# Patient Record
Sex: MALE | Race: WHITE | NOT HISPANIC OR LATINO | URBAN - METROPOLITAN AREA
[De-identification: names, ages, dates, MRNs, and addresses within clinical notes are randomized per-mention and may not be internally consistent; named-entity substitution may affect disease eponyms.]

---

## 2018-11-18 ENCOUNTER — INPATIENT (INPATIENT)
Facility: HOSPITAL | Age: 73
LOS: 2 days | Discharge: OTHER INSTITUTION | DRG: 481 | End: 2018-11-21
Attending: HOSPITALIST | Admitting: FAMILY MEDICINE
Payer: MEDICARE

## 2018-11-18 VITALS
HEIGHT: 67 IN | TEMPERATURE: 98 F | DIASTOLIC BLOOD PRESSURE: 80 MMHG | RESPIRATION RATE: 15 BRPM | HEART RATE: 80 BPM | OXYGEN SATURATION: 93 % | SYSTOLIC BLOOD PRESSURE: 138 MMHG | WEIGHT: 179.9 LBS

## 2018-11-18 DIAGNOSIS — Z90.49 ACQUIRED ABSENCE OF OTHER SPECIFIED PARTS OF DIGESTIVE TRACT: Chronic | ICD-10-CM

## 2018-11-18 DIAGNOSIS — J43.9 EMPHYSEMA, UNSPECIFIED: ICD-10-CM

## 2018-11-18 DIAGNOSIS — I10 ESSENTIAL (PRIMARY) HYPERTENSION: ICD-10-CM

## 2018-11-18 DIAGNOSIS — I25.10 ATHEROSCLEROTIC HEART DISEASE OF NATIVE CORONARY ARTERY WITHOUT ANGINA PECTORIS: ICD-10-CM

## 2018-11-18 DIAGNOSIS — M97.02XA PERIPROSTHETIC FRACTURE AROUND INTERNAL PROSTHETIC LEFT HIP JOINT, INITIAL ENCOUNTER: ICD-10-CM

## 2018-11-18 DIAGNOSIS — M06.9 RHEUMATOID ARTHRITIS, UNSPECIFIED: ICD-10-CM

## 2018-11-18 DIAGNOSIS — M81.0 AGE-RELATED OSTEOPOROSIS WITHOUT CURRENT PATHOLOGICAL FRACTURE: ICD-10-CM

## 2018-11-18 DIAGNOSIS — Z29.9 ENCOUNTER FOR PROPHYLACTIC MEASURES, UNSPECIFIED: ICD-10-CM

## 2018-11-18 DIAGNOSIS — N40.0 BENIGN PROSTATIC HYPERPLASIA WITHOUT LOWER URINARY TRACT SYMPTOMS: ICD-10-CM

## 2018-11-18 DIAGNOSIS — M19.90 UNSPECIFIED OSTEOARTHRITIS, UNSPECIFIED SITE: ICD-10-CM

## 2018-11-18 DIAGNOSIS — F41.9 ANXIETY DISORDER, UNSPECIFIED: ICD-10-CM

## 2018-11-18 DIAGNOSIS — Z96.642 PRESENCE OF LEFT ARTIFICIAL HIP JOINT: Chronic | ICD-10-CM

## 2018-11-18 DIAGNOSIS — E78.5 HYPERLIPIDEMIA, UNSPECIFIED: ICD-10-CM

## 2018-11-18 DIAGNOSIS — E66.9 OBESITY, UNSPECIFIED: ICD-10-CM

## 2018-11-18 DIAGNOSIS — Z96.611 PRESENCE OF RIGHT ARTIFICIAL SHOULDER JOINT: Chronic | ICD-10-CM

## 2018-11-18 LAB
ANION GAP SERPL CALC-SCNC: 8 MMOL/L — SIGNIFICANT CHANGE UP (ref 5–17)
APPEARANCE UR: CLEAR — SIGNIFICANT CHANGE UP
APTT BLD: 29.4 SEC — SIGNIFICANT CHANGE UP (ref 28.5–37)
BILIRUB UR-MCNC: ABNORMAL
BUN SERPL-MCNC: 24 MG/DL — HIGH (ref 7–23)
CALCIUM SERPL-MCNC: 8.7 MG/DL — SIGNIFICANT CHANGE UP (ref 8.5–10.1)
CHLORIDE SERPL-SCNC: 100 MMOL/L — SIGNIFICANT CHANGE UP (ref 96–108)
CO2 SERPL-SCNC: 27 MMOL/L — SIGNIFICANT CHANGE UP (ref 22–31)
COLOR SPEC: YELLOW — SIGNIFICANT CHANGE UP
CREAT SERPL-MCNC: 0.95 MG/DL — SIGNIFICANT CHANGE UP (ref 0.5–1.3)
DIFF PNL FLD: NEGATIVE — SIGNIFICANT CHANGE UP
GLUCOSE SERPL-MCNC: 92 MG/DL — SIGNIFICANT CHANGE UP (ref 70–99)
GLUCOSE UR QL: NEGATIVE — SIGNIFICANT CHANGE UP
HCT VFR BLD CALC: 32.6 % — LOW (ref 39–50)
HGB BLD-MCNC: 10.8 G/DL — LOW (ref 13–17)
INR BLD: 1.16 RATIO — SIGNIFICANT CHANGE UP (ref 0.88–1.16)
KETONES UR-MCNC: ABNORMAL
LEUKOCYTE ESTERASE UR-ACNC: ABNORMAL
MCHC RBC-ENTMCNC: 31.8 PG — SIGNIFICANT CHANGE UP (ref 27–34)
MCHC RBC-ENTMCNC: 33.1 GM/DL — SIGNIFICANT CHANGE UP (ref 32–36)
MCV RBC AUTO: 95.9 FL — SIGNIFICANT CHANGE UP (ref 80–100)
NITRITE UR-MCNC: NEGATIVE — SIGNIFICANT CHANGE UP
NRBC # BLD: 0 /100 WBCS — SIGNIFICANT CHANGE UP (ref 0–0)
PH UR: 5 — SIGNIFICANT CHANGE UP (ref 5–8)
PLATELET # BLD AUTO: 202 K/UL — SIGNIFICANT CHANGE UP (ref 150–400)
POTASSIUM SERPL-MCNC: 4.7 MMOL/L — SIGNIFICANT CHANGE UP (ref 3.5–5.3)
POTASSIUM SERPL-SCNC: 4.7 MMOL/L — SIGNIFICANT CHANGE UP (ref 3.5–5.3)
PROT UR-MCNC: NEGATIVE — SIGNIFICANT CHANGE UP
PROTHROM AB SERPL-ACNC: 13.3 SEC — HIGH (ref 10–12.9)
RBC # BLD: 3.4 M/UL — LOW (ref 4.2–5.8)
RBC # FLD: 15.5 % — HIGH (ref 10.3–14.5)
SODIUM SERPL-SCNC: 135 MMOL/L — SIGNIFICANT CHANGE UP (ref 135–145)
SP GR SPEC: 1.02 — SIGNIFICANT CHANGE UP (ref 1.01–1.02)
TSH SERPL-MCNC: 1.04 UIU/ML — SIGNIFICANT CHANGE UP (ref 0.36–3.74)
UROBILINOGEN FLD QL: 1
WBC # BLD: 11.05 K/UL — HIGH (ref 3.8–10.5)
WBC # FLD AUTO: 11.05 K/UL — HIGH (ref 3.8–10.5)

## 2018-11-18 PROCEDURE — 99285 EMERGENCY DEPT VISIT HI MDM: CPT

## 2018-11-18 PROCEDURE — 73552 X-RAY EXAM OF FEMUR 2/>: CPT | Mod: 26,LT

## 2018-11-18 PROCEDURE — 74018 RADEX ABDOMEN 1 VIEW: CPT | Mod: 26

## 2018-11-18 PROCEDURE — 73700 CT LOWER EXTREMITY W/O DYE: CPT | Mod: 26,LT

## 2018-11-18 PROCEDURE — 99223 1ST HOSP IP/OBS HIGH 75: CPT | Mod: GC,AI

## 2018-11-18 PROCEDURE — 71045 X-RAY EXAM CHEST 1 VIEW: CPT | Mod: 26

## 2018-11-18 PROCEDURE — 73502 X-RAY EXAM HIP UNI 2-3 VIEWS: CPT | Mod: 26,LT

## 2018-11-18 PROCEDURE — 93010 ELECTROCARDIOGRAM REPORT: CPT

## 2018-11-18 RX ORDER — SODIUM CHLORIDE 9 MG/ML
1000 INJECTION, SOLUTION INTRAVENOUS
Qty: 0 | Refills: 0 | Status: DISCONTINUED | OUTPATIENT
Start: 2018-11-18 | End: 2018-11-19

## 2018-11-18 RX ORDER — HYDROCORTISONE 20 MG
25 TABLET ORAL EVERY 8 HOURS
Qty: 0 | Refills: 0 | Status: DISCONTINUED | OUTPATIENT
Start: 2018-11-19 | End: 2018-11-19

## 2018-11-18 RX ORDER — HYDROCORTISONE 20 MG
50 TABLET ORAL ONCE
Qty: 0 | Refills: 0 | Status: COMPLETED | OUTPATIENT
Start: 2018-11-19 | End: 2018-11-19

## 2018-11-18 RX ORDER — HYDROMORPHONE HYDROCHLORIDE 2 MG/ML
1 INJECTION INTRAMUSCULAR; INTRAVENOUS; SUBCUTANEOUS EVERY 4 HOURS
Qty: 0 | Refills: 0 | Status: DISCONTINUED | OUTPATIENT
Start: 2018-11-18 | End: 2018-11-19

## 2018-11-18 RX ORDER — ASPIRIN/CALCIUM CARB/MAGNESIUM 324 MG
81 TABLET ORAL DAILY
Qty: 0 | Refills: 0 | Status: DISCONTINUED | OUTPATIENT
Start: 2018-11-18 | End: 2018-11-18

## 2018-11-18 RX ORDER — FLUOXETINE HCL 10 MG
60 CAPSULE ORAL DAILY
Qty: 0 | Refills: 0 | Status: DISCONTINUED | OUTPATIENT
Start: 2018-11-18 | End: 2018-11-19

## 2018-11-18 RX ORDER — TAMSULOSIN HYDROCHLORIDE 0.4 MG/1
0.4 CAPSULE ORAL AT BEDTIME
Qty: 0 | Refills: 0 | Status: DISCONTINUED | OUTPATIENT
Start: 2018-11-18 | End: 2018-11-19

## 2018-11-18 RX ORDER — ALPRAZOLAM 0.25 MG
1 TABLET ORAL
Qty: 0 | Refills: 0 | COMMUNITY

## 2018-11-18 RX ORDER — LISINOPRIL 2.5 MG/1
1 TABLET ORAL
Qty: 0 | Refills: 0 | COMMUNITY

## 2018-11-18 RX ORDER — FUROSEMIDE 40 MG
1 TABLET ORAL
Qty: 0 | Refills: 0 | COMMUNITY

## 2018-11-18 RX ORDER — FLUOXETINE HCL 10 MG
60 CAPSULE ORAL ONCE
Qty: 0 | Refills: 0 | Status: DISCONTINUED | OUTPATIENT
Start: 2018-11-18 | End: 2018-11-18

## 2018-11-18 RX ORDER — FLUOXETINE HCL 10 MG
1 CAPSULE ORAL
Qty: 0 | Refills: 0 | COMMUNITY

## 2018-11-18 RX ORDER — SODIUM CHLORIDE 9 MG/ML
500 INJECTION, SOLUTION INTRAVENOUS
Qty: 0 | Refills: 0 | Status: DISCONTINUED | OUTPATIENT
Start: 2018-11-18 | End: 2018-11-19

## 2018-11-18 RX ORDER — METHADONE HYDROCHLORIDE 40 MG/1
5 TABLET ORAL EVERY 8 HOURS
Qty: 0 | Refills: 0 | Status: DISCONTINUED | OUTPATIENT
Start: 2018-11-18 | End: 2018-11-19

## 2018-11-18 RX ORDER — CYCLOBENZAPRINE HYDROCHLORIDE 10 MG/1
10 TABLET, FILM COATED ORAL THREE TIMES A DAY
Qty: 0 | Refills: 0 | Status: DISCONTINUED | OUTPATIENT
Start: 2018-11-18 | End: 2018-11-18

## 2018-11-18 RX ORDER — HEPARIN SODIUM 5000 [USP'U]/ML
5000 INJECTION INTRAVENOUS; SUBCUTANEOUS EVERY 8 HOURS
Qty: 0 | Refills: 0 | Status: COMPLETED | OUTPATIENT
Start: 2018-11-18 | End: 2018-11-18

## 2018-11-18 RX ORDER — SIMVASTATIN 20 MG/1
5 TABLET, FILM COATED ORAL AT BEDTIME
Qty: 0 | Refills: 0 | Status: DISCONTINUED | OUTPATIENT
Start: 2018-11-18 | End: 2018-11-19

## 2018-11-18 RX ORDER — FINASTERIDE 5 MG/1
5 TABLET, FILM COATED ORAL DAILY
Qty: 0 | Refills: 0 | Status: DISCONTINUED | OUTPATIENT
Start: 2018-11-18 | End: 2018-11-19

## 2018-11-18 RX ORDER — FUROSEMIDE 40 MG
20 TABLET ORAL DAILY
Qty: 0 | Refills: 0 | Status: DISCONTINUED | OUTPATIENT
Start: 2018-11-18 | End: 2018-11-18

## 2018-11-18 RX ORDER — ALPRAZOLAM 0.25 MG
0.5 TABLET ORAL AT BEDTIME
Qty: 0 | Refills: 0 | Status: DISCONTINUED | OUTPATIENT
Start: 2018-11-18 | End: 2018-11-19

## 2018-11-18 RX ORDER — LISINOPRIL 2.5 MG/1
20 TABLET ORAL DAILY
Qty: 0 | Refills: 0 | Status: DISCONTINUED | OUTPATIENT
Start: 2018-11-18 | End: 2018-11-19

## 2018-11-18 RX ORDER — ALBUTEROL 90 UG/1
2.5 AEROSOL, METERED ORAL EVERY 8 HOURS
Qty: 0 | Refills: 0 | Status: DISCONTINUED | OUTPATIENT
Start: 2018-11-18 | End: 2018-11-19

## 2018-11-18 RX ORDER — TIOTROPIUM BROMIDE 18 UG/1
1 CAPSULE ORAL; RESPIRATORY (INHALATION) DAILY
Qty: 0 | Refills: 0 | Status: DISCONTINUED | OUTPATIENT
Start: 2018-11-18 | End: 2018-11-19

## 2018-11-18 RX ORDER — HYDROMORPHONE HYDROCHLORIDE 2 MG/ML
1 INJECTION INTRAMUSCULAR; INTRAVENOUS; SUBCUTANEOUS ONCE
Qty: 0 | Refills: 0 | Status: DISCONTINUED | OUTPATIENT
Start: 2018-11-18 | End: 2018-11-18

## 2018-11-18 RX ADMIN — ALBUTEROL 2.5 MILLIGRAM(S): 90 AEROSOL, METERED ORAL at 23:24

## 2018-11-18 RX ADMIN — HYDROMORPHONE HYDROCHLORIDE 1 MILLIGRAM(S): 2 INJECTION INTRAMUSCULAR; INTRAVENOUS; SUBCUTANEOUS at 16:12

## 2018-11-18 RX ADMIN — HYDROMORPHONE HYDROCHLORIDE 1 MILLIGRAM(S): 2 INJECTION INTRAMUSCULAR; INTRAVENOUS; SUBCUTANEOUS at 20:09

## 2018-11-18 RX ADMIN — HYDROMORPHONE HYDROCHLORIDE 1 MILLIGRAM(S): 2 INJECTION INTRAMUSCULAR; INTRAVENOUS; SUBCUTANEOUS at 21:00

## 2018-11-18 RX ADMIN — HEPARIN SODIUM 5000 UNIT(S): 5000 INJECTION INTRAVENOUS; SUBCUTANEOUS at 16:33

## 2018-11-18 RX ADMIN — FINASTERIDE 5 MILLIGRAM(S): 5 TABLET, FILM COATED ORAL at 18:25

## 2018-11-18 RX ADMIN — SODIUM CHLORIDE 75 MILLILITER(S): 9 INJECTION, SOLUTION INTRAVENOUS at 23:30

## 2018-11-18 RX ADMIN — METHADONE HYDROCHLORIDE 5 MILLIGRAM(S): 40 TABLET ORAL at 21:43

## 2018-11-18 RX ADMIN — HYDROMORPHONE HYDROCHLORIDE 1 MILLIGRAM(S): 2 INJECTION INTRAMUSCULAR; INTRAVENOUS; SUBCUTANEOUS at 15:56

## 2018-11-18 RX ADMIN — TAMSULOSIN HYDROCHLORIDE 0.4 MILLIGRAM(S): 0.4 CAPSULE ORAL at 21:43

## 2018-11-18 RX ADMIN — SIMVASTATIN 5 MILLIGRAM(S): 20 TABLET, FILM COATED ORAL at 21:43

## 2018-11-18 RX ADMIN — HYDROMORPHONE HYDROCHLORIDE 1 MILLIGRAM(S): 2 INJECTION INTRAMUSCULAR; INTRAVENOUS; SUBCUTANEOUS at 12:36

## 2018-11-18 RX ADMIN — Medication 60 MILLIGRAM(S): at 18:26

## 2018-11-18 RX ADMIN — HEPARIN SODIUM 5000 UNIT(S): 5000 INJECTION INTRAVENOUS; SUBCUTANEOUS at 21:43

## 2018-11-18 NOTE — H&P ADULT - NSHPLABSRESULTS_GEN_ALL_CORE
LABS:                        10.8   11.05 )-----------( 202      ( 18 Nov 2018 11:14 )             32.6     11-18    135  |  100  |  24<H>  ----------------------------<  92  4.7   |  27  |  0.95    Ca    8.7      18 Nov 2018 11:11        PT/INR - ( 18 Nov 2018 12:18 )   PT: 13.3 sec;   INR: 1.16 ratio         PTT - ( 18 Nov 2018 12:18 )  PTT:29.4 sec    IMPRESSION:  Left hip arthroplasty. Comminuted slightly displaced left periprosthetic   fracture of the left proximal femur, as discussed. Fracture of the left   superior pubic ramus.    The distal half of the femur cannot be assessed for the presence or   absence of fractures due to extensive motion artifact. However, the   radiographs reveal no evidence of left distal femur fracture.    < end of copied text >    < from: Xray Hip w/ Pelvis 2 or 3 Views, Left (11.18.18 @ 11:48) >    AP pelvis, 2 views left hip.    There is a left THR. There is acute slightly displaced periprosthetic   fracture involving lesser trochanter and proximal shaft. No dislocation.   Pelvic ring sacroiliac joints pubic symphysis intact. Marked global   narrowing right hip joint space. Extensive arterial calcification.    Impression: Acute periprosthetic fracture left proximal femur.    < end of copied text >    < from: Xray Chest 1 View AP/PA (11.18.18 @ 11:46) >    Status post median sternotomy. Heart magnified by AP film shallow   inspiration. Calcified aortic arch. Grossly clear lungs. No consolidation   or effusion. Partially visualized hardware lower cervical spine. Right   shoulder arthroplasty. Trace right upper quadrant. Metallic fusion lumbar   spine    Impression: Grossly clear lungs.    < end of copied text >    < from: Xray Femur 2 Views, Left (11.18.18 @ 11:46) >    Impression: Periprosthetic fracture left proximal femur involving the   lesser trochanter and proximal shaft described in a separate report.   Remainder of the left femur is intact. No dislocation. Left THR noted.   Extensive arterial calcification.    < end of copied text >

## 2018-11-18 NOTE — ED ADULT NURSE NOTE - PMH
HTN (hypertension) COPD (chronic obstructive pulmonary disease)    HTN (hypertension)    Osteoarthritis    Rheumatoid arthritis    S/P CABG x 3    S/P hip replacement, left

## 2018-11-18 NOTE — CONSULT NOTE ADULT - ASSESSMENT
A/P: 72y Male with L PP hip fx    Pain control  NWB L LE, bedrest  imaging reviewed  Admit to medical team and mgmt appreciated  Medical clearance/optimization for OR for tomorrow 11/19/18  npo except meds after midnight   iv fluids while npo  hold any chemical AC after midnight  discussed with attending and will advise if plan changes

## 2018-11-18 NOTE — H&P ADULT - PROBLEM SELECTOR PLAN 8
DVT proph- patient takes alendronate weekly.  patient has medication with him, plan to give to pharmacy patient takes alendronate weekly (on Wednesdays).   patient's wife states that she will bring in the medication, plan to give to pharmacy patient takes alendronate weekly (on Wednesdays).   patient's wife states that she will bring in the medication

## 2018-11-18 NOTE — CONSULT NOTE ADULT - PROBLEM SELECTOR RECOMMENDATION 4
planned for OR  will optimize for OR from pulm point  copd  ex smoker  cad  prognosis guarded  I aundrea  will follow  ortho eval noted  discussed with family

## 2018-11-18 NOTE — H&P ADULT - NSHPSOCIALHISTORY_GEN_ALL_CORE
patient lives in [place] and is here visiting family for the holidays.  patient uses a walker and a cane.  quite smoking 6 years ago, drinks 1 drink per week, denies illicit drug use.

## 2018-11-18 NOTE — ED PROVIDER NOTE - ATTESTATION, MLM
[facility]    [address] [place] [address]    Phone:  [phone]    Fax:  [phone]       Thank You for choosing us for your health care visit. We are glad to serve you and happy to provide you with this summary of your visit. Please help us to ensure we have accurate records. If you find anything that needs to be changed, please let our staff know as soon as possible.          Your Demographic Information     Patient Name Sex Duong Carpio Male 1995       Ethnic Group Patient Race    Not of  or  Origin White      Your Visit Details     Date & Time Provider Department    2017 8:00 AM Duong Pittman MD [facility]      We Ordered or Performed the Following     XR HIP 2 VW RIGHT     XR PELVIS 1 OR 2 VW       Conditions Discussed Today or Order-Related Diagnoses        Comments    Right hip pain    -  Primary       Your Vitals Were     Temp Height Weight BMI Smoking Status       97.6 °F (36.4 °C) (Temporal Artery) 5' 10\" (1.778 m) (56 %, Z= 0.16)* 155 lb (70.3 kg) (52 %, Z= 0.06)* 22.24 kg/m2 (45 %, Z= -0.14)* Current Every Day Smoker     *Growth percentiles are based on CDC 2-20 Years data.      Medications Prescribed or Re-Ordered Today     None      Your Current Medications Are        Disp Refills Start End    cetirizine (ZYRTEC) 10 MG tablet        Sig - Route: Take 10 mg by mouth daily. - Oral    Class: Historical Med    loratadine (CLARITIN) 10 MG tablet 30 tablet 1 2016    Sig - Route: Take 1 tablet by mouth daily. Indications: Hayfever - Oral    fluticasone (FLONASE) 50 MCG/ACT nasal spray 16 g 12 3/17/2016     Sig - Route: Spray 1 spray in each nostril daily as needed (allergic rhinitis). Indications: Hayfever - Nasal    Class: Eprescribe    EPINEPHrine (EPIPEN) 0.3 MG/0.3ML injection        Sig - Route: Inject 0.3 mg into the muscle once. - Intramuscular    Class: Historical Med      Discontinued Medications        Reason for Discontinue    buPROPion (WELLBUTRIN XL) 150 MG 24 hr tablet Therapy Completed    VIVITROL 380 MG injection Therapy Completed      Allergies     Cat Dander Dermatitis    Nut - Multiple     Peanut RASH      Immunizations History as of 2/2/2017     Name Date    DTaP 10/30/2002, 2/4/1999, 5/13/1998, 2/11/1998, 1997    HEPATITIS A CHILD 7/17/2013    HIB 2/4/1999, 2/11/1998, 1997    HPV QUAD 8/24/2011, 3/28/2011, 2/28/2011    Hepatitis B Child 2/11/1999, 5/13/1998, 1997    Influenza 11/20/2012, 11/8/2011, 12/3/2010, 11/13/2006, 10/6/2005, 11/28/2003    Influenza Quadrivalent Live 12/10/2013    Influenza Quadrivalent Preservative Free 10/21/2015  4:40 PM    MMR 10/30/2002, 11/11/1998    Meningococcal Conjugate MCV4P (Menactra) 12/10/2013, 7/28/2009    Polio, INACTIVATED 10/30/2002    Polio, ORAL 2/4/1999, 2/11/1998, 1997    Tdap 7/28/2009    Varicella 11/21/2007, 11/11/1998      Problem List as of 2/2/2017     Myopia OU    Fitting and adjustment of spectacles and contact lenses    Hydrocele    Cannabis abuse    Depressive disorder, not elsewhere classified    Concussion      Results of Testing Done Today             Patient Instructions     None       I have reviewed and confirmed nurses' notes for patient's medications, allergies, medical history, and surgical history.

## 2018-11-18 NOTE — ED PROVIDER NOTE - OBJECTIVE STATEMENT
71 yo white male from [place] with PMHx of COPD, HTN, HLD, MI, CABG, PTCA with STENTS, RA and OA was well until stumbled while putting on pants and landed on left hip. Since that time, 2-hours ago, patient has been unable to walk or bear any weight on left lower extremity as a result of left hip pain. Does not have any headache/head trauma, neck/chest or back pain and denies any LOC.

## 2018-11-18 NOTE — ED PROVIDER NOTE - CARE PLAN
Principal Discharge DX:	Periprosthetic fracture around internal prosthetic left hip joint, initial encounter

## 2018-11-18 NOTE — H&P ADULT - HISTORY OF PRESENT ILLNESS
71yo male PMH emphysema, HTN, HLD, MI, CABG s/p 3 stents (2008), Rheumatoid arthritis, and osteoarthritis presenting with left sided hip pain due to mechanical fall this morning. Patient stated that he was pulling up his pants and lost balance and fallen. He was laying on the ground for 30 minutes, as wife was not able to pick him up. Patient is in severe 10/10 pain. Patient denies LOC, headaches, fevers or chills but does admit to numbness and tingling alongside left lower extremity.    In the ED: vitals stable. CBC, BMP, UA, xray left hip, xray left femur, CT left femur, CT left hip ordered and significant for: EBC 11.05, Hgb 10.8, elevated BUN 24  EKG:  xray left hip: Acute periprosthetic fracture left proximal femur.  xray left femur:  Periprosthetic fracture left proximal femur involving the   lesser trochanter and proximal shaft.  CT femur: Left hip arthroplasty. Comminuted slightly displaced left periprosthetic   fracture of the left proximal femur, as discussed. Fracture of the left   superior pubic ramus.  chest xray: Grossly clear lungs. 71yo male PMH emphysema, HTN, HLD, MI, CABG s/p 3 stents (2008), Rheumatoid arthritis, and osteoarthritis presenting with left sided hip pain due to mechanical fall this morning. Patient stated that he was pulling up his pants and lost balance and fallen. He was laying on the ground for 30 minutes, as wife was not able to pick him up. Patient is in severe 10/10 pain. Patient denies LOC, headaches, fevers or chills but does admit to numbness and tingling alongside left lower extremity.    In the ED: vitals stable. CBC, BMP, UA, xray left hip, xray left femur, CT left femur, CT left hip ordered and significant for: EBC 11.05, Hgb 10.8, elevated BUN 24  EKG: sinus rhythm, 78 with 1st degree AV block  xray left hip: Acute periprosthetic fracture left proximal femur.  xray left femur:  Periprosthetic fracture left proximal femur involving the   lesser trochanter and proximal shaft.  CT femur: Left hip arthroplasty. Comminuted slightly displaced left periprosthetic   fracture of the left proximal femur, as discussed. Fracture of the left   superior pubic ramus.  chest xray: Grossly clear lungs. 73yo male PMH emphysema, HTN, HLD, MI, CABG s/p 3 stents (2008), Rheumatoid arthritis, and osteoarthritis presenting with left sided hip pain due to mechanical fall this morning. Patient stated that he was pulling up his pants, lost his balance, and fell. He was laying on the ground for 30 minutes, as wife was not able to pick him up. Patient is in severe 10/10 pain. Patient denies LOC, headaches, fevers or chills, but does admit to numbness and tingling alongside left lower extremity and reports this is chronic since a prior back surgery.    In the ED: vitals stable. CBC, BMP, UA, xray left hip, xray left femur, CT left femur, CT left hip ordered and significant for: WBC 11.05, Hgb 10.8, elevated BUN 24  EKG: sinus rhythm, 78 with 1st degree AV block  xray left hip: Acute periprosthetic fracture left proximal femur.  xray left femur:  Periprosthetic fracture left proximal femur involving the   lesser trochanter and proximal shaft.  CT femur: Left hip arthroplasty. Comminuted slightly displaced left periprosthetic   fracture of the left proximal femur, as discussed. Fracture of the left   superior pubic ramus.  chest xray: Grossly clear lungs.

## 2018-11-18 NOTE — ED ADULT NURSE NOTE - OBJECTIVE STATEMENT
Pt presents to ED via EMS with family, states he was standing & putting on his pants, lost his balance. Pt states "that's happening a lot lately." Pt is C/O left hip pain, unable to stand after fall. Pt reports taking Methadone prior to coming to ED. Pt states pain is 2/10 at rest, increased with movement

## 2018-11-18 NOTE — H&P ADULT - ASSESSMENT
73 yo male PMH emphysema, HTN, HLD, MI, CABG s/p 3 stents (2008), Rheumatoid arthritis, and osteoarthritis presenting with left sided hip pain due to mechanical fall admitted for Acute periprosthetic fracture left proximal femur and fracture of left superior pubic ramus.

## 2018-11-18 NOTE — CONSULT NOTE ADULT - SUBJECTIVE AND OBJECTIVE BOX
72y Male community ambulator w/ walker and cane for long distances presents c/o L hip pain and inability to ambulate sp mechanical fall today. Denies HS/LOC. Denies numbness/tingling. Denies fever/chills. Denies pain/injury elsewhere. he lives in [place] and is visiting for the holidays. he had a L GENO done in [place] over 25 years ago 2/2 OA. He denies any chemical AC.    ros: denies cp/sob, all other ros neg other than noted above    PAST MEDICAL & SURGICAL HISTORY:  S/P CABG x 3  S/P hip replacement, left  Rheumatoid arthritis  Osteoarthritis  COPD (chronic obstructive pulmonary disease)  HTN (hypertension)  R reverse TSA    MEDICATIONS  (STANDING):  see ehr  Allergies    No Known Allergies    Intolerances                        10.8   11.05 )-----------( 202      ( 18 Nov 2018 11:14 )             32.6     18 Nov 2018 11:11    135    |  100    |  24     ----------------------------<  92     4.7     |  27     |  0.95     Ca    8.7        18 Nov 2018 11:11      PT/INR - ( 18 Nov 2018 12:18 )   PT: 13.3 sec;   INR: 1.16 ratio         PTT - ( 18 Nov 2018 12:18 )  PTT:29.4 sec  Vital Signs Last 24 Hrs  T(C): 36.6 (11-18-18 @ 10:30), Max: 36.6 (11-18-18 @ 10:30)  T(F): 97.8 (11-18-18 @ 10:30), Max: 97.8 (11-18-18 @ 10:30)  HR: 80 (11-18-18 @ 10:30) (80 - 80)  BP: 138/80 (11-18-18 @ 10:30) (138/80 - 138/80)  BP(mean): --  RR: 15 (11-18-18 @ 10:30) (15 - 15)  SpO2: 93% (11-18-18 @ 10:30) (93% - 93%)  Imaging: XR demonstrates L pp hip fx    CT LLE:  IMPRESSION:   Left hip arthroplasty. Comminuted slightly displaced left periprosthetic   fracture of the left proximal femur, as discussed. Fracture of the left   superior pubic ramus.     The distal half of the femur cannot be assessed for the presence or absence   of fractures due to extensive motion artifact. However, the radiographs   reveal no evidence of left distal femur fracture.     Extensive arterial calcifications as noted above.         Physical Exam  Gen: NAD  L LE: skin intact, well healed scar over hip, posterior approach from aforementioned previous GENO surgery, unable to SLR L LE, + log roll L LE, +ttp hip/groin, no ttp elsewhere, +ehl/fhl/ta/gs function, no calf ttp, dp/pt pulse intact, compartments soft, no ttp over distal femur and knee    Secondary survey: benign, nv intact, able to SLR contralateral leg, negative log roll contralateral leg, no bony ttp elsewhere, well healed scar anterior right shoulder

## 2018-11-18 NOTE — H&P ADULT - PSH
History of appendectomy    History of right shoulder replacement    History of total left hip arthroplasty

## 2018-11-18 NOTE — CHART NOTE - NSCHARTNOTEFT_GEN_A_CORE
Orthopedic Pre Op Note    PreOp Dx: left pp hip fx  Procedure: orif vs revision GENO  Surgeon:                         10.8   11.05 )-----------( 202      ( 18 Nov 2018 11:14 )             32.6     18 Nov 2018 11:11    135    |  100    |  24     ----------------------------<  92     4.7     |  27     |  0.95     Ca    8.7        18 Nov 2018 11:11      PT/INR - ( 18 Nov 2018 12:18 )   PT: 13.3 sec;   INR: 1.16 ratio         PTT - ( 18 Nov 2018 12:18 )  PTT:29.4 sec  Labs: AM  CXR: Done  EKG: Done  UA: Pending  T&S: Done  Clearance: med clear'd pending cardiac clearance  Consent: done    Plan: 72y Male with left PP hip fx  -NPO after midnight except meds  -IVF while NPO  -hold all anticoagulation after midnight  -Plan for OR tomorrow 11/19/18

## 2018-11-18 NOTE — H&P ADULT - PROBLEM SELECTOR PLAN 7
chronic   stable  continue finastaride daily   and flomax daily chronic   stable  continue Finasteride daily   and Flomax daily chronic   patient's is distended, r/u urinary cause as patient did not take medication this morning.  f/u bladder scan  continue Finasteride daily   and Flomax daily chronic   Due to his distended abdomen and discomfort, he had bladder scan which showed 185 cc urine in bladder. Continue to monitor for urine output.  continue Finasteride daily and Flomax daily

## 2018-11-18 NOTE — CONSULT NOTE ADULT - SUBJECTIVE AND OBJECTIVE BOX
Date/Time Patient Seen:  		  Referring MD:   Data Reviewed	       Patient is a 72y old  Male who presents with a chief complaint of fall, hip pain (18 Nov 2018 14:09)      Subjective/HPI  in bed  seen and examined  vs and meds reviewed    H and P reviewed  ER provider note reviewed  family at the bedside    labs and imaging reviewed    H&P Adult [Charted Location: [facility] [address]] [Authored: 18-Nov-2018 14:09]- for Visit: [id number], Complete, Revised, Signed in Full, General    History and Physical:   Source of Information	Patient, Spouse/Significant Other  Outpatient Providers	PMD: [facility] [place]  Cardiologist: Dr. Green ([place])  Rheumatologist- Dr. Cruz ([place])     Language:  · Patient/Family of Limited English Proficiency	No       History of Present Illness:  Reason for Admission: fall, hip pain    History of Present Illness:   71yo male PMH emphysema, HTN, HLD, MI, CABG s/p 3 stents (2008), Rheumatoid arthritis, and osteoarthritis presenting with left sided hip pain due to mechanical fall this morning. Patient stated that he was pulling up his pants, lost his balance, and fell. He was laying on the ground for 30 minutes, as wife was not able to pick him up. Patient is in severe 10/10 pain. Patient denies LOC, headaches, fevers or chills, but does admit to numbness and tingling alongside left lower extremity and reports this is chronic since a prior back surgery.    In the ED: vitals stable. CBC, BMP, UA, xray left hip, xray left femur, CT left femur, CT left hip ordered and significant for: WBC 11.05, Hgb 10.8, elevated BUN 24  EKG: sinus rhythm, 78 with 1st degree AV block  xray left hip: Acute periprosthetic fracture left proximal femur.  xray left femur:  Periprosthetic fracture left proximal femur involving the   lesser trochanter and proximal shaft.  CT femur: Left hip arthroplasty. Comminuted slightly displaced left periprosthetic   fracture of the left proximal femur, as discussed. Fracture of the left   superior pubic ramus.  chest xray: Grossly clear lungs.     PAST MEDICAL & SURGICAL HISTORY:  S/P CABG x 3  S/P hip replacement, left  Rheumatoid arthritis  Osteoarthritis  COPD (chronic obstructive pulmonary disease)  HTN (hypertension)  History of appendectomy  History of right shoulder replacement  History of total left hip arthroplasty        Medication list         MEDICATIONS  (STANDING):  ALBUTerol    0.083% 2.5 milliGRAM(s) Nebulizer every 8 hours  finasteride 5 milliGRAM(s) Oral daily  FLUoxetine 60 milliGRAM(s) Oral daily  heparin  Injectable 5000 Unit(s) SubCutaneous every 8 hours  lactated ringers. 500 milliLiter(s) (50 mL/Hr) IV Continuous <Continuous>  lactated ringers. 1000 milliLiter(s) (75 mL/Hr) IV Continuous <Continuous>  lisinopril 20 milliGRAM(s) Oral daily  methadone    Tablet 5 milliGRAM(s) Oral every 8 hours  methylPREDNISolone 4 milliGRAM(s) Oral daily  simvastatin 5 milliGRAM(s) Oral at bedtime  tamsulosin 0.4 milliGRAM(s) Oral at bedtime  tiotropium 18 MICROgram(s) Capsule 1 Capsule(s) Inhalation daily    MEDICATIONS  (PRN):  ALPRAZolam 0.5 milliGRAM(s) Oral at bedtime PRN anxiety  HYDROmorphone  Injectable 1 milliGRAM(s) IV Push every 4 hours PRN breakthrough pain         Vitals log        ICU Vital Signs Last 24 Hrs  T(C): 36.6 (18 Nov 2018 15:42), Max: 36.6 (18 Nov 2018 10:30)  T(F): 97.8 (18 Nov 2018 15:42), Max: 97.8 (18 Nov 2018 10:30)  HR: 88 (18 Nov 2018 15:42) (77 - 88)  BP: 108/75 (18 Nov 2018 15:42) (108/75 - 138/80)  BP(mean): --  ABP: --  ABP(mean): --  RR: 16 (18 Nov 2018 15:42) (15 - 16)  SpO2: 95% (18 Nov 2018 15:42) (93% - 95%)           Input and Output:  I&O's Detail      Lab Data                        10.8   11.05 )-----------( 202      ( 18 Nov 2018 11:14 )             32.6     11-18    135  |  100  |  24<H>  ----------------------------<  92  4.7   |  27  |  0.95    Ca    8.7      18 Nov 2018 11:11      quit tob 3 months ago          Review of Systems	  left hip pain    Objective     Physical Examination    heart s1s2  lung dec BS  abd soft  cn grossly int      Pertinent Lab findings & Imaging      Weinstein:  NO   Adequate UO     I&O's Detail           Discussed with:     Cultures:	        Radiology

## 2018-11-18 NOTE — ED PROVIDER NOTE - CONSTITUTIONAL, MLM
normal... Cushingoid appearing white male, well nourished, awake, alert, oriented to person, place, time/situation and in mild apparent distress.

## 2018-11-18 NOTE — H&P ADULT - ATTENDING COMMENTS
Patient seen and examined. Case discussed in detail with PGY1 Dr. Sheriff and PGY2 Dr Ramos. Agree with assessment and plan above. It has been edited where necessary.    Will add the following:   -Abdominal distension: urinary retention ruled out. Will check AXR now to r/o fecal retention. He does report having a bowel movement yesterday, but his is at risk of constipation given his chronic treatment with opioids. Will start bowel regimen if needed.  -The patient reports having chronic left LE decreased sensation and reports there is currently no change from his baseline in terms of sensation or strength.   -I have also discussed plan at length with patient and his family at bedside. Will consult Pulm as patient has recent diagnosis of emphysema and presently short of breath with wheezing.

## 2018-11-18 NOTE — H&P ADULT - NSHPOUTPATIENTPROVIDERS_GEN_ALL_CORE
PMD: [facility] [place]  Cardiologist: Dr. Green ([place])  Rheumatologist- Dr. Cruz ([place])

## 2018-11-18 NOTE — H&P ADULT - NSHPREVIEWOFSYSTEMS_GEN_ALL_CORE
REVIEW OF SYSTEMS:    CONSTITUTIONAL: + LE weakness,- fevers or chills  EYES/ENT: No visual changes, headaches, blurry vision, LOC;  No vertigo or throat pain   NECK: No pain or stiffness  RESPIRATORY: No cough, + wheezing, - hemoptysis; No shortness of breath  CARDIOVASCULAR: No chest pain or palpitations  GASTROINTESTINAL: No abdominal or epigastric pain. No nausea, vomiting, or hematemesis; No diarrhea or constipation. No melena or hematochezia.  GENITOURINARY: No dysuria, frequency or hematuria  NEUROLOGICAL: + numbness and weakness in left lower extremity  SKIN: No itching REVIEW OF SYSTEMS:    CONSTITUTIONAL: + LE weakness,- fevers or chills  EYES/ENT: No visual changes, headaches, blurry vision, LOC;  No vertigo or throat pain   NECK: No pain or stiffness  RESPIRATORY: No cough, + wheezing, - hemoptysis; No shortness of breath  CARDIOVASCULAR: No chest pain or palpitations  GASTROINTESTINAL: +abdominal distension and discomfort, No epigastric pain. No nausea, vomiting, or hematemesis; No diarrhea or constipation. No melena or hematochezia. No loss of continence.  GENITOURINARY: No dysuria, frequency or hematuria, no loss of continence  NEUROLOGICAL: + numbness and weakness in left lower extremity  SKIN: No itching

## 2018-11-18 NOTE — H&P ADULT - PROBLEM SELECTOR PLAN 3
chronic   stable  continue lisinopril and lasix with hold parameters chronic   stable  continue lisinopril with hold parameters  start LR 50 ml/hr after midnight per ortho  holding Lasix for now due to LR while NPO chronic   continue lisinopril with hold parameters  start LR 50 ml/hr after midnight per ortho  holding Lasix for now due to LR while NPO  Restart post-op.

## 2018-11-18 NOTE — H&P ADULT - NSHPPHYSICALEXAM_GEN_ALL_CORE
PHYSICAL EXAM:  GENERAL: + acute distress due to pain in left hip  HEAD:  Atraumatic, Normocephalic  EYES: EOMI, conjunctiva and sclera clear  NECK: Supple, no JVD  CHEST/LUNG: course breath sounds b/l; + diffuse wheezes, rales, or rhonchi  HEART: Regular rate and rhythm; No murmurs, rubs, or gallops  ABDOMEN: , non-tender, distended in all 4 quadrants; normal bowel sounds.   EXTREMITIES:  2+ peripheral pulses b/l, No clubbing or edema  NEUROLOGY: A&O x 3, decrease sensation on L4 distribution of LLE  SKIN: upper extremities /bl have extensive bruising. PHYSICAL EXAM:  GENERAL: + acute distress due to pain in left hip  HEAD:  Atraumatic, Normocephalic  EYES: EOMI, conjunctiva and sclera clear  NECK: Supple, no JVD  CHEST/LUNG: course breath sounds b/l; + diffuse faint wheezes, no rales or rhonchi  HEART: Regular rate and rhythm; No murmurs, rubs, or gallops  ABDOMEN: , non-tender, distended in all 4 quadrants; normal bowel sounds.   EXTREMITIES:  2+ peripheral pulses b/l, No clubbing or edema  NEUROLOGY: A&O x 3, decreased sensation over L3-L4 dermatome of LLE (reportedly chronic per patient--denies any change in severity)  SKIN: bilateral upper extremities with extensive ecchymoses, striae on bilateral lower abdomen

## 2018-11-18 NOTE — H&P ADULT - PROBLEM SELECTOR PLAN 1
admit to medicine  CT and xray show fractures of periprosthetic left proximal femur and superior pubic ramus on L.   pain control  IV fluids  NPO after midnight with IV fluids  No anticoagulation after midnight  ortho following  patient medically optimized and low risk for intermediate risk procedure. RICI 0.9%.  Pending cardiac clearance- Dr. Tavares consulted. admit to medicine  CT and xray show fractures of periprosthetic left proximal femur and superior pubic ramus on L.   suspect pathologic fracture 2/2 to osteoporosis from chronic steroid use.   pain control  IV fluids  NPO after midnight with IV fluids  No anticoagulation after midnight  ortho following  patient medically optimized and low risk for intermediate risk procedure. RICI 0.9%.  Pending cardiac clearance- Dr. Tavares consulted.

## 2018-11-18 NOTE — H&P ADULT - PMH
COPD (chronic obstructive pulmonary disease)    HTN (hypertension)    Osteoarthritis    Rheumatoid arthritis    S/P CABG x 3    S/P hip replacement, left

## 2018-11-18 NOTE — H&P ADULT - PROBLEM SELECTOR PLAN 6
chronic   stable  continue alprazolam 5mg PRN (wife states that patient takes it every night)  prozac 60mg daily

## 2018-11-18 NOTE — H&P ADULT - PROBLEM SELECTOR PLAN 9
DVT proph- heparin On no home meds.   Wheezing, SOB today.   Will give Albuterol nebs q8h for now.  May need to be discharged with Albuterol MDI.  Pulm Dr. Valente to see in AM.

## 2018-11-18 NOTE — H&P ADULT - PROBLEM SELECTOR PLAN 5
chronic   stable  continue medrol chronic   stable  continue medrol  patient low risk for HPA axis suppression  will not stress dose at the moment, will monitor closely for signs of adrenal insufficiency chronic   stable  continue methylprednisolone  Based on his current dose of methylprednisolone, he would not require periop stress dose steroids, but given his Cushingoid appearance he is likely HPA axis suppressed. Will give his regular dose of Methylprednisolone tomorrow morning, Hydrocortisone 50 mg IV immediately pre-op, and then Hydrocortisone 25 mg IV q8 hours for first 24 hours post-op, and then resume his home dose.

## 2018-11-19 LAB
ABO RH CONFIRMATION: SIGNIFICANT CHANGE UP
ANION GAP SERPL CALC-SCNC: 6 MMOL/L — SIGNIFICANT CHANGE UP (ref 5–17)
ANION GAP SERPL CALC-SCNC: 8 MMOL/L — SIGNIFICANT CHANGE UP (ref 5–17)
APTT BLD: 31.2 SEC — SIGNIFICANT CHANGE UP (ref 28.5–37)
BUN SERPL-MCNC: 33 MG/DL — HIGH (ref 7–23)
BUN SERPL-MCNC: 34 MG/DL — HIGH (ref 7–23)
CALCIUM SERPL-MCNC: 8.1 MG/DL — LOW (ref 8.5–10.1)
CALCIUM SERPL-MCNC: 8.8 MG/DL — SIGNIFICANT CHANGE UP (ref 8.5–10.1)
CHLORIDE SERPL-SCNC: 101 MMOL/L — SIGNIFICANT CHANGE UP (ref 96–108)
CHLORIDE SERPL-SCNC: 96 MMOL/L — SIGNIFICANT CHANGE UP (ref 96–108)
CO2 SERPL-SCNC: 26 MMOL/L — SIGNIFICANT CHANGE UP (ref 22–31)
CO2 SERPL-SCNC: 30 MMOL/L — SIGNIFICANT CHANGE UP (ref 22–31)
CREAT SERPL-MCNC: 1.2 MG/DL — SIGNIFICANT CHANGE UP (ref 0.5–1.3)
CREAT SERPL-MCNC: 1.4 MG/DL — HIGH (ref 0.5–1.3)
GLUCOSE SERPL-MCNC: 104 MG/DL — HIGH (ref 70–99)
GLUCOSE SERPL-MCNC: 121 MG/DL — HIGH (ref 70–99)
HCT VFR BLD CALC: 31.1 % — LOW (ref 39–50)
HCT VFR BLD CALC: 31.9 % — LOW (ref 39–50)
HGB BLD-MCNC: 10.3 G/DL — LOW (ref 13–17)
HGB BLD-MCNC: 10.3 G/DL — LOW (ref 13–17)
INR BLD: 1.25 RATIO — HIGH (ref 0.88–1.16)
MCHC RBC-ENTMCNC: 31 PG — SIGNIFICANT CHANGE UP (ref 27–34)
MCHC RBC-ENTMCNC: 31.6 PG — SIGNIFICANT CHANGE UP (ref 27–34)
MCHC RBC-ENTMCNC: 32.3 GM/DL — SIGNIFICANT CHANGE UP (ref 32–36)
MCHC RBC-ENTMCNC: 33.1 GM/DL — SIGNIFICANT CHANGE UP (ref 32–36)
MCV RBC AUTO: 95.4 FL — SIGNIFICANT CHANGE UP (ref 80–100)
MCV RBC AUTO: 96.1 FL — SIGNIFICANT CHANGE UP (ref 80–100)
NRBC # BLD: 0 /100 WBCS — SIGNIFICANT CHANGE UP (ref 0–0)
NRBC # BLD: 0 /100 WBCS — SIGNIFICANT CHANGE UP (ref 0–0)
NT-PROBNP SERPL-SCNC: 584 PG/ML — HIGH (ref 0–125)
PLATELET # BLD AUTO: 200 K/UL — SIGNIFICANT CHANGE UP (ref 150–400)
PLATELET # BLD AUTO: 224 K/UL — SIGNIFICANT CHANGE UP (ref 150–400)
POTASSIUM SERPL-MCNC: 5.2 MMOL/L — SIGNIFICANT CHANGE UP (ref 3.5–5.3)
POTASSIUM SERPL-MCNC: 5.6 MMOL/L — HIGH (ref 3.5–5.3)
POTASSIUM SERPL-SCNC: 5.2 MMOL/L — SIGNIFICANT CHANGE UP (ref 3.5–5.3)
POTASSIUM SERPL-SCNC: 5.6 MMOL/L — HIGH (ref 3.5–5.3)
PROTHROM AB SERPL-ACNC: 14.4 SEC — HIGH (ref 10–12.9)
RBC # BLD: 3.26 M/UL — LOW (ref 4.2–5.8)
RBC # BLD: 3.32 M/UL — LOW (ref 4.2–5.8)
RBC # FLD: 15.6 % — HIGH (ref 10.3–14.5)
RBC # FLD: 15.6 % — HIGH (ref 10.3–14.5)
SODIUM SERPL-SCNC: 132 MMOL/L — LOW (ref 135–145)
SODIUM SERPL-SCNC: 135 MMOL/L — SIGNIFICANT CHANGE UP (ref 135–145)
T3 SERPL-MCNC: 100 NG/DL — SIGNIFICANT CHANGE UP (ref 80–200)
T4 AB SER-ACNC: 4.6 UG/DL — SIGNIFICANT CHANGE UP (ref 4.6–12)
WBC # BLD: 15.03 K/UL — HIGH (ref 3.8–10.5)
WBC # BLD: 9.62 K/UL — SIGNIFICANT CHANGE UP (ref 3.8–10.5)
WBC # FLD AUTO: 15.03 K/UL — HIGH (ref 3.8–10.5)
WBC # FLD AUTO: 9.62 K/UL — SIGNIFICANT CHANGE UP (ref 3.8–10.5)

## 2018-11-19 PROCEDURE — 99223 1ST HOSP IP/OBS HIGH 75: CPT

## 2018-11-19 PROCEDURE — 93306 TTE W/DOPPLER COMPLETE: CPT | Mod: 26

## 2018-11-19 PROCEDURE — 73501 X-RAY EXAM HIP UNI 1 VIEW: CPT | Mod: 26,LT

## 2018-11-19 PROCEDURE — 99233 SBSQ HOSP IP/OBS HIGH 50: CPT

## 2018-11-19 RX ORDER — HYDROMORPHONE HYDROCHLORIDE 2 MG/ML
0.5 INJECTION INTRAMUSCULAR; INTRAVENOUS; SUBCUTANEOUS ONCE
Qty: 0 | Refills: 0 | Status: DISCONTINUED | OUTPATIENT
Start: 2018-11-19 | End: 2018-11-19

## 2018-11-19 RX ORDER — CYCLOBENZAPRINE HYDROCHLORIDE 10 MG/1
10 TABLET, FILM COATED ORAL THREE TIMES A DAY
Qty: 0 | Refills: 0 | Status: DISCONTINUED | OUTPATIENT
Start: 2018-11-20 | End: 2018-11-21

## 2018-11-19 RX ORDER — CEFAZOLIN SODIUM 1 G
2000 VIAL (EA) INJECTION ONCE
Qty: 0 | Refills: 0 | Status: COMPLETED | OUTPATIENT
Start: 2018-11-19 | End: 2018-11-19

## 2018-11-19 RX ORDER — ONDANSETRON 8 MG/1
4 TABLET, FILM COATED ORAL EVERY 6 HOURS
Qty: 0 | Refills: 0 | Status: DISCONTINUED | OUTPATIENT
Start: 2018-11-19 | End: 2018-11-19

## 2018-11-19 RX ORDER — FINASTERIDE 5 MG/1
5 TABLET, FILM COATED ORAL DAILY
Qty: 0 | Refills: 0 | Status: DISCONTINUED | OUTPATIENT
Start: 2018-11-20 | End: 2018-11-21

## 2018-11-19 RX ORDER — FERROUS SULFATE 325(65) MG
325 TABLET ORAL
Qty: 0 | Refills: 0 | Status: DISCONTINUED | OUTPATIENT
Start: 2018-11-20 | End: 2018-11-21

## 2018-11-19 RX ORDER — DIPHENHYDRAMINE HCL 50 MG
25 CAPSULE ORAL EVERY 8 HOURS
Qty: 0 | Refills: 0 | Status: DISCONTINUED | OUTPATIENT
Start: 2018-11-20 | End: 2018-11-21

## 2018-11-19 RX ORDER — HYDROMORPHONE HYDROCHLORIDE 2 MG/ML
0.5 INJECTION INTRAMUSCULAR; INTRAVENOUS; SUBCUTANEOUS
Qty: 0 | Refills: 0 | Status: DISCONTINUED | OUTPATIENT
Start: 2018-11-19 | End: 2018-11-20

## 2018-11-19 RX ORDER — OXYCODONE HYDROCHLORIDE 5 MG/1
5 TABLET ORAL EVERY 4 HOURS
Qty: 0 | Refills: 0 | Status: DISCONTINUED | OUTPATIENT
Start: 2018-11-20 | End: 2018-11-21

## 2018-11-19 RX ORDER — DOCUSATE SODIUM 100 MG
100 CAPSULE ORAL THREE TIMES A DAY
Qty: 0 | Refills: 0 | Status: DISCONTINUED | OUTPATIENT
Start: 2018-11-20 | End: 2018-11-21

## 2018-11-19 RX ORDER — TAMSULOSIN HYDROCHLORIDE 0.4 MG/1
0.4 CAPSULE ORAL AT BEDTIME
Qty: 0 | Refills: 0 | Status: DISCONTINUED | OUTPATIENT
Start: 2018-11-20 | End: 2018-11-21

## 2018-11-19 RX ORDER — ACETAMINOPHEN 500 MG
650 TABLET ORAL EVERY 6 HOURS
Qty: 0 | Refills: 0 | Status: DISCONTINUED | OUTPATIENT
Start: 2018-11-20 | End: 2018-11-21

## 2018-11-19 RX ORDER — ALPRAZOLAM 0.25 MG
0.5 TABLET ORAL AT BEDTIME
Qty: 0 | Refills: 0 | Status: DISCONTINUED | OUTPATIENT
Start: 2018-11-20 | End: 2018-11-20

## 2018-11-19 RX ORDER — ONDANSETRON 8 MG/1
4 TABLET, FILM COATED ORAL EVERY 6 HOURS
Qty: 0 | Refills: 0 | Status: DISCONTINUED | OUTPATIENT
Start: 2018-11-20 | End: 2018-11-21

## 2018-11-19 RX ORDER — SENNA PLUS 8.6 MG/1
2 TABLET ORAL AT BEDTIME
Qty: 0 | Refills: 0 | Status: DISCONTINUED | OUTPATIENT
Start: 2018-11-20 | End: 2018-11-21

## 2018-11-19 RX ORDER — ACETAMINOPHEN 500 MG
650 TABLET ORAL EVERY 4 HOURS
Qty: 0 | Refills: 0 | Status: DISCONTINUED | OUTPATIENT
Start: 2018-11-20 | End: 2018-11-21

## 2018-11-19 RX ORDER — POLYETHYLENE GLYCOL 3350 17 G/17G
17 POWDER, FOR SOLUTION ORAL DAILY
Qty: 0 | Refills: 0 | Status: DISCONTINUED | OUTPATIENT
Start: 2018-11-20 | End: 2018-11-21

## 2018-11-19 RX ORDER — OXYCODONE HYDROCHLORIDE 5 MG/1
10 TABLET ORAL EVERY 4 HOURS
Qty: 0 | Refills: 0 | Status: DISCONTINUED | OUTPATIENT
Start: 2018-11-20 | End: 2018-11-21

## 2018-11-19 RX ORDER — LANOLIN ALCOHOL/MO/W.PET/CERES
3 CREAM (GRAM) TOPICAL AT BEDTIME
Qty: 0 | Refills: 0 | Status: DISCONTINUED | OUTPATIENT
Start: 2018-11-20 | End: 2018-11-21

## 2018-11-19 RX ORDER — SODIUM CHLORIDE 9 MG/ML
1000 INJECTION, SOLUTION INTRAVENOUS
Qty: 0 | Refills: 0 | Status: DISCONTINUED | OUTPATIENT
Start: 2018-11-19 | End: 2018-11-20

## 2018-11-19 RX ORDER — CEFAZOLIN SODIUM 1 G
2000 VIAL (EA) INJECTION ONCE
Qty: 0 | Refills: 0 | Status: DISCONTINUED | OUTPATIENT
Start: 2018-11-19 | End: 2018-11-19

## 2018-11-19 RX ORDER — TRAMADOL HYDROCHLORIDE 50 MG/1
100 TABLET ORAL EVERY 6 HOURS
Qty: 0 | Refills: 0 | Status: DISCONTINUED | OUTPATIENT
Start: 2018-11-20 | End: 2018-11-21

## 2018-11-19 RX ORDER — ASCORBIC ACID 60 MG
500 TABLET,CHEWABLE ORAL
Qty: 0 | Refills: 0 | Status: DISCONTINUED | OUTPATIENT
Start: 2018-11-20 | End: 2018-11-21

## 2018-11-19 RX ORDER — CEFAZOLIN SODIUM 1 G
2000 VIAL (EA) INJECTION EVERY 8 HOURS
Qty: 0 | Refills: 0 | Status: COMPLETED | OUTPATIENT
Start: 2018-11-20 | End: 2018-11-20

## 2018-11-19 RX ORDER — BUPIVACAINE 13.3 MG/ML
20 INJECTION, SUSPENSION, LIPOSOMAL INFILTRATION ONCE
Qty: 0 | Refills: 0 | Status: DISCONTINUED | OUTPATIENT
Start: 2018-11-19 | End: 2018-11-19

## 2018-11-19 RX ORDER — HYDROMORPHONE HYDROCHLORIDE 2 MG/ML
0.5 INJECTION INTRAMUSCULAR; INTRAVENOUS; SUBCUTANEOUS EVERY 4 HOURS
Qty: 0 | Refills: 0 | Status: DISCONTINUED | OUTPATIENT
Start: 2018-11-19 | End: 2018-11-19

## 2018-11-19 RX ORDER — ACETAMINOPHEN 500 MG
1000 TABLET ORAL ONCE
Qty: 0 | Refills: 0 | Status: COMPLETED | OUTPATIENT
Start: 2018-11-19 | End: 2018-11-19

## 2018-11-19 RX ORDER — BUPIVACAINE 13.3 MG/ML
100 INJECTION, SUSPENSION, LIPOSOMAL INFILTRATION ONCE
Qty: 0 | Refills: 0 | Status: DISCONTINUED | OUTPATIENT
Start: 2018-11-19 | End: 2018-11-19

## 2018-11-19 RX ORDER — IPRATROPIUM/ALBUTEROL SULFATE 18-103MCG
3 AEROSOL WITH ADAPTER (GRAM) INHALATION ONCE
Qty: 0 | Refills: 0 | Status: COMPLETED | OUTPATIENT
Start: 2018-11-19 | End: 2018-11-20

## 2018-11-19 RX ORDER — BENZOCAINE AND MENTHOL 5; 1 G/100ML; G/100ML
1 LIQUID ORAL
Qty: 0 | Refills: 0 | Status: DISCONTINUED | OUTPATIENT
Start: 2018-11-20 | End: 2018-11-21

## 2018-11-19 RX ORDER — FOLIC ACID 0.8 MG
1 TABLET ORAL DAILY
Qty: 0 | Refills: 0 | Status: DISCONTINUED | OUTPATIENT
Start: 2018-11-20 | End: 2018-11-21

## 2018-11-19 RX ORDER — ENOXAPARIN SODIUM 100 MG/ML
40 INJECTION SUBCUTANEOUS DAILY
Qty: 0 | Refills: 0 | Status: DISCONTINUED | OUTPATIENT
Start: 2018-11-20 | End: 2018-11-21

## 2018-11-19 RX ORDER — ASPIRIN/CALCIUM CARB/MAGNESIUM 324 MG
81 TABLET ORAL DAILY
Qty: 0 | Refills: 0 | Status: CANCELLED | OUTPATIENT
Start: 2018-11-20 | End: 2018-11-19

## 2018-11-19 RX ORDER — SIMVASTATIN 20 MG/1
5 TABLET, FILM COATED ORAL AT BEDTIME
Qty: 0 | Refills: 0 | Status: DISCONTINUED | OUTPATIENT
Start: 2018-11-20 | End: 2018-11-21

## 2018-11-19 RX ORDER — SODIUM CHLORIDE 9 MG/ML
3 INJECTION INTRAMUSCULAR; INTRAVENOUS; SUBCUTANEOUS EVERY 8 HOURS
Qty: 0 | Refills: 0 | Status: DISCONTINUED | OUTPATIENT
Start: 2018-11-20 | End: 2018-11-21

## 2018-11-19 RX ORDER — FAMOTIDINE 10 MG/ML
20 INJECTION INTRAVENOUS EVERY 12 HOURS
Qty: 0 | Refills: 0 | Status: DISCONTINUED | OUTPATIENT
Start: 2018-11-20 | End: 2018-11-21

## 2018-11-19 RX ORDER — LISINOPRIL 2.5 MG/1
20 TABLET ORAL DAILY
Qty: 0 | Refills: 0 | Status: DISCONTINUED | OUTPATIENT
Start: 2018-11-20 | End: 2018-11-21

## 2018-11-19 RX ORDER — ONDANSETRON 8 MG/1
4 TABLET, FILM COATED ORAL ONCE
Qty: 0 | Refills: 0 | Status: DISCONTINUED | OUTPATIENT
Start: 2018-11-19 | End: 2018-11-20

## 2018-11-19 RX ADMIN — HYDROMORPHONE HYDROCHLORIDE 1 MILLIGRAM(S): 2 INJECTION INTRAMUSCULAR; INTRAVENOUS; SUBCUTANEOUS at 05:26

## 2018-11-19 RX ADMIN — HYDROMORPHONE HYDROCHLORIDE 1 MILLIGRAM(S): 2 INJECTION INTRAMUSCULAR; INTRAVENOUS; SUBCUTANEOUS at 10:06

## 2018-11-19 RX ADMIN — METHADONE HYDROCHLORIDE 5 MILLIGRAM(S): 40 TABLET ORAL at 05:26

## 2018-11-19 RX ADMIN — Medication 400 MILLIGRAM(S): at 22:15

## 2018-11-19 RX ADMIN — ALBUTEROL 2.5 MILLIGRAM(S): 90 AEROSOL, METERED ORAL at 08:19

## 2018-11-19 RX ADMIN — HYDROMORPHONE HYDROCHLORIDE 1 MILLIGRAM(S): 2 INJECTION INTRAMUSCULAR; INTRAVENOUS; SUBCUTANEOUS at 01:15

## 2018-11-19 RX ADMIN — TIOTROPIUM BROMIDE 1 CAPSULE(S): 18 CAPSULE ORAL; RESPIRATORY (INHALATION) at 09:38

## 2018-11-19 RX ADMIN — Medication 4 MILLIGRAM(S): at 05:26

## 2018-11-19 RX ADMIN — HYDROMORPHONE HYDROCHLORIDE 0.5 MILLIGRAM(S): 2 INJECTION INTRAMUSCULAR; INTRAVENOUS; SUBCUTANEOUS at 07:39

## 2018-11-19 RX ADMIN — METHADONE HYDROCHLORIDE 5 MILLIGRAM(S): 40 TABLET ORAL at 13:09

## 2018-11-19 RX ADMIN — HYDROMORPHONE HYDROCHLORIDE 0.5 MILLIGRAM(S): 2 INJECTION INTRAMUSCULAR; INTRAVENOUS; SUBCUTANEOUS at 07:51

## 2018-11-19 RX ADMIN — LISINOPRIL 20 MILLIGRAM(S): 2.5 TABLET ORAL at 05:26

## 2018-11-19 RX ADMIN — Medication 1000 MILLIGRAM(S): at 23:45

## 2018-11-19 RX ADMIN — SODIUM CHLORIDE 75 MILLILITER(S): 9 INJECTION, SOLUTION INTRAVENOUS at 22:15

## 2018-11-19 RX ADMIN — HYDROMORPHONE HYDROCHLORIDE 1 MILLIGRAM(S): 2 INJECTION INTRAMUSCULAR; INTRAVENOUS; SUBCUTANEOUS at 06:19

## 2018-11-19 RX ADMIN — HYDROMORPHONE HYDROCHLORIDE 1 MILLIGRAM(S): 2 INJECTION INTRAMUSCULAR; INTRAVENOUS; SUBCUTANEOUS at 09:43

## 2018-11-19 RX ADMIN — SODIUM CHLORIDE 75 MILLILITER(S): 9 INJECTION, SOLUTION INTRAVENOUS at 13:05

## 2018-11-19 RX ADMIN — HYDROMORPHONE HYDROCHLORIDE 1 MILLIGRAM(S): 2 INJECTION INTRAMUSCULAR; INTRAVENOUS; SUBCUTANEOUS at 13:47

## 2018-11-19 RX ADMIN — HYDROMORPHONE HYDROCHLORIDE 1 MILLIGRAM(S): 2 INJECTION INTRAMUSCULAR; INTRAVENOUS; SUBCUTANEOUS at 15:01

## 2018-11-19 RX ADMIN — HYDROMORPHONE HYDROCHLORIDE 1 MILLIGRAM(S): 2 INJECTION INTRAMUSCULAR; INTRAVENOUS; SUBCUTANEOUS at 00:40

## 2018-11-19 NOTE — PROGRESS NOTE ADULT - SUBJECTIVE AND OBJECTIVE BOX
Date/Time Patient Seen:  		  Referring MD:   Data Reviewed	       Patient is a 72y old  Male who presents with a chief complaint of fall, hip pain (19 Nov 2018 06:32)  in bed  seen and examined  vs and meds reviewed      Subjective/HPI     PAST MEDICAL & SURGICAL HISTORY:  S/P CABG x 3  S/P hip replacement, left  Rheumatoid arthritis  Osteoarthritis  COPD (chronic obstructive pulmonary disease)  HTN (hypertension)  History of appendectomy  History of right shoulder replacement  History of total left hip arthroplasty        Medication list         MEDICATIONS  (STANDING):  ALBUTerol    0.083% 2.5 milliGRAM(s) Nebulizer every 8 hours  finasteride 5 milliGRAM(s) Oral daily  FLUoxetine 60 milliGRAM(s) Oral daily  hydrocortisone sodium succinate Injectable 50 milliGRAM(s) IV Push once  hydrocortisone sodium succinate Injectable 25 milliGRAM(s) IV Push every 8 hours  HYDROmorphone  Injectable 0.5 milliGRAM(s) IV Push once  lactated ringers. 500 milliLiter(s) (50 mL/Hr) IV Continuous <Continuous>  lactated ringers. 1000 milliLiter(s) (75 mL/Hr) IV Continuous <Continuous>  lisinopril 20 milliGRAM(s) Oral daily  methadone    Tablet 5 milliGRAM(s) Oral every 8 hours  methylPREDNISolone 4 milliGRAM(s) Oral daily  simvastatin 5 milliGRAM(s) Oral at bedtime  tamsulosin 0.4 milliGRAM(s) Oral at bedtime  tiotropium 18 MICROgram(s) Capsule 1 Capsule(s) Inhalation daily    MEDICATIONS  (PRN):  ALPRAZolam 0.5 milliGRAM(s) Oral at bedtime PRN anxiety  HYDROmorphone  Injectable 1 milliGRAM(s) IV Push every 4 hours PRN breakthrough pain         Vitals log        ICU Vital Signs Last 24 Hrs  T(C): 36.8 (19 Nov 2018 05:24), Max: 37.7 (18 Nov 2018 23:40)  T(F): 98.2 (19 Nov 2018 05:24), Max: 99.9 (18 Nov 2018 23:40)  HR: 87 (19 Nov 2018 05:24) (77 - 88)  BP: 124/79 (19 Nov 2018 05:24) (108/75 - 138/80)  BP(mean): --  ABP: --  ABP(mean): --  RR: 18 (19 Nov 2018 05:24) (15 - 18)  SpO2: 95% (19 Nov 2018 05:24) (93% - 100%)           Input and Output:  I&O's Detail    18 Nov 2018 07:01  -  19 Nov 2018 07:00  --------------------------------------------------------  IN:  Total IN: 0 mL    OUT:    Voided: 300 mL  Total OUT: 300 mL    Total NET: -300 mL          Lab Data                        10.3   9.62  )-----------( 200      ( 19 Nov 2018 04:24 )             31.9     11-19    132<L>  |  96  |  34<H>  ----------------------------<  104<H>  5.2   |  30  |  1.40<H>    Ca    8.8      19 Nov 2018 04:24              Review of Systems	      Objective     Physical Examination    heart s1s2  lung dec BS  abd soft      Pertinent Lab findings & Imaging      Corin[place]:  NO   Adequate UO     I&O's Detail    18 Nov 2018 07:01  -  19 Nov 2018 07:00  --------------------------------------------------------  IN:  Total IN: 0 mL    OUT:    Voided: 300 mL  Total OUT: 300 mL    Total NET: -300 mL               Discussed with:     Cultures:	        Radiology

## 2018-11-19 NOTE — PROGRESS NOTE ADULT - PROBLEM SELECTOR PLAN 3
chronic   continue lisinopril with hold parameters  start LR 50 ml/hr after midnight per ortho  holding Lasix for now due to LR while NPO  Restart post-op.

## 2018-11-19 NOTE — PROGRESS NOTE ADULT - SUBJECTIVE AND OBJECTIVE BOX
Patient seen and examined. Pain controlled. Disoriented but able to follow commands, tolerated procedure well, denies any CP/SOB.      MEDICATIONS  (STANDING):  ALBUTerol/ipratropium for Nebulization. 3 milliLiter(s) Nebulizer once  lactated ringers. 1000 milliLiter(s) IV Continuous <Continuous>    Allergies    No Known Allergies    Intolerances                            10.3   15.03 )-----------( 224      ( 19 Nov 2018 22:45 )             31.1     19 Nov 2018 22:45    135    |  101    |  33     ----------------------------<  121    5.6     |  26     |  1.20     Ca    8.1        19 Nov 2018 22:45      PT/INR - ( 19 Nov 2018 04:24 )   PT: 14.4 sec;   INR: 1.25 ratio         PTT - ( 19 Nov 2018 04:24 )  PTT:31.2 sec  Vital Signs Last 24 Hrs  T(C): 37.2 (11-19-18 @ 23:15), Max: 37.7 (11-18-18 @ 23:40)  T(F): 99 (11-19-18 @ 23:15), Max: 99.9 (11-18-18 @ 23:40)  HR: 79 (11-19-18 @ 23:15) (79 - 99)  BP: 96/49 (11-19-18 @ 23:15) (81/60 - 128/69)  RR: 15 (11-19-18 @ 23:15) (14 - 19)  SpO2: 94% (11-19-18 @ 23:15) (92% - 100%)    Physical Exam  Gen: NAD  LLE:   Dressing c/d/i  +ehl/fhl/ta/gs function  L2-S1 silt  Dp/pt pulse intact  No calf ttp  Compartments soft    A/P: 72y Male sp L Femur ORIF POD 0  Continue Post op abx  FU AM labs  FU Transfer to floor   Pt on methadone at baseline, recommend FU with PCP vs Pain management specialist for recs on dosing  1U intraop, 1U in Pacu, discussed with anesthesia he feels due to patient cardiac and COPD history prbc warranted  Pain control  DVT ppx-lovenox in AM  PT/OOB/50% PWB  Ice/elevate  Medical management appreciated  Incentive spirometry  Dispo planning

## 2018-11-19 NOTE — PROGRESS NOTE ADULT - SUBJECTIVE AND OBJECTIVE BOX
Patient is a 72y old  Male who presents with a chief complaint of fall, hip pain (2018 09:57)      INTERVAL HPI/OVERNIGHT EVENTS: 73 yo male PMH emphysema, HTN, HLD, MI, CABG s/p 3 stents (), Rheumatoid arthritis, and osteoarthritis presenting with left sided hip pain due to mechanical fall admitted for Acute periprosthetic fracture left proximal femur and fracture of left superior pubic ramus. pt is in pain, c/o jaw pain which resolved.     Pain Location & Control: not controlled     MEDICATIONS  (STANDING):  ALBUTerol    0.083% 2.5 milliGRAM(s) Nebulizer every 8 hours  finasteride 5 milliGRAM(s) Oral daily  FLUoxetine 60 milliGRAM(s) Oral daily  hydrocortisone sodium succinate Injectable 50 milliGRAM(s) IV Push once  hydrocortisone sodium succinate Injectable 25 milliGRAM(s) IV Push every 8 hours  lactated ringers. 1000 milliLiter(s) (75 mL/Hr) IV Continuous <Continuous>  lisinopril 20 milliGRAM(s) Oral daily  methadone    Tablet 5 milliGRAM(s) Oral every 8 hours  methylPREDNISolone 4 milliGRAM(s) Oral daily  simvastatin 5 milliGRAM(s) Oral at bedtime  tamsulosin 0.4 milliGRAM(s) Oral at bedtime  tiotropium 18 MICROgram(s) Capsule 1 Capsule(s) Inhalation daily    MEDICATIONS  (PRN):  ALPRAZolam 0.5 milliGRAM(s) Oral at bedtime PRN anxiety  HYDROmorphone  Injectable 0.5 milliGRAM(s) IV Push every 4 hours PRN breakthrough pain  HYDROmorphone  Injectable 1 milliGRAM(s) IV Push every 4 hours PRN breakthrough pain      Allergies    No Known Allergies    Intolerances        REVIEW OF SYSTEMS:  CONSTITUTIONAL: No fever, weight loss, or fatigue  EYES: No eye pain, visual disturbances, or discharge  ENMT:  No difficulty hearing, tinnitus, vertigo; No sinus or throat pain  NECK: No pain or stiffness  BREASTS: No pain, masses, or nipple discharge  RESPIRATORY: No cough, wheezing, chills or hemoptysis; No shortness of breath  CARDIOVASCULAR: No chest pain, palpitations, dizziness, or leg swelling  GASTROINTESTINAL: No abdominal or epigastric pain. No nausea, vomiting, or hematemesis; No diarrhea or constipation. No melena or hematochezia.  GENITOURINARY: No dysuria, frequency, hematuria, or incontinence  NEUROLOGICAL: No headaches, memory loss, loss of strength, numbness, or tremors  SKIN: No itching, burning, rashes, or lesions   LYMPH NODES: No enlarged glands  ENDOCRINE: No heat or cold intolerance; No hair loss; No polydipsia or polyuria  MUSCULOSKELETAL: No back pain  PSYCHIATRIC: No depression, anxiety, mood swings, or difficulty sleeping  HEME/LYMPH: No easy bruising, or bleeding gums  ALLERGY AND IMMUNOLOGIC: No hives or eczema    Vital Signs Last 24 Hrs  T(C): 36.9 (2018 08:01), Max: 37.7 (2018 23:40)  T(F): 98.5 (2018 08:01), Max: 99.9 (2018 23:40)  HR: 82 (2018 08:21) (77 - 88)  BP: 114/70 (2018 08:01) (108/75 - 128/78)  BP(mean): --  RR: 18 (2018 08:01) (16 - 18)  SpO2: 94% (2018 08:21) (92% - 100%)    PHYSICAL EXAM:  GENERAL: NAD, well-groomed, well-developed  HEAD:  Atraumatic, Normocephalic  EYES: EOMI, PERRLA, conjunctiva and sclera clear  ENMT: No tonsillar erythema, exudates, or enlargement; Moist mucous membranes, Good dentition, No lesions  NECK: Supple, No JVD, Normal thyroid  NERVOUS SYSTEM:  Alert & Oriented X3, Good concentration; Motor Strength 5/5 B/L upper and lower extremities; DTRs 2+ intact and symmetric  CHEST/LUNG: Clear to auscultation bilaterally; No rales, rhonchi, wheezing, or rubs  HEART: Regular rate and rhythm; No murmurs, rubs, or gallops  ABDOMEN: Soft, Nontender, Nondistended; Bowel sounds present  EXTREMITIES:  2+ Peripheral Pulses, No clubbing or cyanosis, left hip pain and tenderness.  LYMPH: No lymphadenopathy noted  SKIN: No rashes or lesions      LABS:                        10.3   9.62  )-----------( 200      ( 2018 04:24 )             31.9     2018 04:24    132    |  96     |  34     ----------------------------<  104    5.2     |  30     |  1.40     Ca    8.8        2018 04:24      PT/INR - ( 2018 04:24 )   PT: 14.4 sec;   INR: 1.25 ratio         PTT - ( 2018 04:24 )  PTT:31.2 sec  Urinalysis Basic - ( 2018 22:08 )    Color: Yellow / Appearance: Clear / S.020 / pH: x  Gluc: x / Ketone: Trace  / Bili: Small / Urobili: 1   Blood: x / Protein: Negative / Nitrite: Negative   Leuk Esterase: Small / RBC: Negative /HPF / WBC 3-5   Sq Epi: x / Non Sq Epi: Occasional / Bacteria: Occasional      CAPILLARY BLOOD GLUCOSE          RADIOLOGY & ADDITIONAL TESTS:    Imaging Personally Reviewed: < from: CT Femur No Cont, Left (18 @ 13:00) >  Left hip arthroplasty. Comminuted slightly displaced left periprosthetic   fracture of the left proximal femur, as discussed. Fracture of the left   superior pubic ramus.    The distal half of the femur cannot be assessed for the presence or   absence of fractures due to extensive motion artifact. However, the   radiographs reveal no evidence of left distal femur fracture.    < end of copied text >   [ x] YES  [ ] NO    Consultant(s) Notes Reviewed:  [x ] YES  [ ] NO    Care Discussed with Consultants/Other Providers [x ] YES  [ ] NO

## 2018-11-19 NOTE — BRIEF OPERATIVE NOTE - PROCEDURE
<<-----Click on this checkbox to enter Procedure Closed reduction of periprosthetic fracture of femur  11/19/2018    Active  [id number]

## 2018-11-19 NOTE — PRE-OP CHECKLIST - INTERNAL PROSTHESES
yes(specify)/left THR/right shoulder surgery with implants/5 back surgeries with rods/neck surgery with rods/cardiac stent x 1

## 2018-11-19 NOTE — PROGRESS NOTE ADULT - SUBJECTIVE AND OBJECTIVE BOX
Pt seen and examined. Pain controlled. No acute events overnight.  Denies any Fever/Cp/SOB/Chills.      Vital Signs Last 24 Hrs  T(C): 36.8 (19 Nov 2018 05:24), Max: 37.7 (18 Nov 2018 23:40)  T(F): 98.2 (19 Nov 2018 05:24), Max: 99.9 (18 Nov 2018 23:40)  HR: 87 (19 Nov 2018 05:24) (77 - 88)  BP: 124/79 (19 Nov 2018 05:24) (108/75 - 138/80)  BP(mean): --  RR: 18 (19 Nov 2018 05:24) (15 - 18)  SpO2: 95% (19 Nov 2018 05:24) (93% - 100%)                          10.3   9.62  )-----------( 200      ( 19 Nov 2018 04:24 )             31.9     11-19    132<L>  |  96  |  34<H>  ----------------------------<  104<H>  5.2   |  30  |  1.40<H>    Ca    8.8      19 Nov 2018 04:24      PT/INR - ( 19 Nov 2018 04:24 )   PT: 14.4 sec;   INR: 1.25 ratio         PTT - ( 19 Nov 2018 04:24 )  PTT:31.2 sec    Gen: NAD  LLE:  skin intact, well healed scar over L hip, +  muscle spasm 2/2 pain, no deformity noted  +sensation L2-S1  +dorsiflexion/plantarflexion of ankle/hallux  +dorsalis pedis pulse  Soft compartments, - calf tenderness

## 2018-11-19 NOTE — CONSULT NOTE ADULT - SUBJECTIVE AND OBJECTIVE BOX
[facility]         Shannon Valdez, [place], Anusha, Johanny, Sidney, Abdias      [phone] (office)    Reason for Consult: preoperative cardiac assessment    Interval HPI: Patient seen and examined at bedside. No acute events overnight. Consulted for preoperative assessment for L periprosthetic fracture. Denies chest pain, palpitations or sob.     HPI: 73yo male PMH emphysema, HTN, HLD, MI, CABG s/p 3 stents (2008), Rheumatoid arthritis, and osteoarthritis presenting with left sided hip pain due to mechanical fall this morning. Patient stated that he was pulling up his pants, lost his balance, and fell. He was laying on the ground for 30 minutes, as wife was not able to pick him up. Patient is in severe 10/10 pain. Patient denies LOC, headaches, fevers or chills, but does admit to numbness and tingling alongside left lower extremity and reports this is chronic since a prior back surgery.    In the ED: vitals stable. CBC, BMP, UA, xray left hip, xray left femur, CT left femur, CT left hip ordered and significant for: WBC 11.05, Hgb 10.8, elevated BUN 24  EKG: sinus rhythm, 78 with 1st degree AV block  xray left hip: Acute periprosthetic fracture left proximal femur.  xray left femur:  Periprosthetic fracture left proximal femur involving the   lesser trochanter and proximal shaft.  CT femur: Left hip arthroplasty. Comminuted slightly displaced left periprosthetic   fracture of the left proximal femur, as discussed. Fracture of the left   superior pubic ramus.  chest xray: Grossly clear lungs. (18 Nov 2018 14:09)      PAST MEDICAL & SURGICAL HISTORY:  S/P CABG x 3  S/P hip replacement, left  Rheumatoid arthritis  Osteoarthritis  COPD (chronic obstructive pulmonary disease)  HTN (hypertension)  History of appendectomy  History of right shoulder replacement  History of total left hip arthroplasty    SOCIAL HISTORY: No active tobacco, alcohol or illicit drug use    FAMILY HISTORY:  No pertinent family history in first degree relatives      Home Medications:  alendronate 70 mg oral tablet: 1 tab(s) orally once a week (19 Nov 2018 07:30)  ALPRAZolam 0.5 mg oral tablet: 1 tab(s) orally once a day (at bedtime), As Needed (19 Nov 2018 07:30)  aspirin 81 mg oral tablet: 1 tab(s) orally once a day (19 Nov 2018 07:30)  cyclobenzaprine 10 mg oral tablet: 1 tab(s) orally 3 times a day, As Needed (19 Nov 2018 07:30)  finasteride 5 mg oral tablet: 1 tab(s) orally once a day (19 Nov 2018 07:30)  FLUoxetine 20 mg oral capsule: 1 cap(s) orally 3 times a day (19 Nov 2018 07:30)  furosemide 20 mg oral tablet: 1 tab(s) orally once a day (19 Nov 2018 07:30)  lisinopril 20 mg oral tablet: 1 tab(s) orally once a day (19 Nov 2018 07:30)  lovastatin 20 mg oral tablet: 1 tab(s) orally once a day (19 Nov 2018 07:30)  methadone 5 mg oral tablet: 1 tab(s) orally every 8 hours (19 Nov 2018 07:30)  methylPREDNISolone 4 mg oral tablet: orally once a day (19 Nov 2018 07:30)  potassium chloride 10 mEq oral capsule, extended release: 1 cap(s) orally 2 times a day (19 Nov 2018 07:30)  tamsulosin 0.4 mg oral capsule: 1 cap(s) orally once a day (19 Nov 2018 07:30)      MEDICATIONS  (STANDING):  ALBUTerol    0.083% 2.5 milliGRAM(s) Nebulizer every 8 hours  finasteride 5 milliGRAM(s) Oral daily  FLUoxetine 60 milliGRAM(s) Oral daily  hydrocortisone sodium succinate Injectable 50 milliGRAM(s) IV Push once  hydrocortisone sodium succinate Injectable 25 milliGRAM(s) IV Push every 8 hours  lactated ringers. 500 milliLiter(s) (50 mL/Hr) IV Continuous <Continuous>  lactated ringers. 1000 milliLiter(s) (75 mL/Hr) IV Continuous <Continuous>  lisinopril 20 milliGRAM(s) Oral daily  methadone    Tablet 5 milliGRAM(s) Oral every 8 hours  methylPREDNISolone 4 milliGRAM(s) Oral daily  simvastatin 5 milliGRAM(s) Oral at bedtime  tamsulosin 0.4 milliGRAM(s) Oral at bedtime  tiotropium 18 MICROgram(s) Capsule 1 Capsule(s) Inhalation daily    MEDICATIONS  (PRN):  ALPRAZolam 0.5 milliGRAM(s) Oral at bedtime PRN anxiety  HYDROmorphone  Injectable 0.5 milliGRAM(s) IV Push every 4 hours PRN breakthrough pain  HYDROmorphone  Injectable 1 milliGRAM(s) IV Push every 4 hours PRN breakthrough pain      Allergies: No Known Allergies    REVIEW OF SYSTEMS: Negative except as per HPI.    VITAL SIGNS:   Vital Signs Last 24 Hrs  T(C): 36.9 (19 Nov 2018 08:01), Max: 37.7 (18 Nov 2018 23:40)  T(F): 98.5 (19 Nov 2018 08:01), Max: 99.9 (18 Nov 2018 23:40)  HR: 82 (19 Nov 2018 08:21) (77 - 88)  BP: 114/70 (19 Nov 2018 08:01) (108/75 - 138/80)  RR: 18 (19 Nov 2018 08:01) (15 - 18)  SpO2: 94% (19 Nov 2018 08:21) (92% - 100%)    I&O's Summary    18 Nov 2018 07:01  -  19 Nov 2018 07:00  --------------------------------------------------------  IN: 0 mL / OUT: 300 mL / NET: -300 mL    PHYSICAL EXAM:  Constitutional: NAD, well-developed  HEENT NC/AT, moist mucous membranes  Pulmonary: +expiratory wheezing x4  Cardiovascular: +S1, S2, RRR, no murmur  Extremities: No peripheral edema   Neurological: Alert, strength and sensitivity are grossly intact      LABS: All Labs Reviewed:                        10.3   9.62  )-----------( 200      ( 19 Nov 2018 04:24 )             31.9              19 Nov 2018 04:24    132    |  96     |  34     ----------------------------<  104    5.2     |  30     |  1.40   18 Nov 2018 11:11    135    |  100    |  24     ----------------------------<  92     4.7     |  27     |  0.95     Ca    8.8        19 Nov 2018 04:24  Ca    8.7        18 Nov 2018 11:11      PT/INR - ( 19 Nov 2018 04:24 )   PT: 14.4 sec;   INR: 1.25 ratio         PTT - ( 19 Nov 2018 04:24 )  PTT:31.2 sec      Blood Culture:   11-19 @ 04:24  Pro Bnp 584    11-18 @ 20:39  TSH: 1.04      EKG:    RADIOLOGY:  < from: Xray Chest 1 View AP/PA (11.18.18 @ 11:46) >  Grossly clear lungs.    < from: Xray Femur 2 Views, Left (11.18.18 @ 11:46) >   Periprosthetic fracture left proximal femur involving the   lesser trochanter and proximal shaft described in a separate report.   Remainder of the left femur is intact. No dislocation. Left THR noted.   Extensive arterial calcification. [facility]         Shannon Valdez, [place], Anusha, Johanny, Sidney, Abdias      [phone] (office)    Reason for Consult: preoperative cardiac assessment    Interval HPI: Patient seen and examined at bedside. No acute events overnight. Consulted for preoperative assessment for L periprosthetic fracture. Denies chest pain, palpitations or sob.     HPI: 71yo male PMH emphysema, HTN, HLD, MI, CABG s/p 3 stents (2008), Rheumatoid arthritis, and osteoarthritis presenting with left sided hip pain due to mechanical fall this morning. Patient stated that he was pulling up his pants, lost his balance, and fell. He was laying on the ground for 30 minutes, as wife was not able to pick him up. Patient is in severe 10/10 pain. Patient denies LOC, headaches, fevers or chills, but does admit to numbness and tingling alongside left lower extremity and reports this is chronic since a prior back surgery.    In the ED: vitals stable. CBC, BMP, UA, xray left hip, xray left femur, CT left femur, CT left hip ordered and significant for: WBC 11.05, Hgb 10.8, elevated BUN 24  EKG: sinus rhythm, 78 with 1st degree AV block  xray left hip: Acute periprosthetic fracture left proximal femur.  xray left femur:  Periprosthetic fracture left proximal femur involving the   lesser trochanter and proximal shaft.  CT femur: Left hip arthroplasty. Comminuted slightly displaced left periprosthetic   fracture of the left proximal femur, as discussed. Fracture of the left   superior pubic ramus.  chest xray: Grossly clear lungs. (18 Nov 2018 14:09)      PAST MEDICAL & SURGICAL HISTORY:  S/P CABG x 3  S/P hip replacement, left  Rheumatoid arthritis  Osteoarthritis  COPD (chronic obstructive pulmonary disease)  HTN (hypertension)  History of appendectomy  History of right shoulder replacement  History of total left hip arthroplasty    SOCIAL HISTORY: No active tobacco, alcohol or illicit drug use    FAMILY HISTORY:  No pertinent family history in first degree relatives      Home Medications:  alendronate 70 mg oral tablet: 1 tab(s) orally once a week (19 Nov 2018 07:30)  ALPRAZolam 0.5 mg oral tablet: 1 tab(s) orally once a day (at bedtime), As Needed (19 Nov 2018 07:30)  aspirin 81 mg oral tablet: 1 tab(s) orally once a day (19 Nov 2018 07:30)  cyclobenzaprine 10 mg oral tablet: 1 tab(s) orally 3 times a day, As Needed (19 Nov 2018 07:30)  finasteride 5 mg oral tablet: 1 tab(s) orally once a day (19 Nov 2018 07:30)  FLUoxetine 20 mg oral capsule: 1 cap(s) orally 3 times a day (19 Nov 2018 07:30)  furosemide 20 mg oral tablet: 1 tab(s) orally once a day (19 Nov 2018 07:30)  lisinopril 20 mg oral tablet: 1 tab(s) orally once a day (19 Nov 2018 07:30)  lovastatin 20 mg oral tablet: 1 tab(s) orally once a day (19 Nov 2018 07:30)  methadone 5 mg oral tablet: 1 tab(s) orally every 8 hours (19 Nov 2018 07:30)  methylPREDNISolone 4 mg oral tablet: orally once a day (19 Nov 2018 07:30)  potassium chloride 10 mEq oral capsule, extended release: 1 cap(s) orally 2 times a day (19 Nov 2018 07:30)  tamsulosin 0.4 mg oral capsule: 1 cap(s) orally once a day (19 Nov 2018 07:30)      MEDICATIONS  (STANDING):  ALBUTerol    0.083% 2.5 milliGRAM(s) Nebulizer every 8 hours  finasteride 5 milliGRAM(s) Oral daily  FLUoxetine 60 milliGRAM(s) Oral daily  hydrocortisone sodium succinate Injectable 50 milliGRAM(s) IV Push once  hydrocortisone sodium succinate Injectable 25 milliGRAM(s) IV Push every 8 hours  lactated ringers. 500 milliLiter(s) (50 mL/Hr) IV Continuous <Continuous>  lactated ringers. 1000 milliLiter(s) (75 mL/Hr) IV Continuous <Continuous>  lisinopril 20 milliGRAM(s) Oral daily  methadone    Tablet 5 milliGRAM(s) Oral every 8 hours  methylPREDNISolone 4 milliGRAM(s) Oral daily  simvastatin 5 milliGRAM(s) Oral at bedtime  tamsulosin 0.4 milliGRAM(s) Oral at bedtime  tiotropium 18 MICROgram(s) Capsule 1 Capsule(s) Inhalation daily    MEDICATIONS  (PRN):  ALPRAZolam 0.5 milliGRAM(s) Oral at bedtime PRN anxiety  HYDROmorphone  Injectable 0.5 milliGRAM(s) IV Push every 4 hours PRN breakthrough pain  HYDROmorphone  Injectable 1 milliGRAM(s) IV Push every 4 hours PRN breakthrough pain      Allergies: No Known Allergies    REVIEW OF SYSTEMS: Negative except as per HPI.    VITAL SIGNS:   Vital Signs Last 24 Hrs  T(C): 36.9 (19 Nov 2018 08:01), Max: 37.7 (18 Nov 2018 23:40)  T(F): 98.5 (19 Nov 2018 08:01), Max: 99.9 (18 Nov 2018 23:40)  HR: 82 (19 Nov 2018 08:21) (77 - 88)  BP: 114/70 (19 Nov 2018 08:01) (108/75 - 138/80)  RR: 18 (19 Nov 2018 08:01) (15 - 18)  SpO2: 94% (19 Nov 2018 08:21) (92% - 100%)    I&O's Summary    18 Nov 2018 07:01  -  19 Nov 2018 07:00  --------------------------------------------------------  IN: 0 mL / OUT: 300 mL / NET: -300 mL    PHYSICAL EXAM:  Constitutional: NAD, well-developed  HEENT NC/AT, moist mucous membranes  Pulmonary: +expiratory wheezing x4  Cardiovascular: Nl PMI, RRR nl S1 and Nl S2, no murmers, gallops  Extremities: No peripheral edema   Neurological: Alert, strength and sensitivity are grossly intact      LABS: All Labs Reviewed:                        10.3   9.62  )-----------( 200      ( 19 Nov 2018 04:24 )             31.9              19 Nov 2018 04:24    132    |  96     |  34     ----------------------------<  104    5.2     |  30     |  1.40   18 Nov 2018 11:11    135    |  100    |  24     ----------------------------<  92     4.7     |  27     |  0.95     Ca    8.8        19 Nov 2018 04:24  Ca    8.7        18 Nov 2018 11:11      PT/INR - ( 19 Nov 2018 04:24 )   PT: 14.4 sec;   INR: 1.25 ratio         PTT - ( 19 Nov 2018 04:24 )  PTT:31.2 sec      Blood Culture:   11-19 @ 04:24  Pro Bnp 584    11-18 @ 20:39  TSH: 1.04      EKG:    RADIOLOGY:  < from: Xray Chest 1 View AP/PA (11.18.18 @ 11:46) >  Grossly clear lungs.    < from: Xray Femur 2 Views, Left (11.18.18 @ 11:46) >   Periprosthetic fracture left proximal femur involving the   lesser trochanter and proximal shaft described in a separate report.   Remainder of the left femur is intact. No dislocation. Left THR noted.   Extensive arterial calcification.

## 2018-11-19 NOTE — PROGRESS NOTE ADULT - ASSESSMENT
73 y/o Male with L periprosthetic Hip Fracture  NPO except meds  IV Fluids while NPO  Hold all chemical AC, use SCD's   Bedrest  Possible OR Today with Dr Maguire for ORIF vs Revision ORIF L HIp  Continue medical optimization for OR  FU Cards Consult/clearance for OR  FU Pulm clearance for OR  Pain Control  Will advise if plan changes

## 2018-11-19 NOTE — CONSULT NOTE ADULT - ASSESSMENT
72M with PMH of HTN, HLD, CABG x3 (2008), MI (2010), RA and OA presenting with L hip pain. Found to have L periprosthetic proximal femoral fx. Cardio consulted for preoperative assessment.     - No clear evidence of acute ischemia  - No evidence of volume overload   - EKG showed no acute ST elevation or depression.   - Previous ECHO in 10/2018 with Cardiologist in [place] (Dr. John Mar). Awaiting fax of records. Will get perioperative echo in the meantime.   - BP well controlled, continue home BP meds, monitor routine hemodynamics  - Monitor and replete lytes, keep K>4, Mg>2  - Continue Lisinopril 20mg po qd  - Restart Lasix post procedure pending improvement of renal function. Monitor Cr as patient has LIOR.   - Continue statin  - Restart asa 81mg due to hx of CAD  - Pt has no active ischemia, decompensated HF, unstable arrythmia, or severe stenotic valvular disease, and in the setting of low risk orthopedic procedure, patient is optimized from cardiovascular standpoint to proceed with planned procedure with routine hemodynamic monitoring.   - Other cardiovascular workup will depend on clinical course.  - All other workup per primary team  - Will follow 72M with PMH of HTN, HLD, CABG x3 (2008), MI (2010), RA and OA presenting with L hip pain. Found to have L periprosthetic proximal femoral fx. Cardio consulted for preoperative assessment.     - No clear evidence of acute ischemia  - No evidence of volume overload   - EKG showed no acute ST elevation or depression.   - Previous ECHO in 10/2018 with Cardiologist in [place] (Dr. John Mar). Records in chart. ECHO showed LV normal in size, EF 59%, mild AS, mild TR and mild LVH. Perioperative echo done unchanged from prior. Await official read.   - BP well controlled, continue home BP meds, monitor routine hemodynamics  - Monitor and replete lytes, keep K>4, Mg>2  - Continue Lisinopril 20mg po qd  - Restart Lasix post procedure pending improvement of renal function. Monitor Cr as patient has LIOR.   - Continue statin  - Restart asa 81mg due to hx of CAD  - Pt has no active ischemia, decompensated HF, unstable arrythmia, or severe stenotic valvular disease, and in the setting of low risk orthopedic procedure, patient is optimized from cardiovascular standpoint to proceed with planned procedure with routine hemodynamic monitoring.   - Other cardiovascular workup will depend on clinical course.  - All other workup per primary team  - Will follow 72M with PMH of HTN, HLD, CABG x3 (2008), MI (2010), RA and OA presenting with L hip pain. Found to have L periprosthetic proximal femoral fx. Cardio consulted for preoperative assessment.     - No clear evidence of acute ischemia  - No evidence of volume overload   - EKG showed no acute ST elevation or depression.   - Previous ECHO in 10/2018 with Cardiologist in [place] (Dr. John Mar). Records in chart. ECHO showed LV normal in size, EF 59%, mild AS, mild TR and mild LVH.   -Perioperative echo (11/19/18) done unchanged from prior. Await official read.   - BP well controlled, continue home BP meds, monitor routine hemodynamics  - Monitor and replete lytes, keep K>4, Mg>2  - Continue Lisinopril 20mg po qd  - Restart Lasix post procedure pending improvement of renal function. Monitor Cr as patient has LIOR.   - Continue statin  - Restart asa 81mg due to hx of CAD  - Pt has no active ischemia, decompensated HF, unstable arrythmia, or severe stenotic valvular disease, and in the setting of low risk orthopedic procedure, patient is optimized from cardiovascular standpoint to proceed with planned procedure with routine hemodynamic monitoring.   - Other cardiovascular workup will depend on clinical course.  - All other workup per primary team  - Will follow 72M with PMH of HTN, HLD, CABG x3 (2008), MI (2010), RA and OA presenting with L hip pain. Found to have L periprosthetic proximal femoral fx. Cardio consulted for preoperative assessment.     - No clear evidence of acute ischemia  - No evidence of volume overload   - EKG showed no acute ST elevation or depression.   - Previous ECHO in 10/2018 with Cardiologist in [place] (Dr. John Mar). Records in chart. ECHO showed LV normal in size, EF 59%, mild AS, mild TR and mild LVH.   -Perioperative echo (11/19/18) done- prelim read unchanged from prior. Await official read.   - BP well controlled, continue home BP meds, monitor routine hemodynamics  - Monitor and replete lytes, keep K>4, Mg>2  - Continue Lisinopril 20mg po qd  - Restart Lasix post procedure pending improvement of renal function. Monitor Cr as patient has LIOR.   - Continue statin  - Restart asa 81mg due to hx of CAD  - Pt has no active ischemia, decompensated HF, unstable arrythmia, or severe stenotic valvular disease, and in the setting of low risk orthopedic procedure, patient is optimized from cardiovascular standpoint to proceed with planned procedure with routine hemodynamic monitoring.   - Other cardiovascular workup will depend on clinical course.  - All other workup per primary team  - Will follow 72M with PMH of HTN, HLD, CABG x3 (2008), MI (2010), RA and OA presenting with L hip pain. Found to have L periprosthetic proximal femoral fx. Cardio consulted for preoperative assessment.   No clear evidence of acute ischemia, volume overload   EKG showed no acute ST elevation or depression.   Previous ECHO in 10/2018 with Cardiologist in [place] (Dr. John Mar). Records in chart. ECHO showed LV normal in size, EF 59%, mild AS, mild TR and mild LVH.   Perioperative echo (11/19/18) done- prelim read unchanged from prior.  Patient is in optimal cardiovascular condition for his planned surgery      Recoomend  - Proceed with planned surgery using routine hemodynamic monitoring  - BP well controlled, continue home BP meds, monitor routine hemodynamics  - Monitor and replete lytes, keep K>4, Mg>2  - Continue Lisinopril 20mg po qd  - Restart Lasix post procedure pending improvement of renal function. Monitor Cr as patient has LIOR.   - Continue statin  - Restart asa 81mg due to hx of CAD  - Pt has no active ischemia, decompensated HF, unstable arrythmia, or severe stenotic valvular disease, and in the setting of low risk orthopedic procedure, patient is optimized from cardiovascular standpoint to proceed with planned procedure with routine hemodynamic monitoring.   - Other cardiovascular workup will depend on clinical course.  - All other workup per primary team  - Will follow

## 2018-11-19 NOTE — PROGRESS NOTE ADULT - ASSESSMENT
71 yo male PMH emphysema, HTN, HLD, MI, CABG s/p 3 stents (2008), Rheumatoid arthritis, and osteoarthritis presenting with left sided hip pain due to mechanical fall admitted for Acute periprosthetic fracture left proximal femur and fracture of left superior pubic ramus.

## 2018-11-19 NOTE — PROGRESS NOTE ADULT - PROBLEM SELECTOR PLAN 1
planned for left hip surgery  oral and skin care  cardio eval  planned for stress dose of steroids  cont NEBS and Spiriva  I aundrea  may have undiagnosed PRO  optimize cvs regimen  at risk for olvin op and post op complication, quit tobacco 3 months ago  will follow and monitor  no objection to proceed with surgery from pulm point

## 2018-11-20 LAB
ANION GAP SERPL CALC-SCNC: 11 MMOL/L — SIGNIFICANT CHANGE UP (ref 5–17)
BUN SERPL-MCNC: 35 MG/DL — HIGH (ref 7–23)
CALCIUM SERPL-MCNC: 8.1 MG/DL — LOW (ref 8.5–10.1)
CHLORIDE SERPL-SCNC: 98 MMOL/L — SIGNIFICANT CHANGE UP (ref 96–108)
CO2 SERPL-SCNC: 26 MMOL/L — SIGNIFICANT CHANGE UP (ref 22–31)
CREAT SERPL-MCNC: 1.3 MG/DL — SIGNIFICANT CHANGE UP (ref 0.5–1.3)
GLUCOSE SERPL-MCNC: 140 MG/DL — HIGH (ref 70–99)
HCT VFR BLD CALC: 31.3 % — LOW (ref 39–50)
HGB BLD-MCNC: 10.4 G/DL — LOW (ref 13–17)
MCHC RBC-ENTMCNC: 31.7 PG — SIGNIFICANT CHANGE UP (ref 27–34)
MCHC RBC-ENTMCNC: 33.2 GM/DL — SIGNIFICANT CHANGE UP (ref 32–36)
MCV RBC AUTO: 95.4 FL — SIGNIFICANT CHANGE UP (ref 80–100)
NRBC # BLD: 0 /100 WBCS — SIGNIFICANT CHANGE UP (ref 0–0)
PLATELET # BLD AUTO: 184 K/UL — SIGNIFICANT CHANGE UP (ref 150–400)
POTASSIUM SERPL-MCNC: 5.3 MMOL/L — SIGNIFICANT CHANGE UP (ref 3.5–5.3)
POTASSIUM SERPL-SCNC: 5.3 MMOL/L — SIGNIFICANT CHANGE UP (ref 3.5–5.3)
RBC # BLD: 3.28 M/UL — LOW (ref 4.2–5.8)
RBC # FLD: 15.6 % — HIGH (ref 10.3–14.5)
SODIUM SERPL-SCNC: 135 MMOL/L — SIGNIFICANT CHANGE UP (ref 135–145)
WBC # BLD: 12.88 K/UL — HIGH (ref 3.8–10.5)
WBC # FLD AUTO: 12.88 K/UL — HIGH (ref 3.8–10.5)

## 2018-11-20 PROCEDURE — 73502 X-RAY EXAM HIP UNI 2-3 VIEWS: CPT | Mod: 26,LT

## 2018-11-20 PROCEDURE — 73552 X-RAY EXAM OF FEMUR 2/>: CPT | Mod: 26,LT

## 2018-11-20 PROCEDURE — 99233 SBSQ HOSP IP/OBS HIGH 50: CPT

## 2018-11-20 PROCEDURE — 99232 SBSQ HOSP IP/OBS MODERATE 35: CPT

## 2018-11-20 RX ORDER — DIAZEPAM 5 MG
1 TABLET ORAL EVERY 8 HOURS
Qty: 0 | Refills: 0 | Status: DISCONTINUED | OUTPATIENT
Start: 2018-11-20 | End: 2018-11-21

## 2018-11-20 RX ORDER — METHADONE HYDROCHLORIDE 40 MG/1
5 TABLET ORAL EVERY 8 HOURS
Qty: 0 | Refills: 0 | Status: DISCONTINUED | OUTPATIENT
Start: 2018-11-20 | End: 2018-11-21

## 2018-11-20 RX ORDER — HYDROCORTISONE 20 MG
25 TABLET ORAL EVERY 8 HOURS
Qty: 0 | Refills: 0 | Status: DISCONTINUED | OUTPATIENT
Start: 2018-11-20 | End: 2018-11-20

## 2018-11-20 RX ORDER — SODIUM CHLORIDE 9 MG/ML
1000 INJECTION, SOLUTION INTRAVENOUS
Qty: 0 | Refills: 0 | Status: DISCONTINUED | OUTPATIENT
Start: 2018-11-20 | End: 2018-11-20

## 2018-11-20 RX ORDER — ALBUTEROL 90 UG/1
2.5 AEROSOL, METERED ORAL EVERY 8 HOURS
Qty: 0 | Refills: 0 | Status: DISCONTINUED | OUTPATIENT
Start: 2018-11-20 | End: 2018-11-21

## 2018-11-20 RX ORDER — TIOTROPIUM BROMIDE 18 UG/1
1 CAPSULE ORAL; RESPIRATORY (INHALATION) DAILY
Qty: 0 | Refills: 0 | Status: DISCONTINUED | OUTPATIENT
Start: 2018-11-20 | End: 2018-11-21

## 2018-11-20 RX ADMIN — Medication 500 MILLIGRAM(S): at 05:18

## 2018-11-20 RX ADMIN — Medication 3 MILLIGRAM(S): at 21:58

## 2018-11-20 RX ADMIN — SODIUM CHLORIDE 3 MILLILITER(S): 9 INJECTION INTRAMUSCULAR; INTRAVENOUS; SUBCUTANEOUS at 21:59

## 2018-11-20 RX ADMIN — SODIUM CHLORIDE 3 MILLILITER(S): 9 INJECTION INTRAMUSCULAR; INTRAVENOUS; SUBCUTANEOUS at 13:15

## 2018-11-20 RX ADMIN — CYCLOBENZAPRINE HYDROCHLORIDE 10 MILLIGRAM(S): 10 TABLET, FILM COATED ORAL at 02:30

## 2018-11-20 RX ADMIN — ALBUTEROL 2.5 MILLIGRAM(S): 90 AEROSOL, METERED ORAL at 14:01

## 2018-11-20 RX ADMIN — FAMOTIDINE 20 MILLIGRAM(S): 10 INJECTION INTRAVENOUS at 17:19

## 2018-11-20 RX ADMIN — Medication 325 MILLIGRAM(S): at 17:19

## 2018-11-20 RX ADMIN — TAMSULOSIN HYDROCHLORIDE 0.4 MILLIGRAM(S): 0.4 CAPSULE ORAL at 21:58

## 2018-11-20 RX ADMIN — FAMOTIDINE 20 MILLIGRAM(S): 10 INJECTION INTRAVENOUS at 05:19

## 2018-11-20 RX ADMIN — Medication 1 MILLIGRAM(S): at 13:16

## 2018-11-20 RX ADMIN — OXYCODONE HYDROCHLORIDE 10 MILLIGRAM(S): 5 TABLET ORAL at 08:15

## 2018-11-20 RX ADMIN — POLYETHYLENE GLYCOL 3350 17 GRAM(S): 17 POWDER, FOR SOLUTION ORAL at 11:37

## 2018-11-20 RX ADMIN — Medication 100 MILLIGRAM(S): at 01:58

## 2018-11-20 RX ADMIN — OXYCODONE HYDROCHLORIDE 10 MILLIGRAM(S): 5 TABLET ORAL at 07:41

## 2018-11-20 RX ADMIN — Medication 1 TABLET(S): at 13:16

## 2018-11-20 RX ADMIN — Medication 100 MILLIGRAM(S): at 21:58

## 2018-11-20 RX ADMIN — SIMVASTATIN 5 MILLIGRAM(S): 20 TABLET, FILM COATED ORAL at 21:58

## 2018-11-20 RX ADMIN — Medication 100 MILLIGRAM(S): at 13:16

## 2018-11-20 RX ADMIN — Medication 4 MILLIGRAM(S): at 05:19

## 2018-11-20 RX ADMIN — FINASTERIDE 5 MILLIGRAM(S): 5 TABLET, FILM COATED ORAL at 11:37

## 2018-11-20 RX ADMIN — OXYCODONE HYDROCHLORIDE 10 MILLIGRAM(S): 5 TABLET ORAL at 17:19

## 2018-11-20 RX ADMIN — METHADONE HYDROCHLORIDE 5 MILLIGRAM(S): 40 TABLET ORAL at 21:58

## 2018-11-20 RX ADMIN — Medication 100 MILLIGRAM(S): at 09:50

## 2018-11-20 RX ADMIN — Medication 500 MILLIGRAM(S): at 17:19

## 2018-11-20 RX ADMIN — Medication 3 MILLILITER(S): at 04:00

## 2018-11-20 RX ADMIN — Medication 1 TABLET(S): at 21:58

## 2018-11-20 RX ADMIN — TAMSULOSIN HYDROCHLORIDE 0.4 MILLIGRAM(S): 0.4 CAPSULE ORAL at 02:06

## 2018-11-20 RX ADMIN — Medication 100 MILLIGRAM(S): at 05:18

## 2018-11-20 RX ADMIN — METHADONE HYDROCHLORIDE 5 MILLIGRAM(S): 40 TABLET ORAL at 13:16

## 2018-11-20 RX ADMIN — OXYCODONE HYDROCHLORIDE 10 MILLIGRAM(S): 5 TABLET ORAL at 01:59

## 2018-11-20 RX ADMIN — OXYCODONE HYDROCHLORIDE 10 MILLIGRAM(S): 5 TABLET ORAL at 18:06

## 2018-11-20 RX ADMIN — CYCLOBENZAPRINE HYDROCHLORIDE 10 MILLIGRAM(S): 10 TABLET, FILM COATED ORAL at 09:30

## 2018-11-20 RX ADMIN — OXYCODONE HYDROCHLORIDE 10 MILLIGRAM(S): 5 TABLET ORAL at 13:09

## 2018-11-20 RX ADMIN — Medication 325 MILLIGRAM(S): at 11:37

## 2018-11-20 RX ADMIN — ALBUTEROL 2.5 MILLIGRAM(S): 90 AEROSOL, METERED ORAL at 07:48

## 2018-11-20 RX ADMIN — OXYCODONE HYDROCHLORIDE 10 MILLIGRAM(S): 5 TABLET ORAL at 03:00

## 2018-11-20 RX ADMIN — TIOTROPIUM BROMIDE 1 CAPSULE(S): 18 CAPSULE ORAL; RESPIRATORY (INHALATION) at 06:30

## 2018-11-20 RX ADMIN — Medication 1 TABLET(S): at 05:19

## 2018-11-20 RX ADMIN — SODIUM CHLORIDE 50 MILLILITER(S): 9 INJECTION, SOLUTION INTRAVENOUS at 07:43

## 2018-11-20 RX ADMIN — ENOXAPARIN SODIUM 40 MILLIGRAM(S): 100 INJECTION SUBCUTANEOUS at 21:58

## 2018-11-20 RX ADMIN — OXYCODONE HYDROCHLORIDE 10 MILLIGRAM(S): 5 TABLET ORAL at 11:37

## 2018-11-20 RX ADMIN — Medication 1 MILLIGRAM(S): at 15:26

## 2018-11-20 RX ADMIN — Medication 25 MILLIGRAM(S): at 03:12

## 2018-11-20 RX ADMIN — METHADONE HYDROCHLORIDE 5 MILLIGRAM(S): 40 TABLET ORAL at 05:19

## 2018-11-20 RX ADMIN — Medication 20 MILLIGRAM(S): at 07:41

## 2018-11-20 RX ADMIN — Medication 325 MILLIGRAM(S): at 07:41

## 2018-11-20 NOTE — PHYSICAL THERAPY INITIAL EVALUATION ADULT - MD ORDER
PT order for PT eval, OOB to chair, PWB to LLE and FWB to RLE PT order for PT eval, OOB to chair, PWB to LLE and FWB to RLE. 1 unit of PRBC given yesterday. CT left femur: (+) left hip arthroplasty. Comminuted slightly displaced left periprosthetic fx of the left proximal femur, fx of the left superior pubic ramus. No radiographic evidence of left distal fx?; X-ray abd: Probably 1 cm left renal stone. mildly increased fecal content t/o the colon but no sign of obstruction.

## 2018-11-20 NOTE — PROGRESS NOTE ADULT - SUBJECTIVE AND OBJECTIVE BOX
The patient was interviewed and evaluated  72y Male    T(C): 37 (11-20-18 @ 07:52), Max: 37.7 (11-19-18 @ 22:05)  HR: 85 (11-20-18 @ 07:52) (79 - 99)  BP: 124/63 (11-20-18 @ 07:52) (81/60 - 124/63)  RR: 16 (11-20-18 @ 07:52) (14 - 156)  SpO2: 96% (11-20-18 @ 07:52) (93% - 97%)  Wt(kg): --    Pt seen, doing well, no anesthesia complications or complaints noted or reported.   No Nausea    All questions answered    No additional recommendations.     Pain adequately controlled

## 2018-11-20 NOTE — PROGRESS NOTE ADULT - PROBLEM SELECTOR PLAN 10
DVT proph- heparin 5000 u q8h. Stop after midnight.
DVT proph- heparin 5000 u q8h. Stop after midnight.

## 2018-11-20 NOTE — PROGRESS NOTE ADULT - SUBJECTIVE AND OBJECTIVE BOX
Patient is a 72y old  Male who presents with a chief complaint of fall, hip pain (2018 11:40)      INTERVAL HPI/OVERNIGHT EVENTS:     MEDICATIONS  (STANDING):  ALBUTerol    0.083% 2.5 milliGRAM(s) Nebulizer every 8 hours  ascorbic acid 500 milliGRAM(s) Oral two times a day  calcium carbonate 1250 mG  + Vitamin D (OsCal 500 + D) 1 Tablet(s) Oral three times a day  docusate sodium 100 milliGRAM(s) Oral three times a day  enoxaparin Injectable 40 milliGRAM(s) SubCutaneous daily  famotidine    Tablet 20 milliGRAM(s) Oral every 12 hours  ferrous    sulfate 325 milliGRAM(s) Oral three times a day with meals  finasteride 5 milliGRAM(s) Oral daily  folic acid 1 milliGRAM(s) Oral daily  lisinopril 20 milliGRAM(s) Oral daily  methadone    Tablet 5 milliGRAM(s) Oral every 8 hours  methylPREDNISolone 4 milliGRAM(s) Oral daily  multivitamin 1 Tablet(s) Oral daily  polyethylene glycol 3350 17 Gram(s) Oral daily  predniSONE   Tablet 20 milliGRAM(s) Oral daily  simvastatin 5 milliGRAM(s) Oral at bedtime  sodium chloride 0.9% lock flush 3 milliLiter(s) IV Push every 8 hours  tamsulosin 0.4 milliGRAM(s) Oral at bedtime  tiotropium 18 MICROgram(s) Capsule 1 Capsule(s) Inhalation daily    MEDICATIONS  (PRN):  acetaminophen   Tablet .. 650 milliGRAM(s) Oral every 4 hours PRN Mild Pain (1 - 3)  acetaminophen   Tablet .. 650 milliGRAM(s) Oral every 6 hours PRN Temp greater or equal to 38C (100.4F)  ALPRAZolam 0.5 milliGRAM(s) Oral at bedtime PRN anxiety  aluminum hydroxide/magnesium hydroxide/simethicone Suspension 30 milliLiter(s) Oral four times a day PRN Indigestion  benzocaine 15 mG/menthol 3.6 mG Lozenge 1 Lozenge Oral every 3 hours PRN Sore Throat  cyclobenzaprine 10 milliGRAM(s) Oral three times a day PRN Muscle Spasm  diphenhydrAMINE 25 milliGRAM(s) Oral every 8 hours PRN Itching  melatonin 3 milliGRAM(s) Oral at bedtime PRN Insomnia  ondansetron Injectable 4 milliGRAM(s) IV Push every 6 hours PRN Nausea and/or Vomiting  oxyCODONE    IR 5 milliGRAM(s) Oral every 4 hours PRN Moderate Pain (4 - 6)  oxyCODONE    IR 10 milliGRAM(s) Oral every 4 hours PRN Severe Pain (7 - 10)  senna 2 Tablet(s) Oral at bedtime PRN Constipation  traMADol 100 milliGRAM(s) Oral every 6 hours PRN Mild Pain (1 - 3)      Allergies    No Known Allergies    Intolerances        REVIEW OF SYSTEMS:  CONSTITUTIONAL: No fever, weight loss, or fatigue  EYES: No eye pain, visual disturbances, or discharge  ENMT:  No difficulty hearing, tinnitus, vertigo; No sinus or throat pain  NECK: No pain or stiffness  BREASTS: No pain, masses, or nipple discharge  RESPIRATORY: No cough, wheezing, chills or hemoptysis; No shortness of breath  CARDIOVASCULAR: No chest pain, palpitations, dizziness, or leg swelling  GASTROINTESTINAL: No abdominal or epigastric pain. No nausea, vomiting, or hematemesis; No diarrhea or constipation. No melena or hematochezia.  GENITOURINARY: No dysuria, frequency, hematuria, or incontinence  NEUROLOGICAL: No headaches, memory loss, loss of strength, numbness, or tremors  SKIN: No itching, burning, rashes, or lesions   LYMPH NODES: No enlarged glands  ENDOCRINE: No heat or cold intolerance; No hair loss; No polydipsia or polyuria  MUSCULOSKELETAL: No joint pain or swelling; No muscle, back, or extremity pain  PSYCHIATRIC: No depression, anxiety, mood swings, or difficulty sleeping  HEME/LYMPH: No easy bruising, or bleeding gums  ALLERGY AND IMMUNOLOGIC: No hives or eczema    Vital Signs Last 24 Hrs  T(C): 36.9 (2018 12:01), Max: 37.7 (2018 22:05)  T(F): 98.5 (2018 12:01), Max: 99.9 (2018 22:05)  HR: 96 (2018 12:01) (79 - 109)  BP: 117/76 (2018 12:01) (81/60 - 124/63)  BP(mean): --  RR: 18 (2018 12:01) (14 - 156)  SpO2: 95% (2018 12:01) (93% - 97%)    PHYSICAL EXAM:  GENERAL: NAD, well-groomed, well-developed  HEAD:  Atraumatic, Normocephalic  EYES: EOMI, PERRLA, conjunctiva and sclera clear  ENMT: No tonsillar erythema, exudates, or enlargement; Moist mucous membranes, Good dentition, No lesions  NECK: Supple, No JVD, Normal thyroid  NERVOUS SYSTEM:  Alert & Oriented X3, Good concentration; Motor Strength 5/5 B/L upper and lower extremities; DTRs 2+ intact and symmetric  CHEST/LUNG: Clear to auscultation bilaterally; No rales, rhonchi, wheezing, or rubs  HEART: Regular rate and rhythm; No murmurs, rubs, or gallops  ABDOMEN: Soft, Nontender, Nondistended; Bowel sounds present  EXTREMITIES:  2+ Peripheral Pulses, No clubbing, cyanosis, or edema  LYMPH: No lymphadenopathy noted  SKIN: No rashes or lesions    LABS:                        10.4   12.88 )-----------( 184      ( 2018 06:00 )             31.3     2018 06:00    135    |  98     |  35     ----------------------------<  140    5.3     |  26     |  1.30     Ca    8.1        2018 06:00      PT/INR - ( 2018 04:24 )   PT: 14.4 sec;   INR: 1.25 ratio         PTT - ( 2018 04:24 )  PTT:31.2 sec  Urinalysis Basic - ( 2018 22:08 )    Color: Yellow / Appearance: Clear / S.020 / pH: x  Gluc: x / Ketone: Trace  / Bili: Small / Urobili: 1   Blood: x / Protein: Negative / Nitrite: Negative   Leuk Esterase: Small / RBC: Negative /HPF / WBC 3-5   Sq Epi: x / Non Sq Epi: Occasional / Bacteria: Occasional      CAPILLARY BLOOD GLUCOSE          RADIOLOGY & ADDITIONAL TESTS:    Imaging Personally Reviewed:  [ ] YES  [ ] NO    Consultant(s) Notes Reviewed:  [ ] YES  [ ] NO    Care Discussed with Consultants/Other Providers [ ] YES  [ ] NO Patient is a 72y old  Male who presents with a chief complaint of fall, hip pain (2018 11:40)      INTERVAL HPI/OVERNIGHT EVENTS: 73 yo male PMH emphysema, HTN, HLD, MI, CABG s/p 3 stents (), Rheumatoid arthritis, and osteoarthritis presenting with left sided hip pain due to mechanical fall admitted for Acute periprosthetic fracture left proximal femur and fracture of left superior pubic ramus. pt still feel pain and spasm.    MEDICATIONS  (STANDING):  ALBUTerol    0.083% 2.5 milliGRAM(s) Nebulizer every 8 hours  ascorbic acid 500 milliGRAM(s) Oral two times a day  calcium carbonate 1250 mG  + Vitamin D (OsCal 500 + D) 1 Tablet(s) Oral three times a day  docusate sodium 100 milliGRAM(s) Oral three times a day  enoxaparin Injectable 40 milliGRAM(s) SubCutaneous daily  famotidine    Tablet 20 milliGRAM(s) Oral every 12 hours  ferrous    sulfate 325 milliGRAM(s) Oral three times a day with meals  finasteride 5 milliGRAM(s) Oral daily  folic acid 1 milliGRAM(s) Oral daily  lisinopril 20 milliGRAM(s) Oral daily  methadone    Tablet 5 milliGRAM(s) Oral every 8 hours  methylPREDNISolone 4 milliGRAM(s) Oral daily  multivitamin 1 Tablet(s) Oral daily  polyethylene glycol 3350 17 Gram(s) Oral daily  predniSONE   Tablet 20 milliGRAM(s) Oral daily  simvastatin 5 milliGRAM(s) Oral at bedtime  sodium chloride 0.9% lock flush 3 milliLiter(s) IV Push every 8 hours  tamsulosin 0.4 milliGRAM(s) Oral at bedtime  tiotropium 18 MICROgram(s) Capsule 1 Capsule(s) Inhalation daily    MEDICATIONS  (PRN):  acetaminophen   Tablet .. 650 milliGRAM(s) Oral every 4 hours PRN Mild Pain (1 - 3)  acetaminophen   Tablet .. 650 milliGRAM(s) Oral every 6 hours PRN Temp greater or equal to 38C (100.4F)  ALPRAZolam 0.5 milliGRAM(s) Oral at bedtime PRN anxiety  aluminum hydroxide/magnesium hydroxide/simethicone Suspension 30 milliLiter(s) Oral four times a day PRN Indigestion  benzocaine 15 mG/menthol 3.6 mG Lozenge 1 Lozenge Oral every 3 hours PRN Sore Throat  cyclobenzaprine 10 milliGRAM(s) Oral three times a day PRN Muscle Spasm  diphenhydrAMINE 25 milliGRAM(s) Oral every 8 hours PRN Itching  melatonin 3 milliGRAM(s) Oral at bedtime PRN Insomnia  ondansetron Injectable 4 milliGRAM(s) IV Push every 6 hours PRN Nausea and/or Vomiting  oxyCODONE    IR 5 milliGRAM(s) Oral every 4 hours PRN Moderate Pain (4 - 6)  oxyCODONE    IR 10 milliGRAM(s) Oral every 4 hours PRN Severe Pain (7 - 10)  senna 2 Tablet(s) Oral at bedtime PRN Constipation  traMADol 100 milliGRAM(s) Oral every 6 hours PRN Mild Pain (1 - 3)      Allergies    No Known Allergies    Intolerances        REVIEW OF SYSTEMS:  CONSTITUTIONAL: No fever, weight loss, or fatigue  EYES: No eye pain, visual disturbances, or discharge  ENMT:  No difficulty hearing, tinnitus, vertigo; No sinus or throat pain  NECK: No pain or stiffness  BREASTS: No pain, masses, or nipple discharge  RESPIRATORY: No cough, wheezing, chills or hemoptysis; No shortness of breath  CARDIOVASCULAR: No chest pain, palpitations, dizziness, or leg swelling  GASTROINTESTINAL: No abdominal or epigastric pain. No nausea, vomiting, or hematemesis; No diarrhea or constipation. No melena or hematochezia.  GENITOURINARY: No dysuria, frequency, hematuria, or incontinence  NEUROLOGICAL: No headaches, memory loss, loss of strength, numbness, or tremors  SKIN: No itching, burning, rashes, or lesions   LYMPH NODES: No enlarged glands  ENDOCRINE: No heat or cold intolerance; No hair loss; No polydipsia or polyuria  MUSCULOSKELETAL: No joint pain or swelling; No muscle, back, or extremity pain  PSYCHIATRIC: No depression, anxiety, mood swings, or difficulty sleeping  HEME/LYMPH: No easy bruising, or bleeding gums  ALLERGY AND IMMUNOLOGIC: No hives or eczema    Vital Signs Last 24 Hrs  T(C): 36.9 (2018 12:01), Max: 37.7 (2018 22:05)  T(F): 98.5 (2018 12:01), Max: 99.9 (2018 22:05)  HR: 96 (2018 12:01) (79 - 109)  BP: 117/76 (2018 12:01) (81/60 - 124/63)  BP(mean): --  RR: 18 (2018 12:01) (14 - 156)  SpO2: 95% (2018 12:01) (93% - 97%)    PHYSICAL EXAM:  GENERAL: NAD, well-groomed, well-developed  HEAD:  Atraumatic, Normocephalic  EYES: EOMI, PERRLA, conjunctiva and sclera clear  ENMT: No tonsillar erythema, exudates, or enlargement; Moist mucous membranes, Good dentition, No lesions  NECK: Supple, No JVD, Normal thyroid  NERVOUS SYSTEM:  Alert & Oriented X3, Good concentration; Motor Strength 5/5 B/L upper and lower extremities; DTRs 2+ intact and symmetric  CHEST/LUNG: Clear to auscultation bilaterally; No rales, rhonchi, wheezing, or rubs  HEART: Regular rate and rhythm; No murmurs, rubs, or gallops  ABDOMEN: Soft, Nontender, Nondistended; Bowel sounds present  EXTREMITIES:  2+ Peripheral Pulses, No clubbing, cyanosis, or edema  LYMPH: No lymphadenopathy noted  SKIN: No rashes or lesions    LABS:                        10.4   12.88 )-----------( 184      ( 2018 06:00 )             31.3     2018 06:00    135    |  98     |  35     ----------------------------<  140    5.3     |  26     |  1.30     Ca    8.1        2018 06:00      PT/INR - ( 2018 04:24 )   PT: 14.4 sec;   INR: 1.25 ratio         PTT - ( 2018 04:24 )  PTT:31.2 sec  Urinalysis Basic - ( 2018 22:08 )    Color: Yellow / Appearance: Clear / S.020 / pH: x  Gluc: x / Ketone: Trace  / Bili: Small / Urobili: 1   Blood: x / Protein: Negative / Nitrite: Negative   Leuk Esterase: Small / RBC: Negative /HPF / WBC 3-5   Sq Epi: x / Non Sq Epi: Occasional / Bacteria: Occasional      CAPILLARY BLOOD GLUCOSE          RADIOLOGY & ADDITIONAL TESTS:    Imaging Personally Reviewed:  [x ] YES  [ ] NO    Consultant(s) Notes Reviewed:  [x ] YES  [ ] NO    Care Discussed with Consultants/Other Providers [x ] YES  [ ] NO

## 2018-11-20 NOTE — PROGRESS NOTE ADULT - PROBLEM SELECTOR PLAN 8
patient takes alendronate weekly (on Wednesdays).   patient's wife states that she will bring in the medication
patient takes alendronate weekly (on Wednesdays).   patient's wife states that she will bring in the medication

## 2018-11-20 NOTE — PHYSICAL THERAPY INITIAL EVALUATION ADULT - IMPAIRED TRANSFERS: SIT/STAND, REHAB EVAL
impaired balance/decreased strength/decreased flexibility/impaired coordination/abnormal muscle tone/impaired motor control/decreased ROM/decreased sensation/impaired postural control pain/impaired postural control/decreased ROM/impaired motor control/decreased sensation/decreased strength/impaired balance/impaired coordination/decreased flexibility/abnormal muscle tone

## 2018-11-20 NOTE — PROGRESS NOTE ADULT - SUBJECTIVE AND OBJECTIVE BOX
[facility] -- Shannon Valdez, [place], [place], Sidney Orlando Savella  Office # [phone]      Follow Up:  Post op evaluation    Subjective/Observations: Seen and examined. Patient resting comfortably in bed, denies chest pain, SOB/ orthopnea, but c/o left hip pain; on PRN narcotics      REVIEW OF SYSTEMS: All other review of systems is negative unless indicated above    PAST MEDICAL & SURGICAL HISTORY:  S/P CABG x 3  S/P hip replacement, left  Rheumatoid arthritis  Osteoarthritis  COPD (chronic obstructive pulmonary disease)  HTN (hypertension)  History of appendectomy  History of right shoulder replacement  History of total left hip arthroplasty      MEDICATIONS  (STANDING):  ALBUTerol    0.083% 2.5 milliGRAM(s) Nebulizer every 8 hours  ascorbic acid 500 milliGRAM(s) Oral two times a day  calcium carbonate 1250 mG  + Vitamin D (OsCal 500 + D) 1 Tablet(s) Oral three times a day  docusate sodium 100 milliGRAM(s) Oral three times a day  enoxaparin Injectable 40 milliGRAM(s) SubCutaneous daily  famotidine    Tablet 20 milliGRAM(s) Oral every 12 hours  ferrous    sulfate 325 milliGRAM(s) Oral three times a day with meals  finasteride 5 milliGRAM(s) Oral daily  folic acid 1 milliGRAM(s) Oral daily  lactated ringers. 1000 milliLiter(s) (50 mL/Hr) IV Continuous <Continuous>  lisinopril 20 milliGRAM(s) Oral daily  methadone    Tablet 5 milliGRAM(s) Oral every 8 hours  methylPREDNISolone 4 milliGRAM(s) Oral daily  multivitamin 1 Tablet(s) Oral daily  polyethylene glycol 3350 17 Gram(s) Oral daily  predniSONE   Tablet 20 milliGRAM(s) Oral daily  simvastatin 5 milliGRAM(s) Oral at bedtime  sodium chloride 0.9% lock flush 3 milliLiter(s) IV Push every 8 hours  tamsulosin 0.4 milliGRAM(s) Oral at bedtime  tiotropium 18 MICROgram(s) Capsule 1 Capsule(s) Inhalation daily    MEDICATIONS  (PRN):  acetaminophen   Tablet .. 650 milliGRAM(s) Oral every 4 hours PRN Mild Pain (1 - 3)  acetaminophen   Tablet .. 650 milliGRAM(s) Oral every 6 hours PRN Temp greater or equal to 38C (100.4F)  ALPRAZolam 0.5 milliGRAM(s) Oral at bedtime PRN anxiety  aluminum hydroxide/magnesium hydroxide/simethicone Suspension 30 milliLiter(s) Oral four times a day PRN Indigestion  benzocaine 15 mG/menthol 3.6 mG Lozenge 1 Lozenge Oral every 3 hours PRN Sore Throat  cyclobenzaprine 10 milliGRAM(s) Oral three times a day PRN Muscle Spasm  diphenhydrAMINE 25 milliGRAM(s) Oral every 8 hours PRN Itching  melatonin 3 milliGRAM(s) Oral at bedtime PRN Insomnia  ondansetron Injectable 4 milliGRAM(s) IV Push every 6 hours PRN Nausea and/or Vomiting  oxyCODONE    IR 5 milliGRAM(s) Oral every 4 hours PRN Moderate Pain (4 - 6)  oxyCODONE    IR 10 milliGRAM(s) Oral every 4 hours PRN Severe Pain (7 - 10)  senna 2 Tablet(s) Oral at bedtime PRN Constipation  traMADol 100 milliGRAM(s) Oral every 6 hours PRN Mild Pain (1 - 3)      Allergies    No Known Allergies    Intolerances            Vital Signs Last 24 Hrs  T(C): 37 (20 Nov 2018 07:52), Max: 37.7 (19 Nov 2018 22:05)  T(F): 98.6 (20 Nov 2018 07:52), Max: 99.9 (19 Nov 2018 22:05)  HR: 109 (20 Nov 2018 11:13) (79 - 109)  BP: 121/76 (20 Nov 2018 11:13) (81/60 - 124/63)  BP(mean): --  RR: 16 (20 Nov 2018 07:52) (14 - 156)  SpO2: 95% (20 Nov 2018 11:13) (93% - 97%)    I&O's Summary    19 Nov 2018 07:01  -  20 Nov 2018 07:00  --------------------------------------------------------  IN: 1632 mL / OUT: 950 mL / NET: 682 mL    20 Nov 2018 07:01  -  20 Nov 2018 11:41  --------------------------------------------------------  IN: 0 mL / OUT: 150 mL / NET: -150 mL      Weight (kg): 83.9 (11-19 @ 15:27)    PHYSICAL EXAM:  TELE: Not on tele  Constitutional: NAD, awake and alert, well-developed  HEENT: Moist Mucous Membranes, Anicteric  Pulmonary: Non-labored, breath sounds are clear bilaterally, No wheezing, rales or rhonchi  Cardiovascular: Regular, S1 and S2, No murmurs, rubs, gallops or clicks  Gastrointestinal: Bowel Sounds present, soft, nontender.   Lymph: No peripheral edema. No lymphadenopathy.  Skin: No visible rashes or ulcers.  Psych:  Mood & affect appropriate    LABS: All Labs Reviewed:                        10.4   12.88 )-----------( 184      ( 20 Nov 2018 06:00 )             31.3                         10.3   15.03 )-----------( 224      ( 19 Nov 2018 22:45 )             31.1                         10.3   9.62  )-----------( 200      ( 19 Nov 2018 04:24 )             31.9     20 Nov 2018 06:00    135    |  98     |  35     ----------------------------<  140    5.3     |  26     |  1.30   19 Nov 2018 22:45    135    |  101    |  33     ----------------------------<  121    5.6     |  26     |  1.20   19 Nov 2018 04:24    132    |  96     |  34     ----------------------------<  104    5.2     |  30     |  1.40     Ca    8.1        20 Nov 2018 06:00  Ca    8.1        19 Nov 2018 22:45  Ca    8.8        19 Nov 2018 04:24      PT/INR - ( 19 Nov 2018 04:24 )   PT: 14.4 sec;   INR: 1.25 ratio         PTT - ( 19 Nov 2018 04:24 )  PTT:31.2 sec       < from: 12 Lead ECG (11.18.18 @ 10:58) >    Diagnosis Line Sinus rhythm with 1st degree AV block  Left axis deviation  Inferior infarct , age undetermined  Cannot rule out Anterior infarct , age undetermined  Abnormal ECG  No previous ECGs available  Confirmed by Walter Villasenor MD ([id number]) on 11/19/2018 1:15:34 PM    < end of copied text >  < from: Xray Chest 1 View AP/PA (11.18.18 @ 11:46) >  AP chest. No priors.    Status post median sternotomy. Heart magnified by AP film shallow   inspiration. Calcified aortic arch. Grossly clear lungs. No consolidation   or effusion. Partially visualized hardware lower cervical spine. Right   shoulder arthroplasty. Trace right upper quadrant. Metallic fusion lumbar   spine    Impression: Grossly clear lungs.    < end of copied text >

## 2018-11-20 NOTE — PHYSICAL THERAPY INITIAL EVALUATION ADULT - RANGE OF MOTION EXAMINATION, REHAB EVAL
deficits as listed below/Left hip limited due to pain and muscle spasms/bilateral lower extremity ROM was WFL (within functional limits)/bilateral upper extremity ROM was WFL (within functional limits)

## 2018-11-20 NOTE — PHYSICAL THERAPY INITIAL EVALUATION ADULT - PATIENT PROFILE REVIEW, REHAB EVAL
Pt admitted for a mecahnical fall and sustained a left periprosthetic fracture/yes yes/Pt admitted for a mechanical fall and sustained a left periprosthetic fracture

## 2018-11-20 NOTE — PROGRESS NOTE ADULT - ASSESSMENT
73 yo male PMH emphysema, HTN, HLD, MI, CABG s/p 3 stents (2008), Rheumatoid arthritis, and osteoarthritis presenting with left sided hip pain due to mechanical fall admitted for Acute periprosthetic fracture left proximal femur and fracture of left superior pubic ramus. 73 yo male PMH emphysema, HTN, HLD, MI, CABG s/p 3 stents (2008), Rheumatoid arthritis, and osteoarthritis presenting with left sided hip pain due to mechanical fall admitted for Acute periprosthetic fracture left proximal femur and fracture of left superior pubic ramus.     Hyperkalemia resolved  Anemia , acute , 2/2 acute blood loss, monitor cbc, asymptomatic.   Leukocytosis 2/2 post op reactive and steroid induced.

## 2018-11-20 NOTE — PROGRESS NOTE ADULT - PROBLEM SELECTOR PLAN 9
On no home meds.   Wheezing, SOB today.   Will give Albuterol nebs q8h for now.  May need to be discharged with Albuterol MDI.  Pulm Dr. Valente to see in AM.
On no home meds.   Wheezing, SOB today.   Will give Albuterol nebs q8h for now.  May need to be discharged with Albuterol MDI.  Pulm Dr. Valente to see in AM.

## 2018-11-20 NOTE — OCCUPATIONAL THERAPY INITIAL EVALUATION ADULT - ADDITIONAL COMMENTS
Pt lives in [place] with his spouse and is visiting his daughter. Pt appears to be an unreliable historian and was inconsistent in his answers regarding his previous level of function. Pt reports his daughter's house has 1 step to enter back entrance of house. In [place] patient reports 1 step to enter 1 level house. +stall shower with built-in seat. Pt owns a rolling walker, cane, raised toilet seat, reacher and motorized scooter. Pt reports he was ambulating using a rolling walker in [place] and used a motorized scooter in [place]. Pt reports he needed assistance w/transfers ~50% of the time. Spouse assisted patient with bathing, transfers and LE dressing.

## 2018-11-20 NOTE — OCCUPATIONAL THERAPY INITIAL EVALUATION ADULT - BED MOBILITY TRAINING, PT EVAL
Patient will perform bed mobility skills (supine to sit and sit to supine) with moderate assistance in 2-4 sessions.

## 2018-11-20 NOTE — PHYSICAL THERAPY INITIAL EVALUATION ADULT - TRANSFER SAFETY CONCERNS NOTED: SIT/STAND, REHAB EVAL
decreased sequencing ability/decreased proprioception/decreased step length/losing balance/decreased weight-shifting ability decreased weight-shifting ability/losing balance/decreased sequencing ability/decreased step length/decreased proprioception/Pt able to PWB to LLE /c Toe-touch WB

## 2018-11-20 NOTE — PHYSICAL THERAPY INITIAL EVALUATION ADULT - BALANCE DISTURBANCE, IDENTIFIED IMPAIRMENT CONTRIBUTE, REHAB EVAL
abnormal muscle tone/impaired postural control/impaired coordination/impaired motor control/decreased ROM/pain/decreased sensation/decreased strength

## 2018-11-20 NOTE — PROGRESS NOTE ADULT - PROBLEM SELECTOR PLAN 6
chronic   stable  continue alprazolam 5mg PRN (wife states that patient takes it every night)  prozac 60mg daily chronic   stable  d/c alprazolam 5mg and start Valium prn   prozac 60mg daily

## 2018-11-20 NOTE — PHYSICAL THERAPY INITIAL EVALUATION ADULT - PHYSICAL ASSIST/NONPHYSICAL ASSIST: SUPINE/SIT, REHAB EVAL
2 person assist/set-up required/nonverbal cues (demo/gestures)/verbal cues verbal cues/nonverbal cues (demo/gestures)/2 person assist/**pain and muscle spams at rest and /c movement, pt agreeable /c PT and OT consult/set-up required

## 2018-11-20 NOTE — OCCUPATIONAL THERAPY INITIAL EVALUATION ADULT - GENERAL OBSERVATIONS, REHAB EVAL
Patient supine in bed with +IV lock Left UE and +bandages (2) Left LE. Pt exhibits SOB/dyspnea upon exertion. Tremors noted UE's during evaluation. Pt reports that he has muscle spasms, spasms observed b/l LE's.

## 2018-11-20 NOTE — PROGRESS NOTE ADULT - SUBJECTIVE AND OBJECTIVE BOX
Date/Time Patient Seen:  		  Referring MD:   Data Reviewed	       Patient is a 72y old  Male who presents with a chief complaint of fall, hip pain (20 Nov 2018 06:52)  in bed  seen and examined  vs and meds reviewed      Subjective/HPI     PAST MEDICAL & SURGICAL HISTORY:  S/P CABG x 3  S/P hip replacement, left  Rheumatoid arthritis  Osteoarthritis  COPD (chronic obstructive pulmonary disease)  HTN (hypertension)  History of appendectomy  History of right shoulder replacement  History of total left hip arthroplasty        Medication list         MEDICATIONS  (STANDING):  ALBUTerol    0.083% 2.5 milliGRAM(s) Nebulizer every 8 hours  ascorbic acid 500 milliGRAM(s) Oral two times a day  calcium carbonate 1250 mG  + Vitamin D (OsCal 500 + D) 1 Tablet(s) Oral three times a day  ceFAZolin   IVPB 2000 milliGRAM(s) IV Intermittent every 8 hours  docusate sodium 100 milliGRAM(s) Oral three times a day  enoxaparin Injectable 40 milliGRAM(s) SubCutaneous daily  famotidine    Tablet 20 milliGRAM(s) Oral every 12 hours  ferrous    sulfate 325 milliGRAM(s) Oral three times a day with meals  finasteride 5 milliGRAM(s) Oral daily  folic acid 1 milliGRAM(s) Oral daily  lactated ringers. 1000 milliLiter(s) (50 mL/Hr) IV Continuous <Continuous>  lisinopril 20 milliGRAM(s) Oral daily  methadone    Tablet 5 milliGRAM(s) Oral every 8 hours  methylPREDNISolone 4 milliGRAM(s) Oral daily  multivitamin 1 Tablet(s) Oral daily  polyethylene glycol 3350 17 Gram(s) Oral daily  predniSONE   Tablet 20 milliGRAM(s) Oral daily  simvastatin 5 milliGRAM(s) Oral at bedtime  sodium chloride 0.9% lock flush 3 milliLiter(s) IV Push every 8 hours  tamsulosin 0.4 milliGRAM(s) Oral at bedtime  tiotropium 18 MICROgram(s) Capsule 1 Capsule(s) Inhalation daily    MEDICATIONS  (PRN):  acetaminophen   Tablet .. 650 milliGRAM(s) Oral every 4 hours PRN Mild Pain (1 - 3)  acetaminophen   Tablet .. 650 milliGRAM(s) Oral every 6 hours PRN Temp greater or equal to 38C (100.4F)  ALPRAZolam 0.5 milliGRAM(s) Oral at bedtime PRN anxiety  aluminum hydroxide/magnesium hydroxide/simethicone Suspension 30 milliLiter(s) Oral four times a day PRN Indigestion  benzocaine 15 mG/menthol 3.6 mG Lozenge 1 Lozenge Oral every 3 hours PRN Sore Throat  cyclobenzaprine 10 milliGRAM(s) Oral three times a day PRN Muscle Spasm  diphenhydrAMINE 25 milliGRAM(s) Oral every 8 hours PRN Itching  melatonin 3 milliGRAM(s) Oral at bedtime PRN Insomnia  ondansetron Injectable 4 milliGRAM(s) IV Push every 6 hours PRN Nausea and/or Vomiting  oxyCODONE    IR 5 milliGRAM(s) Oral every 4 hours PRN Moderate Pain (4 - 6)  oxyCODONE    IR 10 milliGRAM(s) Oral every 4 hours PRN Severe Pain (7 - 10)  senna 2 Tablet(s) Oral at bedtime PRN Constipation  traMADol 100 milliGRAM(s) Oral every 6 hours PRN Mild Pain (1 - 3)         Vitals log        ICU Vital Signs Last 24 Hrs  T(C): 36.7 (20 Nov 2018 05:10), Max: 37.7 (19 Nov 2018 22:05)  T(F): 98 (20 Nov 2018 05:10), Max: 99.9 (19 Nov 2018 22:05)  HR: 82 (20 Nov 2018 05:10) (79 - 99)  BP: 95/59 (20 Nov 2018 05:10) (81/60 - 128/69)  BP(mean): --  ABP: --  ABP(mean): --  RR: 18 (20 Nov 2018 05:10) (14 - 156)  SpO2: 93% (20 Nov 2018 05:10) (92% - 96%)           Input and Output:  I&O's Detail    19 Nov 2018 07:01  -  20 Nov 2018 07:00  --------------------------------------------------------  IN:    lactated ringers.: 75 mL    lactated ringers.: 300 mL    lactated ringers.: 750 mL    Oral Fluid: 50 mL    Packed Red Blood Cells: 307 mL    Solution: 50 mL    Solution: 100 mL  Total IN: 1632 mL    OUT:    Voided: 950 mL  Total OUT: 950 mL    Total NET: 682 mL          Lab Data                        10.4   12.88 )-----------( 184      ( 20 Nov 2018 06:00 )             31.3     11-20    135  |  98  |  35<H>  ----------------------------<  140<H>  5.3   |  26  |  1.30    Ca    8.1<L>      20 Nov 2018 06:00              Review of Systems	      Objective     Physical Examination    heart s1s2  lung dec BS  abd soft      Pertinent Lab findings & Imaging      [place]in[place]:  NO   Adequate UO     I&O's Detail    19 Nov 2018 07:01  -  20 Nov 2018 07:00  --------------------------------------------------------  IN:    lactated ringers.: 75 mL    lactated ringers.: 300 mL    lactated ringers.: 750 mL    Oral Fluid: 50 mL    Packed Red Blood Cells: 307 mL    Solution: 50 mL    Solution: 100 mL  Total IN: 1632 mL    OUT:    Voided: 950 mL  Total OUT: 950 mL    Total NET: 682 mL               Discussed with:     Cultures:	        Radiology

## 2018-11-20 NOTE — PHYSICAL THERAPY INITIAL EVALUATION ADULT - LEVEL OF INDEPENDENCE: SIT/SUPINE, REHAB EVAL
moderate assist (50% patients effort)/maximum assist (25% patients effort) moderate assist (50% patients effort)/Tactile, manual and verbal cues needed/maximum assist (25% patients effort)

## 2018-11-20 NOTE — OCCUPATIONAL THERAPY INITIAL EVALUATION ADULT - NS ASR FOLLOW COMMAND OT EVAL
Patient educated regarding fall prevention and home/hospital safety. Pt educated on use of AE/DME recommended for safety & the need to call for assistance. Role of OT and patient goals discussed./100% of the time

## 2018-11-20 NOTE — PHYSICAL THERAPY INITIAL EVALUATION ADULT - AMBULATION SKILLS, REHAB EVAL
can ambulate at times /c RW and or uses motorized scooter. Needs assistance at times/needs device and assist

## 2018-11-20 NOTE — PROGRESS NOTE ADULT - PROBLEM SELECTOR PLAN 1
CT and xray show fractures of periprosthetic left proximal femur and superior pubic ramus on L.   suspect pathologic fracture 2/2 to osteoporosis from chronic steroid use.   pain control with IV dilaudid prn and for breakthrough   IV fluids  NPO after midnight with IV fluids  No anticoagulation after midnight  ortho following  patient medically optimized and low risk for intermediate risk procedure. RICI 0.9%.  cleared by cardiology team as well.  - Spoke to Dr Villasenor. Echo done, CT and xray show fractures of periprosthetic left proximal femur and superior pubic ramus on L.   suspect pathologic fracture 2/2 to osteoporosis from chronic steroid use.   pain control with po Oxycodone prn, tramadol,   ortho following  on Flaxeryl for muscle spasm, will add valium prn , since spasm not controlled yet.

## 2018-11-20 NOTE — PROGRESS NOTE ADULT - PROBLEM SELECTOR PLAN 3
chronic   continue lisinopril with hold parameters  start LR 50 ml/hr after midnight per ortho  holding Lasix for now due to LR while NPO  Restart post-op. chronic   continue lisinopril with hold parameters  holding Lasix for now  Restart post-op.

## 2018-11-20 NOTE — PROGRESS NOTE ADULT - NSHPATTENDINGPLANDISCUSS_GEN_ALL_CORE
pt and family and Dr Villasenor about his cardiac history.
pt and ortho team about his spasm control.

## 2018-11-20 NOTE — PHYSICAL THERAPY INITIAL EVALUATION ADULT - MANUAL MUSCLE TESTING RESULTS, REHAB EVAL
BUE at least 4/5, RLE 3+/5, LLE/hip </ 2-/5 due to pain, left ankle DF at least 3+/5 in bed/grossly assessed due to

## 2018-11-20 NOTE — PHYSICAL THERAPY INITIAL EVALUATION ADULT - LEVEL OF INDEPENDENCE: SUPINE/SIT, REHAB EVAL
moderate assist (50% patients effort)/tactile, manual and verbal cues needed/maximum assist (25% patients effort)

## 2018-11-20 NOTE — PHYSICAL THERAPY INITIAL EVALUATION ADULT - PERTINENT HX OF CURRENT PROBLEM, REHAB EVAL
As per H&P:"73yo male PMH emphysema, HTN, HLD, MI, CABG s/p 3 stents (2008), Rheumatoid arthritis, and osteoarthritis presenting with left sided hip pain due to mechanical fall this morning. Patient stated that he was pulling up his pants, lost his balance, and fell. He was laying on the ground for 30 minutes, as wife was not able to pick him up. Patient is in severe 10/10 pain."

## 2018-11-20 NOTE — OCCUPATIONAL THERAPY INITIAL EVALUATION ADULT - PERTINENT HX OF CURRENT PROBLEM, REHAB EVAL
73 yo male PMH emphysema, HTN, HLD, MI, CABG s/p 3 stents (2008), Rheumatoid arthritis, and osteoarthritis presenting with left sided hip pain due to mechanical fall admitted for Acute periprosthetic fracture left proximal femur and fracture of left superior pubic ramus. 73 yo male s/p closed reduction of Left periprosthetic fx of femur on 11/19/18. PMH emphysema, HTN, HLD, MI, CABG s/p 3 stents (2008), RA, and OA presenting with left sided hip pain due to mechanical fall admitted for Acute periprosthetic fx left proximal femur & fx of left superior pubic ramus. Pt reports that he has fibromyalgia.

## 2018-11-20 NOTE — PROGRESS NOTE ADULT - PROBLEM SELECTOR PLAN 1
post op day 1  cut down IVF  monitor for volume overload  I aundrea  Albuterol NEBS round the clock  Spiriva for COPD  cvs regimen optimization  pt likely has PRO, not using CPAP, caution with Opioids  o2 support as needed, keep sat > 88 pct  I aundrea  PT  pain control  DVT p  mobilize  wound care  skin care  will follow

## 2018-11-20 NOTE — PROGRESS NOTE ADULT - ASSESSMENT
72M with PMH of HTN, HLD, CABG x3 (2008), MI (2010), RA and OA presenting with L hip pain. Found to have L periprosthetic proximal femoral fx. Cardio consulted for preoperative assessment.   No clear evidence of acute ischemia, volume overload   EKG showed no acute ST elevation or depression.   Previous ECHO in 10/2018 with Cardiologist in [place] (Dr. John Mar). Records in chart. ECHO showed LV normal in size, EF 59%, mild AS, mild TR and mild LVH.   Perioperative echo (11/19/18) done- prelim read unchanged from prior.    - Patient is POD #1 after hip surgery.  - BP well controlled, continue home BP meds, monitor routine hemodynamics  - Monitor and replete lytes, keep K>4, Mg>2  - Continue Lisinopril 20mg po qd  - Restart Lasix  ( home med)  - Continue statin  - Restart asa 81mg due to hx of CAD  - All other workup per primary team  - Will follow   Bri Ashford, PORTILLO, Cardiology Np 72M with PMH of HTN, HLD, CABG x3 (2008), MI (2010), RA and OA presenting with L hip pain. Found to have L periprosthetic proximal femoral fx. Cardio consulted for preoperative assessment.   No clear evidence of acute ischemia, volume overload   EKG showed no acute ST elevation or depression.   Previous ECHO in 10/2018 with Cardiologist in [place] (Dr. John Mar). Records in chart. ECHO showed LV normal in size, EF 59%, mild AS, mild TR and mild LVH.   Perioperative echo (11/19/18) done- prelim read unchanged from prior.    - Patient is POD #1 after hip surgery.  - No signs of significant ischemia or volume overload.   - BP well controlled, continue home BP meds, monitor routine hemodynamics  - Monitor and replete lytes, keep K>4, Mg>2  - Continue Lisinopril 20mg po qd  - Restart Lasix  ( home med). this would likely help with elevated K.   - Continue statin  - Restart asa 81mg due to hx of CAD  - All other workup per primary team  - Will follow   Bri Ashford, MSN, Cardiology Np

## 2018-11-20 NOTE — OCCUPATIONAL THERAPY INITIAL EVALUATION ADULT - RANGE OF MOTION EXAMINATION, UPPER EXTREMITY
bilateral UE Active ROM was WFL  (within functional limits)/Fine motor skills: WFL's with occasional tremors noted.

## 2018-11-20 NOTE — PHYSICAL THERAPY INITIAL EVALUATION ADULT - ADDITIONAL COMMENTS
Pt lives at home in a private house /c his spouse in [place] /c 1 KAYCEE and no rails. No stairs to negotiate inside. At pt daughter house he has multiple steps to enter but states through the backwards there is only ~ 3 KAYCEE. Pt initially reports being independent /c all functional mobility and ADLs prior to admission however during interviewing pt admits that he needs assistance for transfers and ambulation ~25% and needs assistance for donning and doffing shoes and socks. Supervision needed during showering. Pt does have a rolling walker, single axis cane, motorized scooter, built in shower seat and raised toilet seat. Pt states his wife assists him as needed. Pt /c history of chronic pain due to Fibromyalgia, LLE neuropathy, back and shoulder pain. Pt has been on Methadone for 30 years. Also on muscle relaxers prior to admission. Pt lives at home in a private house /c his spouse in [place] /c 1 KAYCEE and no rails. No stairs to negotiate inside. At pt daughter house he has multiple steps to enter but states through the backwards there is only ~ 3 KAYCEE. Pt initially reports being independent /c all functional mobility and ADLs prior to admission however during interviewing pt admits that he needs assistance for transfers and ambulation ~25% and needs assistance for donning and doffing shoes and socks. Supervision needed during showering. Pt does have a rolling walker, single axis cane, motorized scooter, built in shower seat and raised toilet seat. Pt states his wife assists him as needed. Pt /c history of chronic pain due to Fibromyalgia, LLE neuropathy, back and shoulder pain. Pt has been on Methadone for 30 years. Also on muscle relaxers prior to admission. Pt noted to be a questionable historian.

## 2018-11-20 NOTE — PROGRESS NOTE ADULT - SUBJECTIVE AND OBJECTIVE BOX
Patient seen and examined. Pain controlled. No acute events overnight.  Denies any Fever/Chills/CP/SOB.       MEDICATIONS  (STANDING):  ascorbic acid 500 milliGRAM(s) Oral two times a day  calcium carbonate 1250 mG  + Vitamin D (OsCal 500 + D) 1 Tablet(s) Oral three times a day  ceFAZolin   IVPB 2000 milliGRAM(s) IV Intermittent every 8 hours  docusate sodium 100 milliGRAM(s) Oral three times a day  enoxaparin Injectable 40 milliGRAM(s) SubCutaneous daily  famotidine    Tablet 20 milliGRAM(s) Oral every 12 hours  ferrous    sulfate 325 milliGRAM(s) Oral three times a day with meals  finasteride 5 milliGRAM(s) Oral daily  folic acid 1 milliGRAM(s) Oral daily  hydrocortisone sodium succinate Injectable 25 milliGRAM(s) IV Push every 8 hours  lactated ringers. 1000 milliLiter(s) IV Continuous <Continuous>  lisinopril 20 milliGRAM(s) Oral daily  methadone    Tablet 5 milliGRAM(s) Oral every 8 hours  methylPREDNISolone 4 milliGRAM(s) Oral daily  multivitamin 1 Tablet(s) Oral daily  polyethylene glycol 3350 17 Gram(s) Oral daily  simvastatin 5 milliGRAM(s) Oral at bedtime  sodium chloride 0.9% lock flush 3 milliLiter(s) IV Push every 8 hours  tamsulosin 0.4 milliGRAM(s) Oral at bedtime  tiotropium 18 MICROgram(s) Capsule 1 Capsule(s) Inhalation daily    Allergies    No Known Allergies    Intolerances                            10.4   12.88 )-----------( 184      ( 20 Nov 2018 06:00 )             31.3     20 Nov 2018 06:00    135    |  98     |  35     ----------------------------<  140    5.3     |  26     |  1.30     Ca    8.1        20 Nov 2018 06:00      PT/INR - ( 19 Nov 2018 04:24 )   PT: 14.4 sec;   INR: 1.25 ratio         PTT - ( 19 Nov 2018 04:24 )  PTT:31.2 sec  Vital Signs Last 24 Hrs  T(C): 36.7 (11-20-18 @ 05:10), Max: 37.7 (11-19-18 @ 22:05)  T(F): 98 (11-20-18 @ 05:10), Max: 99.9 (11-19-18 @ 22:05)  HR: 82 (11-20-18 @ 05:10) (79 - 99)  BP: 95/59 (11-20-18 @ 05:10) (81/60 - 128/69)  BP(mean): --  RR: 18 (11-20-18 @ 05:10) (14 - 156)  SpO2: 93% (11-20-18 @ 05:10) (92% - 96%)    Physical Exam  Gen: NAD  LLE:   Dressing c/d/i  +ehl/fhl/ta/gs function  L2-S1 silt  Dp/pt pulse intact  No calf ttp  Compartments soft    A/P: 72y Male sp L Femur ORIF POD 0  Continue Post op abx  FU AM labs  FU Transfer to floor   Pt on methadone at baseline, recommend FU with PCP vs Pain management specialist for recs on dosing  Tolerated 2U PRBC well, follow H&H   Pain control  DVT ppx-lovenox in AM  PT/OOB/50% PWB  Ice/elevate  Medical management appreciated  Incentive spirometry  Dispo planning

## 2018-11-20 NOTE — PHYSICAL THERAPY INITIAL EVALUATION ADULT - IMPAIRMENTS FOUND, PT EVAL
gross motor/posture/tone/muscle strength/gait, locomotion, and balance/sensory integrity/fine motor/joint integrity and mobility/aerobic capacity/endurance joint integrity and mobility/posture/tone/sensory integrity/gross motor/gait, locomotion, and balance/integumentary integrity/muscle strength/arousal, attention, and cognition/aerobic capacity/endurance/fine motor

## 2018-11-21 VITALS
SYSTOLIC BLOOD PRESSURE: 143 MMHG | DIASTOLIC BLOOD PRESSURE: 86 MMHG | HEART RATE: 97 BPM | OXYGEN SATURATION: 100 % | TEMPERATURE: 98 F | RESPIRATION RATE: 18 BRPM

## 2018-11-21 DIAGNOSIS — J44.9 CHRONIC OBSTRUCTIVE PULMONARY DISEASE, UNSPECIFIED: ICD-10-CM

## 2018-11-21 LAB
ANION GAP SERPL CALC-SCNC: 7 MMOL/L — SIGNIFICANT CHANGE UP (ref 5–17)
BUN SERPL-MCNC: 29 MG/DL — HIGH (ref 7–23)
CALCIUM SERPL-MCNC: 8.7 MG/DL — SIGNIFICANT CHANGE UP (ref 8.5–10.1)
CHLORIDE SERPL-SCNC: 100 MMOL/L — SIGNIFICANT CHANGE UP (ref 96–108)
CO2 SERPL-SCNC: 30 MMOL/L — SIGNIFICANT CHANGE UP (ref 22–31)
CREAT SERPL-MCNC: 0.94 MG/DL — SIGNIFICANT CHANGE UP (ref 0.5–1.3)
GLUCOSE SERPL-MCNC: 83 MG/DL — SIGNIFICANT CHANGE UP (ref 70–99)
HCT VFR BLD CALC: 29.5 % — LOW (ref 39–50)
HGB BLD-MCNC: 9.8 G/DL — LOW (ref 13–17)
MCHC RBC-ENTMCNC: 31.6 PG — SIGNIFICANT CHANGE UP (ref 27–34)
MCHC RBC-ENTMCNC: 33.2 GM/DL — SIGNIFICANT CHANGE UP (ref 32–36)
MCV RBC AUTO: 95.2 FL — SIGNIFICANT CHANGE UP (ref 80–100)
NRBC # BLD: 0 /100 WBCS — SIGNIFICANT CHANGE UP (ref 0–0)
PLATELET # BLD AUTO: 200 K/UL — SIGNIFICANT CHANGE UP (ref 150–400)
POTASSIUM SERPL-MCNC: 5.1 MMOL/L — SIGNIFICANT CHANGE UP (ref 3.5–5.3)
POTASSIUM SERPL-SCNC: 5.1 MMOL/L — SIGNIFICANT CHANGE UP (ref 3.5–5.3)
RBC # BLD: 3.1 M/UL — LOW (ref 4.2–5.8)
RBC # FLD: 15.4 % — HIGH (ref 10.3–14.5)
SODIUM SERPL-SCNC: 137 MMOL/L — SIGNIFICANT CHANGE UP (ref 135–145)
WBC # BLD: 10.72 K/UL — HIGH (ref 3.8–10.5)
WBC # FLD AUTO: 10.72 K/UL — HIGH (ref 3.8–10.5)

## 2018-11-21 PROCEDURE — 73501 X-RAY EXAM HIP UNI 1 VIEW: CPT

## 2018-11-21 PROCEDURE — 85730 THROMBOPLASTIN TIME PARTIAL: CPT

## 2018-11-21 PROCEDURE — C1889: CPT

## 2018-11-21 PROCEDURE — 99239 HOSP IP/OBS DSCHRG MGMT >30: CPT

## 2018-11-21 PROCEDURE — 99285 EMERGENCY DEPT VISIT HI MDM: CPT | Mod: 25

## 2018-11-21 PROCEDURE — 71045 X-RAY EXAM CHEST 1 VIEW: CPT

## 2018-11-21 PROCEDURE — 84443 ASSAY THYROID STIM HORMONE: CPT

## 2018-11-21 PROCEDURE — 73700 CT LOWER EXTREMITY W/O DYE: CPT

## 2018-11-21 PROCEDURE — 74018 RADEX ABDOMEN 1 VIEW: CPT

## 2018-11-21 PROCEDURE — 94760 N-INVAS EAR/PLS OXIMETRY 1: CPT

## 2018-11-21 PROCEDURE — 94640 AIRWAY INHALATION TREATMENT: CPT

## 2018-11-21 PROCEDURE — 86901 BLOOD TYPING SEROLOGIC RH(D): CPT

## 2018-11-21 PROCEDURE — 73502 X-RAY EXAM HIP UNI 2-3 VIEWS: CPT

## 2018-11-21 PROCEDURE — 83880 ASSAY OF NATRIURETIC PEPTIDE: CPT

## 2018-11-21 PROCEDURE — 86900 BLOOD TYPING SEROLOGIC ABO: CPT

## 2018-11-21 PROCEDURE — 93005 ELECTROCARDIOGRAM TRACING: CPT

## 2018-11-21 PROCEDURE — 86850 RBC ANTIBODY SCREEN: CPT

## 2018-11-21 PROCEDURE — C1713: CPT

## 2018-11-21 PROCEDURE — 84436 ASSAY OF TOTAL THYROXINE: CPT

## 2018-11-21 PROCEDURE — 99232 SBSQ HOSP IP/OBS MODERATE 35: CPT

## 2018-11-21 PROCEDURE — 80048 BASIC METABOLIC PNL TOTAL CA: CPT

## 2018-11-21 PROCEDURE — C1776: CPT

## 2018-11-21 PROCEDURE — 97530 THERAPEUTIC ACTIVITIES: CPT

## 2018-11-21 PROCEDURE — 99221 1ST HOSP IP/OBS SF/LOW 40: CPT

## 2018-11-21 PROCEDURE — 94664 DEMO&/EVAL PT USE INHALER: CPT

## 2018-11-21 PROCEDURE — 85610 PROTHROMBIN TIME: CPT

## 2018-11-21 PROCEDURE — 97110 THERAPEUTIC EXERCISES: CPT

## 2018-11-21 PROCEDURE — 85027 COMPLETE CBC AUTOMATED: CPT

## 2018-11-21 PROCEDURE — 86923 COMPATIBILITY TEST ELECTRIC: CPT

## 2018-11-21 PROCEDURE — 84480 ASSAY TRIIODOTHYRONINE (T3): CPT

## 2018-11-21 PROCEDURE — 97166 OT EVAL MOD COMPLEX 45 MIN: CPT

## 2018-11-21 PROCEDURE — P9016: CPT

## 2018-11-21 PROCEDURE — 36415 COLL VENOUS BLD VENIPUNCTURE: CPT

## 2018-11-21 PROCEDURE — 97162 PT EVAL MOD COMPLEX 30 MIN: CPT

## 2018-11-21 PROCEDURE — 97535 SELF CARE MNGMENT TRAINING: CPT

## 2018-11-21 PROCEDURE — 73552 X-RAY EXAM OF FEMUR 2/>: CPT

## 2018-11-21 PROCEDURE — C1769: CPT

## 2018-11-21 PROCEDURE — 93306 TTE W/DOPPLER COMPLETE: CPT

## 2018-11-21 PROCEDURE — 96374 THER/PROPH/DIAG INJ IV PUSH: CPT

## 2018-11-21 PROCEDURE — 81001 URINALYSIS AUTO W/SCOPE: CPT

## 2018-11-21 RX ORDER — FOLIC ACID 0.8 MG
1 TABLET ORAL
Qty: 0 | Refills: 0 | COMMUNITY
Start: 2018-11-21

## 2018-11-21 RX ORDER — ASCORBIC ACID 60 MG
1 TABLET,CHEWABLE ORAL
Qty: 0 | Refills: 0 | COMMUNITY
Start: 2018-11-21

## 2018-11-21 RX ORDER — TRAMADOL HYDROCHLORIDE 50 MG/1
2 TABLET ORAL
Qty: 0 | Refills: 0 | COMMUNITY
Start: 2018-11-21

## 2018-11-21 RX ORDER — DOCUSATE SODIUM 100 MG
1 CAPSULE ORAL
Qty: 0 | Refills: 0 | COMMUNITY
Start: 2018-11-21

## 2018-11-21 RX ORDER — ENOXAPARIN SODIUM 100 MG/ML
40 INJECTION SUBCUTANEOUS
Qty: 0 | Refills: 0 | COMMUNITY
Start: 2018-11-21

## 2018-11-21 RX ORDER — POLYETHYLENE GLYCOL 3350 17 G/17G
17 POWDER, FOR SOLUTION ORAL
Qty: 0 | Refills: 0 | COMMUNITY
Start: 2018-11-21

## 2018-11-21 RX ORDER — SENNA PLUS 8.6 MG/1
2 TABLET ORAL
Qty: 0 | Refills: 0 | COMMUNITY
Start: 2018-11-21

## 2018-11-21 RX ADMIN — METHADONE HYDROCHLORIDE 5 MILLIGRAM(S): 40 TABLET ORAL at 06:26

## 2018-11-21 RX ADMIN — Medication 1 MILLIGRAM(S): at 09:07

## 2018-11-21 RX ADMIN — Medication 1 MILLIGRAM(S): at 00:47

## 2018-11-21 RX ADMIN — Medication 325 MILLIGRAM(S): at 08:13

## 2018-11-21 RX ADMIN — OXYCODONE HYDROCHLORIDE 5 MILLIGRAM(S): 5 TABLET ORAL at 09:20

## 2018-11-21 RX ADMIN — LISINOPRIL 20 MILLIGRAM(S): 2.5 TABLET ORAL at 06:26

## 2018-11-21 RX ADMIN — Medication 1 TABLET(S): at 12:15

## 2018-11-21 RX ADMIN — Medication 100 MILLIGRAM(S): at 14:16

## 2018-11-21 RX ADMIN — Medication 500 MILLIGRAM(S): at 06:26

## 2018-11-21 RX ADMIN — FINASTERIDE 5 MILLIGRAM(S): 5 TABLET, FILM COATED ORAL at 12:15

## 2018-11-21 RX ADMIN — Medication 4 MILLIGRAM(S): at 06:26

## 2018-11-21 RX ADMIN — OXYCODONE HYDROCHLORIDE 5 MILLIGRAM(S): 5 TABLET ORAL at 08:49

## 2018-11-21 RX ADMIN — TIOTROPIUM BROMIDE 1 CAPSULE(S): 18 CAPSULE ORAL; RESPIRATORY (INHALATION) at 07:18

## 2018-11-21 RX ADMIN — POLYETHYLENE GLYCOL 3350 17 GRAM(S): 17 POWDER, FOR SOLUTION ORAL at 12:15

## 2018-11-21 RX ADMIN — SODIUM CHLORIDE 3 MILLILITER(S): 9 INJECTION INTRAMUSCULAR; INTRAVENOUS; SUBCUTANEOUS at 06:27

## 2018-11-21 RX ADMIN — Medication 1 MILLIGRAM(S): at 12:15

## 2018-11-21 RX ADMIN — ALBUTEROL 2.5 MILLIGRAM(S): 90 AEROSOL, METERED ORAL at 07:39

## 2018-11-21 RX ADMIN — SODIUM CHLORIDE 3 MILLILITER(S): 9 INJECTION INTRAMUSCULAR; INTRAVENOUS; SUBCUTANEOUS at 14:16

## 2018-11-21 RX ADMIN — OXYCODONE HYDROCHLORIDE 10 MILLIGRAM(S): 5 TABLET ORAL at 03:10

## 2018-11-21 RX ADMIN — Medication 100 MILLIGRAM(S): at 06:26

## 2018-11-21 RX ADMIN — Medication 1 TABLET(S): at 14:16

## 2018-11-21 RX ADMIN — Medication 20 MILLIGRAM(S): at 06:26

## 2018-11-21 RX ADMIN — OXYCODONE HYDROCHLORIDE 10 MILLIGRAM(S): 5 TABLET ORAL at 02:10

## 2018-11-21 RX ADMIN — METHADONE HYDROCHLORIDE 5 MILLIGRAM(S): 40 TABLET ORAL at 14:16

## 2018-11-21 RX ADMIN — Medication 1 TABLET(S): at 06:26

## 2018-11-21 RX ADMIN — Medication 325 MILLIGRAM(S): at 12:15

## 2018-11-21 RX ADMIN — FAMOTIDINE 20 MILLIGRAM(S): 10 INJECTION INTRAVENOUS at 06:26

## 2018-11-21 NOTE — DISCHARGE NOTE ADULT - PLAN OF CARE
1.	Resume previous diet, regular or diabetic as appropriate  2.	50% Partial Weight Bearing Left Lower Extremity with assistive devices  3.	Continue DVT/PE Prophylaxis. Lovenox SC 40mg once a day for 30days. See Med Rec.  4.	PT daily  5.	Follow up with Orthopedic Surgeon Dr. Ramirez in 14 Days. Call Office For Appointment.  6.	Staples to be removed by RN at rehab Post-Op Day 14, provided wound is healed, no open areas drainage.  7.	Ice the hip as much as possible  8.	Keep Aquacel bandage on Hip. Change only if wet or soiled using Tegaderm/Paper tape and dry gauze. No bandage needed after staple removal.  9.     OK to shower with Aquacel beige bandage, otherwise sponge bathe only. Will need assistance to shower Return to baseline ADLs cont steroid booster and maitenance dose as instructed, f/u with your rheumatologist in 2-3 weeks 1.	Resume previous diet, regular or diabetic as appropriate  2.	50% Partial Weight Bearing Left Lower Extremity with assistive devices  3.	Continue DVT/PE Prophylaxis. Lovenox SC 40mg once a day for 30days. See Med Rec.  4.	PT daily  5.	Follow up with Orthopedic Surgeon Dr. Ramirez in 5-7 Days for wound check. Call Office For Appointment.  6.	Staples to be removed by RN at rehab Post-Op Day 14 (12/17, unless otherwise specified by Dr. Ramirez). Please confirm with Dr. Ramirez prior to staple removal.   7.	Ice the hip as much as possible  8.	Keep NAVA dressing on until 12/12, to be removed and placement of dry sterile dressing at that time. Change only if wet or soiled using Paper tape and dry gauze.  9.     Sponge bathe only; Do not get dressing wet or submerge in water. Will need assistance.  10.   Return to ER or office follow up should the wound become more red, painful or significantly more swollen.  11.   Make follow up appt with Infectious Disease (Dr. Childs) in 5-7 days. Plan for IV abx (ancef) via PICC until 1/15/2019. cont steroid booster and maintenance dose as instructed, f/u with your rheumatologist in 2-3 weeks 1.	Resume previous diet, regular or diabetic as appropriate  2.	50% Partial Weight Bearing Left Lower Extremity with assistive devices  3.	Continue DVT/PE Prophylaxis. Lovenox SC 40mg once a day for 30days. See Med Rec.  4.	PT daily  5.	Follow up with Orthopedic Surgeon Dr. Ramirez in 5-7 Days (around 12/17) for wound check and likely staple removal. Call Office For Appointment.  6.	Ice the hip as much as possible  7.	Keep NAVA dressing on until 12/12, to be removed and placement of dry sterile dressing at that time. Change only if wet or soiled using Paper tape and dry gauze.  8.     Sponge bathe only; Do not get dressing wet or submerge in water. Will need assistance.  9.   Return to ER or office follow up should the wound become more red, painful or significantly more swollen.  10.   Make follow up appt with Infectious Disease (Dr. Childs) in 5-7 days. Plan for IV abx (ancef) via PICC until 1/15/2019.

## 2018-11-21 NOTE — DISCHARGE NOTE ADULT - CARE PLAN
Principal Discharge DX:	Periprosthetic fracture around internal prosthetic left hip joint, initial encounter  Goal:	Return to baseline ADLs  Assessment and plan of treatment:	1.	Resume previous diet, regular or diabetic as appropriate  2.	50% Partial Weight Bearing Left Lower Extremity with assistive devices  3.	Continue DVT/PE Prophylaxis. Lovenox SC 40mg once a day for 30days. See Med Rec.  4.	PT daily  5.	Follow up with Orthopedic Surgeon Dr. Ramirez in 14 Days. Call Office For Appointment.  6.	Staples to be removed by RN at rehab Post-Op Day 14, provided wound is healed, no open areas drainage.  7.	Ice the hip as much as possible  8.	Keep Aquacel bandage on Hip. Change only if wet or soiled using Tegaderm/Paper tape and dry gauze. No bandage needed after staple removal.  9.     OK to shower with Aquacel beige bandage, otherwise sponge bathe only. Will need assistance to shower Principal Discharge DX:	Periprosthetic fracture around internal prosthetic left hip joint, initial encounter  Goal:	Return to baseline ADLs  Assessment and plan of treatment:	1.	Resume previous diet, regular or diabetic as appropriate  2.	50% Partial Weight Bearing Left Lower Extremity with assistive devices  3.	Continue DVT/PE Prophylaxis. Lovenox SC 40mg once a day for 30days. See Med Rec.  4.	PT daily  5.	Follow up with Orthopedic Surgeon Dr. Ramirez in 14 Days. Call Office For Appointment.  6.	Staples to be removed by RN at rehab Post-Op Day 14, provided wound is healed, no open areas drainage.  7.	Ice the hip as much as possible  8.	Keep Aquacel bandage on Hip. Change only if wet or soiled using Tegaderm/Paper tape and dry gauze. No bandage needed after staple removal.  9.     OK to shower with Aquacel beige bandage, otherwise sponge bathe only. Will need assistance to shower  Secondary Diagnosis:	Rheumatoid arthritis  Goal:	cont steroid booster and maitenance dose as instructed, f/u with your rheumatologist in 2-3 weeks Principal Discharge DX:	Periprosthetic fracture around internal prosthetic left hip joint, initial encounter  Goal:	Return to baseline ADLs  Assessment and plan of treatment:	1.	Resume previous diet, regular or diabetic as appropriate  2.	50% Partial Weight Bearing Left Lower Extremity with assistive devices  3.	Continue DVT/PE Prophylaxis. Lovenox SC 40mg once a day for 30days. See Med Rec.  4.	PT daily  5.	Follow up with Orthopedic Surgeon Dr. Ramirez in 5-7 Days for wound check. Call Office For Appointment.  6.	Staples to be removed by RN at rehab Post-Op Day 14 (12/17, unless otherwise specified by Dr. Ramirez). Please confirm with Dr. Ramriez prior to staple removal.   7.	Ice the hip as much as possible  8.	Keep NAVA dressing on until 12/12, to be removed and placement of dry sterile dressing at that time. Change only if wet or soiled using Paper tape and dry gauze.  9.     Sponge bathe only; Do not get dressing wet or submerge in water. Will need assistance.  10.   Return to ER or office follow up should the wound become more red, painful or significantly more swollen.  11.   Make follow up appt with Infectious Disease (Dr. Childs) in 5-7 days. Plan for IV abx (ancef) via PICC until 1/15/2019.  Secondary Diagnosis:	Rheumatoid arthritis  Goal:	cont steroid booster and maintenance dose as instructed, f/u with your rheumatologist in 2-3 weeks Principal Discharge DX:	Periprosthetic fracture around internal prosthetic left hip joint, initial encounter  Goal:	Return to baseline ADLs  Assessment and plan of treatment:	1.	Resume previous diet, regular or diabetic as appropriate  2.	50% Partial Weight Bearing Left Lower Extremity with assistive devices  3.	Continue DVT/PE Prophylaxis. Lovenox SC 40mg once a day for 30days. See Med Rec.  4.	PT daily  5.	Follow up with Orthopedic Surgeon Dr. Ramirez in 5-7 Days (around 12/17) for wound check and likely staple removal. Call Office For Appointment.  6.	Ice the hip as much as possible  7.	Keep NAVA dressing on until 12/12, to be removed and placement of dry sterile dressing at that time. Change only if wet or soiled using Paper tape and dry gauze.  8.     Sponge bathe only; Do not get dressing wet or submerge in water. Will need assistance.  9.   Return to ER or office follow up should the wound become more red, painful or significantly more swollen.  10.   Make follow up appt with Infectious Disease (Dr. Childs) in 5-7 days. Plan for IV abx (ancef) via PICC until 1/15/2019.  Secondary Diagnosis:	Rheumatoid arthritis  Goal:	cont steroid booster and maintenance dose as instructed, f/u with your rheumatologist in 2-3 weeks

## 2018-11-21 NOTE — PROGRESS NOTE ADULT - SUBJECTIVE AND OBJECTIVE BOX
[facility] -- Shannon Valdez, [place], [place], Sidney Orlando Savella  Office # [phone]    Follow Up:  Preop eval/CAD s/p CABG and PCI    Subjective/Observations: Denies any cardiac symptoms.  c/o incisional pain during activity.  Also c/o leg spasms.    REVIEW OF SYSTEMS: All other review of systems is negative unless indicated above    PAST MEDICAL & SURGICAL HISTORY:  S/P CABG x 3  S/P hip replacement, left  Rheumatoid arthritis  Osteoarthritis  COPD (chronic obstructive pulmonary disease)  HTN (hypertension)  History of appendectomy  History of right shoulder replacement  History of total left hip arthroplasty    MEDICATIONS  (STANDING):  ALBUTerol    0.083% 2.5 milliGRAM(s) Nebulizer every 8 hours  ascorbic acid 500 milliGRAM(s) Oral two times a day  calcium carbonate 1250 mG  + Vitamin D (OsCal 500 + D) 1 Tablet(s) Oral three times a day  docusate sodium 100 milliGRAM(s) Oral three times a day  enoxaparin Injectable 40 milliGRAM(s) SubCutaneous daily  famotidine    Tablet 20 milliGRAM(s) Oral every 12 hours  ferrous    sulfate 325 milliGRAM(s) Oral three times a day with meals  finasteride 5 milliGRAM(s) Oral daily  folic acid 1 milliGRAM(s) Oral daily  lisinopril 20 milliGRAM(s) Oral daily  methadone    Tablet 5 milliGRAM(s) Oral every 8 hours  methylPREDNISolone 4 milliGRAM(s) Oral daily  multivitamin 1 Tablet(s) Oral daily  polyethylene glycol 3350 17 Gram(s) Oral daily  predniSONE   Tablet 20 milliGRAM(s) Oral daily  simvastatin 5 milliGRAM(s) Oral at bedtime  sodium chloride 0.9% lock flush 3 milliLiter(s) IV Push every 8 hours  tamsulosin 0.4 milliGRAM(s) Oral at bedtime  tiotropium 18 MICROgram(s) Capsule 1 Capsule(s) Inhalation daily    MEDICATIONS  (PRN):  acetaminophen   Tablet .. 650 milliGRAM(s) Oral every 4 hours PRN Mild Pain (1 - 3)  acetaminophen   Tablet .. 650 milliGRAM(s) Oral every 6 hours PRN Temp greater or equal to 38C (100.4F)  aluminum hydroxide/magnesium hydroxide/simethicone Suspension 30 milliLiter(s) Oral four times a day PRN Indigestion  benzocaine 15 mG/menthol 3.6 mG Lozenge 1 Lozenge Oral every 3 hours PRN Sore Throat  cyclobenzaprine 10 milliGRAM(s) Oral three times a day PRN Muscle Spasm  diazepam    Tablet 1 milliGRAM(s) Oral every 8 hours PRN for spasm and anxiety  diphenhydrAMINE 25 milliGRAM(s) Oral every 8 hours PRN Itching  melatonin 3 milliGRAM(s) Oral at bedtime PRN Insomnia  ondansetron Injectable 4 milliGRAM(s) IV Push every 6 hours PRN Nausea and/or Vomiting  oxyCODONE    IR 5 milliGRAM(s) Oral every 4 hours PRN Moderate Pain (4 - 6)  oxyCODONE    IR 10 milliGRAM(s) Oral every 4 hours PRN Severe Pain (7 - 10)  senna 2 Tablet(s) Oral at bedtime PRN Constipation  traMADol 100 milliGRAM(s) Oral every 6 hours PRN Mild Pain (1 - 3)    Allergies    No Known Allergies    Intolerances    Vital Signs Last 24 Hrs  T(C): 36.9 (21 Nov 2018 07:52), Max: 36.9 (21 Nov 2018 00:23)  T(F): 98.4 (21 Nov 2018 07:52), Max: 98.4 (21 Nov 2018 00:23)  HR: 97 (21 Nov 2018 07:52) (74 - 99)  BP: 143/86 (21 Nov 2018 07:52) (117/78 - 143/86)  BP(mean): --  RR: 18 (21 Nov 2018 07:52) (17 - 18)  SpO2: 100% (21 Nov 2018 07:52) (96% - 100%)    I&O's Summary    20 Nov 2018 07:01  -  21 Nov 2018 07:00  --------------------------------------------------------  IN: 1350 mL / OUT: 1900 mL / NET: -550 mL    PHYSICAL EXAM:  TELE: Not on tele  Constitutional: NAD, awake and alert, obese  HEENT: Moist Mucous Membranes, Anicteric  Pulmonary: Non-labored, breath sounds are clear bilaterally, No wheezing, rales or rhonchi  Cardiovascular: Regular, S1 and S2, No murmurs, rubs, gallops or clicks  Gastrointestinal: Bowel Sounds present, soft, nontender.   Lymph: No peripheral edema. No lymphadenopathy.  Skin: No visible rashes or ulcers. + ecchymoses on BUE.  Dressing on left hip dry and intact  Psych:  Mood & affect appropriate    LABS: All Labs Reviewed:                        9.8    10.72 )-----------( 200      ( 21 Nov 2018 06:11 )             29.5                         10.4   12.88 )-----------( 184      ( 20 Nov 2018 06:00 )             31.3                         10.3   15.03 )-----------( 224      ( 19 Nov 2018 22:45 )             31.1     21 Nov 2018 06:11    137    |  100    |  29     ----------------------------<  83     5.1     |  30     |  0.94   20 Nov 2018 06:00    135    |  98     |  35     ----------------------------<  140    5.3     |  26     |  1.30   19 Nov 2018 22:45    135    |  101    |  33     ----------------------------<  121    5.6     |  26     |  1.20     Ca    8.7        21 Nov 2018 06:11  Ca    8.1        20 Nov 2018 06:00  Ca    8.1        19 Nov 2018 22:45    < from: TTE Echo Doppler w/o Cont (11.19.18 @ 11:09) >     EXAM:  ECHO TTE WO CON COMP W DOPPLR         PROCEDURE DATE:  11/19/2018        INTERPRETATION:  INDICATION: CAD  Referring M.D.:Paolo  Blood Pressure 114/70        Weight (kg) :81     Height (cm):170       BSA (sq m): 1.98  Technician: AS    Dimensions:    LA 3.5       Normal Values: 2.0 - 4.0 cm    Ao 3.94        Normal Values: 2.0 - 3.8 cm  SEPTUM 1.1       Normal Values: 0.6 - 1.2 cm  PWT 1.1       Normal Values: 0.6 - 1.1 cm  LVIDd 5.1         Normal Values: 3.0 - 5.6 cm  LVIDs 4.05 Normal Values: 1.8 - 4.0 cm      OBSERVATIONS:  Technically difficult study  Mitral Valve: MAC with calcified MV leaflets. Mild mitral regurgitation.  Aortic Valve/Aorta: Mildly calcified trileaflet AV with normal opening.   Mildly dilated aortic root at 3.9  cm  Tricuspid Valve: Normal tricuspid valve. Trace tricuspid regurgitation.  Pulmonic Valve: The pulmonic valve is not well visualized. Probably   normal.  Left Atrium: Mildly enlarged   Right Atrium: Mildly enlarged  Left Ventricle: Endocardium is not well-visualized. Overall there appears   to be normal left ventricular systolic function. The EF is approximately   65%.  Right Ventricle: The right ventricle is not well-visualized. It appears   to be mildly enlarged in some views with normal systolic function.    Pericardium/Pleura: Trace pericardial effusion noted.  Pulmonary/RV Pressure: Insufficient tricuspid regurgitation Doppler in   order to estimate the right ventricular systolic pressure  LV Diastolic Function: Stage I diastolic dysfunction     Conclusion: Overall preserved left ventricular systolic function. EF 65.   Insufficient tricuspid regurgitation Doppler in order to estimate the   right ventricular systolic pressure        CARMEN BAE M.D., ATTENDING CARDIOLOGIST  This document has been electronically signed. Nov 20 2018  3:56PM      < end of copied text >    < from: Xray Chest 1 View AP/PA (11.18.18 @ 11:46) >    EXAM:  XR CHEST AP OR PA 1V                          PROCEDURE DATE:  11/18/2018      INTERPRETATION:  Preop.    AP chest. No priors.    Status post median sternotomy. Heart magnified by AP film shallow   inspiration. Calcified aortic arch. Grossly clear lungs. No consolidation   or effusion. Partially visualized hardware lower cervical spine. Right   shoulder arthroplasty. Trace right upper quadrant. Metallic fusion lumbar   spine    Impression: Grossly clear lungs.    JENS KELLER M.D., ATTENDING RADIOLOGIST  This document has been electronically signed. Nov 18 2018 11:54AM      < end of copied text >

## 2018-11-21 NOTE — DISCHARGE NOTE ADULT - HOSPITAL COURSE
71yo male PMH emphysema, HTN, HLD, MI, CABG s/p 3 stents (2008), Rheumatoid arthritis, and osteoarthritis presenting with left sided hip pain due to mechanical fall this morning. Patient stated that he was pulling up his pants, lost his balance, and fell. He was laying on the ground for 30 minutes, as wife was not able to pick him up. Patient is in severe 10/10 pain. Patient denies LOC, headaches, fevers or chills, but does admit to numbness and tingling alongside left lower extremity and reports this is chronic since a prior back surgery.    Hyperkalemia resolved    Anemia , acute , 2/2 acute blood loss, started on po Iron.     Leukocytosis 2/2 post op reactive and steroid induced.       Periprosthetic fracture around internal prosthetic left hip joint,  CT and xray show fractures of periprosthetic left proximal femur and superior pubic ramus on L.   suspect pathologic fracture 2/2 to osteoporosis from chronic steroid use.   pain control with po tramadol and Methadone.   con Flaxeryl for muscle spasm,      HTN ,continue lisinopril with hold parameters, Resume  Lasix       Rheumatoid arthritis.  chronic ,stable, continue methylprednisolone      Problem: Anxiety. chronic ,stable , cont alprazolam   prozac 60mg daily.      BPH ,chronic   Due to his distended abdomen and discomfort, he had bladder scan which showed 185 cc urine in bladder. Continue to monitor for urine output.  continue Finasteride daily and Flomax daily.       Osteoporosis.  patient takes alendronate weekly (on Wednesdays).         Emphysema of lung. On no home meds.   Seen by Pulm Dr. Valente     I spent 50 min for discharge.   PHYSICAL EXAM    Constitutional: NAD, well-groomed, well-developed  HEENT: PERRLA, EOMI, Normal Hearing, MMM  Neck: No LAD, No JVD  Back: Normal spine flexure, No CVA tenderness  Respiratory: CTAB/L   Cardiovascular: S1 and S2, RRR, no M/G/R  Gastrointestinal: BS+, soft, NT/ND  Extremities: No peripheral edema  Vascular: 2+ peripheral pulses  Neurological: A/O x 3, no focal deficits  Skin: No rashes

## 2018-11-21 NOTE — DISCHARGE NOTE ADULT - CARE PROVIDER_API CALL
Irvin Ramirez), Orthopaedic Surgery  [address] [place] [address]  Phone: [phone]  Fax: [phone] Irvin Ramirez), Orthopaedic Surgery  [address] [place] [address]  Phone: [phone]  Fax: [phone]    New PMD,   Phone: (   )    -  Fax: (   )    - Irvin Ramirez), Orthopaedic Surgery  [address] [place] [address]  Phone: [phone]  Fax: [phone]    New PMD,   Phone: (   )    -  Fax: (   )    -    Fabian Childs), Infectious Disease; Internal Medicine  [address] [facility]  [address]  [place] [address]  Phone: [phone]  Fax: [phone]

## 2018-11-21 NOTE — PROGRESS NOTE ADULT - SUBJECTIVE AND OBJECTIVE BOX
Patient seen and examined. Pain controlled. No acute events overnight. Patient is reporting of some arthritic flares in his hands and wrists. Denies any Fever/Chills/CP/SOB.       MEDICATIONS  (STANDING):  ascorbic acid 500 milliGRAM(s) Oral two times a day  calcium carbonate 1250 mG  + Vitamin D (OsCal 500 + D) 1 Tablet(s) Oral three times a day  ceFAZolin   IVPB 2000 milliGRAM(s) IV Intermittent every 8 hours  docusate sodium 100 milliGRAM(s) Oral three times a day  enoxaparin Injectable 40 milliGRAM(s) SubCutaneous daily  famotidine    Tablet 20 milliGRAM(s) Oral every 12 hours  ferrous    sulfate 325 milliGRAM(s) Oral three times a day with meals  finasteride 5 milliGRAM(s) Oral daily  folic acid 1 milliGRAM(s) Oral daily  hydrocortisone sodium succinate Injectable 25 milliGRAM(s) IV Push every 8 hours  lactated ringers. 1000 milliLiter(s) IV Continuous <Continuous>  lisinopril 20 milliGRAM(s) Oral daily  methadone    Tablet 5 milliGRAM(s) Oral every 8 hours  methylPREDNISolone 4 milliGRAM(s) Oral daily  multivitamin 1 Tablet(s) Oral daily  polyethylene glycol 3350 17 Gram(s) Oral daily  simvastatin 5 milliGRAM(s) Oral at bedtime  sodium chloride 0.9% lock flush 3 milliLiter(s) IV Push every 8 hours  tamsulosin 0.4 milliGRAM(s) Oral at bedtime  tiotropium 18 MICROgram(s) Capsule 1 Capsule(s) Inhalation daily    Allergies    No Known Allergies    Intolerances                            10.4   12.88 )-----------( 184      ( 20 Nov 2018 06:00 )             31.3     20 Nov 2018 06:00    135    |  98     |  35     ----------------------------<  140    5.3     |  26     |  1.30     Ca    8.1        20 Nov 2018 06:00      PT/INR - ( 19 Nov 2018 04:24 )   PT: 14.4 sec;   INR: 1.25 ratio         PTT - ( 19 Nov 2018 04:24 )  PTT:31.2 sec    Vital Signs Last 24 Hrs  T(C): 36.7 (21 Nov 2018 05:23), Max: 37 (20 Nov 2018 07:52)  T(F): 98.1 (21 Nov 2018 05:23), Max: 98.6 (20 Nov 2018 07:52)  HR: 80 (21 Nov 2018 06:20) (74 - 109)  BP: 128/70 (21 Nov 2018 06:20) (101/59 - 131/74)  BP(mean): --  RR: 17 (21 Nov 2018 06:20) (16 - 19)  SpO2: 97% (21 Nov 2018 06:20) (95% - 98%)    Physical Exam  Gen: NAD, resting comfortably   LLE:   Dressing c/d/i  +ehl/fhl/ta/gs function  L2-S1 silt  Dp/pt pulse intact  No calf ttp  Compartments soft    A/P: 72y Male sp L Femur ORIF POD 1  Continue Post op abx  FU AM labs  Pt on methadone at baseline, recommend FU with PCP vs Pain management specialist for recs on dosing  Tolerated 2U PRBC well, follow H&H   Pain control  DVT ppx-lovenox  PT/OOB/50% PWB  Ice/elevate  Medical management appreciated  c/w methylprednisolone   Incentive spirometry  Dispo planning    will discuss with attending and advise if plan changes Patient seen and examined. Pain controlled. No acute events overnight. Patient is reporting of some arthritic flares in his hands and wrists. Denies any Fever/Chills/CP/SOB.       MEDICATIONS  (STANDING):  ascorbic acid 500 milliGRAM(s) Oral two times a day  calcium carbonate 1250 mG  + Vitamin D (OsCal 500 + D) 1 Tablet(s) Oral three times a day  ceFAZolin   IVPB 2000 milliGRAM(s) IV Intermittent every 8 hours  docusate sodium 100 milliGRAM(s) Oral three times a day  enoxaparin Injectable 40 milliGRAM(s) SubCutaneous daily  famotidine    Tablet 20 milliGRAM(s) Oral every 12 hours  ferrous    sulfate 325 milliGRAM(s) Oral three times a day with meals  finasteride 5 milliGRAM(s) Oral daily  folic acid 1 milliGRAM(s) Oral daily  hydrocortisone sodium succinate Injectable 25 milliGRAM(s) IV Push every 8 hours  lactated ringers. 1000 milliLiter(s) IV Continuous <Continuous>  lisinopril 20 milliGRAM(s) Oral daily  methadone    Tablet 5 milliGRAM(s) Oral every 8 hours  methylPREDNISolone 4 milliGRAM(s) Oral daily  multivitamin 1 Tablet(s) Oral daily  polyethylene glycol 3350 17 Gram(s) Oral daily  simvastatin 5 milliGRAM(s) Oral at bedtime  sodium chloride 0.9% lock flush 3 milliLiter(s) IV Push every 8 hours  tamsulosin 0.4 milliGRAM(s) Oral at bedtime  tiotropium 18 MICROgram(s) Capsule 1 Capsule(s) Inhalation daily    Allergies    No Known Allergies    Intolerances                            10.4   12.88 )-----------( 184      ( 20 Nov 2018 06:00 )             31.3     20 Nov 2018 06:00    135    |  98     |  35     ----------------------------<  140    5.3     |  26     |  1.30     Ca    8.1        20 Nov 2018 06:00      PT/INR - ( 19 Nov 2018 04:24 )   PT: 14.4 sec;   INR: 1.25 ratio         PTT - ( 19 Nov 2018 04:24 )  PTT:31.2 sec    Vital Signs Last 24 Hrs  T(C): 36.7 (21 Nov 2018 05:23), Max: 37 (20 Nov 2018 07:52)  T(F): 98.1 (21 Nov 2018 05:23), Max: 98.6 (20 Nov 2018 07:52)  HR: 80 (21 Nov 2018 06:20) (74 - 109)  BP: 128/70 (21 Nov 2018 06:20) (101/59 - 131/74)  BP(mean): --  RR: 17 (21 Nov 2018 06:20) (16 - 19)  SpO2: 97% (21 Nov 2018 06:20) (95% - 98%)    Physical Exam  Gen: NAD, resting comfortably   LLE:   Dressing c/d/i  +ehl/fhl/ta/gs function  L2-S1 silt  Dp/pt pulse intact  No calf ttp  Compartments soft    A/P: 72y Male sp L Femur ORIF POD 1  Continue Post op abx  FU AM labs  Pt on methadone at baseline, recommend FU with PCP vs Pain management specialist for recs on dosing  Tolerated 2U PRBC well, follow H&H   Pain control  DVT ppx-lovenox  PT/OOB/50% PWB  Ice/elevate  Medical management appreciated  c/w methylprednisolone   Incentive spirometry  No further acute orthopedic surgical interventions at this time  Ortho stable for DC    will discuss with attending and advise if plan changes

## 2018-11-21 NOTE — PROGRESS NOTE ADULT - ATTENDING COMMENTS
I personally saw and examined the patient in detail.  I have spoken to the above provider regarding the assessment and plan of care.  I reviewed the above assessment and plan of care, and agree.  I have made changes in the body of the note where appropriate.
I saw and examined the patient personally. Spoke with above provider regarding this case. I reviewed the above findings completely.  I agree with the above history, physical, and plan which I have edited where appropriate.
pt complains of mild jaw pain, no trauma, no tenderness or clicking in his jaw. no need further work up.

## 2018-11-21 NOTE — PROGRESS NOTE ADULT - REASON FOR ADMISSION
fall, hip pain

## 2018-11-21 NOTE — PROGRESS NOTE ADULT - ASSESSMENT
72M with PMH of HTN, HLD, CABG x3 (2008), MI (2010), RA and OA presenting with L hip pain. Found to have L periprosthetic proximal femoral fx. Cardio consulted for preoperative assessment.   No clear evidence of acute ischemia, volume overload.  EKG showed no acute ST elevation or depression.  Previous ECHO in 10/2018 with Cardiologist in [place] (Dr. John Mar). Records in chart. ECHO showed LV normal in size, EF 59%, mild AS, mild TR and mild LVH.  Perioperative echo (11/19/18) done- prelim read unchanged from prior.    - Patient is post closed reduction of periprosthetic fracture of femur POD #2.  No obvious cardiac complications postop  - No signs of significant ischemia or volume overload.   - BP well controlled, continue home BP meds, monitor routine hemodynamics  - Monitor and replete lytes, keep K>4, Mg>2  - Pain control  - Continue Lisinopril 20mg po qd  - Restart Lasix ( home med).   - Restart asa 81mg due to hx of CAD  - Continue statin  - Unsure why patient is not on BB as he has Hx of CABG and MI.  Patient is under care of a private cardio, Dr. John Mar.  Would defer it to him  - DVT ppx  - All other workup per primary team  - Will follow     Mikayla Gold NP  Cardiology

## 2018-11-21 NOTE — DISCHARGE NOTE ADULT - PATIENT PORTAL LINK FT
You can access the [facility] Patient Portal, offered by [facility], by registering with the following website: [email or web address]

## 2018-11-21 NOTE — PROGRESS NOTE ADULT - PROVIDER SPECIALTY LIST ADULT
Anesthesia
Cardiology
Cardiology
Hospitalist
Orthopedics
Pulmonology
Hospitalist

## 2018-11-21 NOTE — PROGRESS NOTE ADULT - PROBLEM SELECTOR PROBLEM 1
COPD (chronic obstructive pulmonary disease)
Periprosthetic fracture around internal prosthetic left hip joint, initial encounter

## 2018-11-21 NOTE — DISCHARGE NOTE ADULT - MEDICATION SUMMARY - MEDICATIONS TO TAKE
I will START or STAY ON the medications listed below when I get home from the hospital:    finasteride 5 mg oral tablet  -- 1 tab(s) by mouth once a day  -- Indication: For BPH (benign prostatic hyperplasia)    methylPREDNISolone 4 mg oral tablet  -- orally once a day  -- Indication: For Rheumatoid arthritis    predniSONE 20 mg oral tablet  -- 1 tab(s) by mouth once a day, for 3 more days then stop   -- Indication: For Rheumatoid arthritis    methadone 5 mg oral tablet  -- 1 tab(s) by mouth every 8 hours  -- Indication: For Pain     aspirin 81 mg oral tablet  -- 1 tab(s) by mouth once a day  -- Indication: For CAD (coronary artery disease)    traMADol 50 mg oral tablet  -- 2 tab(s) by mouth every 6 hours, As needed, Mild Pain (1 - 3)  -- Indication: For Pain     lisinopril 20 mg oral tablet  -- 1 tab(s) by mouth once a day  -- Indication: For HTN (hypertension)    aluminum hydroxide-magnesium hydroxide 200 mg-200 mg/5 mL oral suspension  -- 30 milliliter(s) by mouth 4 times a day, As needed, Indigestion  -- Indication: For COnstipation     tamsulosin 0.4 mg oral capsule  -- 1 cap(s) by mouth once a day  -- Indication: For BPH (benign prostatic hyperplasia)    enoxaparin  -- 40 milligram(s) subcutaneous once a day  until he ambulates at least 70 feet a day.   -- Indication: For DVT ppx     FLUoxetine 20 mg oral capsule  -- 1 cap(s) by mouth 3 times a day  -- Indication: For Anxiety    lovastatin 20 mg oral tablet  -- 1 tab(s) by mouth once a day  -- Indication: For HLD (hyperlipidemia)    ALPRAZolam 0.5 mg oral tablet  -- 1 tab(s) by mouth once a day (at bedtime), As Needed  -- Indication: For Anxiety    alendronate 70 mg oral tablet  -- 1 tab(s) by mouth once a week  -- Indication: For Osteoporosis    furosemide 20 mg oral tablet  -- 1 tab(s) by mouth once a day  -- Indication: For HTN (hypertension)    ferrous sulfate 324 mg (65 mg elemental iron) oral tablet  -- 1 tab(s) by mouth once a day  -- Indication: For Anemia    polyethylene glycol 3350 oral powder for reconstitution  -- 17 gram(s) by mouth once a day, As Needed for constipation   -- Indication: For COnstiption     docusate sodium 100 mg oral capsule  -- 1 cap(s) by mouth 3 times a day  -- Indication: For COnstipation     bisacodyl 10 mg rectal suppository  -- 1 suppository(ies) rectally once a day, As needed, If no bowel movement by POD#2  -- Indication: For COnstipation     senna oral tablet  -- 2 tab(s) by mouth once a day (at bedtime), As needed, Constipation  -- Indication: For COnstipation     potassium chloride 10 mEq oral capsule, extended release  -- 1 cap(s) by mouth 2 times a day  -- Indication: For Supplementary     cyclobenzaprine 10 mg oral tablet  -- 1 tab(s) by mouth 3 times a day, As Needed  -- Indication: For Spasm     Multiple Vitamins oral tablet  -- 1 tab(s) by mouth once a day  -- Indication: For Home meds     ascorbic acid 500 mg oral tablet  -- 1 tab(s) by mouth 2 times a day  -- Indication: For vitamin     folic acid 1 mg oral tablet  -- 1 tab(s) by mouth once a day  -- Indication: For vitamin

## 2018-11-21 NOTE — PROGRESS NOTE ADULT - SUBJECTIVE AND OBJECTIVE BOX
Date/Time Patient Seen:  		  Referring MD:   Data Reviewed	       Patient is a 72y old  Male who presents with a chief complaint of fall, hip pain (21 Nov 2018 06:38)    in bed  seen and examined  vs and meds reviewed      Subjective/HPI     PAST MEDICAL & SURGICAL HISTORY:  S/P CABG x 3  S/P hip replacement, left  Rheumatoid arthritis  Osteoarthritis  COPD (chronic obstructive pulmonary disease)  HTN (hypertension)  History of appendectomy  History of right shoulder replacement  History of total left hip arthroplasty        Medication list         MEDICATIONS  (STANDING):  ALBUTerol    0.083% 2.5 milliGRAM(s) Nebulizer every 8 hours  ascorbic acid 500 milliGRAM(s) Oral two times a day  calcium carbonate 1250 mG  + Vitamin D (OsCal 500 + D) 1 Tablet(s) Oral three times a day  docusate sodium 100 milliGRAM(s) Oral three times a day  enoxaparin Injectable 40 milliGRAM(s) SubCutaneous daily  famotidine    Tablet 20 milliGRAM(s) Oral every 12 hours  ferrous    sulfate 325 milliGRAM(s) Oral three times a day with meals  finasteride 5 milliGRAM(s) Oral daily  folic acid 1 milliGRAM(s) Oral daily  lisinopril 20 milliGRAM(s) Oral daily  methadone    Tablet 5 milliGRAM(s) Oral every 8 hours  methylPREDNISolone 4 milliGRAM(s) Oral daily  multivitamin 1 Tablet(s) Oral daily  polyethylene glycol 3350 17 Gram(s) Oral daily  predniSONE   Tablet 20 milliGRAM(s) Oral daily  simvastatin 5 milliGRAM(s) Oral at bedtime  sodium chloride 0.9% lock flush 3 milliLiter(s) IV Push every 8 hours  tamsulosin 0.4 milliGRAM(s) Oral at bedtime  tiotropium 18 MICROgram(s) Capsule 1 Capsule(s) Inhalation daily    MEDICATIONS  (PRN):  acetaminophen   Tablet .. 650 milliGRAM(s) Oral every 4 hours PRN Mild Pain (1 - 3)  acetaminophen   Tablet .. 650 milliGRAM(s) Oral every 6 hours PRN Temp greater or equal to 38C (100.4F)  aluminum hydroxide/magnesium hydroxide/simethicone Suspension 30 milliLiter(s) Oral four times a day PRN Indigestion  benzocaine 15 mG/menthol 3.6 mG Lozenge 1 Lozenge Oral every 3 hours PRN Sore Throat  cyclobenzaprine 10 milliGRAM(s) Oral three times a day PRN Muscle Spasm  diazepam    Tablet 1 milliGRAM(s) Oral every 8 hours PRN for spasm and anxiety  diphenhydrAMINE 25 milliGRAM(s) Oral every 8 hours PRN Itching  melatonin 3 milliGRAM(s) Oral at bedtime PRN Insomnia  ondansetron Injectable 4 milliGRAM(s) IV Push every 6 hours PRN Nausea and/or Vomiting  oxyCODONE    IR 5 milliGRAM(s) Oral every 4 hours PRN Moderate Pain (4 - 6)  oxyCODONE    IR 10 milliGRAM(s) Oral every 4 hours PRN Severe Pain (7 - 10)  senna 2 Tablet(s) Oral at bedtime PRN Constipation  traMADol 100 milliGRAM(s) Oral every 6 hours PRN Mild Pain (1 - 3)         Vitals log        ICU Vital Signs Last 24 Hrs  T(C): 36.9 (21 Nov 2018 07:52), Max: 36.9 (20 Nov 2018 12:01)  T(F): 98.4 (21 Nov 2018 07:52), Max: 98.5 (20 Nov 2018 12:01)  HR: 97 (21 Nov 2018 07:52) (74 - 109)  BP: 143/86 (21 Nov 2018 07:52) (117/76 - 143/86)  BP(mean): --  ABP: --  ABP(mean): --  RR: 18 (21 Nov 2018 07:52) (17 - 18)  SpO2: 100% (21 Nov 2018 07:52) (95% - 100%)           Input and Output:  I&O's Detail    20 Nov 2018 07:01  -  21 Nov 2018 07:00  --------------------------------------------------------  IN:    lactated ringers.: 450 mL    Oral Fluid: 850 mL    Solution: 50 mL  Total IN: 1350 mL    OUT:    Voided: 1900 mL  Total OUT: 1900 mL    Total NET: -550 mL          Lab Data                        9.8    10.72 )-----------( 200      ( 21 Nov 2018 06:11 )             29.5     11-21    137  |  100  |  29<H>  ----------------------------<  83  5.1   |  30  |  0.94    Ca    8.7      21 Nov 2018 06:11              Review of Systems	      Objective     Physical Examination    heart s1s2  lung dec BS  abd soft   on room air      Pertinent Lab findings & Imaging      Corinna:  NO   Adequate UO     I&O's Detail    20 Nov 2018 07:01  -  21 Nov 2018 07:00  --------------------------------------------------------  IN:    lactated ringers.: 450 mL    Oral Fluid: 850 mL    Solution: 50 mL  Total IN: 1350 mL    OUT:    Voided: 1900 mL  Total OUT: 1900 mL    Total NET: -550 mL               Discussed with:     Cultures:	        Radiology

## 2018-11-21 NOTE — DISCHARGE NOTE ADULT - PROVIDER TOKENS
TOKLUIS F:'[id number]' TOKEN:'[id number]',FREE:[LAST:[New PMD],PHONE:[(   )    -],FAX:[(   )    -]] TOKEN:'[id number]',FREE:[LAST:[New PMD],PHONE:[(   )    -],FAX:[(   )    -]],TOKEN:'[id number]'

## 2018-11-21 NOTE — PROGRESS NOTE ADULT - PROBLEM SELECTOR PLAN 1
copd  HFpEF  Obesity  POR  post op - hip   I aundrea  oral hygiene  skin care  wound care  cvs regimen  NEBS and Spiriva  PT as tolerated  pain regimen, caution with Opioids  planned for home DC - will need NEBS machine and albuterol Solution

## 2018-12-03 ENCOUNTER — INPATIENT (INPATIENT)
Facility: HOSPITAL | Age: 73
LOS: 6 days | Discharge: HOME | DRG: 857 | End: 2018-12-10
Attending: STUDENT IN AN ORGANIZED HEALTH CARE EDUCATION/TRAINING PROGRAM | Admitting: STUDENT IN AN ORGANIZED HEALTH CARE EDUCATION/TRAINING PROGRAM
Payer: MEDICARE

## 2018-12-03 VITALS
SYSTOLIC BLOOD PRESSURE: 100 MMHG | RESPIRATION RATE: 18 BRPM | HEART RATE: 82 BPM | WEIGHT: 179.9 LBS | DIASTOLIC BLOOD PRESSURE: 66 MMHG | OXYGEN SATURATION: 93 % | TEMPERATURE: 99 F

## 2018-12-03 DIAGNOSIS — E78.5 HYPERLIPIDEMIA, UNSPECIFIED: ICD-10-CM

## 2018-12-03 DIAGNOSIS — G47.33 OBSTRUCTIVE SLEEP APNEA (ADULT) (PEDIATRIC): ICD-10-CM

## 2018-12-03 DIAGNOSIS — T81.49XA INFECTION FOLLOWING A PROCEDURE, OTHER SURGICAL SITE, INITIAL ENCOUNTER: ICD-10-CM

## 2018-12-03 DIAGNOSIS — Z29.9 ENCOUNTER FOR PROPHYLACTIC MEASURES, UNSPECIFIED: ICD-10-CM

## 2018-12-03 DIAGNOSIS — Z96.611 PRESENCE OF RIGHT ARTIFICIAL SHOULDER JOINT: Chronic | ICD-10-CM

## 2018-12-03 DIAGNOSIS — Z96.642 PRESENCE OF LEFT ARTIFICIAL HIP JOINT: ICD-10-CM

## 2018-12-03 DIAGNOSIS — Z96.642 PRESENCE OF LEFT ARTIFICIAL HIP JOINT: Chronic | ICD-10-CM

## 2018-12-03 DIAGNOSIS — R79.1 ABNORMAL COAGULATION PROFILE: ICD-10-CM

## 2018-12-03 DIAGNOSIS — R93.89 ABNORMAL FINDINGS ON DIAGNOSTIC IMAGING OF OTHER SPECIFIED BODY STRUCTURES: ICD-10-CM

## 2018-12-03 DIAGNOSIS — J44.9 CHRONIC OBSTRUCTIVE PULMONARY DISEASE, UNSPECIFIED: ICD-10-CM

## 2018-12-03 DIAGNOSIS — Z90.49 ACQUIRED ABSENCE OF OTHER SPECIFIED PARTS OF DIGESTIVE TRACT: Chronic | ICD-10-CM

## 2018-12-03 DIAGNOSIS — Z98.1 ARTHRODESIS STATUS: Chronic | ICD-10-CM

## 2018-12-03 DIAGNOSIS — Z87.81 PERSONAL HISTORY OF (HEALED) TRAUMATIC FRACTURE: Chronic | ICD-10-CM

## 2018-12-03 DIAGNOSIS — I10 ESSENTIAL (PRIMARY) HYPERTENSION: ICD-10-CM

## 2018-12-03 DIAGNOSIS — M06.9 RHEUMATOID ARTHRITIS, UNSPECIFIED: ICD-10-CM

## 2018-12-03 DIAGNOSIS — D64.9 ANEMIA, UNSPECIFIED: ICD-10-CM

## 2018-12-03 DIAGNOSIS — Z95.1 PRESENCE OF AORTOCORONARY BYPASS GRAFT: ICD-10-CM

## 2018-12-03 PROBLEM — M19.90 UNSPECIFIED OSTEOARTHRITIS, UNSPECIFIED SITE: Chronic | Status: ACTIVE | Noted: 2018-11-18

## 2018-12-03 LAB
ALBUMIN SERPL ELPH-MCNC: 2.6 G/DL — LOW (ref 3.3–5)
ALP SERPL-CCNC: 143 U/L — HIGH (ref 40–120)
ALT FLD-CCNC: 30 U/L — SIGNIFICANT CHANGE UP (ref 12–78)
ANION GAP SERPL CALC-SCNC: 8 MMOL/L — SIGNIFICANT CHANGE UP (ref 5–17)
APTT BLD: 32.4 SEC — SIGNIFICANT CHANGE UP (ref 27.5–36.3)
AST SERPL-CCNC: 21 U/L — SIGNIFICANT CHANGE UP (ref 15–37)
BASOPHILS # BLD AUTO: 0.03 K/UL — SIGNIFICANT CHANGE UP (ref 0–0.2)
BASOPHILS NFR BLD AUTO: 0.3 % — SIGNIFICANT CHANGE UP (ref 0–2)
BILIRUB SERPL-MCNC: 1.1 MG/DL — SIGNIFICANT CHANGE UP (ref 0.2–1.2)
BUN SERPL-MCNC: 12 MG/DL — SIGNIFICANT CHANGE UP (ref 7–23)
CALCIUM SERPL-MCNC: 8.5 MG/DL — SIGNIFICANT CHANGE UP (ref 8.5–10.1)
CHLORIDE SERPL-SCNC: 96 MMOL/L — SIGNIFICANT CHANGE UP (ref 96–108)
CO2 SERPL-SCNC: 30 MMOL/L — SIGNIFICANT CHANGE UP (ref 22–31)
CREAT SERPL-MCNC: 0.73 MG/DL — SIGNIFICANT CHANGE UP (ref 0.5–1.3)
EOSINOPHIL # BLD AUTO: 0.38 K/UL — SIGNIFICANT CHANGE UP (ref 0–0.5)
EOSINOPHIL NFR BLD AUTO: 3.8 % — SIGNIFICANT CHANGE UP (ref 0–6)
GLUCOSE SERPL-MCNC: 96 MG/DL — SIGNIFICANT CHANGE UP (ref 70–99)
HCT VFR BLD CALC: 31 % — LOW (ref 39–50)
HGB BLD-MCNC: 10 G/DL — LOW (ref 13–17)
IMM GRANULOCYTES NFR BLD AUTO: 0.9 % — SIGNIFICANT CHANGE UP (ref 0–1.5)
INR BLD: 1.38 RATIO — HIGH (ref 0.88–1.16)
LACTATE SERPL-SCNC: 1.7 MMOL/L — SIGNIFICANT CHANGE UP (ref 0.7–2)
LYMPHOCYTES # BLD AUTO: 0.87 K/UL — LOW (ref 1–3.3)
LYMPHOCYTES # BLD AUTO: 8.8 % — LOW (ref 13–44)
MCHC RBC-ENTMCNC: 31.2 PG — SIGNIFICANT CHANGE UP (ref 27–34)
MCHC RBC-ENTMCNC: 32.3 GM/DL — SIGNIFICANT CHANGE UP (ref 32–36)
MCV RBC AUTO: 96.6 FL — SIGNIFICANT CHANGE UP (ref 80–100)
MONOCYTES # BLD AUTO: 0.48 K/UL — SIGNIFICANT CHANGE UP (ref 0–0.9)
MONOCYTES NFR BLD AUTO: 4.8 % — SIGNIFICANT CHANGE UP (ref 2–14)
NEUTROPHILS # BLD AUTO: 8.08 K/UL — HIGH (ref 1.8–7.4)
NEUTROPHILS NFR BLD AUTO: 81.4 % — HIGH (ref 43–77)
PLATELET # BLD AUTO: 286 K/UL — SIGNIFICANT CHANGE UP (ref 150–400)
POTASSIUM SERPL-MCNC: 4.1 MMOL/L — SIGNIFICANT CHANGE UP (ref 3.5–5.3)
POTASSIUM SERPL-SCNC: 4.1 MMOL/L — SIGNIFICANT CHANGE UP (ref 3.5–5.3)
PROT SERPL-MCNC: 6.3 G/DL — SIGNIFICANT CHANGE UP (ref 6–8.3)
PROTHROM AB SERPL-ACNC: 15.7 SEC — HIGH (ref 10–12.9)
RBC # BLD: 3.21 M/UL — LOW (ref 4.2–5.8)
RBC # FLD: 15.5 % — HIGH (ref 10.3–14.5)
SODIUM SERPL-SCNC: 134 MMOL/L — LOW (ref 135–145)
WBC # BLD: 9.93 K/UL — SIGNIFICANT CHANGE UP (ref 3.8–10.5)
WBC # FLD AUTO: 9.93 K/UL — SIGNIFICANT CHANGE UP (ref 3.8–10.5)

## 2018-12-03 PROCEDURE — 71250 CT THORAX DX C-: CPT | Mod: 26

## 2018-12-03 PROCEDURE — 99358 PROLONG SERVICE W/O CONTACT: CPT

## 2018-12-03 PROCEDURE — 73551 X-RAY EXAM OF FEMUR 1: CPT | Mod: 26,LT

## 2018-12-03 PROCEDURE — 73701 CT LOWER EXTREMITY W/DYE: CPT | Mod: 26,LT,76

## 2018-12-03 PROCEDURE — 99223 1ST HOSP IP/OBS HIGH 75: CPT | Mod: AI,GC

## 2018-12-03 PROCEDURE — 73552 X-RAY EXAM OF FEMUR 2/>: CPT | Mod: 26,LT

## 2018-12-03 PROCEDURE — 99285 EMERGENCY DEPT VISIT HI MDM: CPT

## 2018-12-03 PROCEDURE — 71045 X-RAY EXAM CHEST 1 VIEW: CPT | Mod: 26

## 2018-12-03 PROCEDURE — 73502 X-RAY EXAM HIP UNI 2-3 VIEWS: CPT | Mod: 26,LT

## 2018-12-03 RX ORDER — ASPIRIN/CALCIUM CARB/MAGNESIUM 324 MG
81 TABLET ORAL DAILY
Qty: 0 | Refills: 0 | Status: DISCONTINUED | OUTPATIENT
Start: 2018-12-03 | End: 2018-12-03

## 2018-12-03 RX ORDER — ACETAMINOPHEN 500 MG
650 TABLET ORAL EVERY 6 HOURS
Qty: 0 | Refills: 0 | Status: DISCONTINUED | OUTPATIENT
Start: 2018-12-03 | End: 2018-12-05

## 2018-12-03 RX ORDER — FINASTERIDE 5 MG/1
5 TABLET, FILM COATED ORAL DAILY
Qty: 0 | Refills: 0 | Status: DISCONTINUED | OUTPATIENT
Start: 2018-12-03 | End: 2018-12-05

## 2018-12-03 RX ORDER — AZTREONAM 2 G
1000 VIAL (EA) INJECTION ONCE
Qty: 0 | Refills: 0 | Status: COMPLETED | OUTPATIENT
Start: 2018-12-03 | End: 2018-12-03

## 2018-12-03 RX ORDER — FAMOTIDINE 10 MG/ML
20 INJECTION INTRAVENOUS
Qty: 0 | Refills: 0 | Status: DISCONTINUED | OUTPATIENT
Start: 2018-12-03 | End: 2018-12-05

## 2018-12-03 RX ORDER — FUROSEMIDE 40 MG
20 TABLET ORAL ONCE
Qty: 0 | Refills: 0 | Status: DISCONTINUED | OUTPATIENT
Start: 2018-12-03 | End: 2018-12-03

## 2018-12-03 RX ORDER — LISINOPRIL 2.5 MG/1
20 TABLET ORAL DAILY
Qty: 0 | Refills: 0 | Status: DISCONTINUED | OUTPATIENT
Start: 2018-12-03 | End: 2018-12-05

## 2018-12-03 RX ORDER — CYCLOBENZAPRINE HYDROCHLORIDE 10 MG/1
1 TABLET, FILM COATED ORAL
Qty: 0 | Refills: 0 | COMMUNITY

## 2018-12-03 RX ORDER — ALPRAZOLAM 0.25 MG
0.5 TABLET ORAL AT BEDTIME
Qty: 0 | Refills: 0 | Status: DISCONTINUED | OUTPATIENT
Start: 2018-12-03 | End: 2018-12-05

## 2018-12-03 RX ORDER — CYCLOBENZAPRINE HYDROCHLORIDE 10 MG/1
10 TABLET, FILM COATED ORAL
Qty: 0 | Refills: 0 | Status: DISCONTINUED | OUTPATIENT
Start: 2018-12-03 | End: 2018-12-05

## 2018-12-03 RX ORDER — FLUOXETINE HCL 10 MG
20 CAPSULE ORAL
Qty: 0 | Refills: 0 | Status: DISCONTINUED | OUTPATIENT
Start: 2018-12-03 | End: 2018-12-05

## 2018-12-03 RX ORDER — CHOLECALCIFEROL (VITAMIN D3) 125 MCG
1000 CAPSULE ORAL DAILY
Qty: 0 | Refills: 0 | Status: DISCONTINUED | OUTPATIENT
Start: 2018-12-03 | End: 2018-12-05

## 2018-12-03 RX ORDER — FERROUS SULFATE 325(65) MG
325 TABLET ORAL DAILY
Qty: 0 | Refills: 0 | Status: DISCONTINUED | OUTPATIENT
Start: 2018-12-03 | End: 2018-12-05

## 2018-12-03 RX ORDER — FUROSEMIDE 40 MG
20 TABLET ORAL DAILY
Qty: 0 | Refills: 0 | Status: DISCONTINUED | OUTPATIENT
Start: 2018-12-03 | End: 2018-12-05

## 2018-12-03 RX ORDER — ALBUTEROL 90 UG/1
2.5 AEROSOL, METERED ORAL EVERY 6 HOURS
Qty: 0 | Refills: 0 | Status: DISCONTINUED | OUTPATIENT
Start: 2018-12-03 | End: 2018-12-06

## 2018-12-03 RX ORDER — ATORVASTATIN CALCIUM 80 MG/1
10 TABLET, FILM COATED ORAL AT BEDTIME
Qty: 0 | Refills: 0 | Status: DISCONTINUED | OUTPATIENT
Start: 2018-12-03 | End: 2018-12-05

## 2018-12-03 RX ORDER — DIPHENHYDRAMINE HCL 50 MG
25 CAPSULE ORAL EVERY 8 HOURS
Qty: 0 | Refills: 0 | Status: DISCONTINUED | OUTPATIENT
Start: 2018-12-03 | End: 2018-12-03

## 2018-12-03 RX ORDER — FOLIC ACID 0.8 MG
1 TABLET ORAL DAILY
Qty: 0 | Refills: 0 | Status: DISCONTINUED | OUTPATIENT
Start: 2018-12-03 | End: 2018-12-03

## 2018-12-03 RX ORDER — SODIUM CHLORIDE 9 MG/ML
1000 INJECTION, SOLUTION INTRAVENOUS
Qty: 0 | Refills: 0 | Status: DISCONTINUED | OUTPATIENT
Start: 2018-12-03 | End: 2018-12-06

## 2018-12-03 RX ORDER — POLYETHYLENE GLYCOL 3350 17 G/17G
17 POWDER, FOR SOLUTION ORAL DAILY
Qty: 0 | Refills: 0 | Status: DISCONTINUED | OUTPATIENT
Start: 2018-12-03 | End: 2018-12-10

## 2018-12-03 RX ORDER — SODIUM CHLORIDE 9 MG/ML
1000 INJECTION INTRAMUSCULAR; INTRAVENOUS; SUBCUTANEOUS ONCE
Qty: 0 | Refills: 0 | Status: COMPLETED | OUTPATIENT
Start: 2018-12-03 | End: 2018-12-03

## 2018-12-03 RX ORDER — ACETAMINOPHEN 500 MG
650 TABLET ORAL ONCE
Qty: 0 | Refills: 0 | Status: COMPLETED | OUTPATIENT
Start: 2018-12-03 | End: 2018-12-03

## 2018-12-03 RX ORDER — TIOTROPIUM BROMIDE 18 UG/1
1 CAPSULE ORAL; RESPIRATORY (INHALATION) DAILY
Qty: 0 | Refills: 0 | Status: DISCONTINUED | OUTPATIENT
Start: 2018-12-03 | End: 2018-12-05

## 2018-12-03 RX ORDER — CALCIUM CARBONATE 500(1250)
1 TABLET ORAL DAILY
Qty: 0 | Refills: 0 | Status: DISCONTINUED | OUTPATIENT
Start: 2018-12-03 | End: 2018-12-03

## 2018-12-03 RX ORDER — VANCOMYCIN HCL 1 G
1000 VIAL (EA) INTRAVENOUS ONCE
Qty: 0 | Refills: 0 | Status: COMPLETED | OUTPATIENT
Start: 2018-12-03 | End: 2018-12-03

## 2018-12-03 RX ORDER — METHADONE HYDROCHLORIDE 40 MG/1
5 TABLET ORAL EVERY 8 HOURS
Qty: 0 | Refills: 0 | Status: DISCONTINUED | OUTPATIENT
Start: 2018-12-03 | End: 2018-12-05

## 2018-12-03 RX ORDER — MORPHINE SULFATE 50 MG/1
4 CAPSULE, EXTENDED RELEASE ORAL ONCE
Qty: 0 | Refills: 0 | Status: DISCONTINUED | OUTPATIENT
Start: 2018-12-03 | End: 2018-12-03

## 2018-12-03 RX ADMIN — SODIUM CHLORIDE 1000 MILLILITER(S): 9 INJECTION INTRAMUSCULAR; INTRAVENOUS; SUBCUTANEOUS at 11:50

## 2018-12-03 RX ADMIN — ATORVASTATIN CALCIUM 10 MILLIGRAM(S): 80 TABLET, FILM COATED ORAL at 23:05

## 2018-12-03 RX ADMIN — Medication 1000 MILLIGRAM(S): at 12:55

## 2018-12-03 RX ADMIN — FAMOTIDINE 20 MILLIGRAM(S): 10 INJECTION INTRAVENOUS at 18:50

## 2018-12-03 RX ADMIN — MORPHINE SULFATE 4 MILLIGRAM(S): 50 CAPSULE, EXTENDED RELEASE ORAL at 12:10

## 2018-12-03 RX ADMIN — Medication 650 MILLIGRAM(S): at 12:55

## 2018-12-03 RX ADMIN — MORPHINE SULFATE 4 MILLIGRAM(S): 50 CAPSULE, EXTENDED RELEASE ORAL at 12:25

## 2018-12-03 RX ADMIN — Medication 650 MILLIGRAM(S): at 11:50

## 2018-12-03 RX ADMIN — Medication 50 MILLIGRAM(S): at 11:50

## 2018-12-03 RX ADMIN — METHADONE HYDROCHLORIDE 5 MILLIGRAM(S): 40 TABLET ORAL at 23:05

## 2018-12-03 RX ADMIN — SODIUM CHLORIDE 1000 MILLILITER(S): 9 INJECTION INTRAMUSCULAR; INTRAVENOUS; SUBCUTANEOUS at 12:50

## 2018-12-03 RX ADMIN — Medication 20 MILLIGRAM(S): at 18:50

## 2018-12-03 RX ADMIN — Medication 1000 MILLIGRAM(S): at 14:10

## 2018-12-03 RX ADMIN — Medication 250 MILLIGRAM(S): at 13:06

## 2018-12-03 NOTE — H&P ADULT - PROBLEM SELECTOR PLAN 1
-Admit to [facility]  -Patient w/ purelent drainage, swelling and erythema surronding surgical site concerning for surgical site infection w/ xrays revealing soft tissue swelling. CT pending  - Deeper joint infection on differential, though less likely. Patient has had previos surgical site infection in back after lumbar fusion which progressed to sepsis, tx'ed with IV abx in [place]. possible patient has some propensity for SSIs, perhaps due to immunocompromised state on chronic steroids (Medrol daily) for RA  - s/p vanc and aztreonam in ED  - hold further abx currently, per ortho and patient covered for gram positives including MRSA for today  - resume abx after decision made on joint tap, with coverage for MRSA (recent hospitalization and rehab)   - trend fever curve  - -Admit to [facility]  -Patient w/ purelent drainage, swelling and erythema surronding surgical site concerning for surgical site infection w/ xrays revealing soft tissue swelling. CT pending  - Deeper joint infection on differential, though less likely. Patient has had previos surgical site infection in back after lumbar fusion which progressed to sepsis, tx'ed with IV abx in [place]. possible patient has some propensity for SSIs, perhaps due to immunocompromised state on chronic steroids (Medrol daily) for RA  - s/p vanc and aztreonam in ED  - hold further abx currently, per ortho and patient covered for gram positives including MRSA for today  - ID consulted, f/u abx recs   - resume abx after decision made on joint tap, with coverage for MRSA (recent hospitalization and rehab)   - trend fever curve  - f/ bcx and wound culture (purulent drainage culture) -Admit to [facility]  -Patient w/ purelent drainage, swelling and erythema surronding surgical site concerning for surgical site infection w/ xrays revealing soft tissue swelling. CT pending  - Deeper joint infection on differential, though less likely. Patient has had previous surgical site infection in back after lumbar fusion which progressed to sepsis, tx'ed with IV abx in [place]. possible patient has some propensity for SSIs, perhaps due to immunocompromised state on chronic steroids (Medrol daily) for RA  - s/p vanc and aztreonam in ED  - hold further abx currently, per ortho and patient covered for gram positives including MRSA for today  - ID consulted, f/u abx recs   - resume abx after decision made on joint tap, with coverage for MRSA (recent hospitalization and rehab)   - trend fever curve  - f/ bcx and wound culture (purulent drainage culture) -Admit to [facility]  -Patient w/ purelent drainage, swelling and erythema surronding surgical site concerning for surgical site infection w/ xrays revealing soft tissue swelling. CT pending  - Deeper joint infection on differential, though less likely. Patient has had previous surgical site infection in back after lumbar fusion which progressed to sepsis, tx[place]ed with IV abx in [place]. possible patient has some propensity for SSIs, perhaps due to immunocompromised state on chronic steroids (Medrol daily) for RA  - s/p vanc and aztreonam in ED  - hold further abx currently, per ortho and patient covered for gram positives including MRSA for today  - ID consulted, f/u abx recs   - resume abx after decision made on joint tap, with coverage for MRSA (recent hospitalization and rehab)   - trend fever curve  - f/ bcx and wound culture (purulent drainage culture)  - f/u IR drainage results tomorrow -Admit to [facility]  -Patient w/ purelent drainage, swelling and erythema surronding surgical site concerning for surgical site infection w/ xrays revealing soft tissue swelling. CT pending  - Deeper joint infection on differential, though less likely. Patient has had previous surgical site infection in back after lumbar fusion which progressed to sepsis, tx[place]ed [place] abx in [place]. possible patient has some propensity for SSIs, perhaps due to immunocompromised state on chronic steroids (Medrol daily) for RA  - s/p vanc and aztreonam in ED  - hold further abx currently, per ortho and patient covered for gram positives including MRSA for today  - ID consulted, f/u abx recs   - resume abx after decision made on joint tap, with coverage for MRSA (recent hospitalization and rehab)   - trend fever curve  - f/ bcx and wound culture (purulent drainage culture)  - EKG ordered STAT, still pending, signed out to nursing PM team who will contact EKG tech, f/u results   - f/u IR drainage results tomorrow

## 2018-12-03 NOTE — H&P ADULT - PROBLEM SELECTOR PLAN 2
- chronic, no hardware displacement viewed on X ray pelvis/hip/femur   - ortho following - Hgb stable at 10 and 9.8 on admission 11/21   - f/u daily CBC, no need for intervention at this time

## 2018-12-03 NOTE — H&P ADULT - NSHPOUTPATIENTPROVIDERS_GEN_ALL_CORE
[facility] (multiple physicians listed)- [phone]; patient's cardiologist acts as his PMD   Wife- Lyndsey Amin [phone]

## 2018-12-03 NOTE — ED PROVIDER NOTE - SKIN COLOR
L hip incision sites with staples; upper incision w/ purulent drainage of distal aspect of wound, overlying fluctuant erythema and firmness; area warm to touch

## 2018-12-03 NOTE — CONSULT NOTE ADULT - SUBJECTIVE AND OBJECTIVE BOX
Date/Time Patient Seen:  		  Referring MD:   Data Reviewed	       Patient is a 72y old  Male who presents with a chief complaint of     Subjective/HPI    in bed  seen and examined  vs and meds reviewed    H and P reviewed  ER provider note reviewed    LABS and Imaging reviewed    returns from [facility] with poss Post Op Site / wound infection    pt is originally from [place]    wife and family at the bedside    Consult Note Adult-Orthopedics Resident [Charted Location: [facility] ED] [Authored: 03-Dec-2018 12:04]- for Visit: [id number], In Progress, Revised, Signed w/additional Signatures Pending, General    Consult Note:   · Provider Specialty	Orthopedics    Referral/Consultation:    Initial Consult:  · Requested by Name:	ER  · Date/Time:	03-Dec-2018 12:04  · Reason for Referral/Consultation:	L hip infection      · Subjective and Objective:   72y Male presents to [place] ED s/p L hip ORIF for Periprosthetic Fx around a GENO on 11/19 with Dr. Ramirez c/o worsening pain and redness around the surgical site. States he has been at [facility] for the past 9 days and overall has been doing well. Has participated in PT without major difficulty. Has been having L hip/thigh pain since the surgery but this has not worsened at all. Approximately 5-6 days ago there was noted to be redness around the hip as well as some clear drainage. He was started on PO ceftin 3 days ago while at rehab. Despite this the redness has continued. Notes pain and sensitivity about the surgical sites. No falls or injuries. No other complaints at this time. Patient denies radiation of pain. Patient denies numbness/tingling/burning in the LLE. No other bone/joint complaints. Patient has maintain 50% PWB with a walker while OOB with PT at rehab.    ED Provider Note [Charted Location: [facility] ED] [Authored: 03-Dec-2018 11:32]- for Visit: [id number], Complete, Revised, Signed in Full, General    HISTORY OF PRESENT ILLNESS:    High Risk Travel:  International Travel? No(1)    · Chief Complaint: The patient is a 72y Male complaining of see chief complaint quote.  · HPI Objective Statement: 71 YO M PMHx emphysema, HTN, HLD, MI, CABG s/p 3 stents (2008), rheumatoid arthritis, OA who presents with redness, drainage of L hip at site of recent surgery. Of note, patient has hx of L hip replacement 30 years ago; visiting from [place] week before thanksgiving and had fall w/ comminuted slightly displaced left periprosthetic fracture of the left proximal femur and underwent closed reduction of periprosthetic fracture of femur  11/19/2018 with Dr. Ramirez. Patient was transferred to [facility]. Last Tuesday, daughter reports patient began having increased redness and pain at surgical site which worsened throughout the week with purulent drainage. Temperatures reported at  F. Denies chills, N/V, CP. Patient has COPD but does report worsening cough and SOB over this time. Daughter reports increased coldness in L foot but patient denies any numbness or weakness in L leg/foot. Patient has been on prednisone daily for 30 years for arthritis. PMD- [facility] [place].           PAST MEDICAL & SURGICAL HISTORY:  S/P CABG x 3  S/P hip replacement, left  Rheumatoid arthritis  Osteoarthritis  COPD (chronic obstructive pulmonary disease)  HTN (hypertension)  History of femur fracture: Repaired 11/19- L hip  History of appendectomy  History of right shoulder replacement  History of total left hip arthroplasty        Medication list         MEDICATIONS  (STANDING):    MEDICATIONS  (PRN):         Vitals log        ICU Vital Signs Last 24 Hrs  T(C): 37.4 (03 Dec 2018 10:37), Max: 37.4 (03 Dec 2018 10:37)  T(F): 99.4 (03 Dec 2018 10:37), Max: 99.4 (03 Dec 2018 10:37)  HR: 82 (03 Dec 2018 10:37) (82 - 82)  BP: 100/66 (03 Dec 2018 10:37) (100/66 - 100/66)  BP(mean): --  ABP: --  ABP(mean): --  RR: 18 (03 Dec 2018 10:37) (18 - 18)  SpO2: 93% (03 Dec 2018 10:37) (93% - 93%)           Input and Output:  I&O's Detail      Lab Data                        10.0   9.93  )-----------( 286      ( 03 Dec 2018 12:17 )             31.0     12-03    134<L>  |  96  |  12  ----------------------------<  96  4.1   |  30  |  0.73    Ca    8.5      03 Dec 2018 12:11    TPro  6.3  /  Alb  2.6<L>  /  TBili  1.1  /  DBili  x   /  AST  21  /  ALT  30  /  AlkPhos  143<H>  12-03      ex smoker  lives in [place]    retired  from [employer]          Review of Systems	      Objective     Physical Examination  obese  neck obese  head at  heart s1s2  lung dec BS        Pertinent Lab findings & Imaging      Weinstein:  NO   Adequate UO     I&O's Detail           Discussed with:     Cultures:	        Radiology      cxr left infiltrate

## 2018-12-03 NOTE — CONSULT NOTE ADULT - SUBJECTIVE AND OBJECTIVE BOX
72y Male presents to [place] ED s/p L hip ORIF for Periprosthetic Fx around a GENO on 11/19 with Dr. Ramirez c/o worsening pain and redness around the surgical site. States he has been at [facility] for the past 9 days and overall has been doing well. Has participated in PT without major difficulty. Has been having L hip/thigh pain since the surgery but this has not worsened at all. Approximately 3 days ago there was noted to be redness around the hip as well as some clear drainage. He was started on PO ceftin 3 days ago while at rehab. Despite this the redness has continued. Notes pain and sensitivity about the surgical sites. No falls or injuries. No other complaints at this time. Patient denies radiation of pain. Patient denies numbness/tingling/burning in the LLE. No other bone/joint complaints. Patient has maintain 50% PWB with a walker while OOB with PT at rehab.    PAST MEDICAL & SURGICAL HISTORY:  S/P CABG x 3  S/P hip replacement, left  Rheumatoid arthritis  Osteoarthritis  COPD (chronic obstructive pulmonary disease)  HTN (hypertension)  History of femur fracture: Repaired 11/19- L hip  History of appendectomy  History of right shoulder replacement  History of total left hip arthroplasty    MEDICATIONS  (STANDING):  acetaminophen   Tablet .. 650 milliGRAM(s) Oral once  aztreonam  IVPB 1000 milliGRAM(s) IV Intermittent once  morphine  - Injectable 4 milliGRAM(s) IV Push Once  sodium chloride 0.9% Bolus 1000 milliLiter(s) IV Bolus once  vancomycin  IVPB 1000 milliGRAM(s) IV Intermittent once    MEDICATIONS  (PRN):    Allergies    Ceftin (Pruritus)    Intolerances        T(C): 37.4 (12-03-18 @ 10:37), Max: 37.4 (12-03-18 @ 10:37)  HR: 82 (12-03-18 @ 10:37) (82 - 82)  BP: 100/66 (12-03-18 @ 10:37) (100/66 - 100/66)  RR: 18 (12-03-18 @ 10:37) (18 - 18)  SpO2: 93% (12-03-18 @ 10:37) (93% - 93%)  Wt(kg): --    PE   LLE:  Surgical staples intact about the lateral incisions.   Diffuse erythema and warmth noted about the lateral hip/thigh extending to the lateral aspect of the buttocks.   Clear serosanguinous drainage noted from the distal aspect of the largest proximal incision. There is firm subcutaneous swelling noted. No overt or localized fluctuance.   Diffusely TTP about the redness.   Difficult to assess ROM due to hip pain.  Compartments soft; No TTP to knee/leg/ankle/foot   Unable to SLR; - Log Roll/Heel Strike  Motor intact GS/TA/FHL/EHL  SILT L2-S1  DP/PT pulses 2+    RLE/BUE:   No bony TTP; Good ROM w/o pain; Exam Unremarkable    Imaging:  XR pending. 72y Male presents to [place] ED s/p L hip ORIF for Periprosthetic Fx around a GENO on 11/19 with Dr. Ramirez c/o worsening pain and redness around the surgical site. States he has been at [facility] for the past 9 days and overall has been doing well. Has participated in PT without major difficulty. Has been having L hip/thigh pain since the surgery but this has not worsened at all. Approximately 5-6 days ago there was noted to be redness around the hip as well as some clear drainage. He was started on PO ceftin 3 days ago while at rehab. Despite this the redness has continued. Notes pain and sensitivity about the surgical sites. No falls or injuries. No other complaints at this time. Patient denies radiation of pain. Patient denies numbness/tingling/burning in the LLE. No other bone/joint complaints. Patient has maintain 50% PWB with a walker while OOB with PT at rehab.    PAST MEDICAL & SURGICAL HISTORY:  S/P CABG x 3  S/P hip replacement, left  Rheumatoid arthritis  Osteoarthritis  COPD (chronic obstructive pulmonary disease)  HTN (hypertension)  History of femur fracture: Repaired 11/19- L hip  History of appendectomy  History of right shoulder replacement  History of total left hip arthroplasty    MEDICATIONS  (STANDING):  acetaminophen   Tablet .. 650 milliGRAM(s) Oral once  aztreonam  IVPB 1000 milliGRAM(s) IV Intermittent once  morphine  - Injectable 4 milliGRAM(s) IV Push Once  sodium chloride 0.9% Bolus 1000 milliLiter(s) IV Bolus once  vancomycin  IVPB 1000 milliGRAM(s) IV Intermittent once    MEDICATIONS  (PRN):    Allergies    Ceftin (Pruritus)    Intolerances        T(C): 37.4 (12-03-18 @ 10:37), Max: 37.4 (12-03-18 @ 10:37)  HR: 82 (12-03-18 @ 10:37) (82 - 82)  BP: 100/66 (12-03-18 @ 10:37) (100/66 - 100/66)  RR: 18 (12-03-18 @ 10:37) (18 - 18)  SpO2: 93% (12-03-18 @ 10:37) (93% - 93%)  Wt(kg): --    PE   LLE:  Surgical staples intact about the lateral incisions.   Diffuse erythema and warmth noted about the lateral hip/thigh extending to the lateral aspect of the buttocks.   Clear serosanguinous drainage noted from the distal aspect of the largest proximal incision. There is firm subcutaneous swelling noted. No overt or localized fluctuance.   Diffusely TTP about the redness.   Difficult to assess ROM due to hip pain.  Compartments soft; No TTP to knee/leg/ankle/foot   Unable to SLR; - Log Roll/Heel Strike  Motor intact GS/TA/FHL/EHL  SILT L2-S1  DP/PT pulses 2+    RLE/BUE:   No bony TTP; Good ROM w/o pain; Exam Unremarkable    Imaging:  XR pending. 72y Male presents to [place] ED s/p L hip ORIF for Periprosthetic Fx around a GENO on 11/19 with Dr. Ramirez c/o worsening pain and redness around the surgical site. States he has been at [facility] for the past 9 days and overall has been doing well. Has participated in PT without major difficulty. Has been having L hip/thigh pain since the surgery but this has not worsened at all. Approximately 5-6 days ago there was noted to be redness around the hip as well as some clear drainage. He was started on PO ceftin 3 days ago while at rehab. Despite this the redness has continued. Notes pain and sensitivity about the surgical sites. No falls or injuries. No other complaints at this time. Patient denies radiation of pain. Patient denies numbness/tingling/burning in the LLE. No other bone/joint complaints. Patient has maintain 50% PWB with a walker while OOB with PT at rehab.    PAST MEDICAL & SURGICAL HISTORY:  S/P CABG x 3  S/P hip replacement, left  Rheumatoid arthritis  Osteoarthritis  COPD (chronic obstructive pulmonary disease)  HTN (hypertension)  History of femur fracture: Repaired 11/19- L hip  History of appendectomy  History of right shoulder replacement  History of total left hip arthroplasty    MEDICATIONS  (STANDING):  acetaminophen   Tablet .. 650 milliGRAM(s) Oral once  aztreonam  IVPB 1000 milliGRAM(s) IV Intermittent once  morphine  - Injectable 4 milliGRAM(s) IV Push Once  sodium chloride 0.9% Bolus 1000 milliLiter(s) IV Bolus once  vancomycin  IVPB 1000 milliGRAM(s) IV Intermittent once    MEDICATIONS  (PRN):    Allergies    Ceftin (Pruritus)    Intolerances        T(C): 37.4 (12-03-18 @ 10:37), Max: 37.4 (12-03-18 @ 10:37)  HR: 82 (12-03-18 @ 10:37) (82 - 82)  BP: 100/66 (12-03-18 @ 10:37) (100/66 - 100/66)  RR: 18 (12-03-18 @ 10:37) (18 - 18)  SpO2: 93% (12-03-18 @ 10:37) (93% - 93%)  Wt(kg): --    PE   LLE:  Surgical staples intact about the lateral incisions.   Diffuse erythema and warmth noted about the lateral hip/thigh extending to the lateral aspect of the buttocks.   Clear serosanguinous drainage noted from the distal aspect of the largest proximal incision. There is firm subcutaneous swelling noted. No overt or localized fluctuance.   Diffusely TTP about the redness.   Difficult to assess ROM due to hip pain.  Compartments soft; No TTP to knee/leg/ankle/foot   Unable to SLR; - Log Roll/Heel Strike  Motor intact GS/TA/FHL/EHL  SILT L2-S1  DP/PT pulses 2+    RLE/BUE:   No bony TTP; Good ROM w/o pain; Exam Unremarkable    Imaging:  Xrays show no change in hardware alignment.     CT: 8cm subcutaneous fluid collection without any intramuscular fascial involvement.

## 2018-12-03 NOTE — CONSULT NOTE ADULT - ASSESSMENT
72y M s/p L hip ORIF 11/21 for [place] B1 periprosthetic fracture about a cemented GENO (25+ yrs ago in [place]) with post op wound infection.     - Pain control  - NPO for the time being  - Ideally, hold abx until orthopedic plan in place.   - CBC/BMP/Coags/UA/T+S x2  - EKG/CXR  - FU XR hip/femur/pelvis  - FU BCx ordered  - Recommend ruling out any other possible causes of septic picture/fever with extensive medical work up.   - Will DW attending and update plan above as necessary. 72y M s/p L hip ORIF 11/21 for [place] B1 periprosthetic fracture about a cemented GENO (25+ yrs ago in [place]) with likely aseptic versus septic hematoma.     - Pain control as needed  - Hip spica dressing applied.   - Distal wound staples removed; Proximal staples left in place.   - Will arrange for IR to perform CT guided (or possible US) aspiration of fluid collection to obtain a sample for culture and sensitivity.   - NPO after midnight tonight for procedure in am.   - ID consult recommended.   - CBC/BMP/Coags/UA/T+S x2  - EKG/CXR  - FU BCx  - DW attending who agrees with above stated plan. 72y M s/p L hip ORIF 11/21 for [place] B1 periprosthetic fracture about a cemented GENO (25+ yrs ago in [place]) with likely aseptic versus septic hematoma.     - Pain control as needed  - Hip spica dressing applied.   - Distal wound staples removed; Proximal staples left in place.   - Will arrange for IR to perform CT guided (or possible US) aspiration of fluid collection to obtain a sample for culture and sensitivity.   - NPO after midnight tonight for procedure in am.   - Hold AC; (PT received Lovenox this am)  - ID consult recommended.   - CBC/BMP/Coags/UA/T+S x2  - EKG/CXR  - FU BCx  - DW attending who agrees with above stated plan.

## 2018-12-03 NOTE — ED ADULT NURSE NOTE - OBJECTIVE STATEMENT
71 y/o M patient presents to ED from home c/o redness and swelling to surgery site on left hip. Patient reports he had left hip surgery two weeks ago and began to have pain, redness and swelling at the site. Patient states he had infection post hip surgery 30 years ago and believes he is having a similar episode. Patient A&Ox3. lungs CTA. redness, swelling, and warmth noted to left hip and left thigh. sutures noted along left hip. yellow thick drainage noted at suture site. staples noted along left thigh. Patient denies HA, dizziness, SOB, chest pain, abdominal pain, N/V/D, bowel/bladder changes, cough. Safety and comfort measures provided and maintained. Family bedside. MD bedside.

## 2018-12-03 NOTE — H&P ADULT - NSHPSOCIALHISTORY_GEN_ALL_CORE
Mostly independent for ADLs and IADLs though does get some help from wife to get dressed beginning before 11/19 surgery     Lives in [place] w/ wife     45 pack year history, quit "a few years ago"  Denies alcohol use or illicit drug use     Full code

## 2018-12-03 NOTE — ED PROVIDER NOTE - PSH
History of appendectomy    History of femur fracture  Repaired 11/19- L hip  History of right shoulder replacement    History of total left hip arthroplasty

## 2018-12-03 NOTE — ED PROVIDER NOTE - MEDICAL DECISION MAKING DETAILS
Recent L hip surgery w/ increased redness/drainage; hypotensive on admission r/o sepsis. BCs x 2, lactate, wound culture. CBC/CMP, coags. XR L hip, femur, pelvis, CXR. Ortho following. IV vanc/azactam x 1. 1L NS bolus.

## 2018-12-03 NOTE — H&P ADULT - ATTENDING COMMENTS
This is a 73yo M with PMHx COPD/emphysema, HTN, HLD, CAD s/p MI, CABG and PCO, Rheumatoid arthritis, HFpEF, obesity, PRO who had a recent hospitalization in Nov 2018 for an acute periprosthetic fracture of the left proximal femur secondary to a fall. He underwent left hip ORIF at the time. He was sent to [facility] and now is presenting with several days of serosang and bloody drainage from the site of sx with associated fever at [facility]. Per pt and daughter who is at bedside, family had noted drainage and mild erythema around site 6 days ago- several days later, on Fri- pt was started on Ceftin. Given the increase in drainage and erythema, pt was sent back to hospital today. He denies left hip pain on palpation. At rehab, he was able to ambulate approximately 10 feet with rolling walker. He denies CP, SOB, abd pain, nausea or vomiting at this time.   L hip surgical site cellulitis with active bloody drainage- CT imaging of femur revealing deep collection that may be abscess vs hematoma. ID eval placed- s/p Aztreonam and Vanco in ED- will await further antibiotics until more definitive plan by ortho- may need washout.  f/u blood and wound cx  EVAN opacity not present on CXR 2 weeks ago- per family- pt has h/o pulm nodule in the area- Ct chest to evaluate for PNA- pt would like to defer test today  RA- would c/w Medrol 4mg for now This is a 71yo M with PMHx COPD/emphysema, HTN, HLD, CAD s/p MI, CABG and PCI, lumbar spinal stenosis s/p fusion, Rheumatoid arthritis, HFpEF, obesity, PRO who had a recent hospitalization in Nov 2018 for an acute periprosthetic fracture of the left proximal femur secondary to a fall. He underwent left hip ORIF at the time. He was sent to [facility] and now is presenting with several days of serosang and bloody drainage from the site of sx with associated reported fever at [facility]. Per pt and daughter who is at bedside, family had noted mild drainage, swelling and mild erythema around site 6 days ago- pt was started on Ceftin 3 days ago. Given the increase in drainage and erythema, pt was sent back to hospital today. He denies left hip pain on palpation. At rehab, he was able to ambulate approximately 10 feet with rolling walker. He denies CP, SOB, abd pain, nausea or vomiting at this time.   L hip surgical site cellulitis with active bloody drainage- CT imaging of femur revealing localized collection involving SQ tissue that may be abscess vs hematoma. ID eval placed- s/p Aztreonam and Vanco in ED- will await further antibiotics until more definitive plan by ortho- may need washout.  f/u blood and wound cx.   EVAN opacity not present on CXR 2 weeks ago- per family- pt has h/o pulm nodule in the area- Ct chest to evaluate for PNA- pt would like to defer test today  RA- would c/w Medrol 4mg for now  CAD with HFpEF- c/w lasix and lisinopril with hold parameters- c/w ASA  DVT PPX- would like to resume lovenox- awaiting ok by ortho after films are reviewed  Spinal stenosis with chronic intermittent LE parestheias- on flexeril and methadone- can continue- currently, pt has 5/5 DF/PF b/l; 5/5 KE/KF b/l; and 4/5 HF/HE b/l; he denies urinary or fecal incontinence- he has no saddle anesthesia. Gross sensation intact of b/l hip/knee/shin and feet This is a 71yo M with PMHx COPD/emphysema, HTN, HLD, CAD s/p MI, CABG and PCI, lumbar spinal stenosis s/p fusion, Rheumatoid arthritis, HFpEF, obesity, PRO who had a recent hospitalization in Nov 2018 for an acute periprosthetic fracture of the left proximal femur secondary to a fall. He underwent left hip ORIF at the time. He was sent to [facility] and now is presenting with several days of serosang and bloody drainage from the site of sx with associated reported fever at [facility]. Per pt and daughter who is at bedside, family had noted mild drainage, swelling and mild erythema around site 6 days ago- pt was started on Ceftin 3 days ago. Given the increase in drainage and erythema, pt was sent back to hospital today. He denies left hip pain on palpation. At rehab, he was able to ambulate approximately 10 feet with rolling walker. He denies CP, SOB, abd pain, nausea or vomiting at this time.   L hip surgical site cellulitis with active bloody drainage- CT imaging of femur revealing localized collection involving SQ tissue that may be abscess vs hematoma. ID eval placed- s/p Aztreonam and Vanco in ED- will await further antibiotics until more definitive plan by ortho- may need washout.  f/u blood and wound cx.   EVAN opacity not present on CXR 2 weeks ago- per family- pt has h/o pulm nodule in the area- Ct chest to evaluate for PNA- pt would like to defer test today  RA- would c/w Medrol 4mg for now  CAD with HFpEF- c/w lasix and lisinopril with hold parameters- c/w ASA  DVT PPX- would like to resume lovenox- awaiting ok by ortho after films are reviewed  Spinal stenosis with chronic intermittent LE parestheias- on flexeril and methadone- can continue- currently, pt has 5/5 DF/PF b/l; 5/5 KE/KF b/l; and 4/5 HF/HE b/l; he denies urinary or fecal incontinence- he has no saddle anesthesia. Gross sensation intact of b/l hip/knee/shin and feet    I spent a total of 33 minutes of non-face to face time, from 3:00-3:33pm reviewing patient's recent hospitalization, including H/P, progress notes and discharge documents, and also reviewing medications from [facility]. In addition, 5 minutes, from 4:10to 4:15pm, was spent discussing with ortho team findings of CT femur results and plan of care. This is a 71yo M with PMHx COPD/emphysema, HTN, HLD, CAD s/p MI, CABG and PCI, lumbar spinal stenosis s/p fusion, Rheumatoid arthritis, HFpEF, obesity, PRO who had a recent hospitalization in Nov 2018 for an acute periprosthetic fracture of the left proximal femur secondary to a fall. He underwent left hip ORIF at the time. He was sent to [facility] and now is presenting with several days of serosang and bloody drainage from the site of sx with associated reported fever at [facility]. Per pt and daughter who is at bedside, family had noted mild drainage, swelling and mild erythema around site 6 days ago- pt was started on Ceftin 3 days ago. Given the increase in drainage and erythema, pt was sent back to hospital today. He denies left hip pain on palpation. At rehab, he was able to ambulate approximately 10 feet with rolling walker. He denies CP, SOB, abd pain, nausea or vomiting at this time.   L hip surgical site cellulitis with active bloody drainage- CT imaging of femur revealing localized collection involving SQ tissue that may be abscess vs hematoma. ID eval placed- s/p Aztreonam and Vanco in ED- will await further antibiotics until more definitive plan by ortho- may need washout.  f/u blood and wound cx.   EVAN opacity not present on CXR 2 weeks ago- per family- pt has h/o pulm nodule in the area- Ct chest to evaluate for PNA- pt would like to defer test today  RA- would c/w Medrol 4mg for now  CAD with HFpEF- c/w lasix and lisinopril with hold parameters- c/w ASA  DVT PPX- would like to resume lovenox- awaiting ok by ortho after films are reviewed  Spinal stenosis with chronic intermittent LE parestheias- on flexeril and methadone- can continue- currently, pt has 5/5 DF/PF b/l; 5/5 KE/KF b/l; and 4/5 HF/HE b/l; he denies urinary or fecal incontinence- he has no saddle anesthesia. Gross sensation intact of b/l hip/knee/shin and feet  No clear allergy to ceftin- pt noted itchiness in back, but likely due to soaked bedsheet from hip drainage- pt has no UE, LE, face, torso pruritus.    I spent a total of 33 minutes of non-face to face time, from 3:00-3:33pm reviewing patient's recent hospitalization, including H/P, progress notes and discharge documents, and also reviewing medications from [facility]. In addition, 5 minutes, from 4:10to 4:15pm, was spent discussing with ortho team findings of CT femur results and plan of care. This is a 73yo M with PMHx COPD/emphysema, HTN, HLD, CAD s/p MI, CABG and PCI, lumbar spinal stenosis s/p fusion, Rheumatoid arthritis, HFpEF, obesity, PRO who had a recent hospitalization in Nov 2018 for an acute periprosthetic fracture of the left proximal femur secondary to a fall. He underwent left hip ORIF at the time. He was sent to [facility] and now is presenting with several days of serosang and bloody drainage from the site of sx with ?reported fever at [facility]. Per pt and daughter who is at bedside, family had noted mild drainage, swelling and mild erythema around site 6 days ago- pt was started on Ceftin 3 days ago. Given the increase in drainage and erythema, pt was sent back to hospital today. He has some left hip pain on palpation but states this has not worsened since surgery. At rehab, he was able to ambulate approximately 10 feet with rolling walker. He denies CP, SOB, abd pain, nausea or vomiting at this time.   L hip surgical site erythema, edema with drainage- CT imaging of femur revealing localized collection involving SQ tissue that may be abscess vs hematoma. ID eval placed- s/p Aztreonam and Vanco in ED- we are deferring further abx for now as plan is to have IR perform aspiration tomorrow to determine if there is infection and if so, to maximize diagnostic yield at this juncture.  f/u blood and wound cx.   EVAN opacity not present on CXR 2 weeks ago- per family- pt has h/o pulm nodule in the area- Ct chest to evaluate for PNA- pt would like to defer test today;  RA- would c/w Medrol 4mg for now  CAD with HFpEF- c/w lasix and lisinopril with hold parameters- c/w ASA  DVT PPX- pt did get dose of Lovenox today- would not start Lovenoz until after aspiration procedure- can use SCD's meanwhile  Spinal stenosis- on flexeril and methadone chronically - can continue- currently, pt has 5/5 DF/PF b/l; 5/5 KE/KF b/l; and 4/5 HF/HE on right- left is limited d/t pain and pt effort; he denies urinary or fecal incontinence- he has no saddle anesthesia. Gross sensation intact of b/l hip/knee/shin and feet; no clonus appreciated  No clear allergy to ceftin- pt noted itchiness in back, but likely due to soaked bedsheet from hip drainage- pt has no UE, LE, face, torso pruritus.    I spent a total of 33 minutes of non-face to face time, from 3:00-3:33pm reviewing patient's recent hospitalization, including H/P, progress notes and discharge documents, and also reviewing medications from [facility]. Pt has an extensive list of medications. In addition, 5 minutes, from 4:10to 4:15pm, was spent discussing with ortho team findings of CT femur results and plan of care. Patient will be continued to be followed by the hospitalist team.  Plan of care discussed with pt, wife, daughter and son at the bedside

## 2018-12-03 NOTE — H&P ADULT - HISTORY OF PRESENT ILLNESS
73 yo M PMHx 45 pack year smoking hx and COPD (diagnosed "a few years ago, quit smoking then), HTN, HLD, hx of MI and CABG (2008) (stents also placed per chart review), RA (on Medrol daily), hx of multiple L hip surgeries (hip replacement 30 years ago in [place], ORIF recently 11/2018 w/ Dr. Ramirez) p/w L pain, drainage, redness and swelling surrounding upper L hip surgical site beginning 6 days ago.     Patient first noted some pain and redness 6 days ago while in [facility] ([place] Patient was prescribed Ceftin w/ symptom progression from pain and erythema to swelling, erythema and purulent drainage. Patient also noted some bilateral tingling (chronic, attributes to spinal stenosis), and left lower extremity coldness compared to right (new starting last week).     Patient denied fever, chills, or other signs of infection including nausea, vomiting, productive cough, change in urination or bowel habits. Patient also denied sick contacts, recent falls.     Of note patient endorsed previous surgical site infection on his back after lumbar fusion for spinal stenosis treated with IV antibiotics, which progressed to sepsis with "Strep" isolated 6 years ago, per patient's wife. Also of note, patient had temperatures ranging from T  at [facility] per patient's daughter.     Also of note, patient now endorses diffuse itching and mild diaphoresis starting after antibiotics in ED, denied difficulty swallowing or breathing, lip swelling or dizziness.       In the ED, vitals T 99.4, HR 82, /66, RR 18, Sp02 93% ORA. Patient received vancomycin and aztreonam, and 1 L NS, morphine 4 mg and acetaminophen 650 mg. Ortho consulted, and L pelvis/hip/femur xrays revealed intact L hip hardware, lumbar fusion hardware and diffuse soft tissue swelling. CT results pending. 71 yo M PMHx 45 pack year smoking hx and COPD (diagnosed "a few years ago, quit smoking then), HTN, HLD, hx of MI and CABG (2008) (stents also placed per chart review), RA (on Medrol daily), hx of multiple L hip surgeries (hip replacement 30 years ago in [place], ORIF recently 11/2018 w/ Dr. Ramirez) p/w L pain, drainage, redness and swelling surrounding upper L hip surgical site beginning 6 days ago.     Patient first noted some pain and redness 6 days ago while in [facility] Patient was prescribed Ceftin w/ symptom progression from pain and erythema to swelling, erythema and purulent drainage.     Patient denied fever, chills, or other signs of infection including nausea, vomiting, productive cough, change in urination or bowel habits. Patient also denied sick contacts, recent falls.     Of note patient endorsed previous surgical site infection on his back after lumbar fusion for spinal stenosis treated with IV antibiotics, which progressed to sepsis with "Strep" isolated 6 years ago, per patient's wife. Also of note, patient had temperatures ranging from T  at [facility] per patient's daughter.     Also of note, patient now endorses diffuse itching and mild diaphoresis starting after antibiotics in ED, denied difficulty swallowing or breathing, lip swelling or dizziness.       In the ED, vitals T 99.4, HR 82, /66, RR 18, Sp02 93% ORA. Patient received vancomycin and aztreonam, and 1 L NS, morphine 4 mg and acetaminophen 650 mg. Ortho consulted, and L pelvis/hip/femur xrays revealed intact L hip hardware, lumbar fusion hardware and diffuse soft tissue swelling. CT results pending.   No Fhx of RA, hip infection

## 2018-12-03 NOTE — H&P ADULT - PROBLEM SELECTOR PLAN 4
- diagnosed "a few years ago", patient had 45 pack year hx now quit after diagnosis   - no oxygen at home, inhalers only prn, rarely uses and patient does not remember which inhaler - diagnosed "a few years ago", patient had 45 pack year hx now quit after diagnosis   - no oxygen at home, inhalers only prn, rarely uses and patient does not remember which inhaler  - CXR revealed lung opacity new from previous admission this month   - non-urgent CT chest for further evaluation  - Dr. Valente consulted, appreciate recs

## 2018-12-03 NOTE — CONSULT NOTE ADULT - PROBLEM SELECTOR RECOMMENDATION 5
sherie  non compliant with CPAP  sleep hygiene discussed  weight management  keep sat > 88 pct  pt refuses CPAP trial   discussed benefits

## 2018-12-03 NOTE — ED PROVIDER NOTE - ABDOMINAL EXAM
nontender.../DISTENDED/no rebound, no guarding, umbilical hernia easily reducible w/o overlying skin changes

## 2018-12-03 NOTE — H&P ADULT - ASSESSMENT
73 yo M PMHx 45 pack year smoking hx and COPD (diagnosed "a few years ago, quit smoking then), HTN, HLD, hx of MI and CABG (2008) (stents also placed per chart review), RA (on Medrol daily), hx of multiple L hip surgeries (hip replacement 30 years ago in [place], ORIF recently 11/2018 w/ Dr. Ramirez) p/w L pain, drainage, redness and swelling surrounding upper L hip surgical site beginning 6 days ago in [facility], s/p 3 days ceftin at [facility] with symptom progression (more erythema, swelling on L hip), admitted for surgical site infection r/o septic joint. 73 yo M PMHx 45 pack year smoking hx and COPD (diagnosed "a few years ago, quit smoking then), HTN, HLD, hx of MI and CABG (2008) (stents also placed per chart review), RA (on Medrol daily), hx of multiple L hip surgeries (hip replacement 30 years ago in [place], ORIF recently 11/2018 w/ Dr. Ramirez) p/w L pain, drainage, redness and swelling surrounding upper L hip surgical site beginning 6 days ago in [facility], s/p 3 days ceftin at [facility] with symptom progression (more erythema, swelling on L hip), admitted for surgical site infection r/o septic joint with CT revealing L hip fluid collection (infection vs hematoma), now for possible CT drainage by IR tomorrow.

## 2018-12-03 NOTE — H&P ADULT - PSH
History of appendectomy    History of femur fracture  Repaired 11/19- L hip  History of right shoulder replacement    History of total left hip arthroplasty    S/P lumbar fusion  Approx 2012

## 2018-12-03 NOTE — H&P ADULT - PROBLEM SELECTOR PLAN 10
- DVT ppx SCDs pending ortho procedures. Can start heparin ppx if none planned  - GI ppx not indicated at this time - DVT ppx SCDs pending ortho procedures. Can start heparin ppx if none planned  - GI ppx pepcid 20 mg (pt on steroids)  - Allergies-- patient's daughter notes no pruritus after ceftin however hx somewhat unclear. Patient does pruritus here in ED after vanc though likely due to infusion rate - DVT ppx SCDs pending  IR procedure tomorrow and possible ortho procedure. Re-start a/c as soon as tomorrow's procedures completed  - GI ppx pepcid 20 mg (pt on steroids)  - Allergies-- patient's daughter notes no pruritus after ceftin however hx somewhat unclear. Patient does pruritus here in ED after vanc though likely due to infusion rate

## 2018-12-03 NOTE — H&P ADULT - NSHPPHYSICALEXAM_GEN_ALL_CORE
T(C): 37.4 (12-03-18 @ 10:37), Max: 37.4 (12-03-18 @ 10:37)  HR: 82 (12-03-18 @ 10:37) (82 - 82)  BP: 100/66 (12-03-18 @ 10:37) (100/66 - 100/66)  RR: 18 (12-03-18 @ 10:37) (18 - 18)  SpO2: 93% (12-03-18 @ 10:37) (93% - 93%)    GENERAL: lying down comfortably with bed flat, not in acute distress  EYES: sclera clear, no exudates  ENMT: oropharynx clear without erythema, no exudates, moist mucous membranes, no lip or tongue swelling, no pharyngeal swelling   NECK: supple, soft  LUNGS: diffuse bilateral rhonchi in all lung bases, some upper respiratory sounds, no crackles, mild end expiratory wheezing   HEART:  difficult exam due to loud lung sounds, soft S1/S2, regular rate and rhythm, no murmurs noted, no lower extremity edema  GASTROINTESTINAL: abdomen is soft, mildly distended, normoactive bowel sounds  INTEGUMENT: diffuse bruising on upper extremities, some areas of hyper and hypopigmentation on upper extremities   MUSCULOSKELETAL: L hip with staples on surgical incision, purulent drainage with evidence of some blood on sheet, erythematous diffuse L hip, impressive swelling near upper L hip surgical site, staples in place   NEUROLOGIC: awake, alert, oriented x3, no obvious sensory deficits  PSYCHIATRIC: mood is good, affect is congruent, linear and logical thought process  HEME/LYMPH: no palpable supraclavicular nodules T(C): 37.4 (12-03-18 @ 10:37), Max: 37.4 (12-03-18 @ 10:37)  HR: 82 (12-03-18 @ 10:37) (82 - 82)  BP: 100/66 (12-03-18 @ 10:37) (100/66 - 100/66)  RR: 18 (12-03-18 @ 10:37) (18 - 18)  SpO2: 93% (12-03-18 @ 10:37) (93% - 93%)    GENERAL: lying down comfortably with bed flat, not in acute distress  EYES: sclera clear, no exudates  ENMT: oropharynx clear without erythema, no exudates, moist mucous membranes, no lip or tongue swelling, no pharyngeal swelling   NECK: supple, soft  LUNGS: diffuse bilateral rhonchi in all lung bases, some upper respiratory sounds, no crackles, mild end expiratory wheezing   HEART:  difficult exam due to loud lung sounds, soft S1/S2, regular rate and rhythm, no murmurs noted, no lower extremity edema  GASTROINTESTINAL: abdomen is soft, mildly distended, normoactive bowel sounds; non-tender  INTEGUMENT: diffuse bruising on upper extremities, some areas of hyper and hypopigmentation on upper extremities   MUSCULOSKELETAL: L hip with staples on surgical incision, purulent drainage with evidence of some blood on sheet, erythematous diffuse L hip, impressive swelling near upper L hip surgical site, staples in place   NEUROLOGIC: awake, alert, oriented x3, no obvious sensory deficits  PSYCHIATRIC: mood is good, affect is congruent, linear and logical thought process  HEME/LYMPH: no palpable supraclavicular nodules

## 2018-12-03 NOTE — ED ADULT NURSE NOTE - NSIMPLEMENTINTERV_GEN_ALL_ED
Implemented All Fall with Harm Risk Interventions:  [place] to call system. Call bell, personal items and telephone within reach. Instruct patient to call for assistance. Room bathroom lighting operational. Non-slip footwear when patient is off stretcher. Physically safe environment: no spills, clutter or unnecessary equipment. Stretcher in lowest position, wheels locked, appropriate side rails in place. Provide visual cue, wrist band, yellow gown, etc. Monitor gait and stability. Monitor for mental status changes and reorient to person, place, and time. Review medications for side effects contributing to fall risk. Reinforce activity limits and safety measures with patient and family. Provide visual clues: red socks.

## 2018-12-03 NOTE — H&P ADULT - PROBLEM SELECTOR PLAN 5
- chronic, on medrol 4 mg daily at home  - patient's daughter noted medrol stopped for a few days ago 11/19 [facility] w/ Dr. Ramirez then decreased to 2 mg daily at [facility] - chronic, on medrol 4 mg daily at home  - patient's daughter noted medrol stopped for a few days ago 11/19 [facility] w/ Dr. Ramirez then decreased to 2 mg daily at [facility] c/w medrol 4 mg as patient noted flares on any decrease of steroids. benefits of decreasing RA flares currently outweigh risk of immunosuppression though low threshold for re-evaluation in light of any new fevers, worsening infection

## 2018-12-03 NOTE — ED PROVIDER NOTE - ATTENDING CONTRIBUTION TO CARE
71 yo male hx of emphysema, htn, hld, MI, cabg, recent L hip replacement by Dr. Ramirez, [facility]o left hip redness/swelling/pain and drainage.  Fever 100.4 here.  Has been on 4 days of PO ceftin.      Gen: Alert, NAD  Head/eyes: NC/AT, PERRL, EOMI, normal lids/conjunctiva, no scleral icterus  ENT: Bilateral TM WNL, normal hearing, patent oropharynx without erythema/exudate, uvula midline, no peritonsillar abscess, no tongue/uvula swelling  Neck: supple, no tenderness/meningismus/JVD, Trachea midline  Pulm: Bilateral clear BS, normal resp effort, no wheeze/stridor/retractions  CV: RRR, no M/R/G, +2 dist pulses (radial, pedal DP/PT, popliteal)  Abd: soft, NT/ND, +BS, no guarding/rebound tenderness  Musculoskeletal: no edema/erythema/cyanosis, FROM in all extremities except left hip secondary to pain, no C/T/L spine ttp  Skin: +left hip erythema, warmth, firm to touch, +purulent drainage surrounding incisional site, staples in place.  Neuro: AAOx3, CN 2-12 intact, normal sensation    labs, XR, IV abx, IV fluids, ortho consult

## 2018-12-03 NOTE — ED PROVIDER NOTE - OBJECTIVE STATEMENT
71 YO M PMHx emphysema, HTN, HLD, MI, CABG s/p 3 stents (2008), rheumatoid arthritis, OA who presents with redness, drainage of L hip at site of recent surgery. Of note, patient has hx of L hip replacement 30 years ago; visiting from [place] week before thanksgiving and had fall w/ comminuted slightly displaced left periprosthetic fracture of the left proximal femur and underwent closed reduction of periprosthetic fracture of femur  11/19/2018 with Dr. Ramirez. Patient was transferred to [facility]. Last Tuesday, daughter reports patient began having increased redness and pain at surgical site which worsened throughout the week with purulent drainage. Temperatures reported at  F. Denies chills, N/V, CP. Patient has COPD but does report worsening cough and SOB over this time. Daughter reports increased coldness in L foot but patient denies any numbness or weakness in L leg/foot. Patient has been on prednisone daily for 30 years for arthritis. PMD- [facility] [place].

## 2018-12-03 NOTE — H&P ADULT - PROBLEM SELECTOR PLAN 3
- Hgb stable at 10 and 9.8 on admission 11/21   - slow bleed vs iron deficiency anemia vs anemia of chronic disease - Hgb stable at 10 and 9.8 on admission 11/21   - slow bleed vs iron deficiency anemia vs anemia of chronic disease  - f/u daily CBC, no need for intervention at this time - Hgb stable at 10 and 9.8 on admission 11/21   - f/u daily CBC, no need for intervention at this time - patient with INR 1.38 on admission though no documented afib, not on chronic a/c per med rec   - possible increased INR due to malnutrition (pt with low albumin)  - patient not currently on a/c; has IR procedure tomorrow for CT guided drainage of L hip  - w/ INR 1.38 and patient receiving lovenox on morning of admission, can continue to hold for IR procedure and for possible ortho procedure after IR drainage; resume a/c as soon as IR and possible ortho procedures are completed

## 2018-12-03 NOTE — CONSULT NOTE ADULT - SUBJECTIVE AND OBJECTIVE BOX
Full note to follow  Redness/drainage from L hip/thigh surgical site  S/P recent Rx of periprosthetic fracture  Extensive hematoma in/of itself can cause current changes  Could be superinfected though air bubbles may be remainder from surgery  If hardware infected would be exceedingly problematic  No fevers  WBC normal  Doubt any pneumonia here, CXR to my eye similar to prior  Defer Abx  IR aspiration of hematoma for culture, though did get single doses of antibiotics  D/W family  D/W ortho resident  Thank you for the courtesy of this referral.  Fabian Childs MD  [phone]  -----------------------------------  [facility], Division of Infectious Diseases  GAETANO Briones A. Lee  [phone]    Name: ERIKA TORREZ  Age: 72y  Gender: Male  MRN: [id number]    Interval History--  Notes reviewed    Past Medical History--  S/P CABG x 3  S/P hip replacement, left  Rheumatoid arthritis  Osteoarthritis  COPD (chronic obstructive pulmonary disease)  HTN (hypertension)  S/P lumbar fusion  History of femur fracture  History of appendectomy  History of right shoulder replacement  History of total left hip arthroplasty      For details regarding the patient's social history, family history, and other miscellaneous elements, please refer the initial infectious diseases consultation and/or the admitting history and physical examination for this admission.    Allergies    Ceftin (Pruritus)    Intolerances        Medications--  Antibiotics:    Immunologic:    Other:  acetaminophen   Tablet .. PRN  ALBUTerol    0.083% PRN  ALPRAZolam PRN  aspirin enteric coated  atorvastatin  calcium carbonate 1250 mG  + Vitamin D (OsCal 500 + D)  cholecalciferol  cyclobenzaprine PRN  famotidine    Tablet  ferrous    sulfate  finasteride  FLUoxetine  furosemide    Tablet  lisinopril  methadone    Tablet  methylPREDNISolone  polyethylene glycol 3350  tiotropium 18 MICROgram(s) Capsule      Review of Systems--  A 10-point review of systems was obtained.     Pertinent positives and negatives--  Constitutional: No fevers. No Chills. No Rigors.   Cardiovascular: No chest pain. No palpitations.  Respiratory: No shortness of breath. No cough.  Gastrointestinal: No nausea or vomiting. No diarrhea or constipation.   Psychiatric: Pleasant. Appropriate affect.    Review of systems otherwise negative except as previously noted.    Physical Examination--  Vital Signs: T(F): 97.7 (12-03-18 @ 16:27), Max: 99.4 (12-03-18 @ 10:37)  HR: 78 (12-03-18 @ 16:27)  BP: 111/77 (12-03-18 @ 16:27)  RR: 18 (12-03-18 @ 16:27)  SpO2: 97% (12-03-18 @ 16:27)  Wt(kg): --  General: Nontoxic-appearing Male in no acute distress.  HEENT: AT/NC. PERRL. EOMI. Anicteric. Conjunctiva pink and moist. Oropharynx clear. Dentition fair.  Neck: Not rigid. No sense of mass.  Nodes: None palpable.  Lungs: Clear bilaterally without rales, wheezing or rhonchi  Heart: Regular rate and rhythm. No Murmur. No rub. No gallop. No palpable thrill.  Abdomen: Bowel sounds present and normoactive. Soft. Nondistended. Nontender. No sense of mass. No organomegaly.  Back: No spinal tenderness. No costovertebral angle tenderness.   Extremities: No cyanosis or clubbing. No edema.   Skin: Warm. Dry. Good turgor. No rash. No vasculitic stigmata.  Psychiatric: Appropriate affect and mood for situation.         Laboratory Studies--  CBC                        10.0   9.93  )-----------( 286      ( 03 Dec 2018 12:17 )             31.0       Chemistries  12-03    134<L>  |  96  |  12  ----------------------------<  96  4.1   |  30  |  0.73    Ca    8.5      03 Dec 2018 12:11    TPro  6.3  /  Alb  2.6<L>  /  TBili  1.1  /  DBili  x   /  AST  21  /  ALT  30  /  AlkPhos  143<H>  12-03      Culture Data

## 2018-12-03 NOTE — H&P ADULT - PROBLEM SELECTOR PLAN 8
- CABG 2008, per chart review also stents placed  - c/w ASA 81 - CABG 2008, per chart review also stents placed  - c/w ASA 81  - c/w statin

## 2018-12-03 NOTE — H&P ADULT - PROBLEM SELECTOR PLAN 6
- per patient, well controlled  - stopped BP meds at [facility] for hypotension per patient's daughter  - at home on lisinopril 20 mg daily

## 2018-12-03 NOTE — H&P ADULT - NSHPREVIEWOFSYSTEMS_GEN_ALL_CORE
Constitutional: +diaphoresis, denies fever, chills   HEENT: denies change in vision or hearing   Respiratory: +chronic nonproductive cough, denies SOB, KENNEY,  sputum production, wheezing, hemoptysis  Cardiovascular: denies chest pain, palpitations, edema  Gastrointestinal: denies nausea, vomiting, diarrhea, constipation, abdominal pain, melena, hematochezia   Genitourinary: denies dysuria, frequency, urgency, hematuria   Skin/Breast: denies rash, itching  Musculoskeletal: + L hip redness, swelling, pain denies muscle weakness  Neurologic: + bilateral paresthesias, chronic, denies headache, weakness, dizziness, paresthesias, numbness/tingling  Hematology/Oncology: +diffuse bruising, chronic and attributes to steroid use, denies lymph node swelling   ROS negative except as noted above Constitutional: +diaphoresis, denies fever, chills   HEENT: denies change in vision or hearing   Respiratory: +chronic nonproductive cough, denies SOB, KENNEY,  sputum production, wheezing, hemoptysis  Cardiovascular: denies chest pain, palpitations, edema  Gastrointestinal: denies nausea, vomiting, diarrhea, constipation, abdominal pain, melena, hematochezia   Genitourinary: denies dysuria, frequency, urgency, hematuria   Skin/Breast: denies rash, itching  Musculoskeletal: + L hip redness, swelling, pain denies muscle weakness  Neurologic: + bilateral paresthesias, chronic, denies headache, weakness, dizziness, paresthesias, numbness/tingling  Hematology/Oncology: +diffuse bruising, chronic and attributes to steroid use, denies lymph node swelling   Psych: denies depression or anxiety

## 2018-12-04 LAB
ALBUMIN SERPL ELPH-MCNC: 2 G/DL — LOW (ref 3.3–5)
ALP SERPL-CCNC: 119 U/L — SIGNIFICANT CHANGE UP (ref 40–120)
ALT FLD-CCNC: 23 U/L — SIGNIFICANT CHANGE UP (ref 12–78)
ANION GAP SERPL CALC-SCNC: 10 MMOL/L — SIGNIFICANT CHANGE UP (ref 5–17)
ANION GAP SERPL CALC-SCNC: 9 MMOL/L — SIGNIFICANT CHANGE UP (ref 5–17)
APTT BLD: 29.9 SEC — SIGNIFICANT CHANGE UP (ref 27.5–36.3)
AST SERPL-CCNC: 17 U/L — SIGNIFICANT CHANGE UP (ref 15–37)
BILIRUB SERPL-MCNC: 0.7 MG/DL — SIGNIFICANT CHANGE UP (ref 0.2–1.2)
BUN SERPL-MCNC: 11 MG/DL — SIGNIFICANT CHANGE UP (ref 7–23)
BUN SERPL-MCNC: 11 MG/DL — SIGNIFICANT CHANGE UP (ref 7–23)
CALCIUM SERPL-MCNC: 8.3 MG/DL — LOW (ref 8.5–10.1)
CALCIUM SERPL-MCNC: 8.4 MG/DL — LOW (ref 8.5–10.1)
CHLORIDE SERPL-SCNC: 100 MMOL/L — SIGNIFICANT CHANGE UP (ref 96–108)
CHLORIDE SERPL-SCNC: 100 MMOL/L — SIGNIFICANT CHANGE UP (ref 96–108)
CO2 SERPL-SCNC: 27 MMOL/L — SIGNIFICANT CHANGE UP (ref 22–31)
CO2 SERPL-SCNC: 28 MMOL/L — SIGNIFICANT CHANGE UP (ref 22–31)
CREAT SERPL-MCNC: 0.54 MG/DL — SIGNIFICANT CHANGE UP (ref 0.5–1.3)
CREAT SERPL-MCNC: 0.58 MG/DL — SIGNIFICANT CHANGE UP (ref 0.5–1.3)
GLUCOSE SERPL-MCNC: 70 MG/DL — SIGNIFICANT CHANGE UP (ref 70–99)
GLUCOSE SERPL-MCNC: 71 MG/DL — SIGNIFICANT CHANGE UP (ref 70–99)
GRAM STN FLD: SIGNIFICANT CHANGE UP
HCT VFR BLD CALC: 28 % — LOW (ref 39–50)
HGB BLD-MCNC: 9.1 G/DL — LOW (ref 13–17)
INR BLD: 1.35 RATIO — HIGH (ref 0.88–1.16)
MCHC RBC-ENTMCNC: 31.1 PG — SIGNIFICANT CHANGE UP (ref 27–34)
MCHC RBC-ENTMCNC: 32.5 GM/DL — SIGNIFICANT CHANGE UP (ref 32–36)
MCV RBC AUTO: 95.6 FL — SIGNIFICANT CHANGE UP (ref 80–100)
NRBC # BLD: 0 /100 WBCS — SIGNIFICANT CHANGE UP (ref 0–0)
PLATELET # BLD AUTO: 279 K/UL — SIGNIFICANT CHANGE UP (ref 150–400)
POTASSIUM SERPL-MCNC: 3.7 MMOL/L — SIGNIFICANT CHANGE UP (ref 3.5–5.3)
POTASSIUM SERPL-MCNC: 3.7 MMOL/L — SIGNIFICANT CHANGE UP (ref 3.5–5.3)
POTASSIUM SERPL-SCNC: 3.7 MMOL/L — SIGNIFICANT CHANGE UP (ref 3.5–5.3)
POTASSIUM SERPL-SCNC: 3.7 MMOL/L — SIGNIFICANT CHANGE UP (ref 3.5–5.3)
PROT SERPL-MCNC: 5.6 G/DL — LOW (ref 6–8.3)
PROTHROM AB SERPL-ACNC: 15.4 SEC — HIGH (ref 10–12.9)
RBC # BLD: 2.93 M/UL — LOW (ref 4.2–5.8)
RBC # FLD: 15.4 % — HIGH (ref 10.3–14.5)
SODIUM SERPL-SCNC: 137 MMOL/L — SIGNIFICANT CHANGE UP (ref 135–145)
SODIUM SERPL-SCNC: 137 MMOL/L — SIGNIFICANT CHANGE UP (ref 135–145)
SPECIMEN SOURCE: SIGNIFICANT CHANGE UP
WBC # BLD: 6.92 K/UL — SIGNIFICANT CHANGE UP (ref 3.8–10.5)
WBC # FLD AUTO: 6.92 K/UL — SIGNIFICANT CHANGE UP (ref 3.8–10.5)

## 2018-12-04 PROCEDURE — 93010 ELECTROCARDIOGRAM REPORT: CPT

## 2018-12-04 PROCEDURE — 99233 SBSQ HOSP IP/OBS HIGH 50: CPT | Mod: GC

## 2018-12-04 PROCEDURE — 76942 ECHO GUIDE FOR BIOPSY: CPT | Mod: 26

## 2018-12-04 PROCEDURE — 10160 PNXR ASPIR ABSC HMTMA BULLA: CPT

## 2018-12-04 RX ORDER — HYDROCORTISONE 1 %
1 OINTMENT (GRAM) TOPICAL
Qty: 0 | Refills: 0 | Status: DISCONTINUED | OUTPATIENT
Start: 2018-12-04 | End: 2018-12-05

## 2018-12-04 RX ORDER — ENOXAPARIN SODIUM 100 MG/ML
40 INJECTION SUBCUTANEOUS DAILY
Qty: 0 | Refills: 0 | Status: DISCONTINUED | OUTPATIENT
Start: 2018-12-04 | End: 2018-12-04

## 2018-12-04 RX ORDER — VANCOMYCIN HCL 1 G
1250 VIAL (EA) INTRAVENOUS EVERY 12 HOURS
Qty: 0 | Refills: 0 | Status: DISCONTINUED | OUTPATIENT
Start: 2018-12-05 | End: 2018-12-05

## 2018-12-04 RX ORDER — VANCOMYCIN HCL 1 G
1250 VIAL (EA) INTRAVENOUS ONCE
Qty: 0 | Refills: 0 | Status: COMPLETED | OUTPATIENT
Start: 2018-12-04 | End: 2018-12-04

## 2018-12-04 RX ORDER — SODIUM CHLORIDE 9 MG/ML
1000 INJECTION, SOLUTION INTRAVENOUS
Qty: 0 | Refills: 0 | Status: DISCONTINUED | OUTPATIENT
Start: 2018-12-04 | End: 2018-12-06

## 2018-12-04 RX ORDER — MEROPENEM 1 G/30ML
2000 INJECTION INTRAVENOUS EVERY 8 HOURS
Qty: 0 | Refills: 0 | Status: DISCONTINUED | OUTPATIENT
Start: 2018-12-04 | End: 2018-12-05

## 2018-12-04 RX ORDER — VANCOMYCIN HCL 1 G
VIAL (EA) INTRAVENOUS
Qty: 0 | Refills: 0 | Status: DISCONTINUED | OUTPATIENT
Start: 2018-12-04 | End: 2018-12-04

## 2018-12-04 RX ORDER — MEROPENEM 1 G/30ML
INJECTION INTRAVENOUS
Qty: 0 | Refills: 0 | Status: DISCONTINUED | OUTPATIENT
Start: 2018-12-04 | End: 2018-12-05

## 2018-12-04 RX ORDER — MEROPENEM 1 G/30ML
2000 INJECTION INTRAVENOUS ONCE
Qty: 0 | Refills: 0 | Status: COMPLETED | OUTPATIENT
Start: 2018-12-04 | End: 2018-12-04

## 2018-12-04 RX ADMIN — Medication 4 MILLIGRAM(S): at 06:44

## 2018-12-04 RX ADMIN — Medication 1 TABLET(S): at 13:16

## 2018-12-04 RX ADMIN — TIOTROPIUM BROMIDE 1 CAPSULE(S): 18 CAPSULE ORAL; RESPIRATORY (INHALATION) at 08:51

## 2018-12-04 RX ADMIN — Medication 20 MILLIGRAM(S): at 05:08

## 2018-12-04 RX ADMIN — METHADONE HYDROCHLORIDE 5 MILLIGRAM(S): 40 TABLET ORAL at 13:16

## 2018-12-04 RX ADMIN — METHADONE HYDROCHLORIDE 5 MILLIGRAM(S): 40 TABLET ORAL at 06:44

## 2018-12-04 RX ADMIN — FAMOTIDINE 20 MILLIGRAM(S): 10 INJECTION INTRAVENOUS at 05:08

## 2018-12-04 RX ADMIN — Medication 20 MILLIGRAM(S): at 06:44

## 2018-12-04 RX ADMIN — METHADONE HYDROCHLORIDE 5 MILLIGRAM(S): 40 TABLET ORAL at 22:34

## 2018-12-04 RX ADMIN — Medication 20 MILLIGRAM(S): at 16:46

## 2018-12-04 RX ADMIN — Medication 325 MILLIGRAM(S): at 13:16

## 2018-12-04 RX ADMIN — FINASTERIDE 5 MILLIGRAM(S): 5 TABLET, FILM COATED ORAL at 13:16

## 2018-12-04 RX ADMIN — POLYETHYLENE GLYCOL 3350 17 GRAM(S): 17 POWDER, FOR SOLUTION ORAL at 13:16

## 2018-12-04 RX ADMIN — Medication 1 APPLICATION(S): at 17:40

## 2018-12-04 RX ADMIN — ATORVASTATIN CALCIUM 10 MILLIGRAM(S): 80 TABLET, FILM COATED ORAL at 22:34

## 2018-12-04 RX ADMIN — FAMOTIDINE 20 MILLIGRAM(S): 10 INJECTION INTRAVENOUS at 17:41

## 2018-12-04 RX ADMIN — Medication 1000 UNIT(S): at 13:17

## 2018-12-04 RX ADMIN — Medication 166.67 MILLIGRAM(S): at 17:40

## 2018-12-04 RX ADMIN — MEROPENEM 200 MILLIGRAM(S): 1 INJECTION INTRAVENOUS at 19:56

## 2018-12-04 NOTE — PROGRESS NOTE ADULT - PROBLEM SELECTOR PLAN 4
- diagnosed "a few years ago", patient had 45 pack year hx now quit after diagnosis   - no oxygen at home, inhalers only prn, rarely uses and patient does not remember which inhaler  - CXR revealed lung opacity new from previous admission this month   - CT chest: . Area of subsegmental linear atelectasis within the left upper lobe,   corresponding to the previously noted x-ray abnormality.    2. Mild scattered peripheral reticular opacities within upper lobe   predominance which may reflect mild fibrosis.  - Dr. Valente consulted, appreciate recs cont use of I aundrea cont NEBS prn for copd. will follow

## 2018-12-04 NOTE — PROGRESS NOTE ADULT - PROBLEM SELECTOR PLAN 4
INR 1.35  continue to monitor INR INR 1.35  continue to monitor INR  will discuss need for DVT prophylaxis with ortho - no DVT prophylaxis at the moment due to possible plans to bring the patient to the OR

## 2018-12-04 NOTE — PROGRESS NOTE ADULT - SUBJECTIVE AND OBJECTIVE BOX
Patient is a 73y old  Male who presents with a chief complaint of Redness and swelling around previous L hip surgical site (04 Dec 2018 10:39)      FROM ADMISSION H+P:   HPI:  71 yo M PMHx 45 pack year smoking hx and COPD (diagnosed "a few years ago, quit smoking then), HTN, HLD, hx of MI and CABG (2008) (stents also placed per chart review), RA (on Medrol daily), hx of multiple L hip surgeries (hip replacement 30 years ago in [place], ORIF recently 11/2018 w/ Dr. Ramirez) p/w L pain, drainage, redness and swelling surrounding upper L hip surgical site beginning 6 days ago.     Patient first noted some pain and redness 6 days ago while in [facility] ([facility] Patient was prescribed Ceftin w/ symptom progression from pain and erythema to swelling, erythema and purulent drainage.     Patient denied fever, chills, or other signs of infection including nausea, vomiting, productive cough, change in urination or bowel habits. Patient also denied sick contacts, recent falls.     Of note patient endorsed previous surgical site infection on his back after lumbar fusion for spinal stenosis treated with IV antibiotics, which progressed to sepsis with "Strep" isolated 6 years ago, per patient's wife. Also of note, patient had temperatures ranging from T  at [facility] per patient's daughter.     Also of note, patient now endorses diffuse itching and mild diaphoresis starting after antibiotics in ED, denied difficulty swallowing or breathing, lip swelling or dizziness.       In the ED, vitals T 99.4, HR 82, /66, RR 18, Sp02 93% ORA. Patient received vancomycin and aztreonam, and 1 L NS, morphine 4 mg and acetaminophen 650 mg. Ortho consulted, and L pelvis/hip/femur xrays revealed intact L hip hardware, lumbar fusion hardware and diffuse soft tissue swelling. CT results pending.   No Fhx of RA, hip infection (03 Dec 2018 14:32)      ----  INTERVAL HPI/OVERNIGHT EVENTS: Pt seen and evaluated at the bedside. No acute overnight events noted. He reports he is in severe pain of L hip (at times 10/10), also reporting pain of his b/l MCPs, PCPs reporting "I have history of RA." Feels warm overall. Denies any other acute focal complaints. No CP, SOB, n/v, chills.     ----  PAST MEDICAL & SURGICAL HISTORY:  S/P CABG x 3  S/P hip replacement, left  Rheumatoid arthritis  Osteoarthritis  COPD (chronic obstructive pulmonary disease)  HTN (hypertension)  S/P lumbar fusion: Approx 2012  History of femur fracture: Repaired 11/19- L hip  History of appendectomy  History of right shoulder replacement  History of total left hip arthroplasty      FAMILY HISTORY:  No pertinent family history in first degree relatives      ----  MEDICATIONS  (STANDING):  atorvastatin 10 milliGRAM(s) Oral at bedtime  calcium carbonate 1250 mG  + Vitamin D (OsCal 500 + D) 1 Tablet(s) Oral daily  cholecalciferol 1000 Unit(s) Oral daily  famotidine    Tablet 20 milliGRAM(s) Oral two times a day  ferrous    sulfate 325 milliGRAM(s) Oral daily  finasteride 5 milliGRAM(s) Oral daily  FLUoxetine 20 milliGRAM(s) Oral <User Schedule>  furosemide    Tablet 20 milliGRAM(s) Oral daily  lactated ringers. 1000 milliLiter(s) (50 mL/Hr) IV Continuous <Continuous>  lisinopril 20 milliGRAM(s) Oral daily  methadone    Tablet 5 milliGRAM(s) Oral every 8 hours  methylPREDNISolone 4 milliGRAM(s) Oral daily  polyethylene glycol 3350 17 Gram(s) Oral daily  tiotropium 18 MICROgram(s) Capsule 1 Capsule(s) Inhalation daily    MEDICATIONS  (PRN):  acetaminophen   Tablet .. 650 milliGRAM(s) Oral every 6 hours PRN Temp greater or equal to 38C (100.4F), Mild Pain (1 - 3)  ALBUTerol    0.083% 2.5 milliGRAM(s) Nebulizer every 6 hours PRN Shortness of Breath and/or Wheezing  ALPRAZolam 0.5 milliGRAM(s) Oral at bedtime PRN anxiety  cyclobenzaprine 10 milliGRAM(s) Oral two times a day PRN Muscle Spasm      ----  REVIEW OF SYSTEMS:  CONSTITUTIONAL: + subjective fever, +sweatiness. NO chills, fatigue, weakness  HEENT: denies blurred vision, sore throat  SKIN: + L hip rash extending to ankle, unchanged from yesterday. NO new lesions, rash  CARDIOVASCULAR: denies chest pain, chest pressure, palpitations  RESPIRATORY: denies shortness of breath, sputum production  GASTROINTESTINAL: denies nausea, vomiting, diarrhea, abdominal pain  GENITOURINARY: denies dysuria, discharge  NEUROLOGICAL: denies numbness, headache, focal weakness  MUSCULOSKELETAL: denies new joint pain, muscle aches  HEMATOLOGIC: + L hip hematoma tracing to ankle. No gross bleeding  LYMPHATICS: denies enlarged lymph nodes, extremity swelling  PSYCHIATRIC: denies recent changes in anxiety, depression  ENDOCRINOLOGIC: denies sweating, cold or heat intolerance    ----  PHYSICAL EXAM:  GENERAL: patient appears well, no acute distress, appropriate, pleasant  EYES: sclera clear, no exudates  ENMT: oropharynx clear without erythema, no exudates, moist mucous membranes  NECK: supple, soft, no thyromegaly noted  LUNGS: good air entry bilaterally, clear to auscultation, symmetric breath sounds, no wheezing or rhonchi appreciated  HEART: soft S1/S2, regular rate and rhythm, no murmurs noted, +1 pitting edema b/l   GASTROINTESTINAL: abdomen is soft, nontender, nondistended, normoactive bowel sounds, no palpable masses  INTEGUMENT: good skin turgor, L hip erythema extending to thigh, dressing intact, staples intact. no evidence of drainage. +evidence of erythema/ecchymosis tracing to L ankle  MUSCULOSKELETAL: no clubbing or cyanosis, no obvious deformity  NEUROLOGIC: awake, alert, oriented x3, good muscle tone in 4 extremities, no obvious sensory deficits  PSYCHIATRIC: mood is good, affect is congruent, linear and logical thought process  HEME/LYMPH: no palpable supraclavicular nodules, no obvious ecchymosis or petechiae     T(C): 36.7 (12-04-18 @ 10:33), Max: 36.7 (12-03-18 @ 12:00)  HR: 76 (12-04-18 @ 10:33) (62 - 86)  BP: 118/69 (12-04-18 @ 10:33) (105/62 - 146/83)  RR: 18 (12-04-18 @ 10:33) (17 - 19)  SpO2: 95% (12-04-18 @ 10:33) (95% - 97%)  Wt(kg): --    ----  I&O's Summary    03 Dec 2018 07:01  -  04 Dec 2018 07:00  --------------------------------------------------------  IN: 350 mL / OUT: 200 mL / NET: 150 mL        LABS:                        9.1    6.92  )-----------( 279      ( 04 Dec 2018 04:55 )             28.0     12-04    137  |  100  |  11  ----------------------------<  70  3.7   |  27  |  0.58    Ca    8.3<L>      04 Dec 2018 04:56    TPro  5.6<L>  /  Alb  2.0<L>  /  TBili  0.7  /  DBili  x   /  AST  17  /  ALT  23  /  AlkPhos  119  12-04    PT/INR - ( 04 Dec 2018 04:55 )   PT: 15.4 sec;   INR: 1.35 ratio         PTT - ( 04 Dec 2018 04:55 )  PTT:29.9 sec    CAPILLARY BLOOD GLUCOSE                    ----  Personally reviewed:  Vital sign trends: [x  ] yes    [  ] no     [  ] n/a  Laboratory results: [x  ] yes    [  ] no     [  ] n/a  Radiology results: [x  ] yes    [  ] no     [  ] n/a  Culture results: [x  ] yes    [  ] no     [  ] n/a  Consultant recommendations: [ x ] yes    [  ] no     [  ] n/a

## 2018-12-04 NOTE — PROGRESS NOTE ADULT - ASSESSMENT
72 yo male with possible Post Sx hematoma vs Surgical Site infection  Drainage from wound overnight, no signs of drainage on new dressing  Plan for IR aspiration today of the Sub Q fluid for analysis  NPO  IV Fluids while NPO  HOld DVT ppx  will apply new dressing after IR  Agree with ID plan to hold abx for now as pt could have hematoma vs seroma post surgically  Pain control  50% PWB, will D/w Dr Ramirez when to advance  Continue Medical treatment  Will D/w attending and advise if plan changes

## 2018-12-04 NOTE — PROGRESS NOTE ADULT - SUBJECTIVE AND OBJECTIVE BOX
[facility], Division of Infectious Diseases  GAETANO Briones A. Lee  [phone]    Name: ERIKA TORREZ  Age: 73y  Gender: Male  MRN: [id number]    Interval History--  Notes reviewed. Feels ok. Still no respiratory symptoms.  Patient s/p IR aspiration. D/W IR RN, 4ml of serosanguineous fluid aspirated. No malodor. No pus.  CT chest reviewed, no acute changes.    Past Medical History--  S/P CABG x 3  S/P hip replacement, left  Rheumatoid arthritis  Osteoarthritis  COPD (chronic obstructive pulmonary disease)  HTN (hypertension)  S/P lumbar fusion  History of femur fracture  History of appendectomy  History of right shoulder replacement  History of total left hip arthroplasty      For details regarding the patient's social history, family history, and other miscellaneous elements, please refer the initial infectious diseases consultation and/or the admitting history and physical examination for this admission.    Allergies    Ceftin (Pruritus)    Intolerances        Medications--  Antibiotics:    Immunologic:    Other:  acetaminophen   Tablet .. PRN  ALBUTerol    0.083% PRN  ALPRAZolam PRN  atorvastatin  calcium carbonate 1250 mG  + Vitamin D (OsCal 500 + D)  cholecalciferol  cyclobenzaprine PRN  famotidine    Tablet  ferrous    sulfate  finasteride  FLUoxetine  furosemide    Tablet  lactated ringers.  lisinopril  methadone    Tablet  methylPREDNISolone  polyethylene glycol 3350  tiotropium 18 MICROgram(s) Capsule      Review of Systems--  A 10-point review of systems was obtained.   Review of systems otherwise unchanged compared to prior visit except as previously noted.    Physical Examination--  Vital Signs: T(F): 98 (12-04-18 @ 10:33), Max: 98.1 (12-04-18 @ 04:57)  HR: 76 (12-04-18 @ 10:33)  BP: 118/69 (12-04-18 @ 10:33)  RR: 18 (12-04-18 @ 10:33)  SpO2: 95% (12-04-18 @ 10:33)  Wt(kg): --  General: Nontoxic-appearing Male in no acute distress.  HEENT: AT/NC. Anicteric. Conjunctiva pink and moist. Oropharynx clear.   Neck: Not rigid. No sense of mass.  Nodes: None palpable.  Lungs: Diminished breath sounds bilaterally without rales, wheezing or rhonchi  Heart: Regular rate and rhythm. No Murmur. No rub. No gallop. No palpable thrill.  Abdomen: Bowel sounds present and normoactive. Soft. Nondistended. Nontender. No sense of mass. No organomegaly. Distended. Obese  Extremities: No cyanosis or clubbing. L thigh erythema/calor/edema certainly no worse, perhaps a little bit better. Still with evidence of deep blood tracking to ankle.   Skin: Warm. Dry. Good turgor. No rash. No vasculitic stigmata.  Psychiatric: Appropriate affect and mood for situation.       Laboratory Studies--  CBC                        9.1    6.92  )-----------( 279      ( 04 Dec 2018 04:55 )             28.0       Chemistries  12-04    137  |  100  |  11  ----------------------------<  70  3.7   |  27  |  0.58    Ca    8.3<L>      04 Dec 2018 04:56    TPro  5.6<L>  /  Alb  2.0<L>  /  TBili  0.7  /  DBili  x   /  AST  17  /  ALT  23  /  AlkPhos  119  12-04    CT Chest (personally reviewed) < from: CT Chest No Cont (12.03.18 @ 19:30) >    1. Area of subsegmental linear atelectasis within the left upper lobe,   corresponding to the previously noted x-ray abnormality.    2. Mild scattered peripheral reticular opacities within upper lobe   predominance which may reflect mild fibrosis.    3. Probable left lateral lower chest wall bruising. Correlate with direct   physical examination.    4. Nonobstructing bilateral intrarenal stones.    < end of copied text >      Culture Data  None as of yet

## 2018-12-04 NOTE — PROGRESS NOTE ADULT - PROBLEM SELECTOR PLAN 1
Surgical site infection vs infected hematoma  ID and ortho following - will discuss with ortho plans for OR  pt received vancomycin and azetreoam in the ED  f/u results of IR procedure  f/u culture results Surgical site infection vs infected hematoma  ID and ortho following - will discuss with ortho to see if they plan to take the patient to the OR  pt received vancomycin and aztreonam in the ED  f/u results of IR procedure  f/u culture results  patient currently NPO - possible plans for OR, continue IVF while patient NPO

## 2018-12-04 NOTE — PROGRESS NOTE ADULT - PROBLEM SELECTOR PLAN 1
-Patient w/ purulent drainage, swelling and erythema surrounding surgical site concerning for surgical site infection w/ xrays revealing soft tissue swelling  -CT reviewed: Interval internal fixation left femur.  Localized deep subcutaneous fluid collection containing air at the level   of the proximal femoral component; recommend further clinical correlation   for infection/abscess or infected hematoma.  - Deeper joint infection on differential, though less likely. Patient has had previous surgical site infection in back after lumbar fusion which progressed to sepsis, tx'ed with IV abx in [place]. possible patient has some propensity for SSIs, perhaps due to immunocompromised state on chronic steroids (Medrol daily) for RA  -CT femur: Interval internal fixation left femur.  Localized deep subcutaneous fluid collection containing air at the level   of the proximal femoral component; recommend further clinical correlation   for infection/abscess or infected hematoma.  - s/p vanc and aztreonam in ED  -ID Dr. Childs c/s appreciated, hold abx at this time. If hardware is infected, he will not be cured with antibiotics. Would need definitive drainage of hematoma and removal of hardware to cure him.  - if abx are indicated, coverage for MRSA (recent hospitalization and rehab)   - trend fever curve  - f/ bcx and wound culture from IR drainage  -ortho reccs noted:IV Fluids while NPO  HOld DVT ppx. will apply new dressing after IR. pt could have hematoma vs seroma post surgically Pain control. 50% PWB,

## 2018-12-04 NOTE — PROGRESS NOTE ADULT - ASSESSMENT
Mr. Stacy is a 73 y.o. male with a past medical history of COPD (45 pack year history), HTN, HLD, hx of MI s/p CABG in 2008 and cardiac stents, RA (on daily medrol), and hx of multiple left hip surgeries (replacement 30 years ago, and most recently ORIF in 11/2018). He presented to the hospital with a chief complaint of redness and swelling around the previous left hip surgical site that started 6 days ago. As an outpatient the patient was started on Ceftin, but experienced progression of his symptoms.

## 2018-12-04 NOTE — PROGRESS NOTE ADULT - PROBLEM SELECTOR PLAN 3
- patient with INR 1.38 on admission, today 1.35 though no documented afib, not on chronic a/c per med rec   - possible increased INR due to malnutrition (pt with low albumin 2.6)  - S/p CT guided drainage of L hip today, 4 ml turbid serosanguinous fluid removed  - resume a/c as soon as ?ortho procedure complete. see above re ortho reccs

## 2018-12-04 NOTE — PROGRESS NOTE ADULT - SUBJECTIVE AND OBJECTIVE BOX
Date/Time Patient Seen:  		  Referring MD:   Data Reviewed	       Patient is a 73y old  Male who presents with a chief complaint of Redness and swelling around previous L hip surgical site (04 Dec 2018 06:31)    in bed  seen and examined  vs and meds reviewed  overnight events noted  on IVF  on LASIX      Subjective/HPI     PAST MEDICAL & SURGICAL HISTORY:  S/P CABG x 3  S/P hip replacement, left  Rheumatoid arthritis  Osteoarthritis  COPD (chronic obstructive pulmonary disease)  HTN (hypertension)  S/P lumbar fusion: Approx 2012  History of femur fracture: Repaired 11/19- L hip  History of appendectomy  History of right shoulder replacement  History of total left hip arthroplasty        Medication list         MEDICATIONS  (STANDING):  atorvastatin 10 milliGRAM(s) Oral at bedtime  calcium carbonate 1250 mG  + Vitamin D (OsCal 500 + D) 1 Tablet(s) Oral daily  cholecalciferol 1000 Unit(s) Oral daily  famotidine    Tablet 20 milliGRAM(s) Oral two times a day  ferrous    sulfate 325 milliGRAM(s) Oral daily  finasteride 5 milliGRAM(s) Oral daily  FLUoxetine 20 milliGRAM(s) Oral <User Schedule>  furosemide    Tablet 20 milliGRAM(s) Oral daily  lactated ringers. 1000 milliLiter(s) (50 mL/Hr) IV Continuous <Continuous>  lisinopril 20 milliGRAM(s) Oral daily  methadone    Tablet 5 milliGRAM(s) Oral every 8 hours  methylPREDNISolone 4 milliGRAM(s) Oral daily  polyethylene glycol 3350 17 Gram(s) Oral daily  tiotropium 18 MICROgram(s) Capsule 1 Capsule(s) Inhalation daily    MEDICATIONS  (PRN):  acetaminophen   Tablet .. 650 milliGRAM(s) Oral every 6 hours PRN Temp greater or equal to 38C (100.4F), Mild Pain (1 - 3)  ALBUTerol    0.083% 2.5 milliGRAM(s) Nebulizer every 6 hours PRN Shortness of Breath and/or Wheezing  ALPRAZolam 0.5 milliGRAM(s) Oral at bedtime PRN anxiety  cyclobenzaprine 10 milliGRAM(s) Oral two times a day PRN Muscle Spasm         Vitals log        ICU Vital Signs Last 24 Hrs  T(C): 36.7 (04 Dec 2018 04:57), Max: 37.4 (03 Dec 2018 10:37)  T(F): 98.1 (04 Dec 2018 04:57), Max: 99.4 (03 Dec 2018 10:37)  HR: 77 (04 Dec 2018 04:57) (62 - 83)  BP: 105/62 (04 Dec 2018 04:57) (100/66 - 119/67)  BP(mean): --  ABP: --  ABP(mean): --  RR: 17 (04 Dec 2018 04:57) (17 - 18)  SpO2: 95% (04 Dec 2018 04:57) (93% - 97%)           Input and Output:  I&O's Detail    03 Dec 2018 07:01  -  04 Dec 2018 07:00  --------------------------------------------------------  IN:    lactated ringers.: 350 mL  Total IN: 350 mL    OUT:    Voided: 200 mL  Total OUT: 200 mL    Total NET: 150 mL          Lab Data                        9.1    6.92  )-----------( 279      ( 04 Dec 2018 04:55 )             28.0     12-04    137  |  100  |  11  ----------------------------<  70  3.7   |  27  |  0.58    Ca    8.3<L>      04 Dec 2018 04:56    TPro  5.6<L>  /  Alb  2.0<L>  /  TBili  0.7  /  DBili  x   /  AST  17  /  ALT  23  /  AlkPhos  119  12-04            Review of Systems	      Objective     Physical Examination    heart s1s2  lung dec BS  abd soft  obese  cn grossly int  on IVF      Pertinent Lab findings & Imaging      Weinstein:  NO   Adequate UO     I&O's Detail    03 Dec 2018 07:01  -  04 Dec 2018 07:00  --------------------------------------------------------  IN:    lactated ringers.: 350 mL  Total IN: 350 mL    OUT:    Voided: 200 mL  Total OUT: 200 mL    Total NET: 150 mL               Discussed with:     Cultures:	        Radiology

## 2018-12-04 NOTE — PROGRESS NOTE ADULT - PROBLEM SELECTOR PLAN 1
eval surgical site infection vs seroma vs hematoma, planned for OR this am  copd  cad  cabg hx  atelectasis  sherie - refuses CPAP  cont use of I aundrea  cont NEBS prn for copd  cvs regimen optimization  pt is on IVF at present and on LASIX, may be hold lasix while on IVF and NPO  will follow and monitor  pt is monitored off ABX at present  serial labs  serial PE  ct chest reviewed  will follow  discussed with pt at bedside

## 2018-12-04 NOTE — BRIEF OPERATIVE NOTE - PROCEDURE
<<-----Click on this checkbox to enter Procedure Aspiration of abscess  12/04/2018  left thigh  Active  WFORMAN

## 2018-12-04 NOTE — PROGRESS NOTE ADULT - SUBJECTIVE AND OBJECTIVE BOX
Patient is a 73y old  Male who presents with a chief complaint of Redness and swelling around previous L hip surgical site (04 Dec 2018 09:08)      FROM ADMISSION H+P:   HPI:  73 yo M PMHx 45 pack year smoking hx and COPD (diagnosed "a few years ago, quit smoking then), HTN, HLD, hx of MI and CABG (2008) (stents also placed per chart review), RA (on Medrol daily), hx of multiple L hip surgeries (hip replacement 30 years ago in [place], ORIF recently 11/2018 w/ Dr. Ramirez) p/w L pain, drainage, redness and swelling surrounding upper L hip surgical site beginning 6 days ago.     Patient first noted some pain and redness 6 days ago while in [facility] ([facility] Patient was prescribed Ceftin w/ symptom progression from pain and erythema to swelling, erythema and purulent drainage.     Patient denied fever, chills, or other signs of infection including nausea, vomiting, productive cough, change in urination or bowel habits. Patient also denied sick contacts, recent falls.     Of note patient endorsed previous surgical site infection on his back after lumbar fusion for spinal stenosis treated with IV antibiotics, which progressed to sepsis with "Strep" isolated 6 years ago, per patient's wife. Also of note, patient had temperatures ranging from T  at [facility] per patient's daughter.     Also of note, patient now endorses diffuse itching and mild diaphoresis starting after antibiotics in ED, denied difficulty swallowing or breathing, lip swelling or dizziness.       In the ED, vitals T 99.4, HR 82, /66, RR 18, Sp02 93% ORA. Patient received vancomycin and aztreonam, and 1 L NS, morphine 4 mg and acetaminophen 650 mg. Ortho consulted, and L pelvis/hip/femur xrays revealed intact L hip hardware, lumbar fusion hardware and diffuse soft tissue swelling. CT results pending.   No Fhx of RA, hip infection (03 Dec 2018 14:32)      ----  INTERVAL HPI/OVERNIGHT EVENTS: Pt seen and evaluated at the bedside. He reports no acute events overnight. He reports significant amount of nonradiating sharp stabbing pain 12/10 in severity in his left hip/leg. He denies fevers/chills/nausea/vomiting. He also complains of itching that started after receiving antibiotics yesterday in the ED.     ----  PAST MEDICAL & SURGICAL HISTORY:  S/P CABG x 3  S/P hip replacement, left  Rheumatoid arthritis  Osteoarthritis  COPD (chronic obstructive pulmonary disease)  HTN (hypertension)  S/P lumbar fusion: Approx 2012  History of femur fracture: Repaired 11/19- L hip  History of appendectomy  History of right shoulder replacement  History of total left hip arthroplasty      FAMILY HISTORY:  No pertinent family history in first degree relatives      ----  MEDICATIONS  (STANDING):  atorvastatin 10 milliGRAM(s) Oral at bedtime  calcium carbonate 1250 mG  + Vitamin D (OsCal 500 + D) 1 Tablet(s) Oral daily  cholecalciferol 1000 Unit(s) Oral daily  famotidine    Tablet 20 milliGRAM(s) Oral two times a day  ferrous    sulfate 325 milliGRAM(s) Oral daily  finasteride 5 milliGRAM(s) Oral daily  FLUoxetine 20 milliGRAM(s) Oral <User Schedule>  furosemide    Tablet 20 milliGRAM(s) Oral daily  lactated ringers. 1000 milliLiter(s) (50 mL/Hr) IV Continuous <Continuous>  lisinopril 20 milliGRAM(s) Oral daily  methadone    Tablet 5 milliGRAM(s) Oral every 8 hours  methylPREDNISolone 4 milliGRAM(s) Oral daily  polyethylene glycol 3350 17 Gram(s) Oral daily  tiotropium 18 MICROgram(s) Capsule 1 Capsule(s) Inhalation daily    MEDICATIONS  (PRN):  acetaminophen   Tablet .. 650 milliGRAM(s) Oral every 6 hours PRN Temp greater or equal to 38C (100.4F), Mild Pain (1 - 3)  ALBUTerol    0.083% 2.5 milliGRAM(s) Nebulizer every 6 hours PRN Shortness of Breath and/or Wheezing  ALPRAZolam 0.5 milliGRAM(s) Oral at bedtime PRN anxiety  cyclobenzaprine 10 milliGRAM(s) Oral two times a day PRN Muscle Spasm      ----  REVIEW OF SYSTEMS:  CONSTITUTIONAL: denies fever, chills, fatigue, weakness, reports feeling hot and sweaty  HEENT: denies blurred vision, sore throat  SKIN: denies new lesions, reports feeling itchy  CARDIOVASCULAR: denies chest pain, chest pressure, palpitations  RESPIRATORY: denies shortness of breath, reports hoarseness and cough that is minimally productive  GASTROINTESTINAL: denies nausea, vomiting, diarrhea, abdominal pain  GENITOURINARY: denies dysuria, discharge  NEUROLOGICAL: denies numbness, headache, focal weakness  MUSCULOSKELETAL: reports significant left hip/leg pain with associated swelling in the left ankle  HEMATOLOGIC: denies gross bleeding, bruising  LYMPHATICS: denies enlarged lymph nodes, extremity swelling  PSYCHIATRIC: denies recent changes in anxiety, depression  ENDOCRINOLOGIC: denies cold or heat intolerance reports sweating    ----  PHYSICAL EXAM:  GENERAL: patient appears well, no acute distress but uncomfortable at times, appropriate, pleasant  EYES: sclera clear, no exudates  NECK: supple, soft, no thyromegaly noted  LUNGS: good air entry bilaterally, clear to auscultation, symmetric breath sounds, diffuse expiratory wheezes appreciated  HEART: soft S1/S2, regular rate and rhythm, no murmurs noted, 2+ pitting edema in the left lower extremity, 2+ dorsalis pedis pulses bilaterally  GASTROINTESTINAL: abdomen is soft, nontender, nondistended, normoactive bowel sounds, no palpable masses  INTEGUMENT: good skin turgor, left hip incision intact held together by surgical staples - area around the incision is warm, erythematous, and edematous. Left leg is edematous to the ankle with 2+ pitting edema. scrotum also edematous  MUSCULOSKELETAL: no clubbing or cyanosis, no obvious deformity  NEUROLOGIC: awake, alert, oriented x3, good muscle tone in 4 extremities, no obvious sensory deficits  PSYCHIATRIC: mood is good, affect is congruent, linear and logical thought process  HEME/LYMPH: no palpable supraclavicular nodules, no obvious ecchymosis or petechiae     T(C): 36.7 (12-04-18 @ 10:33), Max: 36.7 (12-03-18 @ 12:00)  HR: 76 (12-04-18 @ 10:33) (62 - 86)  BP: 118/69 (12-04-18 @ 10:33) (105/62 - 146/83)  RR: 18 (12-04-18 @ 10:33) (17 - 19)  SpO2: 95% (12-04-18 @ 10:33) (95% - 97%)  Wt(kg): --    ----  I&O's Summary    03 Dec 2018 07:01  -  04 Dec 2018 07:00  --------------------------------------------------------  IN: 350 mL / OUT: 200 mL / NET: 150 mL        LABS:                        9.1    6.92  )-----------( 279      ( 04 Dec 2018 04:55 )             28.0     12-04    137  |  100  |  11  ----------------------------<  70  3.7   |  27  |  0.58    Ca    8.3<L>      04 Dec 2018 04:56    TPro  5.6<L>  /  Alb  2.0<L>  /  TBili  0.7  /  DBili  x   /  AST  17  /  ALT  23  /  AlkPhos  119  12-04    PT/INR - ( 04 Dec 2018 04:55 )   PT: 15.4 sec;   INR: 1.35 ratio         PTT - ( 04 Dec 2018 04:55 )  PTT:29.9 sec    CAPILLARY BLOOD GLUCOSE                    ----  Personally reviewed:  Vital sign trends: [ x ] yes    [  ] no     [  ] n/a  Laboratory results: [x  ] yes    [  ] no     [  ] n/a  Radiology results: [x  ] yes    [  ] no     [  ] n/a  Culture results: [x  ] yes    [  ] no     [  ] n/a  Consultant recommendations: [  x	] yes    [  ] no     [  ] n/a

## 2018-12-04 NOTE — CHART NOTE - NSCHARTNOTEFT_GEN_A_CORE
Update on plan:    Aspiration done by IR today.   Will FU on Cx results.   Will make NPO after midnight should surgical washout be necessary upon cx results.   Patient may eat today.   Will give Lovenox 40mg x 1 now then DC.   Attending Aware.

## 2018-12-04 NOTE — PROGRESS NOTE ADULT - PROBLEM SELECTOR PLAN 3
Hgb 9.1 today  Baseline __________ Hgb 9.1 today  Baseline 10.8  monitor H/H q24 hr  continue ferrous sulfate

## 2018-12-04 NOTE — PROGRESS NOTE ADULT - SUBJECTIVE AND OBJECTIVE BOX
Pt seen and examined. Pain controlled. No acute events overnight, mild confusion this AM but AAO x3 after talking with patient. Patient compressive dressing was saturated overnight and removed.     Vital Signs Last 24 Hrs  T(C): 36.7 (04 Dec 2018 04:57), Max: 37.4 (03 Dec 2018 10:37)  T(F): 98.1 (04 Dec 2018 04:57), Max: 99.4 (03 Dec 2018 10:37)  HR: 77 (04 Dec 2018 04:57) (62 - 83)  BP: 105/62 (04 Dec 2018 04:57) (100/66 - 119/67)  BP(mean): --  RR: 17 (04 Dec 2018 04:57) (17 - 18)  SpO2: 95% (04 Dec 2018 04:57) (93% - 97%)    Gen: NAD  LLE:  Dressing clean D&I, no drainage expressable from wound, erythema improved from demarcation, TTP around site  Distal staples removed, no erythema or drainage   +sensation L2-S1  +dorsiflexion/plantarflexion of ankle/hallux  +dorsalis pedis pulse  Soft compartments, - calf tenderness

## 2018-12-04 NOTE — PROGRESS NOTE ADULT - ASSESSMENT
Hematoma/seroma  R/O superinfection but all changes at present could be noninfectious in nature  S/P repair of periprosthetic femur fracture  If hardware is infected, he will not be cured with antibiotics. Would need definitive drainage of hematoma and removal of hardware to cure him. This would be very problematic in the setting of acute fracture.  No concern of pneumonia clinically  CT chest changes look chronic  I would continue to defer empiric antibiotics    Suggestions--  Await gram stain and culture of fluid  Serial exams of the area  Need for any additional studies TBD  D/W patient    Fabian Childs MD  [phone]

## 2018-12-04 NOTE — PROGRESS NOTE ADULT - ASSESSMENT
71 yo M PMHx 45 pack year smoking hx and COPD (diagnosed "a few years ago, quit smoking then), HTN, HLD, hx of MI and CABG (2008) (stents also placed per chart review), RA (on Medrol daily), hx of multiple L hip surgeries (hip replacement 30 years ago in [place], ORIF recently 11/2018 w/ Dr. Ramirez) p/w L pain, drainage, redness and swelling surrounding upper L hip surgical site beginning 6 days ago in [facility], s/p 3 days ceftin at [facility] with symptom progression (more erythema, swelling on L hip), admitted for surgical site infection r/o septic joint with CT revealing L hip fluid collection (infection vs hematoma).

## 2018-12-04 NOTE — PROGRESS NOTE ADULT - PROBLEM SELECTOR PLAN 2
pulmonology consulted  continue spiriva and NEBS PRN pulmonology consulted  continue spiriva and NEBS PRN  monitor off abx at this time  cardiovascular optimization - continue Lasix when patient off IVF and no longer NPO, continue home lisinopril, and ASA

## 2018-12-04 NOTE — PROGRESS NOTE ADULT - PROBLEM SELECTOR PLAN 6
- stable; controlled  - stopped BP meds at [facility] for hypotension per patient's daughter  - cont home lisinopril 20 mg daily with hold parameters

## 2018-12-05 LAB
-  AMPICILLIN/SULBACTAM: SIGNIFICANT CHANGE UP
-  CEFAZOLIN: SIGNIFICANT CHANGE UP
-  CLINDAMYCIN: SIGNIFICANT CHANGE UP
-  ERYTHROMYCIN: SIGNIFICANT CHANGE UP
-  GENTAMICIN: SIGNIFICANT CHANGE UP
-  OXACILLIN: SIGNIFICANT CHANGE UP
-  PENICILLIN: SIGNIFICANT CHANGE UP
-  RIFAMPIN: SIGNIFICANT CHANGE UP
-  TETRACYCLINE: SIGNIFICANT CHANGE UP
-  TRIMETHOPRIM/SULFAMETHOXAZOLE: SIGNIFICANT CHANGE UP
-  VANCOMYCIN: SIGNIFICANT CHANGE UP
ANION GAP SERPL CALC-SCNC: 7 MMOL/L — SIGNIFICANT CHANGE UP (ref 5–17)
APTT BLD: 29.9 SEC — SIGNIFICANT CHANGE UP (ref 28.5–37)
BUN SERPL-MCNC: 8 MG/DL — SIGNIFICANT CHANGE UP (ref 7–23)
CALCIUM SERPL-MCNC: 8.2 MG/DL — LOW (ref 8.5–10.1)
CHLORIDE SERPL-SCNC: 100 MMOL/L — SIGNIFICANT CHANGE UP (ref 96–108)
CO2 SERPL-SCNC: 31 MMOL/L — SIGNIFICANT CHANGE UP (ref 22–31)
CREAT SERPL-MCNC: 0.58 MG/DL — SIGNIFICANT CHANGE UP (ref 0.5–1.3)
GLUCOSE SERPL-MCNC: 92 MG/DL — SIGNIFICANT CHANGE UP (ref 70–99)
HCT VFR BLD CALC: 25.7 % — LOW (ref 39–50)
HGB BLD-MCNC: 8.3 G/DL — LOW (ref 13–17)
INR BLD: 1.27 RATIO — HIGH (ref 0.88–1.16)
MCHC RBC-ENTMCNC: 30.5 PG — SIGNIFICANT CHANGE UP (ref 27–34)
MCHC RBC-ENTMCNC: 32.3 GM/DL — SIGNIFICANT CHANGE UP (ref 32–36)
MCV RBC AUTO: 94.5 FL — SIGNIFICANT CHANGE UP (ref 80–100)
METHOD TYPE: SIGNIFICANT CHANGE UP
NRBC # BLD: 0 /100 WBCS — SIGNIFICANT CHANGE UP (ref 0–0)
PLATELET # BLD AUTO: 276 K/UL — SIGNIFICANT CHANGE UP (ref 150–400)
POTASSIUM SERPL-MCNC: 3.6 MMOL/L — SIGNIFICANT CHANGE UP (ref 3.5–5.3)
POTASSIUM SERPL-SCNC: 3.6 MMOL/L — SIGNIFICANT CHANGE UP (ref 3.5–5.3)
PROTHROM AB SERPL-ACNC: 14.6 SEC — HIGH (ref 10–12.9)
RBC # BLD: 2.72 M/UL — LOW (ref 4.2–5.8)
RBC # FLD: 15.3 % — HIGH (ref 10.3–14.5)
SODIUM SERPL-SCNC: 138 MMOL/L — SIGNIFICANT CHANGE UP (ref 135–145)
WBC # BLD: 7.23 K/UL — SIGNIFICANT CHANGE UP (ref 3.8–10.5)
WBC # FLD AUTO: 7.23 K/UL — SIGNIFICANT CHANGE UP (ref 3.8–10.5)

## 2018-12-05 PROCEDURE — 99222 1ST HOSP IP/OBS MODERATE 55: CPT

## 2018-12-05 PROCEDURE — 99233 SBSQ HOSP IP/OBS HIGH 50: CPT | Mod: GC

## 2018-12-05 RX ORDER — OXYCODONE HYDROCHLORIDE 5 MG/1
5 TABLET ORAL EVERY 4 HOURS
Qty: 0 | Refills: 0 | Status: DISCONTINUED | OUTPATIENT
Start: 2018-12-05 | End: 2018-12-05

## 2018-12-05 RX ORDER — HYDROCORTISONE 20 MG
25 TABLET ORAL EVERY 8 HOURS
Qty: 0 | Refills: 0 | Status: COMPLETED | OUTPATIENT
Start: 2018-12-05 | End: 2018-12-06

## 2018-12-05 RX ORDER — LISINOPRIL 2.5 MG/1
20 TABLET ORAL DAILY
Qty: 0 | Refills: 0 | Status: DISCONTINUED | OUTPATIENT
Start: 2018-12-05 | End: 2018-12-06

## 2018-12-05 RX ORDER — ASCORBIC ACID 60 MG
500 TABLET,CHEWABLE ORAL
Qty: 0 | Refills: 0 | Status: DISCONTINUED | OUTPATIENT
Start: 2018-12-05 | End: 2018-12-05

## 2018-12-05 RX ORDER — SODIUM CHLORIDE 9 MG/ML
1000 INJECTION, SOLUTION INTRAVENOUS
Qty: 0 | Refills: 0 | Status: DISCONTINUED | OUTPATIENT
Start: 2018-12-05 | End: 2018-12-05

## 2018-12-05 RX ORDER — HYDROMORPHONE HYDROCHLORIDE 2 MG/ML
1 INJECTION INTRAMUSCULAR; INTRAVENOUS; SUBCUTANEOUS EVERY 4 HOURS
Qty: 0 | Refills: 0 | Status: DISCONTINUED | OUTPATIENT
Start: 2018-12-05 | End: 2018-12-10

## 2018-12-05 RX ORDER — ONDANSETRON 8 MG/1
4 TABLET, FILM COATED ORAL ONCE
Qty: 0 | Refills: 0 | Status: DISCONTINUED | OUTPATIENT
Start: 2018-12-05 | End: 2018-12-05

## 2018-12-05 RX ORDER — DIPHENHYDRAMINE HCL 50 MG
25 CAPSULE ORAL AT BEDTIME
Qty: 0 | Refills: 0 | Status: DISCONTINUED | OUTPATIENT
Start: 2018-12-05 | End: 2018-12-05

## 2018-12-05 RX ORDER — FAMOTIDINE 10 MG/ML
20 INJECTION INTRAVENOUS
Qty: 0 | Refills: 0 | Status: DISCONTINUED | OUTPATIENT
Start: 2018-12-05 | End: 2018-12-10

## 2018-12-05 RX ORDER — OXYCODONE HYDROCHLORIDE 5 MG/1
10 TABLET ORAL EVERY 4 HOURS
Qty: 0 | Refills: 0 | Status: DISCONTINUED | OUTPATIENT
Start: 2018-12-05 | End: 2018-12-05

## 2018-12-05 RX ORDER — METHADONE HYDROCHLORIDE 40 MG/1
5 TABLET ORAL EVERY 8 HOURS
Qty: 0 | Refills: 0 | Status: DISCONTINUED | OUTPATIENT
Start: 2018-12-05 | End: 2018-12-10

## 2018-12-05 RX ORDER — DIPHENHYDRAMINE HCL 50 MG
25 CAPSULE ORAL AT BEDTIME
Qty: 0 | Refills: 0 | Status: DISCONTINUED | OUTPATIENT
Start: 2018-12-05 | End: 2018-12-07

## 2018-12-05 RX ORDER — MAGNESIUM HYDROXIDE 400 MG/1
30 TABLET, CHEWABLE ORAL DAILY
Qty: 0 | Refills: 0 | Status: DISCONTINUED | OUTPATIENT
Start: 2018-12-05 | End: 2018-12-10

## 2018-12-05 RX ORDER — ASCORBIC ACID 60 MG
500 TABLET,CHEWABLE ORAL
Qty: 0 | Refills: 0 | Status: DISCONTINUED | OUTPATIENT
Start: 2018-12-05 | End: 2018-12-10

## 2018-12-05 RX ORDER — HYDROCORTISONE 20 MG
25 TABLET ORAL EVERY 8 HOURS
Qty: 0 | Refills: 0 | Status: DISCONTINUED | OUTPATIENT
Start: 2018-12-05 | End: 2018-12-05

## 2018-12-05 RX ORDER — ACETAMINOPHEN 500 MG
650 TABLET ORAL EVERY 6 HOURS
Qty: 0 | Refills: 0 | Status: DISCONTINUED | OUTPATIENT
Start: 2018-12-05 | End: 2018-12-05

## 2018-12-05 RX ORDER — MEROPENEM 1 G/30ML
2000 INJECTION INTRAVENOUS EVERY 8 HOURS
Qty: 0 | Refills: 0 | Status: DISCONTINUED | OUTPATIENT
Start: 2018-12-05 | End: 2018-12-06

## 2018-12-05 RX ORDER — FUROSEMIDE 40 MG
20 TABLET ORAL DAILY
Qty: 0 | Refills: 0 | Status: DISCONTINUED | OUTPATIENT
Start: 2018-12-05 | End: 2018-12-06

## 2018-12-05 RX ORDER — HYDROCORTISONE 1 %
1 OINTMENT (GRAM) TOPICAL
Qty: 0 | Refills: 0 | Status: DISCONTINUED | OUTPATIENT
Start: 2018-12-05 | End: 2018-12-10

## 2018-12-05 RX ORDER — FOLIC ACID 0.8 MG
1 TABLET ORAL DAILY
Qty: 0 | Refills: 0 | Status: DISCONTINUED | OUTPATIENT
Start: 2018-12-05 | End: 2018-12-05

## 2018-12-05 RX ORDER — ONDANSETRON 8 MG/1
4 TABLET, FILM COATED ORAL EVERY 6 HOURS
Qty: 0 | Refills: 0 | Status: DISCONTINUED | OUTPATIENT
Start: 2018-12-05 | End: 2018-12-10

## 2018-12-05 RX ORDER — DIPHENHYDRAMINE HCL 50 MG
25 CAPSULE ORAL EVERY 4 HOURS
Qty: 0 | Refills: 0 | Status: DISCONTINUED | OUTPATIENT
Start: 2018-12-05 | End: 2018-12-07

## 2018-12-05 RX ORDER — TIOTROPIUM BROMIDE 18 UG/1
1 CAPSULE ORAL; RESPIRATORY (INHALATION) DAILY
Qty: 0 | Refills: 0 | Status: DISCONTINUED | OUTPATIENT
Start: 2018-12-05 | End: 2018-12-06

## 2018-12-05 RX ORDER — ACETAMINOPHEN 500 MG
1000 TABLET ORAL ONCE
Qty: 0 | Refills: 0 | Status: COMPLETED | OUTPATIENT
Start: 2018-12-05 | End: 2018-12-05

## 2018-12-05 RX ORDER — OXYCODONE HYDROCHLORIDE 5 MG/1
10 TABLET ORAL EVERY 4 HOURS
Qty: 0 | Refills: 0 | Status: DISCONTINUED | OUTPATIENT
Start: 2018-12-05 | End: 2018-12-10

## 2018-12-05 RX ORDER — ALPRAZOLAM 0.25 MG
0.5 TABLET ORAL AT BEDTIME
Qty: 0 | Refills: 0 | Status: DISCONTINUED | OUTPATIENT
Start: 2018-12-05 | End: 2018-12-10

## 2018-12-05 RX ORDER — ACETAMINOPHEN 500 MG
1000 TABLET ORAL ONCE
Qty: 0 | Refills: 0 | Status: DISCONTINUED | OUTPATIENT
Start: 2018-12-05 | End: 2018-12-05

## 2018-12-05 RX ORDER — BENZOCAINE AND MENTHOL 5; 1 G/100ML; G/100ML
1 LIQUID ORAL
Qty: 0 | Refills: 0 | Status: DISCONTINUED | OUTPATIENT
Start: 2018-12-05 | End: 2018-12-10

## 2018-12-05 RX ORDER — CHOLECALCIFEROL (VITAMIN D3) 125 MCG
1000 CAPSULE ORAL DAILY
Qty: 0 | Refills: 0 | Status: DISCONTINUED | OUTPATIENT
Start: 2018-12-05 | End: 2018-12-10

## 2018-12-05 RX ORDER — OXYCODONE HYDROCHLORIDE 5 MG/1
5 TABLET ORAL EVERY 4 HOURS
Qty: 0 | Refills: 0 | Status: DISCONTINUED | OUTPATIENT
Start: 2018-12-05 | End: 2018-12-10

## 2018-12-05 RX ORDER — IPRATROPIUM/ALBUTEROL SULFATE 18-103MCG
3 AEROSOL WITH ADAPTER (GRAM) INHALATION ONCE
Qty: 0 | Refills: 0 | Status: DISCONTINUED | OUTPATIENT
Start: 2018-12-05 | End: 2018-12-06

## 2018-12-05 RX ORDER — FERROUS SULFATE 325(65) MG
325 TABLET ORAL DAILY
Qty: 0 | Refills: 0 | Status: DISCONTINUED | OUTPATIENT
Start: 2018-12-05 | End: 2018-12-10

## 2018-12-05 RX ORDER — CYCLOBENZAPRINE HYDROCHLORIDE 10 MG/1
10 TABLET, FILM COATED ORAL ONCE
Qty: 0 | Refills: 0 | Status: COMPLETED | OUTPATIENT
Start: 2018-12-05 | End: 2018-12-05

## 2018-12-05 RX ORDER — SODIUM CHLORIDE 9 MG/ML
1000 INJECTION, SOLUTION INTRAVENOUS
Qty: 0 | Refills: 0 | Status: DISCONTINUED | OUTPATIENT
Start: 2018-12-05 | End: 2018-12-06

## 2018-12-05 RX ORDER — IPRATROPIUM/ALBUTEROL SULFATE 18-103MCG
3 AEROSOL WITH ADAPTER (GRAM) INHALATION ONCE
Qty: 0 | Refills: 0 | Status: COMPLETED | OUTPATIENT
Start: 2018-12-05 | End: 2018-12-05

## 2018-12-05 RX ORDER — HYDROMORPHONE HYDROCHLORIDE 2 MG/ML
0.5 INJECTION INTRAMUSCULAR; INTRAVENOUS; SUBCUTANEOUS
Qty: 0 | Refills: 0 | Status: DISCONTINUED | OUTPATIENT
Start: 2018-12-05 | End: 2018-12-05

## 2018-12-05 RX ORDER — ATORVASTATIN CALCIUM 80 MG/1
10 TABLET, FILM COATED ORAL AT BEDTIME
Qty: 0 | Refills: 0 | Status: DISCONTINUED | OUTPATIENT
Start: 2018-12-05 | End: 2018-12-10

## 2018-12-05 RX ORDER — ALBUTEROL 90 UG/1
1 AEROSOL, METERED ORAL EVERY 4 HOURS
Qty: 0 | Refills: 0 | Status: DISCONTINUED | OUTPATIENT
Start: 2018-12-05 | End: 2018-12-06

## 2018-12-05 RX ORDER — ENOXAPARIN SODIUM 100 MG/ML
40 INJECTION SUBCUTANEOUS EVERY 24 HOURS
Qty: 0 | Refills: 0 | Status: DISCONTINUED | OUTPATIENT
Start: 2018-12-05 | End: 2018-12-05

## 2018-12-05 RX ORDER — ONDANSETRON 8 MG/1
4 TABLET, FILM COATED ORAL ONCE
Qty: 0 | Refills: 0 | Status: DISCONTINUED | OUTPATIENT
Start: 2018-12-05 | End: 2018-12-10

## 2018-12-05 RX ORDER — ENOXAPARIN SODIUM 100 MG/ML
40 INJECTION SUBCUTANEOUS EVERY 24 HOURS
Qty: 0 | Refills: 0 | Status: DISCONTINUED | OUTPATIENT
Start: 2018-12-05 | End: 2018-12-06

## 2018-12-05 RX ORDER — FINASTERIDE 5 MG/1
5 TABLET, FILM COATED ORAL DAILY
Qty: 0 | Refills: 0 | Status: DISCONTINUED | OUTPATIENT
Start: 2018-12-05 | End: 2018-12-10

## 2018-12-05 RX ORDER — DOCUSATE SODIUM 100 MG
100 CAPSULE ORAL THREE TIMES A DAY
Qty: 0 | Refills: 0 | Status: DISCONTINUED | OUTPATIENT
Start: 2018-12-05 | End: 2018-12-10

## 2018-12-05 RX ORDER — DOCUSATE SODIUM 100 MG
100 CAPSULE ORAL THREE TIMES A DAY
Qty: 0 | Refills: 0 | Status: DISCONTINUED | OUTPATIENT
Start: 2018-12-05 | End: 2018-12-05

## 2018-12-05 RX ORDER — HYDROCORTISONE 20 MG
50 TABLET ORAL ONCE
Qty: 0 | Refills: 0 | Status: COMPLETED | OUTPATIENT
Start: 2018-12-05 | End: 2018-12-05

## 2018-12-05 RX ORDER — FLUOXETINE HCL 10 MG
20 CAPSULE ORAL
Qty: 0 | Refills: 0 | Status: DISCONTINUED | OUTPATIENT
Start: 2018-12-05 | End: 2018-12-10

## 2018-12-05 RX ORDER — MAGNESIUM HYDROXIDE 400 MG/1
30 TABLET, CHEWABLE ORAL DAILY
Qty: 0 | Refills: 0 | Status: DISCONTINUED | OUTPATIENT
Start: 2018-12-05 | End: 2018-12-05

## 2018-12-05 RX ORDER — CYCLOBENZAPRINE HYDROCHLORIDE 10 MG/1
10 TABLET, FILM COATED ORAL
Qty: 0 | Refills: 0 | Status: DISCONTINUED | OUTPATIENT
Start: 2018-12-05 | End: 2018-12-10

## 2018-12-05 RX ORDER — HYDROMORPHONE HYDROCHLORIDE 2 MG/ML
1 INJECTION INTRAMUSCULAR; INTRAVENOUS; SUBCUTANEOUS EVERY 4 HOURS
Qty: 0 | Refills: 0 | Status: DISCONTINUED | OUTPATIENT
Start: 2018-12-05 | End: 2018-12-05

## 2018-12-05 RX ORDER — ACETAMINOPHEN 500 MG
650 TABLET ORAL EVERY 6 HOURS
Qty: 0 | Refills: 0 | Status: DISCONTINUED | OUTPATIENT
Start: 2018-12-05 | End: 2018-12-10

## 2018-12-05 RX ORDER — IPRATROPIUM/ALBUTEROL SULFATE 18-103MCG
3 AEROSOL WITH ADAPTER (GRAM) INHALATION EVERY 6 HOURS
Qty: 0 | Refills: 0 | Status: DISCONTINUED | OUTPATIENT
Start: 2018-12-05 | End: 2018-12-06

## 2018-12-05 RX ORDER — FOLIC ACID 0.8 MG
1 TABLET ORAL DAILY
Qty: 0 | Refills: 0 | Status: DISCONTINUED | OUTPATIENT
Start: 2018-12-05 | End: 2018-12-10

## 2018-12-05 RX ORDER — VANCOMYCIN HCL 1 G
1250 VIAL (EA) INTRAVENOUS EVERY 12 HOURS
Qty: 0 | Refills: 0 | Status: DISCONTINUED | OUTPATIENT
Start: 2018-12-05 | End: 2018-12-06

## 2018-12-05 RX ADMIN — Medication 100 MILLIGRAM(S): at 22:54

## 2018-12-05 RX ADMIN — CYCLOBENZAPRINE HYDROCHLORIDE 10 MILLIGRAM(S): 10 TABLET, FILM COATED ORAL at 23:27

## 2018-12-05 RX ADMIN — MEROPENEM 200 MILLIGRAM(S): 1 INJECTION INTRAVENOUS at 05:07

## 2018-12-05 RX ADMIN — Medication 25 MILLIGRAM(S): at 22:54

## 2018-12-05 RX ADMIN — HYDROMORPHONE HYDROCHLORIDE 0.5 MILLIGRAM(S): 2 INJECTION INTRAMUSCULAR; INTRAVENOUS; SUBCUTANEOUS at 20:51

## 2018-12-05 RX ADMIN — FAMOTIDINE 20 MILLIGRAM(S): 10 INJECTION INTRAVENOUS at 05:07

## 2018-12-05 RX ADMIN — METHADONE HYDROCHLORIDE 5 MILLIGRAM(S): 40 TABLET ORAL at 14:44

## 2018-12-05 RX ADMIN — Medication 25 MILLIGRAM(S): at 23:59

## 2018-12-05 RX ADMIN — METHADONE HYDROCHLORIDE 5 MILLIGRAM(S): 40 TABLET ORAL at 05:14

## 2018-12-05 RX ADMIN — MEROPENEM 200 MILLIGRAM(S): 1 INJECTION INTRAVENOUS at 15:52

## 2018-12-05 RX ADMIN — ATORVASTATIN CALCIUM 10 MILLIGRAM(S): 80 TABLET, FILM COATED ORAL at 22:54

## 2018-12-05 RX ADMIN — SODIUM CHLORIDE 70 MILLILITER(S): 9 INJECTION, SOLUTION INTRAVENOUS at 00:09

## 2018-12-05 RX ADMIN — TIOTROPIUM BROMIDE 1 CAPSULE(S): 18 CAPSULE ORAL; RESPIRATORY (INHALATION) at 06:51

## 2018-12-05 RX ADMIN — Medication 325 MILLIGRAM(S): at 12:54

## 2018-12-05 RX ADMIN — Medication 1 TABLET(S): at 12:54

## 2018-12-05 RX ADMIN — Medication 4 MILLIGRAM(S): at 06:03

## 2018-12-05 RX ADMIN — SODIUM CHLORIDE 100 MILLILITER(S): 9 INJECTION, SOLUTION INTRAVENOUS at 20:55

## 2018-12-05 RX ADMIN — HYDROMORPHONE HYDROCHLORIDE 0.5 MILLIGRAM(S): 2 INJECTION INTRAMUSCULAR; INTRAVENOUS; SUBCUTANEOUS at 21:43

## 2018-12-05 RX ADMIN — METHADONE HYDROCHLORIDE 5 MILLIGRAM(S): 40 TABLET ORAL at 22:54

## 2018-12-05 RX ADMIN — Medication 0.5 MILLIGRAM(S): at 00:03

## 2018-12-05 RX ADMIN — Medication 1000 UNIT(S): at 12:54

## 2018-12-05 RX ADMIN — CYCLOBENZAPRINE HYDROCHLORIDE 10 MILLIGRAM(S): 10 TABLET, FILM COATED ORAL at 02:06

## 2018-12-05 RX ADMIN — MEROPENEM 200 MILLIGRAM(S): 1 INJECTION INTRAVENOUS at 23:28

## 2018-12-05 RX ADMIN — LISINOPRIL 20 MILLIGRAM(S): 2.5 TABLET ORAL at 05:11

## 2018-12-05 RX ADMIN — Medication 20 MILLIGRAM(S): at 15:50

## 2018-12-05 RX ADMIN — Medication 20 MILLIGRAM(S): at 05:09

## 2018-12-05 RX ADMIN — HYDROMORPHONE HYDROCHLORIDE 0.5 MILLIGRAM(S): 2 INJECTION INTRAMUSCULAR; INTRAVENOUS; SUBCUTANEOUS at 21:32

## 2018-12-05 RX ADMIN — Medication 166.67 MILLIGRAM(S): at 06:03

## 2018-12-05 RX ADMIN — FINASTERIDE 5 MILLIGRAM(S): 5 TABLET, FILM COATED ORAL at 12:54

## 2018-12-05 RX ADMIN — Medication 20 MILLIGRAM(S): at 05:11

## 2018-12-05 RX ADMIN — Medication 400 MILLIGRAM(S): at 23:27

## 2018-12-05 RX ADMIN — ALBUTEROL 2.5 MILLIGRAM(S): 90 AEROSOL, METERED ORAL at 20:43

## 2018-12-05 RX ADMIN — CYCLOBENZAPRINE HYDROCHLORIDE 10 MILLIGRAM(S): 10 TABLET, FILM COATED ORAL at 15:50

## 2018-12-05 NOTE — PROGRESS NOTE ADULT - SUBJECTIVE AND OBJECTIVE BOX
Pt seen and examined. Pain controlled. No acute events overnight, mild agitation this AM but AAO x3 after talking with patient. Patient compression dressing was off, no active discharge from wound noticed.     Vital Signs Last 24 Hrs  T(C): 36.8 (05 Dec 2018 04:49), Max: 36.9 (04 Dec 2018 12:00)  T(F): 98.3 (05 Dec 2018 04:49), Max: 98.4 (04 Dec 2018 12:00)  HR: 67 (05 Dec 2018 04:49) (67 - 91)  BP: 123/66 (05 Dec 2018 04:49) (100/62 - 146/83)  BP(mean): --  RR: 17 (05 Dec 2018 04:49) (17 - 19)  SpO2: 98% (05 Dec 2018 04:49) (95% - 98%)    Gen: NAD  LLE:  Dressing clean D&I, no drainage expressible from wound, erythema signigicantly improved from demarcation, mild TTP around site  Distal staples removed, no erythema or drainage   +sensation L2-S1  +dorsiflexion/plantarflexion of ankle/hallux  +dorsalis pedis pulse  Soft compartments, - calf tenderness

## 2018-12-05 NOTE — PROGRESS NOTE ADULT - ASSESSMENT
74 yo male with possible Post Sx hematoma vs Surgical Site infection    no signs of drainage this a.m, New compression dressing was applied  Incisional site erythema improved this a.m  FU IR aspiration cultures  NPO  IV Fluids while NPO  Hold DVT ppx  Agree with ID plan to hold abx for now as pt could have hematoma vs seroma post surgically.  Pain control  50% PWB, will D/w Dr Ramirez when to advance  Continue Medical treatment  Will D/w attending and advise if plan changes 74 yo male with possible Post Sx hematoma vs Surgical Site infection, Plan for Wash Out Today    Plan For OR Today With Dr. Ramirez For Wash out   no signs of drainage this a.m, New compression dressing was applied  Incisional site erythema improved this a.m  FU IR aspiration cultures  NPO  IV Fluids while NPO  Hold DVT ppx  Agree with ID plan to hold abx for now as pt could have hematoma vs seroma post surgically.  Pain control  50% PWB, will D/w Dr Ramirez when to advance  Continue Medical treatment  Attending aware who agrees with plan 74 yo male with possible Post Sx hematoma vs Surgical Site infection, Plan for Wash Out Today    Plan For OR Today With Dr. Ramirez For Wash out   no signs of drainage this a.m, New compression dressing was applied  FU IR aspiration cultures  NPO  IV Fluids while NPO  Hold DVT ppx  Continue Medical treatment, Please document Clearance in Note   Agree with ID plan to hold abx for now as pt could have hematoma vs seroma post surgically.  Pain control  50% PWB, will D/w Dr Ramirez when to advance    Attending aware who agrees with plan

## 2018-12-05 NOTE — PROGRESS NOTE ADULT - PROBLEM SELECTOR PLAN 2
- Hgb stable at 9.1 and 28   - f/u daily CBC, no need for intervention at this time - Will give one units of PRBCs prior to surgery.  - f/u daily CBC, no need for intervention at this time

## 2018-12-05 NOTE — PROGRESS NOTE ADULT - PROBLEM SELECTOR PLAN 3
- patient with INR 1.38 on admission, today 1.27 though no documented afib, not on chronic a/c per med rec   - possible increased INR due to malnutrition (pt with low albumin 2.6)  - S/p CT guided drainage of L hip 12/4, 4 ml turbid serosanguinous fluid removed  - resume a/c as soon as ortho procedure complete. see above re ortho reccs

## 2018-12-05 NOTE — CONSULT NOTE ADULT - ASSESSMENT
72 yo M with PMH of HTN, HLD, MI and CABG with stents x3 (2008), COPD, RA and s/p recent L hip surgery now admitted with surgical site infection. 74 yo M with PMH of HTN, HLD, MI and CABG with stents x3, COPD, RA and s/p recent L hip surgery now admitted with surgical site infection.    - No clear evidence of acute ischemia or arrhythmia on EKG.  - No evidence of volume overload.  - Previous ECHO in 11/2018 shows preserved LV systolic function with EF: 65%, mild MR, mild TR, mildly enlarged LA/RA, stage I diastolic dysfunction.  - BP well controlled, continue home BP meds, monitor routine hemodynamics.  - Monitor and replete lytes, keep K>4, Mg>2.    - Patient has no active ischemia, decompensated HF, unstable arrythmia, or severe stenotic valvular disease, and in the setting of intermediate risk orthopedic procedure, patient is optimized from cardiovascular standpoint to proceed with planned procedure with routine hemodynamic monitoring.   - Other cardiovascular workup will depend on clinical course. All other workup per primary team.  - Will follow. 74 yo M with PMH of HTN, HLD, MI and CABG with stents x3, COPD, RA and s/p recent L hip surgery now admitted with surgical site infection, in need of urgent washout. He has had an overall reasonable exercise capacity and reports riding an exercise bicycle for ~10 miles at a time without difficulty.  He had recent surgery without cv complication.    - No clear evidence of acute ischemia, arrhythmia or volume overload.  - Previous ECHO in 11/2018 shows preserved LV systolic function with EF: 65%, mild MR, mild TR, mildly enlarged LA/RA, stage I diastolic dysfunction.  - BP well controlled, continue home BP meds, monitor routine hemodynamics.  - Monitor and replete lytes, keep K>4, Mg>2.  - Patient has no active ischemia, decompensated HF, unstable arrythmia, or severe stenotic valvular disease, and in the setting of intermediate risk orthopedic procedure, patient is optimized from cardiovascular standpoint to proceed with this urgent procedure with routine hemodynamic monitoring.   - Other cardiovascular workup will depend on clinical course. All other workup per primary team.  - Will follow.

## 2018-12-05 NOTE — PROGRESS NOTE ADULT - ASSESSMENT
Hematoma/seroma  R/O superinfection but all changes at present could be noninfectious in nature  S/P repair of periprosthetic femur fracture  If hardware is infected, he will not be cured with antibiotics. Would need definitive drainage of hematoma and removal of hardware to cure him. This would be very problematic in the setting of acute fracture.  No concern of pneumonia clinically  CT chest changes look chronic  For OR today    Suggestions--  Await OR findings  Continue antibiotics  Depending on OR cx/findings will narrow antibiotics as able.  D/W patient    Fabian Childs MD  [phone]

## 2018-12-05 NOTE — BRIEF OPERATIVE NOTE - PROCEDURE
<<-----Click on this checkbox to enter Procedure Irrigation and debridement of femur or hip  12/05/2018  left hip wound I&D  Active  NOLSON

## 2018-12-05 NOTE — PROGRESS NOTE ADULT - ASSESSMENT
71 yo M PMHx 45 pack year smoking hx and COPD (diagnosed "a few years ago, quit smoking then), HTN, HLD, hx of MI and CABG (2008) (stents also placed per chart review), RA (on Medrol daily), hx of multiple L hip surgeries (hip replacement 30 years ago in [place], ORIF recently 11/2018 w/ Dr. Ramirez) p/w L pain, drainage, redness and swelling surrounding upper L hip surgical site beginning 6 days ago in [facility], s/p 3 days ceftin at [facility] with symptom progression (more erythema, swelling on L hip), admitted for surgical site infection r/o septic joint with CT revealing L hip fluid collection (infection vs hematoma).

## 2018-12-05 NOTE — PROGRESS NOTE ADULT - SUBJECTIVE AND OBJECTIVE BOX
Patient seen and examined. Pain controlled.    Vital Signs Last 24 Hrs  T(C): 36.9 (05 Dec 2018 21:00), Max: 36.9 (05 Dec 2018 21:00)  T(F): 98.4 (05 Dec 2018 21:00), Max: 98.4 (05 Dec 2018 21:00)  HR: 96 (05 Dec 2018 21:45) (67 - 98)  BP: 110/67 (05 Dec 2018 21:45) (106/70 - 137/74)  BP(mean): --  RR: 14 (05 Dec 2018 21:45) (14 - 17)  SpO2: 100% (05 Dec 2018 21:45) (96% - 100%)    Physical exam  Gen: NAD  LLE: NAVA Dressing clean, dry, and intact left hip. +EHL/FHL/TA/GSC.  SILT L3-S1. +Dorsalis pedis (faint), dopplerable, capillary refill brisk. Compartments soft and nontender.    72M s/p L hip wound I&D POD# 0    Partial weight bearing 50% LLE  Pain control  DVT prophylaxis  PT  FU OR Cx  Abx per ID  Discharge planning

## 2018-12-05 NOTE — PROGRESS NOTE ADULT - SUBJECTIVE AND OBJECTIVE BOX
Patient is a 73y old  Male who presents with a chief complaint of Redness and swelling around previous L hip surgical site (05 Dec 2018 08:20)      FROM ADMISSION H+P:   HPI:  73 yo M PMHx 45 pack year smoking hx and COPD (diagnosed "a few years ago, quit smoking then), HTN, HLD, hx of MI and CABG (2008) (stents also placed per chart review), RA (on Medrol daily), hx of multiple L hip surgeries (hip replacement 30 years ago in [place], ORIF recently 11/2018 w/ Dr. Ramirez) p/w L pain, drainage, redness and swelling surrounding upper L hip surgical site beginning 6 days ago.     Patient first noted some pain and redness 6 days ago while in [facility] Patient was prescribed Ceftin w/ symptom progression from pain and erythema to swelling, erythema and purulent drainage.     Patient denied fever, chills, or other signs of infection including nausea, vomiting, productive cough, change in urination or bowel habits. Patient also denied sick contacts, recent falls.     Of note patient endorsed previous surgical site infection on his back after lumbar fusion for spinal stenosis treated with IV antibiotics, which progressed to sepsis with "Strep" isolated 6 years ago, per patient's wife. Also of note, patient had temperatures ranging from T  at [facility] per patient's daughter.     Also of note, patient now endorses diffuse itching and mild diaphoresis starting after antibiotics in ED, denied difficulty swallowing or breathing, lip swelling or dizziness.       In the ED, vitals T 99.4, HR 82, /66, RR 18, Sp02 93% ORA. Patient received vancomycin and aztreonam, and 1 L NS, morphine 4 mg and acetaminophen 650 mg. Ortho consulted, and L pelvis/hip/femur xrays revealed intact L hip hardware, lumbar fusion hardware and diffuse soft tissue swelling. CT results pending.   No Fhx of RA, hip infection (03 Dec 2018 14:32)      ----  INTERVAL HPI/OVERNIGHT EVENTS: Spoke to ID DR. Childs yesterday regarding growth in aspirate cultures, recc'd starting IV meropenam and vanco. Pt seen and evaluated at the bedside. No acute overnight events occurred. Patient reports he is able to walk to the restroom now without difficulty, however RN reporting patient is unsteady on his feet. Pt feels pain has significantly improved, today 0/10. Denies CP, SOB, palpitations, abd pain, diarrhea, n/v, fevers, chills, or any other acute complaints. Comfortable    ----  PAST MEDICAL & SURGICAL HISTORY:  S/P CABG x 3  S/P hip replacement, left  Rheumatoid arthritis  Osteoarthritis  COPD (chronic obstructive pulmonary disease)  HTN (hypertension)  S/P lumbar fusion: Approx 2012  History of femur fracture: Repaired 11/19- L hip  History of appendectomy  History of right shoulder replacement  History of total left hip arthroplasty      FAMILY HISTORY:  No pertinent family history in first degree relatives      ----  MEDICATIONS  (STANDING):  atorvastatin 10 milliGRAM(s) Oral at bedtime  calcium carbonate 1250 mG  + Vitamin D (OsCal 500 + D) 1 Tablet(s) Oral daily  cholecalciferol 1000 Unit(s) Oral daily  famotidine    Tablet 20 milliGRAM(s) Oral two times a day  ferrous    sulfate 325 milliGRAM(s) Oral daily  finasteride 5 milliGRAM(s) Oral daily  FLUoxetine 20 milliGRAM(s) Oral <User Schedule>  furosemide    Tablet 20 milliGRAM(s) Oral daily  hydrocortisone sodium succinate Injectable 50 milliGRAM(s) IV Push once  hydrocortisone sodium succinate Injectable 25 milliGRAM(s) IV Push every 8 hours  lactated ringers. 1000 milliLiter(s) (50 mL/Hr) IV Continuous <Continuous>  lactated ringers. 1000 milliLiter(s) (70 mL/Hr) IV Continuous <Continuous>  lisinopril 20 milliGRAM(s) Oral daily  meropenem  IVPB 2000 milliGRAM(s) IV Intermittent every 8 hours  meropenem  IVPB      methadone    Tablet 5 milliGRAM(s) Oral every 8 hours  methylPREDNISolone 4 milliGRAM(s) Oral daily  polyethylene glycol 3350 17 Gram(s) Oral daily  tiotropium 18 MICROgram(s) Capsule 1 Capsule(s) Inhalation daily  vancomycin  IVPB 1250 milliGRAM(s) IV Intermittent every 12 hours    MEDICATIONS  (PRN):  acetaminophen   Tablet .. 650 milliGRAM(s) Oral every 6 hours PRN Temp greater or equal to 38C (100.4F), Mild Pain (1 - 3)  ALBUTerol    0.083% 2.5 milliGRAM(s) Nebulizer every 6 hours PRN Shortness of Breath and/or Wheezing  ALPRAZolam 0.5 milliGRAM(s) Oral at bedtime PRN anxiety  cyclobenzaprine 10 milliGRAM(s) Oral two times a day PRN Muscle Spasm  hydrocortisone 0.5% Cream 1 Application(s) Topical two times a day PRN Rash and/or Itching      ----  REVIEW OF SYSTEMS:  CONSTITUTIONAL: NO fever, chills, sweats, fatigue, weakness  HEENT: denies blurred vision, sore throat  SKIN: + L hip/thigh rash. NO new lesions, rash  CARDIOVASCULAR: denies chest pain, chest pressure, palpitations  RESPIRATORY: denies shortness of breath, sputum production  GASTROINTESTINAL: denies nausea, vomiting, diarrhea, abdominal pain  GENITOURINARY: denies dysuria, discharge  NEUROLOGICAL: denies numbness, headache, focal weakness  MUSCULOSKELETAL: denies new joint pain, muscle aches  HEMATOLOGIC: + L hip rash.  No gross bleeding  LYMPHATICS: denies enlarged lymph nodes, extremity swelling  PSYCHIATRIC: denies recent changes in anxiety, depression  ENDOCRINOLOGIC: denies sweating, cold or heat intolerance  ----  PHYSICAL EXAM:  GENERAL: patient appears well, no acute distress, appropriate, pleasant. raspy voice  EYES: sclera clear, no exudates  ENMT: oropharynx clear without erythema, no exudates, moist mucous membranes  NECK: supple, soft, no thyromegaly noted  LUNGS: good air entry bilaterally, clear to auscultation, symmetric breath sounds, no wheezing or rhonchi appreciated  HEART: soft S1/S2, regular rate and rhythm, no murmurs noted, +1 pitting edema b/l   GASTROINTESTINAL: abdomen is soft, nontender, nondistended, normoactive bowel sounds, no palpable masses  INTEGUMENT: good skin turgor, L hip dressing intact, no evidence of drainage. +evidence of trace ecchymosis L ankle. No TTP L hip  MUSCULOSKELETAL: no clubbing or cyanosis, no obvious deformity  NEUROLOGIC: awake, alert, oriented x3, good muscle tone in 4 extremities, no obvious sensory deficits  PSYCHIATRIC: mood is good, affect is congruent, linear and logical thought process  HEME/LYMPH: no palpable supraclavicular nodules, no obvious ecchymosis or petechiae     T(C): 36.8 (12-05-18 @ 04:49), Max: 36.9 (12-04-18 @ 12:00)  HR: 67 (12-05-18 @ 04:49) (67 - 91)  BP: 123/66 (12-05-18 @ 04:49) (100/62 - 146/83)  RR: 17 (12-05-18 @ 04:49) (17 - 19)  SpO2: 98% (12-05-18 @ 04:49) (95% - 98%)  Wt(kg): --    ----  I&O's Summary    04 Dec 2018 07:01  -  05 Dec 2018 07:00  --------------------------------------------------------  IN: 0 mL / OUT: 200 mL / NET: -200 mL        LABS:                        8.3    7.23  )-----------( 276      ( 05 Dec 2018 04:50 )             25.7     12-05    138  |  100  |  8   ----------------------------<  92  3.6   |  31  |  0.58    Ca    8.2<L>      05 Dec 2018 04:50    TPro  5.6<L>  /  Alb  2.0<L>  /  TBili  0.7  /  DBili  x   /  AST  17  /  ALT  23  /  AlkPhos  119  12-04    PT/INR - ( 05 Dec 2018 04:50 )   PT: 14.6 sec;   INR: 1.27 ratio         PTT - ( 05 Dec 2018 04:50 )  PTT:29.9 sec    CAPILLARY BLOOD GLUCOSE          12-04 @ 14:54   Numerous Staphylococcus aureus  --  --  12-03 @ 16:52   Few Staphylococcus aureus  --  --  12-03 @ 16:38   No growth to date.  --  --          Culture - Body Fluid with Gram Stain (collected 12-04-18 @ 14:54)  Source: .Body Fluid Interstitial Fluid  Gram Stain (12-04-18 @ 16:03):    Numerous polymorphonuclear leukocytes seen per low power field    Few Gram positive cocci in pairs seen per oil power field  Preliminary Report (12-05-18 @ 08:37):    Numerous Staphylococcus aureus        ----  Personally reviewed:  Vital sign trends: [x  ] yes    [  ] no     [  ] n/a  Laboratory results: [ x ] yes    [  ] no     [  ] n/a  Radiology results: [ x ] yes    [  ] no     [  ] n/a  Culture results: [x  ] yes    [  ] no     [  ] n/a  Consultant recommendations: [ x ] yes    [  ] no     [  ] n/a

## 2018-12-05 NOTE — PROGRESS NOTE ADULT - PROBLEM SELECTOR PLAN 6
- stable; controlled  - stopped BP meds at [facility] for hypotension per patient's daughter  - cont home lisinopril 20 mg daily with hold parameters

## 2018-12-05 NOTE — PROGRESS NOTE ADULT - PROBLEM SELECTOR PLAN 1
-Patient w/ purulent drainage, swelling and erythema surrounding surgical site concerning for surgical site infection w/ xrays revealing soft tissue swelling  -CT reviewed: Interval internal fixation left femur.  Localized deep subcutaneous fluid collection containing air at the level   of the proximal femoral component; recommend further clinical correlation   for infection/abscess or infected hematoma.  - Deeper joint infection on differential, though less likely. Patient has had previous surgical site infection in back after lumbar fusion which progressed to sepsis, tx'ed with IV abx in [place]. possible patient has some propensity for SSIs, perhaps due to immunocompromised state on chronic steroids (Medrol daily) for RA  -CT femur: Interval internal fixation left femur.  Localized deep subcutaneous fluid collection containing air at the level   of the proximal femoral component; recommend further clinical correlation   for infection/abscess or infected hematoma.  - s/p vanc and aztreonam in ED  -ID Dr. Childs c/s appreciated, started patient on IV vanco and meropenam after discussion of gram stain of aspirated fluid.    - trend fever curve  - BC NGTD. Aspirated fluid gram stain and cx- S. Aureus, numerous PMNs, few + cocci in pairs  -ortho reccs noted, for surgical washout today.  -Patient is medically optimized moderate-to high risk for low-to intermediate risk procedure with the following addendum. likely HPA axis suppressed- to get regular dose of Methylprednisolone tomorrow morning, Hydrocortisone 50 mg IV immediately pre-op, and then Hydrocortisone 25 mg IV q8 hours for first 24 hours post-op, and then resume his home dose. -Patient w/ purulent drainage, swelling and erythema surrounding surgical site concerning for surgical site infection w/ xrays revealing soft tissue swelling  -CT reviewed: Interval internal fixation left femur.  Localized deep subcutaneous fluid collection containing air at the level   of the proximal femoral component; recommend further clinical correlation   for infection/abscess or infected hematoma.  - Deeper joint infection on differential, though less likely. Patient has had previous surgical site infection in back after lumbar fusion which progressed to sepsis, tx'ed with IV abx in [place]. possible patient has some propensity for SSIs, perhaps due to immunocompromised state on chronic steroids (Medrol daily) for RA  -CT femur: Interval internal fixation left femur.  Localized deep subcutaneous fluid collection containing air at the level   of the proximal femoral component; recommend further clinical correlation   for infection/abscess or infected hematoma.  - s/p vanc and aztreonam in ED  -ID Dr. Childs c/s appreciated, started patient on IV vanco and meropenam after discussion of gram stain of aspirated fluid.    - trend fever curve  - BC NGTD. Aspirated fluid gram stain and cx- S. Aureus, numerous PMNs, few + cocci in pairs  -ortho reccs noted, for surgical washout today.  -Patient is medically optimized moderate-to high risk for low-to intermediate risk procedure with the following addendum. likely HPA axis suppressed- to get regular dose of Methylprednisolone this AM, Hydrocortisone 50 mg IV immediately pre-op, and then Hydrocortisone 25 mg IV q8 hours for first 24 hours post-op, and then resume his home dose. -Patient w/ purulent drainage, swelling and erythema surrounding surgical site concerning for surgical site infection w/ xrays revealing soft tissue swelling  -CT reviewed: Interval internal fixation left femur.  Localized deep subcutaneous fluid collection containing air at the level   of the proximal femoral component; recommend further clinical correlation   for infection/abscess or infected hematoma.  - Deeper joint infection on differential, though less likely. Patient has had previous surgical site infection in back after lumbar fusion which progressed to sepsis, tx'ed with IV abx in [place]. possible patient has some propensity for SSIs, perhaps due to immunocompromised state on chronic steroids (Medrol daily) for RA  -CT femur: Interval internal fixation left femur.  Localized deep subcutaneous fluid collection containing air at the level   of the proximal femoral component; recommend further clinical correlation   for infection/abscess or infected hematoma.  - s/p vanc and aztreonam in ED  -ID Dr. Childs c/s appreciated, started patient on IV vanco and meropenam after discussion of gram stain of aspirated fluid.    - trend fever curve  - BC NGTD. Aspirated fluid gram stain and cx- S. Aureus, numerous PMNs, few + cocci in pairs  -ortho reccs noted, for surgical washout today.  -Patient is medically optimized moderate-to high risk for low-to intermediate risk procedure with the following addendum. likely HPA axis suppressed- to get regular dose of Methylprednisolone this AM, Hydrocortisone 50 mg IV immediately pre-op, and then Hydrocortisone 25 mg IV q8 hours for first 24 hours post-op, and then resume his home dose.  -PT consulted, will f/u -Patient w/ purulent drainage, swelling and erythema surrounding surgical site concerning for surgical site infection w/x-rays revealing soft tissue swelling  -CT reviewed: Interval internal fixation left femur.  Localized deep subcutaneous fluid collection containing air at the level   of the proximal femoral component; recommend further clinical correlation   for infection/abscess or infected hematoma.  - Deeper joint infection on differential, though less likely. Patient has had previous surgical site infection in back after lumbar fusion which progressed to sepsis, tx'ed with IV abx in [place]. possible patient has some propensity for SSIs, perhaps due to immunocompromised state on chronic steroids (Medrol daily) for RA  -CT femur: Interval internal fixation left femur.  Localized deep subcutaneous fluid collection containing air at the level   of the proximal femoral component; recommend further clinical correlation   for infection/abscess or infected hematoma.  - s/p vanc and aztreonam in ED  -ID Dr. Childs c/s appreciated, started patient on IV vanco and meropenam after discussion of gram stain of aspirated fluid.    - trend fever curve  - BC NGTD. Aspirated fluid gram stain and cx- S. Aureus, numerous PMNs, few + cocci in pairs  -ortho reccs noted, for surgical washout today.  -Patient is medically optimized moderate-to high risk for low-to intermediate risk procedure with the following addendum. likely HPA axis suppressed- to get regular dose of Methylprednisolone this AM, Hydrocortisone 50 mg IV immediately pre-op, and then Hydrocortisone 25 mg IV q8 hours for first 24 hours post-op, and then resume his home dose.  -PT consulted, will f/u

## 2018-12-05 NOTE — CONSULT NOTE ADULT - SUBJECTIVE AND OBJECTIVE BOX
[facility]         Shannon Valdez, [place], Anusha, Johanny, Sidney, Abdias        [phone] (office)    CHIEF COMPLAINT: Patient is a 73y old  Male who presents with a chief complaint of Redness and swelling around previous L hip surgical site (05 Dec 2018 09:14)    HPI: 72 yo M PMHx 45 pack year smoking hx and COPD (diagnosed "a few years ago, quit smoking then), HTN, HLD, hx of MI and CABG (2008) (stents also placed per chart review), RA (on Medrol daily), hx of multiple L hip surgeries (hip replacement 30 years ago in [place], ORIF recently 11/2018 w/ Dr. Ramirez) p/w L pain, drainage, redness and swelling surrounding upper L hip surgical site beginning 6 days ago.   Patient first noted some pain and redness 6 days ago while in [facility] ([facility]). Patient was prescribed Ceftin w/ symptom progression from pain and erythema to swelling, erythema and purulent drainage.   Patient denied fever, chills, or other signs of infection including nausea, vomiting, productive cough, change in urination or bowel habits. Patient also denied sick contacts, recent falls.   Of note patient endorsed previous surgical site infection on his back after lumbar fusion for spinal stenosis treated with IV antibiotics, which progressed to sepsis with "Strep" isolated 6 years ago, per patient's wife. Also of note, patient had temperatures ranging from T  at [facility] per patient's daughter.   Also of note, patient now endorses diffuse itching and mild diaphoresis starting after antibiotics in ED, denied difficulty swallowing or breathing, lip swelling or dizziness.   In the ED, vitals T 99.4, HR 82, /66, RR 18, Sp02 93% ORA. Patient received vancomycin and aztreonam, and 1 L NS, morphine 4 mg and acetaminophen 650 mg. Ortho consulted, and L pelvis/hip/femur xrays revealed intact L hip hardware, lumbar fusion hardware and diffuse soft tissue swelling. CT results pending.   No Fhx of RA, hip infection (03 Dec 2018 14:32)      Patient seen and examined at bedside for cardiac preop assessment. Patient reports that he sees a cardiologist in [place], Dr. Montana, and was visiting family in [place]. He had L hip ORIF in November, has been in rehab where he noted progressively worsening pain, swelling, redness and drainage from the surgical site, starting ~1 week ago. Denied chest pain, palpitations, SOB, dyspnea, PND, orthopnea, near syncope, syncope, or lower extremity edema.      PAST MEDICAL & SURGICAL HISTORY:  S/P CABG x 3  S/P hip replacement, left  Rheumatoid arthritis  Osteoarthritis  COPD (chronic obstructive pulmonary disease)  HTN (hypertension)  S/P lumbar fusion: Approx 2012  History of femur fracture: Repaired 11/19- L hip  History of appendectomy  History of right shoulder replacement  History of total left hip arthroplasty      SOCIAL HISTORY: No active tobacco (former smoker of ~1PPD for ~50 years), alcohol or illicit drug use.    FAMILY HISTORY: History of heart disease in father.    Home Medications:  alendronate 70 mg oral tablet: 1 tab(s) orally once a week (19 Nov 2018 07:30)  ALPRAZolam 0.5 mg oral tablet: 1 tab(s) orally once a day (at bedtime), As Needed (19 Nov 2018 07:30)  ascorbic acid 500 mg oral tablet: 1 tab(s) orally 2 times a day (21 Nov 2018 11:59)  aspirin 81 mg oral tablet: 1 tab(s) orally once a day (19 Nov 2018 07:30)  cyclobenzaprine 10 mg oral tablet: 1 tab(s) orally 2 times a day, As Needed (03 Dec 2018 15:05)  ferrous sulfate 324 mg (65 mg elemental iron) oral tablet: 1 tab(s) orally once a day (21 Nov 2018 11:59)  finasteride 5 mg oral tablet: 1 tab(s) orally once a day (19 Nov 2018 07:30)  FLUoxetine 20 mg oral capsule: 1 cap(s) orally 3 times a day (19 Nov 2018 07:30)  folic acid 1 mg oral tablet: 1 tab(s) orally once a day (21 Nov 2018 11:59)  furosemide 20 mg oral tablet: 1 tab(s) orally once a day (19 Nov 2018 07:30)  lisinopril 20 mg oral tablet: 1 tab(s) orally once a day (19 Nov 2018 07:30)  lovastatin 20 mg oral tablet: 1 tab(s) orally once a day (19 Nov 2018 07:30)  methadone 5 mg oral tablet: 1 tab(s) orally every 8 hours (19 Nov 2018 07:30)  methylPREDNISolone 2 mg oral tablet: 1 tab(s) orally once a day (03 Dec 2018 15:07)  Multiple Vitamins oral tablet: 1 tab(s) orally once a day (21 Nov 2018 11:59)  potassium chloride 10 mEq oral capsule, extended release: 1 cap(s) orally 2 times a day (19 Nov 2018 07:30)  tamsulosin 0.4 mg oral capsule: 1 cap(s) orally once a day (19 Nov 2018 07:30)    MEDICATIONS  (STANDING):  atorvastatin 10 milliGRAM(s) Oral at bedtime  calcium carbonate 1250 mG  + Vitamin D (OsCal 500 + D) 1 Tablet(s) Oral daily  cholecalciferol 1000 Unit(s) Oral daily  famotidine    Tablet 20 milliGRAM(s) Oral two times a day  ferrous    sulfate 325 milliGRAM(s) Oral daily  finasteride 5 milliGRAM(s) Oral daily  FLUoxetine 20 milliGRAM(s) Oral <User Schedule>  furosemide    Tablet 20 milliGRAM(s) Oral daily  hydrocortisone sodium succinate Injectable 50 milliGRAM(s) IV Push once  hydrocortisone sodium succinate Injectable 25 milliGRAM(s) IV Push every 8 hours  lactated ringers. 1000 milliLiter(s) (50 mL/Hr) IV Continuous <Continuous>  lactated ringers. 1000 milliLiter(s) (70 mL/Hr) IV Continuous <Continuous>  lisinopril 20 milliGRAM(s) Oral daily  meropenem  IVPB 2000 milliGRAM(s) IV Intermittent every 8 hours  meropenem  IVPB      methadone    Tablet 5 milliGRAM(s) Oral every 8 hours  methylPREDNISolone 4 milliGRAM(s) Oral daily  polyethylene glycol 3350 17 Gram(s) Oral daily  tiotropium 18 MICROgram(s) Capsule 1 Capsule(s) Inhalation daily  vancomycin  IVPB 1250 milliGRAM(s) IV Intermittent every 12 hours    MEDICATIONS  (PRN):  acetaminophen   Tablet .. 650 milliGRAM(s) Oral every 6 hours PRN Temp greater or equal to 38C (100.4F), Mild Pain (1 - 3)  ALBUTerol    0.083% 2.5 milliGRAM(s) Nebulizer every 6 hours PRN Shortness of Breath and/or Wheezing  ALPRAZolam 0.5 milliGRAM(s) Oral at bedtime PRN anxiety  cyclobenzaprine 10 milliGRAM(s) Oral two times a day PRN Muscle Spasm  hydrocortisone 0.5% Cream 1 Application(s) Topical two times a day PRN Rash and/or Itching      Allergies    Ceftin (Pruritus)    Intolerances        REVIEW OF SYSTEMS: Is negative for eye, ENT, GI, , allergic, dermatologic, musculoskeletal and neurologic, except as described above.    VITAL SIGNS:   Vital Signs Last 24 Hrs  T(C): 36.6 (05 Dec 2018 12:45), Max: 36.9 (04 Dec 2018 20:05)  T(F): 97.8 (05 Dec 2018 12:45), Max: 98.4 (04 Dec 2018 20:05)  HR: 75 (05 Dec 2018 12:45) (67 - 78)  BP: 118/68 (05 Dec 2018 12:45) (103/69 - 123/66)  BP(mean): --  RR: 17 (05 Dec 2018 12:45) (17 - 18)  SpO2: 98% (05 Dec 2018 12:45) (97% - 98%)    I&O's Summary    04 Dec 2018 07:01  -  05 Dec 2018 07:00  --------------------------------------------------------  IN: 0 mL / OUT: 200 mL / NET: -200 mL      PHYSICAL EXAM:  Constitutional: Resting in bed, in mild distress due to pain in L LE  Eyes: EOMI, no oral cyanosis, conjunctivae clear, anicteric  Pulmonary: Non-labored, decreased breath sounds bilaterally, no wheezing, rales or rhonchi  Cardiovascular: Regular S1 and S2, normal rate. No murmur, rubs, gallops or clicks  Gastrointestinal: Bowel Sounds present, soft, nontender  Lymph: No peripheral edema   Neurological: Alert, strength and sensitivity are grossly intact  Skin: L LE dressing intact, ecchymoses across bilateral UE  Psych: Mood & affect appropriate    LABS: All Labs Reviewed:                        8.3    7.23  )-----------( 276      ( 05 Dec 2018 04:50 )             25.7                         9.1    6.92  )-----------( 279      ( 04 Dec 2018 04:55 )             28.0                         10.0   9.93  )-----------( 286      ( 03 Dec 2018 12:17 )             31.0     05 Dec 2018 04:50    138    |  100    |  8      ----------------------------<  92     3.6     |  31     |  0.58   04 Dec 2018 04:56    137    |  100    |  11     ----------------------------<  70     3.7     |  27     |  0.58   04 Dec 2018 04:55    137    |  100    |  11     ----------------------------<  71     3.7     |  28     |  0.54     Ca    8.2        05 Dec 2018 04:50  Ca    8.3        04 Dec 2018 04:56  Ca    8.4        04 Dec 2018 04:55    TPro  5.6    /  Alb  2.0    /  TBili  0.7    /  DBili  x      /  AST  17     /  ALT  23     /  AlkPhos  119    04 Dec 2018 04:56  TPro  6.3    /  Alb  2.6    /  TBili  1.1    /  DBili  x      /  AST  21     /  ALT  30     /  AlkPhos  143    03 Dec 2018 12:11    PT/INR - ( 05 Dec 2018 04:50 )   PT: 14.6 sec;   INR: 1.27 ratio    PTT - ( 05 Dec 2018 04:50 )  PTT:29.9 sec    Blood Culture: Organism --  Gram Stain Blood -- Gram Stain   Numerous polymorphonuclear leukocytes seen per low power field  Few Gram positive cocci in pairs seen per oil power field  Specimen Source .Body Fluid Interstitial Fluid  Culture-Blood --    Organism --  Gram Stain Blood -- Gram Stain --  Specimen Source .Abscess Hip - Left  Culture-Blood --    Organism --  Gram Stain Blood -- Gram Stain --  Specimen Source .Blood Blood-Peripheral  Culture-Blood --      RADIOLOGY:  < from: CT Chest No Cont (12.03.18 @ 19:30) >  IMPRESSION:  1. Area of subsegmental linear atelectasis within the left upper lobe,   corresponding to the previously noted x-ray abnormality.  2. Mild scattered peripheral reticular opacities within upper lobe   predominance which may reflect mild fibrosis.  3. Probable left lateral lower chest wall bruising. Correlate with direct   physical examination.  4. Nonobstructing bilateral intrarenal stones.  < end of copied text >      EKG: Sinus rhythm at 75 bpm [facility]         Shannon Valdez, [place], Anusha, Johanny, Sidney, Abdias        [phone] (office)    CHIEF COMPLAINT: Patient is a 73y old  Male who presents with a chief complaint of Redness and swelling around previous L hip surgical site (05 Dec 2018 09:14)    HPI: 72 yo M PMHx 45 pack year smoking hx and COPD (diagnosed "a few years ago, quit smoking then), HTN, HLD, hx of MI and CABG (2008) (stents also placed per chart review), RA (on Medrol daily), hx of multiple L hip surgeries (hip replacement 30 years ago in [place], ORIF recently 11/2018 w/ Dr. Ramirez) p/w L pain, drainage, redness and swelling surrounding upper L hip surgical site beginning 6 days ago.   Patient first noted some pain and redness 6 days ago while in [facility] ([facility]). Patient was prescribed Ceftin w/ symptom progression from pain and erythema to swelling, erythema and purulent drainage.   Patient denied fever, chills, or other signs of infection including nausea, vomiting, productive cough, change in urination or bowel habits. Patient also denied sick contacts, recent falls.   Of note patient endorsed previous surgical site infection on his back after lumbar fusion for spinal stenosis treated with IV antibiotics, which progressed to sepsis with "Strep" isolated 6 years ago, per patient's wife. Also of note, patient had temperatures ranging from T  at [facility] per patient's daughter.   Also of note, patient now endorses diffuse itching and mild diaphoresis starting after antibiotics in ED, denied difficulty swallowing or breathing, lip swelling or dizziness.   In the ED, vitals T 99.4, HR 82, /66, RR 18, Sp02 93% ORA. Patient received vancomycin and aztreonam, and 1 L NS, morphine 4 mg and acetaminophen 650 mg. Ortho consulted, and L pelvis/hip/femur xrays revealed intact L hip hardware, lumbar fusion hardware and diffuse soft tissue swelling. CT results pending.   No Fhx of RA, hip infection (03 Dec 2018 14:32)      Patient seen and examined at bedside for cardiac preop assessment. Patient reports that he sees a cardiologist in [place], Dr. Mar, and was visiting family in [place]. He had L hip ORIF in November, which he reports he tolerated well without postop complications. He has been in rehab where he noted progressively worsening pain, swelling, redness and drainage from the surgical site, starting ~1 week ago. Denied chest pain, palpitations, SOB, dyspnea, PND, orthopnea, near syncope, syncope, or lower extremity edema. Was ambulating with a walker/cane and reports previously exercising regularly on his bike without Stark or angina.      PAST MEDICAL & SURGICAL HISTORY:  S/P CABG x 3  S/P hip replacement, left  Rheumatoid arthritis  Osteoarthritis  COPD (chronic obstructive pulmonary disease)  HTN (hypertension)  S/P lumbar fusion: Approx 2012  History of femur fracture: Repaired 11/19- L hip  History of appendectomy  History of right shoulder replacement  History of total left hip arthroplasty      SOCIAL HISTORY: No active tobacco (former smoker of ~1PPD for ~50 years), alcohol or illicit drug use.    FAMILY HISTORY: History of heart disease in father.    Home Medications:  alendronate 70 mg oral tablet: 1 tab(s) orally once a week (19 Nov 2018 07:30)  ALPRAZolam 0.5 mg oral tablet: 1 tab(s) orally once a day (at bedtime), As Needed (19 Nov 2018 07:30)  ascorbic acid 500 mg oral tablet: 1 tab(s) orally 2 times a day (21 Nov 2018 11:59)  aspirin 81 mg oral tablet: 1 tab(s) orally once a day (19 Nov 2018 07:30)  cyclobenzaprine 10 mg oral tablet: 1 tab(s) orally 2 times a day, As Needed (03 Dec 2018 15:05)  ferrous sulfate 324 mg (65 mg elemental iron) oral tablet: 1 tab(s) orally once a day (21 Nov 2018 11:59)  finasteride 5 mg oral tablet: 1 tab(s) orally once a day (19 Nov 2018 07:30)  FLUoxetine 20 mg oral capsule: 1 cap(s) orally 3 times a day (19 Nov 2018 07:30)  folic acid 1 mg oral tablet: 1 tab(s) orally once a day (21 Nov 2018 11:59)  furosemide 20 mg oral tablet: 1 tab(s) orally once a day (19 Nov 2018 07:30)  lisinopril 20 mg oral tablet: 1 tab(s) orally once a day (19 Nov 2018 07:30)  lovastatin 20 mg oral tablet: 1 tab(s) orally once a day (19 Nov 2018 07:30)  methadone 5 mg oral tablet: 1 tab(s) orally every 8 hours (19 Nov 2018 07:30)  methylPREDNISolone 2 mg oral tablet: 1 tab(s) orally once a day (03 Dec 2018 15:07)  Multiple Vitamins oral tablet: 1 tab(s) orally once a day (21 Nov 2018 11:59)  potassium chloride 10 mEq oral capsule, extended release: 1 cap(s) orally 2 times a day (19 Nov 2018 07:30)  tamsulosin 0.4 mg oral capsule: 1 cap(s) orally once a day (19 Nov 2018 07:30)    MEDICATIONS  (STANDING):  atorvastatin 10 milliGRAM(s) Oral at bedtime  calcium carbonate 1250 mG  + Vitamin D (OsCal 500 + D) 1 Tablet(s) Oral daily  cholecalciferol 1000 Unit(s) Oral daily  famotidine    Tablet 20 milliGRAM(s) Oral two times a day  ferrous    sulfate 325 milliGRAM(s) Oral daily  finasteride 5 milliGRAM(s) Oral daily  FLUoxetine 20 milliGRAM(s) Oral <User Schedule>  furosemide    Tablet 20 milliGRAM(s) Oral daily  hydrocortisone sodium succinate Injectable 50 milliGRAM(s) IV Push once  hydrocortisone sodium succinate Injectable 25 milliGRAM(s) IV Push every 8 hours  lactated ringers. 1000 milliLiter(s) (50 mL/Hr) IV Continuous <Continuous>  lactated ringers. 1000 milliLiter(s) (70 mL/Hr) IV Continuous <Continuous>  lisinopril 20 milliGRAM(s) Oral daily  meropenem  IVPB 2000 milliGRAM(s) IV Intermittent every 8 hours  meropenem  IVPB      methadone    Tablet 5 milliGRAM(s) Oral every 8 hours  methylPREDNISolone 4 milliGRAM(s) Oral daily  polyethylene glycol 3350 17 Gram(s) Oral daily  tiotropium 18 MICROgram(s) Capsule 1 Capsule(s) Inhalation daily  vancomycin  IVPB 1250 milliGRAM(s) IV Intermittent every 12 hours    MEDICATIONS  (PRN):  acetaminophen   Tablet .. 650 milliGRAM(s) Oral every 6 hours PRN Temp greater or equal to 38C (100.4F), Mild Pain (1 - 3)  ALBUTerol    0.083% 2.5 milliGRAM(s) Nebulizer every 6 hours PRN Shortness of Breath and/or Wheezing  ALPRAZolam 0.5 milliGRAM(s) Oral at bedtime PRN anxiety  cyclobenzaprine 10 milliGRAM(s) Oral two times a day PRN Muscle Spasm  hydrocortisone 0.5% Cream 1 Application(s) Topical two times a day PRN Rash and/or Itching      Allergies    Ceftin (Pruritus)    Intolerances        REVIEW OF SYSTEMS: Is negative for eye, ENT, GI, , allergic, dermatologic, musculoskeletal and neurologic, except as described above.    VITAL SIGNS:   Vital Signs Last 24 Hrs  T(C): 36.6 (05 Dec 2018 12:45), Max: 36.9 (04 Dec 2018 20:05)  T(F): 97.8 (05 Dec 2018 12:45), Max: 98.4 (04 Dec 2018 20:05)  HR: 75 (05 Dec 2018 12:45) (67 - 78)  BP: 118/68 (05 Dec 2018 12:45) (103/69 - 123/66)  BP(mean): --  RR: 17 (05 Dec 2018 12:45) (17 - 18)  SpO2: 98% (05 Dec 2018 12:45) (97% - 98%)    I&O's Summary    04 Dec 2018 07:01  -  05 Dec 2018 07:00  --------------------------------------------------------  IN: 0 mL / OUT: 200 mL / NET: -200 mL      PHYSICAL EXAM:  Constitutional: Resting in bed, in mild distress due to pain in L LE  Eyes: EOMI, no oral cyanosis, conjunctivae clear, anicteric  Pulmonary: Non-labored, decreased breath sounds bilaterally, no wheezing, rales or rhonchi  Cardiovascular: Regular S1 and S2, normal rate. No murmur, rubs, gallops or clicks  Gastrointestinal: Bowel Sounds present, soft, nontender  Lymph: No peripheral edema   Neurological: Alert, strength and sensitivity are grossly intact  Skin: L LE dressing intact, ecchymoses across bilateral UE  Psych: Mood & affect appropriate    LABS: All Labs Reviewed:                        8.3    7.23  )-----------( 276      ( 05 Dec 2018 04:50 )             25.7                         9.1    6.92  )-----------( 279      ( 04 Dec 2018 04:55 )             28.0                         10.0   9.93  )-----------( 286      ( 03 Dec 2018 12:17 )             31.0     05 Dec 2018 04:50    138    |  100    |  8      ----------------------------<  92     3.6     |  31     |  0.58   04 Dec 2018 04:56    137    |  100    |  11     ----------------------------<  70     3.7     |  27     |  0.58   04 Dec 2018 04:55    137    |  100    |  11     ----------------------------<  71     3.7     |  28     |  0.54     Ca    8.2        05 Dec 2018 04:50  Ca    8.3        04 Dec 2018 04:56  Ca    8.4        04 Dec 2018 04:55    TPro  5.6    /  Alb  2.0    /  TBili  0.7    /  DBili  x      /  AST  17     /  ALT  23     /  AlkPhos  119    04 Dec 2018 04:56  TPro  6.3    /  Alb  2.6    /  TBili  1.1    /  DBili  x      /  AST  21     /  ALT  30     /  AlkPhos  143    03 Dec 2018 12:11    PT/INR - ( 05 Dec 2018 04:50 )   PT: 14.6 sec;   INR: 1.27 ratio    PTT - ( 05 Dec 2018 04:50 )  PTT:29.9 sec    Blood Culture: Organism --  Gram Stain Blood -- Gram Stain   Numerous polymorphonuclear leukocytes seen per low power field  Few Gram positive cocci in pairs seen per oil power field  Specimen Source .Body Fluid Interstitial Fluid  Culture-Blood --    Organism --  Gram Stain Blood -- Gram Stain --  Specimen Source .Abscess Hip - Left  Culture-Blood --    Organism --  Gram Stain Blood -- Gram Stain --  Specimen Source .Blood Blood-Peripheral  Culture-Blood --      RADIOLOGY:  < from: CT Chest No Cont (12.03.18 @ 19:30) >  IMPRESSION:  1. Area of subsegmental linear atelectasis within the left upper lobe,   corresponding to the previously noted x-ray abnormality.  2. Mild scattered peripheral reticular opacities within upper lobe   predominance which may reflect mild fibrosis.  3. Probable left lateral lower chest wall bruising. Correlate with direct   physical examination.  4. Nonobstructing bilateral intrarenal stones.  < end of copied text >    < from: TTE Echo Doppler w/o Cont (11.19.18 @ 11:09) >  INTERPRETATION:  INDICATION: CAD  Referring M.D.:Paolo  Blood Pressure 114/70        Weight (kg) :81     Height (cm):170       BSA (sq m): 1.98  Technician: AS    Dimensions:    LA 3.5       Normal Values: 2.0 - 4.0 cm    Ao 3.94        Normal Values: 2.0 - 3.8 cm  SEPTUM 1.1       Normal Values: 0.6 - 1.2 cm  PWT 1.1       Normal Values: 0.6 - 1.1 cm  LVIDd 5.1         Normal Values: 3.0 - 5.6 cm  LVIDs 4.05 Normal Values: 1.8 - 4.0 cm    OBSERVATIONS:  Technically difficult study  Mitral Valve: MAC with calcified MV leaflets. Mild mitral regurgitation.  Aortic Valve/Aorta: Mildly calcified trileaflet AV with normal opening.   Mildly dilated aortic root at 3.9  cm  Tricuspid Valve: Normal tricuspid valve. Trace tricuspid regurgitation.  Pulmonic Valve: The pulmonic valve is not well visualized. Probably   normal.  Left Atrium: Mildly enlarged   Right Atrium: Mildly enlarged  Left Ventricle: Endocardium is not well-visualized. Overall there appears   to be normal left ventricular systolic function. The EF is approximately   65%.  Right Ventricle: The right ventricle is not well-visualized. It appears   to be mildly enlarged in some views with normal systolic function.    Pericardium/Pleura: Trace pericardial effusion noted.  Pulmonary/RV Pressure: Insufficient tricuspid regurgitation Doppler in   order to estimate the right ventricular systolic pressure  LV Diastolic Function: Stage I diastolic dysfunction     Conclusion: Overall preserved left ventricular systolic function. EF 65.   Insufficient tricuspid regurgitation Doppler in order to estimate the   right ventricular systolic pressure  < end of copied text >    EKG: Sinus rhythm at 75 bpm, UT interval at ULN

## 2018-12-05 NOTE — PROGRESS NOTE ADULT - SUBJECTIVE AND OBJECTIVE BOX
Date/Time Patient Seen:  		  Referring MD:   Data Reviewed	       Patient is a 73y old  Male who presents with a chief complaint of Redness and swelling around previous L hip surgical site (05 Dec 2018 08:42)    in bed  seen and examined  vs and meds reviewed    Subjective/HPI     PAST MEDICAL & SURGICAL HISTORY:  S/P CABG x 3  S/P hip replacement, left  Rheumatoid arthritis  Osteoarthritis  COPD (chronic obstructive pulmonary disease)  HTN (hypertension)  S/P lumbar fusion: Approx 2012  History of femur fracture: Repaired 11/19- L hip  History of appendectomy  History of right shoulder replacement  History of total left hip arthroplasty        Medication list         MEDICATIONS  (STANDING):  atorvastatin 10 milliGRAM(s) Oral at bedtime  calcium carbonate 1250 mG  + Vitamin D (OsCal 500 + D) 1 Tablet(s) Oral daily  cholecalciferol 1000 Unit(s) Oral daily  famotidine    Tablet 20 milliGRAM(s) Oral two times a day  ferrous    sulfate 325 milliGRAM(s) Oral daily  finasteride 5 milliGRAM(s) Oral daily  FLUoxetine 20 milliGRAM(s) Oral <User Schedule>  furosemide    Tablet 20 milliGRAM(s) Oral daily  hydrocortisone sodium succinate Injectable 50 milliGRAM(s) IV Push once  hydrocortisone sodium succinate Injectable 25 milliGRAM(s) IV Push every 8 hours  lactated ringers. 1000 milliLiter(s) (50 mL/Hr) IV Continuous <Continuous>  lactated ringers. 1000 milliLiter(s) (70 mL/Hr) IV Continuous <Continuous>  lisinopril 20 milliGRAM(s) Oral daily  meropenem  IVPB 2000 milliGRAM(s) IV Intermittent every 8 hours  meropenem  IVPB      methadone    Tablet 5 milliGRAM(s) Oral every 8 hours  methylPREDNISolone 4 milliGRAM(s) Oral daily  polyethylene glycol 3350 17 Gram(s) Oral daily  tiotropium 18 MICROgram(s) Capsule 1 Capsule(s) Inhalation daily  vancomycin  IVPB 1250 milliGRAM(s) IV Intermittent every 12 hours    MEDICATIONS  (PRN):  acetaminophen   Tablet .. 650 milliGRAM(s) Oral every 6 hours PRN Temp greater or equal to 38C (100.4F), Mild Pain (1 - 3)  ALBUTerol    0.083% 2.5 milliGRAM(s) Nebulizer every 6 hours PRN Shortness of Breath and/or Wheezing  ALPRAZolam 0.5 milliGRAM(s) Oral at bedtime PRN anxiety  cyclobenzaprine 10 milliGRAM(s) Oral two times a day PRN Muscle Spasm  hydrocortisone 0.5% Cream 1 Application(s) Topical two times a day PRN Rash and/or Itching         Vitals log        ICU Vital Signs Last 24 Hrs  T(C): 36.8 (05 Dec 2018 04:49), Max: 36.9 (04 Dec 2018 12:00)  T(F): 98.3 (05 Dec 2018 04:49), Max: 98.4 (04 Dec 2018 12:00)  HR: 67 (05 Dec 2018 04:49) (67 - 91)  BP: 123/66 (05 Dec 2018 04:49) (100/62 - 146/83)  BP(mean): --  ABP: --  ABP(mean): --  RR: 17 (05 Dec 2018 04:49) (17 - 19)  SpO2: 98% (05 Dec 2018 04:49) (95% - 98%)           Input and Output:  I&O's Detail    04 Dec 2018 07:01  -  05 Dec 2018 07:00  --------------------------------------------------------  IN:  Total IN: 0 mL    OUT:    Voided: 200 mL  Total OUT: 200 mL    Total NET: -200 mL          Lab Data                        8.3    7.23  )-----------( 276      ( 05 Dec 2018 04:50 )             25.7     12-05    138  |  100  |  8   ----------------------------<  92  3.6   |  31  |  0.58    Ca    8.2<L>      05 Dec 2018 04:50    TPro  5.6<L>  /  Alb  2.0<L>  /  TBili  0.7  /  DBili  x   /  AST  17  /  ALT  23  /  AlkPhos  119  12-04            Review of Systems	      Objective     Physical Examination    heart s1s2  lung dec BS      Pertinent Lab findings & Imaging      Corinna:  NO   Adequate UO     I&O's Detail    04 Dec 2018 07:01  -  05 Dec 2018 07:00  --------------------------------------------------------  IN:  Total IN: 0 mL    OUT:    Voided: 200 mL  Total OUT: 200 mL    Total NET: -200 mL               Discussed with:     Cultures:	  Culture Results:   Numerous Staphylococcus aureus (12-04 @ 14:54)        Radiology

## 2018-12-05 NOTE — PROGRESS NOTE ADULT - SUBJECTIVE AND OBJECTIVE BOX
[facility], Division of Infectious Diseases  GAETANO Briones A. Lee  [phone]    Name: ERIKA TORREZ  Age: 73y  Gender: Male  MRN: [id number]    Interval History--  Notes reviewed. Discussed with Dr. Verduzco yesterday. Patient's gram stain from the aspirate with GPC, stared on broad spectrum coverage yesterday. D/W ortho residents, for washout today, with hardware likely to be retained.     Past Medical History--  S/P CABG x 3  S/P hip replacement, left  Rheumatoid arthritis  Osteoarthritis  COPD (chronic obstructive pulmonary disease)  HTN (hypertension)  S/P lumbar fusion  History of femur fracture  History of appendectomy  History of right shoulder replacement  History of total left hip arthroplasty      For details regarding the patient's social history, family history, and other miscellaneous elements, please refer the initial infectious diseases consultation and/or the admitting history and physical examination for this admission.    Allergies  Ceftin (Pruritus)    Intolerances        Medications--  Antibiotics:  meropenem  IVPB 2000 milliGRAM(s) IV Intermittent every 8 hours  meropenem  IVPB      vancomycin  IVPB 1250 milliGRAM(s) IV Intermittent every 12 hours    Immunologic:    Other:  acetaminophen   Tablet .. PRN  ALBUTerol    0.083% PRN  ALPRAZolam PRN  atorvastatin  calcium carbonate 1250 mG  + Vitamin D (OsCal 500 + D)  cholecalciferol  cyclobenzaprine PRN  famotidine    Tablet  ferrous    sulfate  finasteride  FLUoxetine  furosemide    Tablet  hydrocortisone 0.5% Cream PRN  hydrocortisone sodium succinate Injectable  hydrocortisone sodium succinate Injectable  lactated ringers.  lactated ringers.  lisinopril  methadone    Tablet  methylPREDNISolone  polyethylene glycol 3350  tiotropium 18 MICROgram(s) Capsule      Review of Systems--  A 10-point review of systems was obtained.   Review of systems otherwise unchanged compared to prior visit except as previously noted.    Physical Examination--  Vital Signs: T(F): 98.3 (12-05-18 @ 04:49), Max: 98.4 (12-04-18 @ 20:05)  HR: 67 (12-05-18 @ 04:49)  BP: 123/66 (12-05-18 @ 04:49)  RR: 17 (12-05-18 @ 04:49)  SpO2: 98% (12-05-18 @ 04:49)  Wt(kg): --  General: Nontoxic-appearing Male in no acute distress.  HEENT: AT/NC. Anicteric. Conjunctiva pink and moist. Oropharynx clear.   Neck: Not rigid. No sense of mass.  Nodes: None palpable.  Lungs: Diminished breath sounds bilaterally without rales, wheezing or rhonchi  Heart: Regular rate and rhythm. No Murmur. No rub. No gallop. No palpable thrill.  Abdomen: Bowel sounds present and normoactive. Soft. Nondistended. Nontender. No sense of mass. No organomegaly. Distended. Obese  Extremities: No cyanosis or clubbing. L thigh erythema/calor/edema about the same.   Skin: Warm. Dry. Good turgor. No rash. No vasculitic stigmata.  Psychiatric: Appropriate affect and mood for situation.         Laboratory Studies--  CBC                        8.3    7.23  )-----------( 276      ( 05 Dec 2018 04:50 )             25.7       Chemistries  12-05    138  |  100  |  8   ----------------------------<  92  3.6   |  31  |  0.58    Ca    8.2<L>      05 Dec 2018 04:50    TPro  5.6<L>  /  Alb  2.0<L>  /  TBili  0.7  /  DBili  x   /  AST  17  /  ALT  23  /  AlkPhos  119  12-04      Culture Data    Culture - Body Fluid with Gram Stain (collected 04 Dec 2018 14:54)  Source: .Body Fluid Interstitial Fluid  Gram Stain (04 Dec 2018 16:03):    Numerous polymorphonuclear leukocytes seen per low power field    Few Gram positive cocci in pairs seen per oil power field  Preliminary Report (05 Dec 2018 08:37):    Numerous Staphylococcus aureus    Culture - Abscess with Gram Stain (collected 03 Dec 2018 16:52)  Source: .Abscess Hip - Left  Preliminary Report (04 Dec 2018 18:10):    Few Staphylococcus aureus    Culture - Blood (collected 03 Dec 2018 16:38)  Source: .Blood Blood-Peripheral  Preliminary Report (04 Dec 2018 17:10):    No growth to date.    Culture - Blood (collected 03 Dec 2018 16:38)  Source: .Blood Blood-Peripheral  Preliminary Report (04 Dec 2018 17:10):    No growth to date.

## 2018-12-05 NOTE — PROGRESS NOTE ADULT - PROBLEM SELECTOR PLAN 1
eval in progress  IR procedure noted  ortho follow up  emp ABX  sherie - refuses CPAP  ct chest reviewed  I aundrea  mobilize if ok with ortho  pain regimen  dvt p  albuterol PRN and Spiriva for COPD  on steroids chronic / stress dose- RA   supportive measures  medical management of comorbidities

## 2018-12-06 DIAGNOSIS — N17.9 ACUTE KIDNEY FAILURE, UNSPECIFIED: ICD-10-CM

## 2018-12-06 LAB
-  AMPICILLIN/SULBACTAM: SIGNIFICANT CHANGE UP
-  CEFAZOLIN: SIGNIFICANT CHANGE UP
-  CLINDAMYCIN: SIGNIFICANT CHANGE UP
-  ERYTHROMYCIN: SIGNIFICANT CHANGE UP
-  GENTAMICIN: SIGNIFICANT CHANGE UP
-  OXACILLIN: SIGNIFICANT CHANGE UP
-  PENICILLIN: SIGNIFICANT CHANGE UP
-  RIFAMPIN: SIGNIFICANT CHANGE UP
-  TETRACYCLINE: SIGNIFICANT CHANGE UP
-  TRIMETHOPRIM/SULFAMETHOXAZOLE: SIGNIFICANT CHANGE UP
-  VANCOMYCIN: SIGNIFICANT CHANGE UP
ANION GAP SERPL CALC-SCNC: 9 MMOL/L — SIGNIFICANT CHANGE UP (ref 5–17)
BASOPHILS # BLD AUTO: 0.02 K/UL — SIGNIFICANT CHANGE UP (ref 0–0.2)
BASOPHILS NFR BLD AUTO: 0.1 % — SIGNIFICANT CHANGE UP (ref 0–2)
BUN SERPL-MCNC: 11 MG/DL — SIGNIFICANT CHANGE UP (ref 7–23)
CALCIUM SERPL-MCNC: 8.4 MG/DL — LOW (ref 8.5–10.1)
CHLORIDE SERPL-SCNC: 100 MMOL/L — SIGNIFICANT CHANGE UP (ref 96–108)
CO2 SERPL-SCNC: 29 MMOL/L — SIGNIFICANT CHANGE UP (ref 22–31)
CREAT SERPL-MCNC: 1.4 MG/DL — HIGH (ref 0.5–1.3)
EOSINOPHIL # BLD AUTO: 0.01 K/UL — SIGNIFICANT CHANGE UP (ref 0–0.5)
EOSINOPHIL NFR BLD AUTO: 0.1 % — SIGNIFICANT CHANGE UP (ref 0–6)
GLUCOSE SERPL-MCNC: 139 MG/DL — HIGH (ref 70–99)
HCT VFR BLD CALC: 26.9 % — LOW (ref 39–50)
HGB BLD-MCNC: 8.7 G/DL — LOW (ref 13–17)
IMM GRANULOCYTES NFR BLD AUTO: 1.1 % — SIGNIFICANT CHANGE UP (ref 0–1.5)
LYMPHOCYTES # BLD AUTO: 0.62 K/UL — LOW (ref 1–3.3)
LYMPHOCYTES # BLD AUTO: 4.4 % — LOW (ref 13–44)
MCHC RBC-ENTMCNC: 31.1 PG — SIGNIFICANT CHANGE UP (ref 27–34)
MCHC RBC-ENTMCNC: 32.3 GM/DL — SIGNIFICANT CHANGE UP (ref 32–36)
MCV RBC AUTO: 96.1 FL — SIGNIFICANT CHANGE UP (ref 80–100)
METHOD TYPE: SIGNIFICANT CHANGE UP
MONOCYTES # BLD AUTO: 0.62 K/UL — SIGNIFICANT CHANGE UP (ref 0–0.9)
MONOCYTES NFR BLD AUTO: 4.4 % — SIGNIFICANT CHANGE UP (ref 2–14)
NEUTROPHILS # BLD AUTO: 12.76 K/UL — HIGH (ref 1.8–7.4)
NEUTROPHILS NFR BLD AUTO: 89.9 % — HIGH (ref 43–77)
PLATELET # BLD AUTO: 309 K/UL — SIGNIFICANT CHANGE UP (ref 150–400)
POTASSIUM SERPL-MCNC: 4.3 MMOL/L — SIGNIFICANT CHANGE UP (ref 3.5–5.3)
POTASSIUM SERPL-SCNC: 4.3 MMOL/L — SIGNIFICANT CHANGE UP (ref 3.5–5.3)
RBC # BLD: 2.8 M/UL — LOW (ref 4.2–5.8)
RBC # FLD: 15.9 % — HIGH (ref 10.3–14.5)
SODIUM SERPL-SCNC: 138 MMOL/L — SIGNIFICANT CHANGE UP (ref 135–145)
VANCOMYCIN TROUGH SERPL-MCNC: 17.2 UG/ML — SIGNIFICANT CHANGE UP (ref 10–20)
WBC # BLD: 14.18 K/UL — HIGH (ref 3.8–10.5)
WBC # FLD AUTO: 14.18 K/UL — HIGH (ref 3.8–10.5)

## 2018-12-06 PROCEDURE — 77001 FLUOROGUIDE FOR VEIN DEVICE: CPT | Mod: 26

## 2018-12-06 PROCEDURE — 36569 INSJ PICC 5 YR+ W/O IMAGING: CPT

## 2018-12-06 PROCEDURE — 76937 US GUIDE VASCULAR ACCESS: CPT | Mod: 26

## 2018-12-06 PROCEDURE — 99232 SBSQ HOSP IP/OBS MODERATE 35: CPT

## 2018-12-06 PROCEDURE — 99233 SBSQ HOSP IP/OBS HIGH 50: CPT | Mod: GC

## 2018-12-06 RX ORDER — CEFAZOLIN SODIUM 1 G
2000 VIAL (EA) INJECTION EVERY 24 HOURS
Qty: 0 | Refills: 0 | Status: DISCONTINUED | OUTPATIENT
Start: 2018-12-07 | End: 2018-12-07

## 2018-12-06 RX ORDER — ALBUTEROL 90 UG/1
2.5 AEROSOL, METERED ORAL
Qty: 0 | Refills: 0 | Status: DISCONTINUED | OUTPATIENT
Start: 2018-12-06 | End: 2018-12-08

## 2018-12-06 RX ORDER — SODIUM CHLORIDE 9 MG/ML
1000 INJECTION INTRAMUSCULAR; INTRAVENOUS; SUBCUTANEOUS
Qty: 0 | Refills: 0 | Status: DISCONTINUED | OUTPATIENT
Start: 2018-12-06 | End: 2018-12-07

## 2018-12-06 RX ORDER — HEPARIN SODIUM 5000 [USP'U]/ML
5000 INJECTION INTRAVENOUS; SUBCUTANEOUS EVERY 12 HOURS
Qty: 0 | Refills: 0 | Status: DISCONTINUED | OUTPATIENT
Start: 2018-12-07 | End: 2018-12-10

## 2018-12-06 RX ORDER — CEFAZOLIN SODIUM 1 G
2000 VIAL (EA) INJECTION ONCE
Qty: 0 | Refills: 0 | Status: COMPLETED | OUTPATIENT
Start: 2018-12-06 | End: 2018-12-06

## 2018-12-06 RX ORDER — CEFAZOLIN SODIUM 1 G
VIAL (EA) INJECTION
Qty: 0 | Refills: 0 | Status: DISCONTINUED | OUTPATIENT
Start: 2018-12-06 | End: 2018-12-07

## 2018-12-06 RX ORDER — VANCOMYCIN HCL 1 G
1250 VIAL (EA) INTRAVENOUS EVERY 12 HOURS
Qty: 0 | Refills: 0 | Status: DISCONTINUED | OUTPATIENT
Start: 2018-12-06 | End: 2018-12-06

## 2018-12-06 RX ORDER — SODIUM CHLORIDE 9 MG/ML
500 INJECTION INTRAMUSCULAR; INTRAVENOUS; SUBCUTANEOUS ONCE
Qty: 0 | Refills: 0 | Status: COMPLETED | OUTPATIENT
Start: 2018-12-06 | End: 2018-12-06

## 2018-12-06 RX ORDER — MEROPENEM 1 G/30ML
2000 INJECTION INTRAVENOUS EVERY 8 HOURS
Qty: 0 | Refills: 0 | Status: DISCONTINUED | OUTPATIENT
Start: 2018-12-06 | End: 2018-12-06

## 2018-12-06 RX ORDER — ALBUTEROL 90 UG/1
2.5 AEROSOL, METERED ORAL EVERY 8 HOURS
Qty: 0 | Refills: 0 | Status: DISCONTINUED | OUTPATIENT
Start: 2018-12-06 | End: 2018-12-08

## 2018-12-06 RX ADMIN — OXYCODONE HYDROCHLORIDE 10 MILLIGRAM(S): 5 TABLET ORAL at 10:05

## 2018-12-06 RX ADMIN — Medication 3 MILLILITER(S): at 00:38

## 2018-12-06 RX ADMIN — SODIUM CHLORIDE 1000 MILLILITER(S): 9 INJECTION INTRAMUSCULAR; INTRAVENOUS; SUBCUTANEOUS at 12:00

## 2018-12-06 RX ADMIN — TIOTROPIUM BROMIDE 1 CAPSULE(S): 18 CAPSULE ORAL; RESPIRATORY (INHALATION) at 06:42

## 2018-12-06 RX ADMIN — Medication 325 MILLIGRAM(S): at 12:13

## 2018-12-06 RX ADMIN — Medication 1000 UNIT(S): at 12:14

## 2018-12-06 RX ADMIN — ALBUTEROL 2.5 MILLIGRAM(S): 90 AEROSOL, METERED ORAL at 23:08

## 2018-12-06 RX ADMIN — Medication 500 MILLIGRAM(S): at 05:24

## 2018-12-06 RX ADMIN — Medication 166.67 MILLIGRAM(S): at 05:24

## 2018-12-06 RX ADMIN — METHADONE HYDROCHLORIDE 5 MILLIGRAM(S): 40 TABLET ORAL at 16:01

## 2018-12-06 RX ADMIN — Medication 100 MILLIGRAM(S): at 20:55

## 2018-12-06 RX ADMIN — Medication 100 MILLIGRAM(S): at 12:54

## 2018-12-06 RX ADMIN — CYCLOBENZAPRINE HYDROCHLORIDE 10 MILLIGRAM(S): 10 TABLET, FILM COATED ORAL at 20:54

## 2018-12-06 RX ADMIN — Medication 3 MILLILITER(S): at 07:32

## 2018-12-06 RX ADMIN — ATORVASTATIN CALCIUM 10 MILLIGRAM(S): 80 TABLET, FILM COATED ORAL at 20:54

## 2018-12-06 RX ADMIN — ALBUTEROL 2.5 MILLIGRAM(S): 90 AEROSOL, METERED ORAL at 13:41

## 2018-12-06 RX ADMIN — METHADONE HYDROCHLORIDE 5 MILLIGRAM(S): 40 TABLET ORAL at 06:41

## 2018-12-06 RX ADMIN — Medication 100 MILLIGRAM(S): at 15:28

## 2018-12-06 RX ADMIN — Medication 25 MILLIGRAM(S): at 06:42

## 2018-12-06 RX ADMIN — Medication 20 MILLIGRAM(S): at 15:28

## 2018-12-06 RX ADMIN — SODIUM CHLORIDE 50 MILLILITER(S): 9 INJECTION INTRAMUSCULAR; INTRAVENOUS; SUBCUTANEOUS at 12:24

## 2018-12-06 RX ADMIN — Medication 20 MILLIGRAM(S): at 07:01

## 2018-12-06 RX ADMIN — Medication 500 MILLIGRAM(S): at 17:27

## 2018-12-06 RX ADMIN — FAMOTIDINE 20 MILLIGRAM(S): 10 INJECTION INTRAVENOUS at 17:27

## 2018-12-06 RX ADMIN — Medication 1 MILLIGRAM(S): at 12:14

## 2018-12-06 RX ADMIN — Medication 1 TABLET(S): at 12:14

## 2018-12-06 RX ADMIN — Medication 100 MILLIGRAM(S): at 06:41

## 2018-12-06 RX ADMIN — FAMOTIDINE 20 MILLIGRAM(S): 10 INJECTION INTRAVENOUS at 05:24

## 2018-12-06 RX ADMIN — ENOXAPARIN SODIUM 40 MILLIGRAM(S): 100 INJECTION SUBCUTANEOUS at 05:24

## 2018-12-06 RX ADMIN — POLYETHYLENE GLYCOL 3350 17 GRAM(S): 17 POWDER, FOR SOLUTION ORAL at 12:14

## 2018-12-06 RX ADMIN — METHADONE HYDROCHLORIDE 5 MILLIGRAM(S): 40 TABLET ORAL at 20:57

## 2018-12-06 RX ADMIN — FINASTERIDE 5 MILLIGRAM(S): 5 TABLET, FILM COATED ORAL at 12:13

## 2018-12-06 RX ADMIN — Medication 1000 MILLIGRAM(S): at 00:00

## 2018-12-06 RX ADMIN — ALBUTEROL 2.5 MILLIGRAM(S): 90 AEROSOL, METERED ORAL at 13:40

## 2018-12-06 RX ADMIN — MEROPENEM 200 MILLIGRAM(S): 1 INJECTION INTRAVENOUS at 06:42

## 2018-12-06 RX ADMIN — Medication 4 MILLIGRAM(S): at 05:24

## 2018-12-06 RX ADMIN — Medication 25 MILLIGRAM(S): at 15:28

## 2018-12-06 RX ADMIN — SODIUM CHLORIDE 50 MILLILITER(S): 9 INJECTION INTRAMUSCULAR; INTRAVENOUS; SUBCUTANEOUS at 10:41

## 2018-12-06 NOTE — PHYSICAL THERAPY INITIAL EVALUATION ADULT - ADDITIONAL COMMENTS
Pt lives at home in a private house /c his spouse in [place] /c 1 KAYCEE and no rails. No stairs to negotiate inside. At pt daughter house he has multiple steps to enter but states through the backwards there is only ~ 3 KAYCEE. Pt initially reports being independent /c all functional mobility and ADLs prior to admission however during interviewing pt admits that he needs assistance for transfers and ambulation ~25% and needs assistance for donning and doffing shoes and socks. Supervision needed during showering. Pt does have a rolling walker, single axis cane, motorized scooter, built in shower seat and raised toilet seat. Pt states his wife assists him as needed. Pt /c history of chronic pain due to Fibromyalgia, LLE neuropathy, back and shoulder pain. Pt has been on Methadone for 30 years. Also on muscle relaxers prior to admission. Pt noted to be a questionable historian? Pt has been in [facility] for 2 weeks in which he was assisted for mobility.

## 2018-12-06 NOTE — PHYSICAL THERAPY INITIAL EVALUATION ADULT - GENERAL OBSERVATIONS, REHAB EVAL
Pt rec'd in chair in room on 3 west /c IV to LUE and portable small wound vac to left hip. Pt is know to this PT from last admission. Pt denies any pain & agreeable /c PT eval.

## 2018-12-06 NOTE — PROGRESS NOTE ADULT - SUBJECTIVE AND OBJECTIVE BOX
[facility], Division of Infectious Diseases  GAETANO Briones A. Lee  [phone]    Name: ERIKA TORREZ  Age: 73y  Gender: Male  MRN: [id number]    Interval History--  Notes reviewed. Discussed with ortho resident, gross pus encountered but no clear connection to the underlying hardware.  Patient feeling ok. No fevers, chills, or rigors. No complaints presently.    Noted to have LIOR this AM, had been hypotensive. Getting IVF.     Past Medical History--  S/P CABG x 3  S/P hip replacement, left  Rheumatoid arthritis  Osteoarthritis  COPD (chronic obstructive pulmonary disease)  HTN (hypertension)  S/P lumbar fusion  History of femur fracture  History of appendectomy  History of right shoulder replacement  History of total left hip arthroplasty      For details regarding the patient's social history, family history, and other miscellaneous elements, please refer the initial infectious diseases consultation and/or the admitting history and physical examination for this admission.    Allergies    Ceftin (Pruritus)    Intolerances        Medications--  Antibiotics:    Immunologic:    Other:  acetaminophen   Tablet .. PRN  ALBUTerol    0.083%  ALBUTerol    0.083% PRN  ALPRAZolam PRN  ascorbic acid  atorvastatin  benzocaine 15 mG/menthol 3.6 mG Lozenge PRN  calcium carbonate 1250 mG  + Vitamin D (OsCal 500 + D)  cholecalciferol  cyclobenzaprine PRN  diphenhydrAMINE PRN  diphenhydrAMINE PRN  docusate sodium  famotidine    Tablet  ferrous    sulfate  finasteride  FLUoxetine  folic acid  furosemide    Tablet  heparin  Injectable  hydrocortisone 0.5% Cream PRN  hydrocortisone sodium succinate Injectable  HYDROmorphone  Injectable PRN  lisinopril  magnesium hydroxide Suspension PRN  methadone    Tablet  methylPREDNISolone  multivitamin  ondansetron Injectable PRN  ondansetron Injectable PRN  oxyCODONE    IR PRN  oxyCODONE    IR PRN  polyethylene glycol 3350  sodium chloride 0.9% Bolus  sodium chloride 0.9%.      Review of Systems--  A 10-point review of systems was obtained.   Review of systems otherwise unchanged compared to prior visit except as previously noted.    Physical Examination--  Vital Signs: T(F): 97.6 (12-06-18 @ 10:15), Max: 98.6 (12-05-18 @ 22:12)  HR: 96 (12-06-18 @ 10:15)  BP: 88/55 (12-06-18 @ 10:15)  RR: 12 (12-06-18 @ 10:15)  SpO2: 94% (12-06-18 @ 07:30)  Wt(kg): --  General: Nontoxic-appearing Male in no acute distress.  HEENT: AT/NC. Anicteric. Conjunctiva pink and moist. Oropharynx clear.   Neck: Not rigid. No sense of mass.  Nodes: None palpable.  Lungs: Diminished breath sounds bilaterally without rales, wheezing or rhonchi  Heart: Regular rate and rhythm. No Murmur. No rub. No gallop. No palpable thrill.  Abdomen: Bowel sounds present and normoactive. Soft. Nondistended. Nontender. No sense of mass. No organomegaly. Obese.   Extremities: No cyanosis or clubbing. L thigh dressed.  Skin: Warm. Dry. Good turgor. No rash. No vasculitic stigmata.  Psychiatric: Appropriate affect and mood for situation.         Laboratory Studies--  CBC                        8.7    14.18 )-----------( 309      ( 06 Dec 2018 05:28 )             26.9       Chemistries  12-06    138  |  100  |  11  ----------------------------<  139<H>  4.3   |  29  |  1.40<H>    Ca    8.4<L>      06 Dec 2018 05:28        Culture Data    Culture - Body Fluid with Gram Stain (collected 04 Dec 2018 14:54)  Source: .Body Fluid Interstitial Fluid  Gram Stain (04 Dec 2018 16:03):    Numerous polymorphonuclear leukocytes seen per low power field    Few Gram positive cocci in pairs seen per oil power field  Preliminary Report (05 Dec 2018 08:37):    Numerous Staphylococcus aureus  Organism: Staphylococcus aureus (06 Dec 2018 09:42)  Organism: Staphylococcus aureus (06 Dec 2018 09:42)    Culture - Abscess with Gram Stain (collected 03 Dec 2018 16:52)  Source: .Abscess Hip - Left  Preliminary Report (04 Dec 2018 18:10):    Few Staphylococcus aureus  Organism: Staphylococcus aureus (05 Dec 2018 19:42)  Organism: Staphylococcus aureus (05 Dec 2018 19:42)    Culture - Blood (collected 03 Dec 2018 16:38)  Source: .Blood Blood-Peripheral  Preliminary Report (04 Dec 2018 17:10):    No growth to date.    Culture - Blood (collected 03 Dec 2018 16:38)  Source: .Blood Blood-Peripheral  Preliminary Report (04 Dec 2018 17:10):    No growth to date.

## 2018-12-06 NOTE — PROGRESS NOTE ADULT - SUBJECTIVE AND OBJECTIVE BOX
[facility] -- Shannon Valdez, Anusha Villasenor, Sidney Restrepo Savella  Office # [phone]      Follow Up:    Preop cardiac assessment  Subjective/Observations:   No events overnight resting comfortably in bed without complaints     REVIEW OF SYSTEMS: All other review of systems is negative unless indicated above    PAST MEDICAL & SURGICAL HISTORY:  S/P CABG x 3  S/P hip replacement, left  Rheumatoid arthritis  Osteoarthritis  COPD (chronic obstructive pulmonary disease)  HTN (hypertension)  S/P lumbar fusion: Approx 2012  History of femur fracture: Repaired 11/19- L hip  History of appendectomy  History of right shoulder replacement  History of total left hip arthroplasty      MEDICATIONS  (STANDING):  ALBUTerol    0.083% 2.5 milliGRAM(s) Nebulizer every 8 hours  ascorbic acid 500 milliGRAM(s) Oral two times a day  atorvastatin 10 milliGRAM(s) Oral at bedtime  calcium carbonate 1250 mG  + Vitamin D (OsCal 500 + D) 1 Tablet(s) Oral daily  ceFAZolin   IVPB      cholecalciferol 1000 Unit(s) Oral daily  docusate sodium 100 milliGRAM(s) Oral three times a day  famotidine    Tablet 20 milliGRAM(s) Oral two times a day  ferrous    sulfate 325 milliGRAM(s) Oral daily  finasteride 5 milliGRAM(s) Oral daily  FLUoxetine 20 milliGRAM(s) Oral <User Schedule>  folic acid 1 milliGRAM(s) Oral daily  furosemide    Tablet 20 milliGRAM(s) Oral daily  heparin  Injectable 5000 Unit(s) SubCutaneous every 12 hours  hydrocortisone sodium succinate Injectable 25 milliGRAM(s) IV Push every 8 hours  lisinopril 20 milliGRAM(s) Oral daily  methadone    Tablet 5 milliGRAM(s) Oral every 8 hours  methylPREDNISolone 4 milliGRAM(s) Oral daily  multivitamin 1 Tablet(s) Oral daily  polyethylene glycol 3350 17 Gram(s) Oral daily  sodium chloride 0.9%. 1000 milliLiter(s) (50 mL/Hr) IV Continuous <Continuous>    MEDICATIONS  (PRN):  acetaminophen   Tablet .. 650 milliGRAM(s) Oral every 6 hours PRN Temp greater or equal to 38C (100.4F)  ALBUTerol    0.083% 2.5 milliGRAM(s) Nebulizer every 3 hours PRN Shortness of Breath and/or Wheezing  ALPRAZolam 0.5 milliGRAM(s) Oral at bedtime PRN anxiety  benzocaine 15 mG/menthol 3.6 mG Lozenge 1 Lozenge Oral every 3 hours PRN Sore Throat  cyclobenzaprine 10 milliGRAM(s) Oral two times a day PRN Muscle Spasm  diphenhydrAMINE 25 milliGRAM(s) Oral at bedtime PRN Insomnia  diphenhydrAMINE 25 milliGRAM(s) Oral every 4 hours PRN Rash and/or Itching  hydrocortisone 0.5% Cream 1 Application(s) Topical two times a day PRN Rash and/or Itching  HYDROmorphone  Injectable 1 milliGRAM(s) IV Push every 4 hours PRN Severe Pain (7 - 10)  magnesium hydroxide Suspension 30 milliLiter(s) Oral daily PRN Constipation  ondansetron Injectable 4 milliGRAM(s) IV Push once PRN Nausea and/or Vomiting  ondansetron Injectable 4 milliGRAM(s) IV Push every 6 hours PRN Nausea and/or Vomiting  oxyCODONE    IR 10 milliGRAM(s) Oral every 4 hours PRN Moderate Pain (4 - 6)  oxyCODONE    IR 5 milliGRAM(s) Oral every 4 hours PRN Mild Pain (1 - 3)      Allergies    Ceftin (Pruritus)    Intolerances        Vital Signs Last 24 Hrs  T(C): 36.4 (06 Dec 2018 10:15), Max: 37 (05 Dec 2018 22:12)  T(F): 97.6 (06 Dec 2018 10:15), Max: 98.6 (05 Dec 2018 22:12)  HR: 96 (06 Dec 2018 10:15) (70 - 98)  BP: 88/55 (06 Dec 2018 10:15) (88/55 - 137/74)  BP(mean): --  RR: 12 (06 Dec 2018 10:15) (12 - 17)  SpO2: 94% (06 Dec 2018 07:30) (92% - 100%)    I&O's Summary    05 Dec 2018 07:01  -  06 Dec 2018 07:00  --------------------------------------------------------  IN: 2485 mL / OUT: 450 mL / NET: 2035 mL    06 Dec 2018 07:01  -  06 Dec 2018 13:15  --------------------------------------------------------  IN: 500 mL / OUT: 0 mL / NET: 500 mL          PHYSICAL EXAM:  TELE: Off tele   Constitutional: NAD, awake and alert, well-developed  HEENT: Moist Mucous Membranes, Anicteric  Pulmonary: Non-labored, breath sounds are clear bilaterally, No wheezing, crackles or rhonchi  Cardiovascular: Regular, S1 and S2 nl, No murmurs, rubs, gallops or clicks  Gastrointestinal: Bowel Sounds present, soft, nontender.   Lymph: No lymphadenopathy. No peripheral edema.  Skin: No visible rashes or ulcers.  Psych:  Mood & affect appropriate    LABS: All Labs Reviewed:                        8.7    14.18 )-----------( 309      ( 06 Dec 2018 05:28 )             26.9                         8.3    7.23  )-----------( 276      ( 05 Dec 2018 04:50 )             25.7                         9.1    6.92  )-----------( 279      ( 04 Dec 2018 04:55 )             28.0     06 Dec 2018 05:28    138    |  100    |  11     ----------------------------<  139    4.3     |  29     |  1.40   05 Dec 2018 04:50    138    |  100    |  8      ----------------------------<  92     3.6     |  31     |  0.58   04 Dec 2018 04:56    137    |  100    |  11     ----------------------------<  70     3.7     |  27     |  0.58     Ca    8.4        06 Dec 2018 05:28  Ca    8.2        05 Dec 2018 04:50  Ca    8.3        04 Dec 2018 04:56    TPro  5.6    /  Alb  2.0    /  TBili  0.7    /  DBili  x      /  AST  17     /  ALT  23     /  AlkPhos  119    04 Dec 2018 04:56    PT/INR - ( 05 Dec 2018 04:50 )   PT: 14.6 sec;   INR: 1.27 ratio         PTT - ( 05 Dec 2018 04:50 )  PTT:29.9 sec         ECG:  < from: 12 Lead ECG (12.04.18 @ 01:38) >  Ventricular Rate 75 BPM    Atrial Rate 75 BPM    P-R Interval 214 ms    QRS Duration 108 ms    Q-T Interval 404 ms    QTC Calculation(Bezet) 451 ms    P Axis 37 degrees    R Axis -26 degrees    T Axis 50 degrees    Diagnosis Line Sinus rhythm with sinus arrhythmia with 1st degree AV block  Confirmed by EVITA RESTREPO ([id number]) on 12/4/2018 10:14:55 AM    < end of copied text >    Echo:  < from: TTE Echo Doppler w/o Cont (11.19.18 @ 11:09) >  EXAM:  ECHO TTE WO CON COMP W DOPPLR         PROCEDURE DATE:  11/19/2018        INTERPRETATION:  INDICATION: CAD  Referring M.D.:Paolo  Blood Pressure 114/70        Weight (kg) :81     Height (cm):170       BSA (sq m): 1.98  Technician: AS    Dimensions:    LA 3.5       Normal Values: 2.0 - 4.0 cm    Ao 3.94        Normal Values: 2.0 - 3.8 cm  SEPTUM 1.1       Normal Values: 0.6 - 1.2 cm  PWT 1.1       Normal Values: 0.6 - 1.1 cm  LVIDd 5.1         Normal Values: 3.0 - 5.6 cm  LVIDs 4.05 Normal Values: 1.8 - 4.0 cm      OBSERVATIONS:  Technically difficult study  Mitral Valve: MAC with calcified MV leaflets. Mild mitral regurgitation.  Aortic Valve/Aorta: Mildly calcified trileaflet AV with normal opening.   Mildly dilated aortic root at 3.9  cm  Tricuspid Valve: Normal tricuspid valve. Trace tricuspid regurgitation.  Pulmonic Valve: The pulmonic valve is not well visualized. Probably   normal.  Left Atrium: Mildly enlarged   Right Atrium: Mildly enlarged  Left Ventricle: Endocardium is not well-visualized. Overall there appears   to be normal left ventricular systolic function. The EF is approximately   65%.  Right Ventricle: The right ventricle is not well-visualized. It appears   to be mildly enlarged in some views with normal systolic function.    Pericardium/Pleura: Trace pericardial effusion noted.  Pulmonary/RV Pressure: Insufficient tricuspid regurgitation Doppler in   order to estimate the right ventricular systolic pressure  LV Diastolic Function: Stage I diastolic dysfunction     Conclusion: Overall preserved left ventricular systolic function. EF 65.   Insufficient tricuspid regurgitation Doppler in order to estimate the   right ventricular systolic pressure                  CARMEN BAE M.D., ATTENDING CARDIOLOGIST  This document has been electronically signed. Nov 20 2018  3:56PM        < end of copied text >    Radiology:  < from: US Guided Needle Placement SI (12.04.18 @ 09:49) >  EXAM:  IR PROCEDURE NON PICC                          EXAM:  SP FLUOROSCOPY TO 1 HOUR                          EXAM:  US GUIDANCE NDL PLC SI                            PROCEDURE DATE:  12/04/2018          INTERPRETATION:  Ultrasound guided fluid aspiration    CLINICAL HISTORY:  Left thigh hematoma after recent hip surgery with   clinical suspicion of underlying infection    PROCEDURE: The patient was brought to the interventional suite and placed   in a supine position. Physiological monitoringand oxygen saturation were   assessed throughout the procedure.  A time-out was performed before   starting the procedure to verify that the correct patient was present for   the appropriate procedure.      Sonographic evaluation of the patient's left lateral thigh was performed.   A suitable level was selected and the skin at that level was prepped and   draped in the usual sterile fashion. After injection of local anesthesia   (Lidocaine 1%), a 20 gauge needle was advanced and its position was   verified with ultrasound imaging. Fluid was aspirated. The needle was   removed.     The specimen obtained was placed in appropriate laboratory solution or   container. A sterile dressing was then applied.     The patient tolerated the procedure well and was discharged from the   interventional suite in stable condition.     INTERPRETATION: Approximately 4 mL of nonfoul smelling turbid   serosanguineous fluid was removed.     Thank you for this referral.                 KETURAH VALENTINE M.D., ATTENDING RADIOLOGIST  This document has been electronically signed. Dec  4 2018  1:55PM    < end of copied text >           [facility]   Cardiology

## 2018-12-06 NOTE — PHYSICAL THERAPY INITIAL EVALUATION ADULT - SKIN COLOR/CHARACTERISTICS
left hip dressing, erythremia and swelling to left hip ,callous, dry and flakiness to feet, left > right, discoloration to BLE hands and shins/redness blanchable

## 2018-12-06 NOTE — PHYSICAL THERAPY INITIAL EVALUATION ADULT - IMPAIRMENTS FOUND, PT EVAL
aerobic capacity/endurance/arousal, attention, and cognition/fine motor/tone/integumentary integrity/joint integrity and mobility/muscle strength/posture/sensory integrity/gross motor/gait, locomotion, and balance

## 2018-12-06 NOTE — PHYSICAL THERAPY INITIAL EVALUATION ADULT - BALANCE DISTURBANCE, IDENTIFIED IMPAIRMENT CONTRIBUTE, REHAB EVAL
decreased ROM/decreased sensation/decreased strength/impaired coordination/impaired postural control/impaired motor control/abnormal muscle tone

## 2018-12-06 NOTE — DISCHARGE NOTE ADULT - HOME CARE AGENCY
[facility]   [phone]    [facility] infusion services  [phone]

## 2018-12-06 NOTE — DISCHARGE NOTE ADULT - MEDICATION SUMMARY - MEDICATIONS TO TAKE
I will START or STAY ON the medications listed below when I get home from the hospital:    3-in-1 commode  -- disp: 1  ICD-10: R26.89 - gait instability  -- Indication: For Ambulatory Assistance    ancillary supplies  -- ancillary supplies, normal saline flush for pre and post intravenous infusion  -- Indication: For IV infusion supplies    finasteride 5 mg oral tablet  -- 1 tab(s) by mouth once a day  -- Indication: For BPH    methylPREDNISolone 2 mg oral tablet  -- 1 tab(s) by mouth once a day  -- Indication: For Rheumatoid arthritis    methadone 5 mg oral tablet  -- 1 tab(s) by mouth every 8 hours  -- Indication: For Chronic opiate use    acetaminophen 325 mg oral tablet  -- 2 tab(s) by mouth every 6 hours, As needed, Temp greater or equal to 38C (100.4F)  -- Indication: For For pain    aspirin 81 mg oral tablet  -- 1 tab(s) by mouth once a day   -- Take with food or milk.    -- Indication: For CAD    lisinopril 10 mg oral tablet  -- 1 tab(s) by mouth once a day  -- Indication: For HTN (hypertension)    tamsulosin 0.4 mg oral capsule  -- 1 cap(s) by mouth once a day  -- Indication: For BPH    FLUoxetine 20 mg oral capsule  -- 1 cap(s) by mouth 3 times a day  -- Indication: For Depression    lovastatin 20 mg oral tablet  -- 1 tab(s) by mouth once a day  -- Indication: For Dyslipidemia    ALPRAZolam 0.5 mg oral tablet  -- 1 tab(s) by mouth once a day (at bedtime), As Needed  -- Indication: For Anxiety    alendronate 70 mg oral tablet  -- 1 tab(s) by mouth once a week  -- Indication: For OSteoporosis (due to chronic steroid use)    furosemide 20 mg oral tablet  -- 1 tab(s) by mouth once a day  -- Indication: For HTN (hypertension)    famotidine 20 mg oral tablet  -- 1 tab(s) by mouth 2 times a day  -- Indication: For Reflux    ferrous sulfate 325 mg (65 mg elemental iron) oral delayed release tablet  -- 1 tab(s) by mouth once a day   -- May discolor urine or feces.  Swallow whole.  Do not crush.    -- Indication: For Iron supplement    potassium chloride 10 mEq oral capsule, extended release  -- 1 cap(s) by mouth 2 times a day  -- Indication: For Potassium supplement    cyclobenzaprine 10 mg oral tablet  -- 1 tab(s) by mouth 2 times a day, As Needed  -- Indication: For Muscle relaxant    Multiple Vitamins oral tablet  -- 1 tab(s) by mouth once a day  -- Indication: For Supplement    ascorbic acid 500 mg oral tablet  -- 1 tab(s) by mouth 2 times a day  -- Indication: For Vitamin C (take w/ iron)    folic acid 1 mg oral tablet  -- 1 tab(s) by mouth once a day  -- Indication: For Supplement

## 2018-12-06 NOTE — DISCHARGE NOTE ADULT - HOSPITAL COURSE
FROM ADMISSION H+P:   HPI:  71 yo M PMHx 45 pack year smoking hx and COPD (diagnosed "a few years ago, quit smoking then), HTN, HLD, hx of MI and CABG (2008) (stents also placed per chart review), RA (on Medrol daily), hx of multiple L hip surgeries (hip replacement 30 years ago in [place], ORIF recently 11/2018 w/ Dr. Ramirez) p/w L pain, drainage, redness and swelling surrounding upper L hip surgical site beginning 6 days ago.     Patient first noted some pain and redness 6 days ago while in [facility] ([facility]). Patient was prescribed Ceftin w/ symptom progression from pain and erythema to swelling, erythema and purulent drainage.     Patient denied fever, chills, or other signs of infection including nausea, vomiting, productive cough, change in urination or bowel habits. Patient also denied sick contacts, recent falls.     Of note patient endorsed previous surgical site infection on his back after lumbar fusion for spinal stenosis treated with IV antibiotics, which progressed to sepsis with "Strep" isolated 6 years ago, per patient's wife. Also of note, patient had temperatures ranging from T  at [facility] per patient's daughter.     Also of note, patient now endorses diffuse itching and mild diaphoresis starting after antibiotics in ED, denied difficulty swallowing or breathing, lip swelling or dizziness.       In the ED, vitals T 99.4, HR 82, /66, RR 18, Sp02 93% ORA. Patient received vancomycin and aztreonam, and 1 L NS, morphine 4 mg and acetaminophen 650 mg. Ortho consulted, and L pelvis/hip/femur xrays revealed intact L hip hardware, lumbar fusion hardware and diffuse soft tissue swelling. CT results pending.   No Fhx of RA, hip infection (03 Dec 2018 14:32)      ---  HOSPITAL COURSE:   Over the course of hospitalization, patient seen by pulmonology (Dr Valente), ID (Dr. Childs), orthopedics team (Dr. Gardner), Cardiology (Dr. Anusha hunter).  He has some left hip pain on palpation but states this has not worsened since surgery. At rehab, he was able to ambulate approximately 10 feet with rolling walker. He denies CP, SOB, abd pain, nausea or vomiting at the time of admission. L hip surgical site erythema, edema with drainage- CT imaging of femur revealing localized collection involving SQ tissue that may be abscess vs hematoma. ID eval placed- s/p Aztreonam and Vanco in ED- we are deferring further abx for now as plan is to have IR perform aspiration    EVAN opacity not present on CXR 2 weeks ago- per family- pt has h/o pulm nodule in the area- Ct chest to evaluate for PNA- pt would like to defer test today;  RA- would c/w Medrol 4mg for now  CAD with HFpEF- c/w lasix and lisinopril with hold parameters- c/w ASA  DVT PPX- pt did get dose of Lovenox today- would not start Lovenoz until after aspiration procedure- can use SCD's meanwhile      ---  CONSULTANTS:     ---  TIME SPENT:  The total amount of time spent reviewing the hospital notes, laboratory values, imaging findings, assessing/counseling the patient, discussing with consultant physicians, social work, nursing staff took -- minutes    ---  FINAL DISCHARGE DIAGNOSIS LIST:  Please see last daily progress note for final discharge diagnoses    ---  Primary care provider was made aware of plan for discharge:      [  ] NO     [  ] YES FROM ADMISSION H+P:   HPI:  73 yo M PMHx 45 pack year smoking hx and COPD (diagnosed "a few years ago, quit smoking then), HTN, HLD, hx of MI and CABG (2008) (stents also placed per chart review), RA (on Medrol daily), hx of multiple L hip surgeries (hip replacement 30 years ago in [place], ORIF recently 11/2018 w/ Dr. Ramirez) p/w L pain, drainage, redness and swelling surrounding upper L hip surgical site beginning 6 days ago.     Patient first noted some pain and redness 6 days ago while in [facility] ([facility]). Patient was prescribed Ceftin w/ symptom progression from pain and erythema to swelling, erythema and purulent drainage.     Patient denied fever, chills, or other signs of infection including nausea, vomiting, productive cough, change in urination or bowel habits. Patient also denied sick contacts, recent falls.     Of note patient endorsed previous surgical site infection on his back after lumbar fusion for spinal stenosis treated with IV antibiotics, which progressed to sepsis with "Strep" isolated 6 years ago, per patient's wife. Also of note, patient had temperatures ranging from T  at [facility] per patient's daughter.     Also of note, patient now endorses diffuse itching and mild diaphoresis starting after antibiotics in ED, denied difficulty swallowing or breathing, lip swelling or dizziness.       In the ED, vitals T 99.4, HR 82, /66, RR 18, Sp02 93% ORA. Patient received vancomycin and aztreonam, and 1 L NS, morphine 4 mg and acetaminophen 650 mg. Ortho consulted, and L pelvis/hip/femur xrays revealed intact L hip hardware, lumbar fusion hardware and diffuse soft tissue swelling. CT results pending.   No Fhx of RA, hip infection (03 Dec 2018 14:32)      ---  HOSPITAL COURSE:   Over the course of hospitalization, patient seen by pulmonology (Dr Valente), ID (Dr. Childs), orthopedics team (Dr. Gardner), Cardiology (Dr. Anusha hunter).  He has some left hip pain on palpation but states this has not worsened since surgery. At rehab, he was able to ambulate approximately 10 feet with rolling walker. He denied CP, SOB, abd pain, nausea or vomiting at the time of admission. There was  L hip surgical site erythema, edema with drainage found on exam, CT imaging of femur revealing localized collection involving SQ tissue that may be abscess vs hematoma. Patient received Aztreonam and Vanco in ED and further abx were deferred until IR guided drainage. Aspirate was drained the following day, with gram stain result from aspirated fluid showing growth of Staph Aureus with ID and patient started on Vancomycin and Meropenem based on allergies. Patient also underwent I&D and surgical washout as per Orthopedics. Follow up cultures from the OR showed XXX. 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 Vitals, labs appropriately monitored and replete PICC line successfully placed 12/6, with ID recommendations to start Cefazolin 2g IVPB Q24h, with liberalization of dosing as creatinine improved.    On the day of discharge, patient with vitals and PE as below:      ---  CONSULTANTS:   pulmonology (Dr Valente), ID (Dr. Childs), orthopedics team (Dr. Gardner), Cardiology (Dr. Wachsman group)  ---  TIME SPENT:  The total amount of time spent reviewing the hospital notes, laboratory values, imaging findings, assessing/counseling the patient, discussing with consultant physicians, social work, nursing staff took -- minutes    ---  FINAL DISCHARGE DIAGNOSIS LIST:  Please see last daily progress note for final discharge diagnoses    ---  Primary care provider was made aware of plan for discharge:      [  ] NO     [  ] YES FROM ADMISSION H+P:   HPI:  71 yo M PMHx 45 pack year smoking hx and COPD (diagnosed "a few years ago, quit smoking then), HTN, HLD, hx of MI and CABG (2008) (stents also placed per chart review), RA (on Medrol daily), hx of multiple L hip surgeries (hip replacement 30 years ago in [place], ORIF recently 11/2018 w/ Dr. Ramirez) p/w L pain, drainage, redness and swelling surrounding upper L hip surgical site beginning 6 days ago.     Patient first noted some pain and redness 6 days ago while in [facility] ([facility]). Patient was prescribed Ceftin w/ symptom progression from pain and erythema to swelling, erythema and purulent drainage.     Patient denied fever, chills, or other signs of infection including nausea, vomiting, productive cough, change in urination or bowel habits. Patient also denied sick contacts, recent falls.     Of note patient endorsed previous surgical site infection on his back after lumbar fusion for spinal stenosis treated with IV antibiotics, which progressed to sepsis with "Strep" isolated 6 years ago, per patient's wife. Also of note, patient had temperatures ranging from T  at [facility] per patient's daughter.     Also of note, patient now endorses diffuse itching and mild diaphoresis starting after antibiotics in ED, denied difficulty swallowing or breathing, lip swelling or dizziness.       In the ED, vitals T 99.4, HR 82, /66, RR 18, Sp02 93% ORA. Patient received vancomycin and aztreonam, and 1 L NS, morphine 4 mg and acetaminophen 650 mg. Ortho consulted, and L pelvis/hip/femur xrays revealed intact L hip hardware, lumbar fusion hardware and diffuse soft tissue swelling. CT results pending.   No Fhx of RA, hip infection (03 Dec 2018 14:32)      ---  HOSPITAL COURSE:   Over the course of hospitalization, patient seen by pulmonology (Dr Valente), ID (Dr. Childs), orthopedics team (Dr. Gardner), Cardiology (Dr. Anusha hunter).  He has some left hip pain on palpation but states this has not worsened since surgery. At rehab, he was able to ambulate approximately 10 feet with rolling walker. He denied CP, SOB, abd pain, nausea or vomiting at the time of admission. There was  L hip surgical site erythema, edema with drainage found on exam, CT imaging of femur revealing localized collection involving SQ tissue that may be abscess vs hematoma. Patient received Aztreonam and Vanco in ED and further abx were deferred until IR guided drainage. Aspirate was drained the following day, with gram stain result from aspirated fluid showing growth of Staph Aureus with ID and patient started on Vancomycin and Meropenem based on allergies. Patient also underwent I&D and surgical washout as per Orthopedics. Follow up cultures from the OR showed MSSA. 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 Vitals, labs appropriately monitored and repleteD. PICC line successfully placed 12/6, with ID recommendations to start Cefazolin 2g IVPB Q8h, with WEEKLY LABS SENT -[phone]    On the day of discharge, patient stable with vitals and PE as below:  Vital Signs Last 24 Hrs  T(C): 36.9 (10 Dec 2018 05:29), Max: 37.2 (09 Dec 2018 20:34)  T(F): 98.4 (10 Dec 2018 05:29), Max: 99 (09 Dec 2018 20:34)  HR: 89 (10 Dec 2018 05:29) (76 - 89)  BP: 94/65 (10 Dec 2018 05:29) (94/65 - 117/62)  BP(mean): --  RR: 18 (10 Dec 2018 05:29) (16 - 18)  SpO2: 95% (10 Dec 2018 05:29) (95% - 97%)    PHYSICAL EXAM:  GENERAL: NAD, well-groomed, well-developed  HEAD:  Atraumatic, Normocephalic  EYES: EOMI, PERRLA, conjunctiva and sclera clear  ENMT: No tonsillar erythema, exudates, or enlargement; Moist mucous membranes  NECK: Supple, No JVD, Normal thyroid  NERVOUS SYSTEM:  Alert & Oriented X3, Good concentration; Motor Strength b/l upper and lower extr grossly intact.  CHEST/LUNG: Clear to percussion bilaterally; No rales, rhonchi, wheezing, or rubs  HEART: Regular rate and rhythm; No murmurs, rubs, or gallops  ABDOMEN: Soft, Nontender, Nondistended; Bowel sounds present  EXTREMITIES:  2+ Peripheral Pulses, No clubbing, cyanosis b/l LEs. Left hip wound dressed, evidence of crusted blood. NO TTP L hip  LYMPH: No lymphadenopathy noted  SKIN: No rashes or lesions unless otherwise mentioned above      ---  CONSULTANTS:   pulmonology (Dr Valente), ID (Dr. Childs), orthopedics team (Dr. Gardner), Cardiology (Dr. Wachsman group)  ---  TIME SPENT:  The total amount of time spent reviewing the hospital notes, laboratory values, imaging findings, assessing/counseling the patient, discussing with consultant physicians, social work, nursing staff took -- minutes    ---  FINAL DISCHARGE DIAGNOSIS LIST:  Please see last daily progress note for final discharge diagnoses    ---  Primary care provider was made aware of plan for discharge:      [  ] NO     [  ] YES FROM ADMISSION H+P:   HPI:  71 yo M PMHx 45 pack year smoking hx and COPD (diagnosed "a few years ago, quit smoking then), HTN, HLD, hx of MI and CABG (2008) (stents also placed per chart review), RA (on Medrol daily), hx of multiple L hip surgeries (hip replacement 30 years ago in [place], ORIF recently 11/2018 w/ Dr. Ramirez) p/w L pain, drainage, redness and swelling surrounding upper L hip surgical site beginning 6 days ago.   Patient first noted some pain and redness 6 days ago while in [facility] ([facility]). Patient was prescribed Ceftin w/ symptom progression from pain and erythema to swelling, erythema and purulent drainage.   Patient denied fever, chills, or other signs of infection including nausea, vomiting, productive cough, change in urination or bowel habits. Patient also denied sick contacts, recent falls.   Of note patient endorsed previous surgical site infection on his back after lumbar fusion for spinal stenosis treated with IV antibiotics, which progressed to sepsis with "Strep" isolated 6 years ago, per patient's wife. Also of note, patient had temperatures ranging from T  at [facility] per patient's daughter.     Also of note, patient now endorses diffuse itching and mild diaphoresis starting after antibiotics in ED, denied difficulty swallowing or breathing, lip swelling or dizziness.     In the ED, vitals T 99.4, HR 82, /66, RR 18, Sp02 93% ORA. Patient received vancomycin and aztreonam, and 1 L NS, morphine 4 mg and acetaminophen 650 mg. Ortho consulted, and L pelvis/hip/femur xrays revealed intact L hip hardware, lumbar fusion hardware and diffuse soft tissue swelling. CT results pending.   No Fhx of RA, hip infection (03 Dec 2018 14:32)    ---  HOSPITAL COURSE:   Patient seen by pulmonology (Dr Valente), ID (Dr. Childs), orthopedics team (Dr. Ramirez), Cardiology (Dr. Tavares).  He has some left hip pain on palpation but states this has not worsened since surgery. At rehab, he was able to ambulate approximately 10 feet with rolling walker. He denied CP, SOB, abd pain, nausea or vomiting at the time of admission. There was  L hip surgical site erythema, edema with drainage found on exam, CT imaging of femur revealing localized collection involving SQ tissue that may be abscess vs hematoma. Patient received Aztreonam and Vanco in ED and further abx were deferred until IR guided drainage. Aspirate was drained the following day, with gram stain result from aspirated fluid showing growth of Staph Aureus (MSSA) with ID and patient started on Vancomycin and Meropenem based on allergies. Patient also underwent I&D and surgical washout as per Orthopedics. Follow up cultures from the OR showed MSSA. 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 Vitals, labs appropriately monitored and repleted. PICC line successfully placed 12/6, with ID recommendations to start Cefazolin 2g IVPB Q8h, with WEEKLY LABS SENT [phone]    On the day of discharge, patient stable with vitals and PE as below:  Vital Signs Last 24 Hrs  T(C): 36.9 (10 Dec 2018 05:29), Max: 37.2 (09 Dec 2018 20:34)  T(F): 98.4 (10 Dec 2018 05:29), Max: 99 (09 Dec 2018 20:34)  HR: 89 (10 Dec 2018 05:29) (76 - 89)  BP: 94/65 (10 Dec 2018 05:29) (94/65 - 117/62)  BP(mean): --  RR: 18 (10 Dec 2018 05:29) (16 - 18)  SpO2: 95% (10 Dec 2018 05:29) (95% - 97%)    PHYSICAL EXAM:  GENERAL: NAD, well-groomed, well-developed  HEAD:  Atraumatic, Normocephalic  EYES: EOMI, PERRLA, conjunctiva and sclera clear  ENMT: No tonsillar erythema, exudates, or enlargement; Moist mucous membranes  NECK: Supple, No JVD, Normal thyroid  NERVOUS SYSTEM:  Alert & Oriented X3, Good concentration; Motor Strength b/l upper and lower extr grossly intact.  CHEST/LUNG: Clear to percussion bilaterally; No rales, rhonchi, wheezing, or rubs  HEART: Regular rate and rhythm; No murmurs, rubs, or gallops  ABDOMEN: Soft, Nontender, Nondistended; Bowel sounds present  EXTREMITIES:  2+ Peripheral Pulses, No clubbing, cyanosis b/l LEs. Left hip wound dressed, evidence of crusted blood. NO TTP L hip  LYMPH: No lymphadenopathy noted  SKIN: No rashes or lesions unless otherwise mentioned above      ---  CONSULTANTS:   pulmonology (Dr Valente), ID (Dr. Childs), orthopedics team (Dr. Ramirez), Cardiology (Dr. Tavares)    ---  TIME SPENT:  The total amount of time spent reviewing the hospital notes, laboratory values, imaging findings, assessing/counseling the patient, discussing with consultant physicians, social work, nursing staff took 65 minutes    ---  FINAL DISCHARGE DIAGNOSIS LIST:  Please see last daily progress note for final discharge diagnoses    ---  Primary care provider was made aware of plan for discharge:      [ x ] NO     [  ] YES - will not f/u w/ PCP in [place]

## 2018-12-06 NOTE — PHYSICAL THERAPY INITIAL EVALUATION ADULT - DISCHARGE DISPOSITION, PT EVAL
rehabilitation facility/[facility] vs. [facility] /c increased assistance (pt patrick snot want to go back to rehab)

## 2018-12-06 NOTE — PHYSICAL THERAPY INITIAL EVALUATION ADULT - IMPAIRED TRANSFERS: SIT/STAND, REHAB EVAL
decreased sensation/decreased strength/impaired balance/impaired coordination/decreased flexibility/impaired motor control/abnormal muscle tone/impaired postural control/decreased ROM

## 2018-12-06 NOTE — DISCHARGE NOTE ADULT - PLAN OF CARE
return to ADLs/Clear infection DC Instructions:    1.	Resume previous diet, regular or diabetic as appropriate  2.	PWB 50% LLE with assistance and rolling walker  3.	Continue DVT/PE Prophylaxis per medical team recommendations.   4.	PT daily  5.	Follow up with Orthopedic Surgeon James in 10-14 Days after Discharge from the Hospital. Call Office asap For Appointment.  6.	Keep NAVA wound dressing/vac in place until: 12/11/2018. It should be DC'd at that point and a dry sterile dressing should be placed.   7.	Ice the hip as much as possible  8.           FU with Dr. Ramirez in office in 10-14 days. [facility] office for appt. Your surgical site was infected, you were seen by infectious disease doctors, orthopedics team and treated with IV antibiotics after your hematoma/abscess was drained  -PICC line was placed for IV antiobiotics: continue Cefazolin 2g IVPB Q24h  -Follow up with ID Dr. Childs within one week of discharge  DC Instructions from Orthopedics  1.	Resume previous diet, regular or diabetic as appropriate  2.	PWB 50% LLE with assistance and rolling walker  3.	Continue DVT/PE Prophylaxis per medical team recommendations.   4.	PT daily  5.	Follow up with Orthopedic Surgeon James in 10-14 Days after Discharge from the Hospital. Call Office asap For Appointment.  6.	Keep NAVA wound dressing/vac in place until: 12/11/2018. It should be DC'd at that point and a dry sterile dressing should be placed.   7.	Ice the hip as much as possible  8.           FU with Dr. Ramirez in office in 10-14 days. [facility] office for appt. Management -Your creatinine (marker of kidney injury) was found to be slightly more elevated than usual.   -Likely related to lower than normal blood pressure  -Avoid taking ibuprofen or advil. Use tylenol for pain. You were treated with IV fluids  -Encourage to take oral fluids  -Follow up with your primary care doctor within one week of discharge -Take medications as above  -Follow up with your primary care doctor within one week of discharge Your surgical site was infected, you were seen by infectious disease doctors, orthopedics team and treated with IV antibiotics after your hematoma/abscess was drained  -PICC line was placed for IV antiobiotics: continue Cefazolin 2g IVPB Q8 hours UNTIL 1/15/19 FAX WEEKLY LABS AS DESCRIBED BELOW -[phone]  -Follow up with ID Dr. Childs within one week of discharge  DC Instructions from Orthopedics  1.	Resume previous diet, regular or diabetic as appropriate  2.	PWB 50% LLE with assistance and rolling walker  3.	Continue DVT/PE Prophylaxis per medical team recommendations.   4.	PT daily  5.	Follow up with Orthopedic Surgeon James in 10-14 Days after Discharge from the Hospital. Call Office asap For Appointment.  6.	Keep NAVA wound dressing/vac in place until: 12/11/2018. It should be DC'd at that point and a dry sterile dressing should be placed.   7.	Ice the hip as much as possible  8.           FU with Dr. Ramirez in office in 10-14 days. [facility]All office for appt.

## 2018-12-06 NOTE — PHYSICAL THERAPY INITIAL EVALUATION ADULT - PHYSICAL ASSIST/NONPHYSICAL ASSIST: SIT/STAND, REHAB EVAL
1 person assist/nonverbal cues (demo/gestures)/Initially required Mod ax2, PT had pt transfer x3/set-up required/verbal cues

## 2018-12-06 NOTE — PHYSICAL THERAPY INITIAL EVALUATION ADULT - PERTINENT HX OF CURRENT PROBLEM, REHAB EVAL
As per H&P:" 71 yo M PMHx 45 pack year smoking hx and COPD (diagnosed "a few years ago, quit smoking then), HTN, HLD, hx of MI and CABG (2008) (stents also placed per chart review), RA (on Medrol daily), hx of multiple L hip surgeries (hip replacement 30 years ago in [place], ORIF recently 11/2018 w/ Dr. Ramirez) p/w L pain, drainage, redness and swelling surrounding upper L hip surgical site beginning 6 days ago. "

## 2018-12-06 NOTE — PROGRESS NOTE ADULT - PROBLEM SELECTOR PLAN 6
- stable; controlled  - stopped BP meds at [facility], [place] for hypotension per patient's daughter  - cont home lisinopril 20 mg daily with hold parameters

## 2018-12-06 NOTE — DISCHARGE NOTE ADULT - PATIENT PORTAL LINK FT
You can access the [facility] Patient Portal, offered by [facility], by registering with the following website: [email or web address]

## 2018-12-06 NOTE — PHYSICAL THERAPY INITIAL EVALUATION ADULT - DIAGNOSIS, PT EVAL
Functional decline, deconditioned, s/p left periprosthetic fx and ORIF to left femur 11/19 & now I&D from infection

## 2018-12-06 NOTE — PHYSICAL THERAPY INITIAL EVALUATION ADULT - TRANSFER SAFETY CONCERNS NOTED: SIT/STAND, REHAB EVAL
decreased weight-shifting ability/decreased proprioception/decreased step length/Pt able to PWB to LLE /c Toe-touch WB to BLE due to leg-length discrepancy/decreased safety awareness/losing balance/decreased sequencing ability

## 2018-12-06 NOTE — PROGRESS NOTE ADULT - PROBLEM SELECTOR PLAN 2
-This AM Cr 1.4 (baseline 0.54 throughout this hospitalization). BUN 11.   -Of note 12/3 XR femur showing Opaque contrast in the bladder from prior contrast is incidentally seen. ??fistula.   -Patient is not complaining of any pneumoturia, hematuria, dysuria or any bowel/bladder symptoms  -Ordered bladder scan, will f/u  -Avoid nephrotoxic meds (Dc'd abx as above per ID, will await further reccs on which abx to cover with given worsening renal fxn)  -Patient on NS50cc/hr

## 2018-12-06 NOTE — DISCHARGE NOTE ADULT - MEDICATION SUMMARY - MEDICATIONS TO STOP TAKING
I will STOP taking the medications listed below when I get home from the hospital:    lisinopril 20 mg oral tablet  -- 1 tab(s) by mouth once a day    enoxaparin  -- 40 milligram(s) subcutaneous once a day  until he ambulates at least 70 feet a day.

## 2018-12-06 NOTE — PROGRESS NOTE ADULT - SUBJECTIVE AND OBJECTIVE BOX
Patient is a 73y old  Male who presents with a chief complaint of Redness and swelling around previous L hip surgical site (05 Dec 2018 08:20)      FROM ADMISSION H+P:   HPI:  73 yo M PMHx 45 pack year smoking hx and COPD (diagnosed "a few years ago, quit smoking then), HTN, HLD, hx of MI and CABG (2008) (stents also placed per chart review), RA (on Medrol daily), hx of multiple L hip surgeries (hip replacement 30 years ago in [place], ORIF recently 11/2018 w/ Dr. Ramirez) p/w L pain, drainage, redness and swelling surrounding upper L hip surgical site beginning 6 days ago.     Patient first noted some pain and redness 6 days ago while in [facility] ([facility] Patient was prescribed Ceftin w/ symptom progression from pain and erythema to swelling, erythema and purulent drainage.     Patient denied fever, chills, or other signs of infection including nausea, vomiting, productive cough, change in urination or bowel habits. Patient also denied sick contacts, recent falls.     Of note patient endorsed previous surgical site infection on his back after lumbar fusion for spinal stenosis treated with IV antibiotics, which progressed to sepsis with "Strep" isolated 6 years ago, per patient's wife. Also of note, patient had temperatures ranging from T  at [facility] per patient's daughter.     Also of note, patient now endorses diffuse itching and mild diaphoresis starting after antibiotics in ED, denied difficulty swallowing or breathing, lip swelling or dizziness.       In the ED, vitals T 99.4, HR 82, /66, RR 18, Sp02 93% ORA. Patient received vancomycin and aztreonam, and 1 L NS, morphine 4 mg and acetaminophen 650 mg. Ortho consulted, and L pelvis/hip/femur xrays revealed intact L hip hardware, lumbar fusion hardware and diffuse soft tissue swelling. CT results pending.   No Fhx of RA, hip infection (03 Dec 2018 14:32)      ----  INTERVAL HPI/OVERNIGHT EVENTS:  Pt seen and evaluated at the bedside. No acute overnight events occurred. He is POD1 s/p L hip I&D. Patient reports pain has significantly improved, today 0/10. Denies CP, SOB, palpitations, abd pain, diarrhea, n/v, fevers, chills, urinary issues or any other acute complaints. Only reporting soreness "in [his] butt from sitting all day."     ----  PAST MEDICAL & SURGICAL HISTORY:  S/P CABG x 3  S/P hip replacement, left  Rheumatoid arthritis  Osteoarthritis  COPD (chronic obstructive pulmonary disease)  HTN (hypertension)  S/P lumbar fusion: Approx 2012  History of femur fracture: Repaired 11/19- L hip  History of appendectomy  History of right shoulder replacement  History of total left hip arthroplasty      FAMILY HISTORY:  No pertinent family history in first degree relatives      ----  MEDICATIONS  (STANDING):  ALBUTerol    0.083% 2.5 milliGRAM(s) Nebulizer every 8 hours  ascorbic acid 500 milliGRAM(s) Oral two times a day  atorvastatin 10 milliGRAM(s) Oral at bedtime  calcium carbonate 1250 mG  + Vitamin D (OsCal 500 + D) 1 Tablet(s) Oral daily  cholecalciferol 1000 Unit(s) Oral daily  docusate sodium 100 milliGRAM(s) Oral three times a day  enoxaparin Injectable 40 milliGRAM(s) SubCutaneous every 24 hours  famotidine    Tablet 20 milliGRAM(s) Oral two times a day  ferrous    sulfate 325 milliGRAM(s) Oral daily  finasteride 5 milliGRAM(s) Oral daily  FLUoxetine 20 milliGRAM(s) Oral <User Schedule>  folic acid 1 milliGRAM(s) Oral daily  furosemide    Tablet 20 milliGRAM(s) Oral daily  hydrocortisone sodium succinate Injectable 25 milliGRAM(s) IV Push every 8 hours  lactated ringers. 1000 milliLiter(s) (75 mL/Hr) IV Continuous <Continuous>  lactated ringers. 1000 milliLiter(s) (75 mL/Hr) IV Continuous <Continuous>  lactated ringers. 1000 milliLiter(s) (50 mL/Hr) IV Continuous <Continuous>  lactated ringers. 1000 milliLiter(s) (70 mL/Hr) IV Continuous <Continuous>  lisinopril 20 milliGRAM(s) Oral daily  meropenem  IVPB 2000 milliGRAM(s) IV Intermittent every 8 hours  methadone    Tablet 5 milliGRAM(s) Oral every 8 hours  methylPREDNISolone 4 milliGRAM(s) Oral daily  multivitamin 1 Tablet(s) Oral daily  polyethylene glycol 3350 17 Gram(s) Oral daily  vancomycin  IVPB 1250 milliGRAM(s) IV Intermittent every 12 hours    MEDICATIONS  (PRN):  acetaminophen   Tablet .. 650 milliGRAM(s) Oral every 6 hours PRN Temp greater or equal to 38C (100.4F)  ALBUTerol    0.083% 2.5 milliGRAM(s) Nebulizer every 3 hours PRN Shortness of Breath and/or Wheezing  ALPRAZolam 0.5 milliGRAM(s) Oral at bedtime PRN anxiety  benzocaine 15 mG/menthol 3.6 mG Lozenge 1 Lozenge Oral every 3 hours PRN Sore Throat  cyclobenzaprine 10 milliGRAM(s) Oral two times a day PRN Muscle Spasm  diphenhydrAMINE 25 milliGRAM(s) Oral at bedtime PRN Insomnia  diphenhydrAMINE 25 milliGRAM(s) Oral every 4 hours PRN Rash and/or Itching  hydrocortisone 0.5% Cream 1 Application(s) Topical two times a day PRN Rash and/or Itching  HYDROmorphone  Injectable 1 milliGRAM(s) IV Push every 4 hours PRN Severe Pain (7 - 10)  magnesium hydroxide Suspension 30 milliLiter(s) Oral daily PRN Constipation  ondansetron Injectable 4 milliGRAM(s) IV Push once PRN Nausea and/or Vomiting  ondansetron Injectable 4 milliGRAM(s) IV Push every 6 hours PRN Nausea and/or Vomiting  oxyCODONE    IR 10 milliGRAM(s) Oral every 4 hours PRN Moderate Pain (4 - 6)  oxyCODONE    IR 5 milliGRAM(s) Oral every 4 hours PRN Mild Pain (1 - 3)        ----  REVIEW OF SYSTEMS:  CONSTITUTIONAL: NO fever, chills, sweats, fatigue, weakness  HEENT: denies blurred vision, sore throat  SKIN: + L hip/thigh rash. NO new lesions, rash  CARDIOVASCULAR: denies chest pain, chest pressure, palpitations  RESPIRATORY: denies shortness of breath, sputum production  GASTROINTESTINAL: denies nausea, vomiting, diarrhea, abdominal pain  GENITOURINARY: denies dysuria, discharge  NEUROLOGICAL: denies numbness, headache, focal weakness  MUSCULOSKELETAL: denies new joint pain, muscle aches  HEMATOLOGIC: + L hip rash.  No gross bleeding  LYMPHATICS: denies enlarged lymph nodes, extremity swelling  PSYCHIATRIC: denies recent changes in anxiety, depression  ENDOCRINOLOGIC: denies sweating, cold or heat intolerance  ----  PHYSICAL EXAM:  GENERAL: patient appears well, no acute distress, appropriate, pleasant. raspy voice  EYES: sclera clear, no exudates  ENMT: oropharynx clear without erythema, no exudates, moist mucous membranes  NECK: supple, soft, no thyromegaly noted  LUNGS: good air entry bilaterally, clear to auscultation, symmetric breath sounds, no wheezing or rhonchi appreciated  HEART: soft S1/S2, regular rate and rhythm, no murmurs noted, +1 pitting edema b/l   GASTROINTESTINAL: abdomen is soft, nontender, nondistended, normoactive bowel sounds, no palpable masses  INTEGUMENT: good skin turgor, L hip dressing intact, no evidence of drainage. +evidence of trace ecchymosis L ankle. mild soreness TTP L hip  MUSCULOSKELETAL: no clubbing or cyanosis, no obvious deformity  NEUROLOGIC: awake, alert, oriented x3, good muscle tone in 4 extremities, no obvious sensory deficits  PSYCHIATRIC: mood is good, affect is congruent, linear and logical thought process  HEME/LYMPH: no palpable supraclavicular nodules, no obvious ecchymosis or petechiae     Vital Signs Last 24 Hrs  T(C): 36.7 (06 Dec 2018 04:55), Max: 37 (05 Dec 2018 22:12)  T(F): 98.1 (06 Dec 2018 04:55), Max: 98.6 (05 Dec 2018 22:12)  HR: 78 (06 Dec 2018 07:30) (70 - 98)  BP: 97/58 (06 Dec 2018 06:21) (94/59 - 137/74)  BP(mean): --  RR: 16 (06 Dec 2018 06:21) (14 - 17)  SpO2: 94% (06 Dec 2018 07:30) (92% - 100%)  ----  I&O's Detail    05 Dec 2018 07:01  -  06 Dec 2018 07:00  --------------------------------------------------------  IN:    lactated ringers.: 560 mL    lactated ringers.: 100 mL    lactated ringers.: 300 mL    lactated ringers.: 450 mL    Oral Fluid: 240 mL    Packed Red Blood Cells: 285 mL    Solution: 350 mL    Solution: 200 mL  Total IN: 2485 mL    OUT:    Voided: 450 mL  Total OUT: 450 mL    Total NET: 2035 mL    LABS:                        8.7    14.18 )-----------( 309      ( 06 Dec 2018 05:28 )             26.9     12-06    138  |  100  |  11  ----------------------------<  139<H>  4.3   |  29  |  1.40<H>    Ca    8.4<L>      06 Dec 2018 05:28        PT/INR - ( 05 Dec 2018 04:50 )   PT: 14.6 sec;   INR: 1.27 ratio         PTT - ( 05 Dec 2018 04:50 )  PTT:29.9 sec        CAPILLARY BLOOD GLUCOSE                12-04 @ 14:54   Numerous Staphylococcus aureus  --  --  12-03 @ 16:52   Few Staphylococcus aureus  --  --  12-03 @ 16:38   No growth to date.  --  --          Culture - Body Fluid with Gram Stain (collected 12-04-18 @ 14:54)  Source: .Body Fluid Interstitial Fluid  Gram Stain (12-04-18 @ 16:03):    Numerous polymorphonuclear leukocytes seen per low power field    Few Gram positive cocci in pairs seen per oil power field  Preliminary Report (12-05-18 @ 08:37):    Numerous Staphylococcus aureus        ----  Personally reviewed:  Vital sign trends: [x  ] yes    [  ] no     [  ] n/a  Laboratory results: [ x ] yes    [  ] no     [  ] n/a  Radiology results: [ x ] yes    [  ] no     [  ] n/a  Culture results: [x  ] yes    [  ] no     [  ] n/a  Consultant recommendations: [ x ] yes    [  ] no     [  ] n/a Patient is a 73y old  Male who presents with a chief complaint of Redness and swelling around previous L hip surgical site (05 Dec 2018 08:20)      FROM ADMISSION H+P:   HPI:  71 yo M PMHx 45 pack year smoking hx and COPD (diagnosed "a few years ago, quit smoking then), HTN, HLD, hx of MI and CABG (2008) (stents also placed per chart review), RA (on Medrol daily), hx of multiple L hip surgeries (hip replacement 30 years ago in [place], ORIF recently 11/2018 w/ Dr. Ramirez) p/w L pain, drainage, redness and swelling surrounding upper L hip surgical site beginning 6 days ago.     Patient first noted some pain and redness 6 days ago while in [facility] ([facility] Patient was prescribed Ceftin w/ symptom progression from pain and erythema to swelling, erythema and purulent drainage.     Patient denied fever, chills, or other signs of infection including nausea, vomiting, productive cough, change in urination or bowel habits. Patient also denied sick contacts, recent falls.     Of note patient endorsed previous surgical site infection on his back after lumbar fusion for spinal stenosis treated with IV antibiotics, which progressed to sepsis with "Strep" isolated 6 years ago, per patient's wife. Also of note, patient had temperatures ranging from T  at [facility] per patient's daughter.     Also of note, patient now endorses diffuse itching and mild diaphoresis starting after antibiotics in ED, denied difficulty swallowing or breathing, lip swelling or dizziness.       In the ED, vitals T 99.4, HR 82, /66, RR 18, Sp02 93% ORA. Patient received vancomycin and aztreonam, and 1 L NS, morphine 4 mg and acetaminophen 650 mg. Ortho consulted, and L pelvis/hip/femur xrays revealed intact L hip hardware, lumbar fusion hardware and diffuse soft tissue swelling. CT results pending.   No Fhx of RA, hip infection (03 Dec 2018 14:32)      ----  INTERVAL HPI/OVERNIGHT EVENTS:  Pt seen and evaluated at the bedside. No acute overnight events occurred. He is POD1 s/p L hip I&D. Patient reports pain has significantly improved, today 0/10. Denies CP, SOB, palpitations, abd pain, diarrhea, n/v, fevers, chills, urinary issues or any other acute complaints. Only reporting soreness "in [his] butt from sitting all day."     ----  PAST MEDICAL & SURGICAL HISTORY:  S/P CABG x 3  S/P hip replacement, left  Rheumatoid arthritis  Osteoarthritis  COPD (chronic obstructive pulmonary disease)  HTN (hypertension)  S/P lumbar fusion: Approx 2012  History of femur fracture: Repaired 11/19- L hip  History of appendectomy  History of right shoulder replacement  History of total left hip arthroplasty      FAMILY HISTORY:  No pertinent family history in first degree relatives      ----  MEDICATIONS  (STANDING):  ALBUTerol    0.083% 2.5 milliGRAM(s) Nebulizer every 8 hours  ascorbic acid 500 milliGRAM(s) Oral two times a day  atorvastatin 10 milliGRAM(s) Oral at bedtime  calcium carbonate 1250 mG  + Vitamin D (OsCal 500 + D) 1 Tablet(s) Oral daily  cholecalciferol 1000 Unit(s) Oral daily  docusate sodium 100 milliGRAM(s) Oral three times a day  enoxaparin Injectable 40 milliGRAM(s) SubCutaneous every 24 hours  famotidine    Tablet 20 milliGRAM(s) Oral two times a day  ferrous    sulfate 325 milliGRAM(s) Oral daily  finasteride 5 milliGRAM(s) Oral daily  FLUoxetine 20 milliGRAM(s) Oral <User Schedule>  folic acid 1 milliGRAM(s) Oral daily  furosemide    Tablet 20 milliGRAM(s) Oral daily  hydrocortisone sodium succinate Injectable 25 milliGRAM(s) IV Push every 8 hours  lactated ringers. 1000 milliLiter(s) (75 mL/Hr) IV Continuous <Continuous>  lactated ringers. 1000 milliLiter(s) (75 mL/Hr) IV Continuous <Continuous>  lactated ringers. 1000 milliLiter(s) (50 mL/Hr) IV Continuous <Continuous>  lactated ringers. 1000 milliLiter(s) (70 mL/Hr) IV Continuous <Continuous>  lisinopril 20 milliGRAM(s) Oral daily  meropenem  IVPB 2000 milliGRAM(s) IV Intermittent every 8 hours  methadone    Tablet 5 milliGRAM(s) Oral every 8 hours  methylPREDNISolone 4 milliGRAM(s) Oral daily  multivitamin 1 Tablet(s) Oral daily  polyethylene glycol 3350 17 Gram(s) Oral daily  vancomycin  IVPB 1250 milliGRAM(s) IV Intermittent every 12 hours    MEDICATIONS  (PRN):  acetaminophen   Tablet .. 650 milliGRAM(s) Oral every 6 hours PRN Temp greater or equal to 38C (100.4F)  ALBUTerol    0.083% 2.5 milliGRAM(s) Nebulizer every 3 hours PRN Shortness of Breath and/or Wheezing  ALPRAZolam 0.5 milliGRAM(s) Oral at bedtime PRN anxiety  benzocaine 15 mG/menthol 3.6 mG Lozenge 1 Lozenge Oral every 3 hours PRN Sore Throat  cyclobenzaprine 10 milliGRAM(s) Oral two times a day PRN Muscle Spasm  diphenhydrAMINE 25 milliGRAM(s) Oral at bedtime PRN Insomnia  diphenhydrAMINE 25 milliGRAM(s) Oral every 4 hours PRN Rash and/or Itching  hydrocortisone 0.5% Cream 1 Application(s) Topical two times a day PRN Rash and/or Itching  HYDROmorphone  Injectable 1 milliGRAM(s) IV Push every 4 hours PRN Severe Pain (7 - 10)  magnesium hydroxide Suspension 30 milliLiter(s) Oral daily PRN Constipation  ondansetron Injectable 4 milliGRAM(s) IV Push once PRN Nausea and/or Vomiting  ondansetron Injectable 4 milliGRAM(s) IV Push every 6 hours PRN Nausea and/or Vomiting  oxyCODONE    IR 10 milliGRAM(s) Oral every 4 hours PRN Moderate Pain (4 - 6)  oxyCODONE    IR 5 milliGRAM(s) Oral every 4 hours PRN Mild Pain (1 - 3)        ----  REVIEW OF SYSTEMS:  CONSTITUTIONAL: NO fever, chills, sweats, fatigue, weakness  HEENT: denies blurred vision, sore throat  SKIN: + L hip/thigh rash. NO new lesions, rash  CARDIOVASCULAR: denies chest pain, chest pressure, palpitations  RESPIRATORY: denies shortness of breath, sputum production  GASTROINTESTINAL: denies nausea, vomiting, diarrhea, abdominal pain  GENITOURINARY: denies dysuria, discharge  NEUROLOGICAL: denies numbness, headache, focal weakness  MUSCULOSKELETAL: denies new joint pain, muscle aches  HEMATOLOGIC: + L hip rash.  No gross bleeding    ----  PHYSICAL EXAM:  GENERAL: patient appears well, no acute distress, appropriate, pleasant. raspy voice  EYES: sclera clear, no exudates  ENMT: oropharynx clear without erythema, no exudates, moist mucous membranes  NECK: supple, soft, no thyromegaly noted  LUNGS: good air entry bilaterally, clear to auscultation, symmetric breath sounds, no wheezing or rhonchi appreciated  HEART: soft S1/S2, regular rate and rhythm, no murmurs noted, +1 pitting edema b/l   GASTROINTESTINAL: abdomen is soft, nontender, nondistended, normoactive bowel sounds, no palpable masses  INTEGUMENT: good skin turgor, L hip dressing intact, no evidence of drainage. +evidence of trace ecchymosis L ankle. mild soreness TTP L hip  MUSCULOSKELETAL: no clubbing or cyanosis, no obvious deformity  NEUROLOGIC: awake, alert, oriented x3, good muscle tone in 4 extremities, no obvious sensory deficits  PSYCHIATRIC: mood is good, affect is congruent, linear and logical thought process  HEME/LYMPH: no palpable supraclavicular nodules, no obvious ecchymosis or petechiae     Vital Signs Last 24 Hrs  T(C): 36.7 (06 Dec 2018 04:55), Max: 37 (05 Dec 2018 22:12)  T(F): 98.1 (06 Dec 2018 04:55), Max: 98.6 (05 Dec 2018 22:12)  HR: 78 (06 Dec 2018 07:30) (70 - 98)  BP: 97/58 (06 Dec 2018 06:21) (94/59 - 137/74)  BP(mean): --  RR: 16 (06 Dec 2018 06:21) (14 - 17)  SpO2: 94% (06 Dec 2018 07:30) (92% - 100%)  ----  I&O's Detail    05 Dec 2018 07:01  -  06 Dec 2018 07:00  --------------------------------------------------------  IN:    lactated ringers.: 560 mL    lactated ringers.: 100 mL    lactated ringers.: 300 mL    lactated ringers.: 450 mL    Oral Fluid: 240 mL    Packed Red Blood Cells: 285 mL    Solution: 350 mL    Solution: 200 mL  Total IN: 2485 mL    OUT:    Voided: 450 mL  Total OUT: 450 mL    Total NET: 2035 mL    LABS:                        8.7    14.18 )-----------( 309      ( 06 Dec 2018 05:28 )             26.9     12-06    138  |  100  |  11  ----------------------------<  139<H>  4.3   |  29  |  1.40<H>    Ca    8.4<L>      06 Dec 2018 05:28        PT/INR - ( 05 Dec 2018 04:50 )   PT: 14.6 sec;   INR: 1.27 ratio         PTT - ( 05 Dec 2018 04:50 )  PTT:29.9 sec        CAPILLARY BLOOD GLUCOSE                12-04 @ 14:54   Numerous Staphylococcus aureus  --  --  12-03 @ 16:52   Few Staphylococcus aureus  --  --  12-03 @ 16:38   No growth to date.  --  --          Culture - Body Fluid with Gram Stain (collected 12-04-18 @ 14:54)  Source: .Body Fluid Interstitial Fluid  Gram Stain (12-04-18 @ 16:03):    Numerous polymorphonuclear leukocytes seen per low power field    Few Gram positive cocci in pairs seen per oil power field  Preliminary Report (12-05-18 @ 08:37):    Numerous Staphylococcus aureus        ----  Personally reviewed:  Vital sign trends: [x  ] yes    [  ] no     [  ] n/a  Laboratory results: [ x ] yes    [  ] no     [  ] n/a  Radiology results: [ x ] yes    [  ] no     [  ] n/a  Culture results: [x  ] yes    [  ] no     [  ] n/a  Consultant recommendations: [ x ] yes    [  ] no     [  ] n/a

## 2018-12-06 NOTE — PROGRESS NOTE ADULT - SUBJECTIVE AND OBJECTIVE BOX
Date/Time Patient Seen:  		  Referring MD:   Data Reviewed	       Patient is a 73y old  Male who presents with a chief complaint of Redness and swelling around previous L hip surgical site (06 Dec 2018 05:50)    in bed  seen and examined  vs and meds reviewed    Subjective/HPI     PAST MEDICAL & SURGICAL HISTORY:  S/P CABG x 3  S/P hip replacement, left  Rheumatoid arthritis  Osteoarthritis  COPD (chronic obstructive pulmonary disease)  HTN (hypertension)  S/P lumbar fusion: Approx 2012  History of femur fracture: Repaired 11/19- L hip  History of appendectomy  History of right shoulder replacement  History of total left hip arthroplasty        Medication list         MEDICATIONS  (STANDING):  ALBUTerol    0.083% 2.5 milliGRAM(s) Nebulizer every 8 hours  ascorbic acid 500 milliGRAM(s) Oral two times a day  atorvastatin 10 milliGRAM(s) Oral at bedtime  calcium carbonate 1250 mG  + Vitamin D (OsCal 500 + D) 1 Tablet(s) Oral daily  cholecalciferol 1000 Unit(s) Oral daily  docusate sodium 100 milliGRAM(s) Oral three times a day  enoxaparin Injectable 40 milliGRAM(s) SubCutaneous every 24 hours  famotidine    Tablet 20 milliGRAM(s) Oral two times a day  ferrous    sulfate 325 milliGRAM(s) Oral daily  finasteride 5 milliGRAM(s) Oral daily  FLUoxetine 20 milliGRAM(s) Oral <User Schedule>  folic acid 1 milliGRAM(s) Oral daily  furosemide    Tablet 20 milliGRAM(s) Oral daily  hydrocortisone sodium succinate Injectable 25 milliGRAM(s) IV Push every 8 hours  lactated ringers. 1000 milliLiter(s) (75 mL/Hr) IV Continuous <Continuous>  lactated ringers. 1000 milliLiter(s) (75 mL/Hr) IV Continuous <Continuous>  lactated ringers. 1000 milliLiter(s) (50 mL/Hr) IV Continuous <Continuous>  lactated ringers. 1000 milliLiter(s) (70 mL/Hr) IV Continuous <Continuous>  lisinopril 20 milliGRAM(s) Oral daily  meropenem  IVPB 2000 milliGRAM(s) IV Intermittent every 8 hours  methadone    Tablet 5 milliGRAM(s) Oral every 8 hours  methylPREDNISolone 4 milliGRAM(s) Oral daily  multivitamin 1 Tablet(s) Oral daily  polyethylene glycol 3350 17 Gram(s) Oral daily  vancomycin  IVPB 1250 milliGRAM(s) IV Intermittent every 12 hours    MEDICATIONS  (PRN):  acetaminophen   Tablet .. 650 milliGRAM(s) Oral every 6 hours PRN Temp greater or equal to 38C (100.4F)  ALBUTerol    0.083% 2.5 milliGRAM(s) Nebulizer every 3 hours PRN Shortness of Breath and/or Wheezing  ALPRAZolam 0.5 milliGRAM(s) Oral at bedtime PRN anxiety  benzocaine 15 mG/menthol 3.6 mG Lozenge 1 Lozenge Oral every 3 hours PRN Sore Throat  cyclobenzaprine 10 milliGRAM(s) Oral two times a day PRN Muscle Spasm  diphenhydrAMINE 25 milliGRAM(s) Oral at bedtime PRN Insomnia  diphenhydrAMINE 25 milliGRAM(s) Oral every 4 hours PRN Rash and/or Itching  hydrocortisone 0.5% Cream 1 Application(s) Topical two times a day PRN Rash and/or Itching  HYDROmorphone  Injectable 1 milliGRAM(s) IV Push every 4 hours PRN Severe Pain (7 - 10)  magnesium hydroxide Suspension 30 milliLiter(s) Oral daily PRN Constipation  ondansetron Injectable 4 milliGRAM(s) IV Push once PRN Nausea and/or Vomiting  ondansetron Injectable 4 milliGRAM(s) IV Push every 6 hours PRN Nausea and/or Vomiting  oxyCODONE    IR 10 milliGRAM(s) Oral every 4 hours PRN Moderate Pain (4 - 6)  oxyCODONE    IR 5 milliGRAM(s) Oral every 4 hours PRN Mild Pain (1 - 3)         Vitals log        ICU Vital Signs Last 24 Hrs  T(C): 36.7 (06 Dec 2018 04:55), Max: 37 (05 Dec 2018 22:12)  T(F): 98.1 (06 Dec 2018 04:55), Max: 98.6 (05 Dec 2018 22:12)  HR: 78 (06 Dec 2018 07:30) (70 - 98)  BP: 97/58 (06 Dec 2018 06:21) (94/59 - 137/74)  BP(mean): --  ABP: --  ABP(mean): --  RR: 16 (06 Dec 2018 06:21) (14 - 17)  SpO2: 94% (06 Dec 2018 07:30) (92% - 100%)           Input and Output:  I&O's Detail    05 Dec 2018 07:01  -  06 Dec 2018 07:00  --------------------------------------------------------  IN:    lactated ringers.: 560 mL    lactated ringers.: 100 mL    lactated ringers.: 300 mL    lactated ringers.: 450 mL    Oral Fluid: 240 mL    Packed Red Blood Cells: 285 mL    Solution: 350 mL    Solution: 200 mL  Total IN: 2485 mL    OUT:    Voided: 450 mL  Total OUT: 450 mL    Total NET: 2035 mL          Lab Data                        8.7    14.18 )-----------( 309      ( 06 Dec 2018 05:28 )             26.9     12-06    138  |  100  |  11  ----------------------------<  139<H>  4.3   |  29  |  1.40<H>    Ca    8.4<L>      06 Dec 2018 05:28              Review of Systems	      Objective     Physical Examination    heart s1s2  lung dec BS      Pertinent Lab findings & Imaging      [place]:  NO   Adequate UO     I&O's Detail    05 Dec 2018 07:01  -  06 Dec 2018 07:00  --------------------------------------------------------  IN:    lactated ringers.: 560 mL    lactated ringers.: 100 mL    lactated ringers.: 300 mL    lactated ringers.: 450 mL    Oral Fluid: 240 mL    Packed Red Blood Cells: 285 mL    Solution: 350 mL    Solution: 200 mL  Total IN: 2485 mL    OUT:    Voided: 450 mL  Total OUT: 450 mL    Total NET: 2035 mL               Discussed with:     Cultures:	        Radiology

## 2018-12-06 NOTE — PHYSICAL THERAPY INITIAL EVALUATION ADULT - MD ORDER
PT order for PT eval, OOB to chair, PWB to LLE and FWB to RLE. s/p Left ORIF to left periprosthetic fracture and closed reduction to same site on 11/19/18. CT Chest: Subsegmental linear atelectasis /c in the left upper lobe. Mild scattered periferal reticular opacities /c in the upper lobe predominance which may reflect mild fibrosis. Probable left lateral anterior wall bruising, obstructing bilateral intrarenal; X-ray left femur: no evidence of fx; X-ray pelvis/femur/hip: Osteopenia

## 2018-12-06 NOTE — PROGRESS NOTE ADULT - ASSESSMENT
Wound infection  S/P repair of periprosthetic femur fracture  I would favor longer-term IV treatment, as if hardware potentially infected though no gross evidence of compromise.  If hardware is infected, he will not be cured with antibiotics.   LIOR, likely related to hypotension. Doubt from antibiotics  No GNR isolated thus far  OR cx pending  No MRSA isolated    Suggestions--  Cefazolin 2g IVPB Q24h, with liberalization of dosing as creatinine improved  F/U OR Cx  PICC  Favor 6 weeks Rx postoperatively    Fabian Childs MD  [phone]

## 2018-12-06 NOTE — PHYSICAL THERAPY INITIAL EVALUATION ADULT - RANGE OF MOTION EXAMINATION, REHAB EVAL
bilateral upper extremity ROM was WFL (within functional limits)/Arthritic changes noted, Left hip limited due to THR, (+) leg-length discrepancy BLE, pt stands on his toes/bilateral lower extremity ROM was WFL (within functional limits)/deficits as listed below

## 2018-12-06 NOTE — PHYSICAL THERAPY INITIAL EVALUATION ADULT - WEIGHT-BEARING RESTRICTIONS: SIT/STAND, REHAB EVAL
partial weight-bearing/LLE, pt tends to extend right LE when initiating transfers, cues to flex BLE/knees

## 2018-12-06 NOTE — PROGRESS NOTE ADULT - SUBJECTIVE AND OBJECTIVE BOX
DEPARTMENT OF ANESTHESIA  POST ANESTHETIC EVALUATION    The Patient was interviewed and evaluated    Vital Signs Last 24 Hrs  T(C): 36.7 (06 Dec 2018 04:55), Max: 37 (05 Dec 2018 22:12)  T(F): 98.1 (06 Dec 2018 04:55), Max: 98.6 (05 Dec 2018 22:12)  HR: 78 (06 Dec 2018 07:30) (70 - 98)  BP: 97/58 (06 Dec 2018 06:21) (94/59 - 137/74)  BP(mean): --  RR: 16 (06 Dec 2018 06:21) (14 - 17)  SpO2: 94% (06 Dec 2018 07:30) (92% - 100%)    Evaluation:      (x ) No apparent complications or complaints regarding anesthesia care at this time  (x ) All questions were answered    Condition:  (x ) Stable      ( ) Guarded      ( ) Critical    Recommendations:  (x ) None     ( ) Other:

## 2018-12-06 NOTE — DISCHARGE NOTE ADULT - ADDITIONAL INSTRUCTIONS
ID. Dr. Childs within one week  PMD [facility] (multiple physicians listed)- [phone];  Orthopedics Dr. Ramirez within 10-14 days. Call to make appointment. See below for contact information.

## 2018-12-06 NOTE — DISCHARGE NOTE ADULT - DURABLE MEDICAL EQUIPMENT AGENCY
[facility] surgical supply- for 3 in 1 commode   [phone]

## 2018-12-06 NOTE — PROGRESS NOTE ADULT - PROBLEM SELECTOR PLAN 1
s/p washout   on emp ABX  PT as tolerated and as per PT  I aundrea  NEBS tid and PRN  monitor vs and HD and Sat  keep sat > 88 pct  pain regimen  wound care and dressing  ortho follow up noted  ID following  refuses CPAP for PRO  cvs regimen optimization

## 2018-12-06 NOTE — DISCHARGE NOTE ADULT - CARE PROVIDER_API CALL
Irvin Ramirez), Orthopaedic Surgery  [address] [place] [address]  Phone: [phone]  Fax: [phone] Irvin Ramirez), Orthopaedic Surgery  [address] [place] [address]  Phone: [phone]  Fax: [phone]    Fabian Childs), Infectious Disease; Internal Medicine  [address] [facility]  [address]  [place] [address]  Phone: [phone]  Fax: [phone]

## 2018-12-06 NOTE — DISCHARGE NOTE ADULT - INSTRUCTIONS
Regular diet as tolerated Regular diet as tolerated    - Weekly labwork: cbc, cmp, ESR and CRP fax to [phone] (Dr. Fabian Childs)

## 2018-12-06 NOTE — PROGRESS NOTE ADULT - ASSESSMENT
74 yo M with PMH of HTN, HLD, MI and CABG with stents x3, COPD, RA and s/p recent L hip surgery now admitted with surgical site infection, in need of urgent washout. He has had an overall reasonable exercise capacity and reports riding an exercise bicycle for ~10 miles at a time without difficulty.  He had recent surgery without cv complication.    - No clear evidence of acute ischemia, arrhythmia or volume overload.  - Previous ECHO in 11/2018 shows preserved LV systolic function with EF: 65%, mild MR, mild TR, mildly enlarged LA/RA, stage I diastolic dysfunction.  - BP well controlled, continue home BP meds, monitor routine hemodynamics.  - Monitor and replete lytes, keep K>4, Mg>2.  - Patient has no active ischemia, decompensated HF, unstable arrythmia, or severe stenotic valvular disease, and in the setting of intermediate risk orthopedic procedure, patient is optimized from cardiovascular standpoint to proceed with this urgent procedure with routine hemodynamic monitoring.   - Other cardiovascular workup will depend on clinical course. All other workup per primary team.  - Will follow.  Walter Anglin [facility]  Cardiology 74 yo M with PMH of HTN, HLD, MI and CABG with stents x3, COPD, RA and s/p recent L hip surgery now admitted with surgical site infection, in need of urgent washout. He has had an overall reasonable exercise capacity and reports riding an exercise bicycle for ~10 miles at a time without difficulty.  He had recent surgery without cv complication.    - s/p washout yesterday by orthopedics.  - No clear evidence of acute ischemia, arrhythmia or volume overload.  - Previous ECHO in 11/2018 shows preserved LV systolic function with EF: 65%, mild MR, mild TR, mildly enlarged LA/RA, stage I diastolic dysfunction.  - Hold BP medications if hypotensive  - Hold Lasix given rise in creatinine.  - Monitor and replete lytes, keep K>4, Mg>2.  - continue with Abx per ID  - Other cardiovascular workup will depend on clinical course. All other workup per primary team.  - Will follow.  Walter Anglin [facility]  Cardiology

## 2018-12-06 NOTE — PHYSICAL THERAPY INITIAL EVALUATION ADULT - IMPAIRMENTS CONTRIBUTING TO GAIT DEVIATIONS, PT EVAL
decreased flexibility/slow, slightly unsteady/impaired postural control/decreased ROM/impaired balance/impaired motor control

## 2018-12-06 NOTE — PROGRESS NOTE ADULT - ASSESSMENT
71 yo M PMHx 45 pack year smoking hx and COPD (diagnosed "a few years ago, quit smoking then), HTN, HLD, hx of MI and CABG (2008) (stents also placed per chart review), RA (on Medrol daily), hx of multiple L hip surgeries (hip replacement 30 years ago in [place], ORIF recently 11/2018 w/ Dr. Ramirez) p/w L pain, drainage, redness and swelling surrounding upper L hip surgical site beginning 6 days ago in [facility], s/p 3 days ceftin at [facility] with symptom progression (more erythema, swelling on L hip), admitted for surgical site infection r/o septic joint with CT revealing L hip fluid collection (infection vs hematoma). POD1 S/p L hip abscess I&D

## 2018-12-06 NOTE — DISCHARGE NOTE ADULT - CARE PLAN
Principal Discharge DX:	Incisional infection  Goal:	return to ADLs/Clear infection  Assessment and plan of treatment:	DC Instructions:    1.	Resume previous diet, regular or diabetic as appropriate  2.	PWB 50% LLE with assistance and rolling walker  3.	Continue DVT/PE Prophylaxis per medical team recommendations.   4.	PT daily  5.	Follow up with Orthopedic Surgeon James in 10-14 Days after Discharge from the Hospital. Call Office asap For Appointment.  6.	Keep NAVA wound dressing/vac in place until: 12/11/2018. It should be DC'd at that point and a dry sterile dressing should be placed.   7.	Ice the hip as much as possible  8.           FU with Dr. Ramirez in office in 10-14 days. CAll office for appt. Principal Discharge DX:	Incisional infection  Goal:	return to ADLs/Clear infection  Assessment and plan of treatment:	Your surgical site was infected, you were seen by infectious disease doctors, orthopedics team and treated with IV antibiotics after your hematoma/abscess was drained  -PICC line was placed for IV antiobiotics: continue Cefazolin 2g IVPB Q24h  -Follow up with ID Dr. Childs within one week of discharge  DC Instructions from Orthopedics  1.	Resume previous diet, regular or diabetic as appropriate  2.	PWB 50% LLE with assistance and rolling walker  3.	Continue DVT/PE Prophylaxis per medical team recommendations.   4.	PT daily  5.	Follow up with Orthopedic Surgeon James in 10-14 Days after Discharge from the Hospital. Call Office asap For Appointment.  6.	Keep NAVA wound dressing/vac in place until: 12/11/2018. It should be DC'd at that point and a dry sterile dressing should be placed.   7.	Ice the hip as much as possible  8.           FU with Dr. Ramirez in office in 10-14 days. CAll office for appt.  Secondary Diagnosis:	Acute kidney injury  Goal:	Management  Assessment and plan of treatment:	-Your creatinine (marker of kidney injury) was found to be slightly more elevated than usual.   -Likely related to lower than normal blood pressure  -Avoid taking ibuprofen or advil. Use tylenol for pain. You were treated with IV fluids  -Encourage to take oral fluids  -Follow up with your primary care doctor within one week of discharge  Secondary Diagnosis:	Anemia  Goal:	Management  Assessment and plan of treatment:	-Take medications as above  -Follow up with your primary care doctor within one week of discharge  Secondary Diagnosis:	COPD (chronic obstructive pulmonary disease)  Goal:	Management  Assessment and plan of treatment:	-Take medications as above  -Follow up with your primary care doctor within one week of discharge  Secondary Diagnosis:	Rheumatoid arthritis  Goal:	Management  Assessment and plan of treatment:	-Take medications as above  -Follow up with your primary care doctor within one week of discharge  Secondary Diagnosis:	HTN (hypertension)  Goal:	Management  Assessment and plan of treatment:	-Take medications as above  -Follow up with your primary care doctor within one week of discharge  Secondary Diagnosis:	HLD (hyperlipidemia)  Goal:	Management  Assessment and plan of treatment:	-Take medications as above  -Follow up with your primary care doctor within one week of discharge Principal Discharge DX:	Incisional infection  Goal:	return to ADLs/Clear infection  Assessment and plan of treatment:	Your surgical site was infected, you were seen by infectious disease doctors, orthopedics team and treated with IV antibiotics after your hematoma/abscess was drained  -PICC line was placed for IV antiobiotics: continue Cefazolin 2g IVPB Q8 hours UNTIL 1/15/19 FAX WEEKLY LABS AS DESCRIBED BELOW -[phone]  -Follow up with ID Dr. Childs within one week of discharge  DC Instructions from Orthopedics  1.	Resume previous diet, regular or diabetic as appropriate  2.	PWB 50% LLE with assistance and rolling walker  3.	Continue DVT/PE Prophylaxis per medical team recommendations.   4.	PT daily  5.	Follow up with Orthopedic Surgeon James in 10-14 Days after Discharge from the Hospital. Call Office asap For Appointment.  6.	Keep NAVA wound dressing/vac in place until: 12/11/2018. It should be DC'd at that point and a dry sterile dressing should be placed.   7.	Ice the hip as much as possible  8.           FU with Dr. Ramirez in office in 10-14 days. CAll office for appt.  Secondary Diagnosis:	Acute kidney injury  Goal:	Management  Assessment and plan of treatment:	-Your creatinine (marker of kidney injury) was found to be slightly more elevated than usual.   -Likely related to lower than normal blood pressure  -Avoid taking ibuprofen or advil. Use tylenol for pain. You were treated with IV fluids  -Encourage to take oral fluids  -Follow up with your primary care doctor within one week of discharge  Secondary Diagnosis:	Anemia  Goal:	Management  Assessment and plan of treatment:	-Take medications as above  -Follow up with your primary care doctor within one week of discharge  Secondary Diagnosis:	COPD (chronic obstructive pulmonary disease)  Goal:	Management  Assessment and plan of treatment:	-Take medications as above  -Follow up with your primary care doctor within one week of discharge  Secondary Diagnosis:	Rheumatoid arthritis  Goal:	Management  Assessment and plan of treatment:	-Take medications as above  -Follow up with your primary care doctor within one week of discharge  Secondary Diagnosis:	HTN (hypertension)  Goal:	Management  Assessment and plan of treatment:	-Take medications as above  -Follow up with your primary care doctor within one week of discharge  Secondary Diagnosis:	HLD (hyperlipidemia)  Goal:	Management  Assessment and plan of treatment:	-Take medications as above  -Follow up with your primary care doctor within one week of discharge

## 2018-12-06 NOTE — PROGRESS NOTE ADULT - PROBLEM SELECTOR PLAN 1
-Patient w/ purulent drainage, swelling and erythema surrounding surgical site concerning for surgical site infection w/x-rays revealing soft tissue swelling  -POD 1 s/p L hip I&D  -CT reviewed: Interval internal fixation left femur.  Localized deep subcutaneous fluid collection containing air at the level   of the proximal femoral component; recommend further clinical correlation   for infection/abscess or infected hematoma.  - Deeper joint infection on differential, though less likely. Patient has had previous surgical site infection in back after lumbar fusion which progressed to sepsis, tx'ed with IV abx in [place]. possible patient has some propensity for SSIs, perhaps due to immunocompromised state on chronic steroids (Medrol daily) for RA  -CT femur: Interval internal fixation left femur.  Localized deep subcutaneous fluid collection containing air at the level   of the proximal femoral component; recommend further clinical correlation   for infection/abscess or infected hematoma.  - s/p vanc and aztreonam in ED  -ID Dr. Childs c/s appreciated, started patient on IV vanco and meropenam after discussion of gram stain of aspirated fluid 12/5, now DC'd today as pt has worsening renal function. F/u ID reccs moving forward  - trend fever curve  - BC NGTD. Aspirated fluid gram stain and cx- S. Aureus, numerous PMNs, few + cocci in pairs  -ortho reccs noted-FU OR Cx Abx per ID Discharge planning  - Likely HPA axis suppressed- cont Hydrocortisone 25 mg IV q8 hours for first 24 hours post-op, and then resume his home dose medrol 4mg daily  -PT reccs noted: patient wanting to go home though reccs are to go to [facility]. Formal note to follow -Patient w/ purulent drainage, swelling and erythema surrounding surgical site concerning for surgical site infection w/x-rays revealing soft tissue swelling  -POD 1 s/p L hip I&D  -CT reviewed: Interval internal fixation left femur.  Localized deep subcutaneous fluid collection containing air at the level   of the proximal femoral component; recommend further clinical correlation   for infection/abscess or infected hematoma.  - Deeper joint infection on differential, though less likely. Patient has had previous surgical site infection in back after lumbar fusion which progressed to sepsis, tx'ed with IV abx in [place]. possible patient has some propensity for SSIs, perhaps due to immunocompromised state on chronic steroids (Medrol daily) for RA  -CT femur: Interval internal fixation left femur.  Localized deep subcutaneous fluid collection containing air at the level   of the proximal femoral component; recommend further clinical correlation   for infection/abscess or infected hematoma.  - s/p vanc and aztreonam in ED  -ID Dr. Childs c/s appreciated, started patient on IV vanco and meropenem after discussion of gram stain of aspirated fluid 12/5, now DC'd today as pt has worsening renal function. F/u ID reccs moving forward  - trend fever curve  - BC NGTD. Aspirated fluid gram stain and cx- S. Aureus, numerous PMNs, few + cocci in pairs  -ortho reccs noted-FU OR Cx Abx per ID Discharge planning  - Likely HPA axis suppressed- cont Hydrocortisone 25 mg IV q8 hours for first 24 hours post-op, and then resume his home dose medrol 4mg daily  -PT reccs noted: patient wanting to go home though reccs are to go to [facility] ([place]). Formal note to follow

## 2018-12-06 NOTE — DISCHARGE NOTE ADULT - SECONDARY DIAGNOSIS.
Acute kidney injury Anemia COPD (chronic obstructive pulmonary disease) Rheumatoid arthritis HTN (hypertension) HLD (hyperlipidemia)

## 2018-12-07 LAB
ANION GAP SERPL CALC-SCNC: 7 MMOL/L — SIGNIFICANT CHANGE UP (ref 5–17)
BASOPHILS # BLD AUTO: 0.04 K/UL — SIGNIFICANT CHANGE UP (ref 0–0.2)
BASOPHILS NFR BLD AUTO: 0.5 % — SIGNIFICANT CHANGE UP (ref 0–2)
BUN SERPL-MCNC: 11 MG/DL — SIGNIFICANT CHANGE UP (ref 7–23)
CALCIUM SERPL-MCNC: 6.6 MG/DL — LOW (ref 8.5–10.1)
CHLORIDE SERPL-SCNC: 110 MMOL/L — HIGH (ref 96–108)
CO2 SERPL-SCNC: 27 MMOL/L — SIGNIFICANT CHANGE UP (ref 22–31)
CREAT SERPL-MCNC: 0.78 MG/DL — SIGNIFICANT CHANGE UP (ref 0.5–1.3)
EOSINOPHIL # BLD AUTO: 0.29 K/UL — SIGNIFICANT CHANGE UP (ref 0–0.5)
EOSINOPHIL NFR BLD AUTO: 3.5 % — SIGNIFICANT CHANGE UP (ref 0–6)
GLUCOSE SERPL-MCNC: 82 MG/DL — SIGNIFICANT CHANGE UP (ref 70–99)
HCT VFR BLD CALC: 21.1 % — LOW (ref 39–50)
HCT VFR BLD CALC: 27.9 % — LOW (ref 39–50)
HGB BLD-MCNC: 6.7 G/DL — CRITICAL LOW (ref 13–17)
HGB BLD-MCNC: 9.1 G/DL — LOW (ref 13–17)
IMM GRANULOCYTES NFR BLD AUTO: 1.2 % — SIGNIFICANT CHANGE UP (ref 0–1.5)
LYMPHOCYTES # BLD AUTO: 1.68 K/UL — SIGNIFICANT CHANGE UP (ref 1–3.3)
LYMPHOCYTES # BLD AUTO: 20.3 % — SIGNIFICANT CHANGE UP (ref 13–44)
MCHC RBC-ENTMCNC: 30.5 PG — SIGNIFICANT CHANGE UP (ref 27–34)
MCHC RBC-ENTMCNC: 31.8 GM/DL — LOW (ref 32–36)
MCV RBC AUTO: 95.9 FL — SIGNIFICANT CHANGE UP (ref 80–100)
MONOCYTES # BLD AUTO: 0.88 K/UL — SIGNIFICANT CHANGE UP (ref 0–0.9)
MONOCYTES NFR BLD AUTO: 10.7 % — SIGNIFICANT CHANGE UP (ref 2–14)
NEUTROPHILS # BLD AUTO: 5.27 K/UL — SIGNIFICANT CHANGE UP (ref 1.8–7.4)
NEUTROPHILS NFR BLD AUTO: 63.8 % — SIGNIFICANT CHANGE UP (ref 43–77)
PLATELET # BLD AUTO: 262 K/UL — SIGNIFICANT CHANGE UP (ref 150–400)
POTASSIUM SERPL-MCNC: 3 MMOL/L — LOW (ref 3.5–5.3)
POTASSIUM SERPL-SCNC: 3 MMOL/L — LOW (ref 3.5–5.3)
RBC # BLD: 2.2 M/UL — LOW (ref 4.2–5.8)
RBC # FLD: 15.7 % — HIGH (ref 10.3–14.5)
SODIUM SERPL-SCNC: 144 MMOL/L — SIGNIFICANT CHANGE UP (ref 135–145)
WBC # BLD: 8.26 K/UL — SIGNIFICANT CHANGE UP (ref 3.8–10.5)
WBC # FLD AUTO: 8.26 K/UL — SIGNIFICANT CHANGE UP (ref 3.8–10.5)

## 2018-12-07 PROCEDURE — 99232 SBSQ HOSP IP/OBS MODERATE 35: CPT

## 2018-12-07 PROCEDURE — 99233 SBSQ HOSP IP/OBS HIGH 50: CPT | Mod: GC

## 2018-12-07 RX ORDER — POTASSIUM CHLORIDE 20 MEQ
40 PACKET (EA) ORAL EVERY 4 HOURS
Qty: 0 | Refills: 0 | Status: COMPLETED | OUTPATIENT
Start: 2018-12-07 | End: 2018-12-07

## 2018-12-07 RX ORDER — CEFAZOLIN SODIUM 1 G
2 VIAL (EA) INJECTION
Qty: 228 | Refills: 0 | OUTPATIENT
Start: 2018-12-07 | End: 2019-01-13

## 2018-12-07 RX ORDER — CEFAZOLIN SODIUM 1 G
2000 VIAL (EA) INJECTION EVERY 8 HOURS
Qty: 0 | Refills: 0 | Status: DISCONTINUED | OUTPATIENT
Start: 2018-12-07 | End: 2018-12-10

## 2018-12-07 RX ADMIN — ALBUTEROL 2.5 MILLIGRAM(S): 90 AEROSOL, METERED ORAL at 07:19

## 2018-12-07 RX ADMIN — Medication 500 MILLIGRAM(S): at 17:53

## 2018-12-07 RX ADMIN — Medication 40 MILLIEQUIVALENT(S): at 17:53

## 2018-12-07 RX ADMIN — POLYETHYLENE GLYCOL 3350 17 GRAM(S): 17 POWDER, FOR SOLUTION ORAL at 13:34

## 2018-12-07 RX ADMIN — Medication 4 MILLIGRAM(S): at 05:37

## 2018-12-07 RX ADMIN — METHADONE HYDROCHLORIDE 5 MILLIGRAM(S): 40 TABLET ORAL at 13:33

## 2018-12-07 RX ADMIN — Medication 325 MILLIGRAM(S): at 13:33

## 2018-12-07 RX ADMIN — SODIUM CHLORIDE 50 MILLILITER(S): 9 INJECTION INTRAMUSCULAR; INTRAVENOUS; SUBCUTANEOUS at 05:47

## 2018-12-07 RX ADMIN — Medication 100 MILLIGRAM(S): at 21:36

## 2018-12-07 RX ADMIN — Medication 1 TABLET(S): at 13:33

## 2018-12-07 RX ADMIN — Medication 1 MILLIGRAM(S): at 13:33

## 2018-12-07 RX ADMIN — FAMOTIDINE 20 MILLIGRAM(S): 10 INJECTION INTRAVENOUS at 05:38

## 2018-12-07 RX ADMIN — Medication 100 MILLIGRAM(S): at 05:38

## 2018-12-07 RX ADMIN — FINASTERIDE 5 MILLIGRAM(S): 5 TABLET, FILM COATED ORAL at 13:34

## 2018-12-07 RX ADMIN — Medication 20 MILLIGRAM(S): at 16:29

## 2018-12-07 RX ADMIN — CYCLOBENZAPRINE HYDROCHLORIDE 10 MILLIGRAM(S): 10 TABLET, FILM COATED ORAL at 21:36

## 2018-12-07 RX ADMIN — METHADONE HYDROCHLORIDE 5 MILLIGRAM(S): 40 TABLET ORAL at 05:44

## 2018-12-07 RX ADMIN — FAMOTIDINE 20 MILLIGRAM(S): 10 INJECTION INTRAVENOUS at 17:53

## 2018-12-07 RX ADMIN — Medication 100 MILLIGRAM(S): at 13:00

## 2018-12-07 RX ADMIN — Medication 25 MILLIGRAM(S): at 00:59

## 2018-12-07 RX ADMIN — Medication 0.5 MILLIGRAM(S): at 21:36

## 2018-12-07 RX ADMIN — HEPARIN SODIUM 5000 UNIT(S): 5000 INJECTION INTRAVENOUS; SUBCUTANEOUS at 05:38

## 2018-12-07 RX ADMIN — ALBUTEROL 2.5 MILLIGRAM(S): 90 AEROSOL, METERED ORAL at 23:48

## 2018-12-07 RX ADMIN — Medication 100 MILLIGRAM(S): at 13:33

## 2018-12-07 RX ADMIN — Medication 40 MILLIEQUIVALENT(S): at 13:34

## 2018-12-07 RX ADMIN — ATORVASTATIN CALCIUM 10 MILLIGRAM(S): 80 TABLET, FILM COATED ORAL at 21:37

## 2018-12-07 RX ADMIN — HEPARIN SODIUM 5000 UNIT(S): 5000 INJECTION INTRAVENOUS; SUBCUTANEOUS at 17:53

## 2018-12-07 RX ADMIN — Medication 500 MILLIGRAM(S): at 05:37

## 2018-12-07 RX ADMIN — Medication 1000 UNIT(S): at 13:33

## 2018-12-07 RX ADMIN — Medication 20 MILLIGRAM(S): at 05:39

## 2018-12-07 RX ADMIN — ALBUTEROL 2.5 MILLIGRAM(S): 90 AEROSOL, METERED ORAL at 15:23

## 2018-12-07 NOTE — PROGRESS NOTE ADULT - PROBLEM SELECTOR PLAN 1
-Patient w/ purulent drainage, swelling and erythema surrounding surgical site concerning for surgical site infection w/x-rays revealing soft tissue swelling  -POD 2 s/p L hip I&D  -CT reviewed: Interval internal fixation left femur.  Localized deep subcutaneous fluid collection containing air at the level   of the proximal femoral component; recommend further clinical correlation   for infection/abscess or infected hematoma.  - Deeper joint infection on differential, though less likely. Patient has had previous surgical site infection in back after lumbar fusion which progressed to sepsis, tx'ed with IV abx in [place]. possible patient has some propensity for SSIs, perhaps due to immunocompromised state on chronic steroids (Medrol daily) for RA  -CT femur: Interval internal fixation left femur.  Localized deep subcutaneous fluid collection containing air at the level   of the proximal femoral component; recommend further clinical correlation   for infection/abscess or infected hematoma.  - s/p vanc and aztreonam in ED  -ID Dr. Childs c/s appreciated, started patient on IV vanco and meropenem after discussion of gram stain of aspirated fluid 12/5, now patient is on ANcef.  -Pt s/p PICC 12/6 in anticipation for d/c planning  - trend fever curve  - BC NGTD. Aspirated fluid gram stain and cx- S. Aureus, numerous PMNs, few + cocci in pairs  -ortho reccs noted. F/u OR Cx  - cont home dose medrol 4mg daily  -PT reccs noted: rehabilitation facility; [facility] vs. HCPT /c increased assistance

## 2018-12-07 NOTE — PROGRESS NOTE ADULT - PROBLEM SELECTOR PLAN 4
- diagnosed "a few years ago", patient had 45 pack year hx now quit after diagnosis   - no oxygen at home, inhalers only prn, rarely uses and patient does not remember which inhaler  - CXR revealed lung opacity new from previous admission this month   - CT chest: . Area of subsegmental linear atelectasis within the left upper lobe,   corresponding to the previously noted x-ray abnormality.    2. Mild scattered peripheral reticular opacities within upper lobe   predominance which may reflect mild fibrosis.  - Dr. Valente consulted, appreciate recs cont use of I aundrea cont NEBS prn for copd. Pt refuses CPAP for PRO. will follow - diagnosed "a few years ago", patient had 45 pack year hx now quit after diagnosis   - no oxygen at home, inhalers only prn, rarely uses and patient does not remember which inhaler  - CXR revealed lung opacity new from previous admission this month   - CT chest: . Area of subsegmental linear atelectasis within the left upper lobe,   corresponding to the previously noted x-ray abnormality.    2. Mild scattered peripheral reticular opacities within upper lobe   predominance which may reflect mild fibrosis.  - Dr. Valente consulted, appreciate recs cont use of I aundrea cont NEBS prn for copd. Pt refuses CPAP for PRO.

## 2018-12-07 NOTE — PROGRESS NOTE ADULT - ASSESSMENT
71 yo M PMHx 45 pack year smoking hx and COPD (diagnosed "a few years ago, quit smoking then), HTN, HLD, hx of MI and CABG (2008) (stents also placed per chart review), RA (on Medrol daily), hx of multiple L hip surgeries (hip replacement 30 years ago in [place], ORIF recently 11/2018 w/ Dr. Ramirez) p/w L pain, drainage, redness and swelling surrounding upper L hip surgical site beginning 6 days ago in [facility], s/p 3 days ceftin at [facility] with symptom progression (more erythema, swelling on L hip), admitted for surgical site infection r/o septic joint with CT revealing L hip fluid collection (infection vs hematoma). POD2 S/p L hip abscess I&D

## 2018-12-07 NOTE — PROGRESS NOTE ADULT - SUBJECTIVE AND OBJECTIVE BOX
[facility] -- Shannon Valdez, Anusha Villasenor, Sidney Restrepo Savella  Office # [phone]      Follow Up:    Preop cardiac assessment  Subjective/Observations:   No events overnight resting comfortably in bed without complaints     REVIEW OF SYSTEMS: All other review of systems is negative unless indicated above    PAST MEDICAL & SURGICAL HISTORY:  S/P CABG x 3  S/P hip replacement, left  Rheumatoid arthritis  Osteoarthritis  COPD (chronic obstructive pulmonary disease)  HTN (hypertension)  S/P lumbar fusion: Approx 2012  History of femur fracture: Repaired 11/19- L hip  History of appendectomy  History of right shoulder replacement  History of total left hip arthroplasty      MEDICATIONS  (STANDING):  ALBUTerol    0.083% 2.5 milliGRAM(s) Nebulizer every 8 hours  ascorbic acid 500 milliGRAM(s) Oral two times a day  atorvastatin 10 milliGRAM(s) Oral at bedtime  calcium carbonate 1250 mG  + Vitamin D (OsCal 500 + D) 1 Tablet(s) Oral daily  ceFAZolin   IVPB      ceFAZolin   IVPB 2000 milliGRAM(s) IV Intermittent every 24 hours  cholecalciferol 1000 Unit(s) Oral daily  docusate sodium 100 milliGRAM(s) Oral three times a day  famotidine    Tablet 20 milliGRAM(s) Oral two times a day  ferrous    sulfate 325 milliGRAM(s) Oral daily  finasteride 5 milliGRAM(s) Oral daily  FLUoxetine 20 milliGRAM(s) Oral <User Schedule>  folic acid 1 milliGRAM(s) Oral daily  heparin  Injectable 5000 Unit(s) SubCutaneous every 12 hours  methadone    Tablet 5 milliGRAM(s) Oral every 8 hours  methylPREDNISolone 4 milliGRAM(s) Oral daily  multivitamin 1 Tablet(s) Oral daily  polyethylene glycol 3350 17 Gram(s) Oral daily  potassium chloride    Tablet ER 40 milliEquivalent(s) Oral every 4 hours    MEDICATIONS  (PRN):  acetaminophen   Tablet .. 650 milliGRAM(s) Oral every 6 hours PRN Temp greater or equal to 38C (100.4F)  ALBUTerol    0.083% 2.5 milliGRAM(s) Nebulizer every 3 hours PRN Shortness of Breath and/or Wheezing  ALPRAZolam 0.5 milliGRAM(s) Oral at bedtime PRN anxiety  benzocaine 15 mG/menthol 3.6 mG Lozenge 1 Lozenge Oral every 3 hours PRN Sore Throat  bisacodyl Suppository 10 milliGRAM(s) Rectal daily PRN If no bowel movement  cyclobenzaprine 10 milliGRAM(s) Oral two times a day PRN Muscle Spasm  hydrocortisone 0.5% Cream 1 Application(s) Topical two times a day PRN Rash and/or Itching  HYDROmorphone  Injectable 1 milliGRAM(s) IV Push every 4 hours PRN Severe Pain (7 - 10)  magnesium hydroxide Suspension 30 milliLiter(s) Oral daily PRN Constipation  ondansetron Injectable 4 milliGRAM(s) IV Push once PRN Nausea and/or Vomiting  ondansetron Injectable 4 milliGRAM(s) IV Push every 6 hours PRN Nausea and/or Vomiting  oxyCODONE    IR 10 milliGRAM(s) Oral every 4 hours PRN Moderate Pain (4 - 6)  oxyCODONE    IR 5 milliGRAM(s) Oral every 4 hours PRN Mild Pain (1 - 3)      Allergies    Ceftin (Pruritus)    Intolerances        Vital Signs Last 24 Hrs  T(C): 36.6 (07 Dec 2018 12:15), Max: 37.1 (06 Dec 2018 20:51)  T(F): 97.8 (07 Dec 2018 12:15), Max: 98.7 (06 Dec 2018 20:51)  HR: 73 (07 Dec 2018 12:15) (73 - 101)  BP: 118/58 (07 Dec 2018 12:15) (100/60 - 118/58)  BP(mean): --  RR: 18 (07 Dec 2018 12:15) (17 - 18)  SpO2: 98% (07 Dec 2018 12:15) (94% - 98%)    I&O's Summary    06 Dec 2018 07:01  -  07 Dec 2018 07:00  --------------------------------------------------------  IN: 1300 mL / OUT: 1100 mL / NET: 200 mL          PHYSICAL EXAM:  TELE: Off tele   Constitutional: NAD, awake and alert, well-developed  HEENT: Moist Mucous Membranes, Anicteric  Pulmonary: Non-labored, expiratory wheezes bilaterally with scattered rhonchi, No , crackles   Cardiovascular: Regular, S1 and S2 nl, No murmurs, rubs, gallops or clicks  Gastrointestinal: Bowel Sounds present, soft, nontender.   Lymph: No lymphadenopathy. No peripheral edema.  Skin: No visible rashes or ulcers.  Psych:  Mood & affect appropriate    LABS: All Labs Reviewed:                        6.7    8.26  )-----------( 262      ( 07 Dec 2018 06:43 )             21.1                         8.7    14.18 )-----------( 309      ( 06 Dec 2018 05:28 )             26.9                         8.3    7.23  )-----------( 276      ( 05 Dec 2018 04:50 )             25.7     07 Dec 2018 06:43    144    |  110    |  11     ----------------------------<  82     3.0     |  27     |  0.78   06 Dec 2018 05:28    138    |  100    |  11     ----------------------------<  139    4.3     |  29     |  1.40   05 Dec 2018 04:50    138    |  100    |  8      ----------------------------<  92     3.6     |  31     |  0.58     Ca    6.6        07 Dec 2018 06:43  Ca    8.4        06 Dec 2018 05:28  Ca    8.2        05 Dec 2018 04:50               ECG:  < from: 12 Lead ECG (12.04.18 @ 01:38) >  Ventricular Rate 75 BPM    Atrial Rate 75 BPM    P-R Interval 214 ms    QRS Duration 108 ms    Q-T Interval 404 ms    QTC Calculation(Bezet) 451 ms    P Axis 37 degrees    R Axis -26 degrees    T Axis 50 degrees    Diagnosis Line Sinus rhythm with sinus arrhythmia with 1st degree AV block  Confirmed by EVITA RESTREPO ([id number]) on 12/4/2018 10:14:55 AM    < end of copied text >    Echo:  < from: TTE Echo Doppler w/o Cont (11.19.18 @ 11:09) >  EXAM:  ECHO TTE WO CON COMP W DOPPLR         PROCEDURE DATE:  11/19/2018        INTERPRETATION:  INDICATION: CAD  Referring M.D.:Paolo  Blood Pressure 114/70        Weight (kg) :81     Height (cm):170       BSA (sq m): 1.98  Technician: AS    Dimensions:    LA 3.5       Normal Values: 2.0 - 4.0 cm    Ao 3.94        Normal Values: 2.0 - 3.8 cm  SEPTUM 1.1       Normal Values: 0.6 - 1.2 cm  PWT 1.1       Normal Values: 0.6 - 1.1 cm  LVIDd 5.1         Normal Values: 3.0 - 5.6 cm  LVIDs 4.05 Normal Values: 1.8 - 4.0 cm      OBSERVATIONS:  Technically difficult study  Mitral Valve: MAC with calcified MV leaflets. Mild mitral regurgitation.  Aortic Valve/Aorta: Mildly calcified trileaflet AV with normal opening.   Mildly dilated aortic root at 3.9  cm  Tricuspid Valve: Normal tricuspid valve. Trace tricuspid regurgitation.  Pulmonic Valve: The pulmonic valve is not well visualized. Probably   normal.  Left Atrium: Mildly enlarged   Right Atrium: Mildly enlarged  Left Ventricle: Endocardium is not well-visualized. Overall there appears   to be normal left ventricular systolic function. The EF is approximately   65%.  Right Ventricle: The right ventricle is not well-visualized. It appears   to be mildly enlarged in some views with normal systolic function.    Pericardium/Pleura: Trace pericardial effusion noted.  Pulmonary/RV Pressure: Insufficient tricuspid regurgitation Doppler in   order to estimate the right ventricular systolic pressure  LV Diastolic Function: Stage I diastolic dysfunction     Conclusion: Overall preserved left ventricular systolic function. EF 65.   Insufficient tricuspid regurgitation Doppler in order to estimate the   right ventricular systolic pressure        CARMEN BAE M.D., ATTENDING CARDIOLOGIST  This document has been electronically signed. Nov 20 2018  3:56PM    < end of copied text >    Radiology:  < from: IR Fluoro Guided Needle CV Cath Proc (12.06.18 @ 14:43) >  EXAM:  IR PROCEDURE PICC                          EXAM:  US GUIDANCE VASCULAR ACCESS                          EXAM:  SP FLUOR GUID CV CATH PROC SI                            PROCEDURE DATE:  12/06/2018          INTERPRETATION:  Ultrasound and fluoroscopy guided right upper extremity   peripherally inserted central venous catheter    Indication: Long-term venous access required for IV antibiotics.    PROCEDURE:  After discussing the risks, benefits and alternatives of this procedure   with the patient, informed consent was obtained.  The patient was placed   supine on the fluoroscopic examination table and connected to physiologic   monitoring.  The patient's right upper arm was prepped and draped in the   usual sterile fashion.     Right upper extremity ultrasound revealed patent right basilic vein and   image was saved.  Lidocaine 1% was used for local anesthesia.    Using sonographic guidance, the right basilic vein was accessed with a 21   gauge micropuncture needle, which was then exchanged for a 5Fr peel-away   sheath over a Mandril wire.  The wire was then used to measure the   distance from the venotomy site to the cavoatrial junction.  A   single-lumen PICC was cut to the appropriate length ( 45 cm) and then   advanced into place through the peel-away sheath under fluoroscopic   observation.  The peel-away sheath was then removed.  The final position   of the catheter tip was within the superior cavoatrial junction. The PICC   was able to aspirate and flush without difficulty and was then locked   with saline.  The catheter was secured with a StatLock adhesive and a dry   sterile dressing was then applied.  The patient tolerated the procedure   well and was discharged from the radiology department in stable condition  and without complaints.  There were no immediate complications.    IMPRESSION:  1.  Successful placement of a right basilic approach 45 cm PICC with the   tip in the superior cavoatrial junction.  2.  The PICC is ready for immediate use.                APRIL STERLING M.D., ATTENDING RADIOLOGIST  This document has been electronically signed. Dec  6 2018  3:17P    < end of copied text >           [facility]   Cardiology

## 2018-12-07 NOTE — PROGRESS NOTE ADULT - SUBJECTIVE AND OBJECTIVE BOX
[facility], Division of Infectious Diseases  GAETANO Briones A. Lee  [phone]    Name: ERIKA TORREZ  Age: 73y  Gender: Male  MRN: [id number]    Interval History--  Notes reviewed. Seen earlier today. No new issues. Pain control not a concern. No fevers, chills, or rigors. No N/V/D. No urinary symptoms. H/H low, for transfusion.     Past Medical History--  S/P CABG x 3  S/P hip replacement, left  Rheumatoid arthritis  Osteoarthritis  COPD (chronic obstructive pulmonary disease)  HTN (hypertension)  S/P lumbar fusion  History of femur fracture  History of appendectomy  History of right shoulder replacement  History of total left hip arthroplasty      For details regarding the patient's social history, family history, and other miscellaneous elements, please refer the initial infectious diseases consultation and/or the admitting history and physical examination for this admission.    Allergies    Ceftin (Pruritus)    Intolerances        Medications--  Antibiotics:  ceFAZolin   IVPB      ceFAZolin   IVPB 2000 milliGRAM(s) IV Intermittent every 24 hours    Immunologic:    Other:  acetaminophen   Tablet .. PRN  ALBUTerol    0.083%  ALBUTerol    0.083% PRN  ALPRAZolam PRN  ascorbic acid  atorvastatin  benzocaine 15 mG/menthol 3.6 mG Lozenge PRN  bisacodyl Suppository PRN  calcium carbonate 1250 mG  + Vitamin D (OsCal 500 + D)  cholecalciferol  cyclobenzaprine PRN  docusate sodium  famotidine    Tablet  ferrous    sulfate  finasteride  FLUoxetine  folic acid  heparin  Injectable  hydrocortisone 0.5% Cream PRN  HYDROmorphone  Injectable PRN  magnesium hydroxide Suspension PRN  methadone    Tablet  methylPREDNISolone  multivitamin  ondansetron Injectable PRN  ondansetron Injectable PRN  oxyCODONE    IR PRN  oxyCODONE    IR PRN  polyethylene glycol 3350  potassium chloride    Tablet ER      Review of Systems--  A 10-point review of systems was obtained.   Review of systems otherwise unchanged compared to prior visit except as previously noted.    Physical Examination--  Vital Signs: T(F): 97.8 (12-07-18 @ 14:36), Max: 98.7 (12-06-18 @ 20:51)  HR: 65 (12-07-18 @ 15:24)  BP: 165/73 (12-07-18 @ 14:36)  RR: 18 (12-07-18 @ 14:36)  SpO2: 95% (12-07-18 @ 15:24)  Wt(kg): --  General: Nontoxic-appearing Male in no acute distress.  HEENT: AT/NC. Anicteric. Conjunctiva pink and moist. Oropharynx clear.   Neck: Not rigid. No sense of mass.  Nodes: None palpable.  Lungs: Diminished breath sounds bilaterally without rales, wheezing or rhonchi  Heart: Regular rate and rhythm. No Murmur. No rub. No gallop. No palpable thrill.  Abdomen: Bowel sounds present and normoactive. Soft. Nondistended. Nontender. No sense of mass. No organomegaly. Obese.   Extremities: No cyanosis or clubbing. L thigh dressed.  Skin: Warm. Dry. Good turgor. No rash. No vasculitic stigmata.  Psychiatric: Appropriate affect and mood for situation.       Laboratory Studies--  CBC                        6.7    8.26  )-----------( 262      ( 07 Dec 2018 06:43 )             21.1   WBC Count: 14.18 K/uL (12.06.18 @ 05:28)        Chemistries  12-07    144  |  110<H>  |  11  ----------------------------<  82  3.0<L>   |  27  |  0.78    Creatinine, Serum: 1.40 mg/dL (12.06.18 @ 05:28)  Creatinine, Serum: 0.58 mg/dL (12.05.18 @ 04:50)    Ca    6.6<L>      07 Dec 2018 06:43      Culture Data    Culture - Surgical Swab (collected 06 Dec 2018 03:29)  Source: .Surgical Swab left hip wound  Preliminary Report (07 Dec 2018 11:14):    Few Staphylococcus aureus    Culture - Surgical Swab (collected 06 Dec 2018 03:29)  Source: .Surgical Swab left hip wound  Preliminary Report (07 Dec 2018 12:34):    Few Staphylococcus aureus    See previous culture [id number]    Culture - Body Fluid with Gram Stain (collected 04 Dec 2018 14:54)  Source: .Body Fluid Interstitial Fluid  Gram Stain (04 Dec 2018 16:03):    Numerous polymorphonuclear leukocytes seen per low power field    Few Gram positive cocci in pairs seen per oil power field  Preliminary Report (05 Dec 2018 08:37):    Numerous Staphylococcus aureus  Organism: Staphylococcus aureus (06 Dec 2018 09:42)  Organism: Staphylococcus aureus (06 Dec 2018 09:42)    Culture - Abscess with Gram Stain (collected 03 Dec 2018 16:52)  Source: .Abscess Hip - Left  Preliminary Report (04 Dec 2018 18:10):    Few Staphylococcus aureus  Organism: Staphylococcus aureus (05 Dec 2018 19:42)  Organism: Staphylococcus aureus (05 Dec 2018 19:42)    Culture - Blood (collected 03 Dec 2018 16:38)  Source: .Blood Blood-Peripheral  Preliminary Report (04 Dec 2018 17:10):    No growth to date.    Culture - Blood (collected 03 Dec 2018 16:38)  Source: .Blood Blood-Peripheral  Preliminary Report (04 Dec 2018 17:10):    No growth to date.

## 2018-12-07 NOTE — PROGRESS NOTE ADULT - PROBLEM SELECTOR PLAN 3
-This AM Cr 0.78 improved from 1.4 (baseline 0.54 throughout this hospitalization). BUN 11.   -Of note 12/3 XR femur showing Opaque contrast in the bladder from prior contrast is incidentally seen. Pt is not retaining urine- bladder scanned yesterday  -Patient is not complaining of any bladder symptoms or difficulties with urination  -Avoid nephrotoxic meds  -DCd fluids. patient to get 1 U pRBCs, see plan for anemia above -This AM Cr 0.78 improved from 1.4 (baseline 0.54 throughout this hospitalization). BUN 11.   -Of note 12/3 XR femur showing Opaque contrast in the bladder from prior contrast is incidentally seen. Pt is not retaining urine- bladder scanned yesterday  -Patient is not complaining of any bladder symptoms or difficulties with urination  -Avoid nephrotoxic meds  -DCd fluids. patient to get 2 U pRBCs, see plan for anemia above

## 2018-12-07 NOTE — PROGRESS NOTE ADULT - PROBLEM SELECTOR PLAN 1
copd and cad, lung and heart disease  MSSA infection, on ABX, ID and Ortho following  DC planning  NEBS for COPD  cvs regimen optimization  BP control  PRO - refuses CPAP  tolerating room air  needs to cont using I aundrea  PT as tolerated  pain regimen, caution with Opioids  bowel regimen  wound care and site care, dressing,

## 2018-12-07 NOTE — PROGRESS NOTE ADULT - SUBJECTIVE AND OBJECTIVE BOX
Date/Time Patient Seen:  		  Referring MD:   Data Reviewed	       Patient is a 73y old  Male who presents with a chief complaint of Redness and swelling around previous L hip surgical site (07 Dec 2018 06:03)    vs and meds reviewed    Subjective/HPI     PAST MEDICAL & SURGICAL HISTORY:  S/P CABG x 3  S/P hip replacement, left  Rheumatoid arthritis  Osteoarthritis  COPD (chronic obstructive pulmonary disease)  HTN (hypertension)  S/P lumbar fusion: Approx 2012  History of femur fracture: Repaired 11/19- L hip  History of appendectomy  History of right shoulder replacement  History of total left hip arthroplasty        Medication list         MEDICATIONS  (STANDING):  ALBUTerol    0.083% 2.5 milliGRAM(s) Nebulizer every 8 hours  ascorbic acid 500 milliGRAM(s) Oral two times a day  atorvastatin 10 milliGRAM(s) Oral at bedtime  calcium carbonate 1250 mG  + Vitamin D (OsCal 500 + D) 1 Tablet(s) Oral daily  ceFAZolin   IVPB      ceFAZolin   IVPB 2000 milliGRAM(s) IV Intermittent every 24 hours  cholecalciferol 1000 Unit(s) Oral daily  docusate sodium 100 milliGRAM(s) Oral three times a day  famotidine    Tablet 20 milliGRAM(s) Oral two times a day  ferrous    sulfate 325 milliGRAM(s) Oral daily  finasteride 5 milliGRAM(s) Oral daily  FLUoxetine 20 milliGRAM(s) Oral <User Schedule>  folic acid 1 milliGRAM(s) Oral daily  heparin  Injectable 5000 Unit(s) SubCutaneous every 12 hours  methadone    Tablet 5 milliGRAM(s) Oral every 8 hours  methylPREDNISolone 4 milliGRAM(s) Oral daily  multivitamin 1 Tablet(s) Oral daily  polyethylene glycol 3350 17 Gram(s) Oral daily  potassium chloride    Tablet ER 40 milliEquivalent(s) Oral every 4 hours  sodium chloride 0.9%. 1000 milliLiter(s) (50 mL/Hr) IV Continuous <Continuous>    MEDICATIONS  (PRN):  acetaminophen   Tablet .. 650 milliGRAM(s) Oral every 6 hours PRN Temp greater or equal to 38C (100.4F)  ALBUTerol    0.083% 2.5 milliGRAM(s) Nebulizer every 3 hours PRN Shortness of Breath and/or Wheezing  ALPRAZolam 0.5 milliGRAM(s) Oral at bedtime PRN anxiety  benzocaine 15 mG/menthol 3.6 mG Lozenge 1 Lozenge Oral every 3 hours PRN Sore Throat  bisacodyl Suppository 10 milliGRAM(s) Rectal daily PRN If no bowel movement  cyclobenzaprine 10 milliGRAM(s) Oral two times a day PRN Muscle Spasm  diphenhydrAMINE 25 milliGRAM(s) Oral at bedtime PRN Insomnia  diphenhydrAMINE 25 milliGRAM(s) Oral every 4 hours PRN Rash and/or Itching  hydrocortisone 0.5% Cream 1 Application(s) Topical two times a day PRN Rash and/or Itching  HYDROmorphone  Injectable 1 milliGRAM(s) IV Push every 4 hours PRN Severe Pain (7 - 10)  magnesium hydroxide Suspension 30 milliLiter(s) Oral daily PRN Constipation  ondansetron Injectable 4 milliGRAM(s) IV Push once PRN Nausea and/or Vomiting  ondansetron Injectable 4 milliGRAM(s) IV Push every 6 hours PRN Nausea and/or Vomiting  oxyCODONE    IR 10 milliGRAM(s) Oral every 4 hours PRN Moderate Pain (4 - 6)  oxyCODONE    IR 5 milliGRAM(s) Oral every 4 hours PRN Mild Pain (1 - 3)         Vitals log        ICU Vital Signs Last 24 Hrs  T(C): 37.1 (07 Dec 2018 04:45), Max: 37.1 (06 Dec 2018 20:51)  T(F): 98.7 (07 Dec 2018 04:45), Max: 98.7 (06 Dec 2018 20:51)  HR: 74 (07 Dec 2018 07:19) (74 - 101)  BP: 111/60 (07 Dec 2018 04:45) (88/55 - 114/56)  BP(mean): --  ABP: --  ABP(mean): --  RR: 17 (07 Dec 2018 04:45) (12 - 18)  SpO2: 97% (07 Dec 2018 07:19) (94% - 98%)           Input and Output:  I&O's Detail    06 Dec 2018 07:01  -  07 Dec 2018 07:00  --------------------------------------------------------  IN:    Oral Fluid: 600 mL    Sodium Chloride 0.9% IV Bolus: 500 mL    sodium chloride 0.9%.: 200 mL  Total IN: 1300 mL    OUT:    Voided: 1100 mL  Total OUT: 1100 mL    Total NET: 200 mL          Lab Data                        6.7    8.26  )-----------( 262      ( 07 Dec 2018 06:43 )             21.1     12-07    144  |  110<H>  |  11  ----------------------------<  82  3.0<L>   |  27  |  0.78    Ca    6.6<L>      07 Dec 2018 06:43              Review of Systems	      Objective     Physical Examination  heart s1s2  lung dec BS  abd soft        Pertinent Lab findings & Imaging      Corinna:  NO   Adequate UO     I&O's Detail    06 Dec 2018 07:01  -  07 Dec 2018 07:00  --------------------------------------------------------  IN:    Oral Fluid: 600 mL    Sodium Chloride 0.9% IV Bolus: 500 mL    sodium chloride 0.9%.: 200 mL  Total IN: 1300 mL    OUT:    Voided: 1100 mL  Total OUT: 1100 mL    Total NET: 200 mL               Discussed with:     Cultures:	        Radiology

## 2018-12-07 NOTE — PROGRESS NOTE ADULT - SUBJECTIVE AND OBJECTIVE BOX
Patient is a 73y old  Male who presents with a chief complaint of Redness and swelling around previous L hip surgical site (05 Dec 2018 08:20)      FROM ADMISSION H+P:   HPI:  73 yo M PMHx 45 pack year smoking hx and COPD (diagnosed "a few years ago, quit smoking then), HTN, HLD, hx of MI and CABG (2008) (stents also placed per chart review), RA (on Medrol daily), hx of multiple L hip surgeries (hip replacement 30 years ago in [place], ORIF recently 11/2018 w/ Dr. Ramirez) p/w L pain, drainage, redness and swelling surrounding upper L hip surgical site beginning 6 days ago.     Patient first noted some pain and redness 6 days ago while in [facility] ([facility]). Patient was prescribed Ceftin w/ symptom progression from pain and erythema to swelling, erythema and purulent drainage.     Patient denied fever, chills, or other signs of infection including nausea, vomiting, productive cough, change in urination or bowel habits. Patient also denied sick contacts, recent falls.     Of note patient endorsed previous surgical site infection on his back after lumbar fusion for spinal stenosis treated with IV antibiotics, which progressed to sepsis with "Strep" isolated 6 years ago, per patient's wife. Also of note, patient had temperatures ranging from T  at [facility] per patient's daughter.     Also of note, patient now endorses diffuse itching and mild diaphoresis starting after antibiotics in ED, denied difficulty swallowing or breathing, lip swelling or dizziness.       In the ED, vitals T 99.4, HR 82, /66, RR 18, Sp02 93% ORA. Patient received vancomycin and aztreonam, and 1 L NS, morphine 4 mg and acetaminophen 650 mg. Ortho consulted, and L pelvis/hip/femur xrays revealed intact L hip hardware, lumbar fusion hardware and diffuse soft tissue swelling. CT results pending.   No Fhx of RA, hip infection (03 Dec 2018 14:32)      ----  INTERVAL HPI/OVERNIGHT EVENTS:  Pt seen and evaluated at the bedside. No acute overnight events occurred. He is POD2 s/p L hip I&D. Patient reports no pain or tenderness today 0/10. Denies CP, SOB, palpitations, abd pain, diarrhea, n/v, fevers, chills, bowel/bladder issues, weakness, dizziness or any other acute complaints.  ----  PAST MEDICAL & SURGICAL HISTORY:  S/P CABG x 3  S/P hip replacement, left  Rheumatoid arthritis  Osteoarthritis  COPD (chronic obstructive pulmonary disease)  HTN (hypertension)  S/P lumbar fusion: Approx 2012  History of femur fracture: Repaired 11/19- L hip  History of appendectomy  History of right shoulder replacement  History of total left hip arthroplasty      FAMILY HISTORY:  No pertinent family history in first degree relatives      ----  MEDICATIONS  (STANDING):  ALBUTerol    0.083% 2.5 milliGRAM(s) Nebulizer every 8 hours  ascorbic acid 500 milliGRAM(s) Oral two times a day  atorvastatin 10 milliGRAM(s) Oral at bedtime  calcium carbonate 1250 mG  + Vitamin D (OsCal 500 + D) 1 Tablet(s) Oral daily  ceFAZolin   IVPB      ceFAZolin   IVPB 2000 milliGRAM(s) IV Intermittent every 24 hours  cholecalciferol 1000 Unit(s) Oral daily  docusate sodium 100 milliGRAM(s) Oral three times a day  famotidine    Tablet 20 milliGRAM(s) Oral two times a day  ferrous    sulfate 325 milliGRAM(s) Oral daily  finasteride 5 milliGRAM(s) Oral daily  FLUoxetine 20 milliGRAM(s) Oral <User Schedule>  folic acid 1 milliGRAM(s) Oral daily  heparin  Injectable 5000 Unit(s) SubCutaneous every 12 hours  methadone    Tablet 5 milliGRAM(s) Oral every 8 hours  methylPREDNISolone 4 milliGRAM(s) Oral daily  multivitamin 1 Tablet(s) Oral daily  polyethylene glycol 3350 17 Gram(s) Oral daily  potassium chloride    Tablet ER 40 milliEquivalent(s) Oral every 4 hours  sodium chloride 0.9%. 1000 milliLiter(s) (50 mL/Hr) IV Continuous <Continuous>    MEDICATIONS  (PRN):  acetaminophen   Tablet .. 650 milliGRAM(s) Oral every 6 hours PRN Temp greater or equal to 38C (100.4F)  ALBUTerol    0.083% 2.5 milliGRAM(s) Nebulizer every 3 hours PRN Shortness of Breath and/or Wheezing  ALPRAZolam 0.5 milliGRAM(s) Oral at bedtime PRN anxiety  benzocaine 15 mG/menthol 3.6 mG Lozenge 1 Lozenge Oral every 3 hours PRN Sore Throat  bisacodyl Suppository 10 milliGRAM(s) Rectal daily PRN If no bowel movement  cyclobenzaprine 10 milliGRAM(s) Oral two times a day PRN Muscle Spasm  diphenhydrAMINE 25 milliGRAM(s) Oral at bedtime PRN Insomnia  diphenhydrAMINE 25 milliGRAM(s) Oral every 4 hours PRN Rash and/or Itching  hydrocortisone 0.5% Cream 1 Application(s) Topical two times a day PRN Rash and/or Itching  HYDROmorphone  Injectable 1 milliGRAM(s) IV Push every 4 hours PRN Severe Pain (7 - 10)  magnesium hydroxide Suspension 30 milliLiter(s) Oral daily PRN Constipation  ondansetron Injectable 4 milliGRAM(s) IV Push once PRN Nausea and/or Vomiting  ondansetron Injectable 4 milliGRAM(s) IV Push every 6 hours PRN Nausea and/or Vomiting  oxyCODONE    IR 10 milliGRAM(s) Oral every 4 hours PRN Moderate Pain (4 - 6)  oxyCODONE    IR 5 milliGRAM(s) Oral every 4 hours PRN Mild Pain (1 - 3)          ----  REVIEW OF SYSTEMS:  CONSTITUTIONAL: NO fever, chills, sweats, fatigue, weakness  HEENT: denies blurred vision, sore throat  SKIN: + L hip/thigh rash, now improving. NO new lesions, rash  CARDIOVASCULAR: denies chest pain, chest pressure, palpitations  RESPIRATORY: denies shortness of breath, sputum production  GASTROINTESTINAL: denies nausea, vomiting, diarrhea, abdominal pain  GENITOURINARY: denies dysuria, discharge  NEUROLOGICAL: denies numbness, headache, focal weakness  MUSCULOSKELETAL: denies new joint pain, muscle aches  HEMATOLOGIC: + L hip rash.  No gross bleeding    ----  PHYSICAL EXAM:  GENERAL: patient appears well, no acute distress, appropriate, pleasant. raspy voice  EYES: sclera clear, no exudates  ENMT: oropharynx clear without erythema, no exudates, moist mucous membranes  NECK: supple, soft, no thyromegaly noted  LUNGS: good air entry bilaterally, clear to auscultation, symmetric breath sounds, no wheezing or rhonchi appreciated  HEART: soft S1/S2, regular rate and rhythm, no murmurs noted, +1 pitting edema b/l   GASTROINTESTINAL: abdomen is soft, nontender, nondistended, normoactive bowel sounds, no palpable masses  INTEGUMENT: good skin turgor, L hip dressing intact, no evidence of drainage. +evidence of trace ecchymosis L ankle. NO tenderness or soreness to palpation  MUSCULOSKELETAL: no clubbing or cyanosis, no obvious deformity  NEUROLOGIC: awake, alert, oriented x3, good muscle tone in 4 extremities, no obvious sensory deficits  PSYCHIATRIC: mood is good, affect is congruent, linear and logical thought process  HEME/LYMPH: no palpable supraclavicular nodules, no obvious ecchymosis or petechiae     Vital Signs Last 24 Hrs  T(C): 37.1 (07 Dec 2018 04:45), Max: 37.1 (06 Dec 2018 20:51)  T(F): 98.7 (07 Dec 2018 04:45), Max: 98.7 (06 Dec 2018 20:51)  HR: 74 (07 Dec 2018 07:19) (74 - 101)  BP: 111/60 (07 Dec 2018 04:45) (88/55 - 114/56)  BP(mean): --  RR: 17 (07 Dec 2018 04:45) (12 - 18)  SpO2: 97% (07 Dec 2018 07:19) (94% - 98%)    ----  I&O's Detail    06 Dec 2018 07:01  -  07 Dec 2018 07:00  --------------------------------------------------------  IN:    Oral Fluid: 600 mL    Sodium Chloride 0.9% IV Bolus: 500 mL    sodium chloride 0.9%.: 200 mL  Total IN: 1300 mL    OUT:    Voided: 1100 mL  Total OUT: 1100 mL    Total NET: 200 mL            LABS:                        6.7    8.26  )-----------( 262      ( 07 Dec 2018 06:43 )             21.1     12-07    144  |  110<H>  |  11  ----------------------------<  82  3.0<L>   |  27  |  0.78    Ca    6.6<L>      07 Dec 2018 06:43        CAPILLARY BLOOD GLUCOSE                    12-04 @ 14:54   Numerous Staphylococcus aureus  --  --  12-03 @ 16:52   Few Staphylococcus aureus  --  --  12-03 @ 16:38   No growth to date.  --  --          Culture - Body Fluid with Gram Stain (collected 12-04-18 @ 14:54)  Source: .Body Fluid Interstitial Fluid  Gram Stain (12-04-18 @ 16:03):    Numerous polymorphonuclear leukocytes seen per low power field    Few Gram positive cocci in pairs seen per oil power field  Preliminary Report (12-05-18 @ 08:37):    Numerous Staphylococcus aureus        ----  Personally reviewed:  Vital sign trends: [x  ] yes    [  ] no     [  ] n/a  Laboratory results: [ x ] yes    [  ] no     [  ] n/a  Radiology results: [ x ] yes    [  ] no     [  ] n/a  Culture results: [x  ] yes    [  ] no     [  ] n/a  Consultant recommendations: [ x ] yes    [  ] no     [  ] n/a Patient is a 73y old  Male who presents with a chief complaint of Redness and swelling around previous L hip surgical site (05 Dec 2018 08:20)      FROM ADMISSION H+P:   HPI:  73 yo M PMHx 45 pack year smoking hx and COPD (diagnosed "a few years ago, quit smoking then), HTN, HLD, hx of MI and CABG (2008) (stents also placed per chart review), RA (on Medrol daily), hx of multiple L hip surgeries (hip replacement 30 years ago in [place], ORIF recently 11/2018 w/ Dr. Ramirez) p/w L pain, drainage, redness and swelling surrounding upper L hip surgical site beginning 6 days ago.     Patient first noted some pain and redness 6 days ago while in [facility] ([facility]). Patient was prescribed Ceftin w/ symptom progression from pain and erythema to swelling, erythema and purulent drainage.     Patient denied fever, chills, or other signs of infection including nausea, vomiting, productive cough, change in urination or bowel habits. Patient also denied sick contacts, recent falls.     Of note patient endorsed previous surgical site infection on his back after lumbar fusion for spinal stenosis treated with IV antibiotics, which progressed to sepsis with "Strep" isolated 6 years ago, per patient's wife. Also of note, patient had temperatures ranging from T  at [facility] per patient's daughter.     Also of note, patient now endorses diffuse itching and mild diaphoresis starting after antibiotics in ED, denied difficulty swallowing or breathing, lip swelling or dizziness.       In the ED, vitals T 99.4, HR 82, /66, RR 18, Sp02 93% ORA. Patient received vancomycin and aztreonam, and 1 L NS, morphine 4 mg and acetaminophen 650 mg. Ortho consulted, and L pelvis/hip/femur xrays revealed intact L hip hardware, lumbar fusion hardware and diffuse soft tissue swelling. CT results pending.   No Fhx of RA, hip infection (03 Dec 2018 14:32)      ----  INTERVAL HPI/OVERNIGHT EVENTS:  Pt seen and evaluated at the bedside. No acute overnight events occurred. He is POD2 s/p L hip I&D. Patient reports no pain or tenderness today 0/10. Denies CP, SOB, palpitations, abd pain, diarrhea, n/v, fevers, chills, bowel/bladder issues, weakness, dizziness or any other acute complaints.  ----  PAST MEDICAL & SURGICAL HISTORY:  S/P CABG x 3  S/P hip replacement, left  Rheumatoid arthritis  Osteoarthritis  COPD (chronic obstructive pulmonary disease)  HTN (hypertension)  S/P lumbar fusion: Approx 2012  History of femur fracture: Repaired 11/19- L hip  History of appendectomy  History of right shoulder replacement  History of total left hip arthroplasty      FAMILY HISTORY:  No pertinent family history in first degree relatives      ----  MEDICATIONS  (STANDING):  ALBUTerol    0.083% 2.5 milliGRAM(s) Nebulizer every 8 hours  ascorbic acid 500 milliGRAM(s) Oral two times a day  atorvastatin 10 milliGRAM(s) Oral at bedtime  calcium carbonate 1250 mG  + Vitamin D (OsCal 500 + D) 1 Tablet(s) Oral daily  ceFAZolin   IVPB      ceFAZolin   IVPB 2000 milliGRAM(s) IV Intermittent every 24 hours  cholecalciferol 1000 Unit(s) Oral daily  docusate sodium 100 milliGRAM(s) Oral three times a day  famotidine    Tablet 20 milliGRAM(s) Oral two times a day  ferrous    sulfate 325 milliGRAM(s) Oral daily  finasteride 5 milliGRAM(s) Oral daily  FLUoxetine 20 milliGRAM(s) Oral <User Schedule>  folic acid 1 milliGRAM(s) Oral daily  heparin  Injectable 5000 Unit(s) SubCutaneous every 12 hours  methadone    Tablet 5 milliGRAM(s) Oral every 8 hours  methylPREDNISolone 4 milliGRAM(s) Oral daily  multivitamin 1 Tablet(s) Oral daily  polyethylene glycol 3350 17 Gram(s) Oral daily  potassium chloride    Tablet ER 40 milliEquivalent(s) Oral every 4 hours  sodium chloride 0.9%. 1000 milliLiter(s) (50 mL/Hr) IV Continuous <Continuous>    MEDICATIONS  (PRN):  acetaminophen   Tablet .. 650 milliGRAM(s) Oral every 6 hours PRN Temp greater or equal to 38C (100.4F)  ALBUTerol    0.083% 2.5 milliGRAM(s) Nebulizer every 3 hours PRN Shortness of Breath and/or Wheezing  ALPRAZolam 0.5 milliGRAM(s) Oral at bedtime PRN anxiety  benzocaine 15 mG/menthol 3.6 mG Lozenge 1 Lozenge Oral every 3 hours PRN Sore Throat  bisacodyl Suppository 10 milliGRAM(s) Rectal daily PRN If no bowel movement  cyclobenzaprine 10 milliGRAM(s) Oral two times a day PRN Muscle Spasm  diphenhydrAMINE 25 milliGRAM(s) Oral at bedtime PRN Insomnia  diphenhydrAMINE 25 milliGRAM(s) Oral every 4 hours PRN Rash and/or Itching  hydrocortisone 0.5% Cream 1 Application(s) Topical two times a day PRN Rash and/or Itching  HYDROmorphone  Injectable 1 milliGRAM(s) IV Push every 4 hours PRN Severe Pain (7 - 10)  magnesium hydroxide Suspension 30 milliLiter(s) Oral daily PRN Constipation  ondansetron Injectable 4 milliGRAM(s) IV Push once PRN Nausea and/or Vomiting  ondansetron Injectable 4 milliGRAM(s) IV Push every 6 hours PRN Nausea and/or Vomiting  oxyCODONE    IR 10 milliGRAM(s) Oral every 4 hours PRN Moderate Pain (4 - 6)  oxyCODONE    IR 5 milliGRAM(s) Oral every 4 hours PRN Mild Pain (1 - 3)          ----  REVIEW OF SYSTEMS:  CONSTITUTIONAL: NO fever, chills, sweats, fatigue, weakness  HEENT: denies blurred vision, sore throat  SKIN: + L hip/thigh rash, now improving. NO new lesions, rash  CARDIOVASCULAR: denies chest pain, chest pressure, palpitations  RESPIRATORY: denies shortness of breath, sputum production  GASTROINTESTINAL: denies nausea, vomiting, diarrhea, abdominal pain  GENITOURINARY: denies dysuria, discharge  NEUROLOGICAL: denies numbness, headache, focal weakness  MUSCULOSKELETAL: denies new joint pain, muscle aches  HEMATOLOGIC: + L hip rash.  No gross bleeding    ----  PHYSICAL EXAM:  GENERAL: patient appears well, no acute distress, appropriate, pleasant. raspy voice  EYES: sclera clear, no exudates  ENMT: oropharynx clear without erythema, no exudates, moist mucous membranes  NECK: supple, soft, no thyromegaly noted  LUNGS: good air entry bilaterally, clear to auscultation, symmetric breath sounds, no wheezing or rhonchi appreciated  HEART: soft S1/S2, regular rate and rhythm, no murmurs noted, +1 pitting edema b/l   GASTROINTESTINAL: abdomen is soft, nontender, nondistended, normoactive bowel sounds, no palpable masses  INTEGUMENT: good skin turgor, L hip dressing intact, no evidence of drainage. +evidence of trace ecchymosis L ankle. NO tenderness or soreness to palpation  MUSCULOSKELETAL: no clubbing or cyanosis, no obvious deformity  NEUROLOGIC: awake, alert, oriented x3, good muscle tone in 4 extremities, no obvious sensory deficits  PSYCHIATRIC: mood is good, affect is congruent, linear and logical thought process  HEME/LYMPH: no palpable supraclavicular nodules, no obvious ecchymosis or petechiae     Vital Signs Last 24 Hrs  T(C): 37.1 (07 Dec 2018 04:45), Max: 37.1 (06 Dec 2018 20:51)  T(F): 98.7 (07 Dec 2018 04:45), Max: 98.7 (06 Dec 2018 20:51)  HR: 74 (07 Dec 2018 07:19) (74 - 101)  BP: 111/60 (07 Dec 2018 04:45) (88/55 - 114/56)  BP(mean): --  RR: 17 (07 Dec 2018 04:45) (12 - 18)  SpO2: 97% (07 Dec 2018 07:19) (94% - 98%)    ----  I&O's Detail    06 Dec 2018 07:01  -  07 Dec 2018 07:00  --------------------------------------------------------  IN:    Oral Fluid: 600 mL    Sodium Chloride 0.9% IV Bolus: 500 mL    sodium chloride 0.9%.: 200 mL  Total IN: 1300 mL    OUT:    Voided: 1100 mL  Total OUT: 1100 mL    Total NET: 200 mL            LABS:                        6.7    8.26  )-----------( 262      ( 07 Dec 2018 06:43 )             21.1     12-07    144  |  110<H>  |  11  ----------------------------<  82  3.0<L>   |  27  |  0.78    Ca    6.6<L>      07 Dec 2018 06:43        12-04 @ 14:54   Numerous Staphylococcus aureus  --  --  12-03 @ 16:52   Few Staphylococcus aureus  --  --  12-03 @ 16:38   No growth to date.  --  --          Culture - Body Fluid with Gram Stain (collected 12-04-18 @ 14:54)  Source: .Body Fluid Interstitial Fluid  Gram Stain (12-04-18 @ 16:03):    Numerous polymorphonuclear leukocytes seen per low power field    Few Gram positive cocci in pairs seen per oil power field  Preliminary Report (12-05-18 @ 08:37):    Numerous Staphylococcus aureus        ----  Personally reviewed:  Vital sign trends: [x  ] yes    [  ] no     [  ] n/a  Laboratory results: [ x ] yes    [  ] no     [  ] n/a  Radiology results: [ x ] yes    [  ] no     [  ] n/a  Culture results: [x  ] yes    [  ] no     [  ] n/a  Consultant recommendations: [ x ] yes    [  ] no     [  ] n/a

## 2018-12-07 NOTE — PROGRESS NOTE ADULT - SUBJECTIVE AND OBJECTIVE BOX
Patient seen and examined. Pain controlled.    Vital Signs Last 24 Hrs  T(C): 37.1 (07 Dec 2018 04:45), Max: 37.1 (06 Dec 2018 20:51)  T(F): 98.7 (07 Dec 2018 04:45), Max: 98.7 (06 Dec 2018 20:51)  HR: 88 (07 Dec 2018 04:45) (76 - 101)  BP: 111/60 (07 Dec 2018 04:45) (88/55 - 114/56)  BP(mean): --  RR: 17 (07 Dec 2018 04:45) (12 - 18)  SpO2: 94% (07 Dec 2018 04:45) (94% - 98%)    Physical exam  Gen: NAD  LLE: NAVA Dressing clean, dry, and intact left hip. +EHL/FHL/TA/GSC.  SILT L3-S1. +Dorsalis pedis (faint), dopplerable, capillary refill brisk. Compartments soft and nontender.    72M s/p L hip wound I&D POD# 2    Partial weight bearing 50% LLE  Pain control  DVT prophylaxis  PT  FU OR Cx   Asp Cx +MSSA  IV abx w ancef   Abx per ID  Discharge planning

## 2018-12-07 NOTE — PROGRESS NOTE ADULT - ASSESSMENT
Wound infection  S/P repair of periprosthetic femur fracture  I would favor longer-term IV treatment, as if hardware potentially infected though no gross evidence of compromise.  If hardware is infected, he will not be cured with antibiotics.   LIOR, likely related to hypotension. Doubt from antibiotics  No GNR isolated thus far  OR cx pending  No MRSA isolated    Suggestions--  Cefazolin change to 2g IVPB Q8h, with liberalization of dosing as creatinine improved  F/U OR Cx  PICC  Favor 6 weeks Rx postoperatively    Fabian Childs MD  [phone]

## 2018-12-07 NOTE — PROGRESS NOTE ADULT - PROBLEM SELECTOR PLAN 2
-Pt POD2, s/p L hip I&D, wound without s/s over bleed, infection  - s/p 1 U pRBCs yesterday prior to OR. Today pt H/H 6.7/21.1. patient asymptomatic without SOB, palpitations, dizziness, CP  -To give another 1 U pRBC today. Will f/u H/H for appropriate response  - f/u daily CBC, no need for intervention at this time -Pt POD2, s/p L hip I&D, wound without s/s over bleed, infection  - s/p 1 U pRBCs yesterday prior to OR. Today pt H/H 6.7/21.1. patient asymptomatic without SOB, palpitations, dizziness, CP  -To give another 2 U pRBC today. Will f/u H/H for appropriate response  - f/u daily CBC, no need for intervention at this time

## 2018-12-07 NOTE — PROGRESS NOTE ADULT - ASSESSMENT
72 yo M with PMH of HTN, HLD, MI and CABG with stents x3, COPD, RA and s/p recent L hip surgery now admitted with surgical site infection, in need of urgent washout. He has had an overall reasonable exercise capacity and reports riding an exercise bicycle for ~10 miles at a time without difficulty.  He had recent surgery without cv complication.  s/p washout by orthopedics without cv complication.  No clear evidence of acute ischemia, arrhythmia or volume overload.     - No clear evidence of acute ischemia, arrhythmia or volume overload.  - Previous ECHO in 11/2018 shows preserved LV systolic function with EF: 65%, mild MR, mild TR, mildly enlarged LA/RA, stage I diastolic dysfunction.  - Hold BP medications if hypotensive  - Hold Lasix given rise in creatinine.  - Monitor and replete lytes, keep K>4, Mg>2.  - continue with Abx per ID  - Other cardiovascular workup will depend on clinical course. All other workup per primary team.  - Will follow.  Walter Anglin [facility]  Cardiology

## 2018-12-08 LAB
-  AMPICILLIN/SULBACTAM: SIGNIFICANT CHANGE UP
-  CEFAZOLIN: SIGNIFICANT CHANGE UP
-  CLINDAMYCIN: SIGNIFICANT CHANGE UP
-  ERYTHROMYCIN: SIGNIFICANT CHANGE UP
-  GENTAMICIN: SIGNIFICANT CHANGE UP
-  OXACILLIN: SIGNIFICANT CHANGE UP
-  PENICILLIN: SIGNIFICANT CHANGE UP
-  RIFAMPIN: SIGNIFICANT CHANGE UP
-  TETRACYCLINE: SIGNIFICANT CHANGE UP
-  TRIMETHOPRIM/SULFAMETHOXAZOLE: SIGNIFICANT CHANGE UP
-  VANCOMYCIN: SIGNIFICANT CHANGE UP
ALBUMIN SERPL ELPH-MCNC: 2.3 G/DL — LOW (ref 3.3–5)
ALP SERPL-CCNC: 143 U/L — HIGH (ref 40–120)
ALT FLD-CCNC: 26 U/L — SIGNIFICANT CHANGE UP (ref 12–78)
ANION GAP SERPL CALC-SCNC: 6 MMOL/L — SIGNIFICANT CHANGE UP (ref 5–17)
AST SERPL-CCNC: 29 U/L — SIGNIFICANT CHANGE UP (ref 15–37)
BASOPHILS # BLD AUTO: 0.05 K/UL — SIGNIFICANT CHANGE UP (ref 0–0.2)
BASOPHILS NFR BLD AUTO: 0.6 % — SIGNIFICANT CHANGE UP (ref 0–2)
BILIRUB SERPL-MCNC: 0.8 MG/DL — SIGNIFICANT CHANGE UP (ref 0.2–1.2)
BUN SERPL-MCNC: 9 MG/DL — SIGNIFICANT CHANGE UP (ref 7–23)
CALCIUM SERPL-MCNC: 8.6 MG/DL — SIGNIFICANT CHANGE UP (ref 8.5–10.1)
CHLORIDE SERPL-SCNC: 102 MMOL/L — SIGNIFICANT CHANGE UP (ref 96–108)
CO2 SERPL-SCNC: 33 MMOL/L — HIGH (ref 22–31)
CREAT SERPL-MCNC: 0.9 MG/DL — SIGNIFICANT CHANGE UP (ref 0.5–1.3)
CULTURE RESULTS: SIGNIFICANT CHANGE UP
EOSINOPHIL # BLD AUTO: 0.5 K/UL — SIGNIFICANT CHANGE UP (ref 0–0.5)
EOSINOPHIL NFR BLD AUTO: 6.5 % — HIGH (ref 0–6)
GLUCOSE SERPL-MCNC: 73 MG/DL — SIGNIFICANT CHANGE UP (ref 70–99)
HCT VFR BLD CALC: 28.5 % — LOW (ref 39–50)
HGB BLD-MCNC: 9.2 G/DL — LOW (ref 13–17)
IMM GRANULOCYTES NFR BLD AUTO: 1 % — SIGNIFICANT CHANGE UP (ref 0–1.5)
LYMPHOCYTES # BLD AUTO: 1.85 K/UL — SIGNIFICANT CHANGE UP (ref 1–3.3)
LYMPHOCYTES # BLD AUTO: 24 % — SIGNIFICANT CHANGE UP (ref 13–44)
MCHC RBC-ENTMCNC: 30.1 PG — SIGNIFICANT CHANGE UP (ref 27–34)
MCHC RBC-ENTMCNC: 32.3 GM/DL — SIGNIFICANT CHANGE UP (ref 32–36)
MCV RBC AUTO: 93.1 FL — SIGNIFICANT CHANGE UP (ref 80–100)
METHOD TYPE: SIGNIFICANT CHANGE UP
MONOCYTES # BLD AUTO: 0.74 K/UL — SIGNIFICANT CHANGE UP (ref 0–0.9)
MONOCYTES NFR BLD AUTO: 9.6 % — SIGNIFICANT CHANGE UP (ref 2–14)
NEUTROPHILS # BLD AUTO: 4.49 K/UL — SIGNIFICANT CHANGE UP (ref 1.8–7.4)
NEUTROPHILS NFR BLD AUTO: 58.3 % — SIGNIFICANT CHANGE UP (ref 43–77)
ORGANISM # SPEC MICROSCOPIC CNT: SIGNIFICANT CHANGE UP
ORGANISM # SPEC MICROSCOPIC CNT: SIGNIFICANT CHANGE UP
PLATELET # BLD AUTO: 272 K/UL — SIGNIFICANT CHANGE UP (ref 150–400)
POTASSIUM SERPL-MCNC: 5 MMOL/L — SIGNIFICANT CHANGE UP (ref 3.5–5.3)
POTASSIUM SERPL-SCNC: 5 MMOL/L — SIGNIFICANT CHANGE UP (ref 3.5–5.3)
PROT SERPL-MCNC: 5.7 G/DL — LOW (ref 6–8.3)
RBC # BLD: 3.06 M/UL — LOW (ref 4.2–5.8)
RBC # FLD: 16.8 % — HIGH (ref 10.3–14.5)
SODIUM SERPL-SCNC: 141 MMOL/L — SIGNIFICANT CHANGE UP (ref 135–145)
SPECIMEN SOURCE: SIGNIFICANT CHANGE UP
WBC # BLD: 7.71 K/UL — SIGNIFICANT CHANGE UP (ref 3.8–10.5)
WBC # FLD AUTO: 7.71 K/UL — SIGNIFICANT CHANGE UP (ref 3.8–10.5)

## 2018-12-08 PROCEDURE — 99233 SBSQ HOSP IP/OBS HIGH 50: CPT

## 2018-12-08 PROCEDURE — 99232 SBSQ HOSP IP/OBS MODERATE 35: CPT

## 2018-12-08 RX ORDER — ALBUTEROL 90 UG/1
2 AEROSOL, METERED ORAL
Qty: 0 | Refills: 0 | Status: DISCONTINUED | OUTPATIENT
Start: 2018-12-08 | End: 2018-12-10

## 2018-12-08 RX ORDER — CHLORHEXIDINE GLUCONATE 213 G/1000ML
1 SOLUTION TOPICAL DAILY
Qty: 0 | Refills: 0 | Status: DISCONTINUED | OUTPATIENT
Start: 2018-12-08 | End: 2018-12-10

## 2018-12-08 RX ORDER — ALBUTEROL 90 UG/1
2 AEROSOL, METERED ORAL EVERY 8 HOURS
Qty: 0 | Refills: 0 | Status: DISCONTINUED | OUTPATIENT
Start: 2018-12-08 | End: 2018-12-10

## 2018-12-08 RX ADMIN — Medication 325 MILLIGRAM(S): at 11:19

## 2018-12-08 RX ADMIN — Medication 1 TABLET(S): at 11:19

## 2018-12-08 RX ADMIN — Medication 100 MILLIGRAM(S): at 13:15

## 2018-12-08 RX ADMIN — METHADONE HYDROCHLORIDE 5 MILLIGRAM(S): 40 TABLET ORAL at 13:15

## 2018-12-08 RX ADMIN — Medication 1000 UNIT(S): at 11:19

## 2018-12-08 RX ADMIN — FAMOTIDINE 20 MILLIGRAM(S): 10 INJECTION INTRAVENOUS at 05:30

## 2018-12-08 RX ADMIN — METHADONE HYDROCHLORIDE 5 MILLIGRAM(S): 40 TABLET ORAL at 05:30

## 2018-12-08 RX ADMIN — Medication 100 MILLIGRAM(S): at 21:26

## 2018-12-08 RX ADMIN — HEPARIN SODIUM 5000 UNIT(S): 5000 INJECTION INTRAVENOUS; SUBCUTANEOUS at 05:30

## 2018-12-08 RX ADMIN — Medication 500 MILLIGRAM(S): at 05:29

## 2018-12-08 RX ADMIN — Medication 20 MILLIGRAM(S): at 16:38

## 2018-12-08 RX ADMIN — METHADONE HYDROCHLORIDE 5 MILLIGRAM(S): 40 TABLET ORAL at 21:26

## 2018-12-08 RX ADMIN — Medication 4 MILLIGRAM(S): at 05:30

## 2018-12-08 RX ADMIN — FINASTERIDE 5 MILLIGRAM(S): 5 TABLET, FILM COATED ORAL at 11:19

## 2018-12-08 RX ADMIN — HEPARIN SODIUM 5000 UNIT(S): 5000 INJECTION INTRAVENOUS; SUBCUTANEOUS at 17:45

## 2018-12-08 RX ADMIN — CHLORHEXIDINE GLUCONATE 1 APPLICATION(S): 213 SOLUTION TOPICAL at 13:03

## 2018-12-08 RX ADMIN — Medication 500 MILLIGRAM(S): at 17:45

## 2018-12-08 RX ADMIN — Medication 1 MILLIGRAM(S): at 11:19

## 2018-12-08 RX ADMIN — POLYETHYLENE GLYCOL 3350 17 GRAM(S): 17 POWDER, FOR SOLUTION ORAL at 11:19

## 2018-12-08 RX ADMIN — Medication 20 MILLIGRAM(S): at 05:31

## 2018-12-08 RX ADMIN — FAMOTIDINE 20 MILLIGRAM(S): 10 INJECTION INTRAVENOUS at 17:45

## 2018-12-08 RX ADMIN — Medication 100 MILLIGRAM(S): at 21:27

## 2018-12-08 RX ADMIN — ATORVASTATIN CALCIUM 10 MILLIGRAM(S): 80 TABLET, FILM COATED ORAL at 21:26

## 2018-12-08 RX ADMIN — ALBUTEROL 2.5 MILLIGRAM(S): 90 AEROSOL, METERED ORAL at 07:32

## 2018-12-08 RX ADMIN — Medication 100 MILLIGRAM(S): at 05:30

## 2018-12-08 NOTE — PROGRESS NOTE ADULT - SUBJECTIVE AND OBJECTIVE BOX
Patient is a 73y old  Male who presents with a chief complaint of Redness and swelling around previous L hip surgical site (08 Dec 2018 12:13)      FROM ADMISSION H+P:   HPI:  73 yo M PMHx 45 pack year smoking hx and COPD (diagnosed "a few years ago, quit smoking then), HTN, HLD, hx of MI and CABG (2008) (stents also placed per chart review), RA (on Medrol daily), hx of multiple L hip surgeries (hip replacement 30 years ago in [place], ORIF recently 11/2018 w/ Dr. Ramirez) p/w L pain, drainage, redness and swelling surrounding upper L hip surgical site beginning 6 days ago.     Patient first noted some pain and redness 6 days ago while in [facility] ([facility]). Patient was prescribed Ceftin w/ symptom progression from pain and erythema to swelling, erythema and purulent drainage.     Patient denied fever, chills, or other signs of infection including nausea, vomiting, productive cough, change in urination or bowel habits. Patient also denied sick contacts, recent falls.     Of note patient endorsed previous surgical site infection on his back after lumbar fusion for spinal stenosis treated with IV antibiotics, which progressed to sepsis with "Strep" isolated 6 years ago, per patient's wife. Also of note, patient had temperatures ranging from T  at [facility] per patient's daughter.     Also of note, patient now endorses diffuse itching and mild diaphoresis starting after antibiotics in ED, denied difficulty swallowing or breathing, lip swelling or dizziness.       In the ED, vitals T 99.4, HR 82, /66, RR 18, Sp02 93% ORA. Patient received vancomycin and aztreonam, and 1 L NS, morphine 4 mg and acetaminophen 650 mg. Ortho consulted, and L pelvis/hip/femur xrays revealed intact L hip hardware, lumbar fusion hardware and diffuse soft tissue swelling. CT results pending.   No Fhx of RA, hip infection (03 Dec 2018 14:32)      ----  INTERVAL HPI/OVERNIGHT EVENTS: Pt seen and evaluated at the bedside. No acute overnight events occurred. Pt feels well. no fever/chills overnight. No other complaints at this time. Appetite is good. no conspitation or diarrhea. Admits mildly stiff hands and asked for increased doses of narcotics to address this issue, denies pain.     ----  PAST MEDICAL & SURGICAL HISTORY:  S/P CABG x 3  S/P hip replacement, left  Rheumatoid arthritis  Osteoarthritis  COPD (chronic obstructive pulmonary disease)  HTN (hypertension)  S/P lumbar fusion: Approx 2012  History of femur fracture: Repaired 11/19- L hip  History of appendectomy  History of right shoulder replacement  History of total left hip arthroplasty      FAMILY HISTORY:  No pertinent family history in first degree relatives      ----  MEDICATIONS  (STANDING):  ALBUTerol    90 MICROgram(s) HFA Inhaler 2 Puff(s) Inhalation every 8 hours  ascorbic acid 500 milliGRAM(s) Oral two times a day  atorvastatin 10 milliGRAM(s) Oral at bedtime  calcium carbonate 1250 mG  + Vitamin D (OsCal 500 + D) 1 Tablet(s) Oral daily  ceFAZolin   IVPB 2000 milliGRAM(s) IV Intermittent every 8 hours  chlorhexidine 2% Cloths 1 Application(s) Topical daily  cholecalciferol 1000 Unit(s) Oral daily  docusate sodium 100 milliGRAM(s) Oral three times a day  famotidine    Tablet 20 milliGRAM(s) Oral two times a day  ferrous    sulfate 325 milliGRAM(s) Oral daily  finasteride 5 milliGRAM(s) Oral daily  FLUoxetine 20 milliGRAM(s) Oral <User Schedule>  folic acid 1 milliGRAM(s) Oral daily  heparin  Injectable 5000 Unit(s) SubCutaneous every 12 hours  methadone    Tablet 5 milliGRAM(s) Oral every 8 hours  methylPREDNISolone 4 milliGRAM(s) Oral daily  multivitamin 1 Tablet(s) Oral daily  polyethylene glycol 3350 17 Gram(s) Oral daily    MEDICATIONS  (PRN):  acetaminophen   Tablet .. 650 milliGRAM(s) Oral every 6 hours PRN Temp greater or equal to 38C (100.4F)  ALBUTerol    90 MICROgram(s) HFA Inhaler 2 Puff(s) Inhalation every 3 hours PRN Shortness of Breath and/or Wheezing  ALPRAZolam 0.5 milliGRAM(s) Oral at bedtime PRN anxiety  benzocaine 15 mG/menthol 3.6 mG Lozenge 1 Lozenge Oral every 3 hours PRN Sore Throat  bisacodyl Suppository 10 milliGRAM(s) Rectal daily PRN If no bowel movement  cyclobenzaprine 10 milliGRAM(s) Oral two times a day PRN Muscle Spasm  hydrocortisone 0.5% Cream 1 Application(s) Topical two times a day PRN Rash and/or Itching  HYDROmorphone  Injectable 1 milliGRAM(s) IV Push every 4 hours PRN Severe Pain (7 - 10)  magnesium hydroxide Suspension 30 milliLiter(s) Oral daily PRN Constipation  ondansetron Injectable 4 milliGRAM(s) IV Push once PRN Nausea and/or Vomiting  ondansetron Injectable 4 milliGRAM(s) IV Push every 6 hours PRN Nausea and/or Vomiting  oxyCODONE    IR 10 milliGRAM(s) Oral every 4 hours PRN Moderate Pain (4 - 6)  oxyCODONE    IR 5 milliGRAM(s) Oral every 4 hours PRN Mild Pain (1 - 3)      ----  REVIEW OF SYSTEMS:  CONSTITUTIONAL: denies fever, chills, fatigue, weakness  HEENT: denies blurred vision, sore throat  SKIN: denies new lesions, rash  CARDIOVASCULAR: denies chest pain, chest pressure, palpitations  RESPIRATORY: denies shortness of breath, sputum production  GASTROINTESTINAL: denies nausea, vomiting, diarrhea, abdominal pain  GENITOURINARY: denies dysuria, discharge  NEUROLOGICAL: denies numbness, headache, focal weakness  MUSCULOSKELETAL: as per HPI  HEMATOLOGIC: denies gross bleeding, bruising  LYMPHATICS: denies enlarged lymph nodes, extremity swelling  PSYCHIATRIC: denies recent changes in anxiety, depression  ENDOCRINOLOGIC: denies sweating, cold or heat intolerance    ----  PHYSICAL EXAM:  GENERAL: patient appears disheveled, no acute distress  EYES: sclera clear, no exudates  ENMT: oropharynx clear without erythema, no exudates, moist mucous membranes  NECK: supple, soft, no thyromegaly noted  LUNGS: reduced air entry bilaterally, clear to auscultation, symmetric breath sounds, no wheezing or rhonchi appreciated  HEART: soft S1/S2, regular rate and rhythm, no murmurs noted, no lower extremity edema  GASTROINTESTINAL: abdomen is soft, nontender, nondistended, normoactive bowel sounds, no palpable masses  INTEGUMENT: good skin turgor, no lesions noted, PICC line in RUE  MUSCULOSKELETAL: no clubbing or cyanosis, no obvious deformity  NEUROLOGIC: awake, alert, oriented x3, good muscle tone in 4 extremities, no obvious sensory deficits  PSYCHIATRIC: mood is good, affect is congruent, linear and logical thought process  HEME/LYMPH: no palpable supraclavicular nodules, no obvious ecchymosis or petechiae     T(C): 36.7 (12-08-18 @ 14:47), Max: 37 (12-07-18 @ 18:58)  HR: 77 (12-08-18 @ 14:47) (69 - 85)  BP: 115/62 (12-08-18 @ 14:47) (115/62 - 153/78)  RR: 18 (12-08-18 @ 14:47) (16 - 18)  SpO2: 98% (12-08-18 @ 14:47) (94% - 99%)  Wt(kg): --    ----  I&O's Summary    07 Dec 2018 07:01  -  08 Dec 2018 07:00  --------------------------------------------------------  IN: 1115 mL / OUT: 800 mL / NET: 315 mL    08 Dec 2018 07:01  -  08 Dec 2018 15:53  --------------------------------------------------------  IN: 50 mL / OUT: 660 mL / NET: -610 mL        LABS:                        9.2    7.71  )-----------( 272      ( 08 Dec 2018 06:48 )             28.5     12-08    141  |  102  |  9   ----------------------------<  73  5.0   |  33<H>  |  0.90    Ca    8.6      08 Dec 2018 06:48    TPro  5.7<L>  /  Alb  2.3<L>  /  TBili  0.8  /  DBili  x   /  AST  29  /  ALT  26  /  AlkPhos  143<H>  12-08        CAPILLARY BLOOD GLUCOSE          12-06 @ 03:29   Few Staphylococcus aureus  See previous culture [id number]  --  Staphylococcus aureus            ----  Personally reviewed:  Vital sign trends: [ x ] yes    [  ] no     [  ] n/a  Laboratory results: [ x ] yes    [  ] no     [  ] n/a  Radiology results: [  ] yes    [  ] no     [ x ] n/a  Culture results: [ x ] yes    [  ] no     [  ] n/a  Consultant recommendations: [ x ] yes    [  ] no     [  ] n/a

## 2018-12-08 NOTE — PROGRESS NOTE ADULT - PROBLEM SELECTOR PLAN 4
- diagnosed "a few years ago", patient had 45 pack year hx now quit after diagnosis   - no oxygen at home, inhalers only prn, rarely uses and patient does not remember which inhaler  - CXR revealed lung opacity new from previous admission this month   - CT chest: . Area of subsegmental linear atelectasis within the left upper lobe,   corresponding to the previously noted x-ray abnormality.  2. Mild scattered peripheral reticular opacities within upper lobe   predominance which may reflect mild fibrosis.  - Dr. Valente consulted, appreciate recs cont use of I aundrea cont NEBS prn for copd. Pt refuses CPAP for PRO.

## 2018-12-08 NOTE — PROGRESS NOTE ADULT - SUBJECTIVE AND OBJECTIVE BOX
[facility], Division of Infectious Diseases  GAETANO Briones A. Lee  [phone]    Name: ERIKA TORREZ  Age: 73y  Gender: Male  MRN: [id number]    Interval History--  Notes reviewed. Seen earlier today. Feels pretty good. Pain not an issue. Denies fevers, chills, or rigors. No diarrhea. Tolerating antibiotics without issues. States daughter willing to help him at her home with IV antibiotics.     Past Medical History--  S/P CABG x 3  S/P hip replacement, left  Rheumatoid arthritis  Osteoarthritis  COPD (chronic obstructive pulmonary disease)  HTN (hypertension)  S/P lumbar fusion  History of femur fracture  History of appendectomy  History of right shoulder replacement  History of total left hip arthroplasty      For details regarding the patient's social history, family history, and other miscellaneous elements, please refer the initial infectious diseases consultation and/or the admitting history and physical examination for this admission.    Allergies    Ceftin (Pruritus)    Intolerances        Medications--  Antibiotics:  ceFAZolin   IVPB 2000 milliGRAM(s) IV Intermittent every 8 hours    Immunologic:    Other:  acetaminophen   Tablet .. PRN  ALBUTerol    90 MICROgram(s) HFA Inhaler  ALBUTerol    90 MICROgram(s) HFA Inhaler PRN  ALPRAZolam PRN  ascorbic acid  atorvastatin  benzocaine 15 mG/menthol 3.6 mG Lozenge PRN  bisacodyl Suppository PRN  calcium carbonate 1250 mG  + Vitamin D (OsCal 500 + D)  chlorhexidine 2% Cloths  cholecalciferol  cyclobenzaprine PRN  docusate sodium  famotidine    Tablet  ferrous    sulfate  finasteride  FLUoxetine  folic acid  heparin  Injectable  hydrocortisone 0.5% Cream PRN  HYDROmorphone  Injectable PRN  magnesium hydroxide Suspension PRN  methadone    Tablet  methylPREDNISolone  multivitamin  ondansetron Injectable PRN  ondansetron Injectable PRN  oxyCODONE    IR PRN  oxyCODONE    IR PRN  polyethylene glycol 3350      Review of Systems--  A 10-point review of systems was obtained.   Review of systems otherwise unchanged compared to prior visit except as previously noted.    Physical Examination--  Vital Signs: T(F): 98 (12-08-18 @ 04:29), Max: 98.6 (12-07-18 @ 18:58)  HR: 71 (12-08-18 @ 07:38)  BP: 129/71 (12-08-18 @ 04:29)  RR: 17 (12-08-18 @ 04:29)  SpO2: 97% (12-08-18 @ 07:38)  Wt(kg): --  General: Nontoxic-appearing Male in no acute distress.  HEENT: AT/NC. Anicteric. Conjunctiva pink and moist. Oropharynx clear.   Neck: Not rigid. No sense of mass.  Nodes: None palpable.  Lungs: Diminished breath sounds bilaterally without rales, wheezing or rhonchi  Heart: Regular rate and rhythm. No Murmur. No rub. No gallop. No palpable thrill.  Abdomen: Bowel sounds present and normoactive. Soft. Nondistended. Nontender. No sense of mass. No organomegaly. Obese.   Extremities: No cyanosis or clubbing. L thigh dressed. PICC C/D/I.  Skin: Warm. Dry. Good turgor. No rash. No vasculitic stigmata.  Psychiatric: Appropriate affect and mood for situation.         Laboratory Studies--  CBC                        9.2    7.71  )-----------( 272      ( 08 Dec 2018 06:48 )             28.5       Chemistries  12-08    141  |  102  |  9   ----------------------------<  73  5.0   |  33<H>  |  0.90    Ca    8.6      08 Dec 2018 06:48    TPro  5.7<L>  /  Alb  2.3<L>  /  TBili  0.8  /  DBili  x   /  AST  29  /  ALT  26  /  AlkPhos  143<H>  12-08      Culture Data    Culture - Surgical Swab (collected 06 Dec 2018 03:29)  Source: .Surgical Swab left hip wound  Preliminary Report (07 Dec 2018 11:14):    Few Staphylococcus aureus    Culture - Surgical Swab (collected 06 Dec 2018 03:29)  Source: .Surgical Swab left hip wound  Preliminary Report (07 Dec 2018 12:34):    Few Staphylococcus aureus    See previous culture [id number]    Culture - Body Fluid with Gram Stain (collected 04 Dec 2018 14:54)  Source: .Body Fluid Interstitial Fluid  Gram Stain (04 Dec 2018 16:03):    Numerous polymorphonuclear leukocytes seen per low power field    Few Gram positive cocci in pairs seen per oil power field  Preliminary Report (05 Dec 2018 08:37):    Numerous Staphylococcus aureus  Organism: Staphylococcus aureus (06 Dec 2018 09:42)  Organism: Staphylococcus aureus (06 Dec 2018 09:42)    Culture - Abscess with Gram Stain (collected 03 Dec 2018 16:52)  Source: .Abscess Hip - Left  Preliminary Report (04 Dec 2018 18:10):    Few Staphylococcus aureus  Organism: Staphylococcus aureus (05 Dec 2018 19:42)  Organism: Staphylococcus aureus (05 Dec 2018 19:42)    Culture - Blood (collected 03 Dec 2018 16:38)  Source: .Blood Blood-Peripheral  Preliminary Report (04 Dec 2018 17:10):    No growth to date.    Culture - Blood (collected 03 Dec 2018 16:38)  Source: .Blood Blood-Peripheral  Preliminary Report (04 Dec 2018 17:10):    No growth to date.

## 2018-12-08 NOTE — PROGRESS NOTE ADULT - PROBLEM SELECTOR PLAN 1
s/p I and D  ID following, Ortho following  on emp Abx , cx and labs noted  serial labs  serial PE  oral hygiene  I aundrea  pain regimen, caution with Opioids  DVT p  copd- will change bronchodilators to Inhaler regimen  sherie - refuses CPAP  sleep hygiene discussion   weight management  PT  DC planning   cr better  Hgb noted this am

## 2018-12-08 NOTE — PROGRESS NOTE ADULT - PROBLEM SELECTOR PLAN 3
-creatinine labile but overall trends are stable  -Avoid nephrotoxic meds  -monitor creatinine and monitor volume status q daily

## 2018-12-08 NOTE — PROGRESS NOTE ADULT - ASSESSMENT
74 yo M with PMH of HTN, HLD, MI and CABG with stents x3, COPD, RA and s/p recent L hip surgery now admitted with surgical site infection, in need of urgent washout. He has had an overall reasonable exercise capacity and reports riding an exercise bicycle for ~10 miles at a time without difficulty.  He had recent surgery without cv complication.  s/p washout by orthopedics without cv complication.  No clear evidence of acute ischemia, arrhythmia or volume overload.     - No clear evidence of acute ischemia, arrhythmia or volume overload.  - Previous ECHO in 11/2018 shows preserved LV systolic function with EF: 65%, mild MR, mild TR, mildly enlarged LA/RA, stage I diastolic dysfunction.  No need for repeat  - BP had been more stable and LIOR resolved; been normal x 2 days.  May reintroduce ACEI and diuretic at a lower dose  - Unsure why patient is not on BB given Hx of MI with CABG and stents.  He follows up with a cardiologist in [place]  - Continue statin  - Continue to monitor routine hemodynamics.  Continue to monitor renal function   - Monitor and replete lytes, keep K>4, Mg>2.  - Continue with long term Abx per ID  - Other cardiovascular workup will depend on clinical course. All other workup per primary team.  - Will follow.    Mikayla Gold NP  Cardiology

## 2018-12-08 NOTE — PROGRESS NOTE ADULT - ASSESSMENT
Wound infection  S/P repair of periprosthetic femur fracture  I would favor longer-term IV treatment, as if hardware potentially infected though no gross evidence of compromise.  If hardware is infected, he will not be cured with antibiotics.   LIOR, likely related to hypotension. Doubt from antibiotics  No GNR isolated thus far  OR cx all MSSA, blood cultures fortunately negative

## 2018-12-08 NOTE — PROGRESS NOTE ADULT - SUBJECTIVE AND OBJECTIVE BOX
Date/Time Patient Seen:  		  Referring MD:   Data Reviewed	       Patient is a 73y old  Male who presents with a chief complaint of Redness and swelling around previous L hip surgical site (08 Dec 2018 07:22)    in bed  seen and examined  vs and meds reviewed    am Hgb noted      Subjective/HPI     PAST MEDICAL & SURGICAL HISTORY:  S/P CABG x 3  S/P hip replacement, left  Rheumatoid arthritis  Osteoarthritis  COPD (chronic obstructive pulmonary disease)  HTN (hypertension)  S/P lumbar fusion: Approx 2012  History of femur fracture: Repaired 11/19- L hip  History of appendectomy  History of right shoulder replacement  History of total left hip arthroplasty        Medication list         MEDICATIONS  (STANDING):  ALBUTerol    90 MICROgram(s) HFA Inhaler 2 Puff(s) Inhalation every 8 hours  ascorbic acid 500 milliGRAM(s) Oral two times a day  atorvastatin 10 milliGRAM(s) Oral at bedtime  calcium carbonate 1250 mG  + Vitamin D (OsCal 500 + D) 1 Tablet(s) Oral daily  ceFAZolin   IVPB 2000 milliGRAM(s) IV Intermittent every 8 hours  cholecalciferol 1000 Unit(s) Oral daily  docusate sodium 100 milliGRAM(s) Oral three times a day  famotidine    Tablet 20 milliGRAM(s) Oral two times a day  ferrous    sulfate 325 milliGRAM(s) Oral daily  finasteride 5 milliGRAM(s) Oral daily  FLUoxetine 20 milliGRAM(s) Oral <User Schedule>  folic acid 1 milliGRAM(s) Oral daily  heparin  Injectable 5000 Unit(s) SubCutaneous every 12 hours  methadone    Tablet 5 milliGRAM(s) Oral every 8 hours  methylPREDNISolone 4 milliGRAM(s) Oral daily  multivitamin 1 Tablet(s) Oral daily  polyethylene glycol 3350 17 Gram(s) Oral daily    MEDICATIONS  (PRN):  acetaminophen   Tablet .. 650 milliGRAM(s) Oral every 6 hours PRN Temp greater or equal to 38C (100.4F)  ALBUTerol    90 MICROgram(s) HFA Inhaler 2 Puff(s) Inhalation every 3 hours PRN Shortness of Breath and/or Wheezing  ALPRAZolam 0.5 milliGRAM(s) Oral at bedtime PRN anxiety  benzocaine 15 mG/menthol 3.6 mG Lozenge 1 Lozenge Oral every 3 hours PRN Sore Throat  bisacodyl Suppository 10 milliGRAM(s) Rectal daily PRN If no bowel movement  cyclobenzaprine 10 milliGRAM(s) Oral two times a day PRN Muscle Spasm  hydrocortisone 0.5% Cream 1 Application(s) Topical two times a day PRN Rash and/or Itching  HYDROmorphone  Injectable 1 milliGRAM(s) IV Push every 4 hours PRN Severe Pain (7 - 10)  magnesium hydroxide Suspension 30 milliLiter(s) Oral daily PRN Constipation  ondansetron Injectable 4 milliGRAM(s) IV Push once PRN Nausea and/or Vomiting  ondansetron Injectable 4 milliGRAM(s) IV Push every 6 hours PRN Nausea and/or Vomiting  oxyCODONE    IR 10 milliGRAM(s) Oral every 4 hours PRN Moderate Pain (4 - 6)  oxyCODONE    IR 5 milliGRAM(s) Oral every 4 hours PRN Mild Pain (1 - 3)         Vitals log        ICU Vital Signs Last 24 Hrs  T(C): 36.7 (08 Dec 2018 04:29), Max: 37 (07 Dec 2018 18:58)  T(F): 98 (08 Dec 2018 04:29), Max: 98.6 (07 Dec 2018 18:58)  HR: 71 (08 Dec 2018 07:38) (65 - 85)  BP: 129/71 (08 Dec 2018 04:29) (108/58 - 165/73)  BP(mean): --  ABP: --  ABP(mean): --  RR: 17 (08 Dec 2018 04:29) (16 - 18)  SpO2: 97% (08 Dec 2018 07:38) (94% - 100%)           Input and Output:  I&O's Detail    07 Dec 2018 07:01  -  08 Dec 2018 07:00  --------------------------------------------------------  IN:    Oral Fluid: 480 mL    Packed Red Blood Cells: 485 mL    Solution: 150 mL  Total IN: 1115 mL    OUT:    Voided: 800 mL  Total OUT: 800 mL    Total NET: 315 mL          Lab Data                        9.2    7.71  )-----------( 272      ( 08 Dec 2018 06:48 )             28.5     12-08    141  |  102  |  9   ----------------------------<  73  5.0   |  33<H>  |  0.90    Ca    8.6      08 Dec 2018 06:48    TPro  5.7<L>  /  Alb  2.3<L>  /  TBili  0.8  /  DBili  x   /  AST  29  /  ALT  26  /  AlkPhos  143<H>  12-08            Review of Systems	      Objective     Physical Examination    heart s1s2  lung dec BS  abd soft  cn grossly int      Pertinent Lab findings & Imaging      [place]:  NO   Adequate UO     I&O's Detail    07 Dec 2018 07:01  -  08 Dec 2018 07:00  --------------------------------------------------------  IN:    Oral Fluid: 480 mL    Packed Red Blood Cells: 485 mL    Solution: 150 mL  Total IN: 1115 mL    OUT:    Voided: 800 mL  Total OUT: 800 mL    Total NET: 315 mL               Discussed with:     Cultures:	        Radiology

## 2018-12-08 NOTE — PROGRESS NOTE ADULT - PROBLEM SELECTOR PLAN 1
S/P Washout on 12/5.  OR findings reportedly with intact fascia, though still need to have an index of suspicion for hardware involvement. However if hardware infected willem that will not be cured with antibiotics alone. As such favor prolonged course of treatment here with 6 weeks cefazolin 2g IVPB Q8H stop date ~1/15/19.

## 2018-12-08 NOTE — PROGRESS NOTE ADULT - PROBLEM SELECTOR PLAN 6
- stable; controlled  - stopped BP meds at [facility] for hypotension per patient's daughter  - holding home lisinopril 20 mg and lasix until LIOR further resolves

## 2018-12-08 NOTE — PROGRESS NOTE ADULT - PROBLEM SELECTOR PLAN 1
-Patient w/ purulent drainage, swelling and erythema surrounding surgical site concerning for surgical site infection w/x-rays revealing soft tissue swelling  -POD 3 s/p L hip I&D and washout w/ intact fascia  - Deeper joint infection in differential. Patient has had previous surgical site infection in back after lumbar fusion which progressed to sepsis, tx'ed with IV abx in [place]. possible patient has some propensity for SSIs, perhaps due to immunocompromised state on chronic steroids (Medrol daily) for RA  - ID Dr. Childs c/s appreciated, started patient on IV vanco and meropenem after discussion of gram stain of aspirated fluid 12/5, now patient is on ANcef.  -Pt s/p PICC 12/6 in anticipation for d/c planning early next week  - BC NGTD. Aspirated fluid gram stain and cx- S. Aureus, numerous PMNs, few + cocci in pairs  -ortho recs noted. F/u OR Cx  -dispo planning to home w/ home care w/ ultimate plan for pt to travel back to [place] in near future

## 2018-12-08 NOTE — PROGRESS NOTE ADULT - ASSESSMENT
73 yo M PMHx 45 pack year smoking hx and COPD (diagnosed "a few years ago, quit smoking then), HTN, HLD, hx of MI and CABG (2008) (stents also placed per chart review), RA (on Medrol daily), hx of multiple L hip surgeries (hip replacement 30 years ago in [place], ORIF recently 11/2018 w/ Dr. Ramirez) p/w L pain, drainage, redness and swelling surrounding upper L hip surgical site beginning 6 days ago in [facility], s/p 3 days ceftin at [facility] with symptom progression (more erythema, swelling on L hip), admitted for surgical site infection r/o septic joint with CT revealing L hip fluid collection (infection vs hematoma).

## 2018-12-08 NOTE — PROGRESS NOTE ADULT - SUBJECTIVE AND OBJECTIVE BOX
Patient seen and examined. Pain controlled. No acute overnight events, patient did experience come confusion this a.m. No other complaints at this time.     Vital Signs Last 24 Hrs  T(C): 36.7 (08 Dec 2018 04:29), Max: 37 (07 Dec 2018 18:58)  T(F): 98 (08 Dec 2018 04:29), Max: 98.6 (07 Dec 2018 18:58)  HR: 73 (08 Dec 2018 04:29) (65 - 85)  BP: 129/71 (08 Dec 2018 04:29) (108/58 - 165/73)  BP(mean): --  RR: 17 (08 Dec 2018 04:29) (16 - 18)  SpO2: 94% (08 Dec 2018 04:29) (94% - 100%)    Physical exam  Gen: NAD  LLE: NAVA Dressing clean, dry, and intact left hip. +EHL/FHL/TA/GSC.  SILT L3-S1. +Dorsalis pedis (faint), dopplerable, capillary refill brisk. Compartments soft and nontender.    72M s/p L hip wound I&D POD# 3    Partial weight bearing 50% LLE  Pain control  DVT prophylaxis  PT  FU OR Cx   Asp Cx +MSSA  IV abx w ancef   Abx per ID, FU PICC  Discharge planning    Will discuss with attending and advise if plan changes

## 2018-12-08 NOTE — PROGRESS NOTE ADULT - SUBJECTIVE AND OBJECTIVE BOX
[facility] -- Shannon Valdez, [place], [place], Sidney Orlando Savella  Office # [phone]    Follow Up:  Preop Eval    Subjective/Observations: Lyingin bed, comfortable on RA.  Easily awakened to name-calling.  Denies CP, palpitations, or SOB.  c/o mild left hip postop pain    REVIEW OF SYSTEMS: All other review of systems is negative unless indicated above    PAST MEDICAL & SURGICAL HISTORY:  S/P CABG x 3  S/P hip replacement, left  Rheumatoid arthritis  Osteoarthritis  COPD (chronic obstructive pulmonary disease)  HTN (hypertension)  S/P lumbar fusion: Approx 2012  History of femur fracture: Repaired 11/19- L hip  History of appendectomy  History of right shoulder replacement  History of total left hip arthroplasty    MEDICATIONS  (STANDING):  ALBUTerol    90 MICROgram(s) HFA Inhaler 2 Puff(s) Inhalation every 8 hours  ascorbic acid 500 milliGRAM(s) Oral two times a day  atorvastatin 10 milliGRAM(s) Oral at bedtime  calcium carbonate 1250 mG  + Vitamin D (OsCal 500 + D) 1 Tablet(s) Oral daily  ceFAZolin   IVPB 2000 milliGRAM(s) IV Intermittent every 8 hours  chlorhexidine 2% Cloths 1 Application(s) Topical daily  cholecalciferol 1000 Unit(s) Oral daily  docusate sodium 100 milliGRAM(s) Oral three times a day  famotidine    Tablet 20 milliGRAM(s) Oral two times a day  ferrous    sulfate 325 milliGRAM(s) Oral daily  finasteride 5 milliGRAM(s) Oral daily  FLUoxetine 20 milliGRAM(s) Oral <User Schedule>  folic acid 1 milliGRAM(s) Oral daily  heparin  Injectable 5000 Unit(s) SubCutaneous every 12 hours  methadone    Tablet 5 milliGRAM(s) Oral every 8 hours  methylPREDNISolone 4 milliGRAM(s) Oral daily  multivitamin 1 Tablet(s) Oral daily  polyethylene glycol 3350 17 Gram(s) Oral daily    MEDICATIONS  (PRN):  acetaminophen   Tablet .. 650 milliGRAM(s) Oral every 6 hours PRN Temp greater or equal to 38C (100.4F)  ALBUTerol    90 MICROgram(s) HFA Inhaler 2 Puff(s) Inhalation every 3 hours PRN Shortness of Breath and/or Wheezing  ALPRAZolam 0.5 milliGRAM(s) Oral at bedtime PRN anxiety  benzocaine 15 mG/menthol 3.6 mG Lozenge 1 Lozenge Oral every 3 hours PRN Sore Throat  bisacodyl Suppository 10 milliGRAM(s) Rectal daily PRN If no bowel movement  cyclobenzaprine 10 milliGRAM(s) Oral two times a day PRN Muscle Spasm  hydrocortisone 0.5% Cream 1 Application(s) Topical two times a day PRN Rash and/or Itching  HYDROmorphone  Injectable 1 milliGRAM(s) IV Push every 4 hours PRN Severe Pain (7 - 10)  magnesium hydroxide Suspension 30 milliLiter(s) Oral daily PRN Constipation  ondansetron Injectable 4 milliGRAM(s) IV Push once PRN Nausea and/or Vomiting  ondansetron Injectable 4 milliGRAM(s) IV Push every 6 hours PRN Nausea and/or Vomiting  oxyCODONE    IR 10 milliGRAM(s) Oral every 4 hours PRN Moderate Pain (4 - 6)  oxyCODONE    IR 5 milliGRAM(s) Oral every 4 hours PRN Mild Pain (1 - 3)      Allergies    Ceftin (Pruritus)    Intolerances    Vital Signs Last 24 Hrs  T(C): 36.7 (08 Dec 2018 04:29), Max: 37 (07 Dec 2018 18:58)  T(F): 98 (08 Dec 2018 04:29), Max: 98.6 (07 Dec 2018 18:58)  HR: 71 (08 Dec 2018 07:38) (65 - 85)  BP: 129/71 (08 Dec 2018 04:29) (118/58 - 165/73)  BP(mean): --  RR: 17 (08 Dec 2018 04:29) (16 - 18)  SpO2: 97% (08 Dec 2018 07:38) (94% - 100%)    I&O's Summary    07 Dec 2018 07:01  -  08 Dec 2018 07:00  --------------------------------------------------------  IN: 1115 mL / OUT: 800 mL / NET: 315 mL    08 Dec 2018 07:01  -  08 Dec 2018 12:13  --------------------------------------------------------  IN: 0 mL / OUT: 660 mL / NET: -660 mL      PHYSICAL EXAM:  TELE: Not on tele  Constitutional: NAD, awake and alert, well-developed  HEENT: Moist Mucous Membranes, Anicteric  Pulmonary: Non-labored, breath sounds are clear bilaterally, No wheezing, rales or rhonchi  Cardiovascular: Regular, S1 and S2, No murmurs, rubs, gallops or clicks  Gastrointestinal: Bowel Sounds present, soft, nontender.   Lymph: No peripheral edema. No lymphadenopathy.  Skin: No visible rashes or ulcers.  Left hip with dressing dry and intact  Psych:  Mood & affect appropriate    LABS: All Labs Reviewed:                        9.2    7.71  )-----------( 272      ( 08 Dec 2018 06:48 )             28.5                         9.1    x     )-----------( x        ( 07 Dec 2018 21:57 )             27.9                         6.7    8.26  )-----------( 262      ( 07 Dec 2018 06:43 )             21.1     08 Dec 2018 06:48    141    |  102    |  9      ----------------------------<  73     5.0     |  33     |  0.90   07 Dec 2018 06:43    144    |  110    |  11     ----------------------------<  82     3.0     |  27     |  0.78   06 Dec 2018 05:28    138    |  100    |  11     ----------------------------<  139    4.3     |  29     |  1.40     Ca    8.6        08 Dec 2018 06:48  Ca    6.6        07 Dec 2018 06:43  Ca    8.4        06 Dec 2018 05:28    TPro  5.7    /  Alb  2.3    /  TBili  0.8    /  DBili  x      /  AST  29     /  ALT  26     /  AlkPhos  143    08 Dec 2018 06:48    < from: TTE Echo Doppler w/o Cont (11.19.18 @ 11:09) >     EXAM:  ECHO TTE WO CON COMP W DOPPLR         PROCEDURE DATE:  11/19/2018        INTERPRETATION:  INDICATION: CAD  Referring M.D.:Paolo  Blood Pressure 114/70        Weight (kg) :81     Height (cm):170       BSA (sq m): 1.98  Technician: AS    Dimensions:    LA 3.5       Normal Values: 2.0 - 4.0 cm    Ao 3.94        Normal Values: 2.0 - 3.8 cm  SEPTUM 1.1       Normal Values: 0.6 - 1.2 cm  PWT 1.1       Normal Values: 0.6 - 1.1 cm  LVIDd 5.1         Normal Values: 3.0 - 5.6 cm  LVIDs 4.05 Normal Values: 1.8 - 4.0 cm    OBSERVATIONS:  Technically difficult study  Mitral Valve: MAC with calcified MV leaflets. Mild mitral regurgitation.  Aortic Valve/Aorta: Mildly calcified trileaflet AV with normal opening.   Mildly dilated aortic root at 3.9  cm  Tricuspid Valve: Normal tricuspid valve. Trace tricuspid regurgitation.  Pulmonic Valve: The pulmonic valve is not well visualized. Probably   normal.  Left Atrium: Mildly enlarged   Right Atrium: Mildly enlarged  Left Ventricle: Endocardium is not well-visualized. Overall there appears   to be normal left ventricular systolic function. The EF is approximately   65%.  Right Ventricle: The right ventricle is not well-visualized. It appears   to be mildly enlarged in some views with normal systolic function.    Pericardium/Pleura: Trace pericardial effusion noted.  Pulmonary/RV Pressure: Insufficient tricuspid regurgitation Doppler in   order to estimate the right ventricular systolic pressure  LV Diastolic Function: Stage I diastolic dysfunction     Conclusion: Overall preserved left ventricular systolic function. EF 65.   Insufficient tricuspid regurgitation Doppler in order to estimate the   right ventricular systolic pressure      CARMEN BAE M.D., ATTENDING CARDIOLOGIST  This document has been electronically signed. Nov 20 2018  3:56PM    < end of copied text >    < from: CT Chest No Cont (12.03.18 @ 19:30) >    EXAM:  CT CHEST                            PROCEDURE DATE:  12/03/2018          INTERPRETATION:  .    CLINICAL INFORMATION: Evaluate left-sided opacity. Infection following   procedure.    TECHNIQUE: Helical axial images of the chest were obtained from the   thoracic inlet to the adrenal glands without the administration of   intravenous contrast. Sagittal and coronal reformations were obtained   from the source data.     COMPARISON: Prior chest x-ray examination from 12/3/2018     FINDINGS: Nonspecific subcentimeter sized lymph nodes are noted within   the axillary areas, mediastinal, and hilar regions.    The heart size is at the upper limits of normal. There is mild aneurysmal   dilatation of the ascending aorta at the level of the main pulmonary   artery measuring up to 4.6 cm. There are atherosclerotic calcifications   of the imaged coronary arteries, aorta, and branch vessels. The patient   is status post median sternotomy.    The central airways are patent. Scattered areas of linear atelectasis are   noted throughout both lungs. In the area of the previously noted abnormal   opacity, there is subsegmental atelectasis. A few additional scattered   reticular opacities are noted throughout both lungs in the periphery with   an upper lobe predominance and also within the right middle lobe and   lingula. There is no architectural distortion or honeycombing.    There are mild multilevel degenerative changes of the thoracic spine. The   patient is status post lower anterior discectomy and fusion of the   cervical spine. There is a moderate anterior wedge compression of the T6   vertebral body. The patient is status post upper posterior lumbar fusion.   There is grade 1 anterolisthesis of T12 on L1 which may be related to   adjacent segment disease. A right-sided reverse shoulder arthroplasty is   noted.    There is a 1.4 cm nonobstructing right-sided intrarenal stone.   Nonobstructing stones on the left are partially visualized. There is   minimal nonspecific bilateral perinephric stranding. A few calcified   granulomas are notable within the right hepatic lobe. The other   visualized upper abdominal organs appear unremarkable.    Subcutaneous fat reticulation along the left lateral lower chest wall may   reflect bruising.    IMPRESSION:    1. Area of subsegmental linear atelectasis within the left upper lobe,   corresponding to the previously noted x-ray abnormality.    2. Mild scattered peripheral reticular opacities within upper lobe   predominance which may reflect mild fibrosis.    3. Probable left lateral lower chest wall bruising. Correlate with direct   physical examination.    4. Nonobstructing bilateral intrarenal stones.    KENZIE VELA M.D., ATTENDING RADIOLOGIST  This document has been electronically signed. Dec  3 2018  7:57PM  < end of copied text >

## 2018-12-09 LAB
ANION GAP SERPL CALC-SCNC: 7 MMOL/L — SIGNIFICANT CHANGE UP (ref 5–17)
BASOPHILS # BLD AUTO: 0.04 K/UL — SIGNIFICANT CHANGE UP (ref 0–0.2)
BASOPHILS NFR BLD AUTO: 0.4 % — SIGNIFICANT CHANGE UP (ref 0–2)
BUN SERPL-MCNC: 10 MG/DL — SIGNIFICANT CHANGE UP (ref 7–23)
CALCIUM SERPL-MCNC: 8.3 MG/DL — LOW (ref 8.5–10.1)
CHLORIDE SERPL-SCNC: 98 MMOL/L — SIGNIFICANT CHANGE UP (ref 96–108)
CO2 SERPL-SCNC: 32 MMOL/L — HIGH (ref 22–31)
CREAT SERPL-MCNC: 0.81 MG/DL — SIGNIFICANT CHANGE UP (ref 0.5–1.3)
CULTURE RESULTS: SIGNIFICANT CHANGE UP
EOSINOPHIL # BLD AUTO: 0.61 K/UL — HIGH (ref 0–0.5)
EOSINOPHIL NFR BLD AUTO: 5.5 % — SIGNIFICANT CHANGE UP (ref 0–6)
GLUCOSE SERPL-MCNC: 90 MG/DL — SIGNIFICANT CHANGE UP (ref 70–99)
HCT VFR BLD CALC: 30.3 % — LOW (ref 39–50)
HGB BLD-MCNC: 9.8 G/DL — LOW (ref 13–17)
IMM GRANULOCYTES NFR BLD AUTO: 0.6 % — SIGNIFICANT CHANGE UP (ref 0–1.5)
LYMPHOCYTES # BLD AUTO: 1.12 K/UL — SIGNIFICANT CHANGE UP (ref 1–3.3)
LYMPHOCYTES # BLD AUTO: 10.2 % — LOW (ref 13–44)
MAGNESIUM SERPL-MCNC: 2 MG/DL — SIGNIFICANT CHANGE UP (ref 1.6–2.6)
MCHC RBC-ENTMCNC: 30.1 PG — SIGNIFICANT CHANGE UP (ref 27–34)
MCHC RBC-ENTMCNC: 32.3 GM/DL — SIGNIFICANT CHANGE UP (ref 32–36)
MCV RBC AUTO: 92.9 FL — SIGNIFICANT CHANGE UP (ref 80–100)
MONOCYTES # BLD AUTO: 0.7 K/UL — SIGNIFICANT CHANGE UP (ref 0–0.9)
MONOCYTES NFR BLD AUTO: 6.4 % — SIGNIFICANT CHANGE UP (ref 2–14)
NEUTROPHILS # BLD AUTO: 8.47 K/UL — HIGH (ref 1.8–7.4)
NEUTROPHILS NFR BLD AUTO: 76.9 % — SIGNIFICANT CHANGE UP (ref 43–77)
ORGANISM # SPEC MICROSCOPIC CNT: SIGNIFICANT CHANGE UP
ORGANISM # SPEC MICROSCOPIC CNT: SIGNIFICANT CHANGE UP
PLATELET # BLD AUTO: 293 K/UL — SIGNIFICANT CHANGE UP (ref 150–400)
POTASSIUM SERPL-MCNC: 4.1 MMOL/L — SIGNIFICANT CHANGE UP (ref 3.5–5.3)
POTASSIUM SERPL-SCNC: 4.1 MMOL/L — SIGNIFICANT CHANGE UP (ref 3.5–5.3)
RBC # BLD: 3.26 M/UL — LOW (ref 4.2–5.8)
RBC # FLD: 16 % — HIGH (ref 10.3–14.5)
SODIUM SERPL-SCNC: 137 MMOL/L — SIGNIFICANT CHANGE UP (ref 135–145)
SPECIMEN SOURCE: SIGNIFICANT CHANGE UP
WBC # BLD: 11.01 K/UL — HIGH (ref 3.8–10.5)
WBC # FLD AUTO: 11.01 K/UL — HIGH (ref 3.8–10.5)

## 2018-12-09 PROCEDURE — 99232 SBSQ HOSP IP/OBS MODERATE 35: CPT

## 2018-12-09 RX ORDER — FUROSEMIDE 40 MG
20 TABLET ORAL DAILY
Qty: 0 | Refills: 0 | Status: DISCONTINUED | OUTPATIENT
Start: 2018-12-09 | End: 2018-12-10

## 2018-12-09 RX ORDER — LISINOPRIL 2.5 MG/1
20 TABLET ORAL DAILY
Qty: 0 | Refills: 0 | Status: DISCONTINUED | OUTPATIENT
Start: 2018-12-09 | End: 2018-12-09

## 2018-12-09 RX ORDER — LISINOPRIL 2.5 MG/1
10 TABLET ORAL DAILY
Qty: 0 | Refills: 0 | Status: DISCONTINUED | OUTPATIENT
Start: 2018-12-09 | End: 2018-12-10

## 2018-12-09 RX ADMIN — Medication 100 MILLIGRAM(S): at 05:09

## 2018-12-09 RX ADMIN — Medication 20 MILLIGRAM(S): at 11:48

## 2018-12-09 RX ADMIN — METHADONE HYDROCHLORIDE 5 MILLIGRAM(S): 40 TABLET ORAL at 21:10

## 2018-12-09 RX ADMIN — Medication 325 MILLIGRAM(S): at 11:48

## 2018-12-09 RX ADMIN — ALBUTEROL 2 PUFF(S): 90 AEROSOL, METERED ORAL at 21:21

## 2018-12-09 RX ADMIN — LISINOPRIL 10 MILLIGRAM(S): 2.5 TABLET ORAL at 11:48

## 2018-12-09 RX ADMIN — OXYCODONE HYDROCHLORIDE 10 MILLIGRAM(S): 5 TABLET ORAL at 14:30

## 2018-12-09 RX ADMIN — OXYCODONE HYDROCHLORIDE 10 MILLIGRAM(S): 5 TABLET ORAL at 17:52

## 2018-12-09 RX ADMIN — METHADONE HYDROCHLORIDE 5 MILLIGRAM(S): 40 TABLET ORAL at 13:51

## 2018-12-09 RX ADMIN — ALBUTEROL 2 PUFF(S): 90 AEROSOL, METERED ORAL at 06:46

## 2018-12-09 RX ADMIN — FAMOTIDINE 20 MILLIGRAM(S): 10 INJECTION INTRAVENOUS at 17:30

## 2018-12-09 RX ADMIN — Medication 1 MILLIGRAM(S): at 11:48

## 2018-12-09 RX ADMIN — Medication 1 TABLET(S): at 11:48

## 2018-12-09 RX ADMIN — OXYCODONE HYDROCHLORIDE 10 MILLIGRAM(S): 5 TABLET ORAL at 11:48

## 2018-12-09 RX ADMIN — FAMOTIDINE 20 MILLIGRAM(S): 10 INJECTION INTRAVENOUS at 05:18

## 2018-12-09 RX ADMIN — Medication 100 MILLIGRAM(S): at 13:51

## 2018-12-09 RX ADMIN — METHADONE HYDROCHLORIDE 5 MILLIGRAM(S): 40 TABLET ORAL at 05:09

## 2018-12-09 RX ADMIN — POLYETHYLENE GLYCOL 3350 17 GRAM(S): 17 POWDER, FOR SOLUTION ORAL at 11:49

## 2018-12-09 RX ADMIN — ALBUTEROL 2 PUFF(S): 90 AEROSOL, METERED ORAL at 17:33

## 2018-12-09 RX ADMIN — FINASTERIDE 5 MILLIGRAM(S): 5 TABLET, FILM COATED ORAL at 11:48

## 2018-12-09 RX ADMIN — Medication 20 MILLIGRAM(S): at 06:24

## 2018-12-09 RX ADMIN — OXYCODONE HYDROCHLORIDE 10 MILLIGRAM(S): 5 TABLET ORAL at 18:27

## 2018-12-09 RX ADMIN — Medication 100 MILLIGRAM(S): at 21:10

## 2018-12-09 RX ADMIN — Medication 0.5 MILLIGRAM(S): at 21:13

## 2018-12-09 RX ADMIN — Medication 500 MILLIGRAM(S): at 17:32

## 2018-12-09 RX ADMIN — HEPARIN SODIUM 5000 UNIT(S): 5000 INJECTION INTRAVENOUS; SUBCUTANEOUS at 05:18

## 2018-12-09 RX ADMIN — Medication 500 MILLIGRAM(S): at 05:18

## 2018-12-09 RX ADMIN — Medication 20 MILLIGRAM(S): at 16:06

## 2018-12-09 RX ADMIN — Medication 4 MILLIGRAM(S): at 05:18

## 2018-12-09 RX ADMIN — CHLORHEXIDINE GLUCONATE 1 APPLICATION(S): 213 SOLUTION TOPICAL at 11:40

## 2018-12-09 RX ADMIN — Medication 100 MILLIGRAM(S): at 05:18

## 2018-12-09 RX ADMIN — Medication 1000 UNIT(S): at 11:48

## 2018-12-09 RX ADMIN — Medication 100 MILLIGRAM(S): at 21:09

## 2018-12-09 RX ADMIN — HEPARIN SODIUM 5000 UNIT(S): 5000 INJECTION INTRAVENOUS; SUBCUTANEOUS at 17:31

## 2018-12-09 RX ADMIN — ATORVASTATIN CALCIUM 10 MILLIGRAM(S): 80 TABLET, FILM COATED ORAL at 21:10

## 2018-12-09 NOTE — PROGRESS NOTE ADULT - PROBLEM SELECTOR PLAN 1
acute infection, MSSA, Abx as per ID, Picc line in, supportive measures, wound care  PRO - refuses CPAP  COPD - inhaler only, monitor vs and HD and Sat, keep sat > 88 pct  I aundrea  PT  pain regimen, caution with Opioids  dietary discretion, DM and HTN management  CAD - cvs regimen and BP control  DC planning  will follow

## 2018-12-09 NOTE — PROGRESS NOTE ADULT - PROBLEM SELECTOR PLAN 8
- CABG 2008, per chart review also stents placed  - c/w ASA 81  - c/w statin
consult with ortho for when to start DVT prophylaxis - hold for now in case of plans for OR

## 2018-12-09 NOTE — PROGRESS NOTE ADULT - PROBLEM SELECTOR PLAN 1
-Patient w/ purulent drainage, swelling and erythema surrounding surgical site concerning for surgical site infection w/x-rays revealing soft tissue swelling  -POD 4 s/p L hip I&D and washout w/ intact fascia  -Deeper joint infection in differential. Patient has had previous surgical site infection in back after lumbar fusion which progressed to sepsis, tx'ed with IV abx in [place]. possible patient has some propensity for SSIs, perhaps due to immunocompromised state on chronic steroids (Medrol daily) for RA  -ID Dr. Childs c/s appreciated, started patient on IV vanco and meropenem after discussion of gram stain of aspirated fluid 12/5, now patient is on ANcef x6wks  -Pt s/p PICC 12/6 in anticipation for d/c planning early next week  -BC NGTD. Aspirated fluid gram stain and cx- S. Aureus, numerous PMNs, few + cocci in pairs  -ortho recs noted.   -dispo planning to home w/ home care w/ ultimate plan for pt to travel back to [place] in near future  - wbc slightly up today, will monitor

## 2018-12-09 NOTE — PROGRESS NOTE ADULT - PROBLEM SELECTOR PLAN 9
- chronic, no hardware displacement viewed on X ray pelvis/hip/femur   - ortho following

## 2018-12-09 NOTE — PROGRESS NOTE ADULT - PROBLEM SELECTOR PLAN 2
- s/p 3 U pRBCs total (12/6 x1 unit pRBC's and 12/8 x2 units pRBC's)  - appropriate response today, will continue to monitor h/h

## 2018-12-09 NOTE — PROGRESS NOTE ADULT - SUBJECTIVE AND OBJECTIVE BOX
CHIEF COMPLAINT: Patient is a 73y old  Male who presents with a chief complaint of Redness and swelling around previous L hip surgical site (09 Dec 2018 07:57)      HPI:  73 yo M PMHx 45 pack year smoking hx and COPD (diagnosed "a few years ago, quit smoking then), HTN, HLD, hx of MI and CABG (2008) (stents also placed per chart review), RA (on Medrol daily), hx of multiple L hip surgeries (hip replacement 30 years ago in [place], ORIF recently 11/2018 w/ Dr. Ramirez) p/w L pain, drainage, redness and swelling surrounding upper L hip surgical site beginning 6 days ago.     Patient first noted some pain and redness 6 days ago while in [facility] ([facility]). Patient was prescribed Ceftin w/ symptom progression from pain and erythema to swelling, erythema and purulent drainage.     Patient denied fever, chills, or other signs of infection including nausea, vomiting, productive cough, change in urination or bowel habits. Patient also denied sick contacts, recent falls.     Of note patient endorsed previous surgical site infection on his back after lumbar fusion for spinal stenosis treated with IV antibiotics, which progressed to sepsis with "Strep" isolated 6 years ago, per patient's wife. Also of note, patient had temperatures ranging from T  at [facility] per patient's daughter.     Also of note, patient now endorses diffuse itching and mild diaphoresis starting after antibiotics in ED, denied difficulty swallowing or breathing, lip swelling or dizziness.       In the ED, vitals T 99.4, HR 82, /66, RR 18, Sp02 93% ORA. Patient received vancomycin and aztreonam, and 1 L NS, morphine 4 mg and acetaminophen 650 mg. Ortho consulted, and L pelvis/hip/femur xrays revealed intact L hip hardware, lumbar fusion hardware and diffuse soft tissue swelling. CT results pending.   No Fhx of RA, hip infection (03 Dec 2018 14:32)      Follow Up:    Interval History:  EKG:    REVIEW OF SYSTEMS:   All other review of systems are negative unless indicated above    PAST MEDICAL & SURGICAL HISTORY:  S/P CABG x 3  S/P hip replacement, left  Rheumatoid arthritis  Osteoarthritis  COPD (chronic obstructive pulmonary disease)  HTN (hypertension)  S/P lumbar fusion: Approx 2012  History of femur fracture: Repaired 11/19- L hip  History of appendectomy  History of right shoulder replacement  History of total left hip arthroplasty      SOCIAL HISTORY:  No tobacco, ethanol, or drug abuse.    FAMILY HISTORY:  No pertinent family history in first degree relatives    No family history of acute MI or sudden cardiac death.    MEDICATIONS  (STANDING):  ALBUTerol    90 MICROgram(s) HFA Inhaler 2 Puff(s) Inhalation every 8 hours  ascorbic acid 500 milliGRAM(s) Oral two times a day  atorvastatin 10 milliGRAM(s) Oral at bedtime  calcium carbonate 1250 mG  + Vitamin D (OsCal 500 + D) 1 Tablet(s) Oral daily  ceFAZolin   IVPB 2000 milliGRAM(s) IV Intermittent every 8 hours  chlorhexidine 2% Cloths 1 Application(s) Topical daily  cholecalciferol 1000 Unit(s) Oral daily  docusate sodium 100 milliGRAM(s) Oral three times a day  famotidine    Tablet 20 milliGRAM(s) Oral two times a day  ferrous    sulfate 325 milliGRAM(s) Oral daily  finasteride 5 milliGRAM(s) Oral daily  FLUoxetine 20 milliGRAM(s) Oral <User Schedule>  folic acid 1 milliGRAM(s) Oral daily  heparin  Injectable 5000 Unit(s) SubCutaneous every 12 hours  methadone    Tablet 5 milliGRAM(s) Oral every 8 hours  methylPREDNISolone 4 milliGRAM(s) Oral daily  multivitamin 1 Tablet(s) Oral daily  polyethylene glycol 3350 17 Gram(s) Oral daily    MEDICATIONS  (PRN):  acetaminophen   Tablet .. 650 milliGRAM(s) Oral every 6 hours PRN Temp greater or equal to 38C (100.4F)  ALBUTerol    90 MICROgram(s) HFA Inhaler 2 Puff(s) Inhalation every 3 hours PRN Shortness of Breath and/or Wheezing  ALPRAZolam 0.5 milliGRAM(s) Oral at bedtime PRN anxiety  benzocaine 15 mG/menthol 3.6 mG Lozenge 1 Lozenge Oral every 3 hours PRN Sore Throat  bisacodyl Suppository 10 milliGRAM(s) Rectal daily PRN If no bowel movement  cyclobenzaprine 10 milliGRAM(s) Oral two times a day PRN Muscle Spasm  hydrocortisone 0.5% Cream 1 Application(s) Topical two times a day PRN Rash and/or Itching  HYDROmorphone  Injectable 1 milliGRAM(s) IV Push every 4 hours PRN Severe Pain (7 - 10)  magnesium hydroxide Suspension 30 milliLiter(s) Oral daily PRN Constipation  ondansetron Injectable 4 milliGRAM(s) IV Push once PRN Nausea and/or Vomiting  ondansetron Injectable 4 milliGRAM(s) IV Push every 6 hours PRN Nausea and/or Vomiting  oxyCODONE    IR 10 milliGRAM(s) Oral every 4 hours PRN Moderate Pain (4 - 6)  oxyCODONE    IR 5 milliGRAM(s) Oral every 4 hours PRN Mild Pain (1 - 3)      Allergies    Ceftin (Pruritus)    Intolerances        Home meds:  Home Medications:  alendronate 70 mg oral tablet: 1 tab(s) orally once a week (19 Nov 2018 07:30)  ALPRAZolam 0.5 mg oral tablet: 1 tab(s) orally once a day (at bedtime), As Needed (19 Nov 2018 07:30)  ascorbic acid 500 mg oral tablet: 1 tab(s) orally 2 times a day (21 Nov 2018 11:59)  aspirin 81 mg oral tablet: 1 tab(s) orally once a day (19 Nov 2018 07:30)  cyclobenzaprine 10 mg oral tablet: 1 tab(s) orally 2 times a day, As Needed (03 Dec 2018 15:05)  ferrous sulfate 324 mg (65 mg elemental iron) oral tablet: 1 tab(s) orally once a day (21 Nov 2018 11:59)  finasteride 5 mg oral tablet: 1 tab(s) orally once a day (19 Nov 2018 07:30)  FLUoxetine 20 mg oral capsule: 1 cap(s) orally 3 times a day (19 Nov 2018 07:30)  folic acid 1 mg oral tablet: 1 tab(s) orally once a day (21 Nov 2018 11:59)  furosemide 20 mg oral tablet: 1 tab(s) orally once a day (19 Nov 2018 07:30)  lisinopril 20 mg oral tablet: 1 tab(s) orally once a day (19 Nov 2018 07:30)  lovastatin 20 mg oral tablet: 1 tab(s) orally once a day (19 Nov 2018 07:30)  methadone 5 mg oral tablet: 1 tab(s) orally every 8 hours (19 Nov 2018 07:30)  methylPREDNISolone 2 mg oral tablet: 1 tab(s) orally once a day (03 Dec 2018 15:07)  Multiple Vitamins oral tablet: 1 tab(s) orally once a day (21 Nov 2018 11:59)  potassium chloride 10 mEq oral capsule, extended release: 1 cap(s) orally 2 times a day (19 Nov 2018 07:30)  tamsulosin 0.4 mg oral capsule: 1 cap(s) orally once a day (19 Nov 2018 07:30)        VITAL SIGNS:   Vital Signs Last 24 Hrs  T(C): 36.8 (09 Dec 2018 05:43), Max: 36.8 (09 Dec 2018 05:43)  T(F): 98.2 (09 Dec 2018 05:43), Max: 98.2 (09 Dec 2018 05:43)  HR: 84 (09 Dec 2018 05:43) (77 - 95)  BP: 135/77 (09 Dec 2018 05:43) (115/62 - 135/77)  BP(mean): --  RR: 18 (09 Dec 2018 05:43) (18 - 18)  SpO2: 98% (09 Dec 2018 05:43) (98% - 98%)    I&O's Summary    08 Dec 2018 07:01  -  09 Dec 2018 07:00  --------------------------------------------------------  IN: 50 mL / OUT: 960 mL / NET: -910 mL        On Exam:  TELE:   Constitutional: NAD, awake and alert, well-developed  HEENT: Moist Mucous Membranes, Anicteric  Pulmonary: Non-labored, breath sounds are clear bilaterally, No wheezing, rales or rhonchi  Cardiovascular: Regular, S1 and S2, No murmurs, rubs, gallops or clicks  Gastrointestinal: Bowel Sounds present, soft, nontender.   Lymph: No peripheral edema. No lymphadenopathy.  Skin: No visible rashes or ulcers.  Psych:  Mood & affect appropriate    LABS: All Labs Reviewed:                        9.2    7.71  )-----------( 272      ( 08 Dec 2018 06:48 )             28.5                         9.1    x     )-----------( x        ( 07 Dec 2018 21:57 )             27.9                         6.7    8.26  )-----------( 262      ( 07 Dec 2018 06:43 )             21.1     08 Dec 2018 06:48    141    |  102    |  9      ----------------------------<  73     5.0     |  33     |  0.90   07 Dec 2018 06:43    144    |  110    |  11     ----------------------------<  82     3.0     |  27     |  0.78     Ca    8.6        08 Dec 2018 06:48  Ca    6.6        07 Dec 2018 06:43    TPro  5.7    /  Alb  2.3    /  TBili  0.8    /  DBili  x      /  AST  29     /  ALT  26     /  AlkPhos  143    08 Dec 2018 06:48          Blood Culture: Organism Staphylococcus aureus  Gram Stain Blood -- Gram Stain --  Specimen Source .Surgical Swab left hip wound  Culture-Blood --    Organism Staphylococcus aureus  Gram Stain Blood -- Gram Stain   Numerous polymorphonuclear leukocytes seen per low power field  Few Gram positive cocci in pairs seen per oil power field  Specimen Source .Body Fluid Interstitial Fluid  Culture-Blood --            RADIOLOGY: CHIEF COMPLAINT: Patient is a 73y old  Male who presents with a chief complaint of Redness and swelling around previous L hip surgical site (09 Dec 2018 07:57)      HPI:  71 yo M PMHx 45 pack year smoking hx and COPD (diagnosed "a few years ago, quit smoking then), HTN, HLD, hx of MI and CABG (2008) (stents also placed per chart review), RA (on Medrol daily), hx of multiple L hip surgeries (hip replacement 30 years ago in [place], ORIF recently 11/2018 w/ Dr. Ramirez) p/w L pain, drainage, redness and swelling surrounding upper L hip surgical site beginning 6 days ago.     Patient first noted some pain and redness 6 days ago while in [facility] ([facility] Patient was prescribed Ceftin w/ symptom progression from pain and erythema to swelling, erythema and purulent drainage.     Patient denied fever, chills, or other signs of infection including nausea, vomiting, productive cough, change in urination or bowel habits. Patient also denied sick contacts, recent falls.     Of note patient endorsed previous surgical site infection on his back after lumbar fusion for spinal stenosis treated with IV antibiotics, which progressed to sepsis with "Strep" isolated 6 years ago, per patient's wife. Also of note, patient had temperatures ranging from T  at [facility] per patient's daughter.     Also of note, patient now endorses diffuse itching and mild diaphoresis starting after antibiotics in ED, denied difficulty swallowing or breathing, lip swelling or dizziness.       In the ED, vitals T 99.4, HR 82, /66, RR 18, Sp02 93% ORA. Patient received vancomycin and aztreonam, and 1 L NS, morphine 4 mg and acetaminophen 650 mg. Ortho consulted, and L pelvis/hip/femur xrays revealed intact L hip hardware, lumbar fusion hardware and diffuse soft tissue swelling. CT results pending.   No Fhx of RA, hip infection (03 Dec 2018 14:32)      Follow Up: Preop Eval    Interval History: Patient seen and examined, denies CP, dyspnea, orthopnea, PND or palpitations. Sitting up at bedside comfortably. Patient enquired regarding when he will receive his HTN and Diuretic meds; reordered today no LIOR and BP stable.     EKG:  < from: 12 Lead ECG (12.04.18 @ 01:38) >  Ventricular Rate 75 BPM    Atrial Rate 75 BPM    P-R Interval 214 ms    QRS Duration 108 ms    Q-T Interval 404 ms    QTC Calculation(Bezet) 451 ms    P Axis 37 degrees    R Axis -26 degrees    T Axis 50 degrees    Diagnosis Line Sinus rhythm with sinus arrhythmia with 1st degree AV block    REVIEW OF SYSTEMS:    CONSTITUTIONAL: No weakness, fevers or chills  EYES/ENT: No visual changes;  No vertigo or throat pain   NECK: No pain or stiffness  RESPIRATORY: No cough, wheezing, hemoptysis; No shortness of breath  CARDIOVASCULAR: No chest pain or palpitations  GASTROINTESTINAL: No abdominal or epigastric pain. No nausea, vomiting, or hematemesis; No diarrhea or constipation. No melena or hematochezia.  GENITOURINARY: No dysuria, frequency or hematuria  NEUROLOGICAL: No numbness or weakness  SKIN: No itching, rashes    PAST MEDICAL & SURGICAL HISTORY:  S/P CABG x 3  S/P hip replacement, left  Rheumatoid arthritis  Osteoarthritis  COPD (chronic obstructive pulmonary disease)  HTN (hypertension)  S/P lumbar fusion: Approx 2012  History of femur fracture: Repaired 11/19- L hip  History of appendectomy  History of right shoulder replacement  History of total left hip arthroplasty      SOCIAL HISTORY:  No tobacco, ethanol, or drug abuse.    FAMILY HISTORY:  No pertinent family history in first degree relatives    No family history of acute MI or sudden cardiac death.    MEDICATIONS  (STANDING):  ALBUTerol    90 MICROgram(s) HFA Inhaler 2 Puff(s) Inhalation every 8 hours  ascorbic acid 500 milliGRAM(s) Oral two times a day  atorvastatin 10 milliGRAM(s) Oral at bedtime  calcium carbonate 1250 mG  + Vitamin D (OsCal 500 + D) 1 Tablet(s) Oral daily  ceFAZolin   IVPB 2000 milliGRAM(s) IV Intermittent every 8 hours  chlorhexidine 2% Cloths 1 Application(s) Topical daily  cholecalciferol 1000 Unit(s) Oral daily  docusate sodium 100 milliGRAM(s) Oral three times a day  famotidine    Tablet 20 milliGRAM(s) Oral two times a day  ferrous    sulfate 325 milliGRAM(s) Oral daily  finasteride 5 milliGRAM(s) Oral daily  FLUoxetine 20 milliGRAM(s) Oral <User Schedule>  folic acid 1 milliGRAM(s) Oral daily  heparin  Injectable 5000 Unit(s) SubCutaneous every 12 hours  methadone    Tablet 5 milliGRAM(s) Oral every 8 hours  methylPREDNISolone 4 milliGRAM(s) Oral daily  multivitamin 1 Tablet(s) Oral daily  polyethylene glycol 3350 17 Gram(s) Oral daily    MEDICATIONS  (PRN):  acetaminophen   Tablet .. 650 milliGRAM(s) Oral every 6 hours PRN Temp greater or equal to 38C (100.4F)  ALBUTerol    90 MICROgram(s) HFA Inhaler 2 Puff(s) Inhalation every 3 hours PRN Shortness of Breath and/or Wheezing  ALPRAZolam 0.5 milliGRAM(s) Oral at bedtime PRN anxiety  benzocaine 15 mG/menthol 3.6 mG Lozenge 1 Lozenge Oral every 3 hours PRN Sore Throat  bisacodyl Suppository 10 milliGRAM(s) Rectal daily PRN If no bowel movement  cyclobenzaprine 10 milliGRAM(s) Oral two times a day PRN Muscle Spasm  hydrocortisone 0.5% Cream 1 Application(s) Topical two times a day PRN Rash and/or Itching  HYDROmorphone  Injectable 1 milliGRAM(s) IV Push every 4 hours PRN Severe Pain (7 - 10)  magnesium hydroxide Suspension 30 milliLiter(s) Oral daily PRN Constipation  ondansetron Injectable 4 milliGRAM(s) IV Push once PRN Nausea and/or Vomiting  ondansetron Injectable 4 milliGRAM(s) IV Push every 6 hours PRN Nausea and/or Vomiting  oxyCODONE    IR 10 milliGRAM(s) Oral every 4 hours PRN Moderate Pain (4 - 6)  oxyCODONE    IR 5 milliGRAM(s) Oral every 4 hours PRN Mild Pain (1 - 3)      Allergies    Ceftin (Pruritus)    Intolerances    Home meds:  Home Medications:  alendronate 70 mg oral tablet: 1 tab(s) orally once a week (19 Nov 2018 07:30)  ALPRAZolam 0.5 mg oral tablet: 1 tab(s) orally once a day (at bedtime), As Needed (19 Nov 2018 07:30)  ascorbic acid 500 mg oral tablet: 1 tab(s) orally 2 times a day (21 Nov 2018 11:59)  aspirin 81 mg oral tablet: 1 tab(s) orally once a day (19 Nov 2018 07:30)  cyclobenzaprine 10 mg oral tablet: 1 tab(s) orally 2 times a day, As Needed (03 Dec 2018 15:05)  ferrous sulfate 324 mg (65 mg elemental iron) oral tablet: 1 tab(s) orally once a day (21 Nov 2018 11:59)  finasteride 5 mg oral tablet: 1 tab(s) orally once a day (19 Nov 2018 07:30)  FLUoxetine 20 mg oral capsule: 1 cap(s) orally 3 times a day (19 Nov 2018 07:30)  folic acid 1 mg oral tablet: 1 tab(s) orally once a day (21 Nov 2018 11:59)  furosemide 20 mg oral tablet: 1 tab(s) orally once a day (19 Nov 2018 07:30)  lisinopril 20 mg oral tablet: 1 tab(s) orally once a day (19 Nov 2018 07:30)  lovastatin 20 mg oral tablet: 1 tab(s) orally once a day (19 Nov 2018 07:30)  methadone 5 mg oral tablet: 1 tab(s) orally every 8 hours (19 Nov 2018 07:30)  methylPREDNISolone 2 mg oral tablet: 1 tab(s) orally once a day (03 Dec 2018 15:07)  Multiple Vitamins oral tablet: 1 tab(s) orally once a day (21 Nov 2018 11:59)  potassium chloride 10 mEq oral capsule, extended release: 1 cap(s) orally 2 times a day (19 Nov 2018 07:30)  tamsulosin 0.4 mg oral capsule: 1 cap(s) orally once a day (19 Nov 2018 07:30)      VITAL SIGNS:   Vital Signs Last 24 Hrs  T(C): 36.8 (09 Dec 2018 05:43), Max: 36.8 (09 Dec 2018 05:43)  T(F): 98.2 (09 Dec 2018 05:43), Max: 98.2 (09 Dec 2018 05:43)  HR: 84 (09 Dec 2018 05:43) (77 - 95)  BP: 135/77 (09 Dec 2018 05:43) (115/62 - 135/77)  BP(mean): --  RR: 18 (09 Dec 2018 05:43) (18 - 18)  SpO2: 98% (09 Dec 2018 05:43) (98% - 98%)    I&O's Summary    08 Dec 2018 07:01  -  09 Dec 2018 07:00  --------------------------------------------------------  IN: 50 mL / OUT: 960 mL / NET: -910 mL      On Exam:  TELE: not on tele  Constitutional: NAD, awake and alert, well-developed  HEENT: Moist Mucous Membranes, Anicteric  Pulmonary: Non-labored, breath sounds are clear bilaterally, No wheezing, rales or rhonchi  Cardiovascular: Regular, S1 and S2, No murmurs, rubs, gallops or clicks  Gastrointestinal: Bowel Sounds present, soft, nontender, obese  Lymph: No peripheral edema. No lymphadenopathy.  Skin: No visible rashes or ulcers. Left hip with dressing dry and intact  Psych:  Mood & affect appropriate    LABS: All Labs Reviewed:                        9.2    7.71  )-----------( 272      ( 08 Dec 2018 06:48 )             28.5                         9.1    x     )-----------( x        ( 07 Dec 2018 21:57 )             27.9                         6.7    8.26  )-----------( 262      ( 07 Dec 2018 06:43 )             21.1     08 Dec 2018 06:48    141    |  102    |  9      ----------------------------<  73     5.0     |  33     |  0.90   07 Dec 2018 06:43    144    |  110    |  11     ----------------------------<  82     3.0     |  27     |  0.78     Ca    8.6        08 Dec 2018 06:48  Ca    6.6        07 Dec 2018 06:43    TPro  5.7    /  Alb  2.3    /  TBili  0.8    /  DBili  x      /  AST  29     /  ALT  26     /  AlkPhos  143    08 Dec 2018 06:48      Blood Culture: Organism Staphylococcus aureus  Gram Stain Blood -- Gram Stain --  Specimen Source .Surgical Swab left hip wound  Culture-Blood --    Organism Staphylococcus aureus  Gram Stain Blood -- Gram Stain   Numerous polymorphonuclear leukocytes seen per low power field  Few Gram positive cocci in pairs seen per oil power field  Specimen Source .Body Fluid Interstitial Fluid  Culture-Blood --      < from: TTE Echo Doppler w/o Cont (11.19.18 @ 11:09) >  The EF is approximately  65%.  Conclusion: Overall preserved left ventricular systolic function. EF 65.   Insufficient tricuspid regurgitation Doppler in order to estimate the   right ventricular systolic pressure        RADIOLOGY:    < from: Xray Chest 1 View AP/PA (12.03.18 @ 12:50) >  IMPRESSION: Rounded patchy opacity at the left lung apex which may be   infectious in etiology, however follow-up to resolution is recommended.    < from: CT Chest No Cont (12.03.18 @ 19:30) >  IMPRESSION:    1. Area of subsegmental linear atelectasis within the left upper lobe,   corresponding to the previously noted x-ray abnormality.    2. Mild scattered peripheral reticular opacities within upper lobe   predominance which may reflect mild fibrosis.    3. Probable left lateral lower chest wall bruising. Correlate with direct   physical examination.    4. Nonobstructing bilateral intrarenal stones.

## 2018-12-09 NOTE — PROGRESS NOTE ADULT - PROBLEM SELECTOR PROBLEM 6
HTN (hypertension)
HLD (hyperlipidemia)
HTN (hypertension)

## 2018-12-09 NOTE — PROGRESS NOTE ADULT - PROBLEM SELECTOR PROBLEM 7
HLD (hyperlipidemia)
Rheumatoid arthritis

## 2018-12-09 NOTE — PROGRESS NOTE ADULT - ASSESSMENT
72 yo M with PMH of HTN, HLD, MI and CABG with stents x3, COPD, RA and s/p recent L hip surgery now admitted with surgical site infection, in need of urgent washout. He has had an overall reasonable exercise capacity and reports riding an exercise bicycle for ~10 miles at a time without difficulty.  He had recent surgery without cv complication.  s/p washout by orthopedics without cv complication.  No clear evidence of acute ischemia, arrhythmia or volume overload.     - No clear evidence of acute ischemia, arrhythmia or volume overload.  - Previous ECHO in 11/2018 shows preserved LV systolic function with EF: 65%, mild MR, mild TR, mildly enlarged LA/RA, stage I diastolic dysfunction.  No need for repeat  - BP had been more stable and LIOR resolved; been normal x 2 days.  May reintroduce ACEI and diuretic at a lower dose  - Unsure why patient is not on BB given Hx of MI with CABG and stents.  He follows up with a cardiologist in [place]  - Continue statin  - Continue to monitor routine hemodynamics.  Continue to monitor renal function   - Monitor and replete lytes, keep K>4, Mg>2.  - Continue with long term Abx per ID  - Other cardiovascular workup will depend on clinical course. All other workup per primary team.  - Will follow.    Avril Cleary [facility]  Cardiology 74 yo M with PMH of HTN, HLD, MI and CABG with stents x3, COPD, RA and s/p recent L hip surgery now admitted with surgical site infection, in need of urgent washout. He has had an overall reasonable exercise capacity and reports riding an exercise bicycle for ~10 miles at a time without difficulty.  He had recent surgery without cv complication.  s/p washout by orthopedics without cv complication.  No clear evidence of acute ischemia, arrhythmia or volume overload.     - No clear evidence of acute ischemia, arrhythmia or volume overload.  - Previous ECHO in 11/2018 shows preserved LV systolic function with EF: 65%, mild MR, mild TR, mildly enlarged LA/RA, stage I diastolic dysfunction.  No need for repeat  - Restarted home Lasix 20mg daily and Lisinopril 10mg daily (lower dose) with parameters and will titrate up to 20mg home dose;  LIOR resolved; been normal x 2 days.   - Unsure why patient is not on BB given Hx of MI with CABG and stents.  He follows up with a cardiologist in [place]  - Continue statin  - Continue to monitor routine hemodynamics.  Continue to monitor renal function   - Monitor and replete lytes, keep K>4, Mg>2. (no am labs available)  - Continue with long term Abx per ID  - Other cardiovascular workup will depend on clinical course. All other workup per primary team.  - Will follow.    Avril Cleary [facility]  Cardiology

## 2018-12-09 NOTE — PROGRESS NOTE ADULT - SUBJECTIVE AND OBJECTIVE BOX
Date/Time Patient Seen:  		  Referring MD:   Data Reviewed	       Patient is a 73y old  Male who presents with a chief complaint of Redness and swelling around previous L hip surgical site (08 Dec 2018 15:53)  in bed  seen and examined  vs and meds reviewed      Subjective/HPI     PAST MEDICAL & SURGICAL HISTORY:  S/P CABG x 3  S/P hip replacement, left  Rheumatoid arthritis  Osteoarthritis  COPD (chronic obstructive pulmonary disease)  HTN (hypertension)  S/P lumbar fusion: Approx 2012  History of femur fracture: Repaired 11/19- L hip  History of appendectomy  History of right shoulder replacement  History of total left hip arthroplasty        Medication list         MEDICATIONS  (STANDING):  ALBUTerol    90 MICROgram(s) HFA Inhaler 2 Puff(s) Inhalation every 8 hours  ascorbic acid 500 milliGRAM(s) Oral two times a day  atorvastatin 10 milliGRAM(s) Oral at bedtime  calcium carbonate 1250 mG  + Vitamin D (OsCal 500 + D) 1 Tablet(s) Oral daily  ceFAZolin   IVPB 2000 milliGRAM(s) IV Intermittent every 8 hours  chlorhexidine 2% Cloths 1 Application(s) Topical daily  cholecalciferol 1000 Unit(s) Oral daily  docusate sodium 100 milliGRAM(s) Oral three times a day  famotidine    Tablet 20 milliGRAM(s) Oral two times a day  ferrous    sulfate 325 milliGRAM(s) Oral daily  finasteride 5 milliGRAM(s) Oral daily  FLUoxetine 20 milliGRAM(s) Oral <User Schedule>  folic acid 1 milliGRAM(s) Oral daily  heparin  Injectable 5000 Unit(s) SubCutaneous every 12 hours  methadone    Tablet 5 milliGRAM(s) Oral every 8 hours  methylPREDNISolone 4 milliGRAM(s) Oral daily  multivitamin 1 Tablet(s) Oral daily  polyethylene glycol 3350 17 Gram(s) Oral daily    MEDICATIONS  (PRN):  acetaminophen   Tablet .. 650 milliGRAM(s) Oral every 6 hours PRN Temp greater or equal to 38C (100.4F)  ALBUTerol    90 MICROgram(s) HFA Inhaler 2 Puff(s) Inhalation every 3 hours PRN Shortness of Breath and/or Wheezing  ALPRAZolam 0.5 milliGRAM(s) Oral at bedtime PRN anxiety  benzocaine 15 mG/menthol 3.6 mG Lozenge 1 Lozenge Oral every 3 hours PRN Sore Throat  bisacodyl Suppository 10 milliGRAM(s) Rectal daily PRN If no bowel movement  cyclobenzaprine 10 milliGRAM(s) Oral two times a day PRN Muscle Spasm  hydrocortisone 0.5% Cream 1 Application(s) Topical two times a day PRN Rash and/or Itching  HYDROmorphone  Injectable 1 milliGRAM(s) IV Push every 4 hours PRN Severe Pain (7 - 10)  magnesium hydroxide Suspension 30 milliLiter(s) Oral daily PRN Constipation  ondansetron Injectable 4 milliGRAM(s) IV Push once PRN Nausea and/or Vomiting  ondansetron Injectable 4 milliGRAM(s) IV Push every 6 hours PRN Nausea and/or Vomiting  oxyCODONE    IR 10 milliGRAM(s) Oral every 4 hours PRN Moderate Pain (4 - 6)  oxyCODONE    IR 5 milliGRAM(s) Oral every 4 hours PRN Mild Pain (1 - 3)         Vitals log        ICU Vital Signs Last 24 Hrs  T(C): 36.8 (09 Dec 2018 05:43), Max: 36.8 (09 Dec 2018 05:43)  T(F): 98.2 (09 Dec 2018 05:43), Max: 98.2 (09 Dec 2018 05:43)  HR: 84 (09 Dec 2018 05:43) (71 - 95)  BP: 135/77 (09 Dec 2018 05:43) (115/62 - 135/77)  BP(mean): --  ABP: --  ABP(mean): --  RR: 18 (09 Dec 2018 05:43) (18 - 18)  SpO2: 98% (09 Dec 2018 05:43) (97% - 98%)           Input and Output:  I&O's Detail    08 Dec 2018 07:01  -  09 Dec 2018 07:00  --------------------------------------------------------  IN:    Solution: 50 mL  Total IN: 50 mL    OUT:    Voided: 960 mL  Total OUT: 960 mL    Total NET: -910 mL          Lab Data                        9.2    7.71  )-----------( 272      ( 08 Dec 2018 06:48 )             28.5     12-08    141  |  102  |  9   ----------------------------<  73  5.0   |  33<H>  |  0.90    Ca    8.6      08 Dec 2018 06:48    TPro  5.7<L>  /  Alb  2.3<L>  /  TBili  0.8  /  DBili  x   /  AST  29  /  ALT  26  /  AlkPhos  143<H>  12-08            Review of Systems	      Objective     Physical Examination  heart s1s2  lung dec BS  abd soft        Pertinent Lab findings & Imaging      [place]in[place]:  NO   Adequate UO     I&O's Detail    08 Dec 2018 07:01  -  09 Dec 2018 07:00  --------------------------------------------------------  IN:    Solution: 50 mL  Total IN: 50 mL    OUT:    Voided: 960 mL  Total OUT: 960 mL    Total NET: -910 mL               Discussed with:     Cultures:	        Radiology

## 2018-12-09 NOTE — PROGRESS NOTE ADULT - PROBLEM SELECTOR PROBLEM 9
S/P hip replacement, left

## 2018-12-09 NOTE — PROGRESS NOTE ADULT - ASSESSMENT
71 yo M PMHx 45 pack year smoking hx and COPD (diagnosed "a few years ago, quit smoking then), HTN, HLD, hx of MI and CABG (2008) (stents also placed per chart review), RA (on Medrol daily), hx of multiple L hip surgeries (hip replacement 30 years ago in [place], ORIF recently 11/2018 w/ Dr. Ramirez) p/w L pain, drainage, redness and swelling surrounding upper L hip surgical site beginning 6 days ago in [facility], s/p 3 days ceftin at [facility] with symptom progression (more erythema, swelling on L hip), admitted for surgical site infection r/o septic joint with CT revealing L hip fluid collection (infection vs hematoma).

## 2018-12-09 NOTE — PROGRESS NOTE ADULT - PROBLEM SELECTOR PLAN 10
- DVT ppx [id number] SubQ q12  - GI ppx pepcid 20 mg (pt on steroids)  - Allergies-- patient's daughter notes no pruritus after ceftin however hx somewhat unclear. Patient does pruritus here in ED after vanc though likely due to infusion rate    Dc planning underway
- DVT ppx [id number] SubQ q12  - GI ppx pepcid 20 mg (pt on steroids)  - Allergies-- patient's daughter notes no pruritus after ceftin however hx somewhat unclear. Patient does pruritus here in ED after vanc though likely due to infusion rate    Dc planning underway
- DVT ppx [id number] SubQ q12  - GI ppx pepcid 20 mg (pt on steroids)  - Allergies-- patient's daughter notes no pruritus after ceftin however hx somewhat unclear. Patient does pruritus here in ED after vanc though likely due to infusion rate    Dc planning underway
- DVT ppx SCDs. Awaiting ortho re-eval for OR to restart chemical dvt ppx  - GI ppx pepcid 20 mg (pt on steroids)  - Allergies-- patient's daughter notes no pruritus after ceftin however hx somewhat unclear. Patient does pruritus here in ED after vanc though likely due to infusion rate
- DVT ppx SCDs. Awaiting ortho re-eval for OR to restart chemical dvt ppx  - GI ppx pepcid 20 mg (pt on steroids)  - Allergies-- patient's daughter notes no pruritus after ceftin however hx somewhat unclear. Patient does pruritus here in ED after vanc though likely due to infusion rate
- DVT ppx [id number] SubQ q12  - GI ppx pepcid 20 mg (pt on steroids)  - Allergies-- patient's daughter notes no pruritus after ceftin however hx somewhat unclear. Patient does pruritus here in ED after vanc though likely due to infusion rate    Dc planning underway

## 2018-12-09 NOTE — PROGRESS NOTE ADULT - PROBLEM SELECTOR PLAN 6
- stable; controlled  - stopped BP meds at [facility] for hypotension per patient's daughter  - holding home lisinopril 20 mg and lasix until LIOR further resolves

## 2018-12-09 NOTE — PROGRESS NOTE ADULT - SUBJECTIVE AND OBJECTIVE BOX
Patient is a 73y old  Male who presents with a chief complaint of Redness and swelling around previous L hip surgical site (09 Dec 2018 08:48)      FROM ADMISSION H+P:   HPI:  71 yo M PMHx 45 pack year smoking hx and COPD (diagnosed "a few years ago, quit smoking then), HTN, HLD, hx of MI and CABG (2008) (stents also placed per chart review), RA (on Medrol daily), hx of multiple L hip surgeries (hip replacement 30 years ago in [place], ORIF recently 11/2018 w/ Dr. Ramirez) p/w L pain, drainage, redness and swelling surrounding upper L hip surgical site beginning 6 days ago.     Patient first noted some pain and redness 6 days ago while in [facility] ([facility] Patient was prescribed Ceftin w/ symptom progression from pain and erythema to swelling, erythema and purulent drainage.     Patient denied fever, chills, or other signs of infection including nausea, vomiting, productive cough, change in urination or bowel habits. Patient also denied sick contacts, recent falls.     Of note patient endorsed previous surgical site infection on his back after lumbar fusion for spinal stenosis treated with IV antibiotics, which progressed to sepsis with "Strep" isolated 6 years ago, per patient's wife. Also of note, patient had temperatures ranging from T  at [facility] per patient's daughter.     Also of note, patient now endorses diffuse itching and mild diaphoresis starting after antibiotics in ED, denied difficulty swallowing or breathing, lip swelling or dizziness.       In the ED, vitals T 99.4, HR 82, /66, RR 18, Sp02 93% ORA. Patient received vancomycin and aztreonam, and 1 L NS, morphine 4 mg and acetaminophen 650 mg. Ortho consulted, and L pelvis/hip/femur xrays revealed intact L hip hardware, lumbar fusion hardware and diffuse soft tissue swelling. CT results pending.   No Fhx of RA, hip infection (03 Dec 2018 14:32)      ----  INTERVAL HPI/OVERNIGHT EVENTS: Pt seen and evaluated at the bedside. No acute overnight events occurred. Feeling well today. No fever/chills. No complaints. No musculoskeletal complaints today.     ----  PAST MEDICAL & SURGICAL HISTORY:  S/P CABG x 3  S/P hip replacement, left  Rheumatoid arthritis  Osteoarthritis  COPD (chronic obstructive pulmonary disease)  HTN (hypertension)  S/P lumbar fusion: Approx 2012  History of femur fracture: Repaired 11/19- L hip  History of appendectomy  History of right shoulder replacement  History of total left hip arthroplasty      FAMILY HISTORY:  No pertinent family history in first degree relatives      ----  MEDICATIONS  (STANDING):  ALBUTerol    90 MICROgram(s) HFA Inhaler 2 Puff(s) Inhalation every 8 hours  ascorbic acid 500 milliGRAM(s) Oral two times a day  atorvastatin 10 milliGRAM(s) Oral at bedtime  calcium carbonate 1250 mG  + Vitamin D (OsCal 500 + D) 1 Tablet(s) Oral daily  ceFAZolin   IVPB 2000 milliGRAM(s) IV Intermittent every 8 hours  chlorhexidine 2% Cloths 1 Application(s) Topical daily  cholecalciferol 1000 Unit(s) Oral daily  docusate sodium 100 milliGRAM(s) Oral three times a day  famotidine    Tablet 20 milliGRAM(s) Oral two times a day  ferrous    sulfate 325 milliGRAM(s) Oral daily  finasteride 5 milliGRAM(s) Oral daily  FLUoxetine 20 milliGRAM(s) Oral <User Schedule>  folic acid 1 milliGRAM(s) Oral daily  furosemide    Tablet 20 milliGRAM(s) Oral daily  heparin  Injectable 5000 Unit(s) SubCutaneous every 12 hours  lisinopril 10 milliGRAM(s) Oral daily  methadone    Tablet 5 milliGRAM(s) Oral every 8 hours  methylPREDNISolone 4 milliGRAM(s) Oral daily  multivitamin 1 Tablet(s) Oral daily  polyethylene glycol 3350 17 Gram(s) Oral daily    MEDICATIONS  (PRN):  acetaminophen   Tablet .. 650 milliGRAM(s) Oral every 6 hours PRN Temp greater or equal to 38C (100.4F)  ALBUTerol    90 MICROgram(s) HFA Inhaler 2 Puff(s) Inhalation every 3 hours PRN Shortness of Breath and/or Wheezing  ALPRAZolam 0.5 milliGRAM(s) Oral at bedtime PRN anxiety  benzocaine 15 mG/menthol 3.6 mG Lozenge 1 Lozenge Oral every 3 hours PRN Sore Throat  bisacodyl Suppository 10 milliGRAM(s) Rectal daily PRN If no bowel movement  cyclobenzaprine 10 milliGRAM(s) Oral two times a day PRN Muscle Spasm  hydrocortisone 0.5% Cream 1 Application(s) Topical two times a day PRN Rash and/or Itching  HYDROmorphone  Injectable 1 milliGRAM(s) IV Push every 4 hours PRN Severe Pain (7 - 10)  magnesium hydroxide Suspension 30 milliLiter(s) Oral daily PRN Constipation  ondansetron Injectable 4 milliGRAM(s) IV Push once PRN Nausea and/or Vomiting  ondansetron Injectable 4 milliGRAM(s) IV Push every 6 hours PRN Nausea and/or Vomiting  oxyCODONE    IR 10 milliGRAM(s) Oral every 4 hours PRN Moderate Pain (4 - 6)  oxyCODONE    IR 5 milliGRAM(s) Oral every 4 hours PRN Mild Pain (1 - 3)      ----  REVIEW OF SYSTEMS:  CONSTITUTIONAL: denies fever, chills, fatigue, weakness  HEENT: denies blurred vision, sore throat  SKIN: denies new lesions, rash  CARDIOVASCULAR: denies chest pain, chest pressure, palpitations  RESPIRATORY: denies shortness of breath, sputum production  GASTROINTESTINAL: denies nausea, vomiting, diarrhea, abdominal pain  GENITOURINARY: denies dysuria, discharge  NEUROLOGICAL: denies numbness, headache, focal weakness  MUSCULOSKELETAL: as per HPI  HEMATOLOGIC: denies gross bleeding, bruising  LYMPHATICS: denies enlarged lymph nodes, extremity swelling  PSYCHIATRIC: denies recent changes in anxiety, depression  ENDOCRINOLOGIC: denies sweating, cold or heat intolerance    ----  PHYSICAL EXAM:  GENERAL: patient appears disheveled, no acute distress  EYES: sclera clear, no exudates  ENMT: oropharynx clear without erythema, no exudates, moist mucous membranes  NECK: supple, soft, no thyromegaly noted  LUNGS: reduced air entry bilaterally, clear to auscultation, symmetric breath sounds, no wheezing or rhonchi appreciated  HEART: soft S1/S2, regular rate and rhythm, no murmurs noted, no lower extremity edema  GASTROINTESTINAL: abdomen is soft, nontender, nondistended, normoactive bowel sounds, no palpable masses  INTEGUMENT: good skin turgor, no lesions noted, PICC line in RUE  MUSCULOSKELETAL: no clubbing or cyanosis, no obvious deformity  NEUROLOGIC: awake, alert, oriented x3, good muscle tone in 4 extremities, no obvious sensory deficits  HEME/LYMPH: no palpable supraclavicular nodules, no obvious ecchymosis or petechiae     T(C): 37.1 (12-09-18 @ 13:49), Max: 37.1 (12-09-18 @ 13:49)  HR: 81 (12-09-18 @ 13:49) (81 - 95)  BP: 100/63 (12-09-18 @ 13:49) (100/63 - 135/77)  RR: 16 (12-09-18 @ 13:49) (16 - 18)  SpO2: 95% (12-09-18 @ 13:49) (95% - 98%)  Wt(kg): --    ----  I&O's Summary    08 Dec 2018 07:01  -  09 Dec 2018 07:00  --------------------------------------------------------  IN: 50 mL / OUT: 960 mL / NET: -910 mL    09 Dec 2018 07:01  -  09 Dec 2018 18:29  --------------------------------------------------------  IN: 850 mL / OUT: 1500 mL / NET: -650 mL        LABS:                        9.8    11.01 )-----------( 293      ( 09 Dec 2018 09:18 )             30.3     12-09    137  |  98  |  10  ----------------------------<  90  4.1   |  32<H>  |  0.81    Ca    8.3<L>      09 Dec 2018 09:18  Mg     2.0     12-09    TPro  5.7<L>  /  Alb  2.3<L>  /  TBili  0.8  /  DBili  x   /  AST  29  /  ALT  26  /  AlkPhos  143<H>  12-08        CAPILLARY BLOOD GLUCOSE          12-06 @ 03:29   Few Staphylococcus aureus  See previous culture [id number]  --  Staphylococcus aureus            ----  Personally reviewed:  Vital sign trends: [ x ] yes    [  ] no     [  ] n/a  Laboratory results: [ x ] yes    [  ] no     [  ] n/a  Radiology results: [  ] yes    [  ] no     [ x ] n/a  Culture results: [ x ] yes    [  ] no     [  ] n/a  Consultant recommendations: [ x ] yes    [  ] no     [  ] n/a

## 2018-12-09 NOTE — PROGRESS NOTE ADULT - SUBJECTIVE AND OBJECTIVE BOX
Patient seen and examined. Pain controlled. No acute overnight events. No other complaints at this time.     Vital Signs Last 24 Hrs  T(C): 36.8 (09 Dec 2018 05:43), Max: 36.8 (09 Dec 2018 05:43)  T(F): 98.2 (09 Dec 2018 05:43), Max: 98.2 (09 Dec 2018 05:43)  HR: 84 (09 Dec 2018 05:43) (77 - 95)  BP: 135/77 (09 Dec 2018 05:43) (115/62 - 135/77)  BP(mean): --  RR: 18 (09 Dec 2018 05:43) (18 - 18)  SpO2: 98% (09 Dec 2018 05:43) (98% - 98%)    Physical exam  Gen: NAD  LLE: NAVA Dressing clean, dry, and intact left hip. +EHL/FHL/TA/GSC.  SILT L3-S1. +Dorsalis pedis (faint), dopplerable, capillary refill brisk. Compartments soft and nontender.    72M s/p L hip wound I&D POD# 4    Partial weight bearing 50% LLE  Pain control  DVT prophylaxis  PT  FU OR Cx   Asp Cx +MSSA  IV abx w ancef   Abx per ID, PICC in place  Discharge planning    Will discuss with attending and advise if plan changes

## 2018-12-09 NOTE — PROGRESS NOTE ADULT - PROBLEM SELECTOR PLAN 5
- Clinically, likely HPA suppressed; cont home medrol dose  - patient's daughter noted medrol stopped for a few days ago 11/19 [facility] w/ Dr. Ramirez then decreased to 2 mg daily at [facility]
- chronic, on medrol 4 mg daily at home  - patient's daughter noted medrol stopped for a few days ago 11/19 [facility] w/ Dr. Ramirez then decreased to 2 mg daily at [facility] c/w medrol 4 mg as patient noted flares on any decrease of steroids. 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 Will give his regular dose of Methylprednisolone tomorrow morning, Hydrocortisone 50 mg IV immediately pre-op, and then Hydrocortisone 25 mg IV q8 hours for first 24 hours post-op, and then resume his home dose. See above
- chronic, on medrol 4 mg daily at home  - patient's daughter noted medrol stopped for a few days ago 11/19 [facility] w/ Dr. Ramirez then decreased to 2 mg daily at [facility] c/w medrol 4 mg as patient noted flares on any decrease of steroids. benefits of decreasing RA flares currently outweigh risk of immunosuppression though low threshold for re-evaluation in light of any new fevers, worsening infection
- chronic, on medrol 4 mg daily at home. solucortef 25q8 24hrs post op, then cont home medrol dose  - patient's daughter noted medrol stopped for a few days ago 11/19 [facility] w/ Dr. 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 Will give Hydrocortisone 25 mg IV q8 hours for first 24 hours post-op, and then resume his home dose. See above
bp stable  105/62 this morning  continue home lisinopil and home lasix

## 2018-12-10 VITALS
OXYGEN SATURATION: 96 % | TEMPERATURE: 98 F | HEART RATE: 78 BPM | DIASTOLIC BLOOD PRESSURE: 66 MMHG | SYSTOLIC BLOOD PRESSURE: 108 MMHG | RESPIRATION RATE: 17 BRPM

## 2018-12-10 LAB
ANION GAP SERPL CALC-SCNC: 8 MMOL/L — SIGNIFICANT CHANGE UP (ref 5–17)
BUN SERPL-MCNC: 10 MG/DL — SIGNIFICANT CHANGE UP (ref 7–23)
CALCIUM SERPL-MCNC: 8.2 MG/DL — LOW (ref 8.5–10.1)
CHLORIDE SERPL-SCNC: 97 MMOL/L — SIGNIFICANT CHANGE UP (ref 96–108)
CO2 SERPL-SCNC: 32 MMOL/L — HIGH (ref 22–31)
CREAT SERPL-MCNC: 0.81 MG/DL — SIGNIFICANT CHANGE UP (ref 0.5–1.3)
GLUCOSE SERPL-MCNC: 108 MG/DL — HIGH (ref 70–99)
HCT VFR BLD CALC: 31.1 % — LOW (ref 39–50)
HGB BLD-MCNC: 10 G/DL — LOW (ref 13–17)
MAGNESIUM SERPL-MCNC: 2.1 MG/DL — SIGNIFICANT CHANGE UP (ref 1.6–2.6)
MCHC RBC-ENTMCNC: 30.4 PG — SIGNIFICANT CHANGE UP (ref 27–34)
MCHC RBC-ENTMCNC: 32.2 GM/DL — SIGNIFICANT CHANGE UP (ref 32–36)
MCV RBC AUTO: 94.5 FL — SIGNIFICANT CHANGE UP (ref 80–100)
NRBC # BLD: 0 /100 WBCS — SIGNIFICANT CHANGE UP (ref 0–0)
PLATELET # BLD AUTO: 280 K/UL — SIGNIFICANT CHANGE UP (ref 150–400)
POTASSIUM SERPL-MCNC: 4.1 MMOL/L — SIGNIFICANT CHANGE UP (ref 3.5–5.3)
POTASSIUM SERPL-SCNC: 4.1 MMOL/L — SIGNIFICANT CHANGE UP (ref 3.5–5.3)
RBC # BLD: 3.29 M/UL — LOW (ref 4.2–5.8)
RBC # FLD: 15.7 % — HIGH (ref 10.3–14.5)
SODIUM SERPL-SCNC: 137 MMOL/L — SIGNIFICANT CHANGE UP (ref 135–145)
WBC # BLD: 10.62 K/UL — HIGH (ref 3.8–10.5)
WBC # FLD AUTO: 10.62 K/UL — HIGH (ref 3.8–10.5)

## 2018-12-10 PROCEDURE — 97116 GAIT TRAINING THERAPY: CPT

## 2018-12-10 PROCEDURE — 36415 COLL VENOUS BLD VENIPUNCTURE: CPT

## 2018-12-10 PROCEDURE — 87205 SMEAR GRAM STAIN: CPT

## 2018-12-10 PROCEDURE — 94640 AIRWAY INHALATION TREATMENT: CPT

## 2018-12-10 PROCEDURE — 80048 BASIC METABOLIC PNL TOTAL CA: CPT

## 2018-12-10 PROCEDURE — 72170 X-RAY EXAM OF PELVIS: CPT

## 2018-12-10 PROCEDURE — 80053 COMPREHEN METABOLIC PANEL: CPT

## 2018-12-10 PROCEDURE — 99285 EMERGENCY DEPT VISIT HI MDM: CPT | Mod: 25

## 2018-12-10 PROCEDURE — 36569 INSJ PICC 5 YR+ W/O IMAGING: CPT

## 2018-12-10 PROCEDURE — 97530 THERAPEUTIC ACTIVITIES: CPT

## 2018-12-10 PROCEDURE — 99232 SBSQ HOSP IP/OBS MODERATE 35: CPT

## 2018-12-10 PROCEDURE — 86901 BLOOD TYPING SEROLOGIC RH(D): CPT

## 2018-12-10 PROCEDURE — P9016: CPT

## 2018-12-10 PROCEDURE — 86923 COMPATIBILITY TEST ELECTRIC: CPT

## 2018-12-10 PROCEDURE — 87075 CULTR BACTERIA EXCEPT BLOOD: CPT

## 2018-12-10 PROCEDURE — 85730 THROMBOPLASTIN TIME PARTIAL: CPT

## 2018-12-10 PROCEDURE — 85018 HEMOGLOBIN: CPT

## 2018-12-10 PROCEDURE — 87070 CULTURE OTHR SPECIMN AEROBIC: CPT

## 2018-12-10 PROCEDURE — 80202 ASSAY OF VANCOMYCIN: CPT

## 2018-12-10 PROCEDURE — 87186 SC STD MICRODIL/AGAR DIL: CPT

## 2018-12-10 PROCEDURE — 86900 BLOOD TYPING SEROLOGIC ABO: CPT

## 2018-12-10 PROCEDURE — 86850 RBC ANTIBODY SCREEN: CPT

## 2018-12-10 PROCEDURE — 73701 CT LOWER EXTREMITY W/DYE: CPT

## 2018-12-10 PROCEDURE — 10160 PNXR ASPIR ABSC HMTMA BULLA: CPT

## 2018-12-10 PROCEDURE — 36430 TRANSFUSION BLD/BLD COMPNT: CPT

## 2018-12-10 PROCEDURE — 87040 BLOOD CULTURE FOR BACTERIA: CPT

## 2018-12-10 PROCEDURE — 76937 US GUIDE VASCULAR ACCESS: CPT

## 2018-12-10 PROCEDURE — 93005 ELECTROCARDIOGRAM TRACING: CPT

## 2018-12-10 PROCEDURE — 85652 RBC SED RATE AUTOMATED: CPT

## 2018-12-10 PROCEDURE — 71250 CT THORAX DX C-: CPT

## 2018-12-10 PROCEDURE — 85027 COMPLETE CBC AUTOMATED: CPT

## 2018-12-10 PROCEDURE — 97162 PT EVAL MOD COMPLEX 30 MIN: CPT

## 2018-12-10 PROCEDURE — 76000 FLUOROSCOPY <1 HR PHYS/QHP: CPT

## 2018-12-10 PROCEDURE — 76942 ECHO GUIDE FOR BIOPSY: CPT

## 2018-12-10 PROCEDURE — 86140 C-REACTIVE PROTEIN: CPT

## 2018-12-10 PROCEDURE — C1751: CPT

## 2018-12-10 PROCEDURE — 99239 HOSP IP/OBS DSCHRG MGMT >30: CPT

## 2018-12-10 PROCEDURE — 83605 ASSAY OF LACTIC ACID: CPT

## 2018-12-10 PROCEDURE — 96365 THER/PROPH/DIAG IV INF INIT: CPT

## 2018-12-10 PROCEDURE — 73552 X-RAY EXAM OF FEMUR 2/>: CPT

## 2018-12-10 PROCEDURE — 73502 X-RAY EXAM HIP UNI 2-3 VIEWS: CPT

## 2018-12-10 PROCEDURE — 71045 X-RAY EXAM CHEST 1 VIEW: CPT

## 2018-12-10 PROCEDURE — 85610 PROTHROMBIN TIME: CPT

## 2018-12-10 PROCEDURE — 85014 HEMATOCRIT: CPT

## 2018-12-10 PROCEDURE — 73551 X-RAY EXAM OF FEMUR 1: CPT

## 2018-12-10 PROCEDURE — 83735 ASSAY OF MAGNESIUM: CPT

## 2018-12-10 PROCEDURE — 77001 FLUOROGUIDE FOR VEIN DEVICE: CPT

## 2018-12-10 RX ORDER — FERROUS SULFATE 325(65) MG
1 TABLET ORAL
Qty: 0 | Refills: 0 | COMMUNITY

## 2018-12-10 RX ORDER — ASPIRIN/CALCIUM CARB/MAGNESIUM 324 MG
1 TABLET ORAL
Qty: 0 | Refills: 0 | COMMUNITY

## 2018-12-10 RX ORDER — CEFAZOLIN SODIUM 1 G
2 VIAL (EA) INJECTION
Qty: 228 | Refills: 0 | OUTPATIENT
Start: 2018-12-10 | End: 2019-01-16

## 2018-12-10 RX ORDER — FUROSEMIDE 40 MG
1 TABLET ORAL
Qty: 0 | Refills: 0 | COMMUNITY
Start: 2018-12-10

## 2018-12-10 RX ORDER — FERROUS SULFATE 325(65) MG
1 TABLET ORAL
Qty: 30 | Refills: 0 | OUTPATIENT
Start: 2018-12-10 | End: 2019-01-08

## 2018-12-10 RX ORDER — FUROSEMIDE 40 MG
1 TABLET ORAL
Qty: 0 | Refills: 0 | COMMUNITY

## 2018-12-10 RX ORDER — METHADONE HYDROCHLORIDE 40 MG/1
1 TABLET ORAL
Qty: 0 | Refills: 0 | COMMUNITY
Start: 2018-12-10

## 2018-12-10 RX ORDER — ASPIRIN/CALCIUM CARB/MAGNESIUM 324 MG
1 TABLET ORAL
Qty: 30 | Refills: 0 | OUTPATIENT
Start: 2018-12-10 | End: 2019-01-08

## 2018-12-10 RX ORDER — FAMOTIDINE 10 MG/ML
1 INJECTION INTRAVENOUS
Qty: 60 | Refills: 0 | OUTPATIENT
Start: 2018-12-10 | End: 2019-01-08

## 2018-12-10 RX ORDER — ACETAMINOPHEN 500 MG
2 TABLET ORAL
Qty: 0 | Refills: 0 | COMMUNITY
Start: 2018-12-10

## 2018-12-10 RX ORDER — DOCUSATE SODIUM 100 MG
1 CAPSULE ORAL
Qty: 90 | Refills: 0 | OUTPATIENT
Start: 2018-12-10 | End: 2019-01-08

## 2018-12-10 RX ORDER — POLYETHYLENE GLYCOL 3350 17 G/17G
17 POWDER, FOR SOLUTION ORAL
Qty: 500 | Refills: 0 | OUTPATIENT
Start: 2018-12-10 | End: 2019-01-08

## 2018-12-10 RX ORDER — LISINOPRIL 2.5 MG/1
1 TABLET ORAL
Qty: 0 | Refills: 0 | COMMUNITY

## 2018-12-10 RX ORDER — METHADONE HYDROCHLORIDE 40 MG/1
1 TABLET ORAL
Qty: 0 | Refills: 0 | COMMUNITY

## 2018-12-10 RX ORDER — LISINOPRIL 2.5 MG/1
1 TABLET ORAL
Qty: 0 | Refills: 0 | COMMUNITY
Start: 2018-12-10

## 2018-12-10 RX ADMIN — Medication 325 MILLIGRAM(S): at 11:01

## 2018-12-10 RX ADMIN — METHADONE HYDROCHLORIDE 5 MILLIGRAM(S): 40 TABLET ORAL at 14:27

## 2018-12-10 RX ADMIN — METHADONE HYDROCHLORIDE 5 MILLIGRAM(S): 40 TABLET ORAL at 05:29

## 2018-12-10 RX ADMIN — Medication 20 MILLIGRAM(S): at 05:29

## 2018-12-10 RX ADMIN — Medication 1 TABLET(S): at 11:01

## 2018-12-10 RX ADMIN — Medication 100 MILLIGRAM(S): at 14:29

## 2018-12-10 RX ADMIN — Medication 4 MILLIGRAM(S): at 05:29

## 2018-12-10 RX ADMIN — Medication 1000 UNIT(S): at 11:01

## 2018-12-10 RX ADMIN — Medication 100 MILLIGRAM(S): at 05:29

## 2018-12-10 RX ADMIN — Medication 20 MILLIGRAM(S): at 14:29

## 2018-12-10 RX ADMIN — ALBUTEROL 2 PUFF(S): 90 AEROSOL, METERED ORAL at 08:55

## 2018-12-10 RX ADMIN — FAMOTIDINE 20 MILLIGRAM(S): 10 INJECTION INTRAVENOUS at 05:29

## 2018-12-10 RX ADMIN — Medication 500 MILLIGRAM(S): at 17:01

## 2018-12-10 RX ADMIN — FINASTERIDE 5 MILLIGRAM(S): 5 TABLET, FILM COATED ORAL at 11:01

## 2018-12-10 RX ADMIN — HEPARIN SODIUM 5000 UNIT(S): 5000 INJECTION INTRAVENOUS; SUBCUTANEOUS at 05:32

## 2018-12-10 RX ADMIN — POLYETHYLENE GLYCOL 3350 17 GRAM(S): 17 POWDER, FOR SOLUTION ORAL at 09:16

## 2018-12-10 RX ADMIN — CHLORHEXIDINE GLUCONATE 1 APPLICATION(S): 213 SOLUTION TOPICAL at 11:05

## 2018-12-10 RX ADMIN — Medication 1 MILLIGRAM(S): at 11:01

## 2018-12-10 RX ADMIN — ALBUTEROL 2 PUFF(S): 90 AEROSOL, METERED ORAL at 14:32

## 2018-12-10 RX ADMIN — Medication 500 MILLIGRAM(S): at 05:29

## 2018-12-10 RX ADMIN — FAMOTIDINE 20 MILLIGRAM(S): 10 INJECTION INTRAVENOUS at 17:01

## 2018-12-10 RX ADMIN — Medication 100 MILLIGRAM(S): at 14:25

## 2018-12-10 RX ADMIN — Medication 100 MILLIGRAM(S): at 05:30

## 2018-12-10 NOTE — PROGRESS NOTE ADULT - ASSESSMENT
Wound infection  S/P repair of periprosthetic femur fracture  I would favor longer-term IV treatment, as if hardware potentially infected though no gross evidence of compromise.  If hardware is infected, he will not be cured with antibiotics.   LIOR, likely related to hypotension. Doubt from antibiotics  OR cx all MSSA, blood cultures fortunately negative

## 2018-12-10 NOTE — PROGRESS NOTE ADULT - ATTENDING COMMENTS
Chart reviewed    Patient seen and examined    Agree with plan as outlined above
Chart reviewed    Patient seen and examined    Agree with plan as outlined above
I personally saw and examined the patient in detail.  I have spoken to the above provider regarding the assessment and plan of care.  I reviewed the above assessment and plan of care, and agree.  I have made changes in the body of the note where appropriate.
Seen/examined. agree with above. Hold anti-HTN medications if remains with SBP<100. LIOR noted, hold lasix.
Chart reviewed    Patient seen and examined    Agree with plan as outlined above
I'm available for issues over the remainder of the weekend, please call if ID input needed.    Fabian Childs MD  [phone]
The tx plan was discussed with the patient in detail. The patient's questions and concerns were addressed to the best of my ability. The patient is in agreement with the plan detailed above. The patient demonstrated adequate understanding of the plan and the counseling which I have provided.     D/c planning to home w/ home care  monitor wbc, h/h
The tx plan was discussed with the patient in detail. The patient's questions and concerns were addressed to the best of my ability. The patient is in agreement with the plan detailed above. The patient demonstrated adequate understanding of the plan and the counseling which I have provided.     D/c planning to home w/ home care  monitor daily h/h  monitoring renal indices  ambulate
Agree with above with following addition. Gram stain result from aspirated fluid discussed with ID and patient started on Vancomycin and Meropenem based on allergies. He will likely require washout and ?hardware removal. If he undergoes an operation, he will need stress dose steroids. Based on his current dose of methylprednisolone, he would not require periop stress dose steroids, but given his Cushingoid appearance he is likely HPA axis suppressed. Will give his regular dose of Methylprednisolone tomorrow morning, Hydrocortisone 50 mg IV immediately pre-op, and then Hydrocortisone 25 mg IV q8 hours for first 24 hours post-op, and then resume his home dose.
New LIOR today. Likely ATN in setting of hypotension. Continue fluids and monitor. PICC line placement today for long term antibiotics. Patient prefers to go home instead of [facility] as recommended. CM to make arrangements when renal function is stable.
Dispo: CM making arrangements for home antibiotic infusions. Anticipate discharge home on Monday if hemoglobin remains stable post transfusion.

## 2018-12-10 NOTE — PROGRESS NOTE ADULT - SUBJECTIVE AND OBJECTIVE BOX
[facility], Division of Infectious Diseases  GAETANO Briones A. Lee  [phone]   Name: ERIKA TORREZ  Age: 73y  Gender: Male  MRN: [id number]    Interval History--  Notes reviewed  very pleasant  ready to go home now.    Past Medical History--  S/P CABG x 3  S/P hip replacement, left  Rheumatoid arthritis  Osteoarthritis  COPD (chronic obstructive pulmonary disease)  HTN (hypertension)  S/P lumbar fusion  History of femur fracture  History of appendectomy  History of right shoulder replacement  History of total left hip arthroplasty      For details regarding the patient's social history, family history, and other miscellaneous elements, please refer the initial infectious diseases consultation and/or the admitting history and physical examination for this admission.    Allergies    Ceftin (Pruritus)    Intolerances        Medications--  Antibiotics:  ceFAZolin   IVPB 2000 milliGRAM(s) IV Intermittent every 8 hours    Immunologic:    Other:  acetaminophen   Tablet .. PRN  ALBUTerol    90 MICROgram(s) HFA Inhaler  ALBUTerol    90 MICROgram(s) HFA Inhaler PRN  ALPRAZolam PRN  ascorbic acid  atorvastatin  benzocaine 15 mG/menthol 3.6 mG Lozenge PRN  bisacodyl Suppository PRN  calcium carbonate 1250 mG  + Vitamin D (OsCal 500 + D)  chlorhexidine 2% Cloths  cholecalciferol  cyclobenzaprine PRN  docusate sodium  famotidine    Tablet  ferrous    sulfate  finasteride  FLUoxetine  folic acid  furosemide    Tablet  heparin  Injectable  hydrocortisone 0.5% Cream PRN  HYDROmorphone  Injectable PRN  lisinopril  magnesium hydroxide Suspension PRN  methadone    Tablet  methylPREDNISolone  multivitamin  ondansetron Injectable PRN  ondansetron Injectable PRN  oxyCODONE    IR PRN  oxyCODONE    IR PRN  polyethylene glycol 3350      Review of Systems--  A 10-point review of systems was obtained.     Pertinent positives and negatives--  Constitutional: No fevers. No Chills. No Rigors.   Cardiovascular: No chest pain. No palpitations.  Respiratory: No shortness of breath. No cough.  Gastrointestinal: No nausea or vomiting. No diarrhea or constipation.   Psychiatric: no depression    Review of systems otherwise negative except as previously noted.    Physical Examination--  Vital Signs: T(F): 98.4 (12-10-18 @ 05:29), Max: 99 (12-09-18 @ 20:34)  HR: 89 (12-10-18 @ 05:29)  BP: 94/65 (12-10-18 @ 05:29)  RR: 18 (12-10-18 @ 05:29)  SpO2: 95% (12-10-18 @ 05:29)  Wt(kg): --  General: Nontoxic-appearing Male in no acute distress.  HEENT: AT/NC. anicteric.   Neck: Not rigid. No sense of mass.  Nodes: None palpable.  Lungs: Clear bilaterally without rales, wheezing or rhonchi  Heart: Regular rate and rhythm. No Murmur. No rub. No gallop. No palpable thrill.  Abdomen: Bowel sounds present and normoactive. Soft. Nondistended.  Extremities: No cyanosis or clubbing. left hip indurated, bandage with blood, + christina device  Skin: Warm. Dry. Good turgor. No rash. No vasculitic stigmata.  Psychiatric: Appropriate affect and mood for situation.         Laboratory Studies--  CBC                        10.0   10.62 )-----------( 280      ( 10 Dec 2018 09:18 )             31.1       Chemistries  12-10    137  |  97  |  10  ----------------------------<  108<H>  4.1   |  32<H>  |  0.81    Ca    8.2<L>      10 Dec 2018 09:18  Mg     2.1     12-10        Culture Data    Culture - Surgical Swab (collected 06 Dec 2018 03:29)  Source: .Surgical Swab left hip wound  Preliminary Report (07 Dec 2018 11:14):    Few Staphylococcus aureus  Organism: Staphylococcus aureus (08 Dec 2018 12:40)  Organism: Staphylococcus aureus (08 Dec 2018 12:40)    Culture - Surgical Swab (collected 06 Dec 2018 03:29)  Source: .Surgical Swab left hip wound  Preliminary Report (07 Dec 2018 12:34):    Few Staphylococcus aureus    See previous culture [id number]    Culture - Body Fluid with Gram Stain (collected 04 Dec 2018 14:54)  Source: .Body Fluid Interstitial Fluid  Gram Stain (04 Dec 2018 16:03):    Numerous polymorphonuclear leukocytes seen per low power field    Few Gram positive cocci in pairs seen per oil power field  Final Report (09 Dec 2018 11:35):    Numerous Staphylococcus aureus  Organism: Staphylococcus aureus (09 Dec 2018 11:35)  Organism: Staphylococcus aureus (09 Dec 2018 11:35)    Culture - Abscess with Gram Stain (collected 03 Dec 2018 16:52)  Source: .Abscess Hip - Left  Final Report (08 Dec 2018 21:18):    Few Staphylococcus aureus  Organism: Staphylococcus aureus (08 Dec 2018 21:18)  Organism: Staphylococcus aureus (08 Dec 2018 21:18)    Culture - Blood (collected 03 Dec 2018 16:38)  Source: .Blood Blood-Peripheral  Final Report (08 Dec 2018 17:01):    No growth at 5 days.    Culture - Blood (collected 03 Dec 2018 16:38)  Source: .Blood Blood-Peripheral  Final Report (08 Dec 2018 17:01):    No growth at 5 days.

## 2018-12-10 NOTE — PROGRESS NOTE ADULT - PROBLEM SELECTOR PLAN 1
copd, sherie, cad, ashd, obesity,   mssa infection - on abx regimen, ID following, picc line in  DC planning  SHERIE - not using CPAP, education provided  COPD - proventil Inhaler  CAD - cvs regimen and BP control, dietary discretion  RA - on methadone, on steroids, supportive measures  PT as tolerated  ortho follow up  will follow  tolerating room air  KEENAN vs Home DC planning

## 2018-12-10 NOTE — PROGRESS NOTE ADULT - ASSESSMENT
72 yo M with PMH of HTN, HLD, MI and CABG with stents x3, COPD, RA and s/p recent L hip surgery now admitted with surgical site infection, in need of urgent washout. He has had an overall reasonable exercise capacity and reports riding an exercise bicycle for ~10 miles at a time without difficulty.  He had recent surgery without cv complication.  s/p washout by orthopedics without cv complication.  No clear evidence of acute ischemia, arrhythmia or volume overload.     - No clear evidence of acute ischemia, arrhythmia or volume overload.  - Previous ECHO in 11/2018 shows preserved LV systolic function with EF: 65%, mild MR, mild TR, mildly enlarged LA/RA, stage I diastolic dysfunction.  No need for repeat  - Restarted home Lasix 20mg daily and Lisinopril 10mg daily (lower dose) with parameters and will titrate up to 20mg home dose;  LIOR resolved; been normal x 2 days.   - Unsure why patient is not on BB given Hx of MI with CABG and stents.  He follows up with a cardiologist in [place]  - Continue statin  - LIOR resolved  - Monitor and replete lytes, keep K>4, Mg>2.  - Continue with long term Abx per ID.  PAtient is s/p PICC line insertion  - Awaiting discharge    Mikayla Gold NP  Cardiology

## 2018-12-10 NOTE — PROGRESS NOTE ADULT - PROBLEM SELECTOR PLAN 1
S/P Washout on 12/5.  OR findings reportedly with intact fascia, though still need to have an index of suspicion for hardware involvement.   if hardware infected likely that will not be cured with antibiotics alone.   favor prolonged course of treatment here with 6 weeks cefazolin 2g IVPB Q8H stop date ~1/15/19.  weekly labs cbc, cmp, sed rate and crp fax to [phone]

## 2018-12-10 NOTE — PROGRESS NOTE ADULT - PROVIDER SPECIALTY LIST ADULT
Anesthesia
Cardiology
Hospitalist
Infectious Disease
Orthopedics
Pulmonology
Orthopedics
Infectious Disease
Pulmonology
Hospitalist

## 2018-12-10 NOTE — PROGRESS NOTE ADULT - PROBLEM SELECTOR PROBLEM 2
Anemia
Acute kidney injury
Anemia
COPD (chronic obstructive pulmonary disease)
S/P hip replacement, left
S/P hip replacement, left

## 2018-12-10 NOTE — PROGRESS NOTE ADULT - SUBJECTIVE AND OBJECTIVE BOX
Date/Time Patient Seen:  		  Referring MD:   Data Reviewed	       Patient is a 73y old  Male who presents with a chief complaint of Redness and swelling around previous L hip surgical site (09 Dec 2018 18:28)  in bed  seen and examined  vs and meds reviewed        Subjective/HPI     PAST MEDICAL & SURGICAL HISTORY:  S/P CABG x 3  S/P hip replacement, left  Rheumatoid arthritis  Osteoarthritis  COPD (chronic obstructive pulmonary disease)  HTN (hypertension)  S/P lumbar fusion: Approx 2012  History of femur fracture: Repaired 11/19- L hip  History of appendectomy  History of right shoulder replacement  History of total left hip arthroplasty        Medication list         MEDICATIONS  (STANDING):  ALBUTerol    90 MICROgram(s) HFA Inhaler 2 Puff(s) Inhalation every 8 hours  ascorbic acid 500 milliGRAM(s) Oral two times a day  atorvastatin 10 milliGRAM(s) Oral at bedtime  calcium carbonate 1250 mG  + Vitamin D (OsCal 500 + D) 1 Tablet(s) Oral daily  ceFAZolin   IVPB 2000 milliGRAM(s) IV Intermittent every 8 hours  chlorhexidine 2% Cloths 1 Application(s) Topical daily  cholecalciferol 1000 Unit(s) Oral daily  docusate sodium 100 milliGRAM(s) Oral three times a day  famotidine    Tablet 20 milliGRAM(s) Oral two times a day  ferrous    sulfate 325 milliGRAM(s) Oral daily  finasteride 5 milliGRAM(s) Oral daily  FLUoxetine 20 milliGRAM(s) Oral <User Schedule>  folic acid 1 milliGRAM(s) Oral daily  furosemide    Tablet 20 milliGRAM(s) Oral daily  heparin  Injectable 5000 Unit(s) SubCutaneous every 12 hours  lisinopril 10 milliGRAM(s) Oral daily  methadone    Tablet 5 milliGRAM(s) Oral every 8 hours  methylPREDNISolone 4 milliGRAM(s) Oral daily  multivitamin 1 Tablet(s) Oral daily  polyethylene glycol 3350 17 Gram(s) Oral daily    MEDICATIONS  (PRN):  acetaminophen   Tablet .. 650 milliGRAM(s) Oral every 6 hours PRN Temp greater or equal to 38C (100.4F)  ALBUTerol    90 MICROgram(s) HFA Inhaler 2 Puff(s) Inhalation every 3 hours PRN Shortness of Breath and/or Wheezing  ALPRAZolam 0.5 milliGRAM(s) Oral at bedtime PRN anxiety  benzocaine 15 mG/menthol 3.6 mG Lozenge 1 Lozenge Oral every 3 hours PRN Sore Throat  bisacodyl Suppository 10 milliGRAM(s) Rectal daily PRN If no bowel movement  cyclobenzaprine 10 milliGRAM(s) Oral two times a day PRN Muscle Spasm  hydrocortisone 0.5% Cream 1 Application(s) Topical two times a day PRN Rash and/or Itching  HYDROmorphone  Injectable 1 milliGRAM(s) IV Push every 4 hours PRN Severe Pain (7 - 10)  magnesium hydroxide Suspension 30 milliLiter(s) Oral daily PRN Constipation  ondansetron Injectable 4 milliGRAM(s) IV Push once PRN Nausea and/or Vomiting  ondansetron Injectable 4 milliGRAM(s) IV Push every 6 hours PRN Nausea and/or Vomiting  oxyCODONE    IR 10 milliGRAM(s) Oral every 4 hours PRN Moderate Pain (4 - 6)  oxyCODONE    IR 5 milliGRAM(s) Oral every 4 hours PRN Mild Pain (1 - 3)         Vitals log        ICU Vital Signs Last 24 Hrs  T(C): 36.9 (10 Dec 2018 05:29), Max: 37.2 (09 Dec 2018 20:34)  T(F): 98.4 (10 Dec 2018 05:29), Max: 99 (09 Dec 2018 20:34)  HR: 89 (10 Dec 2018 05:29) (76 - 92)  BP: 94/65 (10 Dec 2018 05:29) (94/65 - 132/79)  BP(mean): --  ABP: --  ABP(mean): --  RR: 18 (10 Dec 2018 05:29) (16 - 18)  SpO2: 95% (10 Dec 2018 05:29) (95% - 97%)           Input and Output:  I&O's Detail    09 Dec 2018 07:01  -  10 Dec 2018 07:00  --------------------------------------------------------  IN:    Oral Fluid: 1200 mL    Solution: 150 mL  Total IN: 1350 mL    OUT:    Voided: 1500 mL  Total OUT: 1500 mL    Total NET: -150 mL          Lab Data                        9.8    11.01 )-----------( 293      ( 09 Dec 2018 09:18 )             30.3     12-09    137  |  98  |  10  ----------------------------<  90  4.1   |  32<H>  |  0.81    Ca    8.3<L>      09 Dec 2018 09:18  Mg     2.0     12-09              Review of Systems	      Objective     Physical Examination    heart s1s2  lung dec BS  abd soft  cn grossly int      Pertinent Lab findings & Imaging      Corinna:  NO   Adequate UO     I&O's Detail    09 Dec 2018 07:01  -  10 Dec 2018 07:00  --------------------------------------------------------  IN:    Oral Fluid: 1200 mL    Solution: 150 mL  Total IN: 1350 mL    OUT:    Voided: 1500 mL  Total OUT: 1500 mL    Total NET: -150 mL               Discussed with:     Cultures:	        Radiology

## 2018-12-10 NOTE — CHART NOTE - NSCHARTNOTEFT_GEN_A_CORE
Pt stable for discharge. Please see discharge note for additional information regarding the hospital course and the day of discharge.     Spoke w/ ortho, ID, pt, family on day of discharge. Reviewed meds w/ the pt. Confirms he's been taking methadone which is prescribed by outside physician for spinal stenosis/pain control. I informed him that he needs to f/u with his outpatient providers to discuss appropriateness of this regimen. Unclear why the pt is not on BB but I recommended that the pt f/u outpatient to discuss why he's not taking a beta blocker. His BP's have been soft but he's not bradycardic. Stable for d/c home.

## 2018-12-10 NOTE — PROGRESS NOTE ADULT - REASON FOR ADMISSION
Redness and swelling around previous L hip surgical site

## 2018-12-10 NOTE — PROGRESS NOTE ADULT - PROBLEM SELECTOR PROBLEM 1
COPD (chronic obstructive pulmonary disease)
COPD (chronic obstructive pulmonary disease)
Incisional infection
S/P hip replacement, left
S/P hip replacement, left
Surgical site infection

## 2018-12-10 NOTE — PROGRESS NOTE ADULT - SUBJECTIVE AND OBJECTIVE BOX
[facility] -- Shannon Valdez, [place], [place], Sidney Orlando Savella  Office # [phone]    Follow Up:  Preop Eval, Hx of CAD    Subjective/Observations: No cardiac complaints.  Postop pain tolerable.      REVIEW OF SYSTEMS: All other review of systems is negative unless indicated above    PAST MEDICAL & SURGICAL HISTORY:  S/P CABG x 3  S/P hip replacement, left  Rheumatoid arthritis  Osteoarthritis  COPD (chronic obstructive pulmonary disease)  HTN (hypertension)  S/P lumbar fusion: Approx 2012  History of femur fracture: Repaired 11/19- L hip  History of appendectomy  History of right shoulder replacement  History of total left hip arthroplasty    MEDICATIONS  (STANDING):  ALBUTerol    90 MICROgram(s) HFA Inhaler 2 Puff(s) Inhalation every 8 hours  ascorbic acid 500 milliGRAM(s) Oral two times a day  atorvastatin 10 milliGRAM(s) Oral at bedtime  calcium carbonate 1250 mG  + Vitamin D (OsCal 500 + D) 1 Tablet(s) Oral daily  ceFAZolin   IVPB 2000 milliGRAM(s) IV Intermittent every 8 hours  chlorhexidine 2% Cloths 1 Application(s) Topical daily  cholecalciferol 1000 Unit(s) Oral daily  docusate sodium 100 milliGRAM(s) Oral three times a day  famotidine    Tablet 20 milliGRAM(s) Oral two times a day  ferrous    sulfate 325 milliGRAM(s) Oral daily  finasteride 5 milliGRAM(s) Oral daily  FLUoxetine 20 milliGRAM(s) Oral <User Schedule>  folic acid 1 milliGRAM(s) Oral daily  furosemide    Tablet 20 milliGRAM(s) Oral daily  heparin  Injectable 5000 Unit(s) SubCutaneous every 12 hours  lisinopril 10 milliGRAM(s) Oral daily  methadone    Tablet 5 milliGRAM(s) Oral every 8 hours  methylPREDNISolone 4 milliGRAM(s) Oral daily  multivitamin 1 Tablet(s) Oral daily  polyethylene glycol 3350 17 Gram(s) Oral daily    MEDICATIONS  (PRN):  acetaminophen   Tablet .. 650 milliGRAM(s) Oral every 6 hours PRN Temp greater or equal to 38C (100.4F)  ALBUTerol    90 MICROgram(s) HFA Inhaler 2 Puff(s) Inhalation every 3 hours PRN Shortness of Breath and/or Wheezing  ALPRAZolam 0.5 milliGRAM(s) Oral at bedtime PRN anxiety  benzocaine 15 mG/menthol 3.6 mG Lozenge 1 Lozenge Oral every 3 hours PRN Sore Throat  bisacodyl Suppository 10 milliGRAM(s) Rectal daily PRN If no bowel movement  cyclobenzaprine 10 milliGRAM(s) Oral two times a day PRN Muscle Spasm  hydrocortisone 0.5% Cream 1 Application(s) Topical two times a day PRN Rash and/or Itching  HYDROmorphone  Injectable 1 milliGRAM(s) IV Push every 4 hours PRN Severe Pain (7 - 10)  magnesium hydroxide Suspension 30 milliLiter(s) Oral daily PRN Constipation  ondansetron Injectable 4 milliGRAM(s) IV Push once PRN Nausea and/or Vomiting  ondansetron Injectable 4 milliGRAM(s) IV Push every 6 hours PRN Nausea and/or Vomiting  oxyCODONE    IR 10 milliGRAM(s) Oral every 4 hours PRN Moderate Pain (4 - 6)  oxyCODONE    IR 5 milliGRAM(s) Oral every 4 hours PRN Mild Pain (1 - 3)    Allergies    Ceftin (Pruritus)    Intolerances    Vital Signs Last 24 Hrs  T(C): 36.9 (10 Dec 2018 05:29), Max: 37.2 (09 Dec 2018 20:34)  T(F): 98.4 (10 Dec 2018 05:29), Max: 99 (09 Dec 2018 20:34)  HR: 89 (10 Dec 2018 05:29) (76 - 89)  BP: 94/65 (10 Dec 2018 05:29) (94/65 - 117/62)  BP(mean): --  RR: 18 (10 Dec 2018 05:29) (16 - 18)  SpO2: 95% (10 Dec 2018 05:29) (95% - 97%)    I&O's Summary    09 Dec 2018 07:01  -  10 Dec 2018 07:00  --------------------------------------------------------  IN: 1350 mL / OUT: 1500 mL / NET: -150 mL    PHYSICAL EXAM:  TELE: Not on tele  Constitutional: NAD, awake and alert, well-developed  HEENT: Moist Mucous Membranes, Anicteric  Pulmonary: Non-labored, breath sounds are clear bilaterally, No wheezing, rales or rhonchi  Cardiovascular: Regular, S1 and S2, No murmurs, rubs, gallops or clicks  Gastrointestinal: Bowel Sounds present, soft, nontender.   Lymph: No peripheral edema. No lymphadenopathy.  Extr:  Right AC PICC line  Skin: No visible rashes or ulcers.  Right hip with erythema, no drainage.    Psych:  Mood & affect appropriate    LABS: All Labs Reviewed:                        10.0   10.62 )-----------( 280      ( 10 Dec 2018 09:18 )             31.1                         9.8    11.01 )-----------( 293      ( 09 Dec 2018 09:18 )             30.3                         9.2    7.71  )-----------( 272      ( 08 Dec 2018 06:48 )             28.5     10 Dec 2018 09:18    137    |  97     |  10     ----------------------------<  108    4.1     |  32     |  0.81   09 Dec 2018 09:18    137    |  98     |  10     ----------------------------<  90     4.1     |  32     |  0.81   08 Dec 2018 06:48    141    |  102    |  9      ----------------------------<  73     5.0     |  33     |  0.90     Ca    8.2        10 Dec 2018 09:18  Ca    8.3        09 Dec 2018 09:18  Ca    8.6        08 Dec 2018 06:48  Mg     2.1       10 Dec 2018 09:18  Mg     2.0       09 Dec 2018 09:18    TPro  5.7    /  Alb  2.3    /  TBili  0.8    /  DBili  x      /  AST  29     /  ALT  26     /  AlkPhos  143    08 Dec 2018 06:48    < from: TTE Echo Doppler w/o Cont (11.19.18 @ 11:09) >     EXAM:  ECHO TTE WO CON COMP W DOPPLR      PROCEDURE DATE:  11/19/2018      INTERPRETATION:  INDICATION: CAD  Referring M.D.:Paolo  Blood Pressure 114/70        Weight (kg) :81     Height (cm):170       BSA (sq m): 1.98  Technician: AS    Dimensions:    LA 3.5       Normal Values: 2.0 - 4.0 cm    Ao 3.94        Normal Values: 2.0 - 3.8 cm  SEPTUM 1.1       Normal Values: 0.6 - 1.2 cm  PWT 1.1       Normal Values: 0.6 - 1.1 cm  LVIDd 5.1         Normal Values: 3.0 - 5.6 cm  LVIDs 4.05 Normal Values: 1.8 - 4.0 cm      OBSERVATIONS:  Technically difficult study  Mitral Valve: MAC with calcified MV leaflets. Mild mitral regurgitation.  Aortic Valve/Aorta: Mildly calcified trileaflet AV with normal opening.   Mildly dilated aortic root at 3.9  cm  Tricuspid Valve: Normal tricuspid valve. Trace tricuspid regurgitation.  Pulmonic Valve: The pulmonic valve is not well visualized. Probably   normal.  Left Atrium: Mildly enlarged   Right Atrium: Mildly enlarged  Left Ventricle: Endocardium is not well-visualized. Overall there appears   to be normal left ventricular systolic function. The EF is approximately   65%.  Right Ventricle: The right ventricle is not well-visualized. It appears   to be mildly enlarged in some views with normal systolic function.    Pericardium/Pleura: Trace pericardial effusion noted.  Pulmonary/RV Pressure: Insufficient tricuspid regurgitation Doppler in   order to estimate the right ventricular systolic pressure  LV Diastolic Function: Stage I diastolic dysfunction     Conclusion: Overall preserved left ventricular systolic function. EF 65.   Insufficient tricuspid regurgitation Doppler in order to estimate the   right ventricular systolic pressure      CARMEN BAE M.D., ATTENDING CARDIOLOGIST  This document has been electronically signed. Nov 20 2018  3:56PM     < end of copied text >    < from: CT Chest No Cont (12.03.18 @ 19:30) >    EXAM:  CT CHEST                          PROCEDURE DATE:  12/03/2018      INTERPRETATION:  .    CLINICAL INFORMATION: Evaluate left-sided opacity. Infection following   procedure.    TECHNIQUE: Helical axial images of the chest were obtained from the   thoracic inlet to the adrenal glands without the administration of   intravenous contrast. Sagittal and coronal reformations were obtained   from the source data.     COMPARISON: Prior chest x-ray examination from 12/3/2018     FINDINGS: Nonspecific subcentimeter sized lymph nodes are noted within   the axillary areas, mediastinal, and hilar regions.    The heart size is at the upper limits of normal. There is mild aneurysmal   dilatation of the ascending aorta at the level of the main pulmonary   artery measuring up to 4.6 cm. There are atherosclerotic calcifications   of the imaged coronary arteries, aorta, and branch vessels. The patient   is status post median sternotomy.    The central airways are patent. Scattered areas of linear atelectasis are   noted throughout both lungs. In the area of the previously noted abnormal   opacity, there is subsegmental atelectasis. A few additional scattered   reticular opacities are noted throughout both lungs in the periphery with   an upper lobe predominance and also within the right middle lobe and   lingula. There is no architectural distortion or honeycombing.    There are mild multilevel degenerative changes of the thoracic spine. The   patient is status post lower anterior discectomy and fusion of the   cervical spine. There is a moderate anterior wedge compression of the T6   vertebral body. The patient is status post upper posterior lumbar fusion.   There is grade 1 anterolisthesis of T12 on L1 which may be related to   adjacent segment disease. A right-sided reverse shoulder arthroplasty is   noted.    There is a 1.4 cm nonobstructing right-sided intrarenal stone.   Nonobstructing stones on the left are partially visualized. There is   minimal nonspecific bilateral perinephric stranding. A few calcified   granulomas are notable within the right hepatic lobe. The other   visualized upper abdominal organs appear unremarkable.    Subcutaneous fat reticulation along the left lateral lower chest wall may   reflect bruising.    IMPRESSION:    1. Area of subsegmental linear atelectasis within the left upper lobe,   corresponding to the previously noted x-ray abnormality.    2. Mild scattered peripheral reticular opacities within upper lobe   predominance which may reflect mild fibrosis.    3. Probable left lateral lower chest wall bruising. Correlate with direct   physical examination.    4. Nonobstructing bilateral intrarenal stones.    KENZIE VELA M.D., ATTENDING RADIOLOGIST  This document has been electronically signed. Dec  3 2018  7:57PM    < end of copied text >

## 2018-12-10 NOTE — PROGRESS NOTE ADULT - SUBJECTIVE AND OBJECTIVE BOX
Patient seen and examined. Pain controlled. No acute overnight events. No other complaints at this time.     Vital Signs Last 24 Hrs  T(C): 36.9 (10 Dec 2018 05:29), Max: 37.2 (09 Dec 2018 20:34)  T(F): 98.4 (10 Dec 2018 05:29), Max: 99 (09 Dec 2018 20:34)  HR: 89 (10 Dec 2018 05:29) (76 - 92)  BP: 94/65 (10 Dec 2018 05:29) (94/65 - 132/79)  BP(mean): --  RR: 18 (10 Dec 2018 05:29) (16 - 18)  SpO2: 95% (10 Dec 2018 05:29) (95% - 97%)    Physical exam  Gen: NAD  LLE: NAVA Dressing clean, dry, and intact left hip. +EHL/FHL/TA/GSC.  SILT L3-S1. +Dorsalis pedis (faint), dopplerable, capillary refill brisk. Compartments soft and nontender.    72M s/p L hip wound I&D POD# 5    NAVA stopped working last night; battery adjusted this am, working well now. Plan to DC on 12/12 while at rehab.   Partial weight bearing 50% LLE  Pain control  DVT prophylaxis  PT  OR Cx: MSSA  Asp Cx +MSSA  IV abx w ancef   Abx per ID, PICC in place  Discharge planning    Will discuss with attending and advise if plan changes

## 2018-12-10 NOTE — PROGRESS NOTE ADULT - PROBLEM SELECTOR PROBLEM 4
COPD (chronic obstructive pulmonary disease)
INR (international normal ratio) abnormal
Rheumatoid arthritis
Rheumatoid arthritis

## 2018-12-10 NOTE — PROGRESS NOTE ADULT - PROBLEM SELECTOR PROBLEM 3
Acute kidney injury
Anemia
Anemia
INR (international normal ratio) abnormal
INR (international normal ratio) abnormal

## 2018-12-10 NOTE — PROGRESS NOTE ADULT - NSHPATTENDINGPLANDISCUSS_GEN_ALL_CORE
Dr Joseph and Dr Borden
pt, RN
pt, RN
patient, re: treatment plan going forward, possible surgical intervention, antibiotic therapy
patient re: kidney function, hypotension, rehab vs home discharge, pain control
patient and family
case management, ortho, patient (re: need for transfusion, continued iv antibiotics, discharge planning home with home antibiotics.)

## 2018-12-11 LAB
CULTURE RESULTS: SIGNIFICANT CHANGE UP
CULTURE RESULTS: SIGNIFICANT CHANGE UP
ORGANISM # SPEC MICROSCOPIC CNT: SIGNIFICANT CHANGE UP
ORGANISM # SPEC MICROSCOPIC CNT: SIGNIFICANT CHANGE UP
SPECIMEN SOURCE: SIGNIFICANT CHANGE UP
SPECIMEN SOURCE: SIGNIFICANT CHANGE UP

## 2018-12-14 ENCOUNTER — EMERGENCY (EMERGENCY)
Facility: HOSPITAL | Age: 73
LOS: 1 days | Discharge: HOME | End: 2018-12-14
Attending: EMERGENCY MEDICINE | Admitting: EMERGENCY MEDICINE
Payer: MEDICARE

## 2018-12-14 VITALS
WEIGHT: 184.09 LBS | TEMPERATURE: 98 F | SYSTOLIC BLOOD PRESSURE: 137 MMHG | RESPIRATION RATE: 14 BRPM | HEART RATE: 90 BPM | DIASTOLIC BLOOD PRESSURE: 88 MMHG | OXYGEN SATURATION: 98 %

## 2018-12-14 DIAGNOSIS — Z96.611 PRESENCE OF RIGHT ARTIFICIAL SHOULDER JOINT: Chronic | ICD-10-CM

## 2018-12-14 DIAGNOSIS — Z98.1 ARTHRODESIS STATUS: Chronic | ICD-10-CM

## 2018-12-14 DIAGNOSIS — Z96.642 PRESENCE OF LEFT ARTIFICIAL HIP JOINT: Chronic | ICD-10-CM

## 2018-12-14 DIAGNOSIS — Z87.81 PERSONAL HISTORY OF (HEALED) TRAUMATIC FRACTURE: Chronic | ICD-10-CM

## 2018-12-14 DIAGNOSIS — Z90.49 ACQUIRED ABSENCE OF OTHER SPECIFIED PARTS OF DIGESTIVE TRACT: Chronic | ICD-10-CM

## 2018-12-14 LAB
CRP SERPL-MCNC: 2.48 MG/DL — HIGH (ref 0–0.4)
ERYTHROCYTE [SEDIMENTATION RATE] IN BLOOD: 65 MM/HR — HIGH (ref 0–20)
HCT VFR BLD CALC: 30.7 % — LOW (ref 39–50)
HGB BLD-MCNC: 9.8 G/DL — LOW (ref 13–17)
MCHC RBC-ENTMCNC: 29.9 PG — SIGNIFICANT CHANGE UP (ref 27–34)
MCHC RBC-ENTMCNC: 31.9 GM/DL — LOW (ref 32–36)
MCV RBC AUTO: 93.6 FL — SIGNIFICANT CHANGE UP (ref 80–100)
NRBC # BLD: 0 /100 WBCS — SIGNIFICANT CHANGE UP (ref 0–0)
PLATELET # BLD AUTO: 265 K/UL — SIGNIFICANT CHANGE UP (ref 150–400)
RBC # BLD: 3.28 M/UL — LOW (ref 4.2–5.8)
RBC # FLD: 15.1 % — HIGH (ref 10.3–14.5)
WBC # BLD: 10.67 K/UL — HIGH (ref 3.8–10.5)
WBC # FLD AUTO: 10.67 K/UL — HIGH (ref 3.8–10.5)

## 2018-12-14 PROCEDURE — 85652 RBC SED RATE AUTOMATED: CPT

## 2018-12-14 PROCEDURE — 99284 EMERGENCY DEPT VISIT MOD MDM: CPT

## 2018-12-14 PROCEDURE — 99283 EMERGENCY DEPT VISIT LOW MDM: CPT

## 2018-12-14 PROCEDURE — 86140 C-REACTIVE PROTEIN: CPT

## 2018-12-14 PROCEDURE — 36415 COLL VENOUS BLD VENIPUNCTURE: CPT

## 2018-12-14 PROCEDURE — 85027 COMPLETE CBC AUTOMATED: CPT

## 2018-12-14 NOTE — ED PROVIDER NOTE - PROGRESS NOTE DETAILS
otho seen pt, placed new dressing labs drawn and ortho will trend  follow up with dr parisi next friday as discussed

## 2018-12-14 NOTE — ED PROVIDER NOTE - MEDICAL DECISION MAKING DETAILS
74 y/o male  sent in form wound care for ortho consult, left hip surgical wound  no drainage no fever, orhto seen new christina dressing placed, labs ordered and will follow by ortho  follow up with dr parisi next week

## 2018-12-14 NOTE — ED ADULT NURSE NOTE - NSIMPLEMENTINTERV_GEN_ALL_ED
Implemented All Fall Risk Interventions:  [place] to call system. Call bell, personal items and telephone within reach. Instruct patient to call for assistance. Room bathroom lighting operational. Non-slip footwear when patient is off stretcher. Physically safe environment: no spills, clutter or unnecessary equipment. Stretcher in lowest position, wheels locked, appropriate side rails in place. Provide visual cue, wrist band, yellow gown, etc. Monitor gait and stability. Monitor for mental status changes and reorient to person, place, and time. Review medications for side effects contributing to fall risk. Reinforce activity limits and safety measures with patient and family.

## 2018-12-14 NOTE — CONSULT NOTE ADULT - ASSESSMENT
/72y M s/p L hip I+D for septic hematoma with Dr. Ramirez with history of ORIF for Periprosthetic Fx around a GENO on 11/19/18.    - new NAVA applied.   - Keep wound and NAVA clean and dry.   - Continue with PWB as previous instructed to the LLE.  - CBC, ESR, CRP  - DW attending who agrees with above stated plan.   - FU with Dr. Ramirez on 12/19 in the office.

## 2018-12-14 NOTE — CONSULT NOTE ADULT - SUBJECTIVE AND OBJECTIVE BOX
72y Male presents to [facility] ED s/p L hip I+D with Dr. Ramirez with history of ORIF for Periprosthetic Fx around a GENO on 11/19/18 c/o drainage from his recent surgical wound. Patient was seen by Dr. Ramirez earlier today and sent in for a NAVA dressing reapplication.  Notes pain and sensitivity about the surgical sites. No falls or injuries. No other complaints at this time. Patient denies radiation of pain. Patient denies numbness/tingling/burning in the LLE. No other bone/joint complaints. Patient has maintain 50% PWB with a walker while OOB..    PAST MEDICAL & SURGICAL HISTORY:  S/P CABG x 3  S/P hip replacement, left  Rheumatoid arthritis  Osteoarthritis  COPD (chronic obstructive pulmonary disease)  HTN (hypertension)  History of femur fracture: Repaired 11/19- L hip  History of appendectomy  History of right shoulder replacement  History of total left hip arthroplasty    MEDICATIONS  (STANDING):  acetaminophen   Tablet .. 650 milliGRAM(s) Oral once  aztreonam  IVPB 1000 milliGRAM(s) IV Intermittent once  morphine  - Injectable 4 milliGRAM(s) IV Push Once  sodium chloride 0.9% Bolus 1000 milliLiter(s) IV Bolus once  vancomycin  IVPB 1000 milliGRAM(s) IV Intermittent once    MEDICATIONS  (PRN):    Allergies    Ceftin (Pruritus)    Intolerances    Vital Signs Last 24 Hrs  T(C): 36.5 (14 Dec 2018 11:07), Max: 36.5 (14 Dec 2018 11:07)  T(F): 97.7 (14 Dec 2018 11:07), Max: 97.7 (14 Dec 2018 11:07)  HR: 90 (14 Dec 2018 11:07) (90 - 90)  BP: 137/88 (14 Dec 2018 11:07) (137/88 - 137/88)  BP(mean): --  RR: 14 (14 Dec 2018 11:07) (14 - 14)  SpO2: 98% (14 Dec 2018 11:07) (98% - 98%)    PE   LLE:  Wound well healing. Serosang drainage from distal aspect of wound.   Warmth noted about the lateral hip/thigh.  No overt or localized fluctuance.   Diffusely TTP about the incision.  Difficult to assess ROM due to hip pain.  Compartments soft; No TTP to knee/leg/ankle/foot   Able to SLR; - Log Roll/Heel Strike  Motor intact GS/TA/FHL/EHL  SILT L2-S1  DP/PT pulses 2+    RLE/BUE:   No bony TTP; Good ROM w/o pain; Exam Unremarkable

## 2018-12-14 NOTE — ED PROVIDER NOTE - ATTENDING CONTRIBUTION TO CARE
s/p hip replacement x 1 month ago  sent for open wound  no fever or chills  ortho resident changed dressing and states patient can be discharged and they will follow up

## 2018-12-14 NOTE — ED PROVIDER NOTE - OBJECTIVE STATEMENT
74 y/o male  presents to the ED sent in from ortho for wound care   s/p hip replacement on 11/19 and then reopened beinging of this month  no other complaints today  here per ortho for dressing changes and trend lab (cbc/cmp/CRP)  pt denies any pain or drainage,  denies fever, chills, n/v/d, chest pain, sob, neck or back pain

## 2018-12-14 NOTE — ED PROVIDER NOTE - TEMPLATE, MLM
Initial Anesthesia Post-op Note    Patient: Le Lorenzo  Procedure(s) Performed: COLONOSCOPY   Anesthesia type: Monitor Anesthesia Care    Last 24 I/O:   Intake/Output Summary (Last 24 hours) at 12/06/17 1103  Last data filed at 12/06/17 1102   Gross per 24 hour   Intake              500 ml   Output                0 ml   Net              500 ml       Patient location: gi c.  POST-OP VITAL SIGNS: stable  LEVEL OF CONSCIOUSNESS: sedated  RESPIRATORY STATUS: spontaneous ventilation, unassisted and room air  CARDIOVASCULAR: blood pressure returned to baseline  HYDRATION: euvolemic    PAIN MANAGEMENT: well controlled  NAUSEA: None  AIRWAY PATENCY: patent  POST-OP ASSESSMENT: no complications, patient tolerated procedure well with no complications and no evidence of recall  COMPLICATIONS: none  HANDOFF:  Handoff to receiving nurse was performed and questions were answered       Wound Check/Suture Removal

## 2018-12-21 ENCOUNTER — EMERGENCY (EMERGENCY)
Facility: HOSPITAL | Age: 73
LOS: 1 days | Discharge: HOME | End: 2018-12-21
Attending: EMERGENCY MEDICINE | Admitting: EMERGENCY MEDICINE
Payer: MEDICARE

## 2018-12-21 VITALS
DIASTOLIC BLOOD PRESSURE: 83 MMHG | RESPIRATION RATE: 20 BRPM | WEIGHT: 184.09 LBS | HEIGHT: 67 IN | OXYGEN SATURATION: 98 % | HEART RATE: 92 BPM | TEMPERATURE: 98 F | SYSTOLIC BLOOD PRESSURE: 126 MMHG

## 2018-12-21 VITALS
OXYGEN SATURATION: 97 % | HEART RATE: 88 BPM | RESPIRATION RATE: 18 BRPM | DIASTOLIC BLOOD PRESSURE: 78 MMHG | SYSTOLIC BLOOD PRESSURE: 116 MMHG

## 2018-12-21 DIAGNOSIS — Z96.642 PRESENCE OF LEFT ARTIFICIAL HIP JOINT: Chronic | ICD-10-CM

## 2018-12-21 DIAGNOSIS — Z96.611 PRESENCE OF RIGHT ARTIFICIAL SHOULDER JOINT: Chronic | ICD-10-CM

## 2018-12-21 DIAGNOSIS — Z98.1 ARTHRODESIS STATUS: Chronic | ICD-10-CM

## 2018-12-21 DIAGNOSIS — Z90.49 ACQUIRED ABSENCE OF OTHER SPECIFIED PARTS OF DIGESTIVE TRACT: Chronic | ICD-10-CM

## 2018-12-21 DIAGNOSIS — Z87.81 PERSONAL HISTORY OF (HEALED) TRAUMATIC FRACTURE: Chronic | ICD-10-CM

## 2018-12-21 PROCEDURE — 36569 INSJ PICC 5 YR+ W/O IMAGING: CPT

## 2018-12-21 PROCEDURE — 36556 INSERT NON-TUNNEL CV CATH: CPT

## 2018-12-21 PROCEDURE — 76937 US GUIDE VASCULAR ACCESS: CPT | Mod: 26

## 2018-12-21 PROCEDURE — 77001 FLUOROGUIDE FOR VEIN DEVICE: CPT | Mod: 26

## 2018-12-21 PROCEDURE — 76937 US GUIDE VASCULAR ACCESS: CPT

## 2018-12-21 PROCEDURE — 99284 EMERGENCY DEPT VISIT MOD MDM: CPT | Mod: 25

## 2018-12-21 PROCEDURE — C1751: CPT

## 2018-12-21 PROCEDURE — 77001 FLUOROGUIDE FOR VEIN DEVICE: CPT

## 2018-12-21 PROCEDURE — 36573 INSJ PICC RS&I 5 YR+: CPT | Mod: LT

## 2018-12-21 RX ORDER — METHADONE HYDROCHLORIDE 40 MG/1
5 TABLET ORAL ONCE
Qty: 0 | Refills: 0 | Status: DISCONTINUED | OUTPATIENT
Start: 2018-12-21 | End: 2018-12-21

## 2018-12-21 RX ORDER — FUROSEMIDE 40 MG
20 TABLET ORAL ONCE
Qty: 0 | Refills: 0 | Status: COMPLETED | OUTPATIENT
Start: 2018-12-21 | End: 2018-12-21

## 2018-12-21 RX ORDER — LISINOPRIL 2.5 MG/1
10 TABLET ORAL ONCE
Qty: 0 | Refills: 0 | Status: COMPLETED | OUTPATIENT
Start: 2018-12-21 | End: 2018-12-21

## 2018-12-21 RX ADMIN — Medication 20 MILLIGRAM(S): at 11:37

## 2018-12-21 RX ADMIN — LISINOPRIL 10 MILLIGRAM(S): 2.5 TABLET ORAL at 11:37

## 2018-12-21 RX ADMIN — METHADONE HYDROCHLORIDE 5 MILLIGRAM(S): 40 TABLET ORAL at 11:37

## 2018-12-21 NOTE — ED PROVIDER NOTE - OBJECTIVE STATEMENT
74 y/o M presents for new PICC line placement and left leg dressing change.  Pt s/p left hip replacement by aleks Knight on IV antibiotics.  Pt accidentally dislodged his PICC line today.  Pt had appointment with Dr. Ramirez today at 10:30 to replace his PECO dressing.  Pt denies fevers, discharge.

## 2018-12-21 NOTE — CONSULT NOTE ADULT - SUBJECTIVE AND OBJECTIVE BOX
72 yo male with hx of Left hip periprosthetic hip fx, ORIF with Dr ramirez ,complicated by superficial infection 2 weeks ago that needed I&D + PICC line placement presents today to [facility] after pulling out PICC line, patient had appointment with Dr Ramirez today in office for wound check and dressing change.  Pt denies any fever, chills, CP, SOB, increased drainage or pain from wound, no new trauma.  States he is improving, walking more.     PMHx, PSHx: see previous notes    Vital Signs Last 24 Hrs  T(C): 36.4 (21 Dec 2018 07:41), Max: 36.4 (21 Dec 2018 07:41)  T(F): 97.6 (21 Dec 2018 07:41), Max: 97.6 (21 Dec 2018 07:41)  HR: 92 (21 Dec 2018 07:41) (92 - 92)  BP: 126/83 (21 Dec 2018 07:41) (126/83 - 126/83)  BP(mean): --  RR: 20 (21 Dec 2018 07:41) (20 - 20)  SpO2: 98% (21 Dec 2018 07:41) (98% - 98%)    LLE: NAVA dressing removed, wound cleaned with sterile saline and gauze, no signs of dehiscence, sutures in place, no signs of drainage or fluid expressing from wound, proximally incision is well healed, distally incision demonstrates granulation tissue.  No fluctuance appreciated under wound, non TTP, able to range hip with mild discomfort, +EHL/FHL/TA/GSC, SILT L2-S1, compartments soft and compressible, no Calf TTP    A/P: 72 yo male s/p I&D after periprosthetic ORIF  Dressing changed to aquacel in ED, incision is healing appropriately  D/w Dr ramirez and reviewed clinical photo of wound, agrees with covering with aquacel and monitoring further  D/w patient and family at bedside to monitor for signs of erythema, increased pain or drainage, they are aware, answered all questions  Continue ABX after picc placed for recent infection  Continue DVT ppx  WBAT  Pain control  PT, home care nursing  D/w family to FU with Dr Ramirez in the office in 7-10 days for continued care.    Ortho stable for DC

## 2018-12-21 NOTE — ED ADULT NURSE NOTE - OBJECTIVE STATEMENT
Patient states he inadvertently pulled out his PICC line which he has for home antibiotics.  Per Mehran in IR the schedule is very busy today, but she will try to get him in for the PICC.  Patient miguelito family aware.

## 2018-12-21 NOTE — ED ADULT NURSE REASSESSMENT NOTE - NS ED NURSE REASSESS COMMENT FT1
Giselle DON IRD nurses note for PICC insertion.      Pt received into IRD A&O via stretcher @9255 for PICC insertion. Oriented to unit, routine, procedure. O2 3L n/c in use. O2 sat 100%. HR 74 reg. Seen by Dr Avery. Procedure explained, questions ans, consent obtained. PICC placed right arm by Dr Avery. DSD to site. No bleeding or hematoma noted. PICC bracelet & stockinette to right arm. PICC info sheet & catheter card to chart. Pt lei insertion well. See PICC flow sheet. Report to [facility] DON ER. Pt trans back to ER via stretcher in stable condition @9824.     Giselle DNO IRD [id number]

## 2018-12-31 ENCOUNTER — INPATIENT (INPATIENT)
Facility: HOSPITAL | Age: 73
LOS: 6 days | Discharge: HOME | DRG: 464 | End: 2019-01-07
Attending: STUDENT IN AN ORGANIZED HEALTH CARE EDUCATION/TRAINING PROGRAM | Admitting: STUDENT IN AN ORGANIZED HEALTH CARE EDUCATION/TRAINING PROGRAM
Payer: MEDICARE

## 2018-12-31 VITALS
HEART RATE: 106 BPM | DIASTOLIC BLOOD PRESSURE: 88 MMHG | RESPIRATION RATE: 18 BRPM | SYSTOLIC BLOOD PRESSURE: 128 MMHG | WEIGHT: 184.09 LBS | HEIGHT: 67 IN | OXYGEN SATURATION: 98 % | TEMPERATURE: 99 F

## 2018-12-31 DIAGNOSIS — Z96.611 PRESENCE OF RIGHT ARTIFICIAL SHOULDER JOINT: Chronic | ICD-10-CM

## 2018-12-31 DIAGNOSIS — Z95.1 PRESENCE OF AORTOCORONARY BYPASS GRAFT: ICD-10-CM

## 2018-12-31 DIAGNOSIS — I10 ESSENTIAL (PRIMARY) HYPERTENSION: ICD-10-CM

## 2018-12-31 DIAGNOSIS — T84.7XXS INFECTION AND INFLAMMATORY REACTION DUE TO OTHER INTERNAL ORTHOPEDIC PROSTHETIC DEVICES, IMPLANTS AND GRAFTS, SEQUELA: ICD-10-CM

## 2018-12-31 DIAGNOSIS — M62.838 OTHER MUSCLE SPASM: ICD-10-CM

## 2018-12-31 DIAGNOSIS — M06.9 RHEUMATOID ARTHRITIS, UNSPECIFIED: ICD-10-CM

## 2018-12-31 DIAGNOSIS — J44.9 CHRONIC OBSTRUCTIVE PULMONARY DISEASE, UNSPECIFIED: ICD-10-CM

## 2018-12-31 DIAGNOSIS — Z96.642 PRESENCE OF LEFT ARTIFICIAL HIP JOINT: ICD-10-CM

## 2018-12-31 DIAGNOSIS — M19.90 UNSPECIFIED OSTEOARTHRITIS, UNSPECIFIED SITE: ICD-10-CM

## 2018-12-31 DIAGNOSIS — Z98.1 ARTHRODESIS STATUS: Chronic | ICD-10-CM

## 2018-12-31 DIAGNOSIS — Z90.49 ACQUIRED ABSENCE OF OTHER SPECIFIED PARTS OF DIGESTIVE TRACT: Chronic | ICD-10-CM

## 2018-12-31 DIAGNOSIS — Z87.81 PERSONAL HISTORY OF (HEALED) TRAUMATIC FRACTURE: Chronic | ICD-10-CM

## 2018-12-31 DIAGNOSIS — Z96.642 PRESENCE OF LEFT ARTIFICIAL HIP JOINT: Chronic | ICD-10-CM

## 2018-12-31 DIAGNOSIS — Z29.9 ENCOUNTER FOR PROPHYLACTIC MEASURES, UNSPECIFIED: ICD-10-CM

## 2018-12-31 DIAGNOSIS — E78.5 HYPERLIPIDEMIA, UNSPECIFIED: ICD-10-CM

## 2018-12-31 DIAGNOSIS — D64.9 ANEMIA, UNSPECIFIED: ICD-10-CM

## 2018-12-31 LAB
ANION GAP SERPL CALC-SCNC: 8 MMOL/L — SIGNIFICANT CHANGE UP (ref 5–17)
APTT BLD: 31.8 SEC — SIGNIFICANT CHANGE UP (ref 28.5–37)
BUN SERPL-MCNC: 17 MG/DL — SIGNIFICANT CHANGE UP (ref 7–23)
CALCIUM SERPL-MCNC: 9.5 MG/DL — SIGNIFICANT CHANGE UP (ref 8.5–10.1)
CHLORIDE SERPL-SCNC: 97 MMOL/L — SIGNIFICANT CHANGE UP (ref 96–108)
CO2 SERPL-SCNC: 28 MMOL/L — SIGNIFICANT CHANGE UP (ref 22–31)
CREAT SERPL-MCNC: 0.87 MG/DL — SIGNIFICANT CHANGE UP (ref 0.5–1.3)
ERYTHROCYTE [SEDIMENTATION RATE] IN BLOOD: 83 MM/HR — HIGH (ref 0–20)
GLUCOSE SERPL-MCNC: 149 MG/DL — HIGH (ref 70–99)
HCT VFR BLD CALC: 33.4 % — LOW (ref 39–50)
HGB BLD-MCNC: 10.7 G/DL — LOW (ref 13–17)
INR BLD: 1.33 RATIO — HIGH (ref 0.88–1.16)
MCHC RBC-ENTMCNC: 29.6 PG — SIGNIFICANT CHANGE UP (ref 27–34)
MCHC RBC-ENTMCNC: 32 GM/DL — SIGNIFICANT CHANGE UP (ref 32–36)
MCV RBC AUTO: 92.5 FL — SIGNIFICANT CHANGE UP (ref 80–100)
NRBC # BLD: 0 /100 WBCS — SIGNIFICANT CHANGE UP (ref 0–0)
PLATELET # BLD AUTO: 282 K/UL — SIGNIFICANT CHANGE UP (ref 150–400)
POTASSIUM SERPL-MCNC: 4.8 MMOL/L — SIGNIFICANT CHANGE UP (ref 3.5–5.3)
POTASSIUM SERPL-SCNC: 4.8 MMOL/L — SIGNIFICANT CHANGE UP (ref 3.5–5.3)
PROTHROM AB SERPL-ACNC: 15.3 SEC — HIGH (ref 10–12.9)
RBC # BLD: 3.61 M/UL — LOW (ref 4.2–5.8)
RBC # FLD: 14.9 % — HIGH (ref 10.3–14.5)
SODIUM SERPL-SCNC: 133 MMOL/L — LOW (ref 135–145)
WBC # BLD: 9.26 K/UL — SIGNIFICANT CHANGE UP (ref 3.8–10.5)
WBC # FLD AUTO: 9.26 K/UL — SIGNIFICANT CHANGE UP (ref 3.8–10.5)

## 2018-12-31 PROCEDURE — 93010 ELECTROCARDIOGRAM REPORT: CPT

## 2018-12-31 PROCEDURE — 99222 1ST HOSP IP/OBS MODERATE 55: CPT

## 2018-12-31 PROCEDURE — 99284 EMERGENCY DEPT VISIT MOD MDM: CPT

## 2018-12-31 PROCEDURE — 71045 X-RAY EXAM CHEST 1 VIEW: CPT | Mod: 26

## 2018-12-31 PROCEDURE — 73502 X-RAY EXAM HIP UNI 2-3 VIEWS: CPT | Mod: 26,LT

## 2018-12-31 PROCEDURE — 73701 CT LOWER EXTREMITY W/DYE: CPT | Mod: 26,LT

## 2018-12-31 PROCEDURE — 99223 1ST HOSP IP/OBS HIGH 75: CPT | Mod: GC,AI

## 2018-12-31 PROCEDURE — 73552 X-RAY EXAM OF FEMUR 2/>: CPT | Mod: 26,LT

## 2018-12-31 RX ORDER — CYCLOBENZAPRINE HYDROCHLORIDE 10 MG/1
10 TABLET, FILM COATED ORAL
Qty: 0 | Refills: 0 | Status: DISCONTINUED | OUTPATIENT
Start: 2018-12-31 | End: 2019-01-02

## 2018-12-31 RX ORDER — ACETAMINOPHEN 500 MG
650 TABLET ORAL EVERY 6 HOURS
Qty: 0 | Refills: 0 | Status: DISCONTINUED | OUTPATIENT
Start: 2018-12-31 | End: 2019-01-02

## 2018-12-31 RX ORDER — ALPRAZOLAM 0.25 MG
0.5 TABLET ORAL AT BEDTIME
Qty: 0 | Refills: 0 | Status: DISCONTINUED | OUTPATIENT
Start: 2018-12-31 | End: 2019-01-02

## 2018-12-31 RX ORDER — FOLIC ACID 0.8 MG
1 TABLET ORAL DAILY
Qty: 0 | Refills: 0 | Status: DISCONTINUED | OUTPATIENT
Start: 2018-12-31 | End: 2019-01-02

## 2018-12-31 RX ORDER — ENOXAPARIN SODIUM 100 MG/ML
40 INJECTION SUBCUTANEOUS EVERY 24 HOURS
Qty: 0 | Refills: 0 | Status: DISCONTINUED | OUTPATIENT
Start: 2018-12-31 | End: 2018-12-31

## 2018-12-31 RX ORDER — LISINOPRIL 2.5 MG/1
10 TABLET ORAL DAILY
Qty: 0 | Refills: 0 | Status: DISCONTINUED | OUTPATIENT
Start: 2018-12-31 | End: 2019-01-02

## 2018-12-31 RX ORDER — FAMOTIDINE 10 MG/ML
20 INJECTION INTRAVENOUS
Qty: 0 | Refills: 0 | Status: DISCONTINUED | OUTPATIENT
Start: 2018-12-31 | End: 2019-01-02

## 2018-12-31 RX ORDER — FERROUS SULFATE 325(65) MG
325 TABLET ORAL DAILY
Qty: 0 | Refills: 0 | Status: DISCONTINUED | OUTPATIENT
Start: 2018-12-31 | End: 2019-01-02

## 2018-12-31 RX ORDER — IBUPROFEN 200 MG
400 TABLET ORAL ONCE
Qty: 0 | Refills: 0 | Status: COMPLETED | OUTPATIENT
Start: 2018-12-31 | End: 2018-12-31

## 2018-12-31 RX ORDER — OXYCODONE AND ACETAMINOPHEN 5; 325 MG/1; MG/1
2 TABLET ORAL ONCE
Qty: 0 | Refills: 0 | Status: DISCONTINUED | OUTPATIENT
Start: 2018-12-31 | End: 2018-12-31

## 2018-12-31 RX ORDER — FLUOXETINE HCL 10 MG
20 CAPSULE ORAL
Qty: 0 | Refills: 0 | Status: DISCONTINUED | OUTPATIENT
Start: 2018-12-31 | End: 2019-01-02

## 2018-12-31 RX ORDER — POTASSIUM CHLORIDE 20 MEQ
10 PACKET (EA) ORAL
Qty: 0 | Refills: 0 | Status: DISCONTINUED | OUTPATIENT
Start: 2018-12-31 | End: 2018-12-31

## 2018-12-31 RX ORDER — FINASTERIDE 5 MG/1
5 TABLET, FILM COATED ORAL DAILY
Qty: 0 | Refills: 0 | Status: DISCONTINUED | OUTPATIENT
Start: 2018-12-31 | End: 2019-01-02

## 2018-12-31 RX ORDER — FUROSEMIDE 40 MG
20 TABLET ORAL DAILY
Qty: 0 | Refills: 0 | Status: DISCONTINUED | OUTPATIENT
Start: 2018-12-31 | End: 2019-01-02

## 2018-12-31 RX ORDER — ASCORBIC ACID 60 MG
500 TABLET,CHEWABLE ORAL
Qty: 0 | Refills: 0 | Status: DISCONTINUED | OUTPATIENT
Start: 2018-12-31 | End: 2019-01-02

## 2018-12-31 RX ORDER — ATORVASTATIN CALCIUM 80 MG/1
10 TABLET, FILM COATED ORAL AT BEDTIME
Qty: 0 | Refills: 0 | Status: DISCONTINUED | OUTPATIENT
Start: 2018-12-31 | End: 2019-01-02

## 2018-12-31 RX ORDER — SODIUM CHLORIDE 9 MG/ML
1000 INJECTION, SOLUTION INTRAVENOUS
Qty: 0 | Refills: 0 | Status: DISCONTINUED | OUTPATIENT
Start: 2018-12-31 | End: 2019-01-01

## 2018-12-31 RX ORDER — METHADONE HYDROCHLORIDE 40 MG/1
5 TABLET ORAL EVERY 8 HOURS
Qty: 0 | Refills: 0 | Status: DISCONTINUED | OUTPATIENT
Start: 2018-12-31 | End: 2019-01-02

## 2018-12-31 RX ORDER — HEPARIN SODIUM 5000 [USP'U]/ML
5000 INJECTION INTRAVENOUS; SUBCUTANEOUS
Qty: 0 | Refills: 0 | Status: DISCONTINUED | OUTPATIENT
Start: 2018-12-31 | End: 2018-12-31

## 2018-12-31 RX ORDER — TAMSULOSIN HYDROCHLORIDE 0.4 MG/1
0.4 CAPSULE ORAL AT BEDTIME
Qty: 0 | Refills: 0 | Status: DISCONTINUED | OUTPATIENT
Start: 2018-12-31 | End: 2019-01-02

## 2018-12-31 RX ADMIN — Medication 400 MILLIGRAM(S): at 20:58

## 2018-12-31 RX ADMIN — Medication 20 MILLIGRAM(S): at 22:57

## 2018-12-31 RX ADMIN — TAMSULOSIN HYDROCHLORIDE 0.4 MILLIGRAM(S): 0.4 CAPSULE ORAL at 22:57

## 2018-12-31 RX ADMIN — ATORVASTATIN CALCIUM 10 MILLIGRAM(S): 80 TABLET, FILM COATED ORAL at 22:57

## 2018-12-31 RX ADMIN — METHADONE HYDROCHLORIDE 5 MILLIGRAM(S): 40 TABLET ORAL at 22:57

## 2018-12-31 RX ADMIN — Medication 500 MILLIGRAM(S): at 18:17

## 2018-12-31 RX ADMIN — Medication 650 MILLIGRAM(S): at 18:17

## 2018-12-31 RX ADMIN — OXYCODONE AND ACETAMINOPHEN 2 TABLET(S): 5; 325 TABLET ORAL at 13:12

## 2018-12-31 RX ADMIN — Medication 650 MILLIGRAM(S): at 18:47

## 2018-12-31 RX ADMIN — Medication 0.5 MILLIGRAM(S): at 22:57

## 2018-12-31 RX ADMIN — Medication 400 MILLIGRAM(S): at 20:00

## 2018-12-31 RX ADMIN — FAMOTIDINE 20 MILLIGRAM(S): 10 INJECTION INTRAVENOUS at 18:17

## 2018-12-31 RX ADMIN — OXYCODONE AND ACETAMINOPHEN 2 TABLET(S): 5; 325 TABLET ORAL at 13:42

## 2018-12-31 RX ADMIN — CYCLOBENZAPRINE HYDROCHLORIDE 10 MILLIGRAM(S): 10 TABLET, FILM COATED ORAL at 22:56

## 2018-12-31 NOTE — CONSULT NOTE ADULT - ASSESSMENT
74 yo M PMHx 45 pack year smoking hx and COPD (diagnosed "a few years ago, quit smoking 6 months ago), HTN, HLD, hx of MI and CABG (2008) (stents also placed per chart review), RA (on Medrol daily), hx of multiple L hip surgeries (hip replacement 30 years ago in [place], ORIF recently 11/2018 w/ Dr. Ramirez) complicated with superficial infection which required I&D on 12/5 (discharged with PICC line and continued to receive cefazolin), present to the ED with L hip pain at the surgical site. Admitted for infected hip, for surgical irrigation and debridement by ortho, called for cardiac clearance    - No clear evidence of acute ischemia  - No evidence of volume overload.  - No acute changes on EKG  - Pt originally from [place], sees cardiologist back home, per patient had full cardiac check prior coming to [place] before Thanksgiving, reports normal stress and echo.  - BP well controlled, continue home BP meds, monitor routine hemodynamics.  - HR elevated likely 2/2 infection, improved now  - hold asa, can resume post op  - continue statin  - Monitor and replete lytes, keep K>4, Mg>2.  - Pt has no active ischemia, decompensated HF, unstable arrythmia, or severe stenotic valvular disease, and in the setting of intermediate risk procedure, patient is optimized from cardiovascular standpoint to proceed with planned procedure with routine hemodynamic monitoring.   - Other cardiovascular workup will depend on clinical course.  - All other workup per primary team.  - Will follow with you 72 yo M PMHx 45 pack year smoking hx and COPD (diagnosed "a few years ago, quit smoking 6 months ago), HTN, HLD, hx of MI and CABG (2008) (stents also placed per chart review), RA (on Medrol daily), hx of multiple L hip surgeries (hip replacement 30 years ago in [place], ORIF recently 11/2018 w/ Dr. Ramirez) complicated with superficial infection which required I&D on 12/5 (discharged with PICC line and continued to receive cefazolin), present to the ED with L hip pain at the surgical site. Admitted for infected hip, for surgical irrigation and debridement by ortho, called for cardiac clearance    - No clear evidence of acute ischemia  - No evidence of volume overload.  - No acute changes on EKG  - Pt originally from [place], sees cardiologist back home, per patient had full cardiac check prior coming to [place] before Thanksgiving, reports normal stress and echo.  - BP well controlled, continue home BP meds, monitor routine hemodynamics.  - HR elevated likely 2/2 infection, improved now  - hold asa, can resume post op  - continue statin  - Monitor and replete lytes, keep K>4, Mg>2.  - Pt has no active ischemia, decompensated HF, unstable arrythmia, or severe stenotic valvular disease, and in the setting of intermediate risk procedure, patient is optimized from cardiovascular standpoint to proceed with planned intermediate risk necessary urgent orthopedic surgery without any further cardiac workup. Proceed with routine hemodynamic monitoring.   - Other cardiovascular workup will depend on clinical course.  - All other workup per primary team.  - Will follow with you

## 2018-12-31 NOTE — CHART NOTE - NSCHARTNOTEFT_GEN_A_CORE
Called by RN to see patient for headache and need for antibiotics. Patient admitted earlier tonight with left hip pain and s/p ORIF le Called by RN to see patient for headache and need for antibiotics. Patient admitted earlier tonight with left hip pain and s/p ORIF left hip 11/20 complicated with superficial infection which required I&D on 12/5 (discharged with PICC line and continued to receive cefazolin.)     Patient desribes headache as sharp, starting from his neck radiating to his head. Denies blurred vision, dizziness, LOC. Was given motrin and Tylenol in ED without relief. Pt has hx of RA and attributes HA to flare up of his sx.     Vital Signs Last 24 Hrs  T(C): 36.8 (31 Dec 2018 21:15), Max: 37.1 (31 Dec 2018 12:49)  T(F): 98.3 (31 Dec 2018 21:15), Max: 98.8 (31 Dec 2018 12:49)  HR: 98 (01 Jan 2019 00:20) (65 - 106)  BP: 113/68 (01 Jan 2019 00:20) (113/68 - 151/71)  BP(mean): --  RR: 17 (31 Dec 2018 21:15) (16 - 18)  SpO2: 97% (31 Dec 2018 21:15) (95% - 98%)    Physical Exam:  General: in mild distress due to pain  Neurology: A&Ox3, nonfocal, sensation intact, muscle strength 5/5, (-) pronator drift    Respiratory: CTA B/L, No W/R/R  CV: RRR, +S1/S2, no murmurs  Abdominal: Soft, NT, ND +BSx4  Extremities: + TTP neck b/l ROM ltd 2/2 pain. Left hip surgical site warm, ttp, erythematous, bandage draining minor amount of serosanguinous discharge    A/P    74 yo M PMHx 45 pack year smoking hx and COPD (diagnosed "a few years ago, quit smoking then), HTN, HLD, hx of MI and CABG (2008) (stents also placed per chart review), RA (on Medrol daily), hx of multiple L hip surgeries (hip replacement 30 years ago in [place], ORIF recently 11/2018 w/ Dr. Ramirez) complicated with superficial infection which required I&D on 12/5 (discharged with PICC line and continued to receive cefazolin), present to the ED with L hip pain at the surgical site admitted with chronic infection of hardware.    - given tylenol and motrin in ED for headache.   - continue to Called by RN to see patient for headache and need for antibiotics. Patient admitted earlier tonight with left hip pain and s/p ORIF left hip 11/20 complicated with superficial infection which required I&D on 12/5 (discharged with PICC line and continued to receive cefazolin.)     Patient desribes headache as sharp, starting from his neck radiating to his head. Denies blurred vision, dizziness, LOC. Was given motrin and Tylenol in ED without relief. Pt has hx of RA and attributes HA to flare up of his sx.     Vital Signs Last 24 Hrs  T(C): 36.8 (31 Dec 2018 21:15), Max: 37.1 (31 Dec 2018 12:49)  T(F): 98.3 (31 Dec 2018 21:15), Max: 98.8 (31 Dec 2018 12:49)  HR: 98 (01 Jan 2019 00:20) (65 - 106)  BP: 113/68 (01 Jan 2019 00:20) (113/68 - 151/71)  BP(mean): --  RR: 17 (31 Dec 2018 21:15) (16 - 18)  SpO2: 97% (31 Dec 2018 21:15) (95% - 98%)    Physical Exam:  General: in mild distress due to pain  Neurology: A&Ox3, nonfocal, sensation intact, muscle strength 5/5, (-) pronator drift    Respiratory: CTA B/L, No W/R/R  CV: RRR, +S1/S2, no murmurs  Abdominal: Soft, NT, ND +BSx4  Extremities: + TTP neck b/l ROM ltd 2/2 pain. Left hip surgical site warm, ttp, erythematous, bandage draining minor amount of serosanguinous discharge    A/P    72 yo M PMHx 45 pack year smoking hx and COPD (diagnosed "a few years ago, quit smoking then), HTN, HLD, hx of MI and CABG (2008) (stents also placed per chart review), RA (on Medrol daily), hx of multiple L hip surgeries (hip replacement 30 years ago in [place], ORIF recently 11/2018 w/ Dr. Ramirez) complicated with superficial infection which required I&D on 12/5 (discharged with PICC line and continued to receive cefazolin), present to the ED with L hip pain at the surgical site admitted with chronic infection of hardware.    - given Tylenol and Motrin in ED for headache.   - continue to monitor for any neurological changes  - Antibiotics were held by admitting team, pending ID consult, blood C&S  - Local wound care for left hip  - Ortho, plastics consulted

## 2018-12-31 NOTE — CONSULT NOTE ADULT - SUBJECTIVE AND OBJECTIVE BOX
[facility]         Shannon Valdez, [place], Anusha, Johanny, Sidney, Abdias        [phone] (office)    CHIEF COMPLAINT: Patient is a 73y old  Male who presents with a chief complaint of cellulitis (31 Dec 2018 16:16)      HPI:  72 yo M PMHx 45 pack year smoking hx and COPD (diagnosed "a few years ago, quit smoking 6 months ago), HTN, HLD, hx of MI and CABG (2008) (stents also placed per chart review), RA (on Medrol daily), hx of multiple L hip surgeries (hip replacement 30 years ago in [place], ORIF recently 11/2018 w/ Dr. Ramirez) complicated with superficial infection which required I&D on 12/5 (discharged with PICC line and continued to receive cefazolin), present to the ED with L hip pain at the surgical site.  Serosanguinous fluid draining from surgical site since last admission in a small amount and has been on and off, patient was seen by Dr. Scruggs on 12/21 for wound check and dressing change and states that the bleeding present that day was minimal.  Patient ambulating with walker, trying not to put full weight on to affected extremity (50% WBAT).  Patient seen earlier today by wound care nurse who noted drainage from site and some stitches were missing, contacted Dr. Ramirez, would told patient to come to the ED.  Patient denies fevers, chest pain, dizziness, SOB, n/v, calf pain, numbness, tingling, loss of sensation in affected extremity.  Denies recent trauma to the LLE      PAST MEDICAL & SURGICAL HISTORY:  S/P CABG x 3  s/p triple coronary stents  S/P hip replacement, left  Rheumatoid arthritis  Osteoarthritis  COPD (chronic obstructive pulmonary disease)  HTN (hypertension)  S/P lumbar fusion: Approx 2012  History of femur fracture: Repaired 11/19- L hip  History of appendectomy  History of right shoulder replacement  History of total left hip arthroplasty      SOCIAL HISTORY: Previous smoker 50+ pack years, denies alcohol or illicit drug use.    FAMILY HISTORY:  No sudden or early cardiac death on mother or father side    Home Medications:  acetaminophen 325 mg oral tablet: 2 tab(s) orally every 6 hours, As needed, Temp greater or equal to 38C (100.4F) (31 Dec 2018 17:07)  alendronate 70 mg oral tablet: 1 tab(s) orally once a week (31 Dec 2018 17:07)  ALPRAZolam 0.5 mg oral tablet: 1 tab(s) orally once a day (at bedtime), As Needed (31 Dec 2018 17:07)  ascorbic acid 500 mg oral tablet: 1 tab(s) orally 2 times a day (31 Dec 2018 17:07)  cyclobenzaprine 10 mg oral tablet: 1 tab(s) orally 2 times a day, As Needed (31 Dec 2018 17:07)  finasteride 5 mg oral tablet: 1 tab(s) orally once a day (31 Dec 2018 17:07)  FLUoxetine 20 mg oral capsule: 1 cap(s) orally 3 times a day (31 Dec 2018 17:07)  folic acid 1 mg oral tablet: 1 tab(s) orally once a day (31 Dec 2018 17:07)  furosemide 20 mg oral tablet: 1 tab(s) orally once a day (31 Dec 2018 17:07)  lisinopril 10 mg oral tablet: 1 tab(s) orally once a day (31 Dec 2018 17:07)  lovastatin 20 mg oral tablet: 1 tab(s) orally once a day (31 Dec 2018 17:07)  methadone 5 mg oral tablet: 1 tab(s) orally every 8 hours (31 Dec 2018 17:07)  methylPREDNISolone 2 mg oral tablet: 1 tab(s) orally once a day (31 Dec 2018 17:07)  Multiple Vitamins oral tablet: 1 tab(s) orally once a day (31 Dec 2018 17:07)  potassium chloride 10 mEq oral capsule, extended release: 1 cap(s) orally 2 times a day (31 Dec 2018 17:07)  tamsulosin 0.4 mg oral capsule: 1 cap(s) orally once a day (31 Dec 2018 17:07)      MEDICATIONS  (STANDING):  ascorbic acid 500 milliGRAM(s) Oral two times a day  atorvastatin 10 milliGRAM(s) Oral at bedtime  enoxaparin Injectable 40 milliGRAM(s) SubCutaneous every 24 hours  famotidine    Tablet 20 milliGRAM(s) Oral two times a day  ferrous    sulfate 325 milliGRAM(s) Oral daily  finasteride 5 milliGRAM(s) Oral daily  FLUoxetine 20 milliGRAM(s) Oral <User Schedule>  folic acid 1 milliGRAM(s) Oral daily  furosemide    Tablet 20 milliGRAM(s) Oral daily  lactated ringers. 1000 milliLiter(s) (75 mL/Hr) IV Continuous <Continuous>  lisinopril 10 milliGRAM(s) Oral daily  methadone    Tablet 5 milliGRAM(s) Oral every 8 hours  methylPREDNISolone 2 milliGRAM(s) Oral daily  multivitamin 1 Tablet(s) Oral daily  tamsulosin 0.4 milliGRAM(s) Oral at bedtime    MEDICATIONS  (PRN):  acetaminophen   Tablet .. 650 milliGRAM(s) Oral every 6 hours PRN Mild Pain (1 - 3)  ALPRAZolam 0.5 milliGRAM(s) Oral at bedtime PRN anxiety  cyclobenzaprine 10 milliGRAM(s) Oral two times a day PRN Muscle Spasm      Allergies    Ceftin (Pruritus)    Intolerances        REVIEW OF SYSTEMS: Is negative for eye, ENT, GI, , allergic, dermatologic, musculoskeletal and neurologic, except as described above.    VITAL SIGNS:   Vital Signs Last 24 Hrs  T(C): 36.6 (31 Dec 2018 17:55), Max: 37.1 (31 Dec 2018 12:49)  T(F): 97.9 (31 Dec 2018 17:55), Max: 98.8 (31 Dec 2018 12:49)  HR: 65 (31 Dec 2018 17:55) (65 - 106)  BP: 122/77 (31 Dec 2018 17:55) (122/77 - 128/88)  BP(mean): --  RR: 16 (31 Dec 2018 17:55) (16 - 18)  SpO2: 97% (31 Dec 2018 17:55) (97% - 98%)      PHYSICAL EXAM:  Constitutional: NAD, awake and alert, well-developed  Eyes: EOMI, no oral cyanosis, conjunctivae clear, anicteric  Pulmonary: Non-labored, breath sounds are clear bilaterally, no wheezing, rales or rhonchi  Cardiovascular: regular S1 and S2, normal rate. No murmur  Gastrointestinal: Bowel Sounds present, soft, nontender  Lymph: No peripheral edema   Neurological: Alert, strength and sensitivity are grossly intact  Skin: Warm, well-perfused  Psych: Mood & affect appropriate    LABS: All Labs Reviewed:                        10.7   9.26  )-----------( 282      ( 31 Dec 2018 14:46 )             33.4     31 Dec 2018 14:46    133    |  97     |  17     ----------------------------<  149    4.8     |  28     |  0.87     Ca    9.5        31 Dec 2018 14:46      PT/INR - ( 31 Dec 2018 14:46 )   PT: 15.3 sec;   INR: 1.33 ratio         PTT - ( 31 Dec 2018 14:46 )  PTT:31.8 sec      EKG: Sinus with 1st degree AV block [facility]         Shannon Valdez, [place], Anusha, Johanny, Sidney, Abdias        [phone] (office)    CHIEF COMPLAINT: Patient is a 73y old  Male who presents with a chief complaint of cellulitis (31 Dec 2018 16:16)      HPI:  74 yo M PMHx 45 pack year smoking hx and COPD (diagnosed "a few years ago, quit smoking 6 months ago), HTN, HLD, hx of MI and CABG (2008) (stents also placed per chart review), RA (on Medrol daily), hx of multiple L hip surgeries (hip replacement 30 years ago in [place], ORIF recently 11/2018 w/ Dr. Ramirez) complicated with superficial infection which required I&D on 12/5 (discharged with PICC line and continued to receive cefazolin), present to the ED with L hip pain at the surgical site.  Serosanguinous fluid draining from surgical site since last admission in a small amount and has been on and off, patient was seen by Dr. Scruggs on 12/21 for wound check and dressing change and states that the bleeding present that day was minimal.  Patient ambulating with walker, trying not to put full weight on to affected extremity (50% WBAT).  Patient seen earlier today by wound care nurse who noted drainage from site and some stitches were missing, contacted Dr. Ramirez, would told patient to come to the ED.  Patient denies fevers, chest pain, dizziness, SOB, n/v, calf pain, numbness, tingling, loss of sensation in affected extremity.  Denies recent trauma to the LLE      PAST MEDICAL & SURGICAL HISTORY:  S/P CABG x 3  s/p triple coronary stents  S/P hip replacement, left  Rheumatoid arthritis  Osteoarthritis  COPD (chronic obstructive pulmonary disease)  HTN (hypertension)  S/P lumbar fusion: Approx 2012  History of femur fracture: Repaired 11/19- L hip  History of appendectomy  History of right shoulder replacement  History of total left hip arthroplasty      SOCIAL HISTORY: Previous smoker 50+ pack years, denies alcohol or illicit drug use.    FAMILY HISTORY:  No sudden or early cardiac death on mother or father side. No early MI    Home Medications:  acetaminophen 325 mg oral tablet: 2 tab(s) orally every 6 hours, As needed, Temp greater or equal to 38C (100.4F) (31 Dec 2018 17:07)  alendronate 70 mg oral tablet: 1 tab(s) orally once a week (31 Dec 2018 17:07)  ALPRAZolam 0.5 mg oral tablet: 1 tab(s) orally once a day (at bedtime), As Needed (31 Dec 2018 17:07)  ascorbic acid 500 mg oral tablet: 1 tab(s) orally 2 times a day (31 Dec 2018 17:07)  cyclobenzaprine 10 mg oral tablet: 1 tab(s) orally 2 times a day, As Needed (31 Dec 2018 17:07)  finasteride 5 mg oral tablet: 1 tab(s) orally once a day (31 Dec 2018 17:07)  FLUoxetine 20 mg oral capsule: 1 cap(s) orally 3 times a day (31 Dec 2018 17:07)  folic acid 1 mg oral tablet: 1 tab(s) orally once a day (31 Dec 2018 17:07)  furosemide 20 mg oral tablet: 1 tab(s) orally once a day (31 Dec 2018 17:07)  lisinopril 10 mg oral tablet: 1 tab(s) orally once a day (31 Dec 2018 17:07)  lovastatin 20 mg oral tablet: 1 tab(s) orally once a day (31 Dec 2018 17:07)  methadone 5 mg oral tablet: 1 tab(s) orally every 8 hours (31 Dec 2018 17:07)  methylPREDNISolone 2 mg oral tablet: 1 tab(s) orally once a day (31 Dec 2018 17:07)  Multiple Vitamins oral tablet: 1 tab(s) orally once a day (31 Dec 2018 17:07)  potassium chloride 10 mEq oral capsule, extended release: 1 cap(s) orally 2 times a day (31 Dec 2018 17:07)  tamsulosin 0.4 mg oral capsule: 1 cap(s) orally once a day (31 Dec 2018 17:07)      MEDICATIONS  (STANDING):  ascorbic acid 500 milliGRAM(s) Oral two times a day  atorvastatin 10 milliGRAM(s) Oral at bedtime  enoxaparin Injectable 40 milliGRAM(s) SubCutaneous every 24 hours  famotidine    Tablet 20 milliGRAM(s) Oral two times a day  ferrous    sulfate 325 milliGRAM(s) Oral daily  finasteride 5 milliGRAM(s) Oral daily  FLUoxetine 20 milliGRAM(s) Oral <User Schedule>  folic acid 1 milliGRAM(s) Oral daily  furosemide    Tablet 20 milliGRAM(s) Oral daily  lactated ringers. 1000 milliLiter(s) (75 mL/Hr) IV Continuous <Continuous>  lisinopril 10 milliGRAM(s) Oral daily  methadone    Tablet 5 milliGRAM(s) Oral every 8 hours  methylPREDNISolone 2 milliGRAM(s) Oral daily  multivitamin 1 Tablet(s) Oral daily  tamsulosin 0.4 milliGRAM(s) Oral at bedtime    MEDICATIONS  (PRN):  acetaminophen   Tablet .. 650 milliGRAM(s) Oral every 6 hours PRN Mild Pain (1 - 3)  ALPRAZolam 0.5 milliGRAM(s) Oral at bedtime PRN anxiety  cyclobenzaprine 10 milliGRAM(s) Oral two times a day PRN Muscle Spasm      Allergies    Ceftin (Pruritus)    Intolerances        REVIEW OF SYSTEMS: Is negative for eye, ENT, GI, , allergic, dermatologic, musculoskeletal and neurologic, except as described above.    VITAL SIGNS:   Vital Signs Last 24 Hrs  T(C): 36.6 (31 Dec 2018 17:55), Max: 37.1 (31 Dec 2018 12:49)  T(F): 97.9 (31 Dec 2018 17:55), Max: 98.8 (31 Dec 2018 12:49)  HR: 65 (31 Dec 2018 17:55) (65 - 106)  BP: 122/77 (31 Dec 2018 17:55) (122/77 - 128/88)  BP(mean): --  RR: 16 (31 Dec 2018 17:55) (16 - 18)  SpO2: 97% (31 Dec 2018 17:55) (97% - 98%)      PHYSICAL EXAM:  Constitutional: NAD, awake and alert, well-developed  Eyes: EOMI, no oral cyanosis, conjunctivae clear, anicteric  Pulmonary: Non-labored, breath sounds are clear bilaterally, no wheezing, rales or rhonchi  Cardiovascular: regular S1 and S2, normal rate. No murmur  Gastrointestinal: Bowel Sounds present, soft, nontender  Lymph: No peripheral edema   Neurological: Alert, strength and sensitivity are grossly intact  Skin: Warm, well-perfused  Psych: Mood & affect appropriate    LABS: All Labs Reviewed:                        10.7   9.26  )-----------( 282      ( 31 Dec 2018 14:46 )             33.4     31 Dec 2018 14:46    133    |  97     |  17     ----------------------------<  149    4.8     |  28     |  0.87     Ca    9.5        31 Dec 2018 14:46      PT/INR - ( 31 Dec 2018 14:46 )   PT: 15.3 sec;   INR: 1.33 ratio         PTT - ( 31 Dec 2018 14:46 )  PTT:31.8 sec      EKG: Sinus with 1st degree AV block

## 2018-12-31 NOTE — H&P ADULT - NSHPOUTPATIENTPROVIDERS_GEN_ALL_CORE
[facility] (multiple physicians listed)- [phone]; patient's cardiologist acts as his PMD   Ortho- Dr. Ramirez  Wife- Lyndsey Amin [phone]

## 2018-12-31 NOTE — H&P ADULT - PROBLEM SELECTOR PLAN 1
-admit to F  -serosanguinous drainage from surgical site, chronic, patient has had most recent I&D on 12/5  -Xray L Hip: Status post left THR. Old intertrochanteric fracture left hip similar to prior. There is no acute fracture or dislocation. No gross focal bone destruction. Vascular calcification. Calcified left buttock granulomas.  -Xray L Femur: No fracture dislocation or focal bone destruction. Status post ORIF left hip. Metallic fixation left femoral shaft. Hardware intact. Vascular calcification.  -CT LLE w/ IV contrast: Long peripheral enhancing fluid collection lateral soft tissues extending from the buttock to proximal to mid thigh, may represent abscess or infected hematoma  -chronic wound could be due to immunosuppression due to chronic use of steroids for RA  -ESR 83, f/u CRP  -PICC line on last admission, Cefazolin 2g IVPB Q8 hours until 1/15/19  -for possible I&D on Wednesday 1/2 with Dr. Ramirez  -ID consulted, Dr. Wills, f/u recs  -Plastics consulted  -Ortho consulted, recs appreciated -admit to F  -serosanguinous drainage from surgical site, chronic, patient has had most recent I&D on 12/5  -Xray L Hip: Status post left THR. Old intertrochanteric fracture left hip similar to prior. There is no acute fracture or dislocation. No gross focal bone destruction. Vascular calcification. Calcified left buttock granulomas.  -Xray L Femur: No fracture dislocation or focal bone destruction. Status post ORIF left hip. Metallic fixation left femoral shaft. Hardware intact. Vascular calcification.  -CT LLE w/ IV contrast: Long peripheral enhancing fluid collection lateral soft tissues extending from the buttock to proximal to mid thigh, may represent abscess or infected hematoma  -chronic wound could be due to immunosuppression due to chronic use of steroids for RA  -ESR 83, f/u CRP  -f/u wound cultures  -PICC line on last admission, Cefazolin 2g IVPB Q8 hours until 1/15/19, will hold on abx for now pending culture results  -for possible I&D on Wednesday 1/2 with Dr. Ramirez  -ID consulted, Dr. Wills, f/u recs  -Plastics consulted  -Ortho consulted, recs appreciated -admit to F  -serosanguinous drainage from surgical site, chronic, patient has had most recent I&D on 12/5  -Xray L Hip: Status post left THR. Old intertrochanteric fracture left hip similar to prior. There is no acute fracture or dislocation. No gross focal bone destruction. Vascular calcification. Calcified left buttock granulomas.  -Xray L Femur: No fracture dislocation or focal bone destruction. Status post ORIF left hip. Metallic fixation left femoral shaft. Hardware intact. Vascular calcification.  -CT LLE w/ IV contrast: Long peripheral enhancing fluid collection lateral soft tissues extending from the buttock to proximal to mid thigh, may represent abscess or infected hematoma  -chronic wound could be due to immunosuppression due to chronic use of steroids for RA  -ESR 83, f/u CRP  -f/u wound cultures  -PICC line on last admission, Cefazolin 2g IVPB Q8 hours until 1/15/19, will hold on abx for now pending culture results  -for possible I&D on Wednesday 1/2 with Dr. Ramirez  -ID consulted, Dr. Wills, f/u recs  -Plastics consulted  -Ortho consulted, recs appreciated  -Cardio consulted, for clearance, Dr. Little

## 2018-12-31 NOTE — CONSULT NOTE ADULT - ASSESSMENT
Pt is a 73y Male with a L Hip Surgical Site Infection.  -Pain control  -WB as tolerated affected extremity  -FU Labs/Imaging  -NPO except medications  -IVF while NPO  -Hold all chemical DVT prophylaxis  -SCDs  -Further intervention to be determined by Dr. Ramirez. Possible need for surgical I&D of L hip pending labs/imaging.    Erlinda Garber M.D.  PGY-1 Orthopaedic Surgery Pt is a 73y Male with a L Hip Surgical Site Infection.  -Afebrile, no leukocytosis, non-toxic  -ESR 83, CRP pending  -Pain control  -Continue Abx  -WB as tolerated affected extremity  -XR L Hip: s/p ORIF of periprosthetic fracture of gabriela-arthroplasty. No acute fracture.  -XR L Femur: Pending  -CT LLE w/ Contrast: Pending  -DVT PPx  -Please document medical/cardiac optimization for OR.  -FU Plastic Surgery Consult  -FU ID Consult  -Plan for possible surgical irrigation and debridement on Weds (1/2) with Dr. Ramirez.      Erlinda Garber M.D.  PGY-1 Orthopaedic Surgery

## 2018-12-31 NOTE — ED ADULT NURSE NOTE - NSIMPLEMENTINTERV_GEN_ALL_ED
Implemented All Fall Risk Interventions:  [place] to call system. Call bell, personal items and telephone within reach. Instruct patient to call for assistance. Room bathroom lighting operational. Non-slip footwear when patient is off stretcher. Physically safe environment: no spills, clutter or unnecessary equipment. Stretcher in lowest position, wheels locked, appropriate side rails in place. Provide visual cue, wrist band, yellow gown, etc. Monitor gait and stability. Monitor for mental status changes and reorient to person, place, and time. Review medications for side effects contributing to fall risk. Reinforce activity limits and safety measures with patient and family.

## 2018-12-31 NOTE — H&P ADULT - ATTENDING COMMENTS
Pt with headache, treated with tylenol, giving MOtrin.  If not improving, consider other options. Pt with headache, treated with tylenol, giving MOtrin.  If not improving, consider other options.    Pt will be on SCDs.  Possible infected hematoma, don't want to make the hematoma worse and bleed out.

## 2018-12-31 NOTE — H&P ADULT - PROBLEM SELECTOR PLAN 10
-Lovenox 40mg QD for DVT ppx IMPROVE VTE Individual Risk Assessment          RISK                                                          Points  [  ] Previous VTE                                                3  [  ] Thrombophilia                                             2  [  ] Lower limb paralysis                                   2        (unable to hold up >15 seconds)    [  ] Current Cancer                                             2         (within 6 months)  [  ] Immobilization > 24 hrs                              1  [  ] ICU/CCU stay > 24 hours                             1  [  ] Age > 60                                                         1    IMPROVE VTE Score: 2  SCDs

## 2018-12-31 NOTE — H&P ADULT - PROBLEM SELECTOR PLAN 9
-Patient on cyclobenzaprine at home, has interaction with methdaone for causing prolonged QT  -EKG in ED showed no evidence of QT prolongation  -will continue to monitor, EKG ordered for AM -Patient on cyclobenzaprine at home, has interaction with fluoxetine for causing prolonged QT  -EKG in ED showed no evidence of QT prolongation  -will continue to monitor, EKG ordered for AM

## 2018-12-31 NOTE — CONSULT NOTE ADULT - SUBJECTIVE AND OBJECTIVE BOX
Orthopaedic Surgery Consult Note    Pt is a 73y Male with hx of Left hip periprosthetic hip fx, ORIF with Dr ramirez ,complicated by superficial infection 3 weeks ago that needed I&D + PICC line placement (on Cefazolin) presenting with L hip pain surgical site drainage. Pt was last seen by Dr. Ramirez 12/21 for wound check and dressing change. Pt reports wound has been draining blood-tinged fluid since that time, and seems to have increased in volume in the past few days.  Pt has been ambulating with 50% WB of LLE with walker. Pt denies pain, numbness, tingling, or paresthesias in affected limb. Pt was seen at home today 12/31 by wound care nurse, who noted drainage from the wound and "loss of a few stitches." Wound care contacted Dr. Ramirez and instructed patient to come to ED. Pt is scheduled for next follow-up appointment with Dr. Ramirez on 1/9.  Pt denies recent trauma. Pt denies taking blood thinners. Pt last ate at _______ . Denies fevers, dizziness, CP, SOB, N/V, calf pain.    PMHx:  CAD  Rheumatoid arthritis (not on DMARD Therapy)  Osteoarthritis  COPD (chronic obstructive pulmonary disease)  HTN (hypertension)    PSHx:  S/P CABG x 3  S/P lumbar fusion ([place])  History of appendectomy  History of right shoulder replacement ([place])  History of total left hip arthroplasty  Cholecystectomy     Allergies:  Ceftin (Pruritus)    Medications:  lactated ringers. 1000 milliLiter(s) IV Continuous <Continuous>    Social: Denies smoking, EtOH, illicit drug use.    Labs: pending     Imaging: pending    Vitals:  T(C): 37.1 (12-31-18 @ 12:49), Max: 37.1 (12-31-18 @ 12:49)  HR: 106 (12-31-18 @ 12:49) (106 - 106)  BP: 128/88 (12-31-18 @ 12:49) (128/88 - 128/88)  RR: 18 (12-31-18 @ 12:49) (18 - 18)  SpO2: 98% (12-31-18 @ 12:49) (98% - 98%)    Physical Exam:  Gen: NAD, AAOx3    LLE:   Erythema and maceration of distal-most aspect of incision. Wound weeping serosanguineous fluid. 5 cc manually expressed.  No gross deformity  No bony TTP of Hip  Full ROM of Hip  NT with ROM of Hip  Able to SLR  +EHL/FHL/TA/GS  SILT L2-S1  +DP/PT Pulses  Compartments soft and compressible  No calf TTP B/L Orthopaedic Surgery Consult Note    Pt is a 73y Male with hx of Left hip periprosthetic hip fx, ORIF with Dr ramirez ,complicated by superficial infection 3 weeks ago that needed I&D (12/5) + PICC line placement (on Cefazolin) presenting with L hip pain surgical site drainage. Pt was last seen by Dr. Ramirez 12/21 for wound check and dressing change. Pt reports wound has been draining blood-tinged fluid since that time, and seems to have increased in volume in the past few days.  Pt has been ambulating with 50% WB of LLE with walker. Pt denies pain, numbness, tingling, or paresthesias in affected limb. Pt was seen at home today 12/31 by wound care nurse, who noted drainage from the wound and "loss of a few stitches." Wound care contacted Dr. Ramirez and instructed patient to come to ED. Pt is scheduled for next follow-up appointment with Dr. Ramirez on 1/9.  Pt denies recent trauma. Pt denies taking blood thinners. Pt last ate at _______ . Denies fevers, dizziness, CP, SOB, N/V, calf pain.    PMHx:  CAD  Rheumatoid arthritis (not on DMARD Therapy)  Osteoarthritis  COPD (chronic obstructive pulmonary disease)  HTN (hypertension)    PSHx:  S/P CABG x 3  S/P lumbar fusion ([place])  History of appendectomy  History of right shoulder replacement ([place])  History of total left hip arthroplasty  S/P I&D of of L Hip 12/5  Cholecystectomy     Allergies:  Ceftin (Pruritus)    Medications:  lactated ringers. 1000 milliLiter(s) IV Continuous <Continuous>    Social: Denies smoking, EtOH, illicit drug use.    Labs: pending     Imaging: pending    Vitals:  T(C): 37.1 (12-31-18 @ 12:49), Max: 37.1 (12-31-18 @ 12:49)  HR: 106 (12-31-18 @ 12:49) (106 - 106)  BP: 128/88 (12-31-18 @ 12:49) (128/88 - 128/88)  RR: 18 (12-31-18 @ 12:49) (18 - 18)  SpO2: 98% (12-31-18 @ 12:49) (98% - 98%)    Physical Exam:  Gen: NAD, AAOx3    LLE:   Erythema and maceration of distal-most aspect of incision. Wound weeping serosanguineous fluid. 5 cc manually expressed.  No gross deformity  No bony TTP of Hip  Full ROM of Hip  NT with ROM of Hip  Able to SLR  +EHL/FHL/TA/GS  SILT L2-S1  +DP/PT Pulses  Compartments soft and compressible  No calf TTP B/L Orthopaedic Surgery Consult Note    Pt is a 73y Male with hx of Left hip periprosthetic hip fx, ORIF with Dr ramirez ,complicated by superficial infection 3 weeks ago that needed I&D (12/5) + PICC line placement (on Cefazolin) presenting with L hip pain surgical site drainage. Pt was last seen by Dr. Ramirez 12/21 for wound check and dressing change. Pt reports wound has been draining blood-tinged fluid since that time, and seems to have increased in volume in the past few days.  Pt has been ambulating with 50% WB of LLE with walker. Pt denies pain, numbness, tingling, or paresthesias in affected limb. Pt was seen at home today 12/31 by wound care nurse, who noted drainage from the wound and "loss of a few stitches." Wound care contacted Dr. Ramirez and instructed patient to come to ED. Pt is scheduled for next follow-up appointment with Dr. Ramirez on 1/9.  Pt denies recent trauma. Pt denies taking blood thinners. Pt last ate at 0930. Denies fevers, dizziness, CP, SOB, N/V, calf pain.    PMHx:  CAD  Rheumatoid arthritis (not on DMARD Therapy)  Osteoarthritis  COPD (chronic obstructive pulmonary disease)  HTN (hypertension)    PSHx:  S/P CABG x 3  S/P lumbar fusion ([place])  History of appendectomy  History of right shoulder replacement ([place])  History of total left hip arthroplasty  S/P I&D of of L Hip 12/5  Cholecystectomy     Allergies:  Ceftin (Pruritus)    Medications:  lactated ringers. 1000 milliLiter(s) IV Continuous <Continuous>    Social: Denies smoking, EtOH, illicit drug use.    Labs: pending     Imaging: pending    Vitals:  T(C): 37.1 (12-31-18 @ 12:49), Max: 37.1 (12-31-18 @ 12:49)  HR: 106 (12-31-18 @ 12:49) (106 - 106)  BP: 128/88 (12-31-18 @ 12:49) (128/88 - 128/88)  RR: 18 (12-31-18 @ 12:49) (18 - 18)  SpO2: 98% (12-31-18 @ 12:49) (98% - 98%)    Physical Exam:  Gen: NAD, AAOx3    LLE:   Erythema and maceration of distal-most aspect of incision. Wound weeping serosanguineous fluid. 5 cc manually expressed.  No gross deformity  No bony TTP of Hip  Full ROM of Hip  NT with ROM of Hip  Able to SLR  +EHL/FHL/TA/GS  SILT L2-S1  +DP/PT Pulses  Compartments soft and compressible  No calf TTP B/L Orthopaedic Surgery Consult Note    Pt is a 73y Male with hx of Left hip periprosthetic hip fx, ORIF with Dr ramirez ,complicated by superficial infection 3 weeks ago that needed I&D (12/5) + PICC line placement (on Cefazolin) presenting with L hip pain surgical site drainage. Pt was last seen by Dr. Ramirez 12/21 for wound check and dressing change. Pt reports wound has been draining blood-tinged fluid since that time, and seems to have increased in volume in the past few days.  Pt has been ambulating with 50% WB of LLE with walker. Pt denies pain, numbness, tingling, or paresthesias in affected limb. Pt was seen at home today 12/31 by wound care nurse, who noted drainage from the wound and "loss of a few stitches." Wound care contacted Dr. Ramirez and instructed patient to come to ED. Pt is scheduled for next follow-up appointment with Dr. Ramirez on 1/9.  Pt denies recent trauma. Pt denies taking blood thinners. Pt last ate at 0930. Denies fevers, dizziness, CP, SOB, N/V, calf pain.    PMHx:  CAD  Rheumatoid arthritis (not on DMARD Therapy)  Osteoarthritis  COPD (chronic obstructive pulmonary disease)  HTN (hypertension)    PSHx:  S/P CABG x 3  S/P lumbar fusion ([place])  History of appendectomy  History of right shoulder replacement ([place])  History of total left hip arthroplasty  S/P I&D of of L Hip 12/5  Cholecystectomy     Allergies:  Ceftin (Pruritus)    Medications:  lactated ringers. 1000 milliLiter(s) IV Continuous <Continuous>    Social: Denies smoking, EtOH, illicit drug use.    Labs:                          10.7   9.26  )-----------( 282      ( 31 Dec 2018 14:46 )             33.4   12-31    133<L>  |  97  |  17  ----------------------------<  149<H>  4.8   |  28  |  0.87    Ca    9.5      31 Dec 2018 14:46    PT/INR - ( 31 Dec 2018 14:46 )   PT: 15.3 sec;   INR: 1.33 ratio         PTT - ( 31 Dec 2018 14:46 )  PTT:31.8 sec    Imaging:  XR L Hip: s/p ORIF of periprosthetic fracture of gabriela-arthroplasty. No acute fracture.  XR L Femur: Pending  CT LLE w/ Contrast: Pending    Vitals:  T(C): 37.1 (12-31-18 @ 12:49), Max: 37.1 (12-31-18 @ 12:49)  HR: 106 (12-31-18 @ 12:49) (106 - 106)  BP: 128/88 (12-31-18 @ 12:49) (128/88 - 128/88)  RR: 18 (12-31-18 @ 12:49) (18 - 18)  SpO2: 98% (12-31-18 @ 12:49) (98% - 98%)    Physical Exam:  Gen: NAD, AAOx3    LLE:   Erythema and maceration of distal-most aspect of incision. Wound weeping serosanguineous fluid. 5 cc manually expressed.  No gross deformity  No bony TTP of Hip  Full ROM of Hip  NT with ROM of Hip  Able to SLR  +EHL/FHL/TA/GS  SILT L2-S1  +DP/PT Pulses  Compartments soft and compressible  No calf TTP B/L

## 2018-12-31 NOTE — ED PROVIDER NOTE - OBJECTIVE STATEMENT
72 yo white male with H/O Recent ORIF Left Hip with Post Op Wound Infection requiring home IV AbXs via PICC Line, COPD, HTN, RA and DJD sent here by wound care nurse fir evaluation of slight bloody drainage from wound site. No fever or chills. No purulent discharge. Walking as he was directed. Mild headache. No neck pain. No infectious symptoms.

## 2018-12-31 NOTE — ED ADULT NURSE NOTE - OBJECTIVE STATEMENT
Pt presents to the Ed  s/p draining from left hip surgical site. Pt presents to the Ed  s/p draining from left hip surgical site. Pt has a right a/c  single lumen PICC , dressing intact.

## 2018-12-31 NOTE — H&P ADULT - ASSESSMENT
72 yo M PMHx 45 pack year smoking hx and COPD (diagnosed "a few years ago, quit smoking then), HTN, HLD, hx of MI and CABG (2008) (stents also placed per chart review), RA (on Medrol daily), hx of multiple L hip surgeries (hip replacement 30 years ago in [place], ORIF recently 11/2018 w/ Dr. Ramirez) complicated with superficial infection which required I&D on 12/5 (discharged with PICC line and continued to receive cefazolin), present to the ED with L hip pain at the surgical site admitted with chronic infection of hardware.    -admit to [facility]  serosanguinous drainage from surgical site, chronic, patient has had most recent I&D on 12/5 72 yo M PMHx 45 pack year smoking hx and COPD (diagnosed "a few years ago, quit smoking then), HTN, HLD, hx of MI and CABG (2008) (stents also placed per chart review), RA (on Medrol daily), hx of multiple L hip surgeries (hip replacement 30 years ago in [place], ORIF recently 11/2018 w/ Dr. Ramirez) complicated with superficial infection which required I&D on 12/5 (discharged with PICC line and continued to receive cefazolin), present to the ED with L hip pain at the surgical site admitted with chronic infection of hardware.

## 2018-12-31 NOTE — H&P ADULT - NSHPSOCIALHISTORY_GEN_ALL_CORE
Mostly independent for ADLs and IADLs, seen by wound care nurse for dressing changes  Ambulates with walker currently  Lives at home w/ wife     45 pack year history, quit "a few years ago"  Denies alcohol use or illicit drug use

## 2018-12-31 NOTE — ED PROVIDER NOTE - MUSCULOSKELETAL, MLM
Healing post op left lateral proximal thigh wound with scant bloody drainage and with #1 wound dehiscence

## 2018-12-31 NOTE — H&P ADULT - HISTORY OF PRESENT ILLNESS
72 yo M PMHx 45 pack year smoking hx and COPD (diagnosed "a few years ago, quit smoking then), HTN, HLD, hx of MI and CABG (2008) (stents also placed per chart review), RA (on Medrol daily), hx of multiple L hip surgeries (hip replacement 30 years ago in [place], ORIF recently 11/2018 w/ Dr. Ramirez) complicated with superficial infection which required I&D on 12/5 (discharged with PICC line and continued to receive cefazolin), present to the ED with L hip pain at the surgical site.  Patient was seen by Dr. Scruggs on 12/21 for wound check and dressing change and states that the bleeding started that day from the surgical site, but was a small amount.  Last few days, serosanguinous drainage has increased in volume.  Patient ambulating with walker, trying not to put weight on to affected extremity  Patient seen earlier today by wound care nurse who noted drainage from site and some stitches were missing, contacted Dr. Ramirez, would told patient to come to the ED.  Patient denies fevers, chest pain, dizziness, SOB, n/v, calf pain, numbness, tingling, loss of sensation in affected extremity.  Denies recent trauma to the LLE, denies taking blood thinners.      In the ED, T 98.8, , /88, RR 18, SpO2 98%.  Labs significant for ESR 83, WBC 9.26, H/H 10.7/33.4, PT/INR 15.3/1.33, Na 133.    EKG: ___________  CXR: Persistent nodular density left apical region. Fibrotic changes left lower lobe.   Xray L Hip: Status post left THR. old intertrochanteric fracture left hip similar to prior. There is no acute fracture or dislocation. No gross focal bone destruction. Vascular calcification. Calcified left buttock granulomas.  Xray L Femur: _________  CT LLE w/ IV contrast: _____________ 72 yo M PMHx 45 pack year smoking hx and COPD (diagnosed "a few years ago, quit smoking then), HTN, HLD, hx of MI and CABG () (stents also placed per chart review), RA (on Medrol daily), hx of multiple L hip surgeries (hip replacement 30 years ago in [place], ORIF recently 2018 w/ Dr. Ramirez) complicated with superficial infection which required I&D on  (discharged with PICC line and continued to receive cefazolin), present to the ED with L hip pain at the surgical site.  Serosanguinous fluid draining from surgical site since last admission in a small amount and has been on and off, patient was seen by Dr. Scruggs on  for wound check and dressing change and states that the bleeding present that day was minimal.  Patient ambulating with walker, trying not to put full weight on to affected extremity (50% WBAT).  Patient seen earlier today by wound care nurse who noted drainage from site and some stitches were missing, contacted Dr. Ramirez, would told patient to come to the ED.  Patient denies fevers, chest pain, dizziness, SOB, n/v, calf pain, numbness, tingling, loss of sensation in affected extremity.  Denies recent trauma to the LLE, denies taking blood thinners (besides baby ASA for stents).    In the ED, T 98.8, , /88, RR 18, SpO2 98%.  Labs significant for ESR 83, WBC 9.26, H/H 10.7/33.4, PT/INR 15.3/1.33, Na 133.    EKst degree AV block, HR 91  CXR: Persistent nodular density left apical region. Fibrotic changes left lower lobe.   Xray L Hip: Status post left THR. Old intertrochanteric fracture left hip similar to prior. There is no acute fracture or dislocation. No gross focal bone destruction. Vascular calcification. Calcified left buttock granulomas.  Xray L Femur: No fracture dislocation or focal bone destruction. Status post ORIF left hip. Metallic fixation left femoral shaft. Hardware intact. Vascular calcification.  CT LLE w/ IV contrast: Long peripheral enhancing fluid collection lateral soft tissues extending from the buttock to proximal to mid thigh as discussed, may represent abscess or infected hematoma.    Patient received  percocet in the ED for pain.

## 2018-12-31 NOTE — H&P ADULT - PROBLEM SELECTOR PLAN 3
-diagnosed "a few years ago", patient had 45 pack year hx now quit after diagnosis   -no oxygen at home  - CXR revealed Persistent nodular density left apical region. Fibrotic changes left lower lobe   -CT chest done on previous admission, no acute pathology

## 2018-12-31 NOTE — H&P ADULT - PROBLEM SELECTOR PLAN 7
-chronic, no hardware displacement viewed on X ray pelvis/hip/femur   -ortho following  -patient on tylenol and methadone for pain control, will continue with tylenol for mild pain and standing dose for methadone

## 2018-12-31 NOTE — H&P ADULT - PROBLEM SELECTOR PLAN 6
-CABG 2008, per chart review also stents placed  -hold ASA 81 mg for now  -c/w statin   -Cardio consulted, Dr. Little, will f/u recs

## 2018-12-31 NOTE — H&P ADULT - NSHPPHYSICALEXAM_GEN_ALL_CORE
Physical Exam  General: No apparent distress  Head: normocephalic, atraumatic  Eyes: EOMI, anicteric  ENT: moist mucous membranes, no pharyngeal exudates  Heart: RRR, S1, S2, no murmurs  Chest: CTA b/l, no rales, rhonchi, or wheezes  Abd: BS+, soft, NT, ND  Back: no CVA tenderness  Extr: ROM L hip limited 2/2 surgery but no pain with ROM, surgical scar noted along lateral portion of L hip with stitches intact, slight erythema and serosanguinous fluid observed towards distal end of surgical site, proximal end c/d/i, FROM of R hip  Neuro: AA&Ox3, no focal weakness, sensation to light touch intact  Psych: normal affect

## 2018-12-31 NOTE — H&P ADULT - NSHPREVIEWOFSYSTEMS_GEN_ALL_CORE
Constitutional: denies fever, chills, diaphoresis   HEENT: denies blurry vision, difficulty hearing  Respiratory: denies SOB, KENNEY, cough, sputum production, wheezing, hemoptysis  Cardiovascular: denies chest pain, palpitations, edema  Gastrointestinal: denies nausea, vomiting, diarrhea, constipation, abdominal pain  Genitourinary: denies dysuria, frequency, urgency, hematuria   Musculoskeletal: denies myalgias, joint swelling, muscle weakness  Extremities: + slight pain of L hip, non radiating   Neurologic: denies headache, weakness, dizziness, paresthesias, numbness/tingling  Hematology/Oncology: +diffuse bruising, chronic and attributes to steroid use   ROS negative except as noted above

## 2019-01-01 LAB
ANION GAP SERPL CALC-SCNC: 8 MMOL/L — SIGNIFICANT CHANGE UP (ref 5–17)
APPEARANCE UR: CLEAR — SIGNIFICANT CHANGE UP
APTT BLD: 29.9 SEC — SIGNIFICANT CHANGE UP (ref 27.5–36.3)
BILIRUB UR-MCNC: NEGATIVE — SIGNIFICANT CHANGE UP
BUN SERPL-MCNC: 18 MG/DL — SIGNIFICANT CHANGE UP (ref 7–23)
CALCIUM SERPL-MCNC: 9.2 MG/DL — SIGNIFICANT CHANGE UP (ref 8.5–10.1)
CHLORIDE SERPL-SCNC: 101 MMOL/L — SIGNIFICANT CHANGE UP (ref 96–108)
CO2 SERPL-SCNC: 29 MMOL/L — SIGNIFICANT CHANGE UP (ref 22–31)
COLOR SPEC: YELLOW — SIGNIFICANT CHANGE UP
CREAT SERPL-MCNC: 0.74 MG/DL — SIGNIFICANT CHANGE UP (ref 0.5–1.3)
CRP SERPL-MCNC: 9.07 MG/DL — HIGH (ref 0–0.4)
DIFF PNL FLD: NEGATIVE — SIGNIFICANT CHANGE UP
GLUCOSE SERPL-MCNC: 98 MG/DL — SIGNIFICANT CHANGE UP (ref 70–99)
GLUCOSE UR QL: NEGATIVE — SIGNIFICANT CHANGE UP
HCT VFR BLD CALC: 31.4 % — LOW (ref 39–50)
HGB BLD-MCNC: 10 G/DL — LOW (ref 13–17)
INR BLD: 1.32 RATIO — HIGH (ref 0.88–1.16)
KETONES UR-MCNC: NEGATIVE — SIGNIFICANT CHANGE UP
LEUKOCYTE ESTERASE UR-ACNC: ABNORMAL
MCHC RBC-ENTMCNC: 30.1 PG — SIGNIFICANT CHANGE UP (ref 27–34)
MCHC RBC-ENTMCNC: 31.8 GM/DL — LOW (ref 32–36)
MCV RBC AUTO: 94.6 FL — SIGNIFICANT CHANGE UP (ref 80–100)
NITRITE UR-MCNC: NEGATIVE — SIGNIFICANT CHANGE UP
NRBC # BLD: 0 /100 WBCS — SIGNIFICANT CHANGE UP (ref 0–0)
PH UR: 5 — SIGNIFICANT CHANGE UP (ref 5–8)
PLATELET # BLD AUTO: 254 K/UL — SIGNIFICANT CHANGE UP (ref 150–400)
POTASSIUM SERPL-MCNC: 4 MMOL/L — SIGNIFICANT CHANGE UP (ref 3.5–5.3)
POTASSIUM SERPL-SCNC: 4 MMOL/L — SIGNIFICANT CHANGE UP (ref 3.5–5.3)
PROT UR-MCNC: 25 MG/DL
PROTHROM AB SERPL-ACNC: 15 SEC — HIGH (ref 10–12.9)
RBC # BLD: 3.32 M/UL — LOW (ref 4.2–5.8)
RBC # FLD: 14.8 % — HIGH (ref 10.3–14.5)
SODIUM SERPL-SCNC: 138 MMOL/L — SIGNIFICANT CHANGE UP (ref 135–145)
SP GR SPEC: 1.02 — SIGNIFICANT CHANGE UP (ref 1.01–1.02)
UROBILINOGEN FLD QL: NEGATIVE — SIGNIFICANT CHANGE UP
WBC # BLD: 8.16 K/UL — SIGNIFICANT CHANGE UP (ref 3.8–10.5)
WBC # FLD AUTO: 8.16 K/UL — SIGNIFICANT CHANGE UP (ref 3.8–10.5)

## 2019-01-01 PROCEDURE — 93010 ELECTROCARDIOGRAM REPORT: CPT

## 2019-01-01 PROCEDURE — 99232 SBSQ HOSP IP/OBS MODERATE 35: CPT

## 2019-01-01 RX ORDER — MORPHINE SULFATE 50 MG/1
2 CAPSULE, EXTENDED RELEASE ORAL EVERY 6 HOURS
Qty: 0 | Refills: 0 | Status: DISCONTINUED | OUTPATIENT
Start: 2019-01-01 | End: 2019-01-02

## 2019-01-01 RX ORDER — CEFAZOLIN SODIUM 1 G
2000 VIAL (EA) INJECTION EVERY 8 HOURS
Qty: 0 | Refills: 0 | Status: DISCONTINUED | OUTPATIENT
Start: 2019-01-01 | End: 2019-01-02

## 2019-01-01 RX ORDER — POLYETHYLENE GLYCOL 3350 17 G/17G
17 POWDER, FOR SOLUTION ORAL DAILY
Qty: 0 | Refills: 0 | Status: DISCONTINUED | OUTPATIENT
Start: 2019-01-01 | End: 2019-01-02

## 2019-01-01 RX ORDER — DOCUSATE SODIUM 100 MG
100 CAPSULE ORAL
Qty: 0 | Refills: 0 | Status: DISCONTINUED | OUTPATIENT
Start: 2019-01-01 | End: 2019-01-02

## 2019-01-01 RX ORDER — SODIUM CHLORIDE 9 MG/ML
1000 INJECTION, SOLUTION INTRAVENOUS
Qty: 0 | Refills: 0 | Status: DISCONTINUED | OUTPATIENT
Start: 2019-01-01 | End: 2019-01-02

## 2019-01-01 RX ORDER — HYDROCORTISONE 20 MG
100 TABLET ORAL EVERY 8 HOURS
Qty: 0 | Refills: 0 | Status: DISCONTINUED | OUTPATIENT
Start: 2019-01-02 | End: 2019-01-02

## 2019-01-01 RX ORDER — SENNA PLUS 8.6 MG/1
2 TABLET ORAL AT BEDTIME
Qty: 0 | Refills: 0 | Status: DISCONTINUED | OUTPATIENT
Start: 2019-01-01 | End: 2019-01-02

## 2019-01-01 RX ORDER — MORPHINE SULFATE 50 MG/1
4 CAPSULE, EXTENDED RELEASE ORAL EVERY 6 HOURS
Qty: 0 | Refills: 0 | Status: DISCONTINUED | OUTPATIENT
Start: 2019-01-01 | End: 2019-01-02

## 2019-01-01 RX ORDER — ENOXAPARIN SODIUM 100 MG/ML
40 INJECTION SUBCUTANEOUS EVERY 24 HOURS
Qty: 0 | Refills: 0 | Status: DISCONTINUED | OUTPATIENT
Start: 2019-01-01 | End: 2019-01-01

## 2019-01-01 RX ORDER — ENOXAPARIN SODIUM 100 MG/ML
40 INJECTION SUBCUTANEOUS EVERY 24 HOURS
Qty: 0 | Refills: 0 | Status: COMPLETED | OUTPATIENT
Start: 2019-01-01 | End: 2019-01-01

## 2019-01-01 RX ORDER — CHLORHEXIDINE GLUCONATE 213 G/1000ML
1 SOLUTION TOPICAL DAILY
Qty: 0 | Refills: 0 | Status: DISCONTINUED | OUTPATIENT
Start: 2019-01-01 | End: 2019-01-02

## 2019-01-01 RX ADMIN — Medication 325 MILLIGRAM(S): at 12:03

## 2019-01-01 RX ADMIN — MORPHINE SULFATE 2 MILLIGRAM(S): 50 CAPSULE, EXTENDED RELEASE ORAL at 21:20

## 2019-01-01 RX ADMIN — MORPHINE SULFATE 4 MILLIGRAM(S): 50 CAPSULE, EXTENDED RELEASE ORAL at 09:25

## 2019-01-01 RX ADMIN — Medication 100 MILLIGRAM(S): at 21:26

## 2019-01-01 RX ADMIN — Medication 1 TABLET(S): at 12:04

## 2019-01-01 RX ADMIN — Medication 100 MILLIGRAM(S): at 18:04

## 2019-01-01 RX ADMIN — Medication 2 MILLIGRAM(S): at 05:33

## 2019-01-01 RX ADMIN — FAMOTIDINE 20 MILLIGRAM(S): 10 INJECTION INTRAVENOUS at 18:04

## 2019-01-01 RX ADMIN — Medication 650 MILLIGRAM(S): at 19:00

## 2019-01-01 RX ADMIN — MORPHINE SULFATE 4 MILLIGRAM(S): 50 CAPSULE, EXTENDED RELEASE ORAL at 15:27

## 2019-01-01 RX ADMIN — TAMSULOSIN HYDROCHLORIDE 0.4 MILLIGRAM(S): 0.4 CAPSULE ORAL at 21:24

## 2019-01-01 RX ADMIN — Medication 1 MILLIGRAM(S): at 12:04

## 2019-01-01 RX ADMIN — Medication 500 MILLIGRAM(S): at 05:34

## 2019-01-01 RX ADMIN — FINASTERIDE 5 MILLIGRAM(S): 5 TABLET, FILM COATED ORAL at 12:03

## 2019-01-01 RX ADMIN — MORPHINE SULFATE 4 MILLIGRAM(S): 50 CAPSULE, EXTENDED RELEASE ORAL at 15:42

## 2019-01-01 RX ADMIN — MORPHINE SULFATE 4 MILLIGRAM(S): 50 CAPSULE, EXTENDED RELEASE ORAL at 09:40

## 2019-01-01 RX ADMIN — ATORVASTATIN CALCIUM 10 MILLIGRAM(S): 80 TABLET, FILM COATED ORAL at 21:25

## 2019-01-01 RX ADMIN — Medication 20 MILLIGRAM(S): at 05:34

## 2019-01-01 RX ADMIN — FAMOTIDINE 20 MILLIGRAM(S): 10 INJECTION INTRAVENOUS at 05:34

## 2019-01-01 RX ADMIN — Medication 650 MILLIGRAM(S): at 18:06

## 2019-01-01 RX ADMIN — METHADONE HYDROCHLORIDE 5 MILLIGRAM(S): 40 TABLET ORAL at 21:39

## 2019-01-01 RX ADMIN — ENOXAPARIN SODIUM 40 MILLIGRAM(S): 100 INJECTION SUBCUTANEOUS at 12:04

## 2019-01-01 RX ADMIN — METHADONE HYDROCHLORIDE 5 MILLIGRAM(S): 40 TABLET ORAL at 13:32

## 2019-01-01 RX ADMIN — METHADONE HYDROCHLORIDE 5 MILLIGRAM(S): 40 TABLET ORAL at 05:33

## 2019-01-01 RX ADMIN — Medication 100 MILLIGRAM(S): at 13:32

## 2019-01-01 RX ADMIN — Medication 20 MILLIGRAM(S): at 21:25

## 2019-01-01 RX ADMIN — LISINOPRIL 10 MILLIGRAM(S): 2.5 TABLET ORAL at 05:34

## 2019-01-01 RX ADMIN — Medication 20 MILLIGRAM(S): at 13:32

## 2019-01-01 RX ADMIN — SENNA PLUS 2 TABLET(S): 8.6 TABLET ORAL at 21:25

## 2019-01-01 RX ADMIN — Medication 500 MILLIGRAM(S): at 18:04

## 2019-01-01 RX ADMIN — CYCLOBENZAPRINE HYDROCHLORIDE 10 MILLIGRAM(S): 10 TABLET, FILM COATED ORAL at 09:25

## 2019-01-01 RX ADMIN — MORPHINE SULFATE 2 MILLIGRAM(S): 50 CAPSULE, EXTENDED RELEASE ORAL at 22:09

## 2019-01-01 NOTE — CONSULT NOTE ADULT - ASSESSMENT
CT (personally reviewed) with large collection, enhancing rim. Infected seroma vs. superimposed abscess vs. continuous process from prior infection  No definite connection to underlying hardware but scan with a lot of artifact due to adjacent hardware  Plans for OR tomorrow noted, concur  Overall plan to be determined based on OR findings and culture data  Given overall stability I would continue current antibiotics and adjust as needed based on cx from OR.    Suggestions--  Continue Cefazolin  Await OR findings/cultures  Discussed with Dr. Cohen  Reviewed with patient. All questions answered.    Thank you for the courtesy of this referral.    Fabian Childs MD  [phone]

## 2019-01-01 NOTE — CONSULT NOTE ADULT - ASSESSMENT
A/P:   73 /o M with ORIF of left hip presents with collection.  - Pain control  - IV abx  -GAuze to lateral thigh as needed  - DVT PPx: SCD, chemoprophylaxis as per ortho service  - Will discuss surgical intervention and timing with ortho team    Thank You  Jayson Whipple MD  Plastic Surgery  [phone]

## 2019-01-01 NOTE — CONSULT NOTE ADULT - SUBJECTIVE AND OBJECTIVE BOX
ERIKA TORREZ  [id number]    72 yo M PMHx 45 pack year smoking hx and COPD (diagnosed "a few years ago, quit smoking then), HTN, HLD, hx of MI and CABG (2008) (stents also placed per chart review), RA (on Medrol daily), hx of multiple L hip surgeries (hip replacement 30 years ago in [place], ORIF recently 11/2018 w/ Dr. Ramirez) complicated with superficial infection which required I&D on 12/5 (discharged with PICC line and continued to receive cefazolin), present to the ED with L hip pain at the surgical site.  Serosanguinous fluid draining from surgical site since last admission in a small amount and has been on and off, patient was seen by Dr. Scruggs on 12/21 for wound check and dressing change and states that the bleeding present that day was minimal.  Patient ambulating with walker, trying not to put full weight on to affected extremity (50% WBAT).  Patient seen earlier by wound care nurse who noted drainage from site and some stitches were missing, contacted Dr. Ramirez, would told patient to come to the ED.  Patient denies fevers, chest pain, dizziness, SOB, n/v, calf pain, numbness, tingling, loss of sensation in affected extremity.  Denies recent trauma to the LLE, denies taking blood thinners (besides baby ASA for stents).    Plastic surgery consulted to evaluate patient's wound.       Infection and inflammatory reaction due to other internal orthopedic prosthetic devices, implants and grafts, sequela  No pertinent family history in first degree relatives  Handoff  MEWS Score  S/P CABG x 3  S/P hip replacement, left  Rheumatoid arthritis  Osteoarthritis  COPD (chronic obstructive pulmonary disease)  HTN (hypertension)  Wound infection complicating hardware, sequela  Muscle spasm  Hyperlipidemia  Anemia  Need for prophylactic measure  Rheumatoid arthritis  Osteoarthritis  HTN (hypertension)  COPD (chronic obstructive pulmonary disease)  S/P CABG x 3  S/P hip replacement, left  Wound infection complicating hardware, sequela  S/P lumbar fusion  History of femur fracture  History of appendectomy  History of right shoulder replacement  History of total left hip arthroplasty  SENT BY MD BYRD COMING OUT  10    Ceftin (Pruritus)      T(C): 36.7 (01-01-19 @ 04:58), Max: 37.1 (12-31-18 @ 12:49)  HR: 75 (01-01-19 @ 04:58) (65 - 106)  BP: 128/76 (01-01-19 @ 04:58) (113/68 - 151/71)  RR: 17 (01-01-19 @ 04:58) (16 - 18)  SpO2: 96% (01-01-19 @ 04:58) (95% - 98%)  NAD  LLE: Lateral upper leg incision intact with expressible drainage.  + Fluctuance.  Minimal erythema.  No crepitus.  No calf tenderness.  Intact neurovascular exam with soft compartments.     EXAM:  CT LWR EXT IC LT                            PROCEDURE DATE:  12/31/2018          INTERPRETATION:  Clinical information: Left periprosthetic hip fracture   status post internal fixation with soft tissue infection at surgical   site. Currently with erythema and draining at surgical skin site.    CT examination of the left hip/femur is performed with axial intervals   obtained at 2.5 mm from the original data set.  Coronal/sagittal reformatted images are obtained.    Comparison: CT scan 12/3/2018.    Again noted is internal fixation with a long plate and cerclage wire   construct of the left femur.  There is a noncemented left total hip arthroplasty.  Resulting streak artifact as well as motion artifact degrades image   quality limiting evaluation; some diagnostic information can still be   obtained.    There has been interval removal of the surgical skin staples.    There is a long peripheral enhancing fluid collection within the deep   subcutaneous soft tissues extending from the level of the lower buttock   to the proximal to mid thigh.  This extends approximately 18 cm in craniocaudad dimension and is   approximately 2 cm in maximum thickness.  There is a possible skin defect along the lateral aspect of the proximal   thigh, at the level of the superior cerclage banding.    No air is noted within the fluid collection.  No fluid or air is noted within the intermuscular fascial soft tissues.    Calcified buttock injection granulomata are noted.    Impression:    Long peripheral enhancing fluid collection lateral soft tissues extending   from the buttock to proximal to mid thigh as discussed, may represent   abscess or infected hematoma.    Labs: Reviewed.

## 2019-01-01 NOTE — DIETITIAN INITIAL EVALUATION ADULT. - PROBLEM SELECTOR PLAN 10
IMPROVE VTE Individual Risk Assessment          RISK                                                          Points  [  ] Previous VTE                                                3  [  ] Thrombophilia                                             2  [  ] Lower limb paralysis                                   2        (unable to hold up >15 seconds)    [  ] Current Cancer                                             2         (within 6 months)  [  ] Immobilization > 24 hrs                              1  [  ] ICU/CCU stay > 24 hours                             1  [  ] Age > 60                                                         1    IMPROVE VTE Score: 2  SCDs

## 2019-01-01 NOTE — DIETITIAN INITIAL EVALUATION ADULT. - PROBLEM SELECTOR PLAN 1
-admit to F  -serosanguinous drainage from surgical site, chronic, patient has had most recent I&D on 12/5  -Xray L Hip: Status post left THR. Old intertrochanteric fracture left hip similar to prior. There is no acute fracture or dislocation. No gross focal bone destruction. Vascular calcification. Calcified left buttock granulomas.  -Xray L Femur: No fracture dislocation or focal bone destruction. Status post ORIF left hip. Metallic fixation left femoral shaft. Hardware intact. Vascular calcification.  -CT LLE w/ IV contrast: Long peripheral enhancing fluid collection lateral soft tissues extending from the buttock to proximal to mid thigh, may represent abscess or infected hematoma  -chronic wound could be due to immunosuppression due to chronic use of steroids for RA  -ESR 83, f/u CRP  -f/u wound cultures  -PICC line on last admission, Cefazolin 2g IVPB Q8 hours until 1/15/19, will hold on abx for now pending culture results  -for possible I&D on Wednesday 1/2 with Dr. Ramirez  -ID consulted, Dr. Wills, f/u recs  -Plastics consulted  -Ortho consulted, recs appreciated  -Cardio consulted, for clearance, Dr. Little

## 2019-01-01 NOTE — DIETITIAN INITIAL EVALUATION ADULT. - PERTINENT MEDS FT
Ancef, LR 75cc/hr, Colace, Miralax, Senna, Xanax, Vit C, Pepcid, FeSO4, Folic acid, Lasix, MVI, Medrol

## 2019-01-01 NOTE — PROGRESS NOTE ADULT - ASSESSMENT
A/P: Pt is a 73y Male with a L hip SSI .  -Pain control  -Continue Abx  -WB as tolerated affected extremity  -Dressing changes as needed  -DVT PPx  -Please document medical/cardiac optimization for OR.  -FU ID Consult  -Plan for possible surgical irrigation and debridement on Weds (1/2) with Dr. Ramirez.      Erlinda Garber M.D.  PGY-1 Orthopaedic Surgery

## 2019-01-01 NOTE — CONSULT NOTE ADULT - SUBJECTIVE AND OBJECTIVE BOX
[facility], Division of Infectious Diseases  GAETANO Briones A. Lee    SHAN, ERIKA  73y, Male  [id number]    HPI--  73M previously known to our group, was supposed to see me last week in the office for follow up regarding L hip abscess in the setting of periprosthetic fracture repair. Patient's cultures grew MSSA from the abscess and while there was no clear connection of the abscess to the underlying hardware he was discharged with a prolonged course of antibiotics (cognizant that if there was involvement of the underlying hardware, odds of cure without removal of hardware would be quite remote). His family had a flat tire en route to my office, but was apparently doing well until the last day or two when redness and swelling developed in the area of prior surgery. Patient tells me that the the visiting RN though there was a "stich breaking down." Denies any fevers, chills, or rigors. He had been ambulating reasonably well without hip pain, though his rheumatoid arthritis had been bothering him a little worse than usual.     PMH/PSH--  S/P CABG x 3  S/P hip replacement, left  Rheumatoid arthritis  Osteoarthritis  COPD (chronic obstructive pulmonary disease)  HTN (hypertension)  S/P lumbar fusion  History of femur fracture  History of appendectomy  History of right shoulder replacement  History of total left hip arthroplasty      Allergies-- ceftin      Medications--  Antibiotics: ceFAZolin   IVPB 2000 milliGRAM(s) IV Intermittent every 8 hours    Immunologic:   Other: acetaminophen   Tablet .. PRN  ALPRAZolam PRN  ascorbic acid  atorvastatin  cyclobenzaprine PRN  docusate sodium  famotidine    Tablet  ferrous    sulfate  finasteride  FLUoxetine  folic acid  furosemide    Tablet  lactated ringers.  lisinopril  methadone    Tablet  methylPREDNISolone  morphine  - Injectable PRN  morphine  - Injectable PRN  multivitamin  polyethylene glycol 3350 PRN  senna  tamsulosin      Social History--  EtOH: denies active  Tobacco: denies active  Drug Use: denies active    Family/Marital History--    No pertinent family history in first degree relatives  Remainder not relevant to clinical concern.    Travel/Environmental/Occupational History:  Staying with daughter presently    Review of Systems:  A >=10-point review of systems was obtained.   Review of systems otherwise negative except as previously noted.    Physical Exam--  Vital Signs: T(F): 98.1 (01-01-19 @ 04:58), Max: 98.8 (12-31-18 @ 12:49)  HR: 75 (01-01-19 @ 04:58)  BP: 128/76 (01-01-19 @ 04:58)  RR: 17 (01-01-19 @ 04:58)  SpO2: 96% (01-01-19 @ 04:58)  Wt(kg): --  General: Nontoxic-appearing Male in no acute distress.  HEENT: AT/NC. Anicteric. Conjunctiva pink and moist. Oropharynx clear.   Neck: Not rigid. No sense of mass.  Nodes: None palpable.  Lungs: Diminished breath sounds bilaterally without rales, wheezing or rhonchi  Heart: Regular rate and rhythm. No Murmur. No rub. No gallop. No palpable thrill.  Abdomen: Bowel sounds present and normoactive. Soft. Nondistended. Nontender. No sense of mass. No organomegaly. Obese.   Extremities: No cyanosis or clubbing. PICC C/D/I. Fluctuance/erythema L hip with scant SS drainage.  Skin: Warm. Dry. Good turgor. No rash. No vasculitic stigmata.  Psychiatric: Appropriate affect and mood for situation.     Laboratory & Imaging Data--  CBC                        10.0   8.16  )-----------( 254      ( 01 Jan 2019 08:08 )             31.4       Chemistries  01-01    138  |  101  |  18  ----------------------------<  98  4.0   |  29  |  0.74    Ca    9.2      01 Jan 2019 08:08    < from: CT Lower Extremity w/ IV Cont, Left (12.31.18 @ 16:24) >    EXAM:  CT LWR EXT IC LT                            PROCEDURE DATE:  12/31/2018          INTERPRETATION:  Clinical information: Left periprosthetic hip fracture   status post internal fixation with soft tissue infection at surgical   site. Currentlywith erythema and draining at surgical skin site.    CT examination of the left hip/femur is performed with axial intervals   obtained at 2.5 mm from the original data set.  Coronal/sagittal reformatted images are obtained.    Comparison: CT scan 12/3/2018.    Again noted is internal fixation with a long plate and cerclage wire   construct of the left femur.  There is a noncemented left total hip arthroplasty.  Resulting streak artifact as well as motion artifact degrades image   quality limitingevaluation; some diagnostic information can still be   obtained.    There has been interval removal of the surgical skin staples.    There is a long peripheral enhancing fluid collection within the deep   subcutaneous soft tissues extending from the level of the lower buttock   to the proximal to mid thigh.  This extends approximately 18 cm in craniocaudad dimension and is   approximately 2 cm in maximum thickness.  There is a possible skin defect along the lateral aspect of the proximal   thigh,at the level of the superior cerclage banding.    No air is noted within the fluid collection.  No fluid or air is noted within the intermuscular fascial soft tissues.    Calcified buttock injection granulomata are noted.    Impression:    Long peripheral enhancing fluid collection lateral soft tissues extending   from the buttock to proximal to mid thigh as discussed, may represent   abscess or infected hematoma.    BOB HODGES M.D., ATTENDING RADIOLOGIST  This document has beenelectronically signed. Dec 31 2018  4:43PM    < end of copied text >      Culture Data  None

## 2019-01-01 NOTE — DIETITIAN INITIAL EVALUATION ADULT. - OTHER INFO
Pt reports a wt gain of 10-15# over past year due to binge eating snacks and sweets at times. Drinks 1 bottle of Ensure Hi Protein (160 michele) and a Ensure bar for bfst daily because its easy to eat with his teeth gone.  Eats regular food for other meals. Denies wanting to change diet consistency while inpatient.  Would like ensure shakes while here.   Pt to go for L hip I+D tomorrow.

## 2019-01-01 NOTE — PROGRESS NOTE ADULT - SUBJECTIVE AND OBJECTIVE BOX
[facility] -- Shannon Valdez, [place], [place], Sidney Orlando Savella  Office # [phone]      Follow Up:    Clearance  Subjective/Observations:   No events overnight resting comfortably in bed.  No complaints of chest pain, dyspnea, or palpitations reported. No signs of orthopnea or PND.     REVIEW OF SYSTEMS: All other review of systems is negative unless indicated above    PAST MEDICAL & SURGICAL HISTORY:  S/P CABG x 3  S/P hip replacement, left  Rheumatoid arthritis  Osteoarthritis  COPD (chronic obstructive pulmonary disease)  HTN (hypertension)  S/P lumbar fusion: Approx 2012  History of femur fracture: Repaired 11/19- L hip  History of appendectomy  History of right shoulder replacement  History of total left hip arthroplasty      MEDICATIONS  (STANDING):  ascorbic acid 500 milliGRAM(s) Oral two times a day  atorvastatin 10 milliGRAM(s) Oral at bedtime  ceFAZolin   IVPB 2000 milliGRAM(s) IV Intermittent every 8 hours  docusate sodium 100 milliGRAM(s) Oral two times a day  famotidine    Tablet 20 milliGRAM(s) Oral two times a day  ferrous    sulfate 325 milliGRAM(s) Oral daily  finasteride 5 milliGRAM(s) Oral daily  FLUoxetine 20 milliGRAM(s) Oral <User Schedule>  folic acid 1 milliGRAM(s) Oral daily  furosemide    Tablet 20 milliGRAM(s) Oral daily  lactated ringers. 1000 milliLiter(s) (75 mL/Hr) IV Continuous <Continuous>  lisinopril 10 milliGRAM(s) Oral daily  methadone    Tablet 5 milliGRAM(s) Oral every 8 hours  methylPREDNISolone 2 milliGRAM(s) Oral daily  multivitamin 1 Tablet(s) Oral daily  senna 2 Tablet(s) Oral at bedtime  tamsulosin 0.4 milliGRAM(s) Oral at bedtime    MEDICATIONS  (PRN):  acetaminophen   Tablet .. 650 milliGRAM(s) Oral every 6 hours PRN Mild Pain (1 - 3)  ALPRAZolam 0.5 milliGRAM(s) Oral at bedtime PRN anxiety  cyclobenzaprine 10 milliGRAM(s) Oral two times a day PRN Muscle Spasm  morphine  - Injectable 2 milliGRAM(s) IV Push every 6 hours PRN Moderate Pain (4 - 6)  morphine  - Injectable 4 milliGRAM(s) IV Push every 6 hours PRN Severe Pain (7 - 10)  polyethylene glycol 3350 17 Gram(s) Oral daily PRN Constipation      Allergies    Ceftin (Pruritus)    Intolerances        Vital Signs Last 24 Hrs  T(C): 36.7 (01 Jan 2019 04:58), Max: 37.1 (31 Dec 2018 12:49)  T(F): 98.1 (01 Jan 2019 04:58), Max: 98.8 (31 Dec 2018 12:49)  HR: 75 (01 Jan 2019 04:58) (65 - 106)  BP: 128/76 (01 Jan 2019 04:58) (113/68 - 151/71)  BP(mean): --  RR: 17 (01 Jan 2019 04:58) (16 - 18)  SpO2: 96% (01 Jan 2019 04:58) (95% - 98%)    I&O's Summary    Weight (kg): 83.5 (12-31 @ 12:49)    PHYSICAL EXAM:  TELE: Off tele   Constitutional: NAD, awake and alert, well-developed  HEENT: Moist Mucous Membranes, Anicteric  Pulmonary: Non-labored, breath sounds are clear bilaterally, No wheezing, crackles or rhonchi  Cardiovascular: Regular, S1 and S2 nl, No murmurs, rubs, gallops or clicks  Gastrointestinal: Bowel Sounds present, soft, nontender.   Lymph: No lymphadenopathy. No peripheral edema.  Skin: No visible rashes or ulcers.  Psych:  Mood & affect appropriate    LABS: All Labs Reviewed:                        10.0   8.16  )-----------( 254      ( 01 Jan 2019 08:08 )             31.4                         10.7   9.26  )-----------( 282      ( 31 Dec 2018 14:46 )             33.4     01 Jan 2019 08:08    138    |  101    |  18     ----------------------------<  98     4.0     |  29     |  0.74   31 Dec 2018 14:46    133    |  97     |  17     ----------------------------<  149    4.8     |  28     |  0.87     Ca    9.2        01 Jan 2019 08:08  Ca    9.5        31 Dec 2018 14:46      PT/INR - ( 01 Jan 2019 08:08 )   PT: 15.0 sec;   INR: 1.32 ratio         PTT - ( 01 Jan 2019 08:08 )  PTT:29.9 sec         ECG:  < from: 12 Lead ECG (12.31.18 @ 14:21) >  Ventricular Rate 91 BPM    Atrial Rate 91 BPM    P-R Interval 214 ms    QRS Duration 96 ms    Q-T Interval 376 ms    QTC Calculation(Bezet) 462 ms    P Axis 41 degrees    R Axis -40 degrees    T Axis 36 degrees    Diagnosis Line Sinus rhythm with 1st degree AV block  Left axis deviation  Abnormal ECG    Confirmed by Mariano Bird MD ([id number]) on 1/1/2019 8:38:37 AM    < end of copied text >    Echo:  < from: TTE Echo Doppler w/o Cont (11.19.18 @ 11:09) >   EXAM:  ECHO TTE WO CON COMP W DOPPLR         PROCEDURE DATE:  11/19/2018        INTERPRETATION:  INDICATION: CAD  Referring M.D.:Paolo  Blood Pressure 114/70        Weight (kg) :81     Height (cm):170       BSA (sq m): 1.98  Technician: AS    Dimensions:    LA 3.5       Normal Values: 2.0 - 4.0 cm    Ao 3.94        Normal Values: 2.0 - 3.8 cm  SEPTUM 1.1       Normal Values: 0.6 - 1.2 cm  PWT 1.1       Normal Values: 0.6 - 1.1 cm  LVIDd 5.1         Normal Values: 3.0 - 5.6 cm  LVIDs 4.05 Normal Values: 1.8 - 4.0 cm      OBSERVATIONS:  Technically difficult study  Mitral Valve: MAC with calcified MV leaflets. Mild mitral regurgitation.  Aortic Valve/Aorta: Mildly calcified trileaflet AV with normal opening.   Mildly dilated aortic root at 3.9  cm  Tricuspid Valve: Normal tricuspid valve. Trace tricuspid regurgitation.  Pulmonic Valve: The pulmonic valve is not well visualized. Probably   normal.  Left Atrium: Mildly enlarged   Right Atrium: Mildly enlarged  Left Ventricle: Endocardium is not well-visualized. Overall there appears   to be normal left ventricular systolic function. The EF is approximately   65%.  Right Ventricle: The right ventricle is not well-visualized. It appears   to be mildly enlarged in some views with normal systolic function.    Pericardium/Pleura: Trace pericardial effusion noted.  Pulmonary/RV Pressure: Insufficient tricuspid regurgitation Doppler in   order to estimate the right ventricular systolic pressure  LV Diastolic Function: Stage I diastolic dysfunction     Conclusion: Overall preserved left ventricular systolic function. EF 65.   Insufficient tricuspid regurgitation Doppler in order to estimate the   right ventricular systolic pressure                  CARMEN BAE M.D., ATTENDING CARDIOLOGIST  This document has been electronically signed. Nov 20 2018  3:56PM                < end of copied text >    Radiology:  < from: Xray Chest 1 View AP/PA (12.31.18 @ 15:18) >  EXAM:  XR CHEST AP OR PA 1V                            PROCEDURE DATE:  12/31/2018          INTERPRETATION:  cough    A frontal chest film again demonstrates an opacity in the left apical   region. This can be seen on 12/3/2018. Also there are fibrotic changes in   the left lower lobe.    Heart is mildly enlarged. Evidence of prior thoracotomy and ACDF      No pneumothorax noted .     CT imaging of the chest recommended.     IMPRESSION:  Persistent nodular density left apical region. Fibrotic changes left   lower lobe. CT imaging of the chest recommended. For reassessment..                ANOOP OLVERA M.D., ATTENDING RADIOLOGIST  This document has been electronically signed. Dec 31 2018  3:22PM        < end of copied text >           [facility]   Cardiology

## 2019-01-01 NOTE — DIETITIAN INITIAL EVALUATION ADULT. - PROBLEM SELECTOR PLAN 8
-chronic, on medrol 2mg daily at home  -risk of immunosuppression with chronic steroid use could be cause for chronic infection at surgical site, and for chronic ecchymosis noted on body

## 2019-01-01 NOTE — PROGRESS NOTE ADULT - ASSESSMENT
A/P: Pt is a 73y Male with a L hip SSI .  -Pain control  -Continue Abx  -WB as tolerated affected extremity  -Dressing changes as needed  -Please hold all chemical DVT PPx after midnight  -SCDs  -NPO except medications after midnight  -Fluids while NPO  -Please continue to document medical/cardiac optimization for OR  -Plan for surgical irrigation and debridement on Weds (1/2) with Dr. Ramirez.    Erlinda Garber M.D.  PGY-1 Orthopaedic Surgery

## 2019-01-01 NOTE — PROGRESS NOTE ADULT - SUBJECTIVE AND OBJECTIVE BOX
CHIEF COMPLAINT/INTERVAL HISTORY:  Pt. seen and evaluated for L. hip wound/hardware infection.  Pt. reports no pain along L. hip area.  However, does endorse significant pain in almost all other joints.      REVIEW OF SYSTEMS:  No fever, CP, SOB, or abdominal pain.     Vital Signs Last 24 Hrs  T(C): 36.7 (01 Jan 2019 04:58), Max: 37.1 (31 Dec 2018 12:49)  T(F): 98.1 (01 Jan 2019 04:58), Max: 98.8 (31 Dec 2018 12:49)  HR: 75 (01 Jan 2019 04:58) (65 - 106)  BP: 128/76 (01 Jan 2019 04:58) (113/68 - 151/71)  BP(mean): --  RR: 17 (01 Jan 2019 04:58) (16 - 18)  SpO2: 96% (01 Jan 2019 04:58) (95% - 98%)    PHYSICAL EXAM:  GENERAL: mild distress 2/2 joint pain  HEENT: EOMI, hearing normal, conjunctiva and sclera clear  Chest: CTA bilaterally, no wheezing  CV: S1S2, RRR,   GI: soft, +BS, NT/ND  Musculoskeletal: no edema  Psychiatric: affect nL, mood nL  Skin: warm and dry, L. hip incision with stitches intact +serosanguinous drainage    LABS:                        10.0   8.16  )-----------( 254      ( 01 Jan 2019 08:08 )             31.4     12-31    133<L>  |  97  |  17  ----------------------------<  149<H>  4.8   |  28  |  0.87    Ca    9.5      31 Dec 2018 14:46      PT/INR - ( 01 Jan 2019 08:08 )   PT: 15.0 sec;   INR: 1.32 ratio         PTT - ( 01 Jan 2019 08:08 )  PTT:29.9 sec      Assessment and Plan:  -L. hip wound/hardware infection:  Restart Cefazolin 2gm IV Q8h.  Pt. scheduled for surgical irrigation and debridement tomorrow.  Pt. is without s/s of decompensated CHF or ACS.  No absolute contraindication from medical perspective to proceed with planned debridement.  Plastic surgery and orthopedic surgery f/u.  ID consult.    -Anemia:  Continue ferrous sulfate 325mg PO daily and folic acid 1mg PO daily  -COPD:  stable.  Will continue to monitor.  -HTN:  continue Lisinopril 10mg PO daily  -HLD:  continue Lipitor 10mg PO QHS  -CAD:  continue statin.  Holding ASA for given given concern for possible infected hematoma.   -RA:  continue methylprednisolone 2mg PO daily  -VTE ppx:  SCD

## 2019-01-01 NOTE — PROGRESS NOTE ADULT - SUBJECTIVE AND OBJECTIVE BOX
Orthopaedic Surgery Progress Note    Pt seen and examined at bedside. Pt reports pain is well controlled in the L hip. Pt endorses pain in the fingers/hands secondary to RA. Pt reports his home steroids were not re-started. No acute overnight events. Denies fevers, dizziness, CP, SOB, N/V, numbness/tingling, calf pain.    Labs:                        10.7   9.26  )-----------( 282      ( 31 Dec 2018 14:46 )             33.4     12-31    133<L>  |  97  |  17  ----------------------------<  149<H>  4.8   |  28  |  0.87    Ca    9.5      31 Dec 2018 14:46      PT/INR - ( 31 Dec 2018 14:46 )   PT: 15.3 sec;   INR: 1.33 ratio         PTT - ( 31 Dec 2018 14:46 )  PTT:31.8 sec    Vitals:  T(C): 36.7 (01-01-19 @ 04:58), Max: 37.1 (12-31-18 @ 12:49)  HR: 75 (01-01-19 @ 04:58) (65 - 106)  BP: 128/76 (01-01-19 @ 04:58) (113/68 - 151/71)  RR: 17 (01-01-19 @ 04:58) (16 - 18)  SpO2: 96% (01-01-19 @ 04:58) (95% - 98%)    Physical Exam:  Gen: NAD, resting comfortably    LLE:  Dressing with minimal sero-sanguineous drainage; 2 cc of drainage manually expressed.  +EHL/FHL/TA/GS  SILT L2-S1  +DP/PT Pulses  Compartments soft and compressible  No calf TTP B/L

## 2019-01-01 NOTE — DIETITIAN INITIAL EVALUATION ADULT. - PROBLEM SELECTOR PLAN 9
-Patient on cyclobenzaprine at home, has interaction with fluoxetine for causing prolonged QT  -EKG in ED showed no evidence of QT prolongation  -will continue to monitor, EKG ordered for AM

## 2019-01-01 NOTE — PROGRESS NOTE ADULT - SUBJECTIVE AND OBJECTIVE BOX
Orthopaedic Surgery Pre-Op Note    Dx: L Hip Surgical Site Infection    Sx: Irrigations and Debridement    Surgeon: Dr. Ramirez    Clearance: obtained from Medicine/Cardiology    Consent: to be obtained at time of surgery by Dr. Ramirez    H&P: completed by medicine                          10.0   8.16  )-----------( 254      ( 2019 08:08 )             31.4     2019 08:08    138    |  101    |  18     ----------------------------<  98     4.0     |  29     |  0.74     Ca    9.2        2019 08:08      PT/INR - ( 2019 08:08 )   PT: 15.0 sec;   INR: 1.32 ratio         PTT - ( 2019 08:08 )  PTT:29.9 sec    Type + Screen 12-31 @ 14:46  O POS  NEG    UA: negative    EKst degree AV block    CXR: No acute cardiopulmonary findings

## 2019-01-02 DIAGNOSIS — I25.10 ATHEROSCLEROTIC HEART DISEASE OF NATIVE CORONARY ARTERY WITHOUT ANGINA PECTORIS: ICD-10-CM

## 2019-01-02 DIAGNOSIS — E78.5 HYPERLIPIDEMIA, UNSPECIFIED: ICD-10-CM

## 2019-01-02 LAB
ALBUMIN SERPL ELPH-MCNC: 2.8 G/DL — LOW (ref 3.3–5)
ALP SERPL-CCNC: 179 U/L — HIGH (ref 40–120)
ALT FLD-CCNC: 15 U/L — SIGNIFICANT CHANGE UP (ref 12–78)
ANION GAP SERPL CALC-SCNC: 11 MMOL/L — SIGNIFICANT CHANGE UP (ref 5–17)
ANION GAP SERPL CALC-SCNC: 7 MMOL/L — SIGNIFICANT CHANGE UP (ref 5–17)
APTT BLD: 33.2 SEC — SIGNIFICANT CHANGE UP (ref 28.5–37)
AST SERPL-CCNC: 19 U/L — SIGNIFICANT CHANGE UP (ref 15–37)
BILIRUB DIRECT SERPL-MCNC: 0.2 MG/DL — SIGNIFICANT CHANGE UP (ref 0.05–0.2)
BILIRUB INDIRECT FLD-MCNC: 0.4 MG/DL — SIGNIFICANT CHANGE UP (ref 0.2–1)
BILIRUB SERPL-MCNC: 0.6 MG/DL — SIGNIFICANT CHANGE UP (ref 0.2–1.2)
BUN SERPL-MCNC: 15 MG/DL — SIGNIFICANT CHANGE UP (ref 7–23)
BUN SERPL-MCNC: 20 MG/DL — SIGNIFICANT CHANGE UP (ref 7–23)
CALCIUM SERPL-MCNC: 8.8 MG/DL — SIGNIFICANT CHANGE UP (ref 8.5–10.1)
CALCIUM SERPL-MCNC: 9.3 MG/DL — SIGNIFICANT CHANGE UP (ref 8.5–10.1)
CHLORIDE SERPL-SCNC: 99 MMOL/L — SIGNIFICANT CHANGE UP (ref 96–108)
CHLORIDE SERPL-SCNC: 99 MMOL/L — SIGNIFICANT CHANGE UP (ref 96–108)
CO2 SERPL-SCNC: 26 MMOL/L — SIGNIFICANT CHANGE UP (ref 22–31)
CO2 SERPL-SCNC: 31 MMOL/L — SIGNIFICANT CHANGE UP (ref 22–31)
CREAT SERPL-MCNC: 0.8 MG/DL — SIGNIFICANT CHANGE UP (ref 0.5–1.3)
CREAT SERPL-MCNC: 1 MG/DL — SIGNIFICANT CHANGE UP (ref 0.5–1.3)
GLUCOSE SERPL-MCNC: 190 MG/DL — HIGH (ref 70–99)
GLUCOSE SERPL-MCNC: 87 MG/DL — SIGNIFICANT CHANGE UP (ref 70–99)
HCT VFR BLD CALC: 28.4 % — LOW (ref 39–50)
HCT VFR BLD CALC: 33.7 % — LOW (ref 39–50)
HGB BLD-MCNC: 10.8 G/DL — LOW (ref 13–17)
HGB BLD-MCNC: 9 G/DL — LOW (ref 13–17)
INR BLD: 1.36 RATIO — HIGH (ref 0.88–1.16)
MCHC RBC-ENTMCNC: 29.5 PG — SIGNIFICANT CHANGE UP (ref 27–34)
MCHC RBC-ENTMCNC: 30.1 PG — SIGNIFICANT CHANGE UP (ref 27–34)
MCHC RBC-ENTMCNC: 31.7 GM/DL — LOW (ref 32–36)
MCHC RBC-ENTMCNC: 32 GM/DL — SIGNIFICANT CHANGE UP (ref 32–36)
MCV RBC AUTO: 93.1 FL — SIGNIFICANT CHANGE UP (ref 80–100)
MCV RBC AUTO: 93.9 FL — SIGNIFICANT CHANGE UP (ref 80–100)
NRBC # BLD: 0 /100 WBCS — SIGNIFICANT CHANGE UP (ref 0–0)
NRBC # BLD: 0 /100 WBCS — SIGNIFICANT CHANGE UP (ref 0–0)
PLATELET # BLD AUTO: 286 K/UL — SIGNIFICANT CHANGE UP (ref 150–400)
PLATELET # BLD AUTO: 301 K/UL — SIGNIFICANT CHANGE UP (ref 150–400)
POTASSIUM SERPL-MCNC: 4 MMOL/L — SIGNIFICANT CHANGE UP (ref 3.5–5.3)
POTASSIUM SERPL-MCNC: 4.1 MMOL/L — SIGNIFICANT CHANGE UP (ref 3.5–5.3)
POTASSIUM SERPL-SCNC: 4 MMOL/L — SIGNIFICANT CHANGE UP (ref 3.5–5.3)
POTASSIUM SERPL-SCNC: 4.1 MMOL/L — SIGNIFICANT CHANGE UP (ref 3.5–5.3)
PREALB SERPL-MCNC: 18.7 MG/DL — LOW (ref 20–40)
PROT SERPL-MCNC: 6.9 G/DL — SIGNIFICANT CHANGE UP (ref 6–8.3)
PROTHROM AB SERPL-ACNC: 15.6 SEC — HIGH (ref 10–12.9)
RBC # BLD: 3.05 M/UL — LOW (ref 4.2–5.8)
RBC # BLD: 3.59 M/UL — LOW (ref 4.2–5.8)
RBC # FLD: 14.6 % — HIGH (ref 10.3–14.5)
RBC # FLD: 14.7 % — HIGH (ref 10.3–14.5)
SODIUM SERPL-SCNC: 136 MMOL/L — SIGNIFICANT CHANGE UP (ref 135–145)
SODIUM SERPL-SCNC: 137 MMOL/L — SIGNIFICANT CHANGE UP (ref 135–145)
WBC # BLD: 8.39 K/UL — SIGNIFICANT CHANGE UP (ref 3.8–10.5)
WBC # BLD: 9.48 K/UL — SIGNIFICANT CHANGE UP (ref 3.8–10.5)
WBC # FLD AUTO: 8.39 K/UL — SIGNIFICANT CHANGE UP (ref 3.8–10.5)
WBC # FLD AUTO: 9.48 K/UL — SIGNIFICANT CHANGE UP (ref 3.8–10.5)

## 2019-01-02 PROCEDURE — 99232 SBSQ HOSP IP/OBS MODERATE 35: CPT | Mod: GC

## 2019-01-02 PROCEDURE — 99232 SBSQ HOSP IP/OBS MODERATE 35: CPT

## 2019-01-02 PROCEDURE — 88304 TISSUE EXAM BY PATHOLOGIST: CPT | Mod: 26

## 2019-01-02 RX ORDER — DIPHENHYDRAMINE HCL 50 MG
50 CAPSULE ORAL EVERY 4 HOURS
Qty: 0 | Refills: 0 | Status: DISCONTINUED | OUTPATIENT
Start: 2019-01-03 | End: 2019-01-03

## 2019-01-02 RX ORDER — OXYCODONE HYDROCHLORIDE 5 MG/1
10 TABLET ORAL ONCE
Qty: 0 | Refills: 0 | Status: DISCONTINUED | OUTPATIENT
Start: 2019-01-02 | End: 2019-01-03

## 2019-01-02 RX ORDER — SODIUM CHLORIDE 9 MG/ML
500 INJECTION, SOLUTION INTRAVENOUS ONCE
Qty: 0 | Refills: 0 | Status: COMPLETED | OUTPATIENT
Start: 2019-01-02 | End: 2019-01-02

## 2019-01-02 RX ORDER — ONDANSETRON 8 MG/1
4 TABLET, FILM COATED ORAL EVERY 6 HOURS
Qty: 0 | Refills: 0 | Status: DISCONTINUED | OUTPATIENT
Start: 2019-01-03 | End: 2019-01-07

## 2019-01-02 RX ORDER — HYDROCORTISONE 20 MG
100 TABLET ORAL ONCE
Qty: 0 | Refills: 0 | Status: COMPLETED | OUTPATIENT
Start: 2019-01-02 | End: 2019-01-02

## 2019-01-02 RX ORDER — FERROUS SULFATE 325(65) MG
325 TABLET ORAL
Qty: 0 | Refills: 0 | Status: DISCONTINUED | OUTPATIENT
Start: 2019-01-03 | End: 2019-01-07

## 2019-01-02 RX ORDER — ACETAMINOPHEN 500 MG
650 TABLET ORAL EVERY 6 HOURS
Qty: 0 | Refills: 0 | Status: DISCONTINUED | OUTPATIENT
Start: 2019-01-03 | End: 2019-01-07

## 2019-01-02 RX ORDER — DOCUSATE SODIUM 100 MG
100 CAPSULE ORAL THREE TIMES A DAY
Qty: 0 | Refills: 0 | Status: DISCONTINUED | OUTPATIENT
Start: 2019-01-03 | End: 2019-01-07

## 2019-01-02 RX ORDER — PANTOPRAZOLE SODIUM 20 MG/1
40 TABLET, DELAYED RELEASE ORAL
Qty: 0 | Refills: 0 | Status: DISCONTINUED | OUTPATIENT
Start: 2019-01-03 | End: 2019-01-03

## 2019-01-02 RX ORDER — SENNA PLUS 8.6 MG/1
2 TABLET ORAL AT BEDTIME
Qty: 0 | Refills: 0 | Status: DISCONTINUED | OUTPATIENT
Start: 2019-01-03 | End: 2019-01-07

## 2019-01-02 RX ORDER — OXYCODONE HYDROCHLORIDE 5 MG/1
5 TABLET ORAL EVERY 4 HOURS
Qty: 0 | Refills: 0 | Status: DISCONTINUED | OUTPATIENT
Start: 2019-01-03 | End: 2019-01-03

## 2019-01-02 RX ORDER — OXYCODONE HYDROCHLORIDE 5 MG/1
10 TABLET ORAL EVERY 4 HOURS
Qty: 0 | Refills: 0 | Status: DISCONTINUED | OUTPATIENT
Start: 2019-01-03 | End: 2019-01-07

## 2019-01-02 RX ORDER — HYDROCORTISONE 20 MG
50 TABLET ORAL EVERY 8 HOURS
Qty: 0 | Refills: 0 | Status: DISCONTINUED | OUTPATIENT
Start: 2019-01-02 | End: 2019-01-02

## 2019-01-02 RX ORDER — FOLIC ACID 0.8 MG
1 TABLET ORAL DAILY
Qty: 0 | Refills: 0 | Status: DISCONTINUED | OUTPATIENT
Start: 2019-01-03 | End: 2019-01-07

## 2019-01-02 RX ORDER — ACETAMINOPHEN 500 MG
650 TABLET ORAL EVERY 6 HOURS
Qty: 0 | Refills: 0 | Status: DISCONTINUED | OUTPATIENT
Start: 2019-01-03 | End: 2019-01-03

## 2019-01-02 RX ORDER — SODIUM CHLORIDE 9 MG/ML
1000 INJECTION, SOLUTION INTRAVENOUS
Qty: 0 | Refills: 0 | Status: DISCONTINUED | OUTPATIENT
Start: 2019-01-03 | End: 2019-01-03

## 2019-01-02 RX ORDER — CEFAZOLIN SODIUM 1 G
2000 VIAL (EA) INJECTION EVERY 8 HOURS
Qty: 0 | Refills: 0 | Status: DISCONTINUED | OUTPATIENT
Start: 2019-01-03 | End: 2019-01-04

## 2019-01-02 RX ORDER — BENZOCAINE AND MENTHOL 5; 1 G/100ML; G/100ML
1 LIQUID ORAL
Qty: 0 | Refills: 0 | Status: DISCONTINUED | OUTPATIENT
Start: 2019-01-03 | End: 2019-01-07

## 2019-01-02 RX ORDER — OXYCODONE HYDROCHLORIDE 5 MG/1
5 TABLET ORAL ONCE
Qty: 0 | Refills: 0 | Status: DISCONTINUED | OUTPATIENT
Start: 2019-01-02 | End: 2019-01-03

## 2019-01-02 RX ORDER — ZALEPLON 10 MG
5 CAPSULE ORAL AT BEDTIME
Qty: 0 | Refills: 0 | Status: DISCONTINUED | OUTPATIENT
Start: 2019-01-03 | End: 2019-01-03

## 2019-01-02 RX ORDER — HYDROMORPHONE HYDROCHLORIDE 2 MG/ML
0.5 INJECTION INTRAMUSCULAR; INTRAVENOUS; SUBCUTANEOUS
Qty: 0 | Refills: 0 | Status: DISCONTINUED | OUTPATIENT
Start: 2019-01-02 | End: 2019-01-03

## 2019-01-02 RX ORDER — METOCLOPRAMIDE HCL 10 MG
5 TABLET ORAL ONCE
Qty: 0 | Refills: 0 | Status: DISCONTINUED | OUTPATIENT
Start: 2019-01-02 | End: 2019-01-03

## 2019-01-02 RX ORDER — SODIUM CHLORIDE 9 MG/ML
1000 INJECTION, SOLUTION INTRAVENOUS
Qty: 0 | Refills: 0 | Status: DISCONTINUED | OUTPATIENT
Start: 2019-01-02 | End: 2019-01-03

## 2019-01-02 RX ORDER — ASCORBIC ACID 60 MG
500 TABLET,CHEWABLE ORAL
Qty: 0 | Refills: 0 | Status: DISCONTINUED | OUTPATIENT
Start: 2019-01-03 | End: 2019-01-07

## 2019-01-02 RX ORDER — HYDROMORPHONE HYDROCHLORIDE 2 MG/ML
1 INJECTION INTRAMUSCULAR; INTRAVENOUS; SUBCUTANEOUS
Qty: 0 | Refills: 0 | Status: DISCONTINUED | OUTPATIENT
Start: 2019-01-03 | End: 2019-01-07

## 2019-01-02 RX ADMIN — Medication 100 MILLIGRAM(S): at 13:29

## 2019-01-02 RX ADMIN — Medication 100 MILLIGRAM(S): at 05:25

## 2019-01-02 RX ADMIN — Medication 650 MILLIGRAM(S): at 04:16

## 2019-01-02 RX ADMIN — Medication 1 TABLET(S): at 11:35

## 2019-01-02 RX ADMIN — Medication 1 MILLIGRAM(S): at 11:35

## 2019-01-02 RX ADMIN — SODIUM CHLORIDE 75 MILLILITER(S): 9 INJECTION, SOLUTION INTRAVENOUS at 16:16

## 2019-01-02 RX ADMIN — Medication 100 MILLIGRAM(S): at 05:23

## 2019-01-02 RX ADMIN — Medication 100 MILLIGRAM(S): at 23:30

## 2019-01-02 RX ADMIN — SODIUM CHLORIDE 2000 MILLILITER(S): 9 INJECTION, SOLUTION INTRAVENOUS at 21:43

## 2019-01-02 RX ADMIN — LISINOPRIL 10 MILLIGRAM(S): 2.5 TABLET ORAL at 05:23

## 2019-01-02 RX ADMIN — FAMOTIDINE 20 MILLIGRAM(S): 10 INJECTION INTRAVENOUS at 05:23

## 2019-01-02 RX ADMIN — MORPHINE SULFATE 4 MILLIGRAM(S): 50 CAPSULE, EXTENDED RELEASE ORAL at 05:34

## 2019-01-02 RX ADMIN — METHADONE HYDROCHLORIDE 5 MILLIGRAM(S): 40 TABLET ORAL at 13:29

## 2019-01-02 RX ADMIN — SODIUM CHLORIDE 2000 MILLILITER(S): 9 INJECTION, SOLUTION INTRAVENOUS at 23:00

## 2019-01-02 RX ADMIN — Medication 650 MILLIGRAM(S): at 15:40

## 2019-01-02 RX ADMIN — Medication 2 MILLIGRAM(S): at 05:24

## 2019-01-02 RX ADMIN — MORPHINE SULFATE 4 MILLIGRAM(S): 50 CAPSULE, EXTENDED RELEASE ORAL at 05:54

## 2019-01-02 RX ADMIN — Medication 325 MILLIGRAM(S): at 11:35

## 2019-01-02 RX ADMIN — Medication 20 MILLIGRAM(S): at 05:24

## 2019-01-02 RX ADMIN — Medication 20 MILLIGRAM(S): at 13:29

## 2019-01-02 RX ADMIN — METHADONE HYDROCHLORIDE 5 MILLIGRAM(S): 40 TABLET ORAL at 05:23

## 2019-01-02 RX ADMIN — Medication 650 MILLIGRAM(S): at 14:46

## 2019-01-02 RX ADMIN — CHLORHEXIDINE GLUCONATE 1 APPLICATION(S): 213 SOLUTION TOPICAL at 11:35

## 2019-01-02 RX ADMIN — FINASTERIDE 5 MILLIGRAM(S): 5 TABLET, FILM COATED ORAL at 11:35

## 2019-01-02 RX ADMIN — Medication 650 MILLIGRAM(S): at 05:27

## 2019-01-02 RX ADMIN — Medication 500 MILLIGRAM(S): at 05:23

## 2019-01-02 NOTE — DISCHARGE NOTE ADULT - PLAN OF CARE
Return to baseline ADLs 1.	Pain control  2.	Walking with full weight bearing as tolerated, with assistive devices as needed  3.	DVT prophylaxis  4.	PT as needed  5.	Follow up with Dr Whipple and Dr Ramirez as outpatient in 10-14 days after discharge from the hospital or rehab. Call office for appointment.  6.	Remove sutures post-op day 14 with dressing changes as directed by Dr Whipple  7.	Ice/elevate affected area as needed.  8.	Keep dressing clean and dry.  9.           Prevena incisional vac will  on 2019 1.	Pain control  2.	Walking with full weight bearing as tolerated, with assistive devices as needed  3.	PT as needed  4.	Follow up with Dr Whipple and Dr Ramirez as outpatient in 10-14 days after discharge from the hospital or rehab. Call office for appointment.  5.	Follow up with Dr. Whipple Thursday, 1/10/19 for drain removal and dressing change.  6.	Ice/elevate affected area as needed.  7.	Keep dressing clean and dry.

## 2019-01-02 NOTE — PROGRESS NOTE ADULT - SUBJECTIVE AND OBJECTIVE BOX
Patient seen and examined. Pain controlled.    Physical exam  VS: see EMR  Gen: NAD  Left LE: Posterior hemovac drain site with some sanguinous drainage. Prevena and other dressing intact. +EHL/FHL/TA/GSC. SILT L3-S1. +Dorsalis pedis, capillary refill brisk. Compartments soft and nontender.      73M s/p left hip I&D with plastics closure POD# 0    FU OR cultures  FU hemovac output  Hemovac and dressing per plastic surgery  Antibiotics per ID  Weight bear as tolerated  Pain control  DVT prophylaxis  Physical therapy  Discharge planning

## 2019-01-02 NOTE — PROGRESS NOTE ADULT - ASSESSMENT
Assessment and Plan:  -L. hip wound/hardware infection:  Restart Cefazolin 2gm IV Q8h.  Patient post-op day 0 s/p surgical irrigation and debridement.  Pt. is without s/s of decompensated CHF or ACS.  No absolute contraindication from medical perspective to proceed with planned debridement.  Plastic surgery and orthopedic surgery f/u.  ID consult.    -Anemia:  Continue ferrous sulfate 325mg PO daily and folic acid 1mg PO daily  -COPD:  stable.  Will continue to monitor.  -HTN:  continue Lisinopril 10mg PO daily  -HLD:  continue Lipitor 10mg PO QHS  -CAD:  continue statin.  Holding ASA for given given concern for possible infected hematoma.   -RA:  continue methylprednisolone 2mg PO daily  -VTE ppx:  SCD 74 yo M PMHx 45 pack year smoking hx and COPD (diagnosed "a few years ago, quit smoking then), HTN, HLD, hx of MI and CABG (2008) (stents also placed per chart review), RA (on Medrol daily), hx of multiple L hip surgeries (hip replacement 30 years ago in [place], ORIF recently 11/2018 w/ Dr. Ramirez) complicated with superficial infection which required I&D on 12/5 (discharged with PICC line and continued to receive cefazolin), present to the ED with L hip pain at the surgical site admitted with chronic infection of hardware.      Assessment and Plan:  -L. hip wound/hardware infection:  Restart Cefazolin 2gm IV Q8h.  Patient post-op day 0 s/p surgical irrigation and debridement.  Pt. is without s/s of decompensated CHF or ACS.  No absolute contraindication from medical perspective to proceed with planned debridement.  Plastic surgery and orthopedic surgery f/u.  ID consult.    -Anemia:  Continue ferrous sulfate 325mg PO daily and folic acid 1mg PO daily  -COPD:  stable.  Will continue to monitor.  -HTN:  continue Lisinopril 10mg PO daily  -HLD:  continue Lipitor 10mg PO QHS  -CAD:  continue statin.  Holding ASA for given given concern for possible infected hematoma.   -RA:  continue methylprednisolone 2mg PO daily  -VTE ppx:  SCD 72 yo M PMHx 45 pack year smoking hx and COPD (diagnosed "a few years ago, quit smoking then), HTN, HLD, hx of MI and CABG (2008) (stents also placed per chart review), RA (on Medrol daily), hx of multiple L hip surgeries (hip replacement 30 years ago in [place], ORIF recently 11/2018 w/ Dr. Ramirez) complicated with superficial infection which required I&D on 12/5 (discharged with PICC line and continued to receive cefazolin), present to the ED with L hip pain at the surgical site admitted with chronic infection of hardware.

## 2019-01-02 NOTE — DISCHARGE NOTE ADULT - PROVIDER TOKENS
TOKEN:'[id number]',TOKLUIS [id number]' TOKEN:'[id number]:'[id number]:'[id number]'

## 2019-01-02 NOTE — PROGRESS NOTE ADULT - SUBJECTIVE AND OBJECTIVE BOX
[facility], Division of Infectious Diseases  GAETANO Briones A. Lee  [phone]    Name: ERIKA TORREZ  Age: 73y  Gender: Male  MRN: [id number]    Interval History--  Notes reviewed. No new issues. Awaiting OR. Denies pain in hip. No fevers, chills, or rigors. Generalized RA pain. No other complaints.    Past Medical History--  S/P CABG x 3  S/P hip replacement, left  Rheumatoid arthritis  Osteoarthritis  COPD (chronic obstructive pulmonary disease)  HTN (hypertension)  S/P lumbar fusion  History of femur fracture  History of appendectomy  History of right shoulder replacement  History of total left hip arthroplasty      For details regarding the patient's social history, family history, and other miscellaneous elements, please refer the initial infectious diseases consultation and/or the admitting history and physical examination for this admission.    Allergies    Ceftin (Pruritus)    Intolerances        Medications--  Antibiotics:  ceFAZolin   IVPB 2000 milliGRAM(s) IV Intermittent every 8 hours    Immunologic:    Other:  acetaminophen   Tablet .. PRN  ALPRAZolam PRN  ascorbic acid  atorvastatin  chlorhexidine 2% Cloths  cyclobenzaprine PRN  docusate sodium  famotidine    Tablet  ferrous    sulfate  finasteride  FLUoxetine  folic acid  furosemide    Tablet  hydrocortisone sodium succinate Injectable  lactated ringers.  lisinopril  methadone    Tablet  methylPREDNISolone  morphine  - Injectable PRN  morphine  - Injectable PRN  multivitamin  polyethylene glycol 3350 PRN  senna  tamsulosin      Review of Systems--  A 10-point review of systems was obtained.   Review of systems otherwise unchanged compared to prior visit except as previously noted.    Physical Examination--  Vital Signs: T(F): 98.5 (01-02-19 @ 05:19), Max: 98.5 (01-02-19 @ 05:19)  HR: 99 (01-02-19 @ 05:19)  BP: 113/74 (01-02-19 @ 05:19)  RR: 18 (01-02-19 @ 05:19)  SpO2: 94% (01-02-19 @ 05:19)  Wt(kg): --  General: Nontoxic-appearing Male in no acute distress.  HEENT: AT/NC. Anicteric. Conjunctiva pink and moist. Oropharynx clear.   Neck: Not rigid. No sense of mass.  Nodes: None palpable.  Lungs: Diminished breath sounds bilaterally without rales, wheezing or rhonchi  Heart: Regular rate and rhythm. No Murmur. No rub. No gallop. No palpable thrill.  Abdomen: Bowel sounds present and normoactive. Soft. Nondistended. Nontender. No sense of mass. No organomegaly. Obese.   Extremities: No cyanosis or clubbing. PICC C/D/I. L hip dressed.  Skin: Warm. Dry. Good turgor. No rash. No vasculitic stigmata.  Psychiatric: Appropriate affect and mood for situation.     Laboratory Studies--  CBC                        10.8   8.39  )-----------( 286      ( 02 Jan 2019 05:25 )             33.7       Chemistries  01-02    137  |  99  |  15  ----------------------------<  87  4.0   |  31  |  0.80    Ca    9.3      02 Jan 2019 05:25    TPro  6.9  /  Alb  2.8<L>  /  TBili  0.6  /  DBili  .20  /  AST  19  /  ALT  15  /  AlkPhos  179<H>  01-02      Culture Data  No new culture data

## 2019-01-02 NOTE — PROGRESS NOTE ADULT - SUBJECTIVE AND OBJECTIVE BOX
[facility] -- Shannon Valdez, [place], [place], Sidney Orlando Savella  Office # [phone]    Follow Up:  Preop Eval, CAD    Subjective/Observations: No9 cardiac complaints.  Comfortable on RA.  No orthopnea    REVIEW OF SYSTEMS: All other review of systems is negative unless indicated above    PAST MEDICAL & SURGICAL HISTORY:  S/P CABG x 3  S/P hip replacement, left  Rheumatoid arthritis  Osteoarthritis  COPD (chronic obstructive pulmonary disease)  HTN (hypertension)  S/P lumbar fusion: Approx 2012  History of femur fracture: Repaired 11/19- L hip  History of appendectomy  History of right shoulder replacement  History of total left hip arthroplasty    MEDICATIONS  (STANDING):  ascorbic acid 500 milliGRAM(s) Oral two times a day  atorvastatin 10 milliGRAM(s) Oral at bedtime  ceFAZolin   IVPB 2000 milliGRAM(s) IV Intermittent every 8 hours  chlorhexidine 2% Cloths 1 Application(s) Topical daily  docusate sodium 100 milliGRAM(s) Oral two times a day  famotidine    Tablet 20 milliGRAM(s) Oral two times a day  ferrous    sulfate 325 milliGRAM(s) Oral daily  finasteride 5 milliGRAM(s) Oral daily  FLUoxetine 20 milliGRAM(s) Oral <User Schedule>  folic acid 1 milliGRAM(s) Oral daily  furosemide    Tablet 20 milliGRAM(s) Oral daily  hydrocortisone sodium succinate Injectable 100 milliGRAM(s) IV Push every 8 hours  lactated ringers. 1000 milliLiter(s) (75 mL/Hr) IV Continuous <Continuous>  lisinopril 10 milliGRAM(s) Oral daily  methadone    Tablet 5 milliGRAM(s) Oral every 8 hours  methylPREDNISolone 2 milliGRAM(s) Oral daily  multivitamin 1 Tablet(s) Oral daily  senna 2 Tablet(s) Oral at bedtime  tamsulosin 0.4 milliGRAM(s) Oral at bedtime    MEDICATIONS  (PRN):  acetaminophen   Tablet .. 650 milliGRAM(s) Oral every 6 hours PRN Mild Pain (1 - 3)  ALPRAZolam 0.5 milliGRAM(s) Oral at bedtime PRN anxiety  cyclobenzaprine 10 milliGRAM(s) Oral two times a day PRN Muscle Spasm  morphine  - Injectable 2 milliGRAM(s) IV Push every 6 hours PRN Moderate Pain (4 - 6)  morphine  - Injectable 4 milliGRAM(s) IV Push every 6 hours PRN Severe Pain (7 - 10)  polyethylene glycol 3350 17 Gram(s) Oral daily PRN Constipation      Allergies    Ceftin (Pruritus)    Intolerances        Vital Signs Last 24 Hrs  T(C): 36.6 (02 Jan 2019 13:23), Max: 36.9 (02 Jan 2019 05:19)  T(F): 97.9 (02 Jan 2019 13:23), Max: 98.5 (02 Jan 2019 05:19)  HR: 80 (02 Jan 2019 13:23) (80 - 99)  BP: 128/77 (02 Jan 2019 13:23) (113/74 - 128/77)  BP(mean): --  RR: 18 (02 Jan 2019 13:23) (18 - 18)  SpO2: 95% (02 Jan 2019 13:23) (94% - 96%)    I&O's Summary    01 Jan 2019 07:01  -  02 Jan 2019 07:00  --------------------------------------------------------  IN: 625 mL / OUT: 250 mL / NET: 375 mL    02 Jan 2019 07:01  -  02 Jan 2019 15:38  --------------------------------------------------------  IN: 500 mL / OUT: 0 mL / NET: 500 mL    PHYSICAL EXAM:  TELE: Not on tele  Constitutional: NAD, awake and alert, well-developed  HEENT: Moist Mucous Membranes, Anicteric  Pulmonary: Non-labored, breath sounds are clear bilaterally, No wheezing, rales or rhonchi  Cardiovascular: Regular, S1 and S2, No murmurs, rubs, gallops or clicks  Gastrointestinal: Bowel Sounds present, soft, nontender.   Lymph: No peripheral edema. No lymphadenopathy.  Skin: No visible rashes or ulcers.  Ecchymoses on BUE.  Right AC PICC line  Psych:  Mood & affect appropriate    LABS: All Labs Reviewed:                        10.8   8.39  )-----------( 286      ( 02 Jan 2019 05:25 )             33.7                         10.0   8.16  )-----------( 254      ( 01 Jan 2019 08:08 )             31.4                         10.7   9.26  )-----------( 282      ( 31 Dec 2018 14:46 )             33.4     02 Jan 2019 05:25    137    |  99     |  15     ----------------------------<  87     4.0     |  31     |  0.80   01 Jan 2019 08:08    138    |  101    |  18     ----------------------------<  98     4.0     |  29     |  0.74   31 Dec 2018 14:46    133    |  97     |  17     ----------------------------<  149    4.8     |  28     |  0.87     Ca    9.3        02 Jan 2019 05:25  Ca    9.2        01 Jan 2019 08:08  Ca    9.5        31 Dec 2018 14:46    TPro  6.9    /  Alb  2.8    /  TBili  0.6    /  DBili  .20    /  AST  19     /  ALT  15     /  AlkPhos  179    02 Jan 2019 05:25    PT/INR - ( 02 Jan 2019 05:25 )   PT: 15.6 sec;   INR: 1.36 ratio      PTT - ( 02 Jan 2019 05:25 )  PTT:33.2 sec    < from: TTE Echo Doppler w/o Cont (11.19.18 @ 11:09) >     EXAM:  ECHO TTE WO CON COMP W DOPPLR      PROCEDURE DATE:  11/19/2018      INTERPRETATION:  INDICATION: CAD  Referring M.D.:Paolo  Blood Pressure 114/70        Weight (kg) :81     Height (cm):170       BSA (sq m): 1.98  Technician: AS    Dimensions:    LA 3.5       Normal Values: 2.0 - 4.0 cm    Ao 3.94        Normal Values: 2.0 - 3.8 cm  SEPTUM 1.1       Normal Values: 0.6 - 1.2 cm  PWT 1.1       Normal Values: 0.6 - 1.1 cm  LVIDd 5.1         Normal Values: 3.0 - 5.6 cm  LVIDs 4.05 Normal Values: 1.8 - 4.0 cm    OBSERVATIONS:  Technically difficult study  Mitral Valve: MAC with calcified MV leaflets. Mild mitral regurgitation.  Aortic Valve/Aorta: Mildly calcified trileaflet AV with normal opening.   Mildly dilated aortic root at 3.9  cm  Tricuspid Valve: Normal tricuspid valve. Trace tricuspid regurgitation.  Pulmonic Valve: The pulmonic valve is not well visualized. Probably   normal.  Left Atrium: Mildly enlarged   Right Atrium: Mildly enlarged  Left Ventricle: Endocardium is not well-visualized. Overall there appears   to be normal left ventricular systolic function. The EF is approximately   65%.  Right Ventricle: The right ventricle is not well-visualized. It appears   to be mildly enlarged in some views with normal systolic function.    Pericardium/Pleura: Trace pericardial effusion noted.  Pulmonary/RV Pressure: Insufficient tricuspid regurgitation Doppler in   order to estimate the right ventricular systolic pressure  LV Diastolic Function: Stage I diastolic dysfunction     Conclusion: Overall preserved left ventricular systolic function. EF 65.   Insufficient tricuspid regurgitation Doppler in order to estimate the   right ventricular systolic pressure      CARMEN BAE M.D., ATTENDING CARDIOLOGIST  This document has been electronically signed. Nov 20 2018  3:56PM      < end of copied text >     < from: Xray Chest 1 View AP/PA (12.31.18 @ 15:18) >    EXAM:  XR CHEST AP OR PA 1V                          PROCEDURE DATE:  12/31/2018      INTERPRETATION:  cough    A frontal chest film again demonstrates an opacity in the left apical   region. This can be seen on 12/3/2018. Also there are fibrotic changes in   the left lower lobe.    Heart is mildly enlarged. Evidence of prior thoracotomy and ACDF      No pneumothorax noted .     CT imaging of the chest recommended.     IMPRESSION:  Persistent nodular density left apical region. Fibrotic changes left   lower lobe. CT imaging of the chest recommended. For reassessment..    ANOOP OLVERA M.D., ATTENDING RADIOLOGIST  This document has been electronically signed. Dec 31 2018  3:22PM      < end of copied text >

## 2019-01-02 NOTE — PROGRESS NOTE ADULT - ASSESSMENT
CT (personally reviewed) with large collection, enhancing rim. Infected seroma vs. superimposed abscess vs. continuous process from prior infection  No definite connection to underlying hardware but scan with a lot of artifact due to adjacent hardware  Plans for OR tomorrow noted, concur  Overall plan to be determined based on OR findings and culture data  Given overall stability I would continue current antibiotics and adjust as needed based on cx from OR.

## 2019-01-02 NOTE — PROGRESS NOTE ADULT - ASSESSMENT
A/P: Pt is a 73y Male with a L hip SSI s/p I&D POD 1:  -Pain control  -Continue Abx  -WBAT  -Dressing changes as needed  -DVT PPx  -ID Consulted, recs appreciated  -discussed with attending and will update plan if any changes A/P: Pt is a 73y Male with a L hip SSI:  -Pain control  -Continue Abx  -WBAT  -cleared by medicine/cardiology  -Dressing changes as needed  -DVT PPx  -ID Consulted, recs appreciated  -plan for OR today 1/2 for washout/I&D of L hip SSI:  -discussed with attending and will update plan if any changes

## 2019-01-02 NOTE — BRIEF OPERATIVE NOTE - PROCEDURE
<<-----Click on this checkbox to enter Procedure Irrigation and closure of wound  01/02/2019    Active  AMEGAS

## 2019-01-02 NOTE — CONSULT NOTE ADULT - ASSESSMENT
73M with PMHx as above, admitted with chronic left hip surgical hardware infection s/p I&D POD 0. Transferred to ICU for further management of post op hypotension    -Concern for post op bleed. As per PACU RN, Ortho aware of drainage in PACU  -Additional PRBC ordered.  -Will give stress dose of steroids as patient is no long standing steroids for RA  -Continue IVF's  -Serial CBC. Transfuse prn to keep Hgb > 8 given cardiac history  -Pain management  -WIll hold AC given concern for bleed  -Must also rule out severe sepsis. WBC WNL but Pt is on long standing steroids  -Monitor for fevers  -Check Lactate  -May require pressor support.  -Case D/W EICU Attending 73M with PMHx as above, admitted with chronic left hip surgical hardware infection s/p I&D POD 0. Transferred to ICU for further management of post op hypotension, ABLA     -Concern for post op bleed. As per PACU RN, Ortho aware of drainage in PACU  -Additional PRBC ordered.  -Will give stress dose of steroids as patient is no long standing steroids for RA  -Continue IVF's  -ABLA likely due to post op blood loss  -Serial CBC. Transfuse prn to keep Hgb > 8 given cardiac history  -Pain management  -WIll hold AC given concern for bleed  -Must also rule out severe sepsis. WBC WNL but Pt is on long standing steroids  -Monitor for fevers  -Check Lactate  -May require pressor support.  -Case D/W EICU Attending

## 2019-01-02 NOTE — DISCHARGE NOTE ADULT - ADDITIONAL INSTRUCTIONS
Please call to schedule your follow up appointments with your doctors (primary care doctor in 1-2wks, infectious disease physician around January 21st, orthopedic surgeon in 1-2wks, plastic surgeon please call to schedule appointment for this Thursday 1/10/19)  Please take your medications as detailed in your medication reconciliation  Please return to the ED for worsening of your medical condition Please call to schedule your follow up appointments with your doctors (primary care doctor in 1-2wks, infectious disease physician around January 21st, orthopedic surgeon in 1-2wks, plastic surgeon please call to schedule appointment for this Thursday 1/10/19)  Please take your medications as detailed in your medication reconciliation  Please return to the ED for worsening of your medical condition  Please call to schedule appointments with the above listed physicians

## 2019-01-02 NOTE — DISCHARGE NOTE ADULT - HOME CARE AGENCY
[facility] for IV Antibiotics-[phone] and [facility] [place] [facility] for VN/QP-[phone]

## 2019-01-02 NOTE — PROGRESS NOTE ADULT - ATTENDING COMMENTS
Pt. seen and evaluated for L. hip abscess/possible infected hardware.  Pt. is in no distress.  Reports joint pain is improved.  Tolerating IV antibiotic.  Physical examination as above.        Assessment and Plan:  -L. hip wound/hardware infection:  Continue Cefazolin 2gm IV Q8h.  Patient scheduled for surgical irrigation and debridement today.  Started on stress dose steroids.  Pt. is without s/s of decompensated CHF or ACS.  No absolute contraindication from medical perspective to proceed with planned debridement.  Plastic surgery, orthopedic surgery, and ID f/u  -Anemia:  Hbg stable.  Continue ferrous sulfate 325mg PO daily and folic acid 1mg PO daily  -COPD:  stable.  Will continue to monitor.  -HTN:  continue Lisinopril 10mg PO daily  -HLD:  continue Lipitor 10mg PO QHS   -CAD:  continue statin.  Holding ASA for given given concern for possible infected hematoma.   -RA:  continue methylprednisolone 2mg PO daily.  On stress dose steroids for OR.    -VTE ppx:  SCD

## 2019-01-02 NOTE — PROGRESS NOTE ADULT - SUBJECTIVE AND OBJECTIVE BOX
24 Hr Events:  Pt seen and examined at bedside. Pt reports pain is well controlled in the L hip. Pt endorses pain in the fingers/hands/shoulders secondary to RA. No acute overnight events. Denies fevers, dizziness, CP, SOB, N/V, numbness/tingling, calf pain.    Vital Signs Last 24 Hrs  T(C): 36.9 (02 Jan 2019 05:19), Max: 36.9 (02 Jan 2019 05:19)  T(F): 98.5 (02 Jan 2019 05:19), Max: 98.5 (02 Jan 2019 05:19)  HR: 99 (02 Jan 2019 05:19) (80 - 99)  BP: 113/74 (02 Jan 2019 05:19) (111/75 - 122/76)  BP(mean): --  RR: 18 (02 Jan 2019 05:19) (18 - 18)  SpO2: 94% (02 Jan 2019 05:19) (94% - 97%)    Physical Exam:  Gen: NAD, resting comfortably    LLE:  Dressing CDI, changed  +EHL/FHL/TA/GS  SILT L2-S1  +DP/PT Pulses  Compartments soft and compressible  No calf TTP B/L

## 2019-01-02 NOTE — DISCHARGE NOTE ADULT - HOSPITAL COURSE
74 yo M PMHx 45 pack year smoking hx and COPD (diagnosed "a few years ago, quit smoking then), HTN, HLD, hx of MI and CABG (2008) (stents also placed per chart review), RA (on Medrol daily), hx of multiple L hip surgeries (hip replacement 30 years ago in [place], ORIF recently 11/2018 w/ Dr. Ramirez) complicated with superficial infection which required I&D on 12/5 (discharged with PICC line and continued to receive cefazolin), present to the ED with L hip pain at the surgical site admitted with chronic infection of hardware. In the ED labs significant for ESR 83, WBC 9.26, H/H 10.7/33.4, PT/INR 15.3/1.33, Na 133.  Xray L Femur: : Long peripheral enhancing fluid collection lateral soft tissues extending from the buttock to proximal to mid thigh as discussed, may represent abscess or infected hematoma.    Patient admitted to [facility] w/ possible chronic infection of hardware.  Patient was continued on Cefazolin.  Orthopedic surgery was consulted, and patient brought to OR for surgical debridement and lavage.  ID, Dr. Childs consulted for antibiotic recommendations. 74 yo M PMHx 45 pack year smoking hx and COPD (diagnosed "a few years ago, quit smoking then), HTN, HLD, hx of MI and CABG (2008) (stents also placed per chart review), RA (on Medrol daily), hx of multiple L hip surgeries (hip replacement 30 years ago in [place], ORIF recently 11/2018 w/ Dr. Ramirez) complicated with superficial infection which required I&D on 12/5 (discharged with PICC line and continued to receive cefazolin), present to the ED with L hip pain at the surgical site admitted with chronic infection of hardware. In the ED labs significant for ESR 83, WBC 9.26, H/H 10.7/33.4, PT/INR 15.3/1.33, Na 133.  Xray L Femur: : Long peripheral enhancing fluid collection lateral soft tissues extending from the buttock to proximal to mid thigh as discussed, may represent abscess or infected hematoma.    Patient admitted to [facility] w/ possible chronic infection of hardware.  Patient was continued on Cefazolin.  CT of the hip showed long peripheral enhancing fluid collection lateral soft tissues extending from the buttock to proximal to mid thigh.  Orthopedic surgery was consulted, and patient brought to OR for surgical debridement and lavage.  ID, Dr. Childs consulted for antibiotic recommendations.  Patient had post op bleeding and hypotension.  He was transferred to ICU where he received 2 units PRBC transfusion.  Pt. did not require pressors and hbg and BP stabilized.  He was then down graded to medical/surgical floor.  Surgical cultures grew out alpha hemolytic strep. 72 yo M PMHx 45 pack year smoking hx and COPD (diagnosed "a few years ago, quit smoking then), HTN, HLD, hx of MI and CABG (2008) (stents also placed per chart review), RA (on Medrol daily), hx of multiple L hip surgeries (hip replacement 30 years ago in [place], ORIF recently 11/2018 w/ Dr. Ramirez) complicated with superficial infection which required I&D on 12/5 (discharged with PICC line and continued to receive cefazolin), present to the ED with L hip pain at the surgical site admitted with chronic infection of hardware. In the ED labs significant for ESR 83, WBC 9.26, H/H 10.7/33.4, PT/INR 15.3/1.33, Na 133.  Xray L Femur: : Long peripheral enhancing fluid collection lateral soft tissues extending from the buttock to proximal to mid thigh as discussed, may represent abscess or infected hematoma.    Patient admitted to [facility] w/ possible chronic infection of hardware.  Patient was continued on Cefazolin.  CT of the hip showed long peripheral enhancing fluid collection lateral soft tissues extending from the buttock to proximal to mid thigh.  Orthopedic surgery was consulted, and patient brought to OR for surgical debridement and lavage.  ID, Dr. Childs consulted for antibiotic recommendations.  Patient had post op bleeding and hypotension.  He was transferred to ICU where he received 2 units PRBC transfusion.  Pt. did not require pressors and hbg and BP stabilized.  He was then downgraded to medical/surgical floor.  Surgical cultures grew vancomycin resistant enterococcus which required switching IV abx to daptomycin to complete until at least 1/21/19, will require further evaluation with infectious disease around 1/21/19 for repeat imaging and to determine total duration of therapy. Suggest weekly labs on Mondays CBC, BMP, CK faxed to [phone]. H/H has slightly downtrended but is stable. Will f/u outpatient for further management with ortho/plastics/ID. FROM ADMISSION H+P:   HPI:  74 yo M PMHx 45 pack year smoking hx and COPD (diagnosed "a few years ago, quit smoking then), HTN, HLD, hx of MI and CABG () (stents also placed per chart review), RA (on Medrol daily), hx of multiple L hip surgeries (hip replacement 30 years ago in [place], ORIF recently 2018 w/ Dr. Ramirez) complicated with superficial infection which required I&D on  (discharged with PICC line and continued to receive cefazolin), present to the ED with L hip pain at the surgical site.  Serosanguinous fluid draining from surgical site since last admission in a small amount and has been on and off, patient was seen by Dr. Scruggs on  for wound check and dressing change and states that the bleeding present that day was minimal.  Patient ambulating with walker, trying not to put full weight on to affected extremity (50% WBAT).  Patient seen earlier today by wound care nurse who noted drainage from site and some stitches were missing, contacted Dr. Ramirez, would told patient to come to the ED.  Patient denies fevers, chest pain, dizziness, SOB, n/v, calf pain, numbness, tingling, loss of sensation in affected extremity.  Denies recent trauma to the LLE, denies taking blood thinners (besides baby ASA for stents).    In the ED, T 98.8, , /88, RR 18, SpO2 98%.  Labs significant for ESR 83, WBC 9.26, H/H 10.7/33.4, PT/INR 15.3/1.33, Na 133.    EKst degree AV block, HR 91  CXR: Persistent nodular density left apical region. Fibrotic changes left lower lobe.   Xray L Hip: Status post left THR. Old intertrochanteric fracture left hip similar to prior. There is no acute fracture or dislocation. No gross focal bone destruction. Vascular calcification. Calcified left buttock granulomas.  Xray L Femur: No fracture dislocation or focal bone destruction. Status post ORIF left hip. Metallic fixation left femoral shaft. Hardware intact. Vascular calcification.  CT LLE w/ IV contrast: Long peripheral enhancing fluid collection lateral soft tissues extending from the buttock to proximal to mid thigh as discussed, may represent abscess or infected hematoma.    Patient received  percocet in the ED for pain. (31 Dec 2018 16:16)      ---  HOSPITAL COURSE:   Patient admitted to [facility] w/ possible chronic infection of hardware.  Patient was continued on Cefazolin.  CT of the hip showed long peripheral enhancing fluid collection lateral soft tissues extending from the buttock to proximal to mid thigh.  Orthopedic surgery was consulted, and patient brought to OR for surgical debridement and lavage.  ID, Dr. Childs consulted for antibiotic recommendations.  Patient had post op bleeding and hypotension.  He was transferred to ICU where he received 2 units PRBC transfusion.  Pt. did not require pressors and hbg and BP stabilized.  He was then downgraded to medical/surgical floor.  Surgical cultures grew vancomycin resistant enterococcus which required switching IV abx to daptomycin to complete until at least 19, will require further evaluation with infectious disease around 19 for repeat imaging and to determine total duration of therapy. Suggest weekly labs on  CBC, BMP, CK faxed to [phone]. H/H has slightly downtrended but is stable. Will f/u outpatient for further management with ortho/plastics/ID.     ---  CONSULTANTS:   ID (Amadeo)  Cardio (Anusha)  Ortho (James)  Plastic (Sarabjit)  MICU (Indu)  SW/CM/PT    ---  TIME SPENT:  The total amount of time spent reviewing the hospital notes, laboratory values, imaging findings, assessing/counseling the patient, discussing with consultant physicians, social work, nursing staff took 75 minutes    ---  FINAL DISCHARGE DIAGNOSIS LIST:  Please see last daily progress note for final discharge diagnoses    ---  Primary care provider was made aware of plan for discharge:      [ x ] NO     [  ] YES - has no PCP    ---  Vital Signs Last 24 Hrs  T(C): 37.1 (2019 07:44), Max: 37.3 (2019 16:08)  T(F): 98.7 (2019 07:44), Max: 99.1 (2019 16:08)  HR: 86 (2019 07:44) (81 - 86)  BP: 115/69 (2019 07:44) (101/60 - 115/69)  BP(mean): --  RR: 18 (2019 07:44) (18 - 18)  SpO2: 96% (2019 07:44) (94% - 97%)    PHYSICAL EXAM:  GENERAL: patient appears comfortable, no acute distress, family @ bedside  EYES: sclera clear, no exudates  ENMT: oropharynx clear without erythema, no exudates, moist mucous membranes  NECK: supple, soft, no thyromegaly noted  LUNGS: good air entry bilaterally, clear to auscultation, symmetric breath sounds, no wheezing or rhonchi appreciated  HEART: soft S1/S2, regular rate and rhythm, no murmurs noted, no lower extremity edema  GASTROINTESTINAL: abdomen is soft, nontender, nondistended, normoactive bowel sounds, no palpable masses  MUSCULOSKELETAL: no clubbing or cyanosis, no obvious deformity  NEUROLOGIC: awake, alert, oriented x3, good muscle tone in 4 extremities, no obvious sensory deficits  PSYCHIATRIC: mood is good, affect is congruent, linear and logical thought process  HEME/LYMPH: no palpable supraclavicular nodules, no obvious ecchymosis or petechiae

## 2019-01-02 NOTE — DISCHARGE NOTE ADULT - CARE PLAN
Principal Discharge DX:	History of total left hip arthroplasty  Goal:	Return to baseline ADLs  Assessment and plan of treatment:	1.	Pain control  2.	Walking with full weight bearing as tolerated, with assistive devices as needed  3.	DVT prophylaxis  4.	PT as needed  5.	Follow up with Dr Whipple and Dr Raimrez as outpatient in 10-14 days after discharge from the hospital or rehab. Call office for appointment.  6.	Remove sutures post-op day 14 with dressing changes as directed by Dr Whipple  7.	Ice/elevate affected area as needed.  8.	Keep dressing clean and dry.  9.           Prevena incisional vac will  on 2019 Principal Discharge DX:	History of total left hip arthroplasty  Goal:	Return to baseline ADLs  Assessment and plan of treatment:	1.	Pain control  2.	Walking with full weight bearing as tolerated, with assistive devices as needed  3.	PT as needed  4.	Follow up with Dr Whipple and Dr Ramirez as outpatient in 10-14 days after discharge from the hospital or rehab. Call office for appointment.  5.	Follow up with Dr. Whipple Thursday, 1/10/19 for drain removal and dressing change.  6.	Ice/elevate affected area as needed.  7.	Keep dressing clean and dry.

## 2019-01-02 NOTE — PROGRESS NOTE ADULT - ATTENDING COMMENTS
D/W nursing    D/W patient. All q's answered to the best of my ability.     Fabian Childs MD  [phone]

## 2019-01-02 NOTE — PROGRESS NOTE ADULT - SUBJECTIVE AND OBJECTIVE BOX
HPI:  72 yo M PMHx 45 pack year smoking hx and COPD (diagnosed "a few years ago, quit smoking then), HTN, HLD, hx of MI and CABG () (stents also placed per chart review), RA (on Medrol daily), hx of multiple L hip surgeries (hip replacement 30 years ago in [place], ORIF recently 2018 w/ Dr. Ramirez) complicated with superficial infection which required I&D on  (discharged with PICC line and continued to receive cefazolin), present to the ED with L hip pain at the surgical site.  Serosanguinous fluid draining from surgical site since last admission in a small amount and has been on and off, patient was seen by Dr. Scruggs on  for wound check and dressing change and states that the bleeding present that day was minimal.    In the ED, T 98.8, , /88, RR 18, SpO2 98%.  Labs significant for ESR 83, WBC 9.26, H/H 10.7/33.4, PT/INR 15.3/1.33, Na 133.    EKst degree AV block, HR 91  Xray L Hip: Status post left THR. Old intertrochanteric fracture left hip similar to prior. There is no acute fracture or dislocation. No gross focal bone destruction. Vascular calcification. Calcified left buttock granulomas.  Xray L Femur: No fracture dislocation or focal bone destruction. Status post ORIF left hip. Metallic fixation left femoral shaft. Hardware intact. Vascular calcification.  CT LLE w/ IV contrast: Long peripheral enhancing fluid collection lateral soft tissues extending from the buttock to proximal to mid thigh as discussed, may represent abscess or infected hematoma.    INTERVAL EVENTS:  Patient kept NPO and optimized for surgical debridement today. No acute events overnight. Vital signs and labs reviewed       REVIEW OF SYSTEMS:    CONSTITUTIONAL: No weakness, fevers or chills  EYES/ENT: No visual changes, no throat pain   RESPIRATORY: No cough, wheezing, hemoptysis; No shortness of breath  CARDIOVASCULAR: No chest pain or palpitations  GASTROINTESTINAL: No abdominal pain, nausea, vomiting, or hematemesis; No diarrhea or constipation. No melena or hematochezia.  GENITOURINARY: No dysuria, frequency or hematuria  NEUROLOGICAL: No dizziness, numbness, or weakness  SKIN: No itching, burning, rashes, or lesions   All other review of systems is negative unless indicated above.    ICU Vital Signs Last 24 Hrs  T(C): 36.9 (2019 05:19), Max: 36.9 (2019 05:19)  T(F): 98.5 (2019 05:19), Max: 98.5 (2019 05:19)  HR: 99 (2019 05:19) (88 - 99)  BP: 113/74 (2019 05:19) (113/74 - 122/76)  RR: 18 (2019 05:19) (18 - 18)  SpO2: 94% (2019 05:19) (94% - 96%)      PHYSICAL EXAM:     GENERAL: no acute distress  HEENT: NC/AT, EOMI, neck supple, MMM  RESPIRATORY: LCTAB/L, no rhonchi, rales, or wheezing  CARDIOVASCULAR: RRR, no murmurs, gallops, rubs  ABDOMINAL: soft, non-tender, non-distended, positive bowel sounds   EXTREMITIES: no clubbing, cyanosis, or edema. Incision on Left proximal lateral hip covered with dressing c/d/i. Wound demonstrating mild erythema   NEUROLOGICAL: alert and oriented x 3, non-focal  MUSCULOSKELETAL: no gross joint deformity. LLE muscle strength 4/5                          10.8   8.39  )-----------( 286      ( 2019 05:25 )             33.7     01-    137  |  99  |  15  ----------------------------<  87  4.0   |  31  |  0.80    Ca    9.3      2019 05:25    TPro  6.9  /  Alb  2.8<L>  /  TBili  0.6  /  DBili  .20  /  AST  19  /  ALT  15  /  AlkPhos  179<H>  -02      CAPILLARY BLOOD GLUCOSE      MEDICATIONS  (STANDING):  ascorbic acid 500 milliGRAM(s) Oral two times a day  atorvastatin 10 milliGRAM(s) Oral at bedtime  ceFAZolin   IVPB 2000 milliGRAM(s) IV Intermittent every 8 hours  chlorhexidine 2% Cloths 1 Application(s) Topical daily  docusate sodium 100 milliGRAM(s) Oral two times a day  famotidine    Tablet 20 milliGRAM(s) Oral two times a day  ferrous    sulfate 325 milliGRAM(s) Oral daily  finasteride 5 milliGRAM(s) Oral daily  FLUoxetine 20 milliGRAM(s) Oral <User Schedule>  folic acid 1 milliGRAM(s) Oral daily  furosemide    Tablet 20 milliGRAM(s) Oral daily  hydrocortisone sodium succinate Injectable 100 milliGRAM(s) IV Push every 8 hours  lactated ringers. 1000 milliLiter(s) (75 mL/Hr) IV Continuous <Continuous>  lisinopril 10 milliGRAM(s) Oral daily  methadone    Tablet 5 milliGRAM(s) Oral every 8 hours  methylPREDNISolone 2 milliGRAM(s) Oral daily  multivitamin 1 Tablet(s) Oral daily  senna 2 Tablet(s) Oral at bedtime  tamsulosin 0.4 milliGRAM(s) Oral at bedtime HPI:  74 yo M PMHx 45 pack year smoking hx and COPD (diagnosed "a few years ago, quit smoking then), HTN, HLD, hx of MI and CABG (2008) (stents also placed per chart review), RA (on Medrol daily), hx of multiple L hip surgeries (hip replacement 30 years ago in [place], ORIF recently 11/2018 w/ Dr. Ramirez) complicated with superficial infection which required I&D on 12/5 (discharged with PICC line and continued to receive cefazolin), present to the ED with L hip pain at the surgical site admitted with chronic infection of hardware.    INTERVAL EVENTS:  Patient seen and evaluated at the bedside, seen resting comfortably. Pain well controlled. Patient kept NPO and optimized for surgical debridement today. No acute events overnight. Vital signs and labs reviewed     REVIEW OF SYSTEMS:    CONSTITUTIONAL: No weakness, fevers or chills  EYES/ENT: No visual changes, no throat pain   RESPIRATORY: No cough, wheezing, hemoptysis; No shortness of breath  CARDIOVASCULAR: No chest pain or palpitations  GASTROINTESTINAL: No abdominal pain, nausea, vomiting, or hematemesis; No diarrhea or constipation.   GENITOURINARY: No dysuria, frequency or hematuria  NEUROLOGICAL: No dizziness, numbness, or weakness  SKIN: No itching, admits to surgical wound   All other review of systems is negative unless indicated above.    ICU Vital Signs Last 24 Hrs  T(C): 36.9 (02 Jan 2019 05:19), Max: 36.9 (02 Jan 2019 05:19)  T(F): 98.5 (02 Jan 2019 05:19), Max: 98.5 (02 Jan 2019 05:19)  HR: 99 (02 Jan 2019 05:19) (88 - 99)  BP: 113/74 (02 Jan 2019 05:19) (113/74 - 122/76)  RR: 18 (02 Jan 2019 05:19) (18 - 18)  SpO2: 94% (02 Jan 2019 05:19) (94% - 96%)      PHYSICAL EXAM:     GENERAL: no acute distress, elderly  male  HEENT: NC/AT, EOMI, neck supple  RESPIRATORY: lungs clear to auscultation b/l, no rhonchi, rales, or wheezing  CARDIOVASCULAR: RRR, no murmurs, gallops, rubs  ABDOMINAL: soft, non-tender, non-distended, positive bowel sounds in 4 quadrants  EXTREMITIES: no clubbing, cyanosis, or edema. Linear Incision on Left proximal lateral hip, sutures in place, mild serosanguinous drainage. Wound covered with dressing c/d/i. Wound demonstrating mild erythema   NEUROLOGICAL: alert and oriented x 3, non-focal  MUSCULOSKELETAL: no gross joint deformity. LLE muscle strength 4/5                          10.8   8.39  )-----------( 286      ( 02 Jan 2019 05:25 )             33.7     01-02    137  |  99  |  15  ----------------------------<  87  4.0   |  31  |  0.80    Ca    9.3      02 Jan 2019 05:25    TPro  6.9  /  Alb  2.8<L>  /  TBili  0.6  /  DBili  .20  /  AST  19  /  ALT  15  /  AlkPhos  179<H>  01-02      MEDICATIONS  (STANDING):  ascorbic acid 500 milliGRAM(s) Oral two times a day  atorvastatin 10 milliGRAM(s) Oral at bedtime  ceFAZolin   IVPB 2000 milliGRAM(s) IV Intermittent every 8 hours  chlorhexidine 2% Cloths 1 Application(s) Topical daily  docusate sodium 100 milliGRAM(s) Oral two times a day  famotidine    Tablet 20 milliGRAM(s) Oral two times a day  ferrous    sulfate 325 milliGRAM(s) Oral daily  finasteride 5 milliGRAM(s) Oral daily  FLUoxetine 20 milliGRAM(s) Oral <User Schedule>  folic acid 1 milliGRAM(s) Oral daily  furosemide    Tablet 20 milliGRAM(s) Oral daily  hydrocortisone sodium succinate Injectable 100 milliGRAM(s) IV Push every 8 hours  lactated ringers. 1000 milliLiter(s) (75 mL/Hr) IV Continuous <Continuous>  lisinopril 10 milliGRAM(s) Oral daily  methadone    Tablet 5 milliGRAM(s) Oral every 8 hours  methylPREDNISolone 2 milliGRAM(s) Oral daily  multivitamin 1 Tablet(s) Oral daily  senna 2 Tablet(s) Oral at bedtime  tamsulosin 0.4 milliGRAM(s) Oral at bedtime

## 2019-01-02 NOTE — PROGRESS NOTE ADULT - SUBJECTIVE AND OBJECTIVE BOX
Patient s/p washout of left hip wound with debridement of skin including muscle, repair of muscles and flap reconstruction with drain placement.     Plan:  - Maintain dressings  - LLE elevation  - Drain care  - IV Abx  - Pain regimen  - DVT PPx; SCD, chemoprophylaxis as per spine service  - Will Follow    Thank You  Jayson Whipple MD  Plastic Surgery  [phone]

## 2019-01-02 NOTE — PROGRESS NOTE ADULT - ASSESSMENT
74 yo M PMHx 45 pack year smoking hx and COPD (diagnosed "a few years ago, quit smoking 6 months ago), HTN, HLD, hx of MI and CABG (2008) (stents also placed per chart review), RA (on Medrol daily), hx of multiple L hip surgeries (hip replacement 30 years ago in [place], ORIF recently 11/2018 w/ Dr. Ramirez) complicated with superficial infection which required I&D on 12/5 (discharged with PICC line and continued to receive cefazolin), present to the ED with L hip pain at the surgical site. Admitted for infected hip, for surgical irrigation and debridement by ortho, called for cardiac clearance    - No clear evidence of acute ischemia  - No evidence of volume overload.  - No acute changes on EKG  - Pt originally from [place], sees cardiologist back home, per patient had full cardiac check prior coming to [place] before Thanksgiving, reports normal stress, EKG, echo, and carotid USD.  - BP well controlled, continue home BP meds, monitor routine hemodynamics.  - HR now controlled  - hold asa, can resume post op as patient has CAD s/p stent  - continue statin    - Monitor and replete lytes, keep K>4, Mg>2.  - Pt has no active ischemia, decompensated HF, unstable arrythmia, or severe stenotic valvular disease, and in the setting of intermediate risk procedure, patient is optimized from cardiovascular standpoint to proceed with planned intermediate risk necessary urgent orthopedic surgery without any further cardiac workup. Proceed with routine hemodynamic monitoring.     - Other cardiovascular workup will depend on clinical course.  - All other workup per primary team.  - Will follow with you    Mikayla Gold NP  Cardiology

## 2019-01-02 NOTE — DISCHARGE NOTE ADULT - MEDICATION SUMMARY - MEDICATIONS TO TAKE
I will START or STAY ON the medications listed below when I get home from the hospital:    3-in-1 commode  -- disp: 1  ICD-10: R26.89 - gait instability  -- Indication: For Gait instability    ancillary supplies  -- ancillary supplies, normal saline flush for pre and post intravenous infusion  -- Indication: For IV infusion    finasteride 5 mg oral tablet  -- 1 tab(s) by mouth once a day  -- Indication: For BPH (benign prostatic hyperplasia)    methylPREDNISolone 2 mg oral tablet  -- 1 tab(s) by mouth once a day  -- Indication: For Adrenal insufficiency    methadone 5 mg oral tablet  -- 1 tab(s) by mouth every 8 hours  -- Indication: For Chronic opiate use    acetaminophen 325 mg oral tablet  -- 2 tab(s) by mouth every 6 hours, As needed, Temp greater or equal to 38C (100.4F)  -- Indication: For Pain    aspirin 81 mg oral tablet  -- 1 tab(s) by mouth once a day   -- Take with food or milk.    -- Indication: For CAD (coronary artery disease)    oxyCODONE 10 mg oral tablet  -- 1 tab(s) by mouth every 4 hours, As needed, MDD:6 for breakthrough pain  -- Indication: For Pain    lisinopril 10 mg oral tablet  -- 1 tab(s) by mouth once a day  -- Indication: For HTN (hypertension)    tamsulosin 0.4 mg oral capsule  -- 1 cap(s) by mouth once a day  -- Indication: For BPH (benign prostatic hyperplasia)    FLUoxetine 20 mg oral capsule  -- 1 cap(s) by mouth 3 times a day  -- Indication: For Anxiety    lovastatin 20 mg oral tablet  -- 1 tab(s) by mouth once a day  -- Indication: For Dyslipidemia    ALPRAZolam 0.5 mg oral tablet  -- 1 tab(s) by mouth once a day (at bedtime), As Needed  -- Indication: For Anxiety    alendronate 70 mg oral tablet  -- 1 tab(s) by mouth once a week  -- Indication: For Chronic steroid use    furosemide 20 mg oral tablet  -- 1 tab(s) by mouth once a day  -- Indication: For CHF    famotidine 20 mg oral tablet  -- 1 tab(s) by mouth 2 times a day  -- Indication: For GERD    ferrous sulfate 325 mg (65 mg elemental iron) oral delayed release tablet  -- 1 tab(s) by mouth once a day   -- May discolor urine or feces.  Swallow whole.  Do not crush.    -- Indication: For Iron deficiency    potassium chloride 10 mEq oral capsule, extended release  -- 1 cap(s) by mouth 2 times a day  -- Indication: For Supplement    DAPTOmycin 500 mg intravenous injection  -- 500 milligram(s) intravenously once a day     weekly labs on Mondays-CBC, CK, BMP  faxed to [phone]  -- It is very important that you take or use this exactly as directed.  Do not skip doses or discontinue unless directed by your doctor.  Keep in refrigerator.  Do not freeze.    -- Indication: For Soft tissue infection    cyclobenzaprine 10 mg oral tablet  -- 1 tab(s) by mouth 2 times a day, As Needed  -- Indication: For Muscle spasm    Multiple Vitamins oral tablet  -- 1 tab(s) by mouth once a day  -- Indication: For Need for prophylactic measure    ascorbic acid 500 mg oral tablet  -- 1 tab(s) by mouth 2 times a day  -- Indication: For Supplement    folic acid 1 mg oral tablet  -- 1 tab(s) by mouth once a day  -- Indication: For Supplement I will START or STAY ON the medications listed below when I get home from the hospital:    3-in-1 commode  -- disp: 1  ICD-10: R26.89 - gait instability  -- Indication: For Gait instability    ancillary supplies  -- ancillary supplies, normal saline flush for pre and post intravenous infusion  -- Indication: For IV infusion    finasteride 5 mg oral tablet  -- 1 tab(s) by mouth once a day  -- Indication: For BPH (benign prostatic hyperplasia)    methylPREDNISolone 2 mg oral tablet  -- 1 tab(s) by mouth once a day  -- Indication: For Adrenal insufficiency    methadone 5 mg oral tablet  -- 1 tab(s) by mouth every 8 hours  -- Indication: For Chronic opiate use    acetaminophen 325 mg oral tablet  -- 2 tab(s) by mouth every 6 hours, As needed, Temp greater or equal to 38C (100.4F)  -- Indication: For Pain    aspirin 81 mg oral tablet  -- 1 tab(s) by mouth once a day   -- Take with food or milk.    -- Indication: For CAD (coronary artery disease)    oxyCODONE 10 mg oral tablet  -- 1 tab(s) by mouth every 4 hours, As needed, MDD:6 for breakthrough pain  -- Indication: For Pain    lisinopril 10 mg oral tablet  -- 1 tab(s) by mouth once a day  -- Indication: For HTN (hypertension)    tamsulosin 0.4 mg oral capsule  -- 1 cap(s) by mouth once a day  -- Indication: For BPH (benign prostatic hyperplasia)    FLUoxetine 20 mg oral capsule  -- 1 cap(s) by mouth 3 times a day  -- Indication: For Anxiety    lovastatin 20 mg oral tablet  -- 1 tab(s) by mouth once a day  -- Indication: For Dyslipidemia    ALPRAZolam 0.5 mg oral tablet  -- 1 tab(s) by mouth once a day (at bedtime), As Needed  -- Indication: For Anxiety    alendronate 70 mg oral tablet  -- 1 tab(s) by mouth once a week  -- Indication: For Chronic steroid use    albuterol 1.25 mg/3 mL (0.042%) inhalation solution  -- 3 milliliter(s) by nebulizer 3 times a day as needed for coughing/wheeze  -- For inhalation only.  It is very important that you take or use this exactly as directed.  Do not skip doses or discontinue unless directed by your doctor.  Obtain medical advice before taking any non-prescription drugs as some may affect the action of this medication.    -- Indication: For Wheeze    furosemide 20 mg oral tablet  -- 1 tab(s) by mouth once a day  -- Indication: For CHF    famotidine 20 mg oral tablet  -- 1 tab(s) by mouth 2 times a day  -- Indication: For GERD    ferrous sulfate 325 mg (65 mg elemental iron) oral delayed release tablet  -- 1 tab(s) by mouth once a day   -- May discolor urine or feces.  Swallow whole.  Do not crush.    -- Indication: For Iron deficiency    potassium chloride 10 mEq oral capsule, extended release  -- 1 cap(s) by mouth 2 times a day  -- Indication: For Supplement    DAPTOmycin 500 mg intravenous injection  -- 500 milligram(s) intravenously once a day     weekly labs on Mondays-CBC, CK, BMP  faxed to [phone]  -- It is very important that you take or use this exactly as directed.  Do not skip doses or discontinue unless directed by your doctor.  Keep in refrigerator.  Do not freeze.    -- Indication: For Soft tissue infection    cyclobenzaprine 10 mg oral tablet  -- 1 tab(s) by mouth 2 times a day, As Needed  -- Indication: For Muscle spasm    Multiple Vitamins oral tablet  -- 1 tab(s) by mouth once a day  -- Indication: For Need for prophylactic measure    ascorbic acid 500 mg oral tablet  -- 1 tab(s) by mouth 2 times a day  -- Indication: For Supplement    folic acid 1 mg oral tablet  -- 1 tab(s) by mouth once a day  -- Indication: For Supplement

## 2019-01-02 NOTE — DISCHARGE NOTE ADULT - CARE PROVIDERS DIRECT ADDRESSES
,DirectAddress_Unknown,DirectAddress_Unknown ,DirectAddress_Unknown,DirectAddress_Unknown,[email or web address]

## 2019-01-02 NOTE — CONSULT NOTE ADULT - SUBJECTIVE AND OBJECTIVE BOX
In Brief:  73M with PMHx of cad s/p cabg x3, RA on steroids, OA, copd, htn, s/p spinal fusion, s/p appendectomy, s/p right shoulder replacement, recent left THR 2018, c/b superficial infection s/p I&D on  and discharged to rehab with IV abx via PICC. Pt readmitted with left hip pain due to chronic infection of hardware. Pt now s/p I&D of left HIP, EBL ~ 100cc intra-op and transferred to PACU. In Pacu, Pt notetd to be hypotensive to 80's. Given fluid boluses (total 1700cc intra-op and post op). CBC revealed 2 point drop in Hgb 9 < 11. Pt transfused 1 unit prbc. ~300cc bloody drainage in surgical drainage device. ICU consulted for persistent hypotension.     24 hour events: Pt seen and examined at bedside. Chart/labs reviewed.     PAST MEDICAL & SURGICAL HISTORY:  S/P CABG x 3  S/P hip replacement, left  Rheumatoid arthritis  Osteoarthritis  COPD (chronic obstructive pulmonary disease)  HTN (hypertension)  S/P lumbar fusion: Approx   History of femur fracture: Repaired - L hip  History of appendectomy  History of right shoulder replacement  History of total left hip arthroplasty      Review of Systems:  unable to obtain    T(F): 97.7 (19 @ 00:40), Max: 99 (19 @ 04:23)  HR: 77 (19 @ 00:30) (77 - 103)  BP: 117/75 (19 @ 00:30) (81/74 - 128/77)  RR: 19 (19 @ 00:30) (10 - 19)  SpO2: 98% (19 @ 00:30) (93% - 98%)  Wt(kg): --        I&O's Summary    2019 07:  -  2019 07:00  --------------------------------------------------------  IN: 625 mL / OUT: 250 mL / NET: 375 mL    2019 07:01  -  2019 01:00  --------------------------------------------------------  IN: 1468 mL / OUT: 505 mL / NET: 963 mL        Physical Exam:     Gen: WD/WG male  Neuro: Easily Arousable, following commands  HEENT: NC/AT  Resp: CTA b/l  CVS: nl S1/S2, RRR  Abd: soft, nt, nd, +bs  Ext: Left Hip site c/d/i, no edema, +pulses  Skin: well perfused, warm      Meds:    ceFAZolin   IVPB IV Intermittent  acetaminophen   Tablet .. Oral  acetaminophen   Tablet .. Oral PRN  diphenhydrAMINE Oral PRN  HYDROmorphone  Injectable IV Push PRN  ondansetron Injectable IV Push PRN  oxyCODONE    IR Oral PRN  oxyCODONE    IR Oral PRN  zaleplon Oral PRN  enoxaparin Injectable SubCutaneous  docusate sodium Oral  pantoprazole    Tablet Oral  senna Oral PRN  ascorbic acid Oral  ferrous    sulfate Oral  folic acid Oral  lactated ringers. IV Continuous  multivitamin Oral  benzocaine 15 mG/menthol 3.6 mG Lozenge Oral PRN                              9.0    9.48  )-----------( 301      ( 2019 21:27 )             28.4           136  |  99  |  20  ----------------------------<  190<H>  4.1   |  26  |  1.00    Ca    8.8      2019 21:27    TPro  6.9  /  Alb  2.8<L>  /  TBili  0.6  /  DBili  .20  /  AST  19  /  ALT  15  /  AlkPhos  179<H>            PT/INR - ( 2019 05:25 )   PT: 15.6 sec;   INR: 1.36 ratio         PTT - ( 2019 05:25 )  PTT:33.2 sec  Urinalysis Basic - ( 2019 08:37 )    Color: Yellow / Appearance: Clear / S.020 / pH: x  Gluc: x / Ketone: Negative  / Bili: Negative / Urobili: Negative   Blood: x / Protein: 25 mg/dL / Nitrite: Negative   Leuk Esterase: Trace / RBC: x / WBC 3-5   Sq Epi: x / Non Sq Epi: Occasional / Bacteria: Occasional      .Other Left hip incision site   Few Coag Negative Staphylococcus Susceptibilites not performed. --  @ 12:45        CENTRAL LINE: no    WINSTON: yes    A-LINE: no    GLOBAL ISSUE/BEST PRACTICE:  Analgesia: yes  Sedation: no  HOB elevation: yes  Stress ulcer prophylaxis: yes  VTE prophylaxis: yes  Glycemic control: no  Nutrition: yes      CODE STATUS: Full  GOC discussion: Y  Critical care time spent (mins): 59  (Reviewing data, imaging, discussing with multidisciplinary team, non inclusive of procedures, discussing goals of care with patient/family)

## 2019-01-02 NOTE — BRIEF OPERATIVE NOTE - OPERATION/FINDINGS
left hip superficial fluid collection and devitalized tissue  irrigation and debridement  complex plastics closure

## 2019-01-02 NOTE — DISCHARGE NOTE ADULT - PATIENT PORTAL LINK FT
You can access the [facility] Patient Portal, offered by [facility], by registering with the following website: [email or web address]

## 2019-01-02 NOTE — DISCHARGE NOTE ADULT - CARE PROVIDER_API CALL
Jayson Whipple), Plastic Surgery Surgery  [address] [place] [address]  Phone: [phone]  Fax: [phone]    Irvin Ramirez), Orthopaedic Surgery  [address] [place] [address]  Phone: [phone]  Fax: [phone] Jayson Whipple), Plastic Surgery Surgery  [address] [place] [address]  Phone: [phone]  Fax: [phone]    Irvin Ramirez), Orthopaedic Surgery  [address] [place] [address]  Phone: [phone]  Fax: [phone]    Lopez Gaitan; PhD), Infectious Disease; Internal Medicine  [address] [facility] [facility] [address]  [place] [address]  Phone: [phone]  Fax: [phone]

## 2019-01-03 LAB
ANION GAP SERPL CALC-SCNC: 5 MMOL/L — SIGNIFICANT CHANGE UP (ref 5–17)
BUN SERPL-MCNC: 21 MG/DL — SIGNIFICANT CHANGE UP (ref 7–23)
CALCIUM SERPL-MCNC: 8.5 MG/DL — SIGNIFICANT CHANGE UP (ref 8.5–10.1)
CHLORIDE SERPL-SCNC: 100 MMOL/L — SIGNIFICANT CHANGE UP (ref 96–108)
CO2 SERPL-SCNC: 31 MMOL/L — SIGNIFICANT CHANGE UP (ref 22–31)
CREAT SERPL-MCNC: 0.83 MG/DL — SIGNIFICANT CHANGE UP (ref 0.5–1.3)
GLUCOSE SERPL-MCNC: 157 MG/DL — HIGH (ref 70–99)
HCT VFR BLD CALC: 26.7 % — LOW (ref 39–50)
HGB BLD-MCNC: 8.9 G/DL — LOW (ref 13–17)
MAGNESIUM SERPL-MCNC: 1.7 MG/DL — SIGNIFICANT CHANGE UP (ref 1.6–2.6)
MCHC RBC-ENTMCNC: 30 PG — SIGNIFICANT CHANGE UP (ref 27–34)
MCHC RBC-ENTMCNC: 33.3 GM/DL — SIGNIFICANT CHANGE UP (ref 32–36)
MCV RBC AUTO: 89.9 FL — SIGNIFICANT CHANGE UP (ref 80–100)
NRBC # BLD: 0 /100 WBCS — SIGNIFICANT CHANGE UP (ref 0–0)
PLATELET # BLD AUTO: 216 K/UL — SIGNIFICANT CHANGE UP (ref 150–400)
POTASSIUM SERPL-MCNC: 4.6 MMOL/L — SIGNIFICANT CHANGE UP (ref 3.5–5.3)
POTASSIUM SERPL-SCNC: 4.6 MMOL/L — SIGNIFICANT CHANGE UP (ref 3.5–5.3)
RBC # BLD: 2.97 M/UL — LOW (ref 4.2–5.8)
RBC # FLD: 15.9 % — HIGH (ref 10.3–14.5)
SODIUM SERPL-SCNC: 136 MMOL/L — SIGNIFICANT CHANGE UP (ref 135–145)
WBC # BLD: 11.44 K/UL — HIGH (ref 3.8–10.5)
WBC # FLD AUTO: 11.44 K/UL — HIGH (ref 3.8–10.5)

## 2019-01-03 PROCEDURE — 99232 SBSQ HOSP IP/OBS MODERATE 35: CPT

## 2019-01-03 PROCEDURE — 99233 SBSQ HOSP IP/OBS HIGH 50: CPT | Mod: GC

## 2019-01-03 PROCEDURE — 99291 CRITICAL CARE FIRST HOUR: CPT

## 2019-01-03 RX ORDER — ATORVASTATIN CALCIUM 80 MG/1
10 TABLET, FILM COATED ORAL AT BEDTIME
Qty: 0 | Refills: 0 | Status: DISCONTINUED | OUTPATIENT
Start: 2019-01-03 | End: 2019-01-07

## 2019-01-03 RX ORDER — TAMSULOSIN HYDROCHLORIDE 0.4 MG/1
0.4 CAPSULE ORAL AT BEDTIME
Qty: 0 | Refills: 0 | Status: DISCONTINUED | OUTPATIENT
Start: 2019-01-03 | End: 2019-01-07

## 2019-01-03 RX ORDER — FAMOTIDINE 10 MG/ML
20 INJECTION INTRAVENOUS
Qty: 0 | Refills: 0 | Status: DISCONTINUED | OUTPATIENT
Start: 2019-01-03 | End: 2019-01-07

## 2019-01-03 RX ORDER — MAGNESIUM SULFATE 500 MG/ML
2 VIAL (ML) INJECTION ONCE
Qty: 0 | Refills: 0 | Status: COMPLETED | OUTPATIENT
Start: 2019-01-03 | End: 2019-01-03

## 2019-01-03 RX ORDER — FLUOXETINE HCL 10 MG
20 CAPSULE ORAL
Qty: 0 | Refills: 0 | Status: DISCONTINUED | OUTPATIENT
Start: 2019-01-03 | End: 2019-01-07

## 2019-01-03 RX ORDER — LISINOPRIL 2.5 MG/1
10 TABLET ORAL DAILY
Qty: 0 | Refills: 0 | Status: DISCONTINUED | OUTPATIENT
Start: 2019-01-03 | End: 2019-01-07

## 2019-01-03 RX ORDER — HYDROCORTISONE 20 MG
50 TABLET ORAL EVERY 8 HOURS
Qty: 0 | Refills: 0 | Status: DISCONTINUED | OUTPATIENT
Start: 2019-01-03 | End: 2019-01-03

## 2019-01-03 RX ORDER — MORPHINE SULFATE 50 MG/1
2 CAPSULE, EXTENDED RELEASE ORAL ONCE
Qty: 0 | Refills: 0 | Status: DISCONTINUED | OUTPATIENT
Start: 2019-01-03 | End: 2019-01-03

## 2019-01-03 RX ORDER — FINASTERIDE 5 MG/1
5 TABLET, FILM COATED ORAL DAILY
Qty: 0 | Refills: 0 | Status: DISCONTINUED | OUTPATIENT
Start: 2019-01-03 | End: 2019-01-07

## 2019-01-03 RX ORDER — ACETAMINOPHEN 500 MG
650 TABLET ORAL EVERY 6 HOURS
Qty: 0 | Refills: 0 | Status: DISCONTINUED | OUTPATIENT
Start: 2019-01-03 | End: 2019-01-07

## 2019-01-03 RX ORDER — ALPRAZOLAM 0.25 MG
0.5 TABLET ORAL AT BEDTIME
Qty: 0 | Refills: 0 | Status: DISCONTINUED | OUTPATIENT
Start: 2019-01-03 | End: 2019-01-07

## 2019-01-03 RX ORDER — METHADONE HYDROCHLORIDE 40 MG/1
5 TABLET ORAL EVERY 8 HOURS
Qty: 0 | Refills: 0 | Status: DISCONTINUED | OUTPATIENT
Start: 2019-01-03 | End: 2019-01-07

## 2019-01-03 RX ORDER — CYCLOBENZAPRINE HYDROCHLORIDE 10 MG/1
10 TABLET, FILM COATED ORAL
Qty: 0 | Refills: 0 | Status: DISCONTINUED | OUTPATIENT
Start: 2019-01-03 | End: 2019-01-07

## 2019-01-03 RX ORDER — ENOXAPARIN SODIUM 100 MG/ML
40 INJECTION SUBCUTANEOUS EVERY 24 HOURS
Qty: 0 | Refills: 0 | Status: DISCONTINUED | OUTPATIENT
Start: 2019-01-03 | End: 2019-01-07

## 2019-01-03 RX ORDER — FUROSEMIDE 40 MG
20 TABLET ORAL DAILY
Qty: 0 | Refills: 0 | Status: DISCONTINUED | OUTPATIENT
Start: 2019-01-03 | End: 2019-01-07

## 2019-01-03 RX ORDER — ENOXAPARIN SODIUM 100 MG/ML
40 INJECTION SUBCUTANEOUS EVERY 24 HOURS
Qty: 0 | Refills: 0 | Status: DISCONTINUED | OUTPATIENT
Start: 2019-01-03 | End: 2019-01-03

## 2019-01-03 RX ADMIN — Medication 100 MILLIGRAM(S): at 13:45

## 2019-01-03 RX ADMIN — Medication 1 TABLET(S): at 11:09

## 2019-01-03 RX ADMIN — Medication 100 MILLIGRAM(S): at 05:52

## 2019-01-03 RX ADMIN — ATORVASTATIN CALCIUM 10 MILLIGRAM(S): 80 TABLET, FILM COATED ORAL at 21:52

## 2019-01-03 RX ADMIN — Medication 0.5 MILLIGRAM(S): at 17:42

## 2019-01-03 RX ADMIN — Medication 500 MILLIGRAM(S): at 05:53

## 2019-01-03 RX ADMIN — Medication 325 MILLIGRAM(S): at 17:23

## 2019-01-03 RX ADMIN — OXYCODONE HYDROCHLORIDE 10 MILLIGRAM(S): 5 TABLET ORAL at 21:18

## 2019-01-03 RX ADMIN — OXYCODONE HYDROCHLORIDE 5 MILLIGRAM(S): 5 TABLET ORAL at 03:47

## 2019-01-03 RX ADMIN — Medication 100 MILLIGRAM(S): at 21:18

## 2019-01-03 RX ADMIN — Medication 650 MILLIGRAM(S): at 05:51

## 2019-01-03 RX ADMIN — Medication 20 MILLIGRAM(S): at 21:18

## 2019-01-03 RX ADMIN — Medication 1 MILLIGRAM(S): at 11:08

## 2019-01-03 RX ADMIN — FINASTERIDE 5 MILLIGRAM(S): 5 TABLET, FILM COATED ORAL at 11:07

## 2019-01-03 RX ADMIN — HYDROMORPHONE HYDROCHLORIDE 1 MILLIGRAM(S): 2 INJECTION INTRAMUSCULAR; INTRAVENOUS; SUBCUTANEOUS at 01:59

## 2019-01-03 RX ADMIN — Medication 325 MILLIGRAM(S): at 09:27

## 2019-01-03 RX ADMIN — Medication 50 GRAM(S): at 11:07

## 2019-01-03 RX ADMIN — Medication 2 MILLIGRAM(S): at 11:08

## 2019-01-03 RX ADMIN — Medication 100 MILLIGRAM(S): at 11:07

## 2019-01-03 RX ADMIN — Medication 100 MILLIGRAM(S): at 20:13

## 2019-01-03 RX ADMIN — Medication 20 MILLIGRAM(S): at 15:17

## 2019-01-03 RX ADMIN — Medication 500 MILLIGRAM(S): at 17:23

## 2019-01-03 RX ADMIN — ENOXAPARIN SODIUM 40 MILLIGRAM(S): 100 INJECTION SUBCUTANEOUS at 11:07

## 2019-01-03 RX ADMIN — HYDROMORPHONE HYDROCHLORIDE 1 MILLIGRAM(S): 2 INJECTION INTRAMUSCULAR; INTRAVENOUS; SUBCUTANEOUS at 00:52

## 2019-01-03 RX ADMIN — LISINOPRIL 10 MILLIGRAM(S): 2.5 TABLET ORAL at 11:07

## 2019-01-03 RX ADMIN — Medication 20 MILLIGRAM(S): at 11:08

## 2019-01-03 RX ADMIN — OXYCODONE HYDROCHLORIDE 10 MILLIGRAM(S): 5 TABLET ORAL at 22:30

## 2019-01-03 RX ADMIN — MORPHINE SULFATE 2 MILLIGRAM(S): 50 CAPSULE, EXTENDED RELEASE ORAL at 23:38

## 2019-01-03 RX ADMIN — OXYCODONE HYDROCHLORIDE 5 MILLIGRAM(S): 5 TABLET ORAL at 03:07

## 2019-01-03 RX ADMIN — FAMOTIDINE 20 MILLIGRAM(S): 10 INJECTION INTRAVENOUS at 17:23

## 2019-01-03 RX ADMIN — SODIUM CHLORIDE 75 MILLILITER(S): 9 INJECTION, SOLUTION INTRAVENOUS at 00:52

## 2019-01-03 RX ADMIN — METHADONE HYDROCHLORIDE 5 MILLIGRAM(S): 40 TABLET ORAL at 15:17

## 2019-01-03 RX ADMIN — TAMSULOSIN HYDROCHLORIDE 0.4 MILLIGRAM(S): 0.4 CAPSULE ORAL at 21:18

## 2019-01-03 RX ADMIN — PANTOPRAZOLE SODIUM 40 MILLIGRAM(S): 20 TABLET, DELAYED RELEASE ORAL at 05:53

## 2019-01-03 RX ADMIN — METHADONE HYDROCHLORIDE 5 MILLIGRAM(S): 40 TABLET ORAL at 21:18

## 2019-01-03 RX ADMIN — Medication 325 MILLIGRAM(S): at 11:08

## 2019-01-03 NOTE — PHYSICAL THERAPY INITIAL EVALUATION ADULT - ADDITIONAL COMMENTS
Pt lives at home in a private house /c his spouse in [place] /c 1 KAYCEE and no rails. No stairs to negotiate inside. At pt daughter house he has multiple steps to enter but states through the backwards there is only ~ 3 KAYCEE. Pt initially reports being independent /c all functional mobility and ADLs prior to admission however during interviewing pt admits that he needs assistance for transfers and ambulation ~25% and needs assistance for donning and doffing shoes and socks. Supervision needed during showering. Pt does have a rolling walker, single axis cane, motorized scooter, built in shower seat and raised toilet seat. Pt states his wife assists him as needed. Pt /c history of chronic pain due to Fibromyalgia, LLE neuropathy, back and shoulder pain. Pt has been on Methadone for 30 years. Also on muscle relaxers prior to admission. Pt noted to be a questionable historian? Pt has been in [facility] for 2 weeks in which he was assisted for mobility. Pt lives at home in a private house /c his spouse in [place] /c 1 KAYCEE and no rails. No stairs to negotiate inside. At pt daughter house he has multiple steps to enter but states through the backwards there is only ~ 3 KAYCEE. Pt initially reports being independent /c all functional mobility and ADLs prior to admission however during interviewing pt admits that he needs assistance for transfers and ambulation ~25% and needs assistance for donning and doffing shoes and socks. Supervision needed during showering. Pt does have a rolling walker, single axis cane, motorized scooter, built in shower seat and raised toilet seat. Pt states his wife assists him as needed. Pt /c history of chronic pain due to Fibromyalgia, LLE neuropathy, back and shoulder pain. Pt has been on Methadone for 30 years. Also on muscle relaxers prior to admission. Pt was in [facility] for 2 weeks in which he was assisted for mobility prior to last admission but than has been at [facility] ambulating on his own.

## 2019-01-03 NOTE — PHYSICAL THERAPY INITIAL EVALUATION ADULT - ASSISTIVE DEVICE FOR TRANSFER: STAND/SIT, REHAB EVAL
Hydralazine given to [facility] for SBP>160. Ordered to give 5mg PRN. Pt to be transported to [facility].   rolling walker/Tactile, manual and verbal cues needed

## 2019-01-03 NOTE — PROGRESS NOTE ADULT - ASSESSMENT
A/P: S/P washout, debridement, muscle advancement - doing well.   - Diet  - Pain control  - Drain Monitoring  - Abx  - Trend labs  - Prevena to suction  - DVT PPx: SCD, chemoprophylaxis as per primary service  - Will Follow    Thank You  Jayson Whipple MD  Plastic Surgery  [phone]

## 2019-01-03 NOTE — PHYSICAL THERAPY INITIAL EVALUATION ADULT - BALANCE TRAINING, PT EVAL
Improve static and dynamic balance /c RW, PWB to LLE to Good- in 6 sessions Improve static and dynamic balance /c RW, WBAT to LLE to Good- in 6 sessions

## 2019-01-03 NOTE — PHYSICAL THERAPY INITIAL EVALUATION ADULT - PATIENT PROFILE REVIEW, REHAB EVAL
Pt admitted for a mechanical fall and sustained a left periprosthetic fracture/yes Pt admitted for infection & inflammatory reaction due to other internal orthopedic prosthetic device./yes

## 2019-01-03 NOTE — PROGRESS NOTE ADULT - SUBJECTIVE AND OBJECTIVE BOX
CHIEF COMPLAINT/INTERVAL HISTORY:  Pt. seen and evaluated for L. hip abscess/infected hematoma.  Now in ICU for post op bleeding and hypotension.  Pt. currently reports feeling well.  Tolerating IV antibiotic.  Denies any significant pain.      REVIEW OF SYSTEMS:  No fever, CP, SOB, or abdominal pain.     Vital Signs Last 24 Hrs  T(C): 36.8 (2019 07:20), Max: 37 (2019 22:00)  T(F): 98.2 (2019 07:20), Max: 98.6 (2019 22:00)  HR: 80 (2019 07:00) (66 - 103)  BP: 137/61 (2019 07:00) (81/74 - 137/61)  BP(mean): 88 (2019 07:00) (72 - 90)  RR: 20 (2019 07:00) (9 - 20)  SpO2: 97% (2019 07:00) (93% - 98%)    PHYSICAL EXAM:  GENERAL: NAD  HEENT: EOMI, hearing normal, conjunctiva and sclera clear  Chest: CTA bilaterally, no wheezing  CV: S1S2, RRR,   GI: soft, +BS, NT/ND  Musculoskeletal: dressing and drain of L. hip in place  Psychiatric: affect nL, mood nL  Skin: warm and dry    LABS:                        8.9    11.44 )-----------( 216      ( 2019 05:32 )             26.7         136  |  100  |  21  ----------------------------<  157<H>  4.6   |  31  |  0.83    Ca    8.5      2019 05:32  Mg     1.7     -03    TPro  6.9  /  Alb  2.8<L>  /  TBili  0.6  /  DBili  .20  /  AST  19  /  ALT  15  /  AlkPhos  179<H>      PT/INR - ( 2019 05:25 )   PT: 15.6 sec;   INR: 1.36 ratio         PTT - ( 2019 05:25 )  PTT:33.2 sec  Urinalysis Basic - ( 2019 08:37 )    Color: Yellow / Appearance: Clear / S.020 / pH: x  Gluc: x / Ketone: Negative  / Bili: Negative / Urobili: Negative   Blood: x / Protein: 25 mg/dL / Nitrite: Negative   Leuk Esterase: Trace / RBC: x / WBC 3-5   Sq Epi: x / Non Sq Epi: Occasional / Bacteria: Occasional        Assessment and Plan:  -Post op bleeding and hypotension:  s/p 2 units PRBC transfusion.  Monitor hemoglobin.  BP improved. Continue stress dose steroids.  Intensivist f/u  -L. hip wound/hardware infection:  s/p debridement.  Continue Cefazolin 2gm IV Q8h.    Check OR culture.  Plastic surgery, orthopedic surgery, and ID f/u  -Anemia:  s/p 2 unit PRBC transfusion post op.  Continue ferrous sulfate 325mg PO daily and folic acid 1mg PO daily  -COPD:  stable.  Will continue to monitor.  -HTN:  continue Lisinopril 10mg PO daily  -HLD:  continue Lipitor 10mg PO QHS   -CAD:  continue statin.  Holding ASA given concern for possible post op bleeding.   -RA:  On stress dose steroids.    -VTE ppx:  SCD .

## 2019-01-03 NOTE — PHYSICAL THERAPY INITIAL EVALUATION ADULT - PRECAUTIONS/LIMITATIONS, REHAB EVAL
fall precautions/Chronic pain/surgical precautions/left hip precautions Chronic pain/surgical precautions/fall precautions

## 2019-01-03 NOTE — PHYSICAL THERAPY INITIAL EVALUATION ADULT - DISCHARGE DISPOSITION, PT EVAL
TBD, anticipate HCPT /c increased assistance (pt patrick snot want to go back to rehab)/rehabilitation facility

## 2019-01-03 NOTE — PHYSICAL THERAPY INITIAL EVALUATION ADULT - DIAGNOSIS, PT EVAL
Functional decline, deconditioned, s/p left periprosthetic fx and ORIF to left femur 11/19 & now I&D from infection Functional decline, deconditioned, s/p left periprosthetic fx and ORIF to left femur 11/19 & now I&D from infection on 1/2/19

## 2019-01-03 NOTE — PHYSICAL THERAPY INITIAL EVALUATION ADULT - GAIT TRAINING, PT EVAL
Ambulation 25'-50' /c rolling walker and Min Ax1, PWB to LLE /c in 5 sessions or when appropriate Ambulation 25'-50' /c rolling walker and Min Ax1, WBAT to BLE /c in 5 sessions or when appropriate

## 2019-01-03 NOTE — PROGRESS NOTE ADULT - SUBJECTIVE AND OBJECTIVE BOX
24 hour events: Overnight no acute events were reported per nursing or pt, No pressor support required, per PA drain had 300cc this AM; Per chart output as follows - HMV #1  HMV #2     Brief Hospital Course: 72yo M w/ PMH CAD s/p CABG x3, RA (on Steroids), COPD, HTN, Appy, C/L Spine Fusions, R TSA, L THR ('s), L Hip Periprosthetic Fx ORIF  (18., James), and L Hip I&D (18, James) was DC'd to [facility] w/ PICC for superficial infection. Pt readmitted w/ L hip pain suspicious for persistent surgical site infection, now POD #1 s/p Repeat L Hip I&D w/ Dr. Ramirez. In OR EBL reported to be ~100cc, pt received Solumedrol as well as 1700cc crystalloid. In PACU pt reported to be Hypotensive w/ SBP to 80s, post-op CBC reflected Hgb 11-->9 was transfused a total of 2U PRBC and was subsequently admitted to ICU for hemodynamic monitoring.     Review of Systems:  Constitutional: No fever, chills, fatigue  Neuro: No headache, numbness, weakness  Resp: No cough, wheezing, shortness of breath  CVS: No chest pain, palpitations, leg swelling  GI: No abdominal pain, nausea, vomiting, diarrhea   : No dysuria, frequency, incontinence  Skin: No itching, burning, rashes, or lesions   Msk: No joint pain or swelling  Psych: No depression, anxiety, mood swings    ICU Vital Signs Last 24 Hrs  T(C): 36.8 (2019 07:20), Max: 37 (2019 22:00)  T(F): 98.2 (2019 07:20), Max: 98.6 (2019 22:00)  HR: 77 (2019 06:00) (66 - 103)  BP: 134/62 (2019 06:00) (81/74 - 134/62)  BP(mean): 89 (2019 06:00) (72 - 90)  ABP: --  ABP(mean): --  RR: 12 (2019 06:00) (9 - 20)  SpO2: 98% (2019 06:00) (93% - 98%)          CAPILLARY BLOOD GLUCOSE          I&O's Summary    2019 07:01  -  2019 07:00  --------------------------------------------------------  IN: 1968 mL / OUT: 1015 mL / NET: 953 mL        Physical Exam:   Gen:  Neuro:  HEENT:  Resp:  CVS:  Abd:  Ext:  Skin:    MEDICATIONS  (STANDING):  acetaminophen   Tablet .. 650 milliGRAM(s) Oral every 6 hours  ascorbic acid 500 milliGRAM(s) Oral two times a day  ceFAZolin   IVPB 2000 milliGRAM(s) IV Intermittent every 8 hours  docusate sodium 100 milliGRAM(s) Oral three times a day  ferrous    sulfate 325 milliGRAM(s) Oral three times a day with meals  folic acid 1 milliGRAM(s) Oral daily  lactated ringers. 1000 milliLiter(s) (75 mL/Hr) IV Continuous <Continuous>  multivitamin 1 Tablet(s) Oral daily  pantoprazole    Tablet 40 milliGRAM(s) Oral before breakfast    MEDICATIONS  (PRN):  acetaminophen   Tablet .. 650 milliGRAM(s) Oral every 6 hours PRN Temp greater or equal to 38C (100.4F)  benzocaine 15 mG/menthol 3.6 mG Lozenge 1 Lozenge Oral every 2 hours PRN Sore Throat  diphenhydrAMINE 50 milliGRAM(s) Oral every 4 hours PRN Rash and/or Itching  HYDROmorphone  Injectable 1 milliGRAM(s) IV Push every 3 hours PRN Severe Pain (7 - 10)  ondansetron Injectable 4 milliGRAM(s) IV Push every 6 hours PRN Nausea and/or Vomiting  oxyCODONE    IR 5 milliGRAM(s) Oral every 4 hours PRN Mild Pain (1 - 3)  oxyCODONE    IR 10 milliGRAM(s) Oral every 4 hours PRN Moderate Pain (4 - 6)  senna 2 Tablet(s) Oral at bedtime PRN Constipation  zaleplon 5 milliGRAM(s) Oral at bedtime PRN Insomnia        Labs:                         8.9    11.44 )-----------( 216      ( 2019 05:32 )             26.7         136  |  100  |  21  ----------------------------<  157<H>  4.6   |  31  |  0.83    Ca    8.5      2019 05:32  Mg     1.7         TPro  6.9  /  Alb  2.8<L>  /  TBili  0.6  /  DBili  .20  /  AST  19  /  ALT  15  /  AlkPhos  179<H>              PT/INR - ( 2019 05:25 )   PT: 15.6 sec;   INR: 1.36 ratio         PTT - ( 2019 05:25 )  PTT:33.2 sec    Urinalysis Basic - ( 2019 08:37 )    Color: Yellow / Appearance: Clear / S.020 / pH: x  Gluc: x / Ketone: Negative  / Bili: Negative / Urobili: Negative   Blood: x / Protein: 25 mg/dL / Nitrite: Negative   Leuk Esterase: Trace / RBC: x / WBC 3-5   Sq Epi: x / Non Sq Epi: Occasional / Bacteria: Occasional        Radiology:     Bedside ultrasound:     PICC LINE: Y  CENTRAL LINE: N         DATE INSERTED:              REMOVE: Y/N  WINSTON: N                        DATE INSERTED:              REMOVE: Y/N  A-LINE: N                       DATE INSERTED:              REMOVE: Y/N    GLOBAL ISSUE/BEST PRACTICE:  Analgesia: Methadone (Home Med), Oxycodone  Sedation: Alprazolam (Home Med)  HOB elevation: Y    Stress ulcer prophylaxis: PPI  VTE prophylaxis: Lovenox starting today  Glycemic control: Y    Nutrition: Regular    CODE STATUS: FULL 24 hour events: Overnight no acute events were reported per nursing or pt, No pressor support required, per PA drain had 300cc this AM; Per chart output as follows - HMV #1  HMV #2     Brief Hospital Course: 72yo M w/ PMH CAD s/p CABG x3, RA (on Steroids), COPD, HTN, Appy, C/L Spine Fusions, R TSA, L THR ('s), L Hip Periprosthetic Fx ORIF  (18., James), and L Hip I&D (18, James) was DC'd to [facility] w/ PICC for superficial infection. Pt readmitted w/ L hip pain suspicious for persistent surgical site infection, now POD #1 s/p Repeat L Hip I&D w/ Dr. Ramirez. In OR EBL reported to be ~100cc, pt received Solumedrol as well as 1700cc crystalloid. In PACU pt reported to be Hypotensive w/ SBP to 80s, post-op CBC reflected Hgb 11-->9 was transfused a total of 2U PRBC and was subsequently admitted to ICU for hemodynamic monitoring.     Review of Systems:  Constitutional: No fever, chills, fatigue  Neuro: No headache, numbness, weakness  Resp: No cough, wheezing, shortness of breath  CVS: No chest pain, palpitations, leg swelling  GI: No abdominal pain, nausea, vomiting, diarrhea   : No dysuria, frequency, incontinence  Skin: No itching, burning, rashes, or lesions   Msk: No joint pain or swelling  Psych: No depression, anxiety, mood swings    ICU Vital Signs Last 24 Hrs  T(C): 36.8 (2019 07:20), Max: 37 (2019 22:00)  T(F): 98.2 (2019 07:20), Max: 98.6 (2019 22:00)  HR: 77 (2019 06:00) (66 - 103)  BP: 134/62 (2019 06:00) (81/74 - 134/62)  BP(mean): 89 (2019 06:00) (72 - 90)  ABP: --  ABP(mean): --  RR: 12 (2019 06:00) (9 - 20)  SpO2: 98% (2019 06:00) (93% - 98%)          CAPILLARY BLOOD GLUCOSE          I&O's Summary    2019 07:01  -  2019 07:00  --------------------------------------------------------  IN: 1968 mL / OUT: 1015 mL / NET: 953 mL        Physical Exam:   Gen:  Neuro:  HEENT:  Resp:  CVS:  Abd:  Ext:  Skin:    MEDICATIONS  (STANDING):  acetaminophen   Tablet .. 650 milliGRAM(s) Oral every 6 hours  ascorbic acid 500 milliGRAM(s) Oral two times a day  ceFAZolin   IVPB 2000 milliGRAM(s) IV Intermittent every 8 hours  docusate sodium 100 milliGRAM(s) Oral three times a day  ferrous    sulfate 325 milliGRAM(s) Oral three times a day with meals  folic acid 1 milliGRAM(s) Oral daily  lactated ringers. 1000 milliLiter(s) (75 mL/Hr) IV Continuous <Continuous>  multivitamin 1 Tablet(s) Oral daily  pantoprazole    Tablet 40 milliGRAM(s) Oral before breakfast    MEDICATIONS  (PRN):  acetaminophen   Tablet .. 650 milliGRAM(s) Oral every 6 hours PRN Temp greater or equal to 38C (100.4F)  benzocaine 15 mG/menthol 3.6 mG Lozenge 1 Lozenge Oral every 2 hours PRN Sore Throat  diphenhydrAMINE 50 milliGRAM(s) Oral every 4 hours PRN Rash and/or Itching  HYDROmorphone  Injectable 1 milliGRAM(s) IV Push every 3 hours PRN Severe Pain (7 - 10)  ondansetron Injectable 4 milliGRAM(s) IV Push every 6 hours PRN Nausea and/or Vomiting  oxyCODONE    IR 5 milliGRAM(s) Oral every 4 hours PRN Mild Pain (1 - 3)  oxyCODONE    IR 10 milliGRAM(s) Oral every 4 hours PRN Moderate Pain (4 - 6)  senna 2 Tablet(s) Oral at bedtime PRN Constipation  zaleplon 5 milliGRAM(s) Oral at bedtime PRN Insomnia        Labs:                         8.9    11.44 )-----------( 216      ( 2019 05:32 )             26.7         136  |  100  |  21  ----------------------------<  157<H>  4.6   |  31  |  0.83    Ca    8.5      2019 05:32  Mg     1.7         TPro  6.9  /  Alb  2.8<L>  /  TBili  0.6  /  DBili  .20  /  AST  19  /  ALT  15  /  AlkPhos  179<H>              PT/INR - ( 2019 05:25 )   PT: 15.6 sec;   INR: 1.36 ratio         PTT - ( 2019 05:25 )  PTT:33.2 sec    Urinalysis Basic - ( 2019 08:37 )    Color: Yellow / Appearance: Clear / S.020 / pH: x  Gluc: x / Ketone: Negative  / Bili: Negative / Urobili: Negative   Blood: x / Protein: 25 mg/dL / Nitrite: Negative   Leuk Esterase: Trace / RBC: x / WBC 3-5   Sq Epi: x / Non Sq Epi: Occasional / Bacteria: Occasional        Radiology:     Bedside ultrasound:     PICC LINE: Y                    DATE INSERTED:   18        REMOVE: Y  CENTRAL LINE: N            DATE INSERTED:                	REMOVE: Y/N  WINSTON: Y                        DATE INSERTED:   1/3/19           REMOVE: Y  A-LINE: N                       DATE INSERTED:                       REMOVE: Y/N    GLOBAL ISSUE/BEST PRACTICE:  Analgesia: Methadone (Home Med), Oxycodone  Sedation: Alprazolam (Home Med)  HOB elevation: Y    Stress ulcer prophylaxis: PPI  VTE prophylaxis: Lovenox starting today  Glycemic control: Y    Nutrition: Regular    CODE STATUS: FULL 24 hour events: Overnight no acute events were reported per nursing or pt, No pressor support required, per PA drain had 300cc this AM; Per chart output as follows - HMV #1  HMV #2     Brief Hospital Course: 74yo M w/ PMH CAD s/p CABG x3, RA (on Steroids), COPD, HTN, Appy, C/L Spine Fusions, R TSA, L THR ('90s), L Hip Periprosthetic Fx ORIF  (18., James), and L Hip I&D (18, James) was DC'd to [facility] w/ PICC for superficial infection. Pt readmitted w/ L hip pain suspicious for persistent surgical site infection, now POD #1 s/p Repeat L Hip I&D w/ Dr. Ramirez. In OR EBL reported to be ~100cc, pt received Solumedrol as well as 1700cc crystalloid. In PACU pt reported to be Hypotensive w/ SBP to 80s, post-op CBC reflected Hgb 11-->9 was transfused a total of 2U PRBC and was subsequently admitted to ICU for hemodynamic monitoring.     Review of Systems:  Constitutional: No fever, chills  Neuro: No headache, change in vision  Resp: No cough, wheezing, dyspnea  CVS: No chest pain  GI: No abdominal pain, nausea, vomiting  Msk: No pain at surgical site (L hip), No pain b/l calf    ICU Vital Signs Last 24 Hrs  T(C): 36.8 (2019 07:20), Max: 37 (2019 22:00)  T(F): 98.2 (2019 07:20), Max: 98.6 (2019 22:00)  HR: 77 (2019 06:00) (66 - 103)  BP: 134/62 (2019 06:00) (81/74 - 134/62)  BP(mean): 89 (2019 06:00) (72 - 90)  ABP: --  ABP(mean): --  RR: 12 (2019 06:00) (9 - 20)  SpO2: 98% (2019 06:00) (93% - 98%)          CAPILLARY BLOOD GLUCOSE          I&O's Summary    2019 07: 07:00  --------------------------------------------------------  IN: 1968 mL / OUT: 1015 mL / NET: 953 mL        Physical Exam:   Gen: resting comfortably in NAD, AOx3, cooperative/pleasant  HEENT: normocephalic, atraumatic, MICHAEL  Resp: breathing comfortably on room air, equal rise of chest B/l  CVS: RRR, S1/S2, no m/r/g  Abd: NT/ND, No palpable passes  Gu: +Winston in place  Ext: dressing c/d/i overlying L hip, HMV x2 Prevena in place over incision, b/l calf NTTP, +ehl/fhl/ta/gs, SILT L3-S1, PICC line in RUE with overling kerlix wrap  Skin: WWP, +DP via doppler    MEDICATIONS  (STANDING):  acetaminophen   Tablet .. 650 milliGRAM(s) Oral every 6 hours  ascorbic acid 500 milliGRAM(s) Oral two times a day  ceFAZolin   IVPB 2000 milliGRAM(s) IV Intermittent every 8 hours  docusate sodium 100 milliGRAM(s) Oral three times a day  ferrous    sulfate 325 milliGRAM(s) Oral three times a day with meals  folic acid 1 milliGRAM(s) Oral daily  lactated ringers. 1000 milliLiter(s) (75 mL/Hr) IV Continuous <Continuous>  multivitamin 1 Tablet(s) Oral daily  pantoprazole    Tablet 40 milliGRAM(s) Oral before breakfast    MEDICATIONS  (PRN):  acetaminophen   Tablet .. 650 milliGRAM(s) Oral every 6 hours PRN Temp greater or equal to 38C (100.4F)  benzocaine 15 mG/menthol 3.6 mG Lozenge 1 Lozenge Oral every 2 hours PRN Sore Throat  diphenhydrAMINE 50 milliGRAM(s) Oral every 4 hours PRN Rash and/or Itching  HYDROmorphone  Injectable 1 milliGRAM(s) IV Push every 3 hours PRN Severe Pain (7 - 10)  ondansetron Injectable 4 milliGRAM(s) IV Push every 6 hours PRN Nausea and/or Vomiting  oxyCODONE    IR 5 milliGRAM(s) Oral every 4 hours PRN Mild Pain (1 - 3)  oxyCODONE    IR 10 milliGRAM(s) Oral every 4 hours PRN Moderate Pain (4 - 6)  senna 2 Tablet(s) Oral at bedtime PRN Constipation  zaleplon 5 milliGRAM(s) Oral at bedtime PRN Insomnia        Labs:                         8.9    11.44 )-----------( 216      ( 2019 05:32 )             26.7         136  |  100  |  21  ----------------------------<  157<H>  4.6   |  31  |  0.83    Ca    8.5      2019 05:32  Mg     1.7         TPro  6.9  /  Alb  2.8<L>  /  TBili  0.6  /  DBili  .20  /  AST  19  /  ALT  15  /  AlkPhos  179<H>              PT/INR - ( 2019 05:25 )   PT: 15.6 sec;   INR: 1.36 ratio         PTT - ( 2019 05:25 )  PTT:33.2 sec    Urinalysis Basic - ( 2019 08:37 )    Color: Yellow / Appearance: Clear / S.020 / pH: x  Gluc: x / Ketone: Negative  / Bili: Negative / Urobili: Negative   Blood: x / Protein: 25 mg/dL / Nitrite: Negative   Leuk Esterase: Trace / RBC: x / WBC 3-5   Sq Epi: x / Non Sq Epi: Occasional / Bacteria: Occasional        Radiology:  no    Bedside ultrasound: no    PICC LINE: Y                    DATE INSERTED:   18        REMOVE: Y  CENTRAL LINE: N            DATE INSERTED:                	REMOVE: Y/N  WINSTON: Y                        DATE INSERTED:   1/3/19           REMOVE: Y  A-LINE: N                       DATE INSERTED:                       REMOVE: Y/N    GLOBAL ISSUE/BEST PRACTICE:  Analgesia: Methadone (Home Med), Oxycodone  Sedation: Alprazolam (Home Med)  HOB elevation: Y    Stress ulcer prophylaxis: PPI  VTE prophylaxis: Lovenox starting today  Glycemic control: Y    Nutrition: Regular    CODE STATUS: FULL 24 hour events: Overnight no acute events were reported per nursing or pt, No pressor support required, per PA drain had 300cc this AM; Per chart output as follows - HMV #1  HMV #2     Brief Hospital Course: 74yo M w/ PMH CAD s/p CABG x3, RA (on Steroids), COPD, HTN, Appy, C/L Spine Fusions, R TSA, L THR ('90s), L Hip Periprosthetic Fx ORIF  (18., James), and L Hip I&D (18, James) was DC'd to [facility] w/ PICC for superficial infection. Pt readmitted w/ L hip pain suspicious for persistent surgical site infection, now POD #1 s/p Repeat L Hip I&D w/ Dr. Ramirez. In OR EBL reported to be ~100cc, pt received Solumedrol as well as 1700cc crystalloid. In PACU pt reported to be Hypotensive w/ SBP to 80s, post-op CBC reflected Hgb 11-->9 was transfused a total of 2U PRBC and was subsequently admitted to ICU for hemodynamic monitoring.     Review of Systems:  Constitutional: No fever, chills  Neuro: No headache, change in vision  Resp: No cough, wheezing, dyspnea  CVS: No chest pain  GI: No abdominal pain, nausea, vomiting  Msk: No pain at surgical site (L hip), No pain b/l calf    ICU Vital Signs Last 24 Hrs  T(C): 36.8 (2019 07:20), Max: 37 (2019 22:00)  T(F): 98.2 (2019 07:20), Max: 98.6 (2019 22:00)  HR: 77 (2019 06:00) (66 - 103)  BP: 134/62 (2019 06:00) (81/74 - 134/62)  BP(mean): 89 (2019 06:00) (72 - 90)  ABP: --  ABP(mean): --  RR: 12 (2019 06:00) (9 - 20)  SpO2: 98% (2019 06:00) (93% - 98%)           CAPILLARY BLOOD GLUCOSE          I&O's Summary    2019 07: 07:00  --------------------------------------------------------  IN: 1968 mL / OUT: 1015 mL / NET: 953 mL        Physical Exam:   Gen: resting comfortably in NAD, AOx3, cooperative/pleasant  HEENT: normocephalic, atraumatic, MICHAEL  Resp: breathing comfortably on room air, equal rise of chest B/l  CVS: RRR, S1/S2, no m/r/g  Abd: NT/ND, No palpable passes  Gu: +Winston in place  Ext: dressing c/d/i overlying L hip, HMV x2 Prevena in place over incision, b/l calf NTTP, +ehl/fhl/ta/gs, SILT L3-S1, PICC line in RUE with overling kerlix wrap  Skin: WWP, +DP via doppler    MEDICATIONS  (STANDING):  acetaminophen   Tablet .. 650 milliGRAM(s) Oral every 6 hours  ascorbic acid 500 milliGRAM(s) Oral two times a day  ceFAZolin   IVPB 2000 milliGRAM(s) IV Intermittent every 8 hours  docusate sodium 100 milliGRAM(s) Oral three times a day  ferrous    sulfate 325 milliGRAM(s) Oral three times a day with meals  folic acid 1 milliGRAM(s) Oral daily  lactated ringers. 1000 milliLiter(s) (75 mL/Hr) IV Continuous <Continuous>  multivitamin 1 Tablet(s) Oral daily  pantoprazole    Tablet 40 milliGRAM(s) Oral before breakfast    MEDICATIONS  (PRN):  acetaminophen   Tablet .. 650 milliGRAM(s) Oral every 6 hours PRN Temp greater or equal to 38C (100.4F)  benzocaine 15 mG/menthol 3.6 mG Lozenge 1 Lozenge Oral every 2 hours PRN Sore Throat  diphenhydrAMINE 50 milliGRAM(s) Oral every 4 hours PRN Rash and/or Itching  HYDROmorphone  Injectable 1 milliGRAM(s) IV Push every 3 hours PRN Severe Pain (7 - 10)  ondansetron Injectable 4 milliGRAM(s) IV Push every 6 hours PRN Nausea and/or Vomiting  oxyCODONE    IR 5 milliGRAM(s) Oral every 4 hours PRN Mild Pain (1 - 3)  oxyCODONE    IR 10 milliGRAM(s) Oral every 4 hours PRN Moderate Pain (4 - 6)  senna 2 Tablet(s) Oral at bedtime PRN Constipation  zaleplon 5 milliGRAM(s) Oral at bedtime PRN Insomnia        Labs:                         8.9    11.44 )-----------( 216      ( 2019 05:32 )             26.7         136  |  100  |  21  ----------------------------<  157<H>  4.6   |  31  |  0.83    Ca    8.5      2019 05:32  Mg     1.7         TPro  6.9  /  Alb  2.8<L>  /  TBili  0.6  /  DBili  .20  /  AST  19  /  ALT  15  /  AlkPhos  179<H>              PT/INR - ( 2019 05:25 )   PT: 15.6 sec;   INR: 1.36 ratio         PTT - ( 2019 05:25 )  PTT:33.2 sec    Urinalysis Basic - ( 2019 08:37 )    Color: Yellow / Appearance: Clear / S.020 / pH: x  Gluc: x / Ketone: Negative  / Bili: Negative / Urobili: Negative   Blood: x / Protein: 25 mg/dL / Nitrite: Negative   Leuk Esterase: Trace / RBC: x / WBC 3-5   Sq Epi: x / Non Sq Epi: Occasional / Bacteria: Occasional        Radiology:  no    Bedside ultrasound: no    PICC LINE: Y                    DATE INSERTED:   18        REMOVE: Y  CENTRAL LINE: N            DATE INSERTED:                	REMOVE: Y/N  WINSTON: Y                        DATE INSERTED:   1/3/19           REMOVE: Y  A-LINE: N                       DATE INSERTED:                       REMOVE: Y/N    GLOBAL ISSUE/BEST PRACTICE:  Analgesia: Methadone (Home Med), Oxycodone  Sedation: Alprazolam (Home Med)  HOB elevation: Y    Stress ulcer prophylaxis: PPI  VTE prophylaxis: Lovenox starting today  Glycemic control: Y    Nutrition: Regular    CODE STATUS: FULL

## 2019-01-03 NOTE — PHYSICAL THERAPY INITIAL EVALUATION ADULT - PERTINENT HX OF CURRENT PROBLEM, REHAB EVAL
As per H&P:" 73 yo M PMHx 45 pack year smoking hx and COPD (diagnosed "a few years ago, quit smoking then), HTN, HLD, hx of MI and CABG (2008) (stents also placed per chart review), RA (on Medrol daily), hx of multiple L hip surgeries (hip replacement 30 years ago in [place], ORIF recently 11/2018 w/ Dr. Ramirez) p/w L pain, drainage, redness and swelling surrounding upper L hip surgical site beginning 6 days ago. " As per H&P:"72 y/o male hx of multiple L hip surgeries (hip replacement 30 years ago in [place], ORIF recently 11/2018 w/ Dr. Ramirez) complicated with superficial infection which required I&D on 12/5 (discharged with PICC line and continued to receive cefazolin), present to the ED with L hip pain at the surgical site."

## 2019-01-03 NOTE — PROGRESS NOTE ADULT - SUBJECTIVE AND OBJECTIVE BOX
DEPARTMENT OF ANESTHESIA  POST ANESTHETIC EVALUATION    The Patient was interviewed and evaluated.  The patient is S/P an I and D of hip    Vital Signs Last 24 Hrs  T(C): 36.8 (03 Jan 2019 07:20), Max: 37 (02 Jan 2019 22:00)  T(F): 98.2 (03 Jan 2019 07:20), Max: 98.6 (02 Jan 2019 22:00)  HR: 80 (03 Jan 2019 07:00) (66 - 103)  BP: 137/61 (03 Jan 2019 07:00) (81/74 - 137/61)  BP(mean): 88 (03 Jan 2019 07:00) (72 - 90)  RR: 20 (03 Jan 2019 07:00) (9 - 20)  SpO2: 97% (03 Jan 2019 07:00) (93% - 98%)    Evaluation:  The patient is doing well     (x ) No apparent complications or complaints regarding anesthesia care at this time  (x) All questions were answered    Condition:  (x ) Stable      ( ) Guarded      ( ) Critical    Recommendations:  (x ) None     ( ) Other:

## 2019-01-03 NOTE — PHYSICAL THERAPY INITIAL EVALUATION ADULT - IMPAIRMENTS CONTRIBUTING TO GAIT DEVIATIONS, PT EVAL
impaired balance/decreased flexibility/impaired motor control/impaired postural control/decreased ROM/slow, slightly unsteady pain/slow, slightly unsteady and antalgic/impaired balance/impaired motor control/impaired postural control/decreased ROM/decreased flexibility

## 2019-01-03 NOTE — PROGRESS NOTE ADULT - ASSESSMENT
74yo M w/ L GENO complicated by persistent surgical site infection requiring IV ABx via PICC line now POD #1 s/p L Hip I&D (12/5/18, James/Sarabjit), admitted to ICU (Day #1) for post-operative hypotension.    NEURO  - Resume Fluoxetine 20mg qDay and Alprazolam 0.5mg qHS for anxiety  - Acetaminophen 650 q6hr for pain  - Oxycodone 5mg q4hr PRN for mild pain  - Oxycodone 10mg q4hr PRN for moderate pain  - Dilaudid 1mg IVP q3hr PRN for severe pain  - Cyclobenzaprine 10mg BID for muscle spasm  - DC'd diphenhydramine, will place order PRN for insomnia/Itching      CV: s/p CABG x3, HTN/HLD  - Post-op Hypotension improved w/ SBP 90s --> 130s this AM, likely hypotensive due to blood loss/insensible losses intraop as well as AI, will continue to monitor  - Resume Atorvastatin 10mg qHs for HLD  - Continue to hold A81  - FU Cards C/s, will order rpt EKG    HEME  - Drop in Hgb likely dilutional with superimposed AI, low concern for active bleeding at this time, will resume AC today  - Hgb 10.8 (1/2 Preop) --> 9.0 (Postop, s/p 2U PRBC) --> 8.9 (POD #1); improved and stable, will continue to monitor and transfuse to keep Hgb >8.0  - DVT: Lovenox POD #1, SCDs    PULM  - No active issues at this time  - Atelectasis PPx: ICS    GI  - Ulcer PPx: Famotidine  - Bowel Regimen      - IVF: LR @ 75cc/hr  - Resume Finasteride 5mg qD, Tamsulosin 0.4mg qHS    ENDO: RA on Steroids  - s/p 1 stress dose steroids intraop  - Hydrocortisone 50mg IVP q8hr    ID  - Continue Ancef for SSI via PICC line   - WBC 11.44 this AM, Leukocytosis likely reactive (2/2 OR stress and steroids)  - L Hip Swab growing CNS, likely skin contamination, will FU until final  - Preop UA w/ Trc LE and Bacteria  - FU OR Cx x2 and Tissue Cx (received)  - FU ID Consult, appreciate the recs    MSK  - Continue to monitor output HMX x2 and Prevena   - PT/OT (WBAT)  - OOB to chair 72yo M w/ L GENO complicated by persistent surgical site infection requiring IV ABx via PICC line now POD #1 s/p L Hip I&D (12/5/18, James/Sarabjit), admitted to ICU (Day #1) for post-operative hypotension.    NEURO  - Resume Fluoxetine 20mg qDay and Alprazolam 0.5mg qHS for anxiety  - Acetaminophen 650 q6hr for pain  - Oxycodone 5mg q4hr PRN for mild pain  - Oxycodone 10mg q4hr PRN for moderate pain  - Dilaudid 1mg IVP q3hr PRN for severe pain  - Cyclobenzaprine 10mg BID for muscle spasm  - DC'd diphenhydramine, will place order PRN for insomnia/Itching      CV: s/p CABG x3, HTN/HLD  - Post-op Hypotension improved w/ SBP 90s --> 130s this AM, likely hypotensive due to blood loss/insensible losses intraop as well as AI, will continue to monitor  - Resume Atorvastatin 10mg qHs for HLD  - Continue to hold A81  - FU Cards C/s, will order rpt EKG    HEME  - Drop in Hgb likely dilutional with superimposed AI, low concern for active bleeding at this time, will resume AC today  - Hgb 10.8 (1/2 Preop) --> 9.0 (Postop, s/p 2U PRBC) --> 8.9 (POD #1); improved and stable, will continue to monitor and transfuse to keep Hgb >8.0  - DVT: Lovenox POD #1, SCDs    PULM  - No active issues at this time  - Atelectasis PPx: ICS    GI  - Ulcer PPx: Famotidine  - Bowel Regimen      - IVF: LR @ 75cc/hr  - PARRISH Weinstein this AM  - Resume Finasteride 5mg qD, Tamsulosin 0.4mg qHS    ENDO: RA on Steroids  - s/p 1 stress dose steroids intraop  - Hydrocortisone 50mg IVP q8hr    ID  - Continue Ancef for SSI via PICC line   - WBC 11.44 this AM, Leukocytosis likely reactive (2/2 OR stress and steroids)  - L Hip Swab growing CNS, likely skin contamination, will FU until final  - Preop UA w/ Trc LE and Bacteria  - FU OR Cx x2 and Tissue Cx (received)  - FU ID Consult, appreciate the recs    MSK  - Continue to monitor output HMX x2 and Prevena   - PT/OT (WBAT)  - OOB to chair 74yo M w/ L GENO complicated by persistent surgical site infection requiring IV ABx via PICC line now POD #1 s/p L Hip I&D (12/5/18, James/Sarabjit), admitted to ICU (Day #1) for post-operative hypotension.    NEURO  - Resume Fluoxetine 20mg qDay and Alprazolam 0.5mg qHS for anxiety  - Acetaminophen 650 q6hr for pain  - Oxycodone 5mg q4hr PRN for mild pain  - Oxycodone 10mg q4hr PRN for moderate pain  - Dilaudid 1mg IVP q3hr PRN for severe pain  - Cyclobenzaprine 10mg BID for muscle spasm  - DC'd diphenhydramine, will place order PRN for insomnia/Itching      CV: s/p CABG x3, HTN/HLD  - Post-op Hypotension improved w/ SBP 90s --> 130s this AM, likely hypotensive due to blood loss/insensible losses intraop as well as AI, will continue to monitor  - Resume Atorvastatin 10mg qHs for HLD  - Continue to hold A81  - FU Cards C/s, will order rpt EKG    HEME  - Drop in Hgb likely dilutional with superimposed AI, low concern for active bleeding at this time, will resume AC today  - Hgb 10.8 (1/2 Preop) --> 9.0 (Postop, s/p 2U PRBC) --> 8.9 (POD #1); improved and stable, will continue to monitor and transfuse to keep Hgb >8.0  - DVT: Lovenox POD #1, SCDs    PULM  - No active issues at this time  - Atelectasis PPx: ICS    GI  - Ulcer PPx: Famotidine  - Bowel Regimen      - IVF: LR @ 75cc/hr  - PARRISH Weinstein this AM, making adequate UOP  - Resume Finasteride 5mg qD, Tamsulosin 0.4mg qHS    ENDO: RA on Steroids  - s/p 1 stress dose steroids intraop  - Hydrocortisone 50mg IVP q8hr    ID  - Continue Ancef for SSI via PICC line   - WBC 11.44 this AM, Leukocytosis likely reactive (2/2 OR stress and steroids)  - L Hip Swab growing CNS, likely skin contamination, will FU until final  - Preop UA w/ Trc LE and Bacteria  - FU OR Cx x2 and Tissue Cx (received)  - FU ID Consult, appreciate the recs    MSK  - Continue to monitor output HMX x2 and Prevena   - PT/OT (WBAT)  - OOB to chair 72yo M w/ L GENO complicated by persistent surgical site infection requiring IV ABx via PICC line now POD #1 s/p L Hip I&D (12/5/18, James/Sarabjit), admitted to ICU (Day #1) for post-operative hypotension.    NEURO  - Resume Fluoxetine 20mg qDay and Alprazolam 0.5mg qHS for anxiety  - Acetaminophen 650 q6hr for pain  - Oxycodone 5mg q4hr PRN for mild pain  - Oxycodone 10mg q4hr PRN for moderate pain  - Dilaudid 1mg IVP q3hr PRN for severe pain  - Cyclobenzaprine 10mg BID for muscle spasm  - DC'd diphenhydramine, will place order PRN for insomnia/Itching      CV: s/p CABG x3, HTN/HLD  - Post-op Hypotension improved w/ SBP 90s --> 130s this AM, likely hypotensive due to blood loss/insensible losses intraop as well as AI, will continue to monitor, currently HD stable  - Resume Atorvastatin 10mg qHs for HLD  - Resume A81  - No acute need for EKG, no observed cardiac events on monitor or documented hx of arrhythmia    HEME  - Drop in Hgb likely dilutional with superimposed AI, low concern for active bleeding at this time, will resume AC today  - Hgb 10.8 (1/2 Preop) --> 9.0 (Postop, s/p 2U PRBC) --> 8.9 (POD #1); improved and stable, will continue to monitor and transfuse to keep Hgb >8.0  - DVT: Lovenox POD #1, SCDs    PULM  - No active issues at this time  - Atelectasis PPx: ICS    GI  - Regular diet, encourage PO fluid intake  - Ulcer PPx: Famotidine  - Bowel Regimen      - IVF: LR @ 75cc/hr, will DC this AM  - DC Weinstein this AM, making adequate UOP  - Resume Finasteride 5mg qD, Tamsulosin 0.4mg qHS  - Resume Home Lisinopril     ENDO: RA on Steroids  - s/p 1 stress dose steroids intraop  - DC Hydrocortisone 50mg IVP q8hr and resume home dose Medrol     ID  - Continue Ancef for SSI via PICC line   - WBC 11.44 this AM, Leukocytosis likely reactive (2/2 OR stress and steroids)  - L Hip Swab growing CNS, likely skin contamination, will FU until final  - Preop UA w/ Trc LE and Bacteria  - FU OR Cx x2 and Tissue Cx (received)  - FU ID Consult, appreciate the recs    MSK  - Continue to monitor output HMX x2 and Prevena (to be managed by Ortho/Plastics)  - PT/OT (WBAT)  - OOB to chair 74yo M w/ L GENO complicated by persistent surgical site infection requiring IV ABx via PICC line now POD #1 s/p L Hip I&D (12/5/18, James/Sarabjit), admitted to ICU (Day #1) for post-operative hypotension.    NEURO  - Resume Fluoxetine 20mg qDay and Alprazolam 0.5mg qHS for anxiety  - Acetaminophen 650 q6hr for pain  - Oxycodone 5mg q4hr PRN for mild pain  - Oxycodone 10mg q4hr PRN for moderate pain  - Dilaudid 1mg IVP q3hr PRN for severe pain  - Cyclobenzaprine 10mg BID for muscle spasm  - DC'd diphenhydramine, will place order PRN for insomnia/Itching      CV: s/p CABG x3, HTN/HLD  - Post-op Hypotension improved w/ SBP 90s --> 130s this AM, likely hypotensive due to blood loss/insensible losses intraop as well as AI, will continue to monitor, currently HD stable  - Resume Atorvastatin 10mg qHs for HLD  - Resume A81  - No acute need for EKG, no observed cardiac events on monitor or documented hx of arrhythmia    HEME  - Drop in Hgb likely dilutional with superimposed AI, low concern for active bleeding at this time, will resume AC today  - Hgb 10.8 (1/2 Preop) --> 9.0 (Postop, s/p 2U PRBC) --> 8.9 (POD #1); improved and stable, will continue to monitor and transfuse to keep Hgb >8.0  - DVT: Lovenox POD #1, SCDs    PULM  - No active issues at this time  - Atelectasis PPx: ICS    GI  - Regular diet, encourage PO fluid intake  - Ulcer PPx: Famotidine  - Bowel Regimen      - IVF: LR @ 75cc/hr, will DC this AM  - DC Weinstein this AM, making adequate UOP  - Resume Finasteride 5mg qD, Tamsulosin 0.4mg qHS  - Resume Home Lisinopril     ENDO: RA on Steroids  - s/p 1 stress dose steroids intraop  - DC Hydrocortisone 50mg IVP q8hr and resume home dose Medrol     ID  - Continue Ancef for SSI via PICC line   - WBC 11.44 this AM, Leukocytosis likely reactive (2/2 OR stress and steroids)  - L Hip Swab growing CNS, likely skin contamination, will FU until final  - Preop UA w/ Trc LE and Bacteria  - FU OR Cx x2 and Tissue Cx (received)  - FU ID Consult, appreciate the recs    MSK  - Continue to monitor output HMX x2 and Prevena (to be managed by Ortho/Plastics)  - PT/OT (WBAT)  - OOB to chair    DISPO: stable to floors

## 2019-01-03 NOTE — PROGRESS NOTE ADULT - SUBJECTIVE AND OBJECTIVE BOX
infectious diseases progress note:    ERIKA TORREZ is a 73y y. o. Male patient    Patient reports: feeling much better    ROS:    EYES:  Negative  blurry vision or double vision  GASTROINTESTINAL:  Negative for nausea, vomiting, diarrhea  -otherwise negative except for subjective    Allergies    Ceftin (Pruritus)    Intolerances        ANTIBIOTICS/RELEVANT:  antimicrobials  ceFAZolin   IVPB 2000 milliGRAM(s) IV Intermittent every 8 hours    immunologic:    OTHER:  acetaminophen   Tablet .. 650 milliGRAM(s) Oral every 6 hours PRN  acetaminophen   Tablet .. 650 milliGRAM(s) Oral every 6 hours PRN  ALPRAZolam 0.5 milliGRAM(s) Oral at bedtime PRN  ascorbic acid 500 milliGRAM(s) Oral two times a day  atorvastatin 10 milliGRAM(s) Oral at bedtime  benzocaine 15 mG/menthol 3.6 mG Lozenge 1 Lozenge Oral every 2 hours PRN  cyclobenzaprine 10 milliGRAM(s) Oral two times a day PRN  docusate sodium 100 milliGRAM(s) Oral three times a day  enoxaparin Injectable 40 milliGRAM(s) SubCutaneous every 24 hours  famotidine    Tablet 20 milliGRAM(s) Oral two times a day  ferrous    sulfate 325 milliGRAM(s) Oral three times a day with meals  finasteride 5 milliGRAM(s) Oral daily  FLUoxetine 20 milliGRAM(s) Oral <User Schedule>  folic acid 1 milliGRAM(s) Oral daily  furosemide    Tablet 20 milliGRAM(s) Oral daily  HYDROmorphone  Injectable 1 milliGRAM(s) IV Push every 3 hours PRN  lisinopril 10 milliGRAM(s) Oral daily  magnesium sulfate  IVPB 2 Gram(s) IV Intermittent once  methadone    Tablet 5 milliGRAM(s) Oral every 8 hours  methylPREDNISolone 2 milliGRAM(s) Oral daily  multivitamin 1 Tablet(s) Oral daily  ondansetron Injectable 4 milliGRAM(s) IV Push every 6 hours PRN  oxyCODONE    IR 10 milliGRAM(s) Oral every 4 hours PRN  senna 2 Tablet(s) Oral at bedtime PRN  tamsulosin 0.4 milliGRAM(s) Oral at bedtime      Objective:  Last 24-Vital Signs Last 24 Hrs  T(C): 36.8 (03 Jan 2019 07:20), Max: 37 (02 Jan 2019 22:00)  T(F): 98.2 (03 Jan 2019 07:20), Max: 98.6 (02 Jan 2019 22:00)  HR: 88 (03 Jan 2019 10:22) (66 - 103)  BP: 140/78 (03 Jan 2019 10:00) (81/74 - 141/65)  BP(mean): 100 (03 Jan 2019 10:00) (72 - 100)  RR: 19 (03 Jan 2019 10:22) (9 - 30)  SpO2: 97% (03 Jan 2019 10:22) (93% - 98%)    T(C): 36.8 (01-03-19 @ 07:20), Max: 37.2 (01-02-19 @ 04:23)  T(F): 98.2 (01-03-19 @ 07:20), Max: 99 (01-02-19 @ 04:23)  T(C): 36.8 (01-03-19 @ 07:20), Max: 37.2 (01-02-19 @ 04:23)  T(F): 98.2 (01-03-19 @ 07:20), Max: 99 (01-02-19 @ 04:23)  T(C): 36.8 (01-03-19 @ 07:20), Max: 37.2 (01-02-19 @ 04:23)  T(F): 98.2 (01-03-19 @ 07:20), Max: 99 (01-02-19 @ 04:23)    PHYSICAL EXAM:  Constitutional: Well-developed, well nourished  Eyes: PERRLA, EOMI  Ear/Nose/Throat: oropharynx normal	  Neck: no JVD, no lymphadenopathy, supple  Respiratory: no accessory muscle use, lung fields bilaterally clear  Cardiovascular: RRR, normal S1, S2 no m/r/g  Gastrointestinal: soft, NT, no HSM, BS-normal  Extremities: no clubbing, no cyanosis, edema absent  Neuro: patient alert, oriented and appropriate  Skin: drains and vac pump im place with no sig surrounding erythema      LABS:                        8.9    11.44 )-----------( 216      ( 03 Jan 2019 05:32 )             26.7       WBC 11.44  01-03 @ 05:32  WBC 9.48  01-02 @ 21:27  WBC 8.39  01-02 @ 05:25  WBC 8.16  01-01 @ 08:08  WBC 9.26  12-31 @ 14:46      01-03    136  |  100  |  21  ----------------------------<  157<H>  4.6   |  31  |  0.83    Ca    8.5      03 Jan 2019 05:32  Phos  3.7     01-03  Mg     1.7     01-03    TPro  6.9  /  Alb  2.8<L>  /  TBili  0.6  /  DBili  .20  /  AST  19  /  ALT  15  /  AlkPhos  179<H>  01-02      Creatinine, Serum: 0.83 mg/dL (01-03-19 @ 05:32)  Creatinine, Serum: 1.00 mg/dL (01-02-19 @ 21:27)  Creatinine, Serum: 0.80 mg/dL (01-02-19 @ 05:25)  Creatinine, Serum: 0.74 mg/dL (01-01-19 @ 08:08)  Creatinine, Serum: 0.87 mg/dL (12-31-18 @ 14:46)      PT/INR - ( 02 Jan 2019 05:25 )   PT: 15.6 sec;   INR: 1.36 ratio         PTT - ( 02 Jan 2019 05:25 )  PTT:33.2 sec      MICROBIOLOGY:    Culture - Other (01.01.19 @ 12:45)    Specimen Source: .Other Left hip incision site    Culture Results:   Few Coag Negative Staphylococcus Susceptibilites not performed.      RADIOLOGY & ADDITIONAL STUDIES:

## 2019-01-03 NOTE — PHYSICAL THERAPY INITIAL EVALUATION ADULT - IMPAIRMENTS FOUND, PT EVAL
gross motor/aerobic capacity/endurance/arousal, attention, and cognition/fine motor/sensory integrity/tone/integumentary integrity/joint integrity and mobility/gait, locomotion, and balance/muscle strength/posture integumentary integrity/muscle strength/posture/arousal, attention, and cognition/fine motor/sensory integrity/tone/aerobic capacity/endurance/gross motor/joint integrity and mobility/ROM/gait, locomotion, and balance

## 2019-01-03 NOTE — PHYSICAL THERAPY INITIAL EVALUATION ADULT - ORIENTATION, REHAB EVAL
oriented to person, place, time and situation/Some forgetfulness/confusion noted at times oriented to person, place, time and situation

## 2019-01-03 NOTE — PROGRESS NOTE ADULT - ATTENDING COMMENTS
73M PMH HTN, HLD, CAD s/p CABG, RA on steroids, COPD (not on home oxygen), DM2, and s/p recent L Hip ORIF (11/19/18) complicated by MSSA L Hip wound infection on Cefazolin via PICC, who now presents with persistent surgical site infection, now POD#1 s/p L Hip I&D. OR course complicated by 100mL blood loss, s/p 1700mL crystalloid intra-op, 1000mL post-op, and 2 units prbc's. Required stress dose hydrocortisone for olvin-op hypotension. Now stable.    - Continue pain control. Restart fluoxetine and alprazolam prn. Continue cyclobenzaprine for muscle spasms  - HD stable, restart aspirin, statin, lisinopril, furosemide. D/c IVF given tolerating po  - Stable respiratory status, on room air with SpO2>95%  - Restart DASH/TLC diet  - Stable kidney function, replete low Mg  - Continue cefazolin, f/up surgical site cultures. F/up ID  - Restart enoxaparin for DVT ppx. Stable H/H  - Continue tamsulosin and finasteride  - Stable for surgical floors 73M PMH HTN, HLD, CAD s/p CABG, RA on steroids, COPD (not on home oxygen), DM2, and s/p recent L Hip ORIF (11/19/18) complicated by MSSA L Hip wound infection on Cefazolin via PICC, who now presents with persistent surgical site infection, now POD#1 s/p L Hip I&D. OR course complicated by 100mL blood loss, s/p 1700mL crystalloid intra-op, 1000mL post-op, and 2 units prbc's. Required stress dose hydrocortisone for olvin-op hypotension. Now stable.    - Continue pain control. Restart fluoxetine and alprazolam prn. Continue cyclobenzaprine for muscle spasms  - HD stable, restart aspirin, statin, lisinopril, furosemide. D/c IVF given tolerating po  - Stable respiratory status, on room air with SpO2>95%  - Restart DASH/TLC diet  - Stable kidney function, replete low Mg  - Continue cefazolin, f/up surgical site cultures. F/up ID  - Restart enoxaparin for DVT ppx. Stable H/H  - D/c hydrocortisone and restart methylprednisolone 2 mg qd  - Continue tamsulosin and finasteride  - Stable for surgical floors

## 2019-01-03 NOTE — PROGRESS NOTE ADULT - SUBJECTIVE AND OBJECTIVE BOX
ERIKA [facility]   [id number]    Patient stable, tolerating diet, pain controlled on regimen.      T(C): 36.7 (01-03-19 @ 12:14), Max: 37 (01-02-19 @ 22:00)  HR: 90 (01-03-19 @ 12:14) (66 - 103)  BP: 147/78 (01-03-19 @ 12:14) (81/74 - 147/78)  RR: 18 (01-03-19 @ 12:14) (9 - 30)  SpO2: 99% (01-03-19 @ 12:14) (93% - 99%)  Wt(kg): --  NAD  LLE: VAc in place without suction.  Drains in situ. Soft.  No calf tenderness.       01-02 @ 07:01  -  01-03 @ 07:00  --------------------------------------------------------  IN: 1968 mL / OUT: 1015 mL / NET: 953 mL    01-03 @ 07:01  -  01-03 @ 16:26  --------------------------------------------------------  IN: 225 mL / OUT: 395 mL / NET: -170 mL      Hemovac: 390cc  EUSEBIO: 50cc  acetaminophen   Tablet .. 650 milliGRAM(s) Oral every 6 hours PRN  acetaminophen   Tablet .. 650 milliGRAM(s) Oral every 6 hours PRN  ALPRAZolam 0.5 milliGRAM(s) Oral at bedtime PRN  ascorbic acid 500 milliGRAM(s) Oral two times a day  atorvastatin 10 milliGRAM(s) Oral at bedtime  benzocaine 15 mG/menthol 3.6 mG Lozenge 1 Lozenge Oral every 2 hours PRN  ceFAZolin   IVPB 2000 milliGRAM(s) IV Intermittent every 8 hours  cyclobenzaprine 10 milliGRAM(s) Oral two times a day PRN  docusate sodium 100 milliGRAM(s) Oral three times a day  enoxaparin Injectable 40 milliGRAM(s) SubCutaneous every 24 hours  famotidine    Tablet 20 milliGRAM(s) Oral two times a day  ferrous    sulfate 325 milliGRAM(s) Oral three times a day with meals  finasteride 5 milliGRAM(s) Oral daily  FLUoxetine 20 milliGRAM(s) Oral <User Schedule>  folic acid 1 milliGRAM(s) Oral daily  furosemide    Tablet 20 milliGRAM(s) Oral daily  HYDROmorphone  Injectable 1 milliGRAM(s) IV Push every 3 hours PRN  lisinopril 10 milliGRAM(s) Oral daily  methadone    Tablet 5 milliGRAM(s) Oral every 8 hours  methylPREDNISolone 2 milliGRAM(s) Oral daily  multivitamin 1 Tablet(s) Oral daily  ondansetron Injectable 4 milliGRAM(s) IV Push every 6 hours PRN  oxyCODONE    IR 10 milliGRAM(s) Oral every 4 hours PRN  senna 2 Tablet(s) Oral at bedtime PRN  tamsulosin 0.4 milliGRAM(s) Oral at bedtime                            8.9    11.44 )-----------( 216      ( 03 Jan 2019 05:32 )             26.7     01-03    136  |  100  |  21  ----------------------------<  157<H>  4.6   |  31  |  0.83    Ca    8.5      03 Jan 2019 05:32  Phos  3.7     01-03  Mg     1.7     01-03    TPro  6.9  /  Alb  2.8<L>  /  TBili  0.6  /  DBili  .20  /  AST  19  /  ALT  15  /  AlkPhos  179<H>  01-02 wbc: 9.4; hgb: 12.5; pc: 122; na: 144; bun: 16; cr: 1.04; troponin: 0.207

## 2019-01-03 NOTE — PHYSICAL THERAPY INITIAL EVALUATION ADULT - GAIT DEVIATIONS NOTED, PT EVAL
decreased step length/decreased taran/Pt not advancing RW far enough, cues needed/decreased velocity of limb motion/decreased weight-shifting ability/decreased stride length

## 2019-01-03 NOTE — PHYSICAL THERAPY INITIAL EVALUATION ADULT - LEVEL OF INDEPENDENCE: SIT/STAND, REHAB EVAL
moderate assist (50% patients effort)/tactile, manual and verbal cues needed moderate assist (50% patients effort)/tactile, manual and verbal cues needed/minimum assist (75% patients effort)

## 2019-01-03 NOTE — PHYSICAL THERAPY INITIAL EVALUATION ADULT - TRANSFER SAFETY CONCERNS NOTED: SIT/STAND, REHAB EVAL
Pt able to PWB to LLE /c Toe-touch WB to BLE due to leg-length discrepancy/decreased weight-shifting ability/decreased safety awareness/losing balance/decreased sequencing ability/decreased proprioception/decreased step length losing balance/decreased sequencing ability/decreased weight-shifting ability/Pt able to WBAT to LLE /c Toe-touch WB to BLE due to leg-length discrepancy/decreased proprioception/decreased step length/decreased safety awareness

## 2019-01-03 NOTE — PHYSICAL THERAPY INITIAL EVALUATION ADULT - SKIN COLOR/CHARACTERISTICS
left hip dressing, erythremia and swelling to left hip ,callous, dry and flakiness to feet, left > right, discoloration to BLE hands and shins/redness blanchable redness blanchable/left hip dressing, 2 EUSEBIO drains, small portable wound vac ,callous, dry and flakiness to feet, left > right, discoloration to BLE hands and shins

## 2019-01-03 NOTE — PHYSICAL THERAPY INITIAL EVALUATION ADULT - TRANSFER TRAINING, PT EVAL
Transfers sit<->stand /c RW, BLE and Min ax1 or better in 5 sessions Transfers sit<->stand /c RW, BLE WBAT and Min ax1 or better in 5 sessions

## 2019-01-03 NOTE — PROGRESS NOTE ADULT - SUBJECTIVE AND OBJECTIVE BOX
Patient seen and examined. Pain controlled.    Physical exam  VS: see EMR  Gen: NAD  Left LE: Posterior hemovac drain site with some sanguinous drainage. Prevena and other dressing intact. +EHL/FHL/TA/GSC. SILT L3-S1. +Dorsalis pedis, capillary refill brisk. Compartments soft and nontender.      73M s/p left hip I&D with plastics closure POD# 1    FU OR cultures  FU hemovac output  Hemovac and dressing per plastic surgery  Antibiotics per ID  Weight bear as tolerated  Pain control  DVT prophylaxis  Physical therapy  Discharge planning

## 2019-01-03 NOTE — PROGRESS NOTE ADULT - ASSESSMENT
72 yo M PMHx 45 pack year smoking hx and COPD (diagnosed "a few years ago, quit smoking 6 months ago), HTN, HLD, hx of MI and CABG (2008) (stents also placed per chart review), RA (on Medrol daily), hx of multiple L hip surgeries (hip replacement 30 years ago in [place], ORIF recently 11/2018 w/ Dr. Ramirez) complicated with superficial infection which required I&D on 12/5 (discharged with PICC line and continued to receive cefazolin), present to the ED with L hip pain at the surgical site. Admitted for infected hip, s/p surgical irrigation and debridement by ortho/plastics, postoperative hypotension in PACU    - Tolerated procedure with no evidence of cardiac complications  - Post op hypotension, presumably from acute blood loss anemia.  BP improved s/p 2 units.  Continue to monitor hemodynamics closely  - Trend serial CBC and transfuse PRBC as needed  - Repeat post operative EKG  - NO events on telemetry  - Pt originally from [place], sees cardiologist back home, per patient had full cardiac check prior coming to [place] before Thanksgiving, reports normal stress, EKG, echo, and carotid USD.  - HR now controlled  - hold asa in setting of acute blood loss.  To be resumed ASAP.    - resume statin  - Monitor and replete lytes, keep K>4, Mg>2.  - Other cardiovascular workup will depend on clinical course.  - All other workup per primary team.  - Will follow with you  - Critically ill with high risk of decompensation. Time greater than 35 minutes.

## 2019-01-03 NOTE — PHYSICAL THERAPY INITIAL EVALUATION ADULT - PHYSICAL ASSIST/NONPHYSICAL ASSIST: SIT/STAND, REHAB EVAL
set-up required/Initially required Mod ax2, PT had pt transfer x3/1 person assist/verbal cues/nonverbal cues (demo/gestures) nonverbal cues (demo/gestures)/verbal cues/set-up required/1 person assist

## 2019-01-03 NOTE — PHYSICAL THERAPY INITIAL EVALUATION ADULT - BALANCE DISTURBANCE, IDENTIFIED IMPAIRMENT CONTRIBUTE, REHAB EVAL
decreased ROM/decreased strength/abnormal muscle tone/impaired coordination/impaired motor control/impaired postural control/decreased sensation abnormal muscle tone/pain/decreased ROM/decreased sensation/impaired coordination/impaired motor control/impaired postural control/decreased strength

## 2019-01-03 NOTE — PHYSICAL THERAPY INITIAL EVALUATION ADULT - AMBULATION SKILLS, REHAB EVAL
needs device and assist/can ambulate at times /c RW and or uses motorized scooter. Needs assistance at times

## 2019-01-03 NOTE — PHYSICAL THERAPY INITIAL EVALUATION ADULT - PHYSICAL ASSIST/NONPHYSICAL ASSIST: SUPINE/SIT, REHAB EVAL
Tactile, manual and verbal cues/nonverbal cues (demo/gestures)/1 person assist/verbal cues/set-up required

## 2019-01-03 NOTE — PHYSICAL THERAPY INITIAL EVALUATION ADULT - LEVEL OF INDEPENDENCE: GAIT, REHAB EVAL
moderate assist (50% patients effort) minimum assist (75% patients effort)/moderate assist (50% patients effort)

## 2019-01-03 NOTE — PHYSICAL THERAPY INITIAL EVALUATION ADULT - GENERAL OBSERVATIONS, REHAB EVAL
Pt rec'd in chair in room on 3 west /c IV to LUE and portable small wound vac to left hip. Pt is know to this PT from last admission. Pt denies any pain & agreeable /c PT eval. Pt rec'd in ICU bed /c 2 EUSEBIO drains and portable small wound vac to left hip. Pt is know to this PT from last admission. Pt denies any pain & agreeable /c PT eval.

## 2019-01-03 NOTE — PHYSICAL THERAPY INITIAL EVALUATION ADULT - IMPAIRED TRANSFERS: SIT/STAND, REHAB EVAL
decreased ROM/decreased sensation/impaired postural control/decreased flexibility/abnormal muscle tone/impaired motor control/decreased strength/impaired coordination/impaired balance decreased flexibility/abnormal muscle tone/decreased strength/impaired balance/impaired coordination/impaired postural control/decreased ROM/decreased sensation/impaired motor control/pain

## 2019-01-03 NOTE — PROGRESS NOTE ADULT - SUBJECTIVE AND OBJECTIVE BOX
[facility] - Shannon Valdez, [place], Anusha, Johanny, Abdias Little  Office Number:  [phone]    Patient resting comfortably in bed in NAD.  Laying flat with no respiratory distress.  No complaints of chest pain, dyspnea, palpitations, PND, or orthopnea.  Was hypotensive in PACU.  CBC revealed acute blood loss anemia.  Transfused 2 units PRBC.  BP borderline.  NO pressors required overnight.    F/U for:  CAD, hypotension    Telemetry:  NSR    MEDICATIONS  (STANDING):  acetaminophen   Tablet .. 650 milliGRAM(s) Oral every 6 hours  ascorbic acid 500 milliGRAM(s) Oral two times a day  ceFAZolin   IVPB 2000 milliGRAM(s) IV Intermittent every 8 hours  docusate sodium 100 milliGRAM(s) Oral three times a day  ferrous    sulfate 325 milliGRAM(s) Oral three times a day with meals  folic acid 1 milliGRAM(s) Oral daily  lactated ringers. 1000 milliLiter(s) (75 mL/Hr) IV Continuous <Continuous>  multivitamin 1 Tablet(s) Oral daily  pantoprazole    Tablet 40 milliGRAM(s) Oral before breakfast    MEDICATIONS  (PRN):  acetaminophen   Tablet .. 650 milliGRAM(s) Oral every 6 hours PRN Temp greater or equal to 38C (100.4F)  benzocaine 15 mG/menthol 3.6 mG Lozenge 1 Lozenge Oral every 2 hours PRN Sore Throat  diphenhydrAMINE 50 milliGRAM(s) Oral every 4 hours PRN Rash and/or Itching  HYDROmorphone  Injectable 1 milliGRAM(s) IV Push every 3 hours PRN Severe Pain (7 - 10)  ondansetron Injectable 4 milliGRAM(s) IV Push every 6 hours PRN Nausea and/or Vomiting  oxyCODONE    IR 5 milliGRAM(s) Oral every 4 hours PRN Mild Pain (1 - 3)  oxyCODONE    IR 10 milliGRAM(s) Oral every 4 hours PRN Moderate Pain (4 - 6)  senna 2 Tablet(s) Oral at bedtime PRN Constipation  zaleplon 5 milliGRAM(s) Oral at bedtime PRN Insomnia      Allergies    Ceftin (Pruritus)          Vital Signs Last 24 Hrs  T(C): 36.8 (03 Jan 2019 04:32), Max: 37 (02 Jan 2019 22:00)  T(F): 98.3 (03 Jan 2019 04:32), Max: 98.6 (02 Jan 2019 22:00)  HR: 68 (03 Jan 2019 04:00) (68 - 103)  BP: 123/56 (03 Jan 2019 04:00) (81/74 - 128/77)  BP(mean): 81 (03 Jan 2019 04:00) (72 - 90)  RR: 10 (03 Jan 2019 04:00) (10 - 20)  SpO2: 96% (03 Jan 2019 04:00) (93% - 98%)    I&O's Summary    01 Jan 2019 07:01  -  02 Jan 2019 07:00  --------------------------------------------------------  IN: 625 mL / OUT: 250 mL / NET: 375 mL    02 Jan 2019 07:01  -  03 Jan 2019 05:50  --------------------------------------------------------  IN: 1693 mL / OUT: 685 mL / NET: 1008 mL        ON EXAM:    General: NAD, awake and alert, oriented x 3  HEENT: Mucous membranes are moist, anicteric  Lungs: Non-labored, breath sounds are clear bilaterally, No wheezing, rales or rhonchi  Cardiovascular: Regular, S1 and S2, no murmurs, rubs, or gallops  Gastrointestinal: Bowel Sounds present, soft, nontender.   Lymph: No peripheral edema. No lymphadenopathy.  Skin: No rashes or ulcers  Psych:  Mood & affect appropriate    LABS: All Labs Reviewed:                        8.9    11.44 )-----------( 216      ( 03 Jan 2019 05:32 )             26.7                         9.0    9.48  )-----------( 301      ( 02 Jan 2019 21:27 )             28.4                         10.8   8.39  )-----------( 286      ( 02 Jan 2019 05:25 )             33.7     02 Jan 2019 21:27    136    |  99     |  20     ----------------------------<  190    4.1     |  26     |  1.00   02 Jan 2019 05:25    137    |  99     |  15     ----------------------------<  87     4.0     |  31     |  0.80   01 Jan 2019 08:08    138    |  101    |  18     ----------------------------<  98     4.0     |  29     |  0.74     Ca    8.8        02 Jan 2019 21:27  Ca    9.3        02 Jan 2019 05:25  Ca    9.2        01 Jan 2019 08:08    TPro  6.9    /  Alb  2.8    /  TBili  0.6    /  DBili  .20    /  AST  19     /  ALT  15     /  AlkPhos  179    02 Jan 2019 05:25    PT/INR - ( 02 Jan 2019 05:25 )   PT: 15.6 sec;   INR: 1.36 ratio         PTT - ( 02 Jan 2019 05:25 )  PTT:33.2 sec      Blood Culture: Organism --  Gram Stain Blood -- Gram Stain --  Specimen Source .Other Left hip incision site  Culture-Blood --

## 2019-01-03 NOTE — PROGRESS NOTE ADULT - ASSESSMENT
72 yo male with large collection, enhancing rim. Infected seroma vs. superimposed abscess vs. continuous process from prior infection  sp drainage

## 2019-01-03 NOTE — CHART NOTE - NSCHARTNOTEFT_GEN_A_CORE
Called to see patient for left hip pain and no wound vac drainage. Patient is s/p I&D of left hip POD#1 for infected hematoma. Patient complaining of severe pain at incision site and buttocks. Pain is worse with movement. Received Oxycodone 10mg earlier tonight with minor relief. Denies f/c, headaches, dizziness, chest pain, palpitations, n/v/d. Denies numbness/tingling, loss of sensation, urinary incontinence.     T(C): 36.8 (01-03-19 @ 20:33), Max: 37 (01-02-19 @ 23:30)  HR: 81 (01-03-19 @ 20:33) (66 - 98)  BP: 115/55 (01-03-19 @ 20:33) (81/74 - 147/78)  RR: 17 (01-03-19 @ 20:33) (9 - 30)  SpO2: 95% (01-03-19 @ 20:33) (95% - 99%)  Wt(kg): --    Physical :  Gen- NAD, ncat  Cardio - s+1,s+2, rrr, no murmur  Lung - cta b/l, no wheeze, no rhonchi, no rales   Abdomen- +BS, NT/ND, no guarding, no rebound, no masses  Ext- left hip with surgical scar and wound vac in place (not actively draining). Dressing c/d/i   Neuro- ROM of LLE limited due to pain, sensation intact, able to move distal extremities    LABS:                        8.9    11.44 )-----------( 216      ( 03 Jan 2019 05:32 )             26.7     01-03    136  |  100  |  21  ----------------------------<  157<H>  4.6   |  31  |  0.83    Ca    8.5      03 Jan 2019 05:32  Phos  3.7     01-03  Mg     1.7     01-03    TPro  6.9  /  Alb  2.8<L>  /  TBili  0.6  /  DBili  .20  /  AST  19  /  ALT  15  /  AlkPhos  179<H>  01-02    PT/INR - ( 02 Jan 2019 05:25 )   PT: 15.6 sec;   INR: 1.36 ratio         PTT - ( 02 Jan 2019 05:25 )  PTT:33.2 sec            Assessment/Plan  72 yo M PMHx 45 pack year smoking hx and COPD (diagnosed "a few years ago, quit smoking then), HTN, HLD, hx of MI and CABG (2008) (stents also placed per chart review), RA (on Medrol daily), hx of multiple L hip surgeries (hip replacement 30 years ago in [place], ORIF recently 11/2018 w/ Dr. Ramirez) complicated with superficial infection which required I&D on 12/5 (discharged with PICC line and continued to receive cefazolin), present to the ED with L hip pain at the surgical site admitted with chronic infection of hardware. POD #1    - Will ordered 1x stat dose of morpine IV 2mg for severe pain after repeat of vitals  - Wound vac not actively draining. Nurse d/w jane who will evaluate in AM  - Continue to monitor  - RN to call with any changes

## 2019-01-03 NOTE — PHYSICAL THERAPY INITIAL EVALUATION ADULT - WEIGHT-BEARING RESTRICTIONS: SIT/STAND, REHAB EVAL
partial weight-bearing/LLE, pt tends to extend right LE when initiating transfers, cues to flex BLE/knees weight-bearing as tolerated/BLE, pt tends to extend right LE when initiating transfers and toe-touch WB

## 2019-01-03 NOTE — PHYSICAL THERAPY INITIAL EVALUATION ADULT - RANGE OF MOTION EXAMINATION, REHAB EVAL
bilateral upper extremity ROM was WFL (within functional limits)/bilateral lower extremity ROM was WFL (within functional limits)/deficits as listed below/Arthritic changes noted, Left hip limited due to THR, (+) leg-length discrepancy BLE, pt stands on his toes

## 2019-01-04 LAB
-  AMPICILLIN: SIGNIFICANT CHANGE UP
-  DAPTOMYCIN: SIGNIFICANT CHANGE UP
-  LEVOFLOXACIN: SIGNIFICANT CHANGE UP
-  LINEZOLID: SIGNIFICANT CHANGE UP
-  TETRACYCLINE: SIGNIFICANT CHANGE UP
-  VANCOMYCIN: SIGNIFICANT CHANGE UP
ALBUMIN SERPL ELPH-MCNC: 2.4 G/DL — LOW (ref 3.3–5)
ALP SERPL-CCNC: 121 U/L — HIGH (ref 40–120)
ALT FLD-CCNC: 11 U/L — LOW (ref 12–78)
ANION GAP SERPL CALC-SCNC: 6 MMOL/L — SIGNIFICANT CHANGE UP (ref 5–17)
AST SERPL-CCNC: 13 U/L — LOW (ref 15–37)
BILIRUB SERPL-MCNC: 0.6 MG/DL — SIGNIFICANT CHANGE UP (ref 0.2–1.2)
BUN SERPL-MCNC: 15 MG/DL — SIGNIFICANT CHANGE UP (ref 7–23)
CALCIUM SERPL-MCNC: 8.6 MG/DL — SIGNIFICANT CHANGE UP (ref 8.5–10.1)
CHLORIDE SERPL-SCNC: 99 MMOL/L — SIGNIFICANT CHANGE UP (ref 96–108)
CO2 SERPL-SCNC: 33 MMOL/L — HIGH (ref 22–31)
CREAT SERPL-MCNC: 0.75 MG/DL — SIGNIFICANT CHANGE UP (ref 0.5–1.3)
CULTURE RESULTS: SIGNIFICANT CHANGE UP
GLUCOSE SERPL-MCNC: 89 MG/DL — SIGNIFICANT CHANGE UP (ref 70–99)
HCT VFR BLD CALC: 26.7 % — LOW (ref 39–50)
HGB BLD-MCNC: 8.6 G/DL — LOW (ref 13–17)
MAGNESIUM SERPL-MCNC: 2 MG/DL — SIGNIFICANT CHANGE UP (ref 1.6–2.6)
MCHC RBC-ENTMCNC: 29.6 PG — SIGNIFICANT CHANGE UP (ref 27–34)
MCHC RBC-ENTMCNC: 32.2 GM/DL — SIGNIFICANT CHANGE UP (ref 32–36)
MCV RBC AUTO: 91.8 FL — SIGNIFICANT CHANGE UP (ref 80–100)
METHOD TYPE: SIGNIFICANT CHANGE UP
NRBC # BLD: 0 /100 WBCS — SIGNIFICANT CHANGE UP (ref 0–0)
ORGANISM # SPEC MICROSCOPIC CNT: SIGNIFICANT CHANGE UP
ORGANISM # SPEC MICROSCOPIC CNT: SIGNIFICANT CHANGE UP
PHOSPHATE SERPL-MCNC: 2.6 MG/DL — SIGNIFICANT CHANGE UP (ref 2.5–4.5)
PLATELET # BLD AUTO: 224 K/UL — SIGNIFICANT CHANGE UP (ref 150–400)
POTASSIUM SERPL-MCNC: 3.6 MMOL/L — SIGNIFICANT CHANGE UP (ref 3.5–5.3)
POTASSIUM SERPL-SCNC: 3.6 MMOL/L — SIGNIFICANT CHANGE UP (ref 3.5–5.3)
PROT SERPL-MCNC: 5.8 G/DL — LOW (ref 6–8.3)
RBC # BLD: 2.91 M/UL — LOW (ref 4.2–5.8)
RBC # FLD: 15.9 % — HIGH (ref 10.3–14.5)
SODIUM SERPL-SCNC: 138 MMOL/L — SIGNIFICANT CHANGE UP (ref 135–145)
SPECIMEN SOURCE: SIGNIFICANT CHANGE UP
WBC # BLD: 7.48 K/UL — SIGNIFICANT CHANGE UP (ref 3.8–10.5)
WBC # FLD AUTO: 7.48 K/UL — SIGNIFICANT CHANGE UP (ref 3.8–10.5)

## 2019-01-04 PROCEDURE — 99232 SBSQ HOSP IP/OBS MODERATE 35: CPT

## 2019-01-04 RX ORDER — DAPTOMYCIN 500 MG/10ML
500 INJECTION, POWDER, LYOPHILIZED, FOR SOLUTION INTRAVENOUS ONCE
Qty: 0 | Refills: 0 | Status: COMPLETED | OUTPATIENT
Start: 2019-01-04 | End: 2019-01-04

## 2019-01-04 RX ORDER — ASPIRIN/CALCIUM CARB/MAGNESIUM 324 MG
81 TABLET ORAL DAILY
Qty: 0 | Refills: 0 | Status: DISCONTINUED | OUTPATIENT
Start: 2019-01-05 | End: 2019-01-07

## 2019-01-04 RX ORDER — DAPTOMYCIN 500 MG/10ML
INJECTION, POWDER, LYOPHILIZED, FOR SOLUTION INTRAVENOUS
Qty: 0 | Refills: 0 | Status: DISCONTINUED | OUTPATIENT
Start: 2019-01-04 | End: 2019-01-07

## 2019-01-04 RX ORDER — POTASSIUM CHLORIDE 20 MEQ
40 PACKET (EA) ORAL ONCE
Qty: 0 | Refills: 0 | Status: COMPLETED | OUTPATIENT
Start: 2019-01-04 | End: 2019-01-04

## 2019-01-04 RX ORDER — DAPTOMYCIN 500 MG/10ML
500 INJECTION, POWDER, LYOPHILIZED, FOR SOLUTION INTRAVENOUS EVERY 24 HOURS
Qty: 0 | Refills: 0 | Status: DISCONTINUED | OUTPATIENT
Start: 2019-01-05 | End: 2019-01-07

## 2019-01-04 RX ADMIN — Medication 100 MILLIGRAM(S): at 21:27

## 2019-01-04 RX ADMIN — FAMOTIDINE 20 MILLIGRAM(S): 10 INJECTION INTRAVENOUS at 05:39

## 2019-01-04 RX ADMIN — Medication 20 MILLIGRAM(S): at 13:35

## 2019-01-04 RX ADMIN — Medication 100 MILLIGRAM(S): at 11:17

## 2019-01-04 RX ADMIN — METHADONE HYDROCHLORIDE 5 MILLIGRAM(S): 40 TABLET ORAL at 05:39

## 2019-01-04 RX ADMIN — Medication 500 MILLIGRAM(S): at 05:41

## 2019-01-04 RX ADMIN — HYDROMORPHONE HYDROCHLORIDE 1 MILLIGRAM(S): 2 INJECTION INTRAMUSCULAR; INTRAVENOUS; SUBCUTANEOUS at 19:38

## 2019-01-04 RX ADMIN — Medication 40 MILLIEQUIVALENT(S): at 11:30

## 2019-01-04 RX ADMIN — Medication 1 MILLIGRAM(S): at 11:30

## 2019-01-04 RX ADMIN — Medication 325 MILLIGRAM(S): at 07:38

## 2019-01-04 RX ADMIN — Medication 325 MILLIGRAM(S): at 13:33

## 2019-01-04 RX ADMIN — FINASTERIDE 5 MILLIGRAM(S): 5 TABLET, FILM COATED ORAL at 11:30

## 2019-01-04 RX ADMIN — MORPHINE SULFATE 2 MILLIGRAM(S): 50 CAPSULE, EXTENDED RELEASE ORAL at 00:30

## 2019-01-04 RX ADMIN — Medication 1 TABLET(S): at 11:30

## 2019-01-04 RX ADMIN — HYDROMORPHONE HYDROCHLORIDE 1 MILLIGRAM(S): 2 INJECTION INTRAMUSCULAR; INTRAVENOUS; SUBCUTANEOUS at 13:50

## 2019-01-04 RX ADMIN — HYDROMORPHONE HYDROCHLORIDE 1 MILLIGRAM(S): 2 INJECTION INTRAMUSCULAR; INTRAVENOUS; SUBCUTANEOUS at 05:00

## 2019-01-04 RX ADMIN — Medication 100 MILLIGRAM(S): at 04:00

## 2019-01-04 RX ADMIN — HYDROMORPHONE HYDROCHLORIDE 1 MILLIGRAM(S): 2 INJECTION INTRAMUSCULAR; INTRAVENOUS; SUBCUTANEOUS at 07:35

## 2019-01-04 RX ADMIN — Medication 2 MILLIGRAM(S): at 05:40

## 2019-01-04 RX ADMIN — LISINOPRIL 10 MILLIGRAM(S): 2.5 TABLET ORAL at 05:39

## 2019-01-04 RX ADMIN — METHADONE HYDROCHLORIDE 5 MILLIGRAM(S): 40 TABLET ORAL at 13:35

## 2019-01-04 RX ADMIN — DAPTOMYCIN 120 MILLIGRAM(S): 500 INJECTION, POWDER, LYOPHILIZED, FOR SOLUTION INTRAVENOUS at 19:43

## 2019-01-04 RX ADMIN — HYDROMORPHONE HYDROCHLORIDE 1 MILLIGRAM(S): 2 INJECTION INTRAMUSCULAR; INTRAVENOUS; SUBCUTANEOUS at 04:00

## 2019-01-04 RX ADMIN — Medication 20 MILLIGRAM(S): at 21:27

## 2019-01-04 RX ADMIN — TAMSULOSIN HYDROCHLORIDE 0.4 MILLIGRAM(S): 0.4 CAPSULE ORAL at 21:27

## 2019-01-04 RX ADMIN — HYDROMORPHONE HYDROCHLORIDE 1 MILLIGRAM(S): 2 INJECTION INTRAMUSCULAR; INTRAVENOUS; SUBCUTANEOUS at 07:45

## 2019-01-04 RX ADMIN — Medication 500 MILLIGRAM(S): at 17:10

## 2019-01-04 RX ADMIN — Medication 20 MILLIGRAM(S): at 05:40

## 2019-01-04 RX ADMIN — HYDROMORPHONE HYDROCHLORIDE 1 MILLIGRAM(S): 2 INJECTION INTRAMUSCULAR; INTRAVENOUS; SUBCUTANEOUS at 20:30

## 2019-01-04 RX ADMIN — HYDROMORPHONE HYDROCHLORIDE 1 MILLIGRAM(S): 2 INJECTION INTRAMUSCULAR; INTRAVENOUS; SUBCUTANEOUS at 13:35

## 2019-01-04 RX ADMIN — Medication 100 MILLIGRAM(S): at 13:35

## 2019-01-04 RX ADMIN — FAMOTIDINE 20 MILLIGRAM(S): 10 INJECTION INTRAVENOUS at 17:10

## 2019-01-04 RX ADMIN — CYCLOBENZAPRINE HYDROCHLORIDE 10 MILLIGRAM(S): 10 TABLET, FILM COATED ORAL at 21:27

## 2019-01-04 RX ADMIN — ENOXAPARIN SODIUM 40 MILLIGRAM(S): 100 INJECTION SUBCUTANEOUS at 11:30

## 2019-01-04 RX ADMIN — Medication 100 MILLIGRAM(S): at 05:39

## 2019-01-04 RX ADMIN — ATORVASTATIN CALCIUM 10 MILLIGRAM(S): 80 TABLET, FILM COATED ORAL at 21:27

## 2019-01-04 RX ADMIN — Medication 325 MILLIGRAM(S): at 17:10

## 2019-01-04 NOTE — PROGRESS NOTE ADULT - ASSESSMENT
72 yo male with large collection, enhancing rim. Infected seroma vs. superimposed abscess vs. continuous process from prior infection  sp drainage & plastics closure  Superficial collection. Different isolate than prior  I called the micro lab and asked them to speciate and do sensi on the alpha strep spp.

## 2019-01-04 NOTE — PROGRESS NOTE ADULT - SUBJECTIVE AND OBJECTIVE BOX
[facility] -- Shannon Valdez, Anusha Villasenor, Sidney Orlando Savella  Office # [phone]      Follow Up:    CAD, hypotension  Subjective/Observations:   No events overnight resting comfortably in bed.  No complaints of chest pain, dyspnea, or palpitations reported. No signs of orthopnea or PND.  C/O some hip soreness.    REVIEW OF SYSTEMS: All other review of systems is negative unless indicated above    PAST MEDICAL & SURGICAL HISTORY:  S/P CABG x 3  S/P hip replacement, left  Rheumatoid arthritis  Osteoarthritis  COPD (chronic obstructive pulmonary disease)  HTN (hypertension)  S/P lumbar fusion: Approx 2012  History of femur fracture: Repaired 11/19- L hip  History of appendectomy  History of right shoulder replacement  History of total left hip arthroplasty      MEDICATIONS  (STANDING):  ascorbic acid 500 milliGRAM(s) Oral two times a day  atorvastatin 10 milliGRAM(s) Oral at bedtime  ceFAZolin   IVPB 2000 milliGRAM(s) IV Intermittent every 8 hours  docusate sodium 100 milliGRAM(s) Oral three times a day  enoxaparin Injectable 40 milliGRAM(s) SubCutaneous every 24 hours  famotidine    Tablet 20 milliGRAM(s) Oral two times a day  ferrous    sulfate 325 milliGRAM(s) Oral three times a day with meals  finasteride 5 milliGRAM(s) Oral daily  FLUoxetine 20 milliGRAM(s) Oral <User Schedule>  folic acid 1 milliGRAM(s) Oral daily  furosemide    Tablet 20 milliGRAM(s) Oral daily  lisinopril 10 milliGRAM(s) Oral daily  methadone    Tablet 5 milliGRAM(s) Oral every 8 hours  methylPREDNISolone 2 milliGRAM(s) Oral daily  multivitamin 1 Tablet(s) Oral daily  tamsulosin 0.4 milliGRAM(s) Oral at bedtime    MEDICATIONS  (PRN):  acetaminophen   Tablet .. 650 milliGRAM(s) Oral every 6 hours PRN Temp greater or equal to 38C (100.4F)  acetaminophen   Tablet .. 650 milliGRAM(s) Oral every 6 hours PRN Mild Pain (1 - 3)  ALPRAZolam 0.5 milliGRAM(s) Oral at bedtime PRN anxiety  benzocaine 15 mG/menthol 3.6 mG Lozenge 1 Lozenge Oral every 2 hours PRN Sore Throat  cyclobenzaprine 10 milliGRAM(s) Oral two times a day PRN Muscle Spasm  HYDROmorphone  Injectable 1 milliGRAM(s) IV Push every 3 hours PRN Severe Pain (7 - 10)  ondansetron Injectable 4 milliGRAM(s) IV Push every 6 hours PRN Nausea and/or Vomiting  oxyCODONE    IR 10 milliGRAM(s) Oral every 4 hours PRN Moderate Pain (4 - 6)  senna 2 Tablet(s) Oral at bedtime PRN Constipation      Allergies    Ceftin (Pruritus)    Intolerances        Vital Signs Last 24 Hrs  T(C): 36.7 (04 Jan 2019 07:48), Max: 36.9 (04 Jan 2019 04:00)  T(F): 98.1 (04 Jan 2019 07:48), Max: 98.5 (04 Jan 2019 04:00)  HR: 88 (04 Jan 2019 07:48) (80 - 91)  BP: 110/68 (04 Jan 2019 07:48) (104/59 - 157/76)  BP(mean): --  RR: 17 (04 Jan 2019 07:48) (17 - 18)  SpO2: 96% (04 Jan 2019 07:48) (94% - 97%)    I&O's Summary    03 Jan 2019 07:01  -  04 Jan 2019 07:00  --------------------------------------------------------  IN: 225 mL / OUT: 565 mL / NET: -340 mL          PHYSICAL EXAM:  TELE: Off tele   Constitutional: NAD, awake and alert, well-developed  HEENT: Moist Mucous Membranes, Anicteric  Pulmonary: Non-labored, breath sounds are clear bilaterally, No wheezing, crackles or rhonchi  Cardiovascular: Regular, S1 and S2 nl, No murmurs, rubs, gallops or clicks  Gastrointestinal: Bowel Sounds present, soft, nontender.   Lymph: No lymphadenopathy. No peripheral edema.  Skin: No visible rashes or ulcers.  Psych:  Mood & affect appropriate  Drain: L hip drains intact w/ DSD  LABS: All Labs Reviewed:                        8.6    7.48  )-----------( 224      ( 04 Jan 2019 07:55 )             26.7                         8.9    11.44 )-----------( 216      ( 03 Jan 2019 05:32 )             26.7                         9.0    9.48  )-----------( 301      ( 02 Jan 2019 21:27 )             28.4     04 Jan 2019 07:55    138    |  99     |  15     ----------------------------<  89     3.6     |  33     |  0.75   03 Jan 2019 05:32    136    |  100    |  21     ----------------------------<  157    4.6     |  31     |  0.83   02 Jan 2019 21:27    136    |  99     |  20     ----------------------------<  190    4.1     |  26     |  1.00     Ca    8.6        04 Jan 2019 07:55  Ca    8.5        03 Jan 2019 05:32  Ca    8.8        02 Jan 2019 21:27  Phos  2.6       04 Jan 2019 07:55  Phos  3.7       03 Jan 2019 05:32  Mg     2.0       04 Jan 2019 07:55  Mg     1.7       03 Jan 2019 05:32    TPro  5.8    /  Alb  2.4    /  TBili  0.6    /  DBili  x      /  AST  13     /  ALT  11     /  AlkPhos  121    04 Jan 2019 07:55  TPro  6.9    /  Alb  2.8    /  TBili  0.6    /  DBili  .20    /  AST  19     /  ALT  15     /  AlkPhos  179    02 Jan 2019 05:25             ECG:  < from: 12 Lead ECG (01.01.19 @ 08:52) >  Ventricular Rate 73 BPM    Atrial Rate 73 BPM    P-R Interval 232 ms    QRS Duration 112 ms    Q-T Interval 416 ms    QTC Calculation(Bezet) 458 ms    P Axis 20 degrees    R Axis -35 degrees    T Axis 37 degrees    Diagnosis Line Sinus rhythm with 1st degree AV block  Left axis deviation  Abnormal ECG    Confirmed by Shamar Squires ([id number]) on 1/2/2019 12:51:40 PM    < end of copied text >    Echo:  < from: TTE Echo Doppler w/o Cont (11.19.18 @ 11:09) >  EXAM:  ECHO TTE WO CON COMP W DOPPLR         PROCEDURE DATE:  11/19/2018        INTERPRETATION:  INDICATION: CAD  Referring M.D.:Paolo  Blood Pressure 114/70        Weight (kg) :81     Height (cm):170       BSA (sq m): 1.98  Technician: AS    Dimensions:    LA 3.5       Normal Values: 2.0 - 4.0 cm    Ao 3.94        Normal Values: 2.0 - 3.8 cm  SEPTUM 1.1       Normal Values: 0.6 - 1.2 cm  PWT 1.1       Normal Values: 0.6 - 1.1 cm  LVIDd 5.1         Normal Values: 3.0 - 5.6 cm  LVIDs 4.05 Normal Values: 1.8 - 4.0 cm      OBSERVATIONS:  Technically difficult study  Mitral Valve: MAC with calcified MV leaflets. Mild mitral regurgitation.  Aortic Valve/Aorta: Mildly calcified trileaflet AV with normal opening.   Mildly dilated aortic root at 3.9  cm  Tricuspid Valve: Normal tricuspid valve. Trace tricuspid regurgitation.  Pulmonic Valve: The pulmonic valve is not well visualized. Probably   normal.  Left Atrium: Mildly enlarged   Right Atrium: Mildly enlarged  Left Ventricle: Endocardium is not well-visualized. Overall there appears   to be normal left ventricular systolic function. The EF is approximately   65%.  Right Ventricle: The right ventricle is not well-visualized. It appears   to be mildly enlarged in some views with normal systolic function.    Pericardium/Pleura: Trace pericardial effusion noted.  Pulmonary/RV Pressure: Insufficient tricuspid regurgitation Doppler in   order to estimate the right ventricular systolic pressure  LV Diastolic Function: Stage I diastolic dysfunction     Conclusion: Overall preserved left ventricular systolic function. EF 65.   Insufficient tricuspid regurgitation Doppler in order to estimate the   right ventricular systolic pressure                  CARMEN BAE M.D., ATTENDING CARDIOLOGIST  This document has been electronically signed. Nov 20 2018  3:56PM          < end of copied text >    Radiology:  < from: CT Lower Extremity w/ IV Cont, Left (12.31.18 @ 16:24) >      < end of copied text >           Walter Anglin [facility]   Cardiology

## 2019-01-04 NOTE — PROGRESS NOTE ADULT - SUBJECTIVE AND OBJECTIVE BOX
CHIEF COMPLAINT/INTERVAL HISTORY:  Pt. seen and evaluated for L. hip abscess/infected hematoma.  POD#2 s/p irrigation and debridement followed by complex plastics closure.  Pt. is in no distress.  Reports only minimal joint pain in the hands.  Tolerating IV antibiotic.      REVIEW OF SYSTEMS:  No fever, CP, SOB, or abdominal pain.    Vital Signs Last 24 Hrs  T(C): 36.7 (04 Jan 2019 07:48), Max: 36.9 (03 Jan 2019 12:00)  T(F): 98.1 (04 Jan 2019 07:48), Max: 98.5 (04 Jan 2019 04:00)  HR: 88 (04 Jan 2019 07:48) (80 - 96)  BP: 110/68 (04 Jan 2019 07:48) (104/59 - 157/76)  BP(mean): 96 (03 Jan 2019 11:00) (95 - 100)  RR: 17 (04 Jan 2019 07:48) (17 - 30)  SpO2: 96% (04 Jan 2019 07:48) (94% - 99%)    PHYSICAL EXAM:  GENERAL: NAD  HEENT: EOMI, hearing normal, conjunctiva and sclera clear  Chest: CTA bilaterally, no wheezing  CV: S1S2, RRR,   GI: soft, +BS, NT/ND  Musculoskeletal: no edema, wound vac in place  Psychiatric: affect nL, mood nL  Skin: warm and dry    LABS:                        8.9    11.44 )-----------( 216      ( 03 Jan 2019 05:32 )             26.7     01-03    136  |  100  |  21  ----------------------------<  157<H>  4.6   |  31  |  0.83    Ca    8.5      03 Jan 2019 05:32  Phos  3.7     01-03  Mg     1.7     01-03      Assessment and Plan:  -Post op bleeding and hypotension:  Down graded from ICU yesterday.  s/p 2 units PRBC transfusion.  Monitor hemoglobin.  BP improved. Now off stress dose steroids.  -L. hip wound/hardware infection:  s/p debridement.  Continue Cefazolin 2gm IV Q8h.   Oxycodone IR 10mg PO Q4h PRN, Dilaudid 1mg IV Q3h PRN and methadone 5mg PO Q8h for pain control.  Check OR culture.  Plastic surgery, orthopedic surgery, and ID f/u  -Anemia:  s/p 2 unit PRBC transfusion post op.  Continue ferrous sulfate 325mg PO daily and folic acid 1mg PO daily  -COPD:  stable.  Will continue to monitor.  -HTN:  continue Lisinopril 10mg PO daily  -HLD:  continue Lipitor 10mg PO QHS   -CAD:  continue statin.  Restart ASA if hbg remains stable.    -RA:  Methylprednisolone 2mg PO daily  -BPH:  continue Flomax 0.4mg PO QHS  -VTE ppx:  Lovenox 40mg SQ daily

## 2019-01-04 NOTE — PROGRESS NOTE ADULT - ATTENDING COMMENTS
Call if ID input needed over the weekend, Dr. Selene Laurent is on call.    Fabian Childs MD  [phone]

## 2019-01-04 NOTE — PROGRESS NOTE ADULT - SUBJECTIVE AND OBJECTIVE BOX
[facility], Division of Infectious Diseases  GAETANO Briones A. Lee  [phone]    Name: ERIKA TORREZ  Age: 73y  Gender: Male  MRN: [id number]    Interval History--  Notes reviewed. No new complaints. No fevers, chills, or rigors. Pain control not an issue.    Past Medical History--  S/P CABG x 3  S/P hip replacement, left  Rheumatoid arthritis  Osteoarthritis  COPD (chronic obstructive pulmonary disease)  HTN (hypertension)  S/P lumbar fusion  History of femur fracture  History of appendectomy  History of right shoulder replacement  History of total left hip arthroplasty      For details regarding the patient's social history, family history, and other miscellaneous elements, please refer the initial infectious diseases consultation and/or the admitting history and physical examination for this admission.    Allergies    Ceftin (Pruritus)    Intolerances        Medications--  Antibiotics:  ceFAZolin   IVPB 2000 milliGRAM(s) IV Intermittent every 8 hours    Immunologic:    Other:  acetaminophen   Tablet .. PRN  acetaminophen   Tablet .. PRN  ALPRAZolam PRN  ascorbic acid  atorvastatin  benzocaine 15 mG/menthol 3.6 mG Lozenge PRN  cyclobenzaprine PRN  docusate sodium  enoxaparin Injectable  famotidine    Tablet  ferrous    sulfate  finasteride  FLUoxetine  folic acid  furosemide    Tablet  HYDROmorphone  Injectable PRN  lisinopril  methadone    Tablet  methylPREDNISolone  multivitamin  ondansetron Injectable PRN  oxyCODONE    IR PRN  senna PRN  tamsulosin      Review of Systems--  A 10-point review of systems was obtained.   Review of systems otherwise negative except as previously noted.    Physical Examination--  Vital Signs: T(F): 98.1 (01-04-19 @ 07:48), Max: 98.5 (01-04-19 @ 04:00)  HR: 88 (01-04-19 @ 07:48)  BP: 110/68 (01-04-19 @ 07:48)  RR: 17 (01-04-19 @ 07:48)  SpO2: 96% (01-04-19 @ 07:48)  Wt(kg): --    General: Nontoxic-appearing Male in no acute distress.  HEENT: AT/NC. Anicteric. Conjunctiva pink and moist. Oropharynx clear.   Neck: Not rigid. No sense of mass.  Nodes: None palpable.  Lungs: Diminished breath sounds bilaterally without rales, wheezing or rhonchi  Heart: Regular rate and rhythm. No Murmur. No rub. No gallop. No palpable thrill.  Abdomen: Bowel sounds present and normoactive. Soft. Nondistended. Nontender. No sense of mass. No organomegaly. Obese.   Extremities: No cyanosis or clubbing. PICC C/D/I. L with drains in place, bloody fluid.   Skin: Warm. Dry. Good turgor. No rash. No vasculitic stigmata.  Psychiatric: Appropriate affect and mood for situation.       Laboratory Studies--  CBC                        8.6    7.48  )-----------( 224      ( 04 Jan 2019 07:55 )             26.7       Chemistries  01-04    138  |  99  |  15  ----------------------------<  89  3.6   |  33<H>  |  0.75    Ca    8.6      04 Jan 2019 07:55  Phos  2.6     01-04  Mg     2.0     01-04    TPro  5.8<L>  /  Alb  2.4<L>  /  TBili  0.6  /  DBili  x   /  AST  13<L>  /  ALT  11<L>  /  AlkPhos  121<H>  01-04      Culture Data    Culture - Surgical Swab (collected 03 Jan 2019 01:20)  Source: .Surgical Swab left hip #2  Preliminary Report (03 Jan 2019 17:57):    Rare Alpha hemolytic strep "Susceptibilities not performed"    Culture - Surgical Swab (collected 03 Jan 2019 01:20)  Source: .Surgical Swab left hip #1  Preliminary Report (03 Jan 2019 17:56):    Few Alpha hemolytic strep "Susceptibilities not performed"    Culture - Other (collected 01 Jan 2019 12:45)  Source: .Other Left hip incision site  Final Report (04 Jan 2019 10:35):    Few Coag Negative Staphylococcus Susceptibilites not performed.    Few Enterococcus faecium (vancomycin resistant)  Organism: Enterococcus faecium (vancomycin resistant) (04 Jan 2019 10:35)  Organism: Enterococcus faecium (vancomycin resistant) (04 Jan 2019 10:35)

## 2019-01-04 NOTE — PROGRESS NOTE ADULT - SUBJECTIVE AND OBJECTIVE BOX
Orthopaedic Surgery Progress Note    Pt seen and examined at bedside. Pt reports pain is well controlled. Ortho team contacted overnight for Prevena dressing not draining appropriately. Denies fevers, dizziness, CP, SOB, N/V, numbness/tingling, calf pain.    Labs:                        8.9    11.44 )-----------( 216      ( 03 Jan 2019 05:32 )             26.7     01-03    136  |  100  |  21  ----------------------------<  157<H>  4.6   |  31  |  0.83    Ca    8.5      03 Jan 2019 05:32  Phos  3.7     01-03  Mg     1.7     01-03    Vitals:  T(C): 36.7 (01-04-19 @ 05:33), Max: 36.9 (01-03-19 @ 12:00)  HR: 80 (01-04-19 @ 05:33) (80 - 96)  BP: 111/63 (01-04-19 @ 05:33) (104/59 - 157/76)  RR: 18 (01-04-19 @ 05:33) (17 - 30)  SpO2: 94% (01-04-19 @ 05:33) (94% - 99%)    Physical Exam:  Gen: NAD, resting comfortably    LLE:  Dressing clean, dry, intact  +EHL/FHL/TA/GS  SILT L2-S1  +DP/PT Pulses  Compartments soft and compressible  No calf TTP B/L

## 2019-01-04 NOTE — PROGRESS NOTE ADULT - ASSESSMENT
72 yo M PMHx 45 pack year smoking hx and COPD (diagnosed "a few years ago, quit smoking 6 months ago), HTN, HLD, hx of MI and CABG (2008) (stents also placed per chart review), RA (on Medrol daily), hx of multiple L hip surgeries (hip replacement 30 years ago in [place], ORIF recently 11/2018 w/ Dr. Ramirez) complicated with superficial infection which required I&D on 12/5 (discharged with PICC line and continued to receive cefazolin), present to the ED with L hip pain at the surgical site. Admitted for infected hip, s/p surgical irrigation and debridement by ortho/plastics, postoperative hypotension in PACU    - Tolerated procedure with no evidence of cardiac complications  - Post op hypotension, presumably from acute blood loss anemia.  BP improved s/p 2 units.  Continue to monitor hemodynamics closely  - Trend serial CBC and transfuse PRBC as needed  - Repeat  EKG - pending     - Pt originally from [place], sees cardiologist back home, per patient had full cardiac check prior coming to [place] before [place], reports normal stress, EKG, echo, and carotid USD.  - HR now controlled  -Resume ASA  as soon as cleared by ortho  - C/W statin  - Monitor and replete lytes, keep K>4, Mg>2.  - Other cardiovascular workup will depend on clinical course.  - All other workup per primary team.  - Will follow with you    Walter Anglin ANP  Cardiology

## 2019-01-04 NOTE — PROGRESS NOTE ADULT - ASSESSMENT
A/P: Pt is a 73y Male POD 2 from L Hip I&D.  -Pain Control  -DVT PPx  -WBAT  -PT/OT  -Prevena--monitor output  -Continue abx via PICC per ID  -Encourage Incentive Spirometry  -Med Management  -Dispo Planning  -Will discuss with attending and advise if plan changes    Erlinda Garber M.D.  PGY-1 Orthopaedic Surgery

## 2019-01-05 DIAGNOSIS — N40.0 BENIGN PROSTATIC HYPERPLASIA WITHOUT LOWER URINARY TRACT SYMPTOMS: ICD-10-CM

## 2019-01-05 LAB
-  AMPICILLIN: SIGNIFICANT CHANGE UP
-  AMPICILLIN: SIGNIFICANT CHANGE UP
-  DAPTOMYCIN: SIGNIFICANT CHANGE UP
-  DAPTOMYCIN: SIGNIFICANT CHANGE UP
-  LEVOFLOXACIN: SIGNIFICANT CHANGE UP
-  LEVOFLOXACIN: SIGNIFICANT CHANGE UP
-  LINEZOLID: SIGNIFICANT CHANGE UP
-  LINEZOLID: SIGNIFICANT CHANGE UP
-  TETRACYCLINE: SIGNIFICANT CHANGE UP
-  TETRACYCLINE: SIGNIFICANT CHANGE UP
-  VANCOMYCIN: SIGNIFICANT CHANGE UP
-  VANCOMYCIN: SIGNIFICANT CHANGE UP
ANION GAP SERPL CALC-SCNC: 10 MMOL/L — SIGNIFICANT CHANGE UP (ref 5–17)
BUN SERPL-MCNC: 14 MG/DL — SIGNIFICANT CHANGE UP (ref 7–23)
CALCIUM SERPL-MCNC: 9 MG/DL — SIGNIFICANT CHANGE UP (ref 8.5–10.1)
CHLORIDE SERPL-SCNC: 98 MMOL/L — SIGNIFICANT CHANGE UP (ref 96–108)
CK SERPL-CCNC: 47 U/L — SIGNIFICANT CHANGE UP (ref 26–308)
CO2 SERPL-SCNC: 28 MMOL/L — SIGNIFICANT CHANGE UP (ref 22–31)
CREAT SERPL-MCNC: 0.7 MG/DL — SIGNIFICANT CHANGE UP (ref 0.5–1.3)
GLUCOSE SERPL-MCNC: 108 MG/DL — HIGH (ref 70–99)
HCT VFR BLD CALC: 30.1 % — LOW (ref 39–50)
HGB BLD-MCNC: 9.7 G/DL — LOW (ref 13–17)
MAGNESIUM SERPL-MCNC: 1.9 MG/DL — SIGNIFICANT CHANGE UP (ref 1.6–2.6)
MCHC RBC-ENTMCNC: 29.5 PG — SIGNIFICANT CHANGE UP (ref 27–34)
MCHC RBC-ENTMCNC: 32.2 GM/DL — SIGNIFICANT CHANGE UP (ref 32–36)
MCV RBC AUTO: 91.5 FL — SIGNIFICANT CHANGE UP (ref 80–100)
METHOD TYPE: SIGNIFICANT CHANGE UP
METHOD TYPE: SIGNIFICANT CHANGE UP
NRBC # BLD: 0 /100 WBCS — SIGNIFICANT CHANGE UP (ref 0–0)
PLATELET # BLD AUTO: 260 K/UL — SIGNIFICANT CHANGE UP (ref 150–400)
POTASSIUM SERPL-MCNC: 4.2 MMOL/L — SIGNIFICANT CHANGE UP (ref 3.5–5.3)
POTASSIUM SERPL-SCNC: 4.2 MMOL/L — SIGNIFICANT CHANGE UP (ref 3.5–5.3)
RBC # BLD: 3.29 M/UL — LOW (ref 4.2–5.8)
RBC # FLD: 15.6 % — HIGH (ref 10.3–14.5)
SODIUM SERPL-SCNC: 136 MMOL/L — SIGNIFICANT CHANGE UP (ref 135–145)
WBC # BLD: 10.33 K/UL — SIGNIFICANT CHANGE UP (ref 3.8–10.5)
WBC # FLD AUTO: 10.33 K/UL — SIGNIFICANT CHANGE UP (ref 3.8–10.5)

## 2019-01-05 PROCEDURE — 99233 SBSQ HOSP IP/OBS HIGH 50: CPT

## 2019-01-05 PROCEDURE — 99232 SBSQ HOSP IP/OBS MODERATE 35: CPT

## 2019-01-05 RX ADMIN — Medication 500 MILLIGRAM(S): at 17:26

## 2019-01-05 RX ADMIN — Medication 20 MILLIGRAM(S): at 17:25

## 2019-01-05 RX ADMIN — Medication 81 MILLIGRAM(S): at 11:33

## 2019-01-05 RX ADMIN — Medication 325 MILLIGRAM(S): at 07:43

## 2019-01-05 RX ADMIN — HYDROMORPHONE HYDROCHLORIDE 1 MILLIGRAM(S): 2 INJECTION INTRAMUSCULAR; INTRAVENOUS; SUBCUTANEOUS at 20:30

## 2019-01-05 RX ADMIN — SENNA PLUS 2 TABLET(S): 8.6 TABLET ORAL at 20:31

## 2019-01-05 RX ADMIN — Medication 0.5 MILLIGRAM(S): at 23:01

## 2019-01-05 RX ADMIN — METHADONE HYDROCHLORIDE 5 MILLIGRAM(S): 40 TABLET ORAL at 17:25

## 2019-01-05 RX ADMIN — Medication 325 MILLIGRAM(S): at 17:26

## 2019-01-05 RX ADMIN — Medication 20 MILLIGRAM(S): at 05:46

## 2019-01-05 RX ADMIN — METHADONE HYDROCHLORIDE 5 MILLIGRAM(S): 40 TABLET ORAL at 05:44

## 2019-01-05 RX ADMIN — Medication 1 TABLET(S): at 11:33

## 2019-01-05 RX ADMIN — TAMSULOSIN HYDROCHLORIDE 0.4 MILLIGRAM(S): 0.4 CAPSULE ORAL at 21:35

## 2019-01-05 RX ADMIN — FAMOTIDINE 20 MILLIGRAM(S): 10 INJECTION INTRAVENOUS at 05:45

## 2019-01-05 RX ADMIN — Medication 325 MILLIGRAM(S): at 11:33

## 2019-01-05 RX ADMIN — Medication 100 MILLIGRAM(S): at 05:41

## 2019-01-05 RX ADMIN — Medication 500 MILLIGRAM(S): at 05:48

## 2019-01-05 RX ADMIN — OXYCODONE HYDROCHLORIDE 10 MILLIGRAM(S): 5 TABLET ORAL at 12:05

## 2019-01-05 RX ADMIN — Medication 20 MILLIGRAM(S): at 05:45

## 2019-01-05 RX ADMIN — Medication 2 MILLIGRAM(S): at 05:40

## 2019-01-05 RX ADMIN — METHADONE HYDROCHLORIDE 5 MILLIGRAM(S): 40 TABLET ORAL at 21:35

## 2019-01-05 RX ADMIN — OXYCODONE HYDROCHLORIDE 10 MILLIGRAM(S): 5 TABLET ORAL at 11:35

## 2019-01-05 RX ADMIN — HYDROMORPHONE HYDROCHLORIDE 1 MILLIGRAM(S): 2 INJECTION INTRAMUSCULAR; INTRAVENOUS; SUBCUTANEOUS at 05:38

## 2019-01-05 RX ADMIN — ATORVASTATIN CALCIUM 10 MILLIGRAM(S): 80 TABLET, FILM COATED ORAL at 21:37

## 2019-01-05 RX ADMIN — FAMOTIDINE 20 MILLIGRAM(S): 10 INJECTION INTRAVENOUS at 17:26

## 2019-01-05 RX ADMIN — Medication 100 MILLIGRAM(S): at 21:35

## 2019-01-05 RX ADMIN — DAPTOMYCIN 120 MILLIGRAM(S): 500 INJECTION, POWDER, LYOPHILIZED, FOR SOLUTION INTRAVENOUS at 18:14

## 2019-01-05 RX ADMIN — Medication 100 MILLIGRAM(S): at 17:26

## 2019-01-05 RX ADMIN — HYDROMORPHONE HYDROCHLORIDE 1 MILLIGRAM(S): 2 INJECTION INTRAMUSCULAR; INTRAVENOUS; SUBCUTANEOUS at 06:40

## 2019-01-05 RX ADMIN — FINASTERIDE 5 MILLIGRAM(S): 5 TABLET, FILM COATED ORAL at 11:33

## 2019-01-05 RX ADMIN — Medication 20 MILLIGRAM(S): at 21:37

## 2019-01-05 RX ADMIN — Medication 1 MILLIGRAM(S): at 11:33

## 2019-01-05 RX ADMIN — HYDROMORPHONE HYDROCHLORIDE 1 MILLIGRAM(S): 2 INJECTION INTRAMUSCULAR; INTRAVENOUS; SUBCUTANEOUS at 22:00

## 2019-01-05 RX ADMIN — ENOXAPARIN SODIUM 40 MILLIGRAM(S): 100 INJECTION SUBCUTANEOUS at 11:33

## 2019-01-05 NOTE — PROGRESS NOTE ADULT - SUBJECTIVE AND OBJECTIVE BOX
[facility] -- Shannon Valdez, Anusha Villasenor, Sidney Orlando Savella  Office # [phone]      Follow Up:    CAD, hypotension  Subjective/Observations:   No events overnight resting comfortably in bed.  No complaints of chest pain, dyspnea, or palpitations reported. No signs of orthopnea or PND.     REVIEW OF SYSTEMS: All other review of systems is negative unless indicated above    PAST MEDICAL & SURGICAL HISTORY:  S/P CABG x 3  S/P hip replacement, left  Rheumatoid arthritis  Osteoarthritis  COPD (chronic obstructive pulmonary disease)  HTN (hypertension)  S/P lumbar fusion: Approx 2012  History of femur fracture: Repaired 11/19- L hip  History of appendectomy  History of right shoulder replacement  History of total left hip arthroplasty      MEDICATIONS  (STANDING):  ascorbic acid 500 milliGRAM(s) Oral two times a day  aspirin enteric coated 81 milliGRAM(s) Oral daily  atorvastatin 10 milliGRAM(s) Oral at bedtime  DAPTOmycin IVPB 500 milliGRAM(s) IV Intermittent every 24 hours  DAPTOmycin IVPB      docusate sodium 100 milliGRAM(s) Oral three times a day  enoxaparin Injectable 40 milliGRAM(s) SubCutaneous every 24 hours  famotidine    Tablet 20 milliGRAM(s) Oral two times a day  ferrous    sulfate 325 milliGRAM(s) Oral three times a day with meals  finasteride 5 milliGRAM(s) Oral daily  FLUoxetine 20 milliGRAM(s) Oral <User Schedule>  folic acid 1 milliGRAM(s) Oral daily  furosemide    Tablet 20 milliGRAM(s) Oral daily  lisinopril 10 milliGRAM(s) Oral daily  methadone    Tablet 5 milliGRAM(s) Oral every 8 hours  methylPREDNISolone 2 milliGRAM(s) Oral daily  multivitamin 1 Tablet(s) Oral daily  tamsulosin 0.4 milliGRAM(s) Oral at bedtime    MEDICATIONS  (PRN):  acetaminophen   Tablet .. 650 milliGRAM(s) Oral every 6 hours PRN Temp greater or equal to 38C (100.4F)  acetaminophen   Tablet .. 650 milliGRAM(s) Oral every 6 hours PRN Mild Pain (1 - 3)  ALPRAZolam 0.5 milliGRAM(s) Oral at bedtime PRN anxiety  benzocaine 15 mG/menthol 3.6 mG Lozenge 1 Lozenge Oral every 2 hours PRN Sore Throat  bisacodyl Suppository 10 milliGRAM(s) Rectal daily PRN If no bowel movement by POD#2  cyclobenzaprine 10 milliGRAM(s) Oral two times a day PRN Muscle Spasm  HYDROmorphone  Injectable 1 milliGRAM(s) IV Push every 3 hours PRN Severe Pain (7 - 10)  ondansetron Injectable 4 milliGRAM(s) IV Push every 6 hours PRN Nausea and/or Vomiting  oxyCODONE    IR 10 milliGRAM(s) Oral every 4 hours PRN Moderate Pain (4 - 6)  senna 2 Tablet(s) Oral at bedtime PRN Constipation      Allergies    Ceftin (Pruritus)    Intolerances        Vital Signs Last 24 Hrs  T(C): 36.9 (05 Jan 2019 07:56), Max: 37.2 (04 Jan 2019 16:40)  T(F): 98.4 (05 Jan 2019 07:56), Max: 99 (04 Jan 2019 16:40)  HR: 104 (05 Jan 2019 07:56) (82 - 104)  BP: 137/85 (05 Jan 2019 07:56) (108/70 - 137/85)  BP(mean): --  RR: 18 (05 Jan 2019 07:56) (16 - 18)  SpO2: 93% (05 Jan 2019 07:56) (93% - 97%)    I&O's Summary    04 Jan 2019 07:01  -  05 Jan 2019 07:00  --------------------------------------------------------  IN: 360 mL / OUT: 660 mL / NET: -300 mL          PHYSICAL EXAM:  TELE: Off tele   Constitutional: NAD, awake and alert, well-developed  HEENT: Moist Mucous Membranes, Anicteric  Pulmonary: Non-labored, breath sounds are clear bilaterally, No wheezing, crackles or rhonchi  Cardiovascular: Regular, S1 and S2 nl, No murmurs, rubs, gallops or clicks  Gastrointestinal: Bowel Sounds present, soft, nontender.   Lymph: No lymphadenopathy. No peripheral edema.  Skin: No visible rashes or ulcers. L hip drains D&I  Psych:  Mood & affect appropriate    LABS: All Labs Reviewed:                        9.7    10.33 )-----------( 260      ( 05 Jan 2019 08:20 )             30.1                         8.6    7.48  )-----------( 224      ( 04 Jan 2019 07:55 )             26.7                         8.9    11.44 )-----------( 216      ( 03 Jan 2019 05:32 )             26.7     05 Jan 2019 09:08    136    |  98     |  14     ----------------------------<  108    4.2     |  28     |  0.70   04 Jan 2019 07:55    138    |  99     |  15     ----------------------------<  89     3.6     |  33     |  0.75   03 Jan 2019 05:32    136    |  100    |  21     ----------------------------<  157    4.6     |  31     |  0.83     Ca    9.0        05 Jan 2019 09:08  Ca    8.6        04 Jan 2019 07:55  Ca    8.5        03 Jan 2019 05:32  Phos  2.6       04 Jan 2019 07:55  Phos  3.7       03 Jan 2019 05:32  Mg     1.9       05 Jan 2019 09:08  Mg     2.0       04 Jan 2019 07:55  Mg     1.7       03 Jan 2019 05:32    TPro  5.8    /  Alb  2.4    /  TBili  0.6    /  DBili  x      /  AST  13     /  ALT  11     /  AlkPhos  121    04 Jan 2019 07:55      CARDIAC MARKERS ( 05 Jan 2019 09:08 )  x     / x     / 47 U/L / x     / x           ECG:  < from: 12 Lead ECG (01.01.19 @ 08:52) >  Ventricular Rate 73 BPM    Atrial Rate 73 BPM    P-R Interval 232 ms    QRS Duration 112 ms    Q-T Interval 416 ms    QTC Calculation(Bezet) 458 ms    P Axis 20 degrees    R Axis -35 degrees    T Axis 37 degrees    Diagnosis Line Sinus rhythm with 1st degree AV block  Left axis deviation  Abnormal ECG    Confirmed by Shamar Squires ([id number]) on 1/2/2019 12:51:40 PM    < end of copied text >    Echo:  < from: TTE Echo Doppler w/o Cont (11.19.18 @ 11:09) >  EXAM:  ECHO TTE WO CON COMP W DOPPLR         PROCEDURE DATE:  11/19/2018        INTERPRETATION:  INDICATION: CAD  Referring M.D.:Paolo  Blood Pressure 114/70        Weight (kg) :81     Height (cm):170       BSA (sq m): 1.98  Technician: AS    Dimensions:    LA 3.5       Normal Values: 2.0 - 4.0 cm    Ao 3.94        Normal Values: 2.0 - 3.8 cm  SEPTUM 1.1       Normal Values: 0.6 - 1.2 cm  PWT 1.1       Normal Values: 0.6 - 1.1 cm  LVIDd 5.1         Normal Values: 3.0 - 5.6 cm  LVIDs 4.05 Normal Values: 1.8 - 4.0 cm      OBSERVATIONS:  Technically difficult study  Mitral Valve: MAC with calcified MV leaflets. Mild mitral regurgitation.  Aortic Valve/Aorta: Mildly calcified trileaflet AV with normal opening.   Mildly dilated aortic root at 3.9  cm  Tricuspid Valve: Normal tricuspid valve. Trace tricuspid regurgitation.  Pulmonic Valve: The pulmonic valve is not well visualized. Probably   normal.  Left Atrium: Mildly enlarged   Right Atrium: Mildly enlarged  Left Ventricle: Endocardium is not well-visualized. Overall there appears   to be normal left ventricular systolic function. The EF is approximately   65%.  Right Ventricle: The right ventricle is not well-visualized. It appears   to be mildly enlarged in some views with normal systolic function.    Pericardium/Pleura: Trace pericardial effusion noted.  Pulmonary/RV Pressure: Insufficient tricuspid regurgitation Doppler in   order to estimate the right ventricular systolic pressure  LV Diastolic Function: Stage I diastolic dysfunction     Conclusion: Overall preserved left ventricular systolic function. EF 65.   Insufficient tricuspid regurgitation Doppler in order to estimate the   right ventricular systolic pressure                  CARMEN BAE M.D., ATTENDING CARDIOLOGIST  This document has been electronically signed. Nov 20 2018  3:56PM          < end of copied text >    Radiology:  < from: CT Lower Extremity w/ IV Cont, Left (12.31.18 @ 16:24) >      < end of copied text >             Walter Anglin [facility]   Cardiology

## 2019-01-05 NOTE — PROVIDER CONTACT NOTE (CRITICAL VALUE NOTIFICATION) - TEST AND RESULT REPORTED:
corrective sx site from jan 2 few enter coccyx facium vanco resistant rare enter coccyx facium vanco resistant corrective sx site from jan 2 few enterococcus facccium vanco resistant rare enterococcus faccium vanco resistant

## 2019-01-05 NOTE — PROGRESS NOTE ADULT - ASSESSMENT
73M s/p L hip I&D POD 3  Analgesia  DVT ppx  WBAT LLE  PT/OT  Prevena  Encourage incentive spirometry  Monitor HMV output (management per plastics)  FU OR cultures  DC planning

## 2019-01-05 NOTE — PROGRESS NOTE ADULT - SUBJECTIVE AND OBJECTIVE BOX
Admission HPI:  74 yo M PMHx 45 pack year smoking hx and COPD (diagnosed "a few years ago, quit smoking then), HTN, HLD, hx of MI and CABG () (stents also placed per chart review), RA (on Medrol daily), hx of multiple L hip surgeries (hip replacement 30 years ago in [place], ORIF recently 2018 w/ Dr. Ramirez) complicated with superficial infection which required I&D on  (discharged with PICC line and continued to receive cefazolin), present to the ED with L hip pain at the surgical site.  Serosanguinous fluid draining from surgical site since last admission in a small amount and has been on and off, patient was seen by Dr. Scruggs on  for wound check and dressing change and states that the bleeding present that day was minimal.  Patient ambulating with walker, trying not to put full weight on to affected extremity (50% WBAT).  Patient seen earlier today by wound care nurse who noted drainage from site and some stitches were missing, contacted Dr. Ramirez, would told patient to come to the ED.  Patient denies fevers, chest pain, dizziness, SOB, n/v, calf pain, numbness, tingling, loss of sensation in affected extremity.  Denies recent trauma to the LLE, denies taking blood thinners (besides baby ASA for stents).    In the ED, T 98.8, , /88, RR 18, SpO2 98%.  Labs significant for ESR 83, WBC 9.26, H/H 10.7/33.4, PT/INR 15.3/1.33, Na 133.    EKst degree AV block, HR 91  CXR: Persistent nodular density left apical region. Fibrotic changes left lower lobe.   Xray L Hip: Status post left THR. Old intertrochanteric fracture left hip similar to prior. There is no acute fracture or dislocation. No gross focal bone destruction. Vascular calcification. Calcified left buttock granulomas.  Xray L Femur: No fracture dislocation or focal bone destruction. Status post ORIF left hip. Metallic fixation left femoral shaft. Hardware intact. Vascular calcification.  CT LLE w/ IV contrast: Long peripheral enhancing fluid collection lateral soft tissues extending from the buttock to proximal to mid thigh as discussed, may represent abscess or infected hematoma.    Patient received  percocet in the ED for pain. (31 Dec 2018 16:16)    INTERVAL HPI:  Patient seen and examined at bedside. Denies pain in hip. Reportedly delirious per ortho note earlier today, but now seems appropriate. Denies chest pain or shortness of breath. Anxious to go home.     REVIEW OF SYSTEMS:    CONSTITUTIONAL: No weakness, fevers or chills  EYES/ENT: No visual changes, no throat pain   RESPIRATORY: No cough, wheezing, hemoptysis; No shortness of breath  CARDIOVASCULAR: No chest pain or palpitations  GASTROINTESTINAL: No abdominal pain, nausea, vomiting, or diarrhea  GENITOURINARY: No dysuria, frequency or hematuria  NEUROLOGICAL: No dizziness, numbness, or weakness  SKIN: +hip wound, wound vac   All other review of systems is negative unless indicated above.    VITAL SIGNS:  Vital Signs Last 24 Hrs  T(C): 36.9 (19 @ 07:56), Max: 37.2 (19 @ 16:40)  T(F): 98.4 (19 @ 07:56), Max: 99 (19 @ 16:40)  HR: 104 (19 @ 07:56) (82 - 104)  BP: 137/85 (19 @ 07:56) (108/70 - 137/85)  BP(mean): --  RR: 18 (19 @ 07:56) (16 - 18)  SpO2: 93% (19 @ 07:56) (93% - 97%)      PHYSICAL EXAM:     GENERAL: Cushingoid-appearing male in no acute distress  HEENT: NC/AT, EOMI, neck supple, MMM  RESPIRATORY: LCTAB/L, no rhonchi, rales, or wheezing  CARDIOVASCULAR: RRR, no murmurs, gallops, rubs  ABDOMINAL: soft, non-tender, non-distended, positive bowel sounds   EXTREMITIES: no clubbing, cyanosis, or edema  NEUROLOGICAL: alert and oriented x 3, non-focal  SKIN: left hip operative site with wound vac in place, sanguinous fluid draining.   MUSCULOSKELETAL: no gross joint deformity                          9.7    10.33 )-----------( 260      ( 2019 08:20 )             30.1         136  |  98  |  14  ----------------------------<  108<H>  4.2   |  28  |  0.70    Ca    9.0      2019 09:08  Phos  2.6     -  Mg     1.9         TPro  5.8<L>  /  Alb  2.4<L>  /  TBili  0.6  /  DBili  x   /  AST  13<L>  /  ALT  11<L>  /  AlkPhos  121<H>      MEDICATIONS  (STANDING):  ascorbic acid 500 milliGRAM(s) Oral two times a day  aspirin enteric coated 81 milliGRAM(s) Oral daily  atorvastatin 10 milliGRAM(s) Oral at bedtime  DAPTOmycin IVPB 500 milliGRAM(s) IV Intermittent every 24 hours  DAPTOmycin IVPB      docusate sodium 100 milliGRAM(s) Oral three times a day  enoxaparin Injectable 40 milliGRAM(s) SubCutaneous every 24 hours  famotidine    Tablet 20 milliGRAM(s) Oral two times a day  ferrous    sulfate 325 milliGRAM(s) Oral three times a day with meals  finasteride 5 milliGRAM(s) Oral daily  FLUoxetine 20 milliGRAM(s) Oral <User Schedule>  folic acid 1 milliGRAM(s) Oral daily  furosemide    Tablet 20 milliGRAM(s) Oral daily  lisinopril 10 milliGRAM(s) Oral daily  methadone    Tablet 5 milliGRAM(s) Oral every 8 hours  methylPREDNISolone 2 milliGRAM(s) Oral daily  multivitamin 1 Tablet(s) Oral daily  tamsulosin 0.4 milliGRAM(s) Oral at bedtime    MEDICATIONS  (PRN):  acetaminophen   Tablet .. 650 milliGRAM(s) Oral every 6 hours PRN Temp greater or equal to 38C (100.4F)  acetaminophen   Tablet .. 650 milliGRAM(s) Oral every 6 hours PRN Mild Pain (1 - 3)  ALPRAZolam 0.5 milliGRAM(s) Oral at bedtime PRN anxiety  benzocaine 15 mG/menthol 3.6 mG Lozenge 1 Lozenge Oral every 2 hours PRN Sore Throat  bisacodyl Suppository 10 milliGRAM(s) Rectal daily PRN If no bowel movement by POD#2  cyclobenzaprine 10 milliGRAM(s) Oral two times a day PRN Muscle Spasm  HYDROmorphone  Injectable 1 milliGRAM(s) IV Push every 3 hours PRN Severe Pain (7 - 10)  ondansetron Injectable 4 milliGRAM(s) IV Push every 6 hours PRN Nausea and/or Vomiting  oxyCODONE    IR 10 milliGRAM(s) Oral every 4 hours PRN Moderate Pain (4 - 6)  senna 2 Tablet(s) Oral at bedtime PRN Constipation

## 2019-01-05 NOTE — PROGRESS NOTE ADULT - ASSESSMENT
74 yo M PMHx 45 pack year smoking hx and COPD (diagnosed "a few years ago, quit smoking 6 months ago), HTN, HLD, hx of MI and CABG (2008) (stents also placed per chart review), RA (on Medrol daily), hx of multiple L hip surgeries (hip replacement 30 years ago in [place], ORIF recently 11/2018 w/ Dr. Ramirez) complicated with superficial infection which required I&D on 12/5 (discharged with PICC line and continued to receive cefazolin), present to the ED with L hip pain at the surgical site. Admitted for infected hip, s/p surgical irrigation and debridement by ortho/plastics, postoperative hypotension in PACU    - Tolerated procedure with no evidence of cardiac complications  - Post op hypotension, presumably from acute blood loss anemia.  BP improved s/p 2 units.  Continue to monitor hemodynamics closely  - Trend serial CBC and transfuse PRBC as needed      - Pt originally from [place], sees cardiologist back home, per patient [facility] [place] [facility], reports normal stress, EKG, echo, and carotid USD.  - HR now controlled  -c/w asa  - C/W statin  - Monitor and replete lytes, keep K>4, Mg>2.  - Other cardiovascular workup will depend on clinical course.  - All other workup per primary team.  - Will follow with you    Walter Anglin ANP  Cardiology

## 2019-01-05 NOTE — PROGRESS NOTE ADULT - SUBJECTIVE AND OBJECTIVE BOX
Pt S/E at bedside, no acute events overnight, pain controlled. Pt slightly delirious this AM, likely 2/2 medications. No other complaints. Will reach out to plastic surgery re Prevena dressing.    Vital Signs Last 24 Hrs  T(C): 36.9 (05 Jan 2019 05:32), Max: 37.2 (04 Jan 2019 16:40)  T(F): 98.5 (05 Jan 2019 05:32), Max: 99 (04 Jan 2019 16:40)  HR: 87 (05 Jan 2019 05:32) (82 - 89)  BP: 108/70 (05 Jan 2019 05:32) (108/70 - 132/79)  BP(mean): --  RR: 18 (05 Jan 2019 05:32) (16 - 18)  SpO2: 94% (05 Jan 2019 05:32) (94% - 97%)    Gen: NAD, AAOx3    Left Lower Extremity:  Prevena with possible air leak, otherwise intact  HMV x2 intact  +EHL/FHL/TA/GS  SILT L3-S1  +DP/PT Pulses  Compartments soft  No calf TTP B/L

## 2019-01-05 NOTE — PROGRESS NOTE ADULT - ASSESSMENT
74 yo M PMHx 45 pack year smoking hx and COPD (diagnosed "a few years ago, quit smoking then), HTN, HLD, hx of MI and CABG (2008) (stents also placed per chart review), RA (on Medrol daily), hx of multiple L hip surgeries (hip replacement 30 years ago in [place], ORIF recently 11/2018 w/ Dr. Ramirez) complicated with superficial infection which required I&D on 12/5 (discharged with PICC line and continued to receive cefazolin), present to the ED with L hip pain at the surgical site, now s/p repeat debridement and wound vac placement, post operative course complicated by blood loss anemia and hypotension requiring ICU care, now stable.

## 2019-01-06 LAB
ANION GAP SERPL CALC-SCNC: 9 MMOL/L — SIGNIFICANT CHANGE UP (ref 5–17)
BUN SERPL-MCNC: 13 MG/DL — SIGNIFICANT CHANGE UP (ref 7–23)
CALCIUM SERPL-MCNC: 8.7 MG/DL — SIGNIFICANT CHANGE UP (ref 8.5–10.1)
CHLORIDE SERPL-SCNC: 97 MMOL/L — SIGNIFICANT CHANGE UP (ref 96–108)
CO2 SERPL-SCNC: 31 MMOL/L — SIGNIFICANT CHANGE UP (ref 22–31)
CREAT SERPL-MCNC: 0.59 MG/DL — SIGNIFICANT CHANGE UP (ref 0.5–1.3)
GLUCOSE SERPL-MCNC: 95 MG/DL — SIGNIFICANT CHANGE UP (ref 70–99)
HCT VFR BLD CALC: 26.6 % — LOW (ref 39–50)
HGB BLD-MCNC: 8.7 G/DL — LOW (ref 13–17)
MCHC RBC-ENTMCNC: 29.9 PG — SIGNIFICANT CHANGE UP (ref 27–34)
MCHC RBC-ENTMCNC: 32.7 GM/DL — SIGNIFICANT CHANGE UP (ref 32–36)
MCV RBC AUTO: 91.4 FL — SIGNIFICANT CHANGE UP (ref 80–100)
NRBC # BLD: 0 /100 WBCS — SIGNIFICANT CHANGE UP (ref 0–0)
PLATELET # BLD AUTO: 235 K/UL — SIGNIFICANT CHANGE UP (ref 150–400)
POTASSIUM SERPL-MCNC: 3.8 MMOL/L — SIGNIFICANT CHANGE UP (ref 3.5–5.3)
POTASSIUM SERPL-SCNC: 3.8 MMOL/L — SIGNIFICANT CHANGE UP (ref 3.5–5.3)
RBC # BLD: 2.91 M/UL — LOW (ref 4.2–5.8)
RBC # FLD: 15.1 % — HIGH (ref 10.3–14.5)
SODIUM SERPL-SCNC: 137 MMOL/L — SIGNIFICANT CHANGE UP (ref 135–145)
WBC # BLD: 7.62 K/UL — SIGNIFICANT CHANGE UP (ref 3.8–10.5)
WBC # FLD AUTO: 7.62 K/UL — SIGNIFICANT CHANGE UP (ref 3.8–10.5)

## 2019-01-06 PROCEDURE — 99232 SBSQ HOSP IP/OBS MODERATE 35: CPT

## 2019-01-06 PROCEDURE — 99233 SBSQ HOSP IP/OBS HIGH 50: CPT

## 2019-01-06 RX ORDER — CHLORHEXIDINE GLUCONATE 213 G/1000ML
1 SOLUTION TOPICAL DAILY
Qty: 0 | Refills: 0 | Status: DISCONTINUED | OUTPATIENT
Start: 2019-01-06 | End: 2019-01-07

## 2019-01-06 RX ADMIN — FAMOTIDINE 20 MILLIGRAM(S): 10 INJECTION INTRAVENOUS at 05:35

## 2019-01-06 RX ADMIN — CHLORHEXIDINE GLUCONATE 1 APPLICATION(S): 213 SOLUTION TOPICAL at 11:08

## 2019-01-06 RX ADMIN — Medication 0.5 MILLIGRAM(S): at 08:01

## 2019-01-06 RX ADMIN — HYDROMORPHONE HYDROCHLORIDE 1 MILLIGRAM(S): 2 INJECTION INTRAMUSCULAR; INTRAVENOUS; SUBCUTANEOUS at 08:45

## 2019-01-06 RX ADMIN — Medication 20 MILLIGRAM(S): at 21:04

## 2019-01-06 RX ADMIN — ATORVASTATIN CALCIUM 10 MILLIGRAM(S): 80 TABLET, FILM COATED ORAL at 21:04

## 2019-01-06 RX ADMIN — HYDROMORPHONE HYDROCHLORIDE 1 MILLIGRAM(S): 2 INJECTION INTRAMUSCULAR; INTRAVENOUS; SUBCUTANEOUS at 17:40

## 2019-01-06 RX ADMIN — TAMSULOSIN HYDROCHLORIDE 0.4 MILLIGRAM(S): 0.4 CAPSULE ORAL at 21:04

## 2019-01-06 RX ADMIN — Medication 500 MILLIGRAM(S): at 05:35

## 2019-01-06 RX ADMIN — HYDROMORPHONE HYDROCHLORIDE 1 MILLIGRAM(S): 2 INJECTION INTRAMUSCULAR; INTRAVENOUS; SUBCUTANEOUS at 09:00

## 2019-01-06 RX ADMIN — Medication 100 MILLIGRAM(S): at 13:22

## 2019-01-06 RX ADMIN — ENOXAPARIN SODIUM 40 MILLIGRAM(S): 100 INJECTION SUBCUTANEOUS at 11:12

## 2019-01-06 RX ADMIN — Medication 1 MILLIGRAM(S): at 11:12

## 2019-01-06 RX ADMIN — OXYCODONE HYDROCHLORIDE 10 MILLIGRAM(S): 5 TABLET ORAL at 14:20

## 2019-01-06 RX ADMIN — Medication 20 MILLIGRAM(S): at 05:36

## 2019-01-06 RX ADMIN — Medication 325 MILLIGRAM(S): at 11:15

## 2019-01-06 RX ADMIN — HYDROMORPHONE HYDROCHLORIDE 1 MILLIGRAM(S): 2 INJECTION INTRAMUSCULAR; INTRAVENOUS; SUBCUTANEOUS at 21:04

## 2019-01-06 RX ADMIN — Medication 325 MILLIGRAM(S): at 17:27

## 2019-01-06 RX ADMIN — Medication 1 TABLET(S): at 11:12

## 2019-01-06 RX ADMIN — OXYCODONE HYDROCHLORIDE 10 MILLIGRAM(S): 5 TABLET ORAL at 13:22

## 2019-01-06 RX ADMIN — HYDROMORPHONE HYDROCHLORIDE 1 MILLIGRAM(S): 2 INJECTION INTRAMUSCULAR; INTRAVENOUS; SUBCUTANEOUS at 05:42

## 2019-01-06 RX ADMIN — METHADONE HYDROCHLORIDE 5 MILLIGRAM(S): 40 TABLET ORAL at 21:04

## 2019-01-06 RX ADMIN — METHADONE HYDROCHLORIDE 5 MILLIGRAM(S): 40 TABLET ORAL at 05:35

## 2019-01-06 RX ADMIN — Medication 325 MILLIGRAM(S): at 07:55

## 2019-01-06 RX ADMIN — METHADONE HYDROCHLORIDE 5 MILLIGRAM(S): 40 TABLET ORAL at 14:16

## 2019-01-06 RX ADMIN — Medication 100 MILLIGRAM(S): at 05:35

## 2019-01-06 RX ADMIN — FINASTERIDE 5 MILLIGRAM(S): 5 TABLET, FILM COATED ORAL at 11:12

## 2019-01-06 RX ADMIN — HYDROMORPHONE HYDROCHLORIDE 1 MILLIGRAM(S): 2 INJECTION INTRAMUSCULAR; INTRAVENOUS; SUBCUTANEOUS at 17:27

## 2019-01-06 RX ADMIN — HYDROMORPHONE HYDROCHLORIDE 1 MILLIGRAM(S): 2 INJECTION INTRAMUSCULAR; INTRAVENOUS; SUBCUTANEOUS at 06:30

## 2019-01-06 RX ADMIN — FAMOTIDINE 20 MILLIGRAM(S): 10 INJECTION INTRAVENOUS at 17:27

## 2019-01-06 RX ADMIN — Medication 2 MILLIGRAM(S): at 05:36

## 2019-01-06 RX ADMIN — Medication 81 MILLIGRAM(S): at 11:12

## 2019-01-06 RX ADMIN — Medication 100 MILLIGRAM(S): at 21:04

## 2019-01-06 RX ADMIN — Medication 20 MILLIGRAM(S): at 13:22

## 2019-01-06 RX ADMIN — Medication 20 MILLIGRAM(S): at 05:35

## 2019-01-06 RX ADMIN — LISINOPRIL 10 MILLIGRAM(S): 2.5 TABLET ORAL at 05:35

## 2019-01-06 RX ADMIN — DAPTOMYCIN 120 MILLIGRAM(S): 500 INJECTION, POWDER, LYOPHILIZED, FOR SOLUTION INTRAVENOUS at 18:15

## 2019-01-06 RX ADMIN — Medication 500 MILLIGRAM(S): at 17:27

## 2019-01-06 NOTE — PROGRESS NOTE ADULT - NSHPATTENDINGPLANDISCUSS_GEN_ALL_CORE
patient re: treatment plan going forward, IV antibiotics, wound care, discharge planning.
patient, family @ bedside, RN - re: tx plan, dispo plan, IV antibiotics, wound care, discharge planning

## 2019-01-06 NOTE — PROGRESS NOTE ADULT - PROBLEM SELECTOR PLAN 4
continue Lisinopril 10mg PO daily
continue Lisinopril 10mg PO daily
Chronic, stable  continue lisinopril 10mg daily, with hold parameters  routine hemodynamic monitoring

## 2019-01-06 NOTE — CHART NOTE - NSCHARTNOTEFT_GEN_A_CORE
Assessment: Pt seen for nutrition follow up. Chart reviewed, hospital course noted. Pt is 72 Y/O M with PMH smoking, COPD, HTN, HLD, hx of MI and CABG (2008), multiple L hip surgeries (hip replacement 30 years ago in [place], ORIF recently 11/2018) complicated with superficial infection presented to the ED (12/31) with L hip pain at the surgical site admitted with chronic infection of hardware. Pt S/P irrigation and debridement of L hip abscess and hematoma POD #4.    Pt alert and oriented, with family at bedside.     Factors impacting intake: [ ] none [ ] nausea  [ ] vomiting [ ] diarrhea [ ] constipation  [ ]chewing problems [ ] swallowing issues  [ ] other:     Diet Presciption: Diet, DASH/TLC:   Sodium & Cholesterol Restricted (01-03-19 @ 17:58)    Intake:     Current Weight: Weight (kg): 83.5 (01-02 @ 17:25)  % Weight Change    Pertinent Medications: MEDICATIONS  (STANDING):  ascorbic acid 500 milliGRAM(s) Oral two times a day  aspirin enteric coated 81 milliGRAM(s) Oral daily  atorvastatin 10 milliGRAM(s) Oral at bedtime  chlorhexidine 2% Cloths 1 Application(s) Topical daily  DAPTOmycin IVPB 500 milliGRAM(s) IV Intermittent every 24 hours  DAPTOmycin IVPB      docusate sodium 100 milliGRAM(s) Oral three times a day  enoxaparin Injectable 40 milliGRAM(s) SubCutaneous every 24 hours  famotidine    Tablet 20 milliGRAM(s) Oral two times a day  ferrous    sulfate 325 milliGRAM(s) Oral three times a day with meals  finasteride 5 milliGRAM(s) Oral daily  FLUoxetine 20 milliGRAM(s) Oral <User Schedule>  folic acid 1 milliGRAM(s) Oral daily  furosemide    Tablet 20 milliGRAM(s) Oral daily  lisinopril 10 milliGRAM(s) Oral daily  methadone    Tablet 5 milliGRAM(s) Oral every 8 hours  methylPREDNISolone 2 milliGRAM(s) Oral daily  multivitamin 1 Tablet(s) Oral daily  tamsulosin 0.4 milliGRAM(s) Oral at bedtime    MEDICATIONS  (PRN):  acetaminophen   Tablet .. 650 milliGRAM(s) Oral every 6 hours PRN Temp greater or equal to 38C (100.4F)  acetaminophen   Tablet .. 650 milliGRAM(s) Oral every 6 hours PRN Mild Pain (1 - 3)  ALPRAZolam 0.5 milliGRAM(s) Oral at bedtime PRN anxiety  benzocaine 15 mG/menthol 3.6 mG Lozenge 1 Lozenge Oral every 2 hours PRN Sore Throat  bisacodyl Suppository 10 milliGRAM(s) Rectal daily PRN If no bowel movement by POD#2  cyclobenzaprine 10 milliGRAM(s) Oral two times a day PRN Muscle Spasm  HYDROmorphone  Injectable 1 milliGRAM(s) IV Push every 3 hours PRN Severe Pain (7 - 10)  ondansetron Injectable 4 milliGRAM(s) IV Push every 6 hours PRN Nausea and/or Vomiting  oxyCODONE    IR 10 milliGRAM(s) Oral every 4 hours PRN Moderate Pain (4 - 6)  senna 2 Tablet(s) Oral at bedtime PRN Constipation    Pertinent Labs: 01-06 Na137 mmol/L Glu 95 mg/dL K+ 3.8 mmol/L Cr  0.59 mg/dL BUN 13 mg/dL 01-04 Phos 2.6 mg/dL 01-04 Alb 2.4 g/dL<L> 01-02 PAB 18.7 mg/dL<L>     CAPILLARY BLOOD GLUCOSE        Skin:     Estimated Needs:   [ ] no change since previous assessment  [ ] recalculated:     Previous Nutrition Diagnosis:   [ ] Inadequate Energy Intake [ ]Inadequate Oral Intake [ ] Excessive Energy Intake   [ ] Underweight [ ] Increased Nutrient Needs [ ] Overweight/Obesity   [ ] Altered GI Function [ ] Unintended Weight Loss [ ] Food & Nutrition Related Knowledge Deficit [ ] Malnutrition     Nutrition Diagnosis is [ ] ongoing  [ ] resolved [ ] not applicable     New Nutrition Diagnosis: [ ] not applicable       Interventions:   Recommend  [ ] Change Diet To:  [ ] Nutrition Supplement  [ ] Nutrition Support  [ ] Other:     Monitoring and Evaluation:   [ ] PO intake [ x ] Tolerance to diet prescription [ x ] weights [ x ] labs[ x ] follow up per protocol  [ ] other: Assessment: Pt seen for nutrition follow up. Chart reviewed, hospital course noted. Pt is 72 Y/O M with PMH smoking, COPD, HTN, HLD, hx of MI and CABG (2008), multiple L hip surgeries (hip replacement 30 years ago in [place], ORIF recently 11/2018) complicated with superficial infection presented to the ED (12/31) with L hip pain at the surgical site admitted with chronic infection of hardware. Pt S/P irrigation and debridement of L hip abscess and hematoma POD #4.    Pt alert and oriented, with family at bedside. Observed consuming lunch. States he has good appetite/intake. Is requesting Ensure Enlive BID as he drinks this at home. Denied N/V/diarrhea/constipation. No difficulties chewing/swallowing.     Factors impacting intake: [X] none [ ] nausea  [ ] vomiting [ ] diarrhea [ ] constipation  [ ]chewing problems [ ] swallowing issues  [ ] other:     Diet, DASH/TLC:   Sodium & Cholesterol Restricted (01-03-19 @ 17:58)    Intake: %     Current Weight: 171 pounds (1/3)     Pertinent Medications: MEDICATIONS  (STANDING):  ascorbic acid 500 milliGRAM(s) Oral two times a day  aspirin enteric coated 81 milliGRAM(s) Oral daily  atorvastatin 10 milliGRAM(s) Oral at bedtime  chlorhexidine 2% Cloths 1 Application(s) Topical daily  DAPTOmycin IVPB 500 milliGRAM(s) IV Intermittent every 24 hours  DAPTOmycin IVPB      docusate sodium 100 milliGRAM(s) Oral three times a day  enoxaparin Injectable 40 milliGRAM(s) SubCutaneous every 24 hours  famotidine    Tablet 20 milliGRAM(s) Oral two times a day  ferrous    sulfate 325 milliGRAM(s) Oral three times a day with meals  finasteride 5 milliGRAM(s) Oral daily  FLUoxetine 20 milliGRAM(s) Oral <User Schedule>  folic acid 1 milliGRAM(s) Oral daily  furosemide    Tablet 20 milliGRAM(s) Oral daily  lisinopril 10 milliGRAM(s) Oral daily  methadone    Tablet 5 milliGRAM(s) Oral every 8 hours  methylPREDNISolone 2 milliGRAM(s) Oral daily  multivitamin 1 Tablet(s) Oral daily  tamsulosin 0.4 milliGRAM(s) Oral at bedtime    MEDICATIONS  (PRN):  acetaminophen   Tablet .. 650 milliGRAM(s) Oral every 6 hours PRN Temp greater or equal to 38C (100.4F)  acetaminophen   Tablet .. 650 milliGRAM(s) Oral every 6 hours PRN Mild Pain (1 - 3)  ALPRAZolam 0.5 milliGRAM(s) Oral at bedtime PRN anxiety  benzocaine 15 mG/menthol 3.6 mG Lozenge 1 Lozenge Oral every 2 hours PRN Sore Throat  bisacodyl Suppository 10 milliGRAM(s) Rectal daily PRN If no bowel movement by POD#2  cyclobenzaprine 10 milliGRAM(s) Oral two times a day PRN Muscle Spasm  HYDROmorphone  Injectable 1 milliGRAM(s) IV Push every 3 hours PRN Severe Pain (7 - 10)  ondansetron Injectable 4 milliGRAM(s) IV Push every 6 hours PRN Nausea and/or Vomiting  oxyCODONE    IR 10 milliGRAM(s) Oral every 4 hours PRN Moderate Pain (4 - 6)  senna 2 Tablet(s) Oral at bedtime PRN Constipation    Pertinent Labs: 01-06 Na137 mmol/L Glu 95 mg/dL K+ 3.8 mmol/L Cr  0.59 mg/dL BUN 13 mg/dL 01-04 Phos 2.6 mg/dL 01-04 Alb 2.4 g/dL<L> 01-02 PAB 18.7 mg/dL<L>     CAPILLARY BLOOD GLUCOSE    Skin: +2 generalized edema    Estimated Needs:   [X] no change since previous assessment  [ ] recalculated:     Previous Nutrition Diagnosis:   [X] Increased Nutrient Needs (protein, micronutrients)    Nutrition Diagnosis is [X] ongoing  [ ] resolved [ ] not applicable     New Nutrition Diagnosis: [X] not applicable     Interventions:   Recommend  [ ] Change Diet To:  [X] Nutrition Supplement: Add Ensure Enlive BID (Vanilla) as per pt's request and in light of increased nutrient needs. Pending verification made to MD.   [ ] Nutrition Support  [X] Other: Continue DASH/TLC diet as it remains appropriate at this time. Encourage po intake of nutrient dense meals/snacks.     Monitoring and Evaluation:   [X] PO intake [ x ] Tolerance to diet prescription [ x ] weights [ x ] labs[ x ] follow up per protocol  [X] other: RD to remain available.

## 2019-01-06 NOTE — PROGRESS NOTE ADULT - SUBJECTIVE AND OBJECTIVE BOX
[facility]    Shannon Valdez, [place], Anusha, Johanny, Sidney, Abdias      [phone]    CHIEF COMPLAINT: Patient is a 73y old  Male who presents with a chief complaint of cellulitis (06 Jan 2019 07:59)      Follow Up: cad/cabg, hip asp    Interim history: The patient reports no new symptoms.  Denies chest discomfort and shortness of breath.  No abdominal pain.  No new neurologic symptoms.      MEDICATIONS  (STANDING):  ascorbic acid 500 milliGRAM(s) Oral two times a day  aspirin enteric coated 81 milliGRAM(s) Oral daily  atorvastatin 10 milliGRAM(s) Oral at bedtime  chlorhexidine 2% Cloths 1 Application(s) Topical daily  DAPTOmycin IVPB 500 milliGRAM(s) IV Intermittent every 24 hours  DAPTOmycin IVPB      docusate sodium 100 milliGRAM(s) Oral three times a day  enoxaparin Injectable 40 milliGRAM(s) SubCutaneous every 24 hours  famotidine    Tablet 20 milliGRAM(s) Oral two times a day  ferrous    sulfate 325 milliGRAM(s) Oral three times a day with meals  finasteride 5 milliGRAM(s) Oral daily  FLUoxetine 20 milliGRAM(s) Oral <User Schedule>  folic acid 1 milliGRAM(s) Oral daily  furosemide    Tablet 20 milliGRAM(s) Oral daily  lisinopril 10 milliGRAM(s) Oral daily  methadone    Tablet 5 milliGRAM(s) Oral every 8 hours  methylPREDNISolone 2 milliGRAM(s) Oral daily  multivitamin 1 Tablet(s) Oral daily  tamsulosin 0.4 milliGRAM(s) Oral at bedtime    MEDICATIONS  (PRN):  acetaminophen   Tablet .. 650 milliGRAM(s) Oral every 6 hours PRN Temp greater or equal to 38C (100.4F)  acetaminophen   Tablet .. 650 milliGRAM(s) Oral every 6 hours PRN Mild Pain (1 - 3)  ALPRAZolam 0.5 milliGRAM(s) Oral at bedtime PRN anxiety  benzocaine 15 mG/menthol 3.6 mG Lozenge 1 Lozenge Oral every 2 hours PRN Sore Throat  bisacodyl Suppository 10 milliGRAM(s) Rectal daily PRN If no bowel movement by POD#2  cyclobenzaprine 10 milliGRAM(s) Oral two times a day PRN Muscle Spasm  HYDROmorphone  Injectable 1 milliGRAM(s) IV Push every 3 hours PRN Severe Pain (7 - 10)  ondansetron Injectable 4 milliGRAM(s) IV Push every 6 hours PRN Nausea and/or Vomiting  oxyCODONE    IR 10 milliGRAM(s) Oral every 4 hours PRN Moderate Pain (4 - 6)  senna 2 Tablet(s) Oral at bedtime PRN Constipation      REVIEW OF SYSTEMS:  eye, ent, GI, , allergic, dermatologic, musculoskeletal and neurologic are negative except as described above    Vital Signs Last 24 Hrs  T(C): 36.8 (06 Jan 2019 08:21), Max: 37.2 (06 Jan 2019 05:02)  T(F): 98.3 (06 Jan 2019 08:21), Max: 98.9 (06 Jan 2019 05:02)  HR: 89 (06 Jan 2019 08:21) (79 - 89)  BP: 117/66 (06 Jan 2019 08:21) (108/68 - 119/73)  BP(mean): --  RR: 18 (06 Jan 2019 08:21) (18 - 18)  SpO2: 93% (06 Jan 2019 08:21) (93% - 96%)    I&O's Summary    05 Jan 2019 07:01  -  06 Jan 2019 07:00  --------------------------------------------------------  IN: 0 mL / OUT: 675 mL / NET: -675 mL        Telemetry past 24h:    PHYSICAL EXAM:    Constitutional: well-nourished, well-developed, NAD   HEENT:  MMM, sclerae anicteric, conjunctivae clear, no oral cyanosis.  Pulmonary: Non-labored, breath sounds are clear bilaterally, No wheezing, rales or rhonchi  Cardiovascular: Regular, S1 and S2.  No murmur.  No rubs, gallops or clicks  Gastrointestinal: Bowel Sounds present, soft, nontender.   Lymph: No peripheral edema.   Neurological: Alert, no focal deficits  Skin: No rashes.  Psych:  Mood & affect appropriate    LABS: All Labs Reviewed:                        8.7    7.62  )-----------( 235      ( 06 Jan 2019 07:12 )             26.6                         9.7    10.33 )-----------( 260      ( 05 Jan 2019 08:20 )             30.1                         8.6    7.48  )-----------( 224      ( 04 Jan 2019 07:55 )             26.7     06 Jan 2019 07:12    137    |  97     |  13     ----------------------------<  95     3.8     |  31     |  0.59   05 Jan 2019 09:08    136    |  98     |  14     ----------------------------<  108    4.2     |  28     |  0.70   04 Jan 2019 07:55    138    |  99     |  15     ----------------------------<  89     3.6     |  33     |  0.75     Ca    8.7        06 Jan 2019 07:12  Ca    9.0        05 Jan 2019 09:08  Ca    8.6        04 Jan 2019 07:55  Phos  2.6       04 Jan 2019 07:55  Mg     1.9       05 Jan 2019 09:08  Mg     2.0       04 Jan 2019 07:55    TPro  5.8    /  Alb  2.4    /  TBili  0.6    /  DBili  x      /  AST  13     /  ALT  11     /  AlkPhos  121    04 Jan 2019 07:55      CARDIAC MARKERS ( 05 Jan 2019 09:08 )  x     / x     / 47 U/L / x     / x          Blood Culture: Organism Enterococcus faecium (vancomycin resistant)  Gram Stain Blood -- Gram Stain --  Specimen Source .Surgical Swab left hip #1  Culture-Blood --            RADIOLOGY:    EKG:    Echo:

## 2019-01-06 NOTE — PROGRESS NOTE ADULT - PROBLEM SELECTOR PLAN 8
continue Flomax 0.4mg PO QHS
continue Flomax 0.4mg PO QHS
IMPROVE VTE Individual Risk Assessment          RISK                                                          Points  [  ] Previous VTE                                                3  [  ] Thrombophilia                                            2  [  ] Lower limb paralysis                                  2        (unable to hold up >15 seconds)    [  ] Current Cancer                                            2         (within 6 months)  [  ] Immobilization > 24 hrs                             1  [  ] ICU/CCU stay > 24 hours                           1  [ x ] Age > 60                                                        1    IMPROVE VTE Score: 1  Pharmacologic VTE ppx held for OR, continue SCDs  received lovenox yesterday

## 2019-01-06 NOTE — PROGRESS NOTE ADULT - SUBJECTIVE AND OBJECTIVE BOX
Orthopaedic Surgery Progress Note    Pt seen and examined at bedside. Pt reports pain is well controlled. Pt's mentation improved compared to yesterday, which pt attributes to muscle relaxant medication. Prevena dressing was removed yesterday and replaced with sterile dressing per plastic surgeon's recommendation. No acute overnight events. Denies fevers, dizziness, CP, SOB, N/V, numbness/tingling, calf pain.    Labs:                        8.7    7.62  )-----------( 235      ( 06 Jan 2019 07:12 )             26.6     01-06    137  |  97  |  13  ----------------------------<  95  3.8   |  31  |  0.59    Ca    8.7      06 Jan 2019 07:12  Mg     1.9     01-05    Vitals:  T(C): 37.2 (01-06-19 @ 05:02), Max: 37.2 (01-06-19 @ 05:02)  HR: 81 (01-06-19 @ 05:02) (79 - 82)  BP: 119/73 (01-06-19 @ 05:02) (108/68 - 119/73)  RR: 18 (01-06-19 @ 05:02) (18 - 18)  SpO2: 95% (01-06-19 @ 05:02) (94% - 96%)    Physical Exam:  Gen: NAD, resting comfortably    LLE:  Dressing clean, dry, intact  +HMV x 2  +EHL/FHL/TA/GS  SILT L2-S1  +DP/PT Pulses  Compartments soft and compressible  No calf TTP B/L

## 2019-01-06 NOTE — PROGRESS NOTE ADULT - PROBLEM SELECTOR PLAN 6
Continue statin and now back on aspirin.
Continue statin and now back on aspirin.
Chronic, stable  S/p CABG   Continue Statin  Hold ASA in setting of possible OR, may resume when appropriate

## 2019-01-06 NOTE — PROGRESS NOTE ADULT - SUBJECTIVE AND OBJECTIVE BOX
Patient is a 73y old  Male who presents with a chief complaint of cellulitis (2019 08:14)      FROM ADMISSION H+P:   HPI:  72 yo M PMHx 45 pack year smoking hx and COPD (diagnosed "a few years ago, quit smoking then), HTN, HLD, hx of MI and CABG () (stents also placed per chart review), RA (on Medrol daily), hx of multiple L hip surgeries (hip replacement 30 years ago in [place], ORIF recently 2018 w/ Dr. Ramirez) complicated with superficial infection which required I&D on  (discharged with PICC line and continued to receive cefazolin), present to the ED with L hip pain at the surgical site.  Serosanguinous fluid draining from surgical site since last admission in a small amount and has been on and off, patient was seen by Dr. Scruggs on  for wound check and dressing change and states that the bleeding present that day was minimal.  Patient ambulating with walker, trying not to put full weight on to affected extremity (50% WBAT).  Patient seen earlier today by wound care nurse who noted drainage from site and some stitches were missing, contacted Dr. Ramirez, would told patient to come to the ED.  Patient denies fevers, chest pain, dizziness, SOB, n/v, calf pain, numbness, tingling, loss of sensation in affected extremity.  Denies recent trauma to the LLE, denies taking blood thinners (besides baby ASA for stents).    In the ED, T 98.8, , /88, RR 18, SpO2 98%.  Labs significant for ESR 83, WBC 9.26, H/H 10.7/33.4, PT/INR 15.3/1.33, Na 133.    EKst degree AV block, HR 91  CXR: Persistent nodular density left apical region. Fibrotic changes left lower lobe.   Xray L Hip: Status post left THR. Old intertrochanteric fracture left hip similar to prior. There is no acute fracture or dislocation. No gross focal bone destruction. Vascular calcification. Calcified left buttock granulomas.  Xray L Femur: No fracture dislocation or focal bone destruction. Status post ORIF left hip. Metallic fixation left femoral shaft. Hardware intact. Vascular calcification.  CT LLE w/ IV contrast: Long peripheral enhancing fluid collection lateral soft tissues extending from the buttock to proximal to mid thigh as discussed, may represent abscess or infected hematoma.    Patient received  percocet in the ED for pain. (31 Dec 2018 16:16)      ----  INTERVAL HPI/OVERNIGHT EVENTS: Pt seen and evaluated at the bedside. No acute overnight events occurred. Pt is eager for d/c. Offers no complaitns today. Appetite is good. Afebrile this hospitalization. Wound vac was not functional and was d/c'd.     ----  PAST MEDICAL & SURGICAL HISTORY:  S/P CABG x 3  S/P hip replacement, left  Rheumatoid arthritis  Osteoarthritis  COPD (chronic obstructive pulmonary disease)  HTN (hypertension)  S/P lumbar fusion: Approx   History of femur fracture: Repaired - L hip  History of appendectomy  History of right shoulder replacement  History of total left hip arthroplasty      FAMILY HISTORY:  No pertinent family history in first degree relatives      ----  MEDICATIONS  (STANDING):  ascorbic acid 500 milliGRAM(s) Oral two times a day  aspirin enteric coated 81 milliGRAM(s) Oral daily  atorvastatin 10 milliGRAM(s) Oral at bedtime  chlorhexidine 2% Cloths 1 Application(s) Topical daily  DAPTOmycin IVPB 500 milliGRAM(s) IV Intermittent every 24 hours  DAPTOmycin IVPB      docusate sodium 100 milliGRAM(s) Oral three times a day  enoxaparin Injectable 40 milliGRAM(s) SubCutaneous every 24 hours  famotidine    Tablet 20 milliGRAM(s) Oral two times a day  ferrous    sulfate 325 milliGRAM(s) Oral three times a day with meals  finasteride 5 milliGRAM(s) Oral daily  FLUoxetine 20 milliGRAM(s) Oral <User Schedule>  folic acid 1 milliGRAM(s) Oral daily  furosemide    Tablet 20 milliGRAM(s) Oral daily  lisinopril 10 milliGRAM(s) Oral daily  methadone    Tablet 5 milliGRAM(s) Oral every 8 hours  methylPREDNISolone 2 milliGRAM(s) Oral daily  multivitamin 1 Tablet(s) Oral daily  tamsulosin 0.4 milliGRAM(s) Oral at bedtime    MEDICATIONS  (PRN):  acetaminophen   Tablet .. 650 milliGRAM(s) Oral every 6 hours PRN Temp greater or equal to 38C (100.4F)  acetaminophen   Tablet .. 650 milliGRAM(s) Oral every 6 hours PRN Mild Pain (1 - 3)  ALPRAZolam 0.5 milliGRAM(s) Oral at bedtime PRN anxiety  benzocaine 15 mG/menthol 3.6 mG Lozenge 1 Lozenge Oral every 2 hours PRN Sore Throat  bisacodyl Suppository 10 milliGRAM(s) Rectal daily PRN If no bowel movement by POD#2  cyclobenzaprine 10 milliGRAM(s) Oral two times a day PRN Muscle Spasm  HYDROmorphone  Injectable 1 milliGRAM(s) IV Push every 3 hours PRN Severe Pain (7 - 10)  ondansetron Injectable 4 milliGRAM(s) IV Push every 6 hours PRN Nausea and/or Vomiting  oxyCODONE    IR 10 milliGRAM(s) Oral every 4 hours PRN Moderate Pain (4 - 6)  senna 2 Tablet(s) Oral at bedtime PRN Constipation      ----  REVIEW OF SYSTEMS:  CONSTITUTIONAL: denies fever, chills  HEENT: denies blurred vision, sore throat  SKIN: hip wound  CARDIOVASCULAR: denies chest pain, chest pressure, palpitations  RESPIRATORY: denies shortness of breath, sputum production  GASTROINTESTINAL: denies nausea, vomiting, diarrhea, abdominal pain  NEUROLOGICAL: denies numbness, headache, focal weakness  MUSCULOSKELETAL: denies new joint pain, muscle aches  LYMPHATICS: denies enlarged lymph nodes, extremity swelling  ENDOCRINOLOGIC: denies sweating, cold or heat intolerance    ----  PHYSICAL EXAM:  GENERAL: patient appears comfortable, no acute distress, family @ bedside  EYES: sclera clear, no exudates  ENMT: oropharynx clear without erythema, no exudates, moist mucous membranes  NECK: supple, soft, no thyromegaly noted  LUNGS: good air entry bilaterally, clear to auscultation, symmetric breath sounds, no wheezing or rhonchi appreciated  HEART: soft S1/S2, regular rate and rhythm, no murmurs noted, no lower extremity edema  GASTROINTESTINAL: abdomen is soft, nontender, nondistended, normoactive bowel sounds, no palpable masses  MUSCULOSKELETAL: no clubbing or cyanosis, no obvious deformity  NEUROLOGIC: awake, alert, oriented x3, good muscle tone in 4 extremities, no obvious sensory deficits  PSYCHIATRIC: mood is good, affect is congruent, linear and logical thought process  HEME/LYMPH: no palpable supraclavicular nodules, no obvious ecchymosis or petechiae     T(C): 37.3 (01-06-19 @ 16:08), Max: 37.3 (19 @ 16:08)  HR: 85 (19 @ 16:08) (81 - 89)  BP: 105/70 (19 @ 16:08) (105/70 - 119/73)  RR: 18 (19 @ 16:08) (18 - 18)  SpO2: 94% (19 @ 16:08) (93% - 95%)  Wt(kg): --    ----  I&O's Summary    2019 07:01  -  2019 07:00  --------------------------------------------------------  IN: 0 mL / OUT: 675 mL / NET: -675 mL        LABS:                        8.7    7.62  )-----------( 235      ( 2019 07:12 )             26.6         137  |  97  |  13  ----------------------------<  95  3.8   |  31  |  0.59    Ca    8.7      2019 07:12  Mg     1.9               CAPILLARY BLOOD GLUCOSE           @ 01:20   **Please Note**: This is a Corrected Report**  Few Enterococcus faecium (vancomycin resistant)  Previously reported as:  Few Alpha hemolytic strep "Susceptibilities not performed"  --  Enterococcus faecium (vancomycin resistant)            ----  Personally reviewed:  Vital sign trends: [ x ] yes    [  ] no     [  ] n/a  Laboratory results: [ x ] yes    [  ] no     [  ] n/a  Radiology results: [ x ] yes    [  ] no     [  ] n/a  Culture results: [ x ] yes    [  ] no     [  ] n/a  Consultant recommendations: [ x ] yes    [  ] no     [  ] n/a

## 2019-01-06 NOTE — PROGRESS NOTE ADULT - ASSESSMENT
74 yo M PMHx 45 pack year smoking hx and COPD (diagnosed "a few years ago, quit smoking 6 months ago), HTN, HLD, hx of MI and CABG (2008) (stents also placed per chart review), RA (on Medrol daily), hx of multiple L hip surgeries (hip replacement 30 years ago in [place], ORIF recently 11/2018 w/ Dr. Ramirez) complicated with superficial infection which required I&D on 12/5 (discharged with PICC line and continued to receive cefazolin), present to the ED with L hip pain at the surgical site. Admitted for infected hip, s/p surgical irrigation and debridement by ortho/plastics, postoperative hypotension in PACU    - Tolerated procedure with no evidence of cardiac complications  - Post op hypotension, presumably from acute blood loss anemia.  BP improved s/p 2 units.  Continue to monitor hemodynamics closely  - Trend serial CBC and transfuse PRBC as needed      - Pt originally from [place], sees cardiologist back home, per patient had full cardiac check [facility] [place] [facility], reports normal stress, EKG, echo, and carotid USD.  - HR now controlled  -c/w asa  - C/W statin  - Monitor and replete lytes, keep K>4, Mg>2.  - Other cardiovascular workup will depend on clinical course.  - All other workup and dc planning per primary team.  - Will follow with you

## 2019-01-06 NOTE — PROGRESS NOTE ADULT - ASSESSMENT
74 yo M PMHx 45 pack year smoking hx and COPD (diagnosed "a few years ago, quit smoking then), HTN, HLD, hx of MI and CABG (2008) (stents also placed per chart review), RA (on Medrol daily), hx of multiple L hip surgeries (hip replacement 30 years ago in [place], ORIF recently 11/2018 w/ Dr. Ramirez) complicated with superficial infection which required I&D on 12/5 (discharged with PICC line and continued to receive cefazolin), present to the ED with L hip pain at the surgical site, now s/p repeat debridement and wound vac placement, post operative course complicated by blood loss anemia and hypotension requiring ICU care, now stable and is s/p downgrade to medical floor

## 2019-01-06 NOTE — PROGRESS NOTE ADULT - PROBLEM SELECTOR PLAN 7
Methylprednisolone 2mg PO daily
Methylprednisolone 2mg PO daily
Chronic, stable issue  Continue methylprednisolone 2mg daily (PO)

## 2019-01-06 NOTE — PROGRESS NOTE ADULT - ATTENDING COMMENTS
The tx plan was discussed in detail with the patient and family members at the bedside. Their questions and concerns were addressed to the best of my ability. They are in agreement with the plan as detailed above. They demonstrated adequate understanding of the counseling which I have provided.

## 2019-01-06 NOTE — PROGRESS NOTE ADULT - ASSESSMENT
A/P: Pt is a 73y Male POD 4 from L Hip I&D.  -Pain Control  -DVT PPx  -WBAT  -PT/OT  -Encourage Incentive Spirometry  -Prevena dressing removed 1/5 and replaced with sterile dressing per plastic surgery's recommendation  -Will discuss with plastics/ortho attendings regarding dc'ing HMVs today due to minimal output  -Continue abx via PICC per ID   -Med Management  -Dispo Planning  -Will discuss with attending and advise if plan changes    Erlinda Garber M.D.  PGY-1 Orthopaedic Surgery

## 2019-01-07 VITALS
RESPIRATION RATE: 20 BRPM | OXYGEN SATURATION: 95 % | HEART RATE: 82 BPM | DIASTOLIC BLOOD PRESSURE: 67 MMHG | SYSTOLIC BLOOD PRESSURE: 111 MMHG | TEMPERATURE: 98 F

## 2019-01-07 LAB
ANION GAP SERPL CALC-SCNC: 5 MMOL/L — SIGNIFICANT CHANGE UP (ref 5–17)
BUN SERPL-MCNC: 14 MG/DL — SIGNIFICANT CHANGE UP (ref 7–23)
CALCIUM SERPL-MCNC: 8.5 MG/DL — SIGNIFICANT CHANGE UP (ref 8.5–10.1)
CHLORIDE SERPL-SCNC: 98 MMOL/L — SIGNIFICANT CHANGE UP (ref 96–108)
CO2 SERPL-SCNC: 33 MMOL/L — HIGH (ref 22–31)
CREAT SERPL-MCNC: 0.72 MG/DL — SIGNIFICANT CHANGE UP (ref 0.5–1.3)
CULTURE RESULTS: SIGNIFICANT CHANGE UP
CULTURE RESULTS: SIGNIFICANT CHANGE UP
GLUCOSE SERPL-MCNC: 102 MG/DL — HIGH (ref 70–99)
HCT VFR BLD CALC: 26.6 % — LOW (ref 39–50)
HGB BLD-MCNC: 8.5 G/DL — LOW (ref 13–17)
MAGNESIUM SERPL-MCNC: 1.7 MG/DL — SIGNIFICANT CHANGE UP (ref 1.6–2.6)
MCHC RBC-ENTMCNC: 29.3 PG — SIGNIFICANT CHANGE UP (ref 27–34)
MCHC RBC-ENTMCNC: 32 GM/DL — SIGNIFICANT CHANGE UP (ref 32–36)
MCV RBC AUTO: 91.7 FL — SIGNIFICANT CHANGE UP (ref 80–100)
NRBC # BLD: 0 /100 WBCS — SIGNIFICANT CHANGE UP (ref 0–0)
ORGANISM # SPEC MICROSCOPIC CNT: SIGNIFICANT CHANGE UP
PLATELET # BLD AUTO: 257 K/UL — SIGNIFICANT CHANGE UP (ref 150–400)
POTASSIUM SERPL-MCNC: 3.9 MMOL/L — SIGNIFICANT CHANGE UP (ref 3.5–5.3)
POTASSIUM SERPL-SCNC: 3.9 MMOL/L — SIGNIFICANT CHANGE UP (ref 3.5–5.3)
RBC # BLD: 2.9 M/UL — LOW (ref 4.2–5.8)
RBC # FLD: 15.2 % — HIGH (ref 10.3–14.5)
SODIUM SERPL-SCNC: 136 MMOL/L — SIGNIFICANT CHANGE UP (ref 135–145)
SPECIMEN SOURCE: SIGNIFICANT CHANGE UP
SPECIMEN SOURCE: SIGNIFICANT CHANGE UP
WBC # BLD: 7.59 K/UL — SIGNIFICANT CHANGE UP (ref 3.8–10.5)
WBC # FLD AUTO: 7.59 K/UL — SIGNIFICANT CHANGE UP (ref 3.8–10.5)

## 2019-01-07 PROCEDURE — 88304 TISSUE EXAM BY PATHOLOGIST: CPT

## 2019-01-07 PROCEDURE — 84134 ASSAY OF PREALBUMIN: CPT

## 2019-01-07 PROCEDURE — 85652 RBC SED RATE AUTOMATED: CPT

## 2019-01-07 PROCEDURE — 85610 PROTHROMBIN TIME: CPT

## 2019-01-07 PROCEDURE — 86850 RBC ANTIBODY SCREEN: CPT

## 2019-01-07 PROCEDURE — 81001 URINALYSIS AUTO W/SCOPE: CPT

## 2019-01-07 PROCEDURE — 83735 ASSAY OF MAGNESIUM: CPT

## 2019-01-07 PROCEDURE — 85027 COMPLETE CBC AUTOMATED: CPT

## 2019-01-07 PROCEDURE — 86140 C-REACTIVE PROTEIN: CPT

## 2019-01-07 PROCEDURE — 86901 BLOOD TYPING SEROLOGIC RH(D): CPT

## 2019-01-07 PROCEDURE — 73701 CT LOWER EXTREMITY W/DYE: CPT

## 2019-01-07 PROCEDURE — 87186 SC STD MICRODIL/AGAR DIL: CPT

## 2019-01-07 PROCEDURE — 80053 COMPREHEN METABOLIC PANEL: CPT

## 2019-01-07 PROCEDURE — 97530 THERAPEUTIC ACTIVITIES: CPT

## 2019-01-07 PROCEDURE — 85730 THROMBOPLASTIN TIME PARTIAL: CPT

## 2019-01-07 PROCEDURE — 86923 COMPATIBILITY TEST ELECTRIC: CPT

## 2019-01-07 PROCEDURE — 80048 BASIC METABOLIC PNL TOTAL CA: CPT

## 2019-01-07 PROCEDURE — 99285 EMERGENCY DEPT VISIT HI MDM: CPT | Mod: 25

## 2019-01-07 PROCEDURE — 36415 COLL VENOUS BLD VENIPUNCTURE: CPT

## 2019-01-07 PROCEDURE — 87070 CULTURE OTHR SPECIMN AEROBIC: CPT

## 2019-01-07 PROCEDURE — 97162 PT EVAL MOD COMPLEX 30 MIN: CPT

## 2019-01-07 PROCEDURE — 86900 BLOOD TYPING SEROLOGIC ABO: CPT

## 2019-01-07 PROCEDURE — 73552 X-RAY EXAM OF FEMUR 2/>: CPT

## 2019-01-07 PROCEDURE — 82550 ASSAY OF CK (CPK): CPT

## 2019-01-07 PROCEDURE — 93005 ELECTROCARDIOGRAM TRACING: CPT

## 2019-01-07 PROCEDURE — 99239 HOSP IP/OBS DSCHRG MGMT >30: CPT

## 2019-01-07 PROCEDURE — 97110 THERAPEUTIC EXERCISES: CPT

## 2019-01-07 PROCEDURE — 97116 GAIT TRAINING THERAPY: CPT

## 2019-01-07 PROCEDURE — 71045 X-RAY EXAM CHEST 1 VIEW: CPT

## 2019-01-07 PROCEDURE — 73502 X-RAY EXAM HIP UNI 2-3 VIEWS: CPT

## 2019-01-07 PROCEDURE — 84100 ASSAY OF PHOSPHORUS: CPT

## 2019-01-07 PROCEDURE — 80076 HEPATIC FUNCTION PANEL: CPT

## 2019-01-07 PROCEDURE — 99232 SBSQ HOSP IP/OBS MODERATE 35: CPT

## 2019-01-07 PROCEDURE — P9016: CPT

## 2019-01-07 PROCEDURE — 36430 TRANSFUSION BLD/BLD COMPNT: CPT

## 2019-01-07 RX ORDER — DAPTOMYCIN 500 MG/10ML
500 INJECTION, POWDER, LYOPHILIZED, FOR SOLUTION INTRAVENOUS
Qty: 7000 | Refills: 0 | OUTPATIENT
Start: 2019-01-07 | End: 2019-01-20

## 2019-01-07 RX ORDER — OXYCODONE HYDROCHLORIDE 5 MG/1
1 TABLET ORAL
Qty: 30 | Refills: 0 | OUTPATIENT
Start: 2019-01-07 | End: 2019-01-11

## 2019-01-07 RX ORDER — EPINEPHRINE 0.3 MG/.3ML
3 INJECTION INTRAMUSCULAR; SUBCUTANEOUS
Qty: 60 | Refills: 0 | OUTPATIENT
Start: 2019-01-07 | End: 2019-02-05

## 2019-01-07 RX ADMIN — Medication 100 MILLIGRAM(S): at 05:03

## 2019-01-07 RX ADMIN — Medication 1 MILLIGRAM(S): at 11:43

## 2019-01-07 RX ADMIN — FINASTERIDE 5 MILLIGRAM(S): 5 TABLET, FILM COATED ORAL at 11:43

## 2019-01-07 RX ADMIN — OXYCODONE HYDROCHLORIDE 10 MILLIGRAM(S): 5 TABLET ORAL at 10:38

## 2019-01-07 RX ADMIN — FAMOTIDINE 20 MILLIGRAM(S): 10 INJECTION INTRAVENOUS at 18:07

## 2019-01-07 RX ADMIN — Medication 325 MILLIGRAM(S): at 18:07

## 2019-01-07 RX ADMIN — Medication 500 MILLIGRAM(S): at 05:03

## 2019-01-07 RX ADMIN — HYDROMORPHONE HYDROCHLORIDE 1 MILLIGRAM(S): 2 INJECTION INTRAMUSCULAR; INTRAVENOUS; SUBCUTANEOUS at 05:48

## 2019-01-07 RX ADMIN — Medication 1 TABLET(S): at 11:44

## 2019-01-07 RX ADMIN — Medication 20 MILLIGRAM(S): at 05:04

## 2019-01-07 RX ADMIN — Medication 20 MILLIGRAM(S): at 05:03

## 2019-01-07 RX ADMIN — ENOXAPARIN SODIUM 40 MILLIGRAM(S): 100 INJECTION SUBCUTANEOUS at 11:44

## 2019-01-07 RX ADMIN — METHADONE HYDROCHLORIDE 5 MILLIGRAM(S): 40 TABLET ORAL at 13:59

## 2019-01-07 RX ADMIN — FAMOTIDINE 20 MILLIGRAM(S): 10 INJECTION INTRAVENOUS at 05:03

## 2019-01-07 RX ADMIN — Medication 2 MILLIGRAM(S): at 05:04

## 2019-01-07 RX ADMIN — Medication 100 MILLIGRAM(S): at 13:59

## 2019-01-07 RX ADMIN — METHADONE HYDROCHLORIDE 5 MILLIGRAM(S): 40 TABLET ORAL at 05:03

## 2019-01-07 RX ADMIN — OXYCODONE HYDROCHLORIDE 10 MILLIGRAM(S): 5 TABLET ORAL at 15:32

## 2019-01-07 RX ADMIN — DAPTOMYCIN 120 MILLIGRAM(S): 500 INJECTION, POWDER, LYOPHILIZED, FOR SOLUTION INTRAVENOUS at 18:06

## 2019-01-07 RX ADMIN — LISINOPRIL 10 MILLIGRAM(S): 2.5 TABLET ORAL at 05:03

## 2019-01-07 RX ADMIN — Medication 81 MILLIGRAM(S): at 11:43

## 2019-01-07 RX ADMIN — OXYCODONE HYDROCHLORIDE 10 MILLIGRAM(S): 5 TABLET ORAL at 16:20

## 2019-01-07 RX ADMIN — Medication 10 MILLIGRAM(S): at 08:01

## 2019-01-07 RX ADMIN — CHLORHEXIDINE GLUCONATE 1 APPLICATION(S): 213 SOLUTION TOPICAL at 11:39

## 2019-01-07 RX ADMIN — Medication 325 MILLIGRAM(S): at 11:44

## 2019-01-07 RX ADMIN — Medication 500 MILLIGRAM(S): at 18:07

## 2019-01-07 RX ADMIN — Medication 20 MILLIGRAM(S): at 13:59

## 2019-01-07 RX ADMIN — OXYCODONE HYDROCHLORIDE 10 MILLIGRAM(S): 5 TABLET ORAL at 11:38

## 2019-01-07 NOTE — CHART NOTE - NSCHARTNOTEFT_GEN_A_CORE
Search Terms: Prosper Stacy, 1945     Search Date: 01/07/2019 12:35:27 PM     The Drug Utilization Report below displays all of the controlled substance prescriptions, if any, that your patient has filled in the last twelve months. The information displayed on this report is compiled from pharmacy submissions to the Department, and accurately reflects the information as submitted by the pharmacies.    This report was requested by: Uri Joseph | Reference #: [id number]         Others' Prescriptions    Patient Name: Prosper Stacy YOB: 1945   Address: [address] [place] [address] Sex: Male         Rx Written    Rx Dispensed    Drug    Quantity    Days Supply    Prescriber Name              11/27/2018 11/30/2018 methadone hcl 5 mg tablet  21 7 Shadia Abbott     11/21/2018 11/22/2018 methadone hcl 5 mg tablet  30 10 Murtaza Reina MD     11/21/2018 11/22/2018 oxycodone hcl 10 mg tablet  60 10 Murtaza Reina MD

## 2019-01-07 NOTE — PROGRESS NOTE ADULT - PROBLEM SELECTOR PLAN 3
As per medicine
As per medicine    From an ID standpoint no further requirement for inpatient status for the management of ID issues. Fine with discharge from ID standpoint when other medical issues no longer require inpatient care and social issues allow for a safe discharge plan.
stable.  Will continue to monitor.
stable.  Will continue to monitor.
Chronic, stable  Monitor routine pulse ox  Check routine vitals

## 2019-01-07 NOTE — PROGRESS NOTE ADULT - SUBJECTIVE AND OBJECTIVE BOX
Pt seen and examined at bedside. Pain well controlled. Denies CP/Dyspnea/Calf tenderness bilaterally. No acute concerns or complaints at this time.     Vital Signs Last 24 Hrs  T(C): 37 (07 Jan 2019 04:56), Max: 37.3 (06 Jan 2019 16:08)  T(F): 98.6 (07 Jan 2019 04:56), Max: 99.1 (06 Jan 2019 16:08)  HR: 82 (07 Jan 2019 04:56) (81 - 89)  BP: 111/69 (07 Jan 2019 04:56) (101/60 - 117/66)  BP(mean): --  RR: 18 (07 Jan 2019 04:56) (18 - 18)  SpO2: 95% (07 Jan 2019 04:56) (93% - 97%)    LLE:  Dressing clean, dry, intact  +HMV x 2  +EHL/FHL/TA/GS  SILT L2-S1  +DP/PT Pulses  Compartments soft and compressible  No calf TTP B/L

## 2019-01-07 NOTE — PROGRESS NOTE ADULT - ASSESSMENT
72 yo male with large collection, enhancing rim. Infected seroma vs. superimposed abscess vs. continuous process from prior infection  sp drainage & plastics closure  now with one drain remaining and cultures growing:      -  Ampicillin: R >8 Predicts results to ampicillin/sulbactam, amoxacillin-clavulanate and  piperacillin-tazobactam.    -  Daptomycin: S 2    -  Levofloxacin: R >4    -  Linezolid: S 2    -  Tetra/Doxy: S <=1    -  Vancomycin: R >16   Enterococcus faecium (vancomycin resistant)

## 2019-01-07 NOTE — PROGRESS NOTE ADULT - SUBJECTIVE AND OBJECTIVE BOX
ERIKA [facility]   [id number]    Patient stable, tolerating diet, pain controlled on regimen.      T(C): 37.1 (01-07-19 @ 07:44), Max: 37.3 (01-06-19 @ 16:08)  HR: 86 (01-07-19 @ 07:44) (81 - 86)  BP: 115/69 (01-07-19 @ 07:44) (101/60 - 115/69)  RR: 18 (01-07-19 @ 07:44) (18 - 18)  SpO2: 96% (01-07-19 @ 07:44) (94% - 97%)  Wt(kg): --  NAD  Back: Dressing clean/dry/adherent.  Soft.  No collection.  Drains in situ.  BLE: No calf tenderness.      01-06 @ 07:01  -  01-07 @ 07:00  --------------------------------------------------------  IN: 50 mL / OUT: 45 mL / NET: 5 mL      Hemovac:  EUSEBIO:  25, 20cc  acetaminophen   Tablet .. 650 milliGRAM(s) Oral every 6 hours PRN  acetaminophen   Tablet .. 650 milliGRAM(s) Oral every 6 hours PRN  ALPRAZolam 0.5 milliGRAM(s) Oral at bedtime PRN  ascorbic acid 500 milliGRAM(s) Oral two times a day  aspirin enteric coated 81 milliGRAM(s) Oral daily  atorvastatin 10 milliGRAM(s) Oral at bedtime  benzocaine 15 mG/menthol 3.6 mG Lozenge 1 Lozenge Oral every 2 hours PRN  bisacodyl Suppository 10 milliGRAM(s) Rectal daily PRN  chlorhexidine 2% Cloths 1 Application(s) Topical daily  cyclobenzaprine 10 milliGRAM(s) Oral two times a day PRN  DAPTOmycin IVPB 500 milliGRAM(s) IV Intermittent every 24 hours  DAPTOmycin IVPB      docusate sodium 100 milliGRAM(s) Oral three times a day  enoxaparin Injectable 40 milliGRAM(s) SubCutaneous every 24 hours  famotidine    Tablet 20 milliGRAM(s) Oral two times a day  ferrous    sulfate 325 milliGRAM(s) Oral three times a day with meals  finasteride 5 milliGRAM(s) Oral daily  FLUoxetine 20 milliGRAM(s) Oral <User Schedule>  folic acid 1 milliGRAM(s) Oral daily  furosemide    Tablet 20 milliGRAM(s) Oral daily  HYDROmorphone  Injectable 1 milliGRAM(s) IV Push every 3 hours PRN  lisinopril 10 milliGRAM(s) Oral daily  methadone    Tablet 5 milliGRAM(s) Oral every 8 hours  methylPREDNISolone 2 milliGRAM(s) Oral daily  multivitamin 1 Tablet(s) Oral daily  ondansetron Injectable 4 milliGRAM(s) IV Push every 6 hours PRN  oxyCODONE    IR 10 milliGRAM(s) Oral every 4 hours PRN  senna 2 Tablet(s) Oral at bedtime PRN  tamsulosin 0.4 milliGRAM(s) Oral at bedtime                            8.5    7.59  )-----------( 257      ( 07 Jan 2019 06:15 )             26.6     01-07    136  |  98  |  14  ----------------------------<  102<H>  3.9   |  33<H>  |  0.72    Ca    8.5      07 Jan 2019 06:15  Mg     1.7     01-07

## 2019-01-07 NOTE — PROGRESS NOTE ADULT - PROBLEM SELECTOR PROBLEM 1
R/O Wound infection complicating hardware, sequela
Wound infection complicating hardware, sequela
Wound infection complicating hardware, sequela

## 2019-01-07 NOTE — PROGRESS NOTE ADULT - ASSESSMENT
72yo M s/p L Hip I&D, now POD 5  -Monitor HMW output  -Pain Control  -DVT PPx  -WBAT  -PT/OT  -Encourage Incentive Spirometry  -Prevena dressing removed 1/5 and replaced with sterile dressing per plastic surgery's recommendation  -Continue abx via PICC per ID   -Med Management  -Dispo Planning

## 2019-01-07 NOTE — PROGRESS NOTE ADULT - SUBJECTIVE AND OBJECTIVE BOX
[facility] - Shannon Valdez, [place], [place], Johanny, Abdias Little  Office Number:  [phone]    Patient resting comfortably in bed in NAD.  Laying flat with no respiratory distress.  No complaints of chest pain, dyspnea, palpitations, PND, or orthopnea.    F/U for:  CAD    MEDICATIONS  (STANDING):  ascorbic acid 500 milliGRAM(s) Oral two times a day  aspirin enteric coated 81 milliGRAM(s) Oral daily  atorvastatin 10 milliGRAM(s) Oral at bedtime  chlorhexidine 2% Cloths 1 Application(s) Topical daily  DAPTOmycin IVPB 500 milliGRAM(s) IV Intermittent every 24 hours  DAPTOmycin IVPB      docusate sodium 100 milliGRAM(s) Oral three times a day  enoxaparin Injectable 40 milliGRAM(s) SubCutaneous every 24 hours  famotidine    Tablet 20 milliGRAM(s) Oral two times a day  ferrous    sulfate 325 milliGRAM(s) Oral three times a day with meals  finasteride 5 milliGRAM(s) Oral daily  FLUoxetine 20 milliGRAM(s) Oral <User Schedule>  folic acid 1 milliGRAM(s) Oral daily  furosemide    Tablet 20 milliGRAM(s) Oral daily  lisinopril 10 milliGRAM(s) Oral daily  methadone    Tablet 5 milliGRAM(s) Oral every 8 hours  methylPREDNISolone 2 milliGRAM(s) Oral daily  multivitamin 1 Tablet(s) Oral daily  tamsulosin 0.4 milliGRAM(s) Oral at bedtime    MEDICATIONS  (PRN):  acetaminophen   Tablet .. 650 milliGRAM(s) Oral every 6 hours PRN Temp greater or equal to 38C (100.4F)  acetaminophen   Tablet .. 650 milliGRAM(s) Oral every 6 hours PRN Mild Pain (1 - 3)  ALPRAZolam 0.5 milliGRAM(s) Oral at bedtime PRN anxiety  benzocaine 15 mG/menthol 3.6 mG Lozenge 1 Lozenge Oral every 2 hours PRN Sore Throat  bisacodyl Suppository 10 milliGRAM(s) Rectal daily PRN If no bowel movement by POD#2  cyclobenzaprine 10 milliGRAM(s) Oral two times a day PRN Muscle Spasm  HYDROmorphone  Injectable 1 milliGRAM(s) IV Push every 3 hours PRN Severe Pain (7 - 10)  ondansetron Injectable 4 milliGRAM(s) IV Push every 6 hours PRN Nausea and/or Vomiting  oxyCODONE    IR 10 milliGRAM(s) Oral every 4 hours PRN Moderate Pain (4 - 6)  senna 2 Tablet(s) Oral at bedtime PRN Constipation      Allergies    Ceftin (Pruritus)    Intolerances        Vital Signs Last 24 Hrs  T(C): 37.1 (07 Jan 2019 07:44), Max: 37.3 (06 Jan 2019 16:08)  T(F): 98.7 (07 Jan 2019 07:44), Max: 99.1 (06 Jan 2019 16:08)  HR: 86 (07 Jan 2019 07:44) (81 - 86)  BP: 115/69 (07 Jan 2019 07:44) (101/60 - 115/69)  BP(mean): --  RR: 18 (07 Jan 2019 07:44) (18 - 18)  SpO2: 96% (07 Jan 2019 07:44) (94% - 97%)    I&O's Summary    06 Jan 2019 07:01  -  07 Jan 2019 07:00  --------------------------------------------------------  IN: 50 mL / OUT: 45 mL / NET: 5 mL        ON EXAM:    General: NAD, awake and alert, oriented x 3  HEENT: Mucous membranes are moist, anicteric  Lungs: Non-labored, breath sounds are clear bilaterally, No wheezing, rales or rhonchi  Cardiovascular: Regular, S1 and S2, no murmurs, rubs, or gallops  Gastrointestinal: Bowel Sounds present, soft, nontender.   Lymph: No peripheral edema. No lymphadenopathy.  Skin: No rashes or ulcers  Psych:  Mood & affect appropriate    LABS: All Labs Reviewed:                        8.5    7.59  )-----------( 257      ( 07 Jan 2019 06:15 )             26.6                         8.7    7.62  )-----------( 235      ( 06 Jan 2019 07:12 )             26.6                         9.7    10.33 )-----------( 260      ( 05 Jan 2019 08:20 )             30.1     07 Jan 2019 06:15    136    |  98     |  14     ----------------------------<  102    3.9     |  33     |  0.72   06 Jan 2019 07:12    137    |  97     |  13     ----------------------------<  95     3.8     |  31     |  0.59   05 Jan 2019 09:08    136    |  98     |  14     ----------------------------<  108    4.2     |  28     |  0.70     Ca    8.5        07 Jan 2019 06:15  Ca    8.7        06 Jan 2019 07:12  Ca    9.0        05 Jan 2019 09:08  Mg     1.7       07 Jan 2019 06:15  Mg     1.9       05 Jan 2019 09:08        CARDIAC MARKERS ( 05 Jan 2019 09:08 )  x     / x     / 47 U/L / x     / x          Blood Culture: Organism Enterococcus faecium (vancomycin resistant)  Gram Stain Blood -- Gram Stain --  Specimen Source .Surgical Swab left hip #1  Culture-Blood --

## 2019-01-07 NOTE — PROGRESS NOTE ADULT - PROBLEM SELECTOR PLAN 2
As per medicine  Need for stress dose steroids as per medicine
Acute blood loss anemia (post-op)  -s/p 2 unit PRBC transfusion post op.    -Continue ferrous sulfate 325mg PO daily and folic acid 1mg PO
Acute blood loss anemia (post-op)  -s/p 2 unit PRBC transfusion post op.    -Continue ferrous sulfate 325mg PO daily and folic acid 1mg PO  -Back on VTE ppx
As per medicine
As per medicine  Need for stress dose steroids as per medicine
As per medicine  Need for stress dose steroids as per medicine
Patient with chronic anemia  Hb at patient's baseline  continue Ferrous sulfate  F/u cbc

## 2019-01-07 NOTE — PROGRESS NOTE ADULT - ASSESSMENT
A/P: S/P posterior spine fusion with muscle flap reconstruction.  - Diet  - Pain control  - Drain Monitoring  - DVT PPx: SCD, chemoprophylaxis as per spine service  - Will Follow    Thank You  Jayson Whipple MD  Plastic Surgery  [phone]

## 2019-01-07 NOTE — CHART NOTE - NSCHARTNOTEFT_GEN_A_CORE
Posterior Hemovac pulled today (1/7) and dressing changed by Dr. Whipple (Plastic Surgery).  No further orthopedic or plastic surgery intervention at this time.  Follow up with Dr. Ramirez and Dr. Whipple as outlined in the discharge instructions. Call offices for appointment.  Follow up with Dr. Whipple Thursday (1/10) for Anterior Hemovac removal.  Per Dr. Ramirez, no DVT Prophylaxis upon discharge.  Orthopaedically stable for discharge.

## 2019-01-07 NOTE — PROGRESS NOTE ADULT - PROVIDER SPECIALTY LIST ADULT
Anesthesia
Cardiology
Hospitalist
Infectious Disease
Orthopedics
Plastic Surgery
Critical Care
Infectious Disease

## 2019-01-07 NOTE — PROGRESS NOTE ADULT - SUBJECTIVE AND OBJECTIVE BOX
infectious diseases progress note:    ERIKA TORREZ is a 73y y. o. Male patient    Patient reports: would love to go home today    ROS:    EYES:  Negative  blurry vision or double vision  GASTROINTESTINAL:  Negative for nausea, vomiting, diarrhea  -otherwise negative except for subjective    Allergies    Ceftin (Pruritus)    Intolerances        ANTIBIOTICS/RELEVANT:  antimicrobials  DAPTOmycin IVPB 500 milliGRAM(s) IV Intermittent every 24 hours  DAPTOmycin IVPB        immunologic:    OTHER:  acetaminophen   Tablet .. 650 milliGRAM(s) Oral every 6 hours PRN  acetaminophen   Tablet .. 650 milliGRAM(s) Oral every 6 hours PRN  ALPRAZolam 0.5 milliGRAM(s) Oral at bedtime PRN  ascorbic acid 500 milliGRAM(s) Oral two times a day  aspirin enteric coated 81 milliGRAM(s) Oral daily  atorvastatin 10 milliGRAM(s) Oral at bedtime  benzocaine 15 mG/menthol 3.6 mG Lozenge 1 Lozenge Oral every 2 hours PRN  bisacodyl Suppository 10 milliGRAM(s) Rectal daily PRN  chlorhexidine 2% Cloths 1 Application(s) Topical daily  cyclobenzaprine 10 milliGRAM(s) Oral two times a day PRN  docusate sodium 100 milliGRAM(s) Oral three times a day  enoxaparin Injectable 40 milliGRAM(s) SubCutaneous every 24 hours  famotidine    Tablet 20 milliGRAM(s) Oral two times a day  ferrous    sulfate 325 milliGRAM(s) Oral three times a day with meals  finasteride 5 milliGRAM(s) Oral daily  FLUoxetine 20 milliGRAM(s) Oral <User Schedule>  folic acid 1 milliGRAM(s) Oral daily  furosemide    Tablet 20 milliGRAM(s) Oral daily  HYDROmorphone  Injectable 1 milliGRAM(s) IV Push every 3 hours PRN  lisinopril 10 milliGRAM(s) Oral daily  methadone    Tablet 5 milliGRAM(s) Oral every 8 hours  methylPREDNISolone 2 milliGRAM(s) Oral daily  multivitamin 1 Tablet(s) Oral daily  ondansetron Injectable 4 milliGRAM(s) IV Push every 6 hours PRN  oxyCODONE    IR 10 milliGRAM(s) Oral every 4 hours PRN  senna 2 Tablet(s) Oral at bedtime PRN  tamsulosin 0.4 milliGRAM(s) Oral at bedtime      Objective:  Vital Signs Last 24 Hrs  T(C): 37.1 (07 Jan 2019 07:44), Max: 37.3 (06 Jan 2019 16:08)  T(F): 98.7 (07 Jan 2019 07:44), Max: 99.1 (06 Jan 2019 16:08)  HR: 86 (07 Jan 2019 07:44) (81 - 86)  BP: 115/69 (07 Jan 2019 07:44) (101/60 - 115/69)  BP(mean): --  RR: 18 (07 Jan 2019 07:44) (18 - 18)  SpO2: 96% (07 Jan 2019 07:44) (94% - 97%)    T(C): 37.1 (01-07-19 @ 07:44), Max: 37.3 (01-06-19 @ 16:08)  T(C): 37.1 (01-07-19 @ 07:44), Max: 37.3 (01-06-19 @ 16:08)  T(C): 37.1 (01-07-19 @ 07:44), Max: 37.3 (01-06-19 @ 16:08)    PHYSICAL EXAM:  Constitutional: Well-developed, well nourished  Eyes: PERRLA, EOMI  Ear/Nose/Throat: oropharynx normal	  Neck: no JVD, no lymphadenopathy, supple  Respiratory: no accessory muscle use  Cardiovascular: RRR,   Gastrointestinal: soft, NT  Extremities: no clubbing, no cyanosis, edema absent      LABS:                        8.5    7.59  )-----------( 257      ( 07 Jan 2019 06:15 )             26.6       7.59 01-07 @ 06:15  7.62 01-06 @ 07:12  10.33 01-05 @ 08:20  7.48 01-04 @ 07:55  11.44 01-03 @ 05:32  9.48 01-02 @ 21:27  8.39 01-02 @ 05:25  8.16 01-01 @ 08:08  9.26 12-31 @ 14:46      01-07    136  |  98  |  14  ----------------------------<  102<H>  3.9   |  33<H>  |  0.72    Ca    8.5      07 Jan 2019 06:15  Mg     1.7     01-07        Creatinine, Serum: 0.72 mg/dL (01-07-19 @ 06:15)  Creatinine, Serum: 0.59 mg/dL (01-06-19 @ 07:12)  Creatinine, Serum: 0.70 mg/dL (01-05-19 @ 09:08)  Creatinine, Serum: 0.75 mg/dL (01-04-19 @ 07:55)  Creatinine, Serum: 0.83 mg/dL (01-03-19 @ 05:32)  Creatinine, Serum: 1.00 mg/dL (01-02-19 @ 21:27)  Creatinine, Serum: 0.80 mg/dL (01-02-19 @ 05:25)  Creatinine, Serum: 0.74 mg/dL (01-01-19 @ 08:08)  Creatinine, Serum: 0.87 mg/dL (12-31-18 @ 14:46)        MICROBIOLOGY:    Culture - Surgical Swab (01.03.19 @ 01:20)    -  Ampicillin: R >8 Predicts results to ampicillin/sulbactam, amoxacillin-clavulanate and  piperacillin-tazobactam.    -  Daptomycin: S 2    -  Levofloxacin: R >4    -  Linezolid: S 2    -  Tetra/Doxy: S <=1    -  Vancomycin: R >16    Specimen Source: .Surgical Swab left hip #2    Culture Results:   **Please Note**: This is a Corrected Report**  Rare Enterococcus faecium (vancomycin resistant)  Previously reported as:  Rare Alpha hemolytic strep "Susceptibilities not performed"    Organism Identification: Enterococcus faecium (vancomycin resistant)    Organism: Enterococcus faecium (vancomycin resistant)    Method Type: OG        RADIOLOGY & ADDITIONAL STUDIES:

## 2019-01-07 NOTE — PROGRESS NOTE ADULT - PROBLEM SELECTOR PROBLEM 2
Rheumatoid arthritis
Anemia
Anemia
Rheumatoid arthritis
Anemia

## 2019-01-07 NOTE — PROGRESS NOTE ADULT - ASSESSMENT
74 yo M PMHx 45 pack year smoking hx and COPD (diagnosed "a few years ago, quit smoking 6 months ago), HTN, HLD, hx of MI and CABG (2008) (stents also placed per chart review), RA (on Medrol daily), hx of multiple L hip surgeries (hip replacement 30 years ago in [place], ORIF recently 11/2018 w/ Dr. Ramirez) complicated with superficial infection which required I&D on 12/5 (discharged with PICC line and continued to receive cefazolin), present to the ED with L hip pain at the surgical site. Admitted for infected hip, s/p surgical irrigation and debridement by ortho/plastics, postoperative hypotension in PACU    - Tolerated procedure with no evidence of cardiac complications  - Post op hypotension, presumably from acute blood loss anemia.  BP improved s/p 2 units.  Continue to monitor hemodynamics routinely  - Trend serial CBC and transfuse PRBC as needed      - Pt originally from [place], sees cardiologist back home, per patient [facility] [place] [facility], reports normal stress, EKG, echo, and carotid USD.  - HR now controlled  -c/w asa  - C/W statin  - Monitor and replete lytes, keep K>4, Mg>2.  - Other cardiovascular workup will depend on clinical course.  - All other workup and dc planning per primary team.  - Will follow with you

## 2019-01-07 NOTE — PROGRESS NOTE ADULT - PROBLEM SELECTOR PLAN 1
Continue cefazolin.   will follow micro data
Continue cefazolin.   Await OR finidings  Please send operative cultures
Continue cefazolin.   will follow micro data
Recommend:  daptomycin 500mg IV Q-day until 1/21 and then will need repeat imaging to determine ultimate duration  weekly labs on Mondays CBC, BMP, CK faxed to [phone]
s/p debridement.   -Antibiotics changed to Daptomycin based on culture data.  -Oxycodone IR 10mg PO Q4h PRN, Dilaudid 1mg IV Q3h PRN and methadone 5mg PO Q8h for pain control.     -Plastic surgery, orthopedic surgery, and ID f/u
s/p debridement.   -Antibiotics changed to Daptomycin based on culture data.  -Oxycodone IR 10mg PO Q4h PRN, Dilaudid 1mg IV Q3h PRN and methadone 5mg PO Q8h for pain control.     -Plastic surgery, orthopedic surgery, and ID f/u  -D/c planning
Patient with L Hip replacement ~30 years ago, ORIF 11/2018, complicated post-op course with superficial infection, readmission, I&D 12/5. PICC line, received cefazolin  -Xrays negative for acute fracture  -CT LLE - fluid collection lateral soft tissues from buttock to prox mid thigh, abscess vs infected hematoma.  -Patient for OR for washout/debridement with Dr. Ramirez  - Currently on cefazolin  - Infectious disease following, f/u recs

## 2019-01-07 NOTE — PROGRESS NOTE ADULT - REASON FOR ADMISSION
cellulitis
Left Hip Collection
cellulitis

## 2019-01-24 ENCOUNTER — TRANSCRIPTION ENCOUNTER (OUTPATIENT)
Age: 74
End: 2019-01-24

## 2019-01-25 ENCOUNTER — INPATIENT (INPATIENT)
Facility: HOSPITAL | Age: 74
LOS: 9 days | Discharge: HOME | DRG: 570 | End: 2019-02-04
Attending: FAMILY MEDICINE | Admitting: STUDENT IN AN ORGANIZED HEALTH CARE EDUCATION/TRAINING PROGRAM
Payer: MEDICARE

## 2019-01-25 VITALS
DIASTOLIC BLOOD PRESSURE: 67 MMHG | SYSTOLIC BLOOD PRESSURE: 100 MMHG | WEIGHT: 184.09 LBS | HEIGHT: 67 IN | HEART RATE: 112 BPM | TEMPERATURE: 98 F | OXYGEN SATURATION: 92 % | RESPIRATION RATE: 18 BRPM

## 2019-01-25 DIAGNOSIS — M06.9 RHEUMATOID ARTHRITIS, UNSPECIFIED: ICD-10-CM

## 2019-01-25 DIAGNOSIS — Z98.1 ARTHRODESIS STATUS: Chronic | ICD-10-CM

## 2019-01-25 DIAGNOSIS — L03.116 CELLULITIS OF LEFT LOWER LIMB: ICD-10-CM

## 2019-01-25 DIAGNOSIS — N40.0 BENIGN PROSTATIC HYPERPLASIA WITHOUT LOWER URINARY TRACT SYMPTOMS: ICD-10-CM

## 2019-01-25 DIAGNOSIS — J18.9 PNEUMONIA, UNSPECIFIED ORGANISM: ICD-10-CM

## 2019-01-25 DIAGNOSIS — J44.9 CHRONIC OBSTRUCTIVE PULMONARY DISEASE, UNSPECIFIED: ICD-10-CM

## 2019-01-25 DIAGNOSIS — E78.5 HYPERLIPIDEMIA, UNSPECIFIED: ICD-10-CM

## 2019-01-25 DIAGNOSIS — Z96.611 PRESENCE OF RIGHT ARTIFICIAL SHOULDER JOINT: Chronic | ICD-10-CM

## 2019-01-25 DIAGNOSIS — I10 ESSENTIAL (PRIMARY) HYPERTENSION: ICD-10-CM

## 2019-01-25 DIAGNOSIS — Z96.642 PRESENCE OF LEFT ARTIFICIAL HIP JOINT: Chronic | ICD-10-CM

## 2019-01-25 DIAGNOSIS — Z87.81 PERSONAL HISTORY OF (HEALED) TRAUMATIC FRACTURE: Chronic | ICD-10-CM

## 2019-01-25 DIAGNOSIS — Z90.49 ACQUIRED ABSENCE OF OTHER SPECIFIED PARTS OF DIGESTIVE TRACT: Chronic | ICD-10-CM

## 2019-01-25 DIAGNOSIS — Z29.9 ENCOUNTER FOR PROPHYLACTIC MEASURES, UNSPECIFIED: ICD-10-CM

## 2019-01-25 DIAGNOSIS — F32.9 MAJOR DEPRESSIVE DISORDER, SINGLE EPISODE, UNSPECIFIED: ICD-10-CM

## 2019-01-25 LAB
ALBUMIN SERPL ELPH-MCNC: 2.2 G/DL — LOW (ref 3.3–5)
ALP SERPL-CCNC: 176 U/L — HIGH (ref 40–120)
ALT FLD-CCNC: 59 U/L — SIGNIFICANT CHANGE UP (ref 12–78)
ANION GAP SERPL CALC-SCNC: 10 MMOL/L — SIGNIFICANT CHANGE UP (ref 5–17)
APPEARANCE UR: CLEAR — SIGNIFICANT CHANGE UP
APTT BLD: 27.6 SEC — SIGNIFICANT CHANGE UP (ref 27.5–36.3)
AST SERPL-CCNC: 67 U/L — HIGH (ref 15–37)
BASE EXCESS BLDV CALC-SCNC: 2.6 MMOL/L — HIGH (ref -2–2)
BASOPHILS # BLD AUTO: 0.04 K/UL — SIGNIFICANT CHANGE UP (ref 0–0.2)
BASOPHILS NFR BLD AUTO: 0.3 % — SIGNIFICANT CHANGE UP (ref 0–2)
BILIRUB SERPL-MCNC: 1.2 MG/DL — SIGNIFICANT CHANGE UP (ref 0.2–1.2)
BILIRUB UR-MCNC: ABNORMAL
BUN SERPL-MCNC: 22 MG/DL — SIGNIFICANT CHANGE UP (ref 7–23)
CALCIUM SERPL-MCNC: 9.3 MG/DL — SIGNIFICANT CHANGE UP (ref 8.5–10.1)
CHLORIDE SERPL-SCNC: 92 MMOL/L — LOW (ref 96–108)
CO2 SERPL-SCNC: 25 MMOL/L — SIGNIFICANT CHANGE UP (ref 22–31)
COLOR SPEC: YELLOW — SIGNIFICANT CHANGE UP
CREAT SERPL-MCNC: 0.85 MG/DL — SIGNIFICANT CHANGE UP (ref 0.5–1.3)
CRP SERPL-MCNC: 19.66 MG/DL — HIGH (ref 0–0.4)
DIFF PNL FLD: ABNORMAL
EOSINOPHIL # BLD AUTO: 0.14 K/UL — SIGNIFICANT CHANGE UP (ref 0–0.5)
EOSINOPHIL NFR BLD AUTO: 1.2 % — SIGNIFICANT CHANGE UP (ref 0–6)
ERYTHROCYTE [SEDIMENTATION RATE] IN BLOOD: 107 MM/HR — HIGH (ref 0–20)
FLU A RESULT: SIGNIFICANT CHANGE UP
FLU A RESULT: SIGNIFICANT CHANGE UP
FLUAV AG NPH QL: SIGNIFICANT CHANGE UP
FLUBV AG NPH QL: SIGNIFICANT CHANGE UP
GLUCOSE SERPL-MCNC: 117 MG/DL — HIGH (ref 70–99)
GLUCOSE UR QL: NEGATIVE — SIGNIFICANT CHANGE UP
HCO3 BLDV-SCNC: 26 MMOL/L — SIGNIFICANT CHANGE UP (ref 21–29)
HCT VFR BLD CALC: 30 % — LOW (ref 39–50)
HGB BLD-MCNC: 9.7 G/DL — LOW (ref 13–17)
HOROWITZ INDEX BLDV+IHG-RTO: 21 — SIGNIFICANT CHANGE UP
IMM GRANULOCYTES NFR BLD AUTO: 0.8 % — SIGNIFICANT CHANGE UP (ref 0–1.5)
INR BLD: 1.52 RATIO — HIGH (ref 0.88–1.16)
KETONES UR-MCNC: NEGATIVE — SIGNIFICANT CHANGE UP
LACTATE SERPL-SCNC: 2.1 MMOL/L — HIGH (ref 0.7–2)
LEUKOCYTE ESTERASE UR-ACNC: ABNORMAL
LYMPHOCYTES # BLD AUTO: 0.58 K/UL — LOW (ref 1–3.3)
LYMPHOCYTES # BLD AUTO: 4.9 % — LOW (ref 13–44)
MCHC RBC-ENTMCNC: 27.8 PG — SIGNIFICANT CHANGE UP (ref 27–34)
MCHC RBC-ENTMCNC: 32.3 GM/DL — SIGNIFICANT CHANGE UP (ref 32–36)
MCV RBC AUTO: 86 FL — SIGNIFICANT CHANGE UP (ref 80–100)
MONOCYTES # BLD AUTO: 0.79 K/UL — SIGNIFICANT CHANGE UP (ref 0–0.9)
MONOCYTES NFR BLD AUTO: 6.6 % — SIGNIFICANT CHANGE UP (ref 2–14)
NEUTROPHILS # BLD AUTO: 10.23 K/UL — HIGH (ref 1.8–7.4)
NEUTROPHILS NFR BLD AUTO: 86.2 % — HIGH (ref 43–77)
NITRITE UR-MCNC: NEGATIVE — SIGNIFICANT CHANGE UP
NT-PROBNP SERPL-SCNC: 560 PG/ML — HIGH (ref 0–125)
PCO2 BLDV: 42 MMHG — SIGNIFICANT CHANGE UP (ref 35–50)
PH BLDV: 7.42 — SIGNIFICANT CHANGE UP (ref 7.35–7.45)
PH UR: 5 — SIGNIFICANT CHANGE UP (ref 5–8)
PLATELET # BLD AUTO: 371 K/UL — SIGNIFICANT CHANGE UP (ref 150–400)
PO2 BLDV: 50 MMHG — HIGH (ref 25–45)
POTASSIUM SERPL-MCNC: 4.3 MMOL/L — SIGNIFICANT CHANGE UP (ref 3.5–5.3)
POTASSIUM SERPL-SCNC: 4.3 MMOL/L — SIGNIFICANT CHANGE UP (ref 3.5–5.3)
PROCALCITONIN SERPL-MCNC: 0.46 NG/ML — HIGH (ref 0–0.04)
PROT SERPL-MCNC: 7.2 G/DL — SIGNIFICANT CHANGE UP (ref 6–8.3)
PROT UR-MCNC: 25 MG/DL
PROTHROM AB SERPL-ACNC: 17.4 SEC — HIGH (ref 10–12.9)
RBC # BLD: 3.49 M/UL — LOW (ref 4.2–5.8)
RBC # FLD: 14.7 % — HIGH (ref 10.3–14.5)
RSV RESULT: SIGNIFICANT CHANGE UP
RSV RNA RESP QL NAA+PROBE: SIGNIFICANT CHANGE UP
SAO2 % BLDV: 82 % — SIGNIFICANT CHANGE UP (ref 67–88)
SODIUM SERPL-SCNC: 127 MMOL/L — LOW (ref 135–145)
SP GR SPEC: 1.02 — SIGNIFICANT CHANGE UP (ref 1.01–1.02)
TROPONIN I SERPL-MCNC: <.015 NG/ML — SIGNIFICANT CHANGE UP (ref 0.01–0.04)
UROBILINOGEN FLD QL: 4
WBC # BLD: 11.88 K/UL — HIGH (ref 3.8–10.5)
WBC # FLD AUTO: 11.88 K/UL — HIGH (ref 3.8–10.5)

## 2019-01-25 PROCEDURE — 71046 X-RAY EXAM CHEST 2 VIEWS: CPT | Mod: 26

## 2019-01-25 PROCEDURE — 99285 EMERGENCY DEPT VISIT HI MDM: CPT

## 2019-01-25 PROCEDURE — 71275 CT ANGIOGRAPHY CHEST: CPT | Mod: 26

## 2019-01-25 PROCEDURE — 73502 X-RAY EXAM HIP UNI 2-3 VIEWS: CPT | Mod: 26,LT

## 2019-01-25 PROCEDURE — 93010 ELECTROCARDIOGRAM REPORT: CPT

## 2019-01-25 PROCEDURE — 99223 1ST HOSP IP/OBS HIGH 75: CPT | Mod: GC,AI

## 2019-01-25 RX ORDER — LOVASTATIN 20 MG
1 TABLET ORAL
Qty: 0 | Refills: 0 | COMMUNITY

## 2019-01-25 RX ORDER — FINASTERIDE 5 MG/1
5 TABLET, FILM COATED ORAL DAILY
Qty: 0 | Refills: 0 | Status: DISCONTINUED | OUTPATIENT
Start: 2019-01-25 | End: 2019-02-04

## 2019-01-25 RX ORDER — SENNA PLUS 8.6 MG/1
2 TABLET ORAL AT BEDTIME
Qty: 0 | Refills: 0 | Status: DISCONTINUED | OUTPATIENT
Start: 2019-01-25 | End: 2019-02-04

## 2019-01-25 RX ORDER — ALPRAZOLAM 0.25 MG
0.5 TABLET ORAL AT BEDTIME
Qty: 0 | Refills: 0 | Status: DISCONTINUED | OUTPATIENT
Start: 2019-01-25 | End: 2019-01-26

## 2019-01-25 RX ORDER — OXYCODONE HYDROCHLORIDE 5 MG/1
5 TABLET ORAL EVERY 4 HOURS
Qty: 0 | Refills: 0 | Status: DISCONTINUED | OUTPATIENT
Start: 2019-01-25 | End: 2019-02-01

## 2019-01-25 RX ORDER — LINEZOLID 600 MG/300ML
600 INJECTION, SOLUTION INTRAVENOUS ONCE
Qty: 0 | Refills: 0 | Status: COMPLETED | OUTPATIENT
Start: 2019-01-25 | End: 2019-01-25

## 2019-01-25 RX ORDER — MEROPENEM 1 G/30ML
INJECTION INTRAVENOUS
Qty: 0 | Refills: 0 | Status: DISCONTINUED | OUTPATIENT
Start: 2019-01-25 | End: 2019-01-28

## 2019-01-25 RX ORDER — FLUOXETINE HCL 10 MG
60 CAPSULE ORAL DAILY
Qty: 0 | Refills: 0 | Status: DISCONTINUED | OUTPATIENT
Start: 2019-01-25 | End: 2019-02-04

## 2019-01-25 RX ORDER — POTASSIUM CHLORIDE 20 MEQ
20 PACKET (EA) ORAL DAILY
Qty: 0 | Refills: 0 | Status: DISCONTINUED | OUTPATIENT
Start: 2019-01-25 | End: 2019-02-04

## 2019-01-25 RX ORDER — FLUOXETINE HCL 10 MG
3 CAPSULE ORAL
Qty: 0 | Refills: 0 | COMMUNITY

## 2019-01-25 RX ORDER — POTASSIUM CHLORIDE 20 MEQ
1 PACKET (EA) ORAL
Qty: 0 | Refills: 0 | COMMUNITY

## 2019-01-25 RX ORDER — ALBUTEROL 90 UG/1
2 AEROSOL, METERED ORAL EVERY 6 HOURS
Qty: 0 | Refills: 0 | Status: DISCONTINUED | OUTPATIENT
Start: 2019-01-25 | End: 2019-02-04

## 2019-01-25 RX ORDER — LINEZOLID 600 MG/300ML
600 INJECTION, SOLUTION INTRAVENOUS EVERY 12 HOURS
Qty: 0 | Refills: 0 | Status: DISCONTINUED | OUTPATIENT
Start: 2019-01-26 | End: 2019-01-26

## 2019-01-25 RX ORDER — ASPIRIN/CALCIUM CARB/MAGNESIUM 324 MG
81 TABLET ORAL DAILY
Qty: 0 | Refills: 0 | Status: DISCONTINUED | OUTPATIENT
Start: 2019-01-25 | End: 2019-02-04

## 2019-01-25 RX ORDER — TAMSULOSIN HYDROCHLORIDE 0.4 MG/1
0.4 CAPSULE ORAL AT BEDTIME
Qty: 0 | Refills: 0 | Status: DISCONTINUED | OUTPATIENT
Start: 2019-01-25 | End: 2019-02-04

## 2019-01-25 RX ORDER — TAMSULOSIN HYDROCHLORIDE 0.4 MG/1
1 CAPSULE ORAL
Qty: 0 | Refills: 0 | COMMUNITY

## 2019-01-25 RX ORDER — DOCUSATE SODIUM 100 MG
100 CAPSULE ORAL DAILY
Qty: 0 | Refills: 0 | Status: DISCONTINUED | OUTPATIENT
Start: 2019-01-25 | End: 2019-02-04

## 2019-01-25 RX ORDER — FLUOXETINE HCL 10 MG
1 CAPSULE ORAL
Qty: 0 | Refills: 0 | COMMUNITY

## 2019-01-25 RX ORDER — MEROPENEM 1 G/30ML
1000 INJECTION INTRAVENOUS EVERY 8 HOURS
Qty: 0 | Refills: 0 | Status: DISCONTINUED | OUTPATIENT
Start: 2019-01-25 | End: 2019-01-28

## 2019-01-25 RX ORDER — ENOXAPARIN SODIUM 100 MG/ML
40 INJECTION SUBCUTANEOUS EVERY 24 HOURS
Qty: 0 | Refills: 0 | Status: DISCONTINUED | OUTPATIENT
Start: 2019-01-25 | End: 2019-02-04

## 2019-01-25 RX ORDER — ATORVASTATIN CALCIUM 80 MG/1
10 TABLET, FILM COATED ORAL AT BEDTIME
Qty: 0 | Refills: 0 | Status: DISCONTINUED | OUTPATIENT
Start: 2019-01-25 | End: 2019-02-04

## 2019-01-25 RX ORDER — ALBUTEROL 90 UG/1
2 AEROSOL, METERED ORAL EVERY 6 HOURS
Qty: 0 | Refills: 0 | Status: DISCONTINUED | OUTPATIENT
Start: 2019-01-25 | End: 2019-01-25

## 2019-01-25 RX ORDER — ALPRAZOLAM 0.25 MG
1 TABLET ORAL
Qty: 0 | Refills: 0 | COMMUNITY

## 2019-01-25 RX ORDER — SODIUM CHLORIDE 9 MG/ML
2600 INJECTION INTRAMUSCULAR; INTRAVENOUS; SUBCUTANEOUS ONCE
Qty: 0 | Refills: 0 | Status: COMPLETED | OUTPATIENT
Start: 2019-01-25 | End: 2019-01-25

## 2019-01-25 RX ORDER — LINEZOLID 600 MG/300ML
INJECTION, SOLUTION INTRAVENOUS
Qty: 0 | Refills: 0 | Status: DISCONTINUED | OUTPATIENT
Start: 2019-01-25 | End: 2019-01-26

## 2019-01-25 RX ORDER — ASCORBIC ACID 60 MG
500 TABLET,CHEWABLE ORAL DAILY
Qty: 0 | Refills: 0 | Status: DISCONTINUED | OUTPATIENT
Start: 2019-01-25 | End: 2019-02-04

## 2019-01-25 RX ORDER — FINASTERIDE 5 MG/1
1 TABLET, FILM COATED ORAL
Qty: 0 | Refills: 0 | COMMUNITY

## 2019-01-25 RX ORDER — CYCLOBENZAPRINE HYDROCHLORIDE 10 MG/1
10 TABLET, FILM COATED ORAL
Qty: 0 | Refills: 0 | Status: DISCONTINUED | OUTPATIENT
Start: 2019-01-25 | End: 2019-02-04

## 2019-01-25 RX ORDER — OXYCODONE HYDROCHLORIDE 5 MG/1
10 TABLET ORAL EVERY 4 HOURS
Qty: 0 | Refills: 0 | Status: DISCONTINUED | OUTPATIENT
Start: 2019-01-25 | End: 2019-01-31

## 2019-01-25 RX ORDER — ACETAMINOPHEN 500 MG
650 TABLET ORAL EVERY 6 HOURS
Qty: 0 | Refills: 0 | Status: DISCONTINUED | OUTPATIENT
Start: 2019-01-25 | End: 2019-02-04

## 2019-01-25 RX ORDER — MEROPENEM 1 G/30ML
1000 INJECTION INTRAVENOUS ONCE
Qty: 0 | Refills: 0 | Status: COMPLETED | OUTPATIENT
Start: 2019-01-25 | End: 2019-01-25

## 2019-01-25 RX ORDER — OXYCODONE HYDROCHLORIDE 5 MG/1
10 TABLET ORAL EVERY 4 HOURS
Qty: 0 | Refills: 0 | Status: DISCONTINUED | OUTPATIENT
Start: 2019-01-25 | End: 2019-01-25

## 2019-01-25 RX ORDER — ALENDRONATE SODIUM 70 MG/1
1 TABLET ORAL
Qty: 0 | Refills: 0 | COMMUNITY

## 2019-01-25 RX ADMIN — MEROPENEM 100 MILLIGRAM(S): 1 INJECTION INTRAVENOUS at 22:18

## 2019-01-25 RX ADMIN — OXYCODONE HYDROCHLORIDE 10 MILLIGRAM(S): 5 TABLET ORAL at 18:21

## 2019-01-25 RX ADMIN — MEROPENEM 100 MILLIGRAM(S): 1 INJECTION INTRAVENOUS at 15:20

## 2019-01-25 RX ADMIN — SODIUM CHLORIDE 2600 MILLILITER(S): 9 INJECTION INTRAMUSCULAR; INTRAVENOUS; SUBCUTANEOUS at 12:15

## 2019-01-25 RX ADMIN — ATORVASTATIN CALCIUM 10 MILLIGRAM(S): 80 TABLET, FILM COATED ORAL at 21:44

## 2019-01-25 RX ADMIN — SENNA PLUS 2 TABLET(S): 8.6 TABLET ORAL at 21:44

## 2019-01-25 RX ADMIN — SODIUM CHLORIDE 2600 MILLILITER(S): 9 INJECTION INTRAMUSCULAR; INTRAVENOUS; SUBCUTANEOUS at 10:44

## 2019-01-25 RX ADMIN — LINEZOLID 300 MILLIGRAM(S): 600 INJECTION, SOLUTION INTRAVENOUS at 18:19

## 2019-01-25 RX ADMIN — Medication 20 MILLIGRAM(S): at 18:20

## 2019-01-25 RX ADMIN — ALBUTEROL 2 PUFF(S): 90 AEROSOL, METERED ORAL at 21:44

## 2019-01-25 RX ADMIN — Medication 110 MILLIGRAM(S): at 11:38

## 2019-01-25 RX ADMIN — Medication 100 MILLIGRAM(S): at 12:38

## 2019-01-25 RX ADMIN — TAMSULOSIN HYDROCHLORIDE 0.4 MILLIGRAM(S): 0.4 CAPSULE ORAL at 21:44

## 2019-01-25 NOTE — H&P ADULT - PROBLEM SELECTOR PLAN 8
Lovenox 40 mg subq  IMPROVE VTE Individual Risk Assessment    RISK                                                                Points  Lovenox 40 mg subq  [  ] Previous VTE                                                  3  [  ] Thrombophilia                                               2  [ X ] Lower limb paralysis                                      2        (unable to hold up >15 seconds)    [  ] Current Cancer                                              2         (within 6 months)  [  ] Immobilization > 24 hrs                                1  [  ] ICU/CCU stay > 24 hours                              1  [ X ] Age > 60                                                      1    IMPROVE VTE Score 3

## 2019-01-25 NOTE — H&P ADULT - PROBLEM SELECTOR PLAN 2
-CTA unclear if worsening fibrosis vs. HCAP. Based on clinical picture of worsening cough and associated SOB, cannot rule out MRSA lung infection and will cover empirically. Bronchoscopy not warranted at this time but will follow up with ID and Pulmonlogy for further recommendations  -F/U ID  -F/U Pulm -CTA unclear if worsening fibrosis vs. HCAP. Based on clinical picture of worsening cough and associated SOB, cannot rule out MRSA lung infection and will cover empirically with Meropenem and linezolid Bronchoscopy not warranted at this time but will follow up with ID and Pulmonlogy for further recommendations. No objection to steroids now as per ID.   -Chest PT  -F/U Urine legionella, strep  -F/U MRSA nares swab  -F/U ID  -F/U Pulm

## 2019-01-25 NOTE — ED ADULT NURSE NOTE - OBJECTIVE STATEMENT
a/ox4 patient came to ED c/o shortness of breath over the past 4-5 days. Denies fevers, N/V/D. Patient recently completed 21 days of ABT on Monday 2/2 extensive left hip infection and since then has had decreased appetite and SOB. Left hip drain recently removed by MD Whipple - patient was sent to XRAY after c/o SOB and xray showed PNA. Recommended patient be seen in the hospital.

## 2019-01-25 NOTE — ED PROVIDER NOTE - OBJECTIVE STATEMENT
73 year old male with history of RA, OA, fibromyalgia, HTN, HLD, COPD, back surgery, shoulder and hip surgery, CAD (with stent and triple bypass) presents with shortness of breath the past 4-5 days and redness to right hip. patient fell on Nov 19th and had left hip surgery. Then sent to rehab and developed an infection to left hip and returned. had another surgery to wash out the infection and another repair to left hip. drain was placed and was put on 21 days of daptomycin. finished last dose on Monday (4 days ago). has not been eating and drinking much (loss of appetite and believes antibiotics put a bad taste in mouth). has been weak for the past couple months, but worse the past week. hard time ambulating due to weakness. drain in hip was leaking so was seen by Dr. Whipple a couple days ago and drain was pulled out. was concerned about the shortness of breath so sent to ID specialist and seen in office yesterday. Was called yesterday and told his CXR showed pneumonia and recommended to come to hospital. did not come last night because he was too tired. denies chest pain. no fevers. reports increased redness to left hip that was not present yesterday     PCP - in [place]  reina Whipple

## 2019-01-25 NOTE — H&P ADULT - NSHPSOCIALHISTORY_GEN_ALL_CORE
Patient is from [place] but currently visiting daughter since Thanksgiving -- had to stay in [place] due to hip complications. Quit smoking 6 months ago but 50 ppd hx. Denies Etoh and drugs. 1 dog at home. Ambulates via wheelchair mostly since hip injury with occasional walker Patient is from [place] but currently visiting daughter since Thanksgiving -- had to stay in [place] due to hip complications. Quit smoking 6 months ago but 50 ppd hx. Denies Etoh and drugs. 1 dog at home. Ambulates via wheelchair mostly since hip injury with occasional walker    -No flu shot this year, and did not receive pneumococcal vaccines Patient is from [place] but currently visiting daughter since Thanksgiving -- had to stay in [place] due to hip complications. Quit smoking 6 months ago but 50 pack year hx. Denies Etoh and drugs. 1 dog at home. Ambulates via wheelchair mostly since hip injury with occasional walker    -No flu shot this year, and did not receive pneumococcal vaccines

## 2019-01-25 NOTE — H&P ADULT - PROBLEM SELECTOR PLAN 1
-Likely due to ongoing hip infection with concern for VRE given last culture and 21 day treatment of daptomycin. Will start vinezolid under the guidance of ID and follow up their recommendations.  -S/P 1 dose doxy in ED  - -S/p washout and 21 day treatment with daptomycin. Last cultures revealed VRE. Will start vinezolid under the guidance of ID and follow up their recommendations.  -S/P 1 dose doxy in ED -S/p washout and 21 day treatment with daptomycin. Last cultures revealed VRE. Will start vinezolid under the guidance of ID and follow up their recommendations.  -S/P 1 dose doxy in ED  -Start Meropenem and vinezolid  -Cyclobenzaprine for cramps  -Oxy 5 mg Q4H for mod pain  -Oxy 10 mg Q4H for severe -S/p washout and 21 day treatment with daptomycin. Last cultures revealed VRE. Will start vinezolid under the guidance of ID and follow up their recommendations.  -S/P 1 dose doxy in ED  -Start Vinezolid  -Cyclobenzaprine for cramps  -Oxy 5 mg Q4H for mod pain  -Oxy 10 mg Q4H for severe -S/p washout and 21 day treatment with daptomycin. Last cultures revealed VRE. Will start linezolid under the guidance of ID and follow up their recommendations.  -S/P 1 dose doxy in ED  -Start linezolid  -Cyclobenzaprine for cramps  -Oxy 5 mg Q4H for mod pain  -Oxy 10 mg Q4H for severe

## 2019-01-25 NOTE — PROGRESS NOTE ADULT - ASSESSMENT
Given acute change in respiratory symptoms, and no clear/convincing evidence of CHF, concerned about potential pneumonia.  Also now with likely infected seroma again.  Deep infection involving hardware remains in DDx but has not been demonstrated as of yet.     Suggestions--  F/U blood cx data  Ortho re-eval  Plastics re-eval-- I discussed with Dr. Whipple  Linezolid vs VRE, aslo has MRSA activity  Meropenem vs. pneumonia for now  No objection to steroids if needed  If does not improve may need bronchoscopy but comfortable with empiric therapy.  D/W patient  D/W housestaff  D/W family at bedside    Call if ID input needed over the weekend, Dr. Lopez Gaitan is on call.    Thank you for the courtesy of this referral.    Fabian Childs MD  [phone]

## 2019-01-25 NOTE — CONSULT NOTE ADULT - PROBLEM SELECTOR RECOMMENDATION 9
eval HCAP - ID eval - on broad spectrum ABX - consideration for MRSA  biomarkers  HFpEF stage I and pulm HTN  COPD - quit tobacco - 1 year ago - proventil Inhaler ATC and Prednisone 20 mg PO BID  pt has RA - ct chest reviewed - may have ILD related to CTD  assess for aspiration - check Speech and Swallow   HOB elev  o2 support  VBG noted  pt has PRO - does not want to use CPAP  optimize cvs regimen and BP control  work up under way - Hip wound care, local and systemic, PT if able to tolerate  prognosis guarded  will follow and monitor

## 2019-01-25 NOTE — PROGRESS NOTE ADULT - SUBJECTIVE AND OBJECTIVE BOX
[facility], Division of Infectious Diseases  GAETANO Briones A. Lee  [phone]    Name: ERIKA TORREZ  Age: 73y  Gender: Male  MRN: [id number]    Interval History--  Seen yesterday in office, at that time complained of shortness of breath for 4 days along with some orthopnea. Patient had also seen plastic surgery earlier that day as had some reaccumulation of fluid as drain was dysfunctional and removed. Had sent patient for CXR, got a call from radiologist that patient had diffuse left sided infiltrates. Spoke with his wife, told them to go to the ER but he "just couldn't" and came to the ED today instead. CTA here with pulm fibrosis-like changes, unable to exclude pneumonia, no central PE. Area on thigh also became much more fluctuant, and redness (absent yesterday) has steadily increased today.    Past Medical History--  S/P CABG x 3  S/P hip replacement, left  Rheumatoid arthritis  Osteoarthritis  COPD (chronic obstructive pulmonary disease)  HTN (hypertension)  S/P lumbar fusion  History of femur fracture  History of appendectomy  History of right shoulder replacement  History of total left hip arthroplasty      For details regarding the patient's social history, family history, and other miscellaneous elements, please refer the initial infectious diseases consultation and/or the admitting history and physical examination for this admission.    Allergies    Ceftin (Pruritus)    Intolerances        Medications--  Antibiotics:    Immunologic:    Other:  enoxaparin Injectable      Review of Systems--  A 10-point review of systems was obtained.   Review of systems otherwise unchanged compared to prior visit except as previously noted.    Physical Examination--  Vital Signs: T(F): 97.9 (01-25-19 @ 12:50), Max: 97.9 (01-25-19 @ 12:50)  HR: 88 (01-25-19 @ 12:50)  BP: 118/72 (01-25-19 @ 12:50)  RR: 18 (01-25-19 @ 12:50)  SpO2: 96% (01-25-19 @ 12:50)  Wt(kg): --  General: Nontoxic-appearing Male in no acute distress.  HEENT: AT/NC. Anicteric. Conjunctiva pink and moist. Oropharynx clear.   Neck: Not rigid. No sense of mass.  Nodes: None palpable.  Lungs: Diminished breath sounds bilaterally with scattered bilateral rhonchi  Heart: Regular rate and rhythm. No Murmur. No rub. No gallop. No palpable thrill.  Abdomen: Bowel sounds present and normoactive. Soft. Nondistended. Nontender. No sense of mass. No organomegaly. Obese.   Extremities: No cyanosis or clubbing. L thigh fluctant, red, warm, tender  Skin: Warm. Dry. Good turgor. No rash. No vasculitic stigmata.  Psychiatric: Appropriate affect and mood for situation.         Laboratory Studies--  CBC                        9.7    11.88 )-----------( 371      ( 25 Jan 2019 10:39 )             30.0       Chemistries  01-25    127<L>  |  92<L>  |  22  ----------------------------<  117<H>  4.3   |  25  |  0.85    Ca    9.3      25 Jan 2019 10:39    TPro  7.2  /  Alb  2.2<L>  /  TBili  1.2  /  DBili  x   /  AST  67<H>  /  ALT  59  /  AlkPhos  176<H>  01-25      Culture Data  None    < from: CT Angio Chest w/ IV Cont (01.25.19 @ 12:26) >  IMPRESSION:    1. Suboptimal opacification of the pulmonary arterial vessels. No central   pulmonary embolism. Evaluation for segmental and subsegmental pulmonary   arterial embolism is limited.    2. Worsening fibrotic changes throughout both lungs. Superimposed   pneumonia is difficult to exclude. Correlate clinically.    3. Unchanged aneurysmal dilatation of the ascending aorta measuring up to   4.6 cm.    < end of copied text >

## 2019-01-25 NOTE — ED PROVIDER NOTE - ATTENDING CONTRIBUTION TO CARE
I have personally performed a face to face diagnostic evaluation on this patient.  I have reviewed the PA note and agree with the history, exam, and plan of care, except as noted.  History and Exam by me shows  referral from Amadeo for progressive sob for 4-5 days and redness to right hip.  lungs- cta bl, lab, cta sig for elevated  wbc and lactate.  cxr ? pneumonia, cta chest neg for pe

## 2019-01-25 NOTE — H&P ADULT - NSHPPHYSICALEXAM_GEN_ALL_CORE
Vital Signs Last 24 Hrs  T(C): 36.6 (25 Jan 2019 12:50), Max: 36.6 (25 Jan 2019 12:50)  T(F): 97.9 (25 Jan 2019 12:50), Max: 97.9 (25 Jan 2019 12:50)  HR: 88 (25 Jan 2019 12:50) (88 - 112)  BP: 118/72 (25 Jan 2019 12:50) (100/67 - 118/72)  RR: 18 (25 Jan 2019 12:50) (18 - 18)  SpO2: 96% (25 Jan 2019 12:50) (92% - 96%)    Physical Exam:  General: Well developed, well nourished, No Acute Distress  HEENT: Normocephallic Atraumatic, PERRLA, EOMI bl, moist mucous membranes   Neck: Supple, nontender, no mass  Neurology: AA&Ox3, UE and LE muscle strength 5/5 b/l  Respiratory: Coarse breath sounds b/l, L apical fine crackles  CV: Regular Rate and Rhythm, +S1/S2, no murmurs, rubs or gallops  Abdominal: Soft, Non-Tender, Non-Distended +Bowel Soundsx4  Extremities: No calf edema  Musculoskeletal: L hip appears erythematous with palpable fluctuance extending from greater trochanter to mid femur. Warm to touch.  Skin: warm, dry

## 2019-01-25 NOTE — ED ADULT NURSE NOTE - CHPI ED NUR SYMPTOMS NEG
no chest pain/no chills/no fever/no headache/no wheezing/no edema/no body aches/no hemoptysis/no cough

## 2019-01-25 NOTE — ED PROVIDER NOTE - PROGRESS NOTE DETAILS
Spoke with Dr. Valente. will see patient in ED. recommends CTA to also eval for PE if Cr normal. Spoke with Dr. Wills (ID). will see patient in hospital spoke with ortho resident and will see patient in ED  left message with Dr. Whipple to inform patient is in ED Spoke with hospitalist. Dr. Itzel Faulkner who kindly accepts patient for admission

## 2019-01-25 NOTE — H&P ADULT - HISTORY OF PRESENT ILLNESS
72 yo M PMHx of CHF (stage I diastolic dysfunction, EF 65%) COPD (diagnosed "a few years ago, quit smoking then), HTN, HLD, hx of MI and CABG (2008) (stents also placed per chart review), RA (on Medrol daily), hx of multiple L hip surgeries presenting to the ED with SOB and L hip pain. Patient's wife and daughter present to assist with history. Of note, patient had hip replacement 30 years ago in [place] and an ORIF of periprosthetic fracture on 11/2018 w/ Dr. Ramirez which was complicated with superficial infection requiring ID and PICC line antibiotics. 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 Pt. states his surgical wound drain fell out 4 days ago, and under the advisement of his plastic surgeon Dr. Whipple he kept it out. Patient developed worsening SOB over the past few days in addition to cough when he followed up with Dr. Richardson 2 days ago who removed fluid around the hip and recommended he see Dr. Wills for his URI. Outpatient CXR he received yesterday demonstrated PNA which brought him to the ED today as he wasn't able to come yesterday. He denies recent fever, chills, weight changes but admits to fatigue and a decrease in appetite he ascribes to the daptomycin. Denies F/U/D.     In the ED:   VS wnl, , WBC 11.88, H/H 9.7/30 lactate 2.1  Na: 127, K: 4.3 Cl: 93  BUN/cr : 22/0.85 Alphos: 176 AST: 67 ALT: 59 trops <0.015 BNP: 560 (appears baseline)  ABG: No acute acidosis/alkalosis UA: negative 74 yo M PMHx of CHF (stage I diastolic dysfunction, EF 65%) COPD (diagnosed "a few years ago, quit smoking then), HTN, HLD, hx of MI and CABG (2008) (stents also placed per chart review), RA (on Medrol daily), hx of multiple L hip surgeries presenting to the ED with SOB and L hip pain. Patient's wife and daughter present to assist with history. Of note, patient had hip replacement 30 years ago in [place] and an ORIF of periprosthetic fracture on 11/2018 w/ Dr. Ramirez which was complicated by superficial infection requiring ID evaluation and PICC line antibiotics. 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 Pt. states his surgical wound drain fell out 4 days ago, and under the advisement of his plastic surgeon Dr. Whipple he kept it out. Patient developed worsening SOB over the past few days in addition to cough when he followed up with Dr. Richardson 2 days ago who removed fluid around the hip and recommended he see Dr. Wills for his URI. Outpatient CXR he received yesterday demonstrated PNA which brought him to the ED today as he wasn't able to come yesterday. He denies recent fever, chills, weight changes but admits to fatigue and a decrease in appetite he ascribes to the daptomycin. Denies F/U/D.     In the ED:   VS wnl, , WBC 11.88, H/H 9.7/30 lactate 2.1  Na: 127, K: 4.3 Cl: 93  BUN/cr : 22/0.85 Alphos: 176 AST: 67 ALT: 59 trops <0.015 BNP: 560 (appears baseline)  ABG: No acute acidosis/alkalosis UA: negative

## 2019-01-25 NOTE — ED PROVIDER NOTE - MEDICAL DECISION MAKING DETAILS
shortness of breath past 4-5 days. concerns for pneumonia. left hip also redness and swollen. will order CBC, CMP, lactate, blood cultures, CXR, EKG, troponin, BNP, sed rate, CRP, and x-ray of left hip.  cultures reviewed and will start IV doxycycline. previous cultures resistant to vanco. will likely need to be admitted

## 2019-01-25 NOTE — H&P ADULT - NSHPREVIEWOFSYSTEMS_GEN_ALL_CORE
Constitutional: denies fever, chills, diaphoresis   HEENT: denies blurry vision, double vision, eye pain  Respiratory: Admits SOB, cough, sputum production  Cardiovascular: denies CP, palpitations, edema  Gastrointestinal: denies nausea, vomiting, diarrhea, constipation, abdominal pain  Genitourinary: denies dysuria, frequency, urgency, hematuria   Musculoskeletal: Admits joint pain, myalgias  Neurologic: denies headache, weakness, dizziness, paresthesias, numbness/tingling  Hematology/Oncology: Admits bruising

## 2019-01-25 NOTE — ED ADULT NURSE NOTE - NSIMPLEMENTINTERV_GEN_ALL_ED
Implemented All Fall with Harm Risk Interventions:  [place] to call system. Call bell, personal items and telephone within reach. Instruct patient to call for assistance. Room bathroom lighting operational. Non-slip footwear when patient is off stretcher. Physically safe environment: no spills, clutter or unnecessary equipment. Stretcher in lowest position, wheels locked, appropriate side rails in place. Provide visual cue, wrist band, yellow gown, etc. Monitor gait and stability. Monitor for mental status changes and reorient to person, place, and time. Review medications for side effects contributing to fall risk. Reinforce activity limits and safety measures with patient and family. Provide visual clues: red socks.

## 2019-01-25 NOTE — H&P ADULT - ASSESSMENT
72 yo M PMHx of CHF (stage I diastolic dysfunction, EF 65%) COPD (diagnosed "a few years ago, quit smoking then), HTN, HLD, hx of MI and CABG (2008) (stents also placed per chart review), RA (on Medrol daily), hx of multiple L hip surgeries presenting to the ED with SOB and L hip pain admitted for HCAP.

## 2019-01-25 NOTE — ED PROVIDER NOTE - CARE PLAN
Principal Discharge DX:	Pneumonia of left lung due to infectious organism, unspecified part of lung  Secondary Diagnosis:	Cellulitis of hip, left

## 2019-01-26 DIAGNOSIS — J18.9 PNEUMONIA, UNSPECIFIED ORGANISM: ICD-10-CM

## 2019-01-26 DIAGNOSIS — R78.81 BACTEREMIA: ICD-10-CM

## 2019-01-26 LAB
CULTURE RESULTS: SIGNIFICANT CHANGE UP
E COLI DNA BLD POS QL NAA+NON-PROBE: SIGNIFICANT CHANGE UP
GRAM STN FLD: SIGNIFICANT CHANGE UP
METHOD TYPE: SIGNIFICANT CHANGE UP
MRSA PCR RESULT.: SIGNIFICANT CHANGE UP
S AUREUS DNA NOSE QL NAA+PROBE: SIGNIFICANT CHANGE UP
SPECIMEN SOURCE: SIGNIFICANT CHANGE UP

## 2019-01-26 PROCEDURE — 99233 SBSQ HOSP IP/OBS HIGH 50: CPT

## 2019-01-26 RX ORDER — ALPRAZOLAM 0.25 MG
2 TABLET ORAL AT BEDTIME
Qty: 0 | Refills: 0 | Status: DISCONTINUED | OUTPATIENT
Start: 2019-01-26 | End: 2019-02-02

## 2019-01-26 RX ORDER — METHADONE HYDROCHLORIDE 40 MG/1
5 TABLET ORAL EVERY 8 HOURS
Qty: 0 | Refills: 0 | Status: DISCONTINUED | OUTPATIENT
Start: 2019-01-26 | End: 2019-01-26

## 2019-01-26 RX ORDER — METHADONE HYDROCHLORIDE 40 MG/1
5 TABLET ORAL EVERY 24 HOURS
Qty: 0 | Refills: 0 | Status: DISCONTINUED | OUTPATIENT
Start: 2019-01-26 | End: 2019-02-02

## 2019-01-26 RX ADMIN — MEROPENEM 100 MILLIGRAM(S): 1 INJECTION INTRAVENOUS at 14:10

## 2019-01-26 RX ADMIN — FINASTERIDE 5 MILLIGRAM(S): 5 TABLET, FILM COATED ORAL at 12:22

## 2019-01-26 RX ADMIN — ENOXAPARIN SODIUM 40 MILLIGRAM(S): 100 INJECTION SUBCUTANEOUS at 05:30

## 2019-01-26 RX ADMIN — Medication 100 MILLIGRAM(S): at 12:23

## 2019-01-26 RX ADMIN — MEROPENEM 100 MILLIGRAM(S): 1 INJECTION INTRAVENOUS at 21:52

## 2019-01-26 RX ADMIN — MEROPENEM 100 MILLIGRAM(S): 1 INJECTION INTRAVENOUS at 05:30

## 2019-01-26 RX ADMIN — ALBUTEROL 2 PUFF(S): 90 AEROSOL, METERED ORAL at 14:22

## 2019-01-26 RX ADMIN — Medication 20 MILLIEQUIVALENT(S): at 12:22

## 2019-01-26 RX ADMIN — SENNA PLUS 2 TABLET(S): 8.6 TABLET ORAL at 21:53

## 2019-01-26 RX ADMIN — Medication 20 MILLIGRAM(S): at 05:30

## 2019-01-26 RX ADMIN — Medication 81 MILLIGRAM(S): at 12:23

## 2019-01-26 RX ADMIN — ATORVASTATIN CALCIUM 10 MILLIGRAM(S): 80 TABLET, FILM COATED ORAL at 21:53

## 2019-01-26 RX ADMIN — Medication 60 MILLIGRAM(S): at 12:22

## 2019-01-26 RX ADMIN — Medication 500 MILLIGRAM(S): at 12:23

## 2019-01-26 RX ADMIN — Medication 2 MILLIGRAM(S): at 21:53

## 2019-01-26 RX ADMIN — TAMSULOSIN HYDROCHLORIDE 0.4 MILLIGRAM(S): 0.4 CAPSULE ORAL at 21:53

## 2019-01-26 RX ADMIN — LINEZOLID 300 MILLIGRAM(S): 600 INJECTION, SOLUTION INTRAVENOUS at 06:11

## 2019-01-26 RX ADMIN — METHADONE HYDROCHLORIDE 5 MILLIGRAM(S): 40 TABLET ORAL at 18:54

## 2019-01-26 RX ADMIN — ALBUTEROL 2 PUFF(S): 90 AEROSOL, METERED ORAL at 19:49

## 2019-01-26 RX ADMIN — Medication 20 MILLIGRAM(S): at 18:18

## 2019-01-26 RX ADMIN — CYCLOBENZAPRINE HYDROCHLORIDE 10 MILLIGRAM(S): 10 TABLET, FILM COATED ORAL at 21:07

## 2019-01-26 NOTE — PROVIDER CONTACT NOTE (CRITICAL VALUE NOTIFICATION) - TEST AND RESULT REPORTED:
Blood culture collected on Jan 25  Growth in aerobic bottle : gram negative rods  Growth in anaerobic bottle : Gram negative rods

## 2019-01-26 NOTE — PROGRESS NOTE ADULT - PROBLEM SELECTOR PLAN 2
will follow micro but continue current abx for now. will follow micro but no history of   MRSA and neg testing so will dc linezolid and isolation

## 2019-01-26 NOTE — PROGRESS NOTE ADULT - PROBLEM SELECTOR PLAN 1
pna  hcap  hip wound  bacteremia  ID following  on broad spectrum ABX  cx and labs and MRSA screen noted  cont Proventil for COPD  cont curr dose of steroids for COPD  assess need for Diuresis - pt has HFpEF  I and O  Wound care  skin care  ID follow up  Ortho follow up  pain assessment  will follow and monitor  prognosis guarded

## 2019-01-26 NOTE — PROGRESS NOTE ADULT - SUBJECTIVE AND OBJECTIVE BOX
INTERVAL HPI/OVERNIGHT EVENTS: Patient seen and examined at bedside. No acute events overnight.     MEDICATIONS  (STANDING):  ALBUTerol    90 MICROgram(s) HFA Inhaler 2 Puff(s) Inhalation every 6 hours  ALPRAZolam 2 milliGRAM(s) Oral at bedtime  ascorbic acid 500 milliGRAM(s) Oral daily  aspirin enteric coated 81 milliGRAM(s) Oral daily  atorvastatin 10 milliGRAM(s) Oral at bedtime  docusate sodium 100 milliGRAM(s) Oral daily  enoxaparin Injectable 40 milliGRAM(s) SubCutaneous every 24 hours  finasteride 5 milliGRAM(s) Oral daily  FLUoxetine 60 milliGRAM(s) Oral daily  meropenem  IVPB 1000 milliGRAM(s) IV Intermittent every 8 hours  meropenem  IVPB      methadone    Tablet 5 milliGRAM(s) Oral every 24 hours  potassium chloride    Tablet ER 20 milliEquivalent(s) Oral daily  predniSONE   Tablet 20 milliGRAM(s) Oral two times a day  senna 2 Tablet(s) Oral at bedtime  tamsulosin 0.4 milliGRAM(s) Oral at bedtime    MEDICATIONS  (PRN):  acetaminophen   Tablet .. 650 milliGRAM(s) Oral every 6 hours PRN Mild Pain (1 - 3)  cyclobenzaprine 10 milliGRAM(s) Oral two times a day PRN Muscle Spasm  oxyCODONE    IR 10 milliGRAM(s) Oral every 4 hours PRN Severe Pain (7 - 10)  oxyCODONE    IR 5 milliGRAM(s) Oral every 4 hours PRN Moderate Pain (4 - 6)      Allergies    Ceftin (Pruritus)    Intolerances        CONSTITUTIONAL: No weakness, fevers or chills  RESPIRATORY: No cough, wheezing, hemoptysis; No shortness of breath  Cvs: No chest pain or palpitations  Gi: No abdominal pain. No nausea, vomiting, diarrhea or constipation. No melena or hematochezia.  Neuro: no weakness    All other review of systems is negative unless indicated above.    Vital Signs Last 24 Hrs  T(C): 36.5 (2019 13:45), Max: 37 (2019 20:47)  T(F): 97.7 (2019 13:45), Max: 98.6 (2019 20:47)  HR: 88 (2019 13:45) (72 - 88)  BP: 129/77 (2019 13:45) (108/72 - 129/77)  BP(mean): --  RR: 16 (2019 13:45) (16 - 19)  SpO2: 92% (2019 13:45) (92% - 95%)    General: NAD  Neurology: A&Ox , nonfocal  Respiratory: CTA B/L  CV: RRR, S1S2  Abdominal: Soft, NT, ND +BS  Extremities: No edema, + peripheral pulses      LABS:                        9.7    11.88 )-----------( 371      ( 2019 10:39 )             30.0         127<L>  |  92<L>  |  22  ----------------------------<  117<H>  4.3   |  25  |  0.85    Ca    9.3      2019 10:39    TPro  7.2  /  Alb  2.2<L>  /  TBili  1.2  /  DBili  x   /  AST  67<H>  /  ALT  59  /  AlkPhos  176<H>  25    PT/INR - ( 2019 10:39 )   PT: 17.4 sec;   INR: 1.52 ratio         PTT - ( 2019 10:39 )  PTT:27.6 sec  Urinalysis Basic - ( 2019 13:04 )    Color: Yellow / Appearance: Clear / S.020 / pH: x  Gluc: x / Ketone: Negative  / Bili: Small / Urobili: 4   Blood: x / Protein: 25 mg/dL / Nitrite: Negative   Leuk Esterase: Trace / RBC: 3-5 /HPF / WBC 0-2   Sq Epi: x / Non Sq Epi: Occasional / Bacteria: Few        RADIOLOGY & ADDITIONAL TESTS: INTERVAL HPI/OVERNIGHT EVENTS: Patient seen and examined at bedside. No acute events overnight. Had wash out with plastic surgery done. Spoke to family at bedside, restarted him on his methadone and xanax home meds.    MEDICATIONS  (STANDING):  ALBUTerol    90 MICROgram(s) HFA Inhaler 2 Puff(s) Inhalation every 6 hours  ALPRAZolam 2 milliGRAM(s) Oral at bedtime  ascorbic acid 500 milliGRAM(s) Oral daily  aspirin enteric coated 81 milliGRAM(s) Oral daily  atorvastatin 10 milliGRAM(s) Oral at bedtime  docusate sodium 100 milliGRAM(s) Oral daily  enoxaparin Injectable 40 milliGRAM(s) SubCutaneous every 24 hours  finasteride 5 milliGRAM(s) Oral daily  FLUoxetine 60 milliGRAM(s) Oral daily  meropenem  IVPB 1000 milliGRAM(s) IV Intermittent every 8 hours  meropenem  IVPB      methadone    Tablet 5 milliGRAM(s) Oral every 24 hours  potassium chloride    Tablet ER 20 milliEquivalent(s) Oral daily  predniSONE   Tablet 20 milliGRAM(s) Oral two times a day  senna 2 Tablet(s) Oral at bedtime  tamsulosin 0.4 milliGRAM(s) Oral at bedtime    MEDICATIONS  (PRN):  acetaminophen   Tablet .. 650 milliGRAM(s) Oral every 6 hours PRN Mild Pain (1 - 3)  cyclobenzaprine 10 milliGRAM(s) Oral two times a day PRN Muscle Spasm  oxyCODONE    IR 10 milliGRAM(s) Oral every 4 hours PRN Severe Pain (7 - 10)  oxyCODONE    IR 5 milliGRAM(s) Oral every 4 hours PRN Moderate Pain (4 - 6)      Allergies    Ceftin (Pruritus)    Intolerances        CONSTITUTIONAL: No weakness, fevers or chills  RESPIRATORY: No cough, wheezing, hemoptysis; No shortness of breath  Cvs: No chest pain or palpitations  Gi: No abdominal pain. No nausea, vomiting, diarrhea or constipation. No melena or hematochezia.  Neuro: no weakness    All other review of systems is negative unless indicated above.    Vital Signs Last 24 Hrs  T(C): 36.5 (2019 13:45), Max: 37 (2019 20:47)  T(F): 97.7 (2019 13:45), Max: 98.6 (2019 20:47)  HR: 88 (2019 13:45) (72 - 88)  BP: 129/77 (2019 13:45) (108/72 - 129/77)  BP(mean): --  RR: 16 (2019 13:45) (16 - 19)  SpO2: 92% (2019 13:45) (92% - 95%)    General: NAD  Neurology: A&Ox3 , nonfocal  Respiratory: CTA B/L  CV: RRR, S1S2  Abdominal: Soft, NT, ND +BS  Extremities: No edema, + peripheral pulses      LABS:                        9.7    11.88 )-----------( 371      ( 2019 10:39 )             30.0     -    127<L>  |  92<L>  |  22  ----------------------------<  117<H>  4.3   |  25  |  0.85    Ca    9.3      2019 10:39    TPro  7.2  /  Alb  2.2<L>  /  TBili  1.2  /  DBili  x   /  AST  67<H>  /  ALT  59  /  AlkPhos  176<H>      PT/INR - ( 2019 10:39 )   PT: 17.4 sec;   INR: 1.52 ratio         PTT - ( 2019 10:39 )  PTT:27.6 sec  Urinalysis Basic - ( 2019 13:04 )    Color: Yellow / Appearance: Clear / S.020 / pH: x  Gluc: x / Ketone: Negative  / Bili: Small / Urobili: 4   Blood: x / Protein: 25 mg/dL / Nitrite: Negative   Leuk Esterase: Trace / RBC: 3-5 /HPF / WBC 0-2   Sq Epi: x / Non Sq Epi: Occasional / Bacteria: Few        RADIOLOGY & ADDITIONAL TESTS:

## 2019-01-26 NOTE — PROGRESS NOTE ADULT - SUBJECTIVE AND OBJECTIVE BOX
Date/Time Patient Seen:  		  Referring MD:   Data Reviewed	       Patient is a 73y old  Male who presents with a chief complaint of HCAP and Hip infection (25 Jan 2019 16:34)      Subjective/HPI     PAST MEDICAL & SURGICAL HISTORY:  S/P CABG x 3  S/P hip replacement, left  Rheumatoid arthritis  Osteoarthritis  COPD (chronic obstructive pulmonary disease)  HTN (hypertension)  S/P lumbar fusion: Approx 2012  History of femur fracture: Repaired 11/19- L hip  History of appendectomy  History of right shoulder replacement  History of total left hip arthroplasty        Medication list         MEDICATIONS  (STANDING):  ALBUTerol    90 MICROgram(s) HFA Inhaler 2 Puff(s) Inhalation every 6 hours  ascorbic acid 500 milliGRAM(s) Oral daily  aspirin enteric coated 81 milliGRAM(s) Oral daily  atorvastatin 10 milliGRAM(s) Oral at bedtime  docusate sodium 100 milliGRAM(s) Oral daily  enoxaparin Injectable 40 milliGRAM(s) SubCutaneous every 24 hours  finasteride 5 milliGRAM(s) Oral daily  FLUoxetine 60 milliGRAM(s) Oral daily  linezolid  IVPB      linezolid  IVPB 600 milliGRAM(s) IV Intermittent every 12 hours  meropenem  IVPB 1000 milliGRAM(s) IV Intermittent every 8 hours  meropenem  IVPB      potassium chloride    Tablet ER 20 milliEquivalent(s) Oral daily  predniSONE   Tablet 20 milliGRAM(s) Oral two times a day  senna 2 Tablet(s) Oral at bedtime  tamsulosin 0.4 milliGRAM(s) Oral at bedtime    MEDICATIONS  (PRN):  acetaminophen   Tablet .. 650 milliGRAM(s) Oral every 6 hours PRN Mild Pain (1 - 3)  ALPRAZolam 0.5 milliGRAM(s) Oral at bedtime PRN insomnia  cyclobenzaprine 10 milliGRAM(s) Oral two times a day PRN Muscle Spasm  oxyCODONE    IR 10 milliGRAM(s) Oral every 4 hours PRN Severe Pain (7 - 10)  oxyCODONE    IR 5 milliGRAM(s) Oral every 4 hours PRN Moderate Pain (4 - 6)         Vitals log        ICU Vital Signs Last 24 Hrs  T(C): 36.9 (26 Jan 2019 05:08), Max: 37 (25 Jan 2019 20:47)  T(F): 98.5 (26 Jan 2019 05:08), Max: 98.6 (25 Jan 2019 20:47)  HR: 78 (26 Jan 2019 05:08) (72 - 112)  BP: 108/72 (26 Jan 2019 05:08) (100/67 - 118/72)  BP(mean): --  ABP: --  ABP(mean): --  RR: 16 (26 Jan 2019 05:08) (16 - 19)  SpO2: 94% (26 Jan 2019 05:08) (92% - 98%)           Input and Output:  I&O's Detail      Lab Data                        9.7    11.88 )-----------( 371      ( 25 Jan 2019 10:39 )             30.0     01-25    127<L>  |  92<L>  |  22  ----------------------------<  117<H>  4.3   |  25  |  0.85    Ca    9.3      25 Jan 2019 10:39    TPro  7.2  /  Alb  2.2<L>  /  TBili  1.2  /  DBili  x   /  AST  67<H>  /  ALT  59  /  AlkPhos  176<H>  01-25      CARDIAC MARKERS ( 25 Jan 2019 10:39 )  <.015 ng/mL / x     / x     / x     / x            Review of Systems	      Objective     Physical Examination    heart s1s2  lung dec BS  abd soft  on room air      Pertinent Lab findings & Imaging      Corinna:  NO   Adequate UO     I&O's Detail           Discussed with:     Cultures:	  Culture Results:   Growth in anaerobic bottle: Gram Negative Rods  Growth in aerobic bottle: Gram Negative Rods (01-25 @ 15:00)  Culture Results:   Growth in aerobic bottle: Gram Negative Rods  "Due to technical problems, Proteus sp. will Not be reported as part of  the BCID panel until further notice"  ***Blood Panel PCR results on this specimen are available  approximately 3 hours after the Gram stain result.***  Gram stain, PCR, and/or culture results may not always  correspond due to difference in methodologies.  ************************************************************  This PCR assay was performed using [employer].  The following targets are tested for: Enterococcus,  vancomycin resistant enterococci, Listeria monocytogenes,  coagulase negative staphylococci, S. aureus,  methicillin resistant S. aureus, Streptococcus agalactiae  (Group B), S. pneumoniae, S. pyogenes (Group A),  Acinetobacter baumannii, Enterobacter cloacae, E. coli,  Klebsiella oxytoca, K. pneumoniae, Proteus sp.,  Serratia marcescens, Haemophilus influenzae,  Neisseria meningitidis, Pseudomonas aeruginosa, Candida  albicans, C. glabrata, C krusei, C parapsilosis,  C. tropicalis and the KPC resistance gene.  Growth in anaerobic bottle: Gram Negative Rods (01-25 @ 15:00)        Radiology

## 2019-01-26 NOTE — PROGRESS NOTE ADULT - PROBLEM SELECTOR PLAN 1
At this point sensitivities pending so recommend continued meropenem for now, source unclear but potentially from hip

## 2019-01-26 NOTE — PROVIDER CONTACT NOTE (OTHER) - ACTION/TREATMENT ORDERED:
No need for supplemental O2 sat 97% room air. Pt was anxious. No further orders at this time MD came to see pt. Pt. was anxious, no need for supplemental O2 sat 97% room air. No further orders at this time

## 2019-01-26 NOTE — CHART NOTE - NSCHARTNOTEFT_GEN_A_CORE
Paged by nursing requesting O2 order - stated patient was complaining of dyspnea "earlier" and was saturating at 88% on RA she then put him on 2L to get him to 90%  Pt s/e at bedside resting deeply in bed with nasal cannula displaced, per nurse just got bedtime Xanax   Per pt no dyspnea at present, denies chest pain or complaints at this time, states he uses O2 PRN at home occasionally   SpO2 repeated off O2 w/ NC displaced, now saturating well at 97-98% on RA      PHYSICAL EXAM:  GENERAL: NAD, resting comfortably/deeply in bed  HEAD:  Atraumatic, Normocephalic  EYES: EOMI, PERRLA, conjunctiva and sclera clear  NERVOUS SYSTEM:  Alert & Oriented X3  CHEST/LUNG: equal rise of chest b/l, median sternotomy scar well healed, breath sounds decreased throughout consistent with prior documented exams, diffuse inspiratory rhonchi, no wheezing or labored breathing noted   HEART: Regular rate and rhythm; S1/S2    Plan  - Pt saturating well on room air, no acute need for O2 order at this time  - Reviewed pulm notes, recommended O2 support PRN as pt may have ILD however it was not ordered  - NC order placed, Nursing advised to contact resident should pt develop dyspnea/respiratory symptoms and/or desaturate again

## 2019-01-26 NOTE — PROGRESS NOTE ADULT - PROBLEM SELECTOR PLAN 1
-S/p washout and 21 day treatment with daptomycin. Last cultures revealed VRE. Will start linezolid under the guidance of ID and follow up their recommendations.  -S/P 1 dose doxy in ED  -Start linezolid  -Cyclobenzaprine for cramps  -Oxy 5 mg Q4H for mod pain  -Oxy 10 mg Q4H for severe -S/p washout with Plastic Sx Dr Whipple  -ID recs noted, c/w meropenem  -pain management

## 2019-01-26 NOTE — PROGRESS NOTE ADULT - ASSESSMENT
74 yo M PMHx of CHF (stage I diastolic dysfunction, EF 65%) COPD (diagnosed "a few years ago, quit smoking then), HTN, HLD, hx of MI and CABG (2008) (stents also placed per chart review), RA (on Medrol daily), hx of multiple L hip surgeries presenting to the ED with SOB and L hip pain admitted for HCAP and hip infection. 72 yo M PMHx of CHF (stage I diastolic dysfunction, EF 65%) COPD (diagnosed "a few years ago, quit smoking then), HTN, HLD, hx of MI and CABG (2008) (stents also placed per chart review), RA (on Medrol daily), hx of multiple L hip surgeries presenting to the ED with SOB and L hip pain admitted for GNR bacteremia likely hip infection/abcess worsening cough likely from ILD/fibrosis and superimposed PNA/HCAP.

## 2019-01-26 NOTE — PROGRESS NOTE ADULT - SUBJECTIVE AND OBJECTIVE BOX
infectious diseases progress note:    ERIKA TORREZ is a 73y y. o. Male patient    Patient reports: feeling much better today than yesterday    ROS:    EYES:  Negative  blurry vision or double vision  GASTROINTESTINAL:  Negative for nausea, vomiting, diarrhea  -otherwise negative except for subjective    Allergies    Ceftin (Pruritus)    Intolerances        ANTIBIOTICS/RELEVANT:  antimicrobials  linezolid  IVPB      linezolid  IVPB 600 milliGRAM(s) IV Intermittent every 12 hours  meropenem  IVPB 1000 milliGRAM(s) IV Intermittent every 8 hours  meropenem  IVPB        immunologic:    OTHER:  acetaminophen   Tablet .. 650 milliGRAM(s) Oral every 6 hours PRN  ALBUTerol    90 MICROgram(s) HFA Inhaler 2 Puff(s) Inhalation every 6 hours  ALPRAZolam 0.5 milliGRAM(s) Oral at bedtime PRN  ascorbic acid 500 milliGRAM(s) Oral daily  aspirin enteric coated 81 milliGRAM(s) Oral daily  atorvastatin 10 milliGRAM(s) Oral at bedtime  cyclobenzaprine 10 milliGRAM(s) Oral two times a day PRN  docusate sodium 100 milliGRAM(s) Oral daily  enoxaparin Injectable 40 milliGRAM(s) SubCutaneous every 24 hours  finasteride 5 milliGRAM(s) Oral daily  FLUoxetine 60 milliGRAM(s) Oral daily  oxyCODONE    IR 10 milliGRAM(s) Oral every 4 hours PRN  oxyCODONE    IR 5 milliGRAM(s) Oral every 4 hours PRN  potassium chloride    Tablet ER 20 milliEquivalent(s) Oral daily  predniSONE   Tablet 20 milliGRAM(s) Oral two times a day  senna 2 Tablet(s) Oral at bedtime  tamsulosin 0.4 milliGRAM(s) Oral at bedtime      Objective:  Last 24-Vital Signs Last 24 Hrs  T(C): 36.9 (2019 05:08), Max: 37 (2019 20:47)  T(F): 98.5 (2019 05:08), Max: 98.6 (2019 20:47)  HR: 78 (2019 05:08) (72 - 88)  BP: 108/72 (2019 05:08) (105/64 - 118/72)  BP(mean): --  RR: 16 (2019 05:08) (16 - 19)  SpO2: 94% (2019 05:08) (94% - 98%)    T(C): 36.9 (19 @ 05:08), Max: 37 (19 20:47)  T(F): 98.5 (19 @ 05:08), Max: 98.6 (19 20:47)  T(C): 36.9 (19 @ 05:08), Max: 37 (19 20:47)  T(F): 98.5 (19 @ 05:08), Max: 98.6 (19 20:47)  T(C): 36.9 (19 @ 05:08), Max: 37 (19 20:47)  T(F): 98.5 (19 @ 05:08), Max: 98.6 (19 20:47)    PHYSICAL EXAM:  Constitutional: Well-developed, well nourished  Eyes: PERRLA, EOMI  Ear/Nose/Throat: oropharynx normal	  Neck: no JVD, no lymphadenopathy, supple  Respiratory: no accessory muscle use, lung fields bilaterally clear  Cardiovascular: RRR, normal S1, S2 no m/r/g  Gastrointestinal: soft, NT, no HSM, BS-normal  Extremities: no clubbing, no cyanosis, edema absent  Neuro: patient alert, oriented and appropriate  Skin: left hip with fresh dressing      LABS:                        9.7    11.88 )-----------( 371      ( 2019 10:39 )             30.0       WBC 11.88   @ 10:39          127<L>  |  92<L>  |  22  ----------------------------<  117<H>  4.3   |  25  |  0.85    Ca    9.3      2019 10:39    TPro  7.2  /  Alb  2.2<L>  /  TBili  1.2  /  DBili  x   /  AST  67<H>  /  ALT  59  /  AlkPhos  176<H>        Creatinine, Serum: 0.85 mg/dL (19 @ 10:39)      PT/INR - ( 2019 10:39 )   PT: 17.4 sec;   INR: 1.52 ratio         PTT - ( 2019 10:39 )  PTT:27.6 sec  Urinalysis Basic - ( 2019 13:04 )    Color: Yellow / Appearance: Clear / S.020 / pH: x  Gluc: x / Ketone: Negative  / Bili: Small / Urobili: 4   Blood: x / Protein: 25 mg/dL / Nitrite: Negative   Leuk Esterase: Trace / RBC: 3-5 /HPF / WBC 0-2   Sq Epi: x / Non Sq Epi: Occasional / Bacteria: Few            MICROBIOLOGY:  Culture Results:   Growth in anaerobic bottle: Gram Negative Rods  Growth in aerobic bottle: Gram Negative Rods ( @ 15:00)  Culture Results:   Growth in aerobic bottle: Gram Negative Rods  "Due to technical problems, Proteus sp. will Not be reported as part of  the BCID panel until further notice"  ***Blood Panel PCR results on this specimen are available  approximately 3 hours after the Gram stain result.***  Gram stain, PCR, and/or culture results may not always  correspond due to difference in methodologies.  ************************************************************  This PCR assay was performed using [employer].  The following targets are tested for: Enterococcus,  vancomycin resistant enterococci, Listeria monocytogenes,  coagulase negative staphylococci, S. aureus,  methicillin resistant S. aureus, Streptococcus agalactiae  (Group B), S. pneumoniae, S. pyogenes (Group A),  Acinetobacter baumannii, Enterobacter cloacae, E. coli,  Klebsiella oxytoca, K. pneumoniae, Proteus sp.,  Serratia marcescens, Haemophilus influenzae,  Neisseria meningitidis, Pseudomonas aeruginosa, Candida  albicans, C. glabrata, C krusei, C parapsilosis,  C. tropicalis and the KPC resistance gene.  Growth in anaerobic bottle: Gram Negative Rods ( @ 15:00)    E.Coli    RADIOLOGY & ADDITIONAL STUDIES:    < from: CT Angio Chest w/ IV Cont (19 @ 12:26) >    EXAM:  CT ANGIO CHEST (W)AW IC                            PROCEDURE DATE:  2019          INTERPRETATION:  .    CLINICAL INFORMATION: Recent surgery, shortness of breath, evaluate for   pulmonary embolism.    TECHNIQUE: Helical axial images were obtained of the chest and upper   abdomen using CT angiographic protocol. 78 mls of Omnipaque-350  was   administered intravenously without complication and 22 mls were   discarded. Sagittal and coronal reformatted images were obtained from the   source data. Axial MIP reconstructed images were obtained from the source   data and were also reviewed.    COMPARISON: No prior chest CT angiogram examinations are available for   comparison. Comparison is made with the prior noncontrast chest CT   examination from 12/3/2018. Prior x-ray examination of the chest from   earlier today.    FINDINGS: Evaluation of the pulmonary arterial vessels demonstrates a   suboptimal contrast bolus. No central filling defects are notable within   the pulmonary arterial vessels to suggest pulmonary embolism. Evaluation   for segmental and subsegmental pulmonary arterial embolism is limited.    The heart size is normal. The pericardium appears unremarkable. There are   atherosclerotic calcifications of the imaged coronary arteries, aorta,   and branch vessels. There is unchanged mild aneurysmal dilatation of the   ascending aorta measuring up to 4.6 cm at the level of the main pulmonary   artery. The patient is status post median sternotomy.    There is no axillary, mediastinal, or hilar lymphadenopathy.    The central airways are patent. There is been interval worsening of   peripheral reticular opacification throughout both lungs. Patchy   opacities are also notable within both lungs with a left upperlobe and   lingular predominance. Patchy opacities are also noted within the right   middle lobe. No pleural effusions are noted. A few right-sided scattered   calcified granulomas are noted.    The patient is status post cholecystectomy. There is nonspecific   bilateral perinephric stranding which is only partially imaged.    The liver is enlarged.    Multilevel degenerative changes are noted within the imaged potions of   the spine.    Posterior fusion hardware is notable within the upper lumbar spine and is   only partially imaged. There is an unchanged anterior wedge compression   deformity of the T6 vertebral body. There is unchanged grade 1   anterolisthesis of T12 on L1.    A right-sided reverse shoulder arthroplasty is notable.    Redemonstrated is a lower anterior cervical discectomy and fusion.    IMPRESSION:    1. Suboptimal opacification of the pulmonary arterial vessels. No central   pulmonary embolism. Evaluation for segmental and subsegmental pulmonary   arterial embolism is limited.    2. Worsening fibrotic changes throughout both lungs. Superimposed   pneumonia is difficult to exclude. Correlate clinically.    3. Unchanged aneurysmal dilatation of the ascending aorta measuring up to   4.6 cm.

## 2019-01-26 NOTE — PROGRESS NOTE ADULT - PROBLEM SELECTOR PLAN 2
-CTA unclear if worsening fibrosis vs. HCAP. Based on clinical picture of worsening cough and associated SOB, cannot rule out MRSA lung infection and will cover empirically with Meropenem and linezolid Bronchoscopy not warranted at this time but will follow up with ID and Pulmonlogy for further recommendations. No objection to steroids now as per ID.   -Chest PT  -F/U Urine legionella, strep  -F/U MRSA nares swab  -F/U ID  -F/U Pulm -CTA unclear if worsening fibrosis from hx of RA vs. HCAP  -GNR bacteremia suspected from hip but unable to r/o PNA  -Treat for HCAP at this time with meropenem given cannot rule out superimposed PNA on worsening fibrosis   -ID and Pulmonary recs appreciated

## 2019-01-27 DIAGNOSIS — I50.30 UNSPECIFIED DIASTOLIC (CONGESTIVE) HEART FAILURE: ICD-10-CM

## 2019-01-27 LAB
-  AMIKACIN: SIGNIFICANT CHANGE UP
-  AMIKACIN: SIGNIFICANT CHANGE UP
-  AMPICILLIN/SULBACTAM: SIGNIFICANT CHANGE UP
-  AMPICILLIN/SULBACTAM: SIGNIFICANT CHANGE UP
-  AMPICILLIN: SIGNIFICANT CHANGE UP
-  AMPICILLIN: SIGNIFICANT CHANGE UP
-  AZTREONAM: SIGNIFICANT CHANGE UP
-  AZTREONAM: SIGNIFICANT CHANGE UP
-  CEFAZOLIN: SIGNIFICANT CHANGE UP
-  CEFEPIME: SIGNIFICANT CHANGE UP
-  CEFEPIME: SIGNIFICANT CHANGE UP
-  CEFOXITIN: SIGNIFICANT CHANGE UP
-  CEFOXITIN: SIGNIFICANT CHANGE UP
-  CEFTRIAXONE: SIGNIFICANT CHANGE UP
-  CEFTRIAXONE: SIGNIFICANT CHANGE UP
-  CIPROFLOXACIN: SIGNIFICANT CHANGE UP
-  CIPROFLOXACIN: SIGNIFICANT CHANGE UP
-  ERTAPENEM: SIGNIFICANT CHANGE UP
-  ERTAPENEM: SIGNIFICANT CHANGE UP
-  GENTAMICIN: SIGNIFICANT CHANGE UP
-  GENTAMICIN: SIGNIFICANT CHANGE UP
-  IMIPENEM: SIGNIFICANT CHANGE UP
-  IMIPENEM: SIGNIFICANT CHANGE UP
-  LEVOFLOXACIN: SIGNIFICANT CHANGE UP
-  LEVOFLOXACIN: SIGNIFICANT CHANGE UP
-  MEROPENEM: SIGNIFICANT CHANGE UP
-  MEROPENEM: SIGNIFICANT CHANGE UP
-  PIPERACILLIN/TAZOBACTAM: SIGNIFICANT CHANGE UP
-  PIPERACILLIN/TAZOBACTAM: SIGNIFICANT CHANGE UP
-  TOBRAMYCIN: SIGNIFICANT CHANGE UP
-  TOBRAMYCIN: SIGNIFICANT CHANGE UP
-  TRIMETHOPRIM/SULFAMETHOXAZOLE: SIGNIFICANT CHANGE UP
-  TRIMETHOPRIM/SULFAMETHOXAZOLE: SIGNIFICANT CHANGE UP
ANION GAP SERPL CALC-SCNC: 5 MMOL/L — SIGNIFICANT CHANGE UP (ref 5–17)
BUN SERPL-MCNC: 20 MG/DL — SIGNIFICANT CHANGE UP (ref 7–23)
CALCIUM SERPL-MCNC: 9 MG/DL — SIGNIFICANT CHANGE UP (ref 8.5–10.1)
CHLORIDE SERPL-SCNC: 102 MMOL/L — SIGNIFICANT CHANGE UP (ref 96–108)
CO2 SERPL-SCNC: 29 MMOL/L — SIGNIFICANT CHANGE UP (ref 22–31)
CREAT SERPL-MCNC: 0.59 MG/DL — SIGNIFICANT CHANGE UP (ref 0.5–1.3)
CULTURE RESULTS: SIGNIFICANT CHANGE UP
CULTURE RESULTS: SIGNIFICANT CHANGE UP
GLUCOSE SERPL-MCNC: 120 MG/DL — HIGH (ref 70–99)
HCT VFR BLD CALC: 27 % — LOW (ref 39–50)
HGB BLD-MCNC: 8.5 G/DL — LOW (ref 13–17)
MCHC RBC-ENTMCNC: 27.7 PG — SIGNIFICANT CHANGE UP (ref 27–34)
MCHC RBC-ENTMCNC: 31.5 GM/DL — LOW (ref 32–36)
MCV RBC AUTO: 87.9 FL — SIGNIFICANT CHANGE UP (ref 80–100)
METHOD TYPE: SIGNIFICANT CHANGE UP
METHOD TYPE: SIGNIFICANT CHANGE UP
NRBC # BLD: 0 /100 WBCS — SIGNIFICANT CHANGE UP (ref 0–0)
ORGANISM # SPEC MICROSCOPIC CNT: SIGNIFICANT CHANGE UP
PLATELET # BLD AUTO: 342 K/UL — SIGNIFICANT CHANGE UP (ref 150–400)
POTASSIUM SERPL-MCNC: 4.5 MMOL/L — SIGNIFICANT CHANGE UP (ref 3.5–5.3)
POTASSIUM SERPL-SCNC: 4.5 MMOL/L — SIGNIFICANT CHANGE UP (ref 3.5–5.3)
RBC # BLD: 3.07 M/UL — LOW (ref 4.2–5.8)
RBC # FLD: 14.5 % — SIGNIFICANT CHANGE UP (ref 10.3–14.5)
SODIUM SERPL-SCNC: 136 MMOL/L — SIGNIFICANT CHANGE UP (ref 135–145)
SPECIMEN SOURCE: SIGNIFICANT CHANGE UP
SPECIMEN SOURCE: SIGNIFICANT CHANGE UP
WBC # BLD: 6.77 K/UL — SIGNIFICANT CHANGE UP (ref 3.8–10.5)
WBC # FLD AUTO: 6.77 K/UL — SIGNIFICANT CHANGE UP (ref 3.8–10.5)

## 2019-01-27 PROCEDURE — 99222 1ST HOSP IP/OBS MODERATE 55: CPT

## 2019-01-27 PROCEDURE — 99233 SBSQ HOSP IP/OBS HIGH 50: CPT

## 2019-01-27 RX ORDER — LISINOPRIL 2.5 MG/1
10 TABLET ORAL DAILY
Qty: 0 | Refills: 0 | Status: DISCONTINUED | OUTPATIENT
Start: 2019-01-27 | End: 2019-02-01

## 2019-01-27 RX ADMIN — ALBUTEROL 2 PUFF(S): 90 AEROSOL, METERED ORAL at 05:44

## 2019-01-27 RX ADMIN — Medication 20 MILLIGRAM(S): at 12:50

## 2019-01-27 RX ADMIN — Medication 500 MILLIGRAM(S): at 12:51

## 2019-01-27 RX ADMIN — FINASTERIDE 5 MILLIGRAM(S): 5 TABLET, FILM COATED ORAL at 12:50

## 2019-01-27 RX ADMIN — Medication 2 MILLIGRAM(S): at 21:42

## 2019-01-27 RX ADMIN — ATORVASTATIN CALCIUM 10 MILLIGRAM(S): 80 TABLET, FILM COATED ORAL at 21:42

## 2019-01-27 RX ADMIN — TAMSULOSIN HYDROCHLORIDE 0.4 MILLIGRAM(S): 0.4 CAPSULE ORAL at 21:42

## 2019-01-27 RX ADMIN — ALBUTEROL 2 PUFF(S): 90 AEROSOL, METERED ORAL at 12:51

## 2019-01-27 RX ADMIN — Medication 60 MILLIGRAM(S): at 12:50

## 2019-01-27 RX ADMIN — MEROPENEM 100 MILLIGRAM(S): 1 INJECTION INTRAVENOUS at 06:43

## 2019-01-27 RX ADMIN — METHADONE HYDROCHLORIDE 5 MILLIGRAM(S): 40 TABLET ORAL at 12:50

## 2019-01-27 RX ADMIN — ENOXAPARIN SODIUM 40 MILLIGRAM(S): 100 INJECTION SUBCUTANEOUS at 05:44

## 2019-01-27 RX ADMIN — MEROPENEM 100 MILLIGRAM(S): 1 INJECTION INTRAVENOUS at 15:31

## 2019-01-27 RX ADMIN — Medication 20 MILLIEQUIVALENT(S): at 12:51

## 2019-01-27 RX ADMIN — Medication 81 MILLIGRAM(S): at 12:51

## 2019-01-27 RX ADMIN — Medication 100 MILLIGRAM(S): at 12:50

## 2019-01-27 RX ADMIN — LISINOPRIL 10 MILLIGRAM(S): 2.5 TABLET ORAL at 15:37

## 2019-01-27 RX ADMIN — ALBUTEROL 2 PUFF(S): 90 AEROSOL, METERED ORAL at 17:45

## 2019-01-27 RX ADMIN — Medication 20 MILLIGRAM(S): at 05:44

## 2019-01-27 RX ADMIN — MEROPENEM 100 MILLIGRAM(S): 1 INJECTION INTRAVENOUS at 21:42

## 2019-01-27 RX ADMIN — SENNA PLUS 2 TABLET(S): 8.6 TABLET ORAL at 21:42

## 2019-01-27 NOTE — PROGRESS NOTE ADULT - SUBJECTIVE AND OBJECTIVE BOX
infectious diseases progress note:    ERIKA TORREZ is a 73y y. o. Male patient    Patient with no concerning overnight events    Allergies    Ceftin (Pruritus)    Intolerances        ANTIBIOTICS/RELEVANT:  antimicrobials  meropenem  IVPB 1000 milliGRAM(s) IV Intermittent every 8 hours  meropenem  IVPB        immunologic:    OTHER:  acetaminophen   Tablet .. 650 milliGRAM(s) Oral every 6 hours PRN  ALBUTerol    90 MICROgram(s) HFA Inhaler 2 Puff(s) Inhalation every 6 hours  ALPRAZolam 2 milliGRAM(s) Oral at bedtime  ascorbic acid 500 milliGRAM(s) Oral daily  aspirin enteric coated 81 milliGRAM(s) Oral daily  atorvastatin 10 milliGRAM(s) Oral at bedtime  cyclobenzaprine 10 milliGRAM(s) Oral two times a day PRN  docusate sodium 100 milliGRAM(s) Oral daily  enoxaparin Injectable 40 milliGRAM(s) SubCutaneous every 24 hours  finasteride 5 milliGRAM(s) Oral daily  FLUoxetine 60 milliGRAM(s) Oral daily  methadone    Tablet 5 milliGRAM(s) Oral every 24 hours  oxyCODONE    IR 10 milliGRAM(s) Oral every 4 hours PRN  oxyCODONE    IR 5 milliGRAM(s) Oral every 4 hours PRN  potassium chloride    Tablet ER 20 milliEquivalent(s) Oral daily  predniSONE   Tablet 20 milliGRAM(s) Oral daily  senna 2 Tablet(s) Oral at bedtime  tamsulosin 0.4 milliGRAM(s) Oral at bedtime      Objective:  Vital Signs Last 24 Hrs  T(C): 36.4 (2019 05:02), Max: 36.8 (2019 20:33)  T(F): 97.5 (2019 05:02), Max: 98.2 (2019 20:33)  HR: 62 (2019 05:02) (62 - 88)  BP: 109/63 (2019 05:02) (109/63 - 152/66)  BP(mean): --  RR: 16 (2019 05:02) (16 - 16)  SpO2: 93% (2019 05:02) (92% - 94%)    T(C): 36.4 (19 @ 05:02), Max: 37 (19 @ 20:47)  T(C): 36.4 (19 @ 05:02), Max: 37 (19 @ 20:47)  T(C): 36.4 (19 @ 05:02), Max: 37 (19 @ 20:47)    PHYSICAL EXAM:  Constitutional: Well-developed, well nourished  Eyes: PERRLA, EOMI  Ear/Nose/Throat: oropharynx normal	  Neck: no JVD, no lymphadenopathy, supple  Respiratory: no accessory muscle use  Cardiovascular: RRR,   Gastrointestinal: soft, NT  Extremities: no clubbing, no cyanosis, edema absent      LABS:                        8.5    6.77  )-----------( 342      ( 2019 08:25 )             27.0       6.77  @ 08:25  11.88  @ 10:39          136  |  102  |  20  ----------------------------<  120<H>  4.5   |  29  |  0.59    Ca    9.0      2019 08:25        Creatinine, Serum: 0.59 mg/dL (19 @ 08:25)  Creatinine, Serum: 0.85 mg/dL (19 @ 10:39)        Urinalysis Basic - ( 2019 13:04 )    Color: Yellow / Appearance: Clear / S.020 / pH: x  Gluc: x / Ketone: Negative  / Bili: Small / Urobili: 4   Blood: x / Protein: 25 mg/dL / Nitrite: Negative   Leuk Esterase: Trace / RBC: 3-5 /HPF / WBC 0-2   Sq Epi: x / Non Sq Epi: Occasional / Bacteria: Few            MICROBIOLOGY:    Culture - Abscess with Gram Stain (19 @ 21:33)    Specimen Source: .Abscess Hip - Left    Culture Results:   Numerous Gram Negative Rods    Culture - Blood (19 @ 15:00)    -  Escherichia coli: Detec    Gram Stain:   Growth in aerobic bottle: Gram Negative Rods  Growth in anaerobic bottle: Gram Negative Rods    Specimen Source: .Blood Blood-Peripheral    Organism: Blood Culture PCR    Culture Results:   Growth in aerobic and anaerobic bottles: Gram Negative Rods      RADIOLOGY & ADDITIONAL STUDIES:

## 2019-01-27 NOTE — SWALLOW BEDSIDE ASSESSMENT ADULT - COMMENTS
72 yo M admitted c Lefts sided PNA c PMHx of CHF,COPD , HTN, HLD,  L hip surgeries presenting to the ED with SOB and L hip pain.  Pt awake , alert, communicates wants/ needs/ follows commands. Pt c decreased mastication of solids 2 lack of dentition. Pt reports his dentures are in [place]. Pt c timely trigger of swallow c clear breath sounds pre/post deglutition via cervical auscultation

## 2019-01-27 NOTE — PROGRESS NOTE ADULT - PROBLEM SELECTOR PLAN 1
-S/p washout with Plastic Sx Dr Whipple  -ID recs noted, c/w meropenem  -pain management with methadone   -planned for OR

## 2019-01-27 NOTE — PROGRESS NOTE ADULT - ASSESSMENT
74 yo M PMHx of CHF (stage I diastolic dysfunction, EF 65%) COPD (diagnosed "a few years ago, quit smoking then), HTN, HLD, hx of MI and CABG (2008) (stents also placed per chart review), RA (on Medrol daily), hx of multiple L hip surgeries presenting to the ED with SOB and L hip pain admitted for GNR bacteremia likely hip infection/abcess worsening cough likely from ILD/fibrosis and superimposed PNA/HCAP.  Patient has RCRI score of 2, intermediate risk for intermediate risk procedure. Patient medically optimized for planned orthopedic surgical procedure- see cardiology not for cardiology preoperative risk assessment.

## 2019-01-27 NOTE — PROGRESS NOTE ADULT - PROBLEM SELECTOR PLAN 2
-CTA unclear if worsening fibrosis from hx of RA vs. HCAP  -GNR bacteremia suspected from hip but unable to r/o PNA  -Treat for HCAP at this time with meropenem given cannot rule out superimposed PNA on worsening fibrosis   -ID and Pulmonary recs appreciated

## 2019-01-27 NOTE — PROGRESS NOTE ADULT - SUBJECTIVE AND OBJECTIVE BOX
Date/Time Patient Seen:  		  Referring MD:   Data Reviewed	       Patient is a 73y old  Male who presents with a chief complaint of HCAP and Hip infection (26 Jan 2019 17:17)      Subjective/HPI     PAST MEDICAL & SURGICAL HISTORY:  S/P CABG x 3  S/P hip replacement, left  Rheumatoid arthritis  Osteoarthritis  COPD (chronic obstructive pulmonary disease)  HTN (hypertension)  S/P lumbar fusion: Approx 2012  History of femur fracture: Repaired 11/19- L hip  History of appendectomy  History of right shoulder replacement  History of total left hip arthroplasty        Medication list         MEDICATIONS  (STANDING):  ALBUTerol    90 MICROgram(s) HFA Inhaler 2 Puff(s) Inhalation every 6 hours  ALPRAZolam 2 milliGRAM(s) Oral at bedtime  ascorbic acid 500 milliGRAM(s) Oral daily  aspirin enteric coated 81 milliGRAM(s) Oral daily  atorvastatin 10 milliGRAM(s) Oral at bedtime  docusate sodium 100 milliGRAM(s) Oral daily  enoxaparin Injectable 40 milliGRAM(s) SubCutaneous every 24 hours  finasteride 5 milliGRAM(s) Oral daily  FLUoxetine 60 milliGRAM(s) Oral daily  meropenem  IVPB 1000 milliGRAM(s) IV Intermittent every 8 hours  meropenem  IVPB      methadone    Tablet 5 milliGRAM(s) Oral every 24 hours  potassium chloride    Tablet ER 20 milliEquivalent(s) Oral daily  predniSONE   Tablet 20 milliGRAM(s) Oral daily  senna 2 Tablet(s) Oral at bedtime  tamsulosin 0.4 milliGRAM(s) Oral at bedtime    MEDICATIONS  (PRN):  acetaminophen   Tablet .. 650 milliGRAM(s) Oral every 6 hours PRN Mild Pain (1 - 3)  cyclobenzaprine 10 milliGRAM(s) Oral two times a day PRN Muscle Spasm  oxyCODONE    IR 10 milliGRAM(s) Oral every 4 hours PRN Severe Pain (7 - 10)  oxyCODONE    IR 5 milliGRAM(s) Oral every 4 hours PRN Moderate Pain (4 - 6)         Vitals log        ICU Vital Signs Last 24 Hrs  T(C): 36.4 (27 Jan 2019 05:02), Max: 36.8 (26 Jan 2019 20:33)  T(F): 97.5 (27 Jan 2019 05:02), Max: 98.2 (26 Jan 2019 20:33)  HR: 62 (27 Jan 2019 05:02) (62 - 88)  BP: 109/63 (27 Jan 2019 05:02) (109/63 - 152/66)  BP(mean): --  ABP: --  ABP(mean): --  RR: 16 (27 Jan 2019 05:02) (16 - 16)  SpO2: 93% (27 Jan 2019 05:02) (92% - 94%)           Input and Output:  I&O's Detail    25 Jan 2019 07:01  -  26 Jan 2019 07:00  --------------------------------------------------------  IN:  Total IN: 0 mL    OUT:    Voided: 400 mL  Total OUT: 400 mL    Total NET: -400 mL      26 Jan 2019 07:01  -  27 Jan 2019 06:18  --------------------------------------------------------  IN:  Total IN: 0 mL    OUT:    Voided: 150 mL  Total OUT: 150 mL    Total NET: -150 mL          Lab Data                        9.7    11.88 )-----------( 371      ( 25 Jan 2019 10:39 )             30.0     01-25    127<L>  |  92<L>  |  22  ----------------------------<  117<H>  4.3   |  25  |  0.85    Ca    9.3      25 Jan 2019 10:39    TPro  7.2  /  Alb  2.2<L>  /  TBili  1.2  /  DBili  x   /  AST  67<H>  /  ALT  59  /  AlkPhos  176<H>  01-25      CARDIAC MARKERS ( 25 Jan 2019 10:39 )  <.015 ng/mL / x     / x     / x     / x            Review of Systems	      Objective     Physical Examination    heart s1s2  lung dec BS  abd soft  cn grossly int  on room air      Pertinent Lab findings & Imaging      Corinna:  NO   Adequate UO     I&O's Detail    25 Jan 2019 07:01  -  26 Jan 2019 07:00  --------------------------------------------------------  IN:  Total IN: 0 mL    OUT:    Voided: 400 mL  Total OUT: 400 mL    Total NET: -400 mL      26 Jan 2019 07:01  -  27 Jan 2019 06:18  --------------------------------------------------------  IN:  Total IN: 0 mL    OUT:    Voided: 150 mL  Total OUT: 150 mL    Total NET: -150 mL               Discussed with:     Cultures:	        Radiology

## 2019-01-27 NOTE — PROGRESS NOTE ADULT - SUBJECTIVE AND OBJECTIVE BOX
Patient is seen and examined at bedside. Denies CP/SOB/Dizziness/N/V/D/HA. Pain is controlled.     Vital Signs Last 24 Hrs  T(C): 36.4 (27 Jan 2019 05:02), Max: 36.8 (26 Jan 2019 20:33)  T(F): 97.5 (27 Jan 2019 05:02), Max: 98.2 (26 Jan 2019 20:33)  HR: 62 (27 Jan 2019 05:02) (62 - 88)  BP: 109/63 (27 Jan 2019 05:02) (109/63 - 152/66)  BP(mean): --  RR: 16 (27 Jan 2019 05:02) (16 - 16)  SpO2: 93% (27 Jan 2019 05:02) (92% - 94%)    GEN: NAD  LLE: DSG in place. Mild saturation. Redness slightly regressed.   Motor intact + EHL/FHL/TA/GS in the BL LE. Sensation is grossly intact distal . Extremity warm. Compartments are soft. DP 1+    Labs:  pending      Pt is a 73y Male with a L Hip Recurrent Surgical Site Infection s/p bedside I+D/packing     -Dsg in place  -WB as tolerated affected extremity  -XR L Hip: s/p ORIF of periprosthetic fracture of gabriela-arthroplasty. No acute fracture.  -DVT PPx per medical team.   -Please document medical optimization for OR if required.   -Will DW Dr. Ramirez and advise of any changes.

## 2019-01-27 NOTE — PROGRESS NOTE ADULT - PROBLEM SELECTOR PLAN 3
-Albuterol q6h PRN  -Dr. Valente from pulmonary following, will discuss planned surgery and pulmonary optimization

## 2019-01-27 NOTE — PROGRESS NOTE ADULT - PROBLEM SELECTOR PLAN 8
hx of CAD s/p CABG  -continue aspirin, restarted patients lisinopril , continue statin  -cardiology consulted for preop risk assessment

## 2019-01-27 NOTE — PROGRESS NOTE ADULT - PROBLEM SELECTOR PLAN 1
pna  copd  chronic lung disease  sherie - does not want to use CPAP  heart disease  ct chest reviewed  TTE reviewed  may need PRN diuresis  cont ABX as per ID recs - hip wound, pna, cx noted, biomarkers reviewed  will taper steroids this am, cont proventil inhaler  o2 support as needed - keep sat > 88 pct  needs to use I aundrea and mobilize as tolerated  on pain regimen - bowel regimen, supportive care, counseling  will follow

## 2019-01-27 NOTE — PROGRESS NOTE ADULT - SUBJECTIVE AND OBJECTIVE BOX
INTERVAL HPI/OVERNIGHT EVENTS: Patient seen and examined at bedside. No acute events overnight. Had wash out with plastic surgery done. Spoke to family at bedside, restarted him on his methadone and xanax home meds.  Orthopedic considering removing hardware or wash out.  Cardiology consulted for preop evaluation.  Patient denies any chest pain or SOB. No allergies to anesthesia that patient knows about.     MEDICATIONS  (STANDING):  ALBUTerol    90 MICROgram(s) HFA Inhaler 2 Puff(s) Inhalation every 6 hours  ALPRAZolam 2 milliGRAM(s) Oral at bedtime  ascorbic acid 500 milliGRAM(s) Oral daily  aspirin enteric coated 81 milliGRAM(s) Oral daily  atorvastatin 10 milliGRAM(s) Oral at bedtime  docusate sodium 100 milliGRAM(s) Oral daily  enoxaparin Injectable 40 milliGRAM(s) SubCutaneous every 24 hours  finasteride 5 milliGRAM(s) Oral daily  FLUoxetine 60 milliGRAM(s) Oral daily  lisinopril 10 milliGRAM(s) Oral daily  meropenem  IVPB 1000 milliGRAM(s) IV Intermittent every 8 hours  meropenem  IVPB      methadone    Tablet 5 milliGRAM(s) Oral every 24 hours  potassium chloride    Tablet ER 20 milliEquivalent(s) Oral daily  predniSONE   Tablet 20 milliGRAM(s) Oral daily  senna 2 Tablet(s) Oral at bedtime  tamsulosin 0.4 milliGRAM(s) Oral at bedtime    MEDICATIONS  (PRN):  acetaminophen   Tablet .. 650 milliGRAM(s) Oral every 6 hours PRN Mild Pain (1 - 3)  cyclobenzaprine 10 milliGRAM(s) Oral two times a day PRN Muscle Spasm  oxyCODONE    IR 10 milliGRAM(s) Oral every 4 hours PRN Severe Pain (7 - 10)  oxyCODONE    IR 5 milliGRAM(s) Oral every 4 hours PRN Moderate Pain (4 - 6)      Allergies    Ceftin (Pruritus)    Intolerances        CONSTITUTIONAL: No weakness, fevers or chills  RESPIRATORY: No cough, wheezing, hemoptysis; No shortness of breath  Cvs: No chest pain or palpitations  Gi: No abdominal pain. No nausea, vomiting, diarrhea or constipation. No melena or hematochezia.  Neuro: no weakness    All other review of systems is negative unless indicated above.    Vital Signs Last 24 Hrs  T(C): 36.3 (27 Jan 2019 12:56), Max: 36.8 (26 Jan 2019 20:33)  T(F): 97.4 (27 Jan 2019 12:56), Max: 98.2 (26 Jan 2019 20:33)  HR: 77 (27 Jan 2019 12:56) (62 - 77)  BP: 136/75 (27 Jan 2019 12:56) (109/63 - 152/66)  BP(mean): --  RR: 17 (27 Jan 2019 12:56) (16 - 17)  SpO2: 95% (27 Jan 2019 12:56) (93% - 95%)    General: NAD  Neurology: A&Ox3 , nonfocal  Respiratory: CTA B/L  CV: RRR, S1S2  Abdominal: Soft, NT, ND +BS  Extremities: No edema, + peripheral pulses, left ace bandage/draining     LABS:                        8.5    6.77  )-----------( 342      ( 27 Jan 2019 08:25 )             27.0     01-27    136  |  102  |  20  ----------------------------<  120<H>  4.5   |  29  |  0.59    Ca    9.0      27 Jan 2019 08:25          RADIOLOGY & ADDITIONAL TESTS:

## 2019-01-27 NOTE — SWALLOW BEDSIDE ASSESSMENT ADULT - SWALLOW EVAL: RECOMMENDED FEEDING/EATING TECHNIQUES
alternate food with liquid/maintain upright posture during/after eating for 30 mins/check mouth frequently for oral residue/pocketing/crush medication (when feasible)

## 2019-01-28 LAB
ANION GAP SERPL CALC-SCNC: 9 MMOL/L — SIGNIFICANT CHANGE UP (ref 5–17)
BUN SERPL-MCNC: 18 MG/DL — SIGNIFICANT CHANGE UP (ref 7–23)
CALCIUM SERPL-MCNC: 9.1 MG/DL — SIGNIFICANT CHANGE UP (ref 8.5–10.1)
CHLORIDE SERPL-SCNC: 101 MMOL/L — SIGNIFICANT CHANGE UP (ref 96–108)
CO2 SERPL-SCNC: 28 MMOL/L — SIGNIFICANT CHANGE UP (ref 22–31)
CREAT SERPL-MCNC: 0.56 MG/DL — SIGNIFICANT CHANGE UP (ref 0.5–1.3)
GLUCOSE SERPL-MCNC: 91 MG/DL — SIGNIFICANT CHANGE UP (ref 70–99)
HCT VFR BLD CALC: 27.4 % — LOW (ref 39–50)
HGB BLD-MCNC: 8.5 G/DL — LOW (ref 13–17)
INR BLD: 1.26 RATIO — HIGH (ref 0.88–1.16)
MCHC RBC-ENTMCNC: 27.7 PG — SIGNIFICANT CHANGE UP (ref 27–34)
MCHC RBC-ENTMCNC: 31 GM/DL — LOW (ref 32–36)
MCV RBC AUTO: 89.3 FL — SIGNIFICANT CHANGE UP (ref 80–100)
NRBC # BLD: 0 /100 WBCS — SIGNIFICANT CHANGE UP (ref 0–0)
PLATELET # BLD AUTO: 377 K/UL — SIGNIFICANT CHANGE UP (ref 150–400)
POTASSIUM SERPL-MCNC: 4.4 MMOL/L — SIGNIFICANT CHANGE UP (ref 3.5–5.3)
POTASSIUM SERPL-SCNC: 4.4 MMOL/L — SIGNIFICANT CHANGE UP (ref 3.5–5.3)
PROTHROM AB SERPL-ACNC: 14.5 SEC — HIGH (ref 10–12.9)
RBC # BLD: 3.07 M/UL — LOW (ref 4.2–5.8)
RBC # FLD: 14.6 % — HIGH (ref 10.3–14.5)
SODIUM SERPL-SCNC: 138 MMOL/L — SIGNIFICANT CHANGE UP (ref 135–145)
WBC # BLD: 7.83 K/UL — SIGNIFICANT CHANGE UP (ref 3.8–10.5)
WBC # FLD AUTO: 7.83 K/UL — SIGNIFICANT CHANGE UP (ref 3.8–10.5)

## 2019-01-28 PROCEDURE — 99232 SBSQ HOSP IP/OBS MODERATE 35: CPT

## 2019-01-28 PROCEDURE — 99233 SBSQ HOSP IP/OBS HIGH 50: CPT

## 2019-01-28 RX ORDER — CIPROFLOXACIN LACTATE 400MG/40ML
500 VIAL (ML) INTRAVENOUS EVERY 12 HOURS
Qty: 0 | Refills: 0 | Status: DISCONTINUED | OUTPATIENT
Start: 2019-01-28 | End: 2019-02-04

## 2019-01-28 RX ADMIN — ALBUTEROL 2 PUFF(S): 90 AEROSOL, METERED ORAL at 13:32

## 2019-01-28 RX ADMIN — Medication 20 MILLIEQUIVALENT(S): at 11:17

## 2019-01-28 RX ADMIN — ALBUTEROL 2 PUFF(S): 90 AEROSOL, METERED ORAL at 06:06

## 2019-01-28 RX ADMIN — Medication 500 MILLIGRAM(S): at 17:59

## 2019-01-28 RX ADMIN — Medication 81 MILLIGRAM(S): at 11:17

## 2019-01-28 RX ADMIN — ATORVASTATIN CALCIUM 10 MILLIGRAM(S): 80 TABLET, FILM COATED ORAL at 22:15

## 2019-01-28 RX ADMIN — Medication 60 MILLIGRAM(S): at 11:19

## 2019-01-28 RX ADMIN — LISINOPRIL 10 MILLIGRAM(S): 2.5 TABLET ORAL at 06:06

## 2019-01-28 RX ADMIN — Medication 100 MILLIGRAM(S): at 11:17

## 2019-01-28 RX ADMIN — Medication 500 MILLIGRAM(S): at 11:17

## 2019-01-28 RX ADMIN — TAMSULOSIN HYDROCHLORIDE 0.4 MILLIGRAM(S): 0.4 CAPSULE ORAL at 22:15

## 2019-01-28 RX ADMIN — METHADONE HYDROCHLORIDE 5 MILLIGRAM(S): 40 TABLET ORAL at 17:59

## 2019-01-28 RX ADMIN — CYCLOBENZAPRINE HYDROCHLORIDE 10 MILLIGRAM(S): 10 TABLET, FILM COATED ORAL at 19:55

## 2019-01-28 RX ADMIN — ENOXAPARIN SODIUM 40 MILLIGRAM(S): 100 INJECTION SUBCUTANEOUS at 06:07

## 2019-01-28 RX ADMIN — ALBUTEROL 2 PUFF(S): 90 AEROSOL, METERED ORAL at 22:13

## 2019-01-28 RX ADMIN — MEROPENEM 100 MILLIGRAM(S): 1 INJECTION INTRAVENOUS at 06:07

## 2019-01-28 RX ADMIN — Medication 2 MILLIGRAM(S): at 22:14

## 2019-01-28 RX ADMIN — FINASTERIDE 5 MILLIGRAM(S): 5 TABLET, FILM COATED ORAL at 11:17

## 2019-01-28 RX ADMIN — SENNA PLUS 2 TABLET(S): 8.6 TABLET ORAL at 22:15

## 2019-01-28 RX ADMIN — Medication 20 MILLIGRAM(S): at 06:06

## 2019-01-28 NOTE — PROGRESS NOTE ADULT - SUBJECTIVE AND OBJECTIVE BOX
Patient is seen and examined at bedside. Denies CP/SOB/Dizziness/N/V/D/HA. Pain is controlled. States he is "relaxing"    Vital Signs Last 24 Hrs  T(C): 36.4 (28 Jan 2019 04:52), Max: 36.8 (27 Jan 2019 20:53)  T(F): 97.6 (28 Jan 2019 04:52), Max: 98.2 (27 Jan 2019 20:53)  HR: 60 (28 Jan 2019 04:52) (60 - 78)  BP: 146/64 (28 Jan 2019 04:52) (130/66 - 146/64)  BP(mean): --  RR: 17 (28 Jan 2019 04:52) (17 - 17)  SpO2: 95% (28 Jan 2019 04:52) (94% - 95%)    GEN: NAD  LLE: DSG in place. Mild saturation inferiorly. Redness slightly regressed.   Motor intact + EHL/FHL/TA/GS in the BL LE. Sensation is grossly intact distal . Extremity warm. Compartments are soft. DP 1+    Labs:  pending      Pt is a 73y Male with a L Hip Recurrent Surgical Site Infection s/p bedside I+D/packing     -Dsg in place, will change PRN  -WB as tolerated affected extremity  -XR L Hip: s/p ORIF of periprosthetic fracture of gabriela-arthroplasty. No acute fracture.  -DVT PPx per medical team.   -Please document medical optimization for OR if required.   -Will DW Dr. Ramirez and advise of any changes.

## 2019-01-28 NOTE — PROGRESS NOTE ADULT - SUBJECTIVE AND OBJECTIVE BOX
infectious diseases progress note:    ERIKA TORREZ is a 73y y. o. Male patient    Patient reports: "feeling better but I really do not want surgery."    ROS:    EYES:  Negative  blurry vision or double vision  GASTROINTESTINAL:  Negative for nausea, vomiting, diarrhea  -otherwise negative except for subjective    Allergies    Ceftin (Pruritus)    Intolerances        ANTIBIOTICS/RELEVANT:  antimicrobials  meropenem  IVPB 1000 milliGRAM(s) IV Intermittent every 8 hours  meropenem  IVPB        immunologic:    OTHER:  acetaminophen   Tablet .. 650 milliGRAM(s) Oral every 6 hours PRN  ALBUTerol    90 MICROgram(s) HFA Inhaler 2 Puff(s) Inhalation every 6 hours  ALPRAZolam 2 milliGRAM(s) Oral at bedtime  ascorbic acid 500 milliGRAM(s) Oral daily  aspirin enteric coated 81 milliGRAM(s) Oral daily  atorvastatin 10 milliGRAM(s) Oral at bedtime  cyclobenzaprine 10 milliGRAM(s) Oral two times a day PRN  docusate sodium 100 milliGRAM(s) Oral daily  enoxaparin Injectable 40 milliGRAM(s) SubCutaneous every 24 hours  finasteride 5 milliGRAM(s) Oral daily  FLUoxetine 60 milliGRAM(s) Oral daily  lisinopril 10 milliGRAM(s) Oral daily  methadone    Tablet 5 milliGRAM(s) Oral every 24 hours  oxyCODONE    IR 10 milliGRAM(s) Oral every 4 hours PRN  oxyCODONE    IR 5 milliGRAM(s) Oral every 4 hours PRN  potassium chloride    Tablet ER 20 milliEquivalent(s) Oral daily  predniSONE   Tablet 20 milliGRAM(s) Oral daily  senna 2 Tablet(s) Oral at bedtime  tamsulosin 0.4 milliGRAM(s) Oral at bedtime      Objective:  Vital Signs Last 24 Hrs  T(C): 36.4 (28 Jan 2019 04:52), Max: 36.8 (27 Jan 2019 20:53)  T(F): 97.6 (28 Jan 2019 04:52), Max: 98.2 (27 Jan 2019 20:53)  HR: 60 (28 Jan 2019 04:52) (60 - 78)  BP: 146/64 (28 Jan 2019 04:52) (130/66 - 146/64)  BP(mean): --  RR: 17 (28 Jan 2019 04:52) (17 - 17)  SpO2: 95% (28 Jan 2019 04:52) (94% - 95%)    T(C): 36.4 (01-28-19 @ 04:52), Max: 36.8 (01-26-19 @ 20:33)  T(C): 36.4 (01-28-19 @ 04:52), Max: 37 (01-25-19 @ 20:47)  T(C): 36.4 (01-28-19 @ 04:52), Max: 37 (01-25-19 @ 20:47)    PHYSICAL EXAM:  Constitutional: Well-developed, well nourished  Eyes: PERRLA, EOMI  Ear/Nose/Throat: oropharynx normal	  Neck: no JVD, no lymphadenopathy, supple  Respiratory: no accessory muscle use  Cardiovascular: RRR,   Gastrointestinal: soft, NT  Extremities: no clubbing, no cyanosis, edema absent, left hip dressed      LABS:                        8.5    7.83  )-----------( 377      ( 28 Jan 2019 08:32 )             27.4       7.83 01-28 @ 08:32  6.77 01-27 @ 08:25  11.88 01-25 @ 10:39      01-28    138  |  101  |  18  ----------------------------<  91  4.4   |  28  |  0.56    Ca    9.1      28 Jan 2019 08:32        Creatinine, Serum: 0.56 mg/dL (01-28-19 @ 08:32)  Creatinine, Serum: 0.59 mg/dL (01-27-19 @ 08:25)  Creatinine, Serum: 0.85 mg/dL (01-25-19 @ 10:39)      PT/INR - ( 28 Jan 2019 08:32 )   PT: 14.5 sec;   INR: 1.26 ratio        MICROBIOLOGY:    Culture - Abscess with Gram Stain (01.25.19 @ 21:33)    -  Trimethoprim/Sulfamethoxazole: R >2/38    -  Tobramycin: S <=4    -  Piperacillin/Tazobactam: S <=16    -  Meropenem: S <=1    -  Ciprofloxacin: S <=1    -  Ceftriaxone: S <=1 Enterobacter, Citrobacter, and Serratia may develop resistance during prolonged therapy    -  Gentamicin: S <=4    -  Imipenem: S <=1    -  Levofloxacin: S <=2    -  Cefoxitin: S <=8    -  Ampicillin/Sulbactam: R >16/8    -  Cefepime: S <=4    -  Ertapenem: S <=1    -  Aztreonam: S <=4    -  Ampicillin: R >16 These ampicillin results predict results for amoxicillin    -  Amikacin: S <=16    Specimen Source: .Abscess Hip - Left    Culture Results:   Numerous Escherichia coli    Organism Identification: Escherichia coli    Organism: Escherichia coli    Method Type: OG    Culture - Blood (01.25.19 @ 15:00)    -  Trimethoprim/Sulfamethoxazole: R >2/38    -  Escherichia coli: Detec    Gram Stain:   Growth in aerobic bottle: Gram Negative Rods  Growth in anaerobic bottle: Gram Negative Rods    -  Amikacin: S <=16    -  Ampicillin: R >16 These ampicillin results predict results for amoxicillin    -  Aztreonam: S <=4    -  Ertapenem: S <=1    -  Cefazolin: I 16    -  Cefepime: S <=4    -  Ampicillin/Sulbactam: R >16/8    -  Cefoxitin: S <=8    -  Levofloxacin: S <=2    -  Gentamicin: S <=4    -  Imipenem: S <=1    -  Ceftriaxone: S <=1 Enterobacter, Citrobacter, and Serratia may develop resistance during prolonged therapy    -  Ciprofloxacin: S <=1    -  Meropenem: S <=1    -  Piperacillin/Tazobactam: S <=16    -  Tobramycin: S <=4    Specimen Source: .Blood Blood-Peripheral    Organism: Blood Culture PCR    Organism: Escherichia coli    Culture Results:   Growth in aerobic and anaerobic bottles: Escherichia coli        RADIOLOGY & ADDITIONAL STUDIES:    < from: CT Lower Extremity w/ IV Cont, Left (12.31.18 @ 16:24) >    EXAM:  CT LWR EXT IC LT                            PROCEDURE DATE:  12/31/2018          INTERPRETATION:  Clinical information: Left periprosthetic hip fracture   status post internal fixation with soft tissue infection at surgical   site. Currentlywith erythema and draining at surgical skin site.    CT examination of the left hip/femur is performed with axial intervals   obtained at 2.5 mm from the original data set.  Coronal/sagittal reformatted images are obtained.      Impression:    Long peripheral enhancing fluid collection lateral soft tissues extending   from the buttock to proximal to mid thigh as discussed, may represent   abscess or infected hematoma.

## 2019-01-28 NOTE — PROGRESS NOTE ADULT - SUBJECTIVE AND OBJECTIVE BOX
INTERVAL HPI/OVERNIGHT EVENTS: Patient seen and examined at bedside. No acute events overnight.   Had wash out with plastic surgery done. Patient restarted on home methadone and xanax.  Orthopedic considering removing hardware or wash out.  Cardiology consulted for preop evaluation please see note.  Patient denies any chest pain or SOB. No allergies to anesthesia that patient knows about.     MEDICATIONS  (STANDING):  ALBUTerol    90 MICROgram(s) HFA Inhaler 2 Puff(s) Inhalation every 6 hours  ALPRAZolam 2 milliGRAM(s) Oral at bedtime  ascorbic acid 500 milliGRAM(s) Oral daily  aspirin enteric coated 81 milliGRAM(s) Oral daily  atorvastatin 10 milliGRAM(s) Oral at bedtime  ciprofloxacin     Tablet 500 milliGRAM(s) Oral every 12 hours  docusate sodium 100 milliGRAM(s) Oral daily  enoxaparin Injectable 40 milliGRAM(s) SubCutaneous every 24 hours  finasteride 5 milliGRAM(s) Oral daily  FLUoxetine 60 milliGRAM(s) Oral daily  lisinopril 10 milliGRAM(s) Oral daily  methadone    Tablet 5 milliGRAM(s) Oral every 24 hours  potassium chloride    Tablet ER 20 milliEquivalent(s) Oral daily  predniSONE   Tablet 20 milliGRAM(s) Oral daily  senna 2 Tablet(s) Oral at bedtime  tamsulosin 0.4 milliGRAM(s) Oral at bedtime    MEDICATIONS  (PRN):  acetaminophen   Tablet .. 650 milliGRAM(s) Oral every 6 hours PRN Mild Pain (1 - 3)  cyclobenzaprine 10 milliGRAM(s) Oral two times a day PRN Muscle Spasm  oxyCODONE    IR 10 milliGRAM(s) Oral every 4 hours PRN Severe Pain (7 - 10)  oxyCODONE    IR 5 milliGRAM(s) Oral every 4 hours PRN Moderate Pain (4 - 6)        Allergies    Ceftin (Pruritus)    Intolerances        CONSTITUTIONAL: No weakness, fevers or chills  RESPIRATORY: No cough, wheezing, hemoptysis; No shortness of breath  Cvs: No chest pain or palpitations  Gi: No abdominal pain. No nausea, vomiting, diarrhea or constipation. No melena or hematochezia.  Neuro:++weakness    All other review of systems is negative unless indicated above.    Vital Signs Last 24 Hrs  T(C): 36.3 (28 Jan 2019 13:09), Max: 36.8 (27 Jan 2019 20:53)  T(F): 97.4 (28 Jan 2019 13:09), Max: 98.2 (27 Jan 2019 20:53)  HR: 75 (28 Jan 2019 13:09) (60 - 78)  BP: 120/76 (28 Jan 2019 13:09) (120/76 - 146/64)  BP(mean): --  RR: 17 (28 Jan 2019 13:09) (17 - 17)  SpO2: 93% (28 Jan 2019 13:09) (93% - 95%)    General: NAD  Neurology: A&Ox3 , nonfocal  Respiratory: CTA B/L  CV: RRR, S1S2  Abdominal: Soft, NT, ND +BS  Extremities: No edema, + peripheral pulses, left ace bandage/draining     LABS:                        8.5    7.83  )-----------( 377      ( 28 Jan 2019 08:32 )             27.4     01-28    138  |  101  |  18  ----------------------------<  91  4.4   |  28  |  0.56    Ca    9.1      28 Jan 2019 08:32      PT/INR - ( 28 Jan 2019 08:32 )   PT: 14.5 sec;   INR: 1.26 ratio                     RADIOLOGY & ADDITIONAL TESTS:

## 2019-01-28 NOTE — PROGRESS NOTE ADULT - ASSESSMENT
72 yo M PMHx of CHF (stage I diastolic dysfunction, EF 65%) COPD, HTN, HLD, hx of MI, CABG 2008, pci, RA on steroids, hx of multiple L hip surgeries.  We have seen him several times over the past few months in anticipation and postop from several surgeries.   Of note, patient had hip replacement 30 years ago in [place] and an ORIF of periprosthetic fracture on 11/2018 w/ Dr. Ramirez which was complicated by superficial infection requiring ID evaluation and PICC line antibiotics. 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    Pt. states his surgical wound drain fell out 4 days pta, and under the advisement of his plastic surgeon Dr. Whipple he kept it out. Patient developed worsening SOB over the past few days in addition to cough when he followed up with Dr. Richardson 2 days ago who removed fluid around the hip and recommended he see Dr. Wills for his URI. Outpatient CXR demonstrated PNA which brought him to the ED.  He has been getting abx for presumed hcap, with CT evidence of fibrotic changes, but no definitive pna.  There has been concern about infected seroma, and reportedly orthopedics is considering removal of hardware.  No s/p I&D     The patient reports no new symptoms.  Denies chest discomfort.  He did have some shortness of breath in the past 24, now improved, treated as  a pulmonary issue  No abdominal pain.  No new neurologic symptoms.    -there is no evidence of acute ischemia.  -he has a history of stable cad, s/p cabg and pci  -cont statin  -cont asa  -reportedly has had a nuclear stress test within the past few months in [place], results not available at this time    -there is no evidence of significant arrhythmia.  -no need for tele monitoring now    -there is no evidence for meaningful  volume overload.  -normal ef by echo 11/2018, no need to repeat    -DVT prophylaxis  -monitor electrolytes, keep k>4, Mg>2  -bp  better controlled. cont current meds  - Pain control.       -if needs orthopedic surgery, would be considered optimized from a cv perspective.  would cont asa periop.  Routine hemodynamic monitoring is recommended.  - Ortho f/u     - Further cardiac workup will depend on clinical course.   - All other workup per primary team. Will followup.

## 2019-01-28 NOTE — PROGRESS NOTE ADULT - PROBLEM SELECTOR PLAN 1
pna, copd, atelectasis, hip infection,   on ABX - ID following, broad spectrum ABX  cont proventil and prednisone curr dose for COPD  tolerating ROOM AIR  I aundrea  wound care and packing, plastics follow up noted  ID follow up  oral hygiene  skin care  increase activity  I aundrea  pain assessment - on Opioids, bowel regimen  supportive measures  overall prognosis guarded

## 2019-01-28 NOTE — PROGRESS NOTE ADULT - ASSESSMENT
74 yo male admitted with outpt concern for pneumonia but with E coli bacteremia and likely source left thigh wound

## 2019-01-28 NOTE — PROGRESS NOTE ADULT - ASSESSMENT
Plan:  - Diet  - Pain control  - Left hip wound packing replaced  - DVT PPx: SCD, chemoprophylaxis as per Ortho service  - Will Follow, will monitor for possible need for washout    Thank You  Jayson Whipple MD  Plastic Surgery  [phone]

## 2019-01-28 NOTE — PROGRESS NOTE ADULT - PROBLEM SELECTOR PLAN 2
-CTA unclear if worsening fibrosis from hx of RA vs. HCAP  -GNR bacteremia suspected from hip but unable to r/o PNA  -Treated for HCAP now on ciprofloxacin  -ID and Pulmonary recs appreciated

## 2019-01-28 NOTE — PROGRESS NOTE ADULT - SUBJECTIVE AND OBJECTIVE BOX
Date/Time Patient Seen:  		  Referring MD:   Data Reviewed	       Patient is a 73y old  Male who presents with a chief complaint of HCAP and Hip infection (28 Jan 2019 06:26)      Subjective/HPI     PAST MEDICAL & SURGICAL HISTORY:  S/P CABG x 3  S/P hip replacement, left  Rheumatoid arthritis  Osteoarthritis  COPD (chronic obstructive pulmonary disease)  HTN (hypertension)  S/P lumbar fusion: Approx 2012  History of femur fracture: Repaired 11/19- L hip  History of appendectomy  History of right shoulder replacement  History of total left hip arthroplasty        Medication list         MEDICATIONS  (STANDING):  ALBUTerol    90 MICROgram(s) HFA Inhaler 2 Puff(s) Inhalation every 6 hours  ALPRAZolam 2 milliGRAM(s) Oral at bedtime  ascorbic acid 500 milliGRAM(s) Oral daily  aspirin enteric coated 81 milliGRAM(s) Oral daily  atorvastatin 10 milliGRAM(s) Oral at bedtime  docusate sodium 100 milliGRAM(s) Oral daily  enoxaparin Injectable 40 milliGRAM(s) SubCutaneous every 24 hours  finasteride 5 milliGRAM(s) Oral daily  FLUoxetine 60 milliGRAM(s) Oral daily  lisinopril 10 milliGRAM(s) Oral daily  meropenem  IVPB 1000 milliGRAM(s) IV Intermittent every 8 hours  meropenem  IVPB      methadone    Tablet 5 milliGRAM(s) Oral every 24 hours  potassium chloride    Tablet ER 20 milliEquivalent(s) Oral daily  predniSONE   Tablet 20 milliGRAM(s) Oral daily  senna 2 Tablet(s) Oral at bedtime  tamsulosin 0.4 milliGRAM(s) Oral at bedtime    MEDICATIONS  (PRN):  acetaminophen   Tablet .. 650 milliGRAM(s) Oral every 6 hours PRN Mild Pain (1 - 3)  cyclobenzaprine 10 milliGRAM(s) Oral two times a day PRN Muscle Spasm  oxyCODONE    IR 10 milliGRAM(s) Oral every 4 hours PRN Severe Pain (7 - 10)  oxyCODONE    IR 5 milliGRAM(s) Oral every 4 hours PRN Moderate Pain (4 - 6)         Vitals log        ICU Vital Signs Last 24 Hrs  T(C): 36.4 (28 Jan 2019 04:52), Max: 36.8 (27 Jan 2019 20:53)  T(F): 97.6 (28 Jan 2019 04:52), Max: 98.2 (27 Jan 2019 20:53)  HR: 60 (28 Jan 2019 04:52) (60 - 78)  BP: 146/64 (28 Jan 2019 04:52) (130/66 - 146/64)  BP(mean): --  ABP: --  ABP(mean): --  RR: 17 (28 Jan 2019 04:52) (17 - 17)  SpO2: 95% (28 Jan 2019 04:52) (94% - 95%)           Input and Output:  I&O's Detail    27 Jan 2019 07:01  -  28 Jan 2019 07:00  --------------------------------------------------------  IN:  Total IN: 0 mL    OUT:    Voided: 1050 mL  Total OUT: 1050 mL    Total NET: -1050 mL          Lab Data                        8.5    6.77  )-----------( 342      ( 27 Jan 2019 08:25 )             27.0     01-27    136  |  102  |  20  ----------------------------<  120<H>  4.5   |  29  |  0.59    Ca    9.0      27 Jan 2019 08:25              Review of Systems	      Objective     Physical Examination    heart s1s2  lung dec BS      Pertinent Lab findings & Imaging      Corinna:  NO   Adequate UO     I&O's Detail    27 Jan 2019 07:01  -  28 Jan 2019 07:00  --------------------------------------------------------  IN:  Total IN: 0 mL    OUT:    Voided: 1050 mL  Total OUT: 1050 mL    Total NET: -1050 mL               Discussed with:     Cultures:	        Radiology

## 2019-01-28 NOTE — PROGRESS NOTE ADULT - ASSESSMENT
74 yo M PMHx of CHF (stage I diastolic dysfunction, EF 65%) COPD (diagnosed "a few years ago, quit smoking then), HTN, HLD, hx of MI and CABG (2008) (stents also placed per chart review), RA (on Medrol daily), hx of multiple L hip surgeries presenting to the ED with SOB and L hip pain admitted for GNR bacteremia likely hip infection/abcess worsening cough likely from ILD/fibrosis and superimposed PNA/HCAP.  Patient has RCRI score of 2, intermediate risk for intermediate risk procedure. Patient medically optimized for planned orthopedic surgical procedure- see cardiology not for cardiology preoperative risk assessment. 74 yo M PMHx of CHF (stage I diastolic dysfunction, EF 65%) COPD (diagnosed "a few years ago, quit smoking then), HTN, HLD, hx of MI and CABG (2008) (stents also placed per chart review), RA (on Medrol daily), hx of multiple L hip surgeries presenting to the ED with SOB and L hip pain admitted for GNR bacteremia likely hip infection/abcess etiology uknown and worsening cough likely from ILD/fibrosis and superimposed suspected gram negativd PNA/HCAP.  Patient has RCRI score of 2, intermediate risk for intermediate risk procedure. Patient medically optimized for planned orthopedic surgical procedure- see cardiology not for cardiology preoperative risk assessment.

## 2019-01-28 NOTE — PROGRESS NOTE ADULT - SUBJECTIVE AND OBJECTIVE BOX
[facility] -- Shannon Valdez, [place], [place], Sidney Orlando Savella  Office # [phone]      Follow Up:  CAD    Subjective/Observations: Patient seen and examined. Events noted. Resting comfortably in bed. No complaints of chest pain, dyspnea, or palpitations reported. No signs of orthopnea or PND.       REVIEW OF SYSTEMS: All other review of systems is negative unless indicated above    PAST MEDICAL & SURGICAL HISTORY:  S/P CABG x 3  S/P hip replacement, left  Rheumatoid arthritis  Osteoarthritis  COPD (chronic obstructive pulmonary disease)  HTN (hypertension)  S/P lumbar fusion: Approx 2012  History of femur fracture: Repaired 11/19- L hip  History of appendectomy  History of right shoulder replacement  History of total left hip arthroplasty      MEDICATIONS  (STANDING):  ALBUTerol    90 MICROgram(s) HFA Inhaler 2 Puff(s) Inhalation every 6 hours  ALPRAZolam 2 milliGRAM(s) Oral at bedtime  ascorbic acid 500 milliGRAM(s) Oral daily  aspirin enteric coated 81 milliGRAM(s) Oral daily  atorvastatin 10 milliGRAM(s) Oral at bedtime  docusate sodium 100 milliGRAM(s) Oral daily  enoxaparin Injectable 40 milliGRAM(s) SubCutaneous every 24 hours  finasteride 5 milliGRAM(s) Oral daily  FLUoxetine 60 milliGRAM(s) Oral daily  lisinopril 10 milliGRAM(s) Oral daily  meropenem  IVPB 1000 milliGRAM(s) IV Intermittent every 8 hours  meropenem  IVPB      methadone    Tablet 5 milliGRAM(s) Oral every 24 hours  potassium chloride    Tablet ER 20 milliEquivalent(s) Oral daily  predniSONE   Tablet 20 milliGRAM(s) Oral daily  senna 2 Tablet(s) Oral at bedtime  tamsulosin 0.4 milliGRAM(s) Oral at bedtime    MEDICATIONS  (PRN):  acetaminophen   Tablet .. 650 milliGRAM(s) Oral every 6 hours PRN Mild Pain (1 - 3)  cyclobenzaprine 10 milliGRAM(s) Oral two times a day PRN Muscle Spasm  oxyCODONE    IR 10 milliGRAM(s) Oral every 4 hours PRN Severe Pain (7 - 10)  oxyCODONE    IR 5 milliGRAM(s) Oral every 4 hours PRN Moderate Pain (4 - 6)      Allergies    Ceftin (Pruritus)    Intolerances            Vital Signs Last 24 Hrs  T(C): 36.4 (28 Jan 2019 04:52), Max: 36.8 (27 Jan 2019 20:53)  T(F): 97.6 (28 Jan 2019 04:52), Max: 98.2 (27 Jan 2019 20:53)  HR: 60 (28 Jan 2019 04:52) (60 - 78)  BP: 146/64 (28 Jan 2019 04:52) (130/66 - 146/64)  BP(mean): --  RR: 17 (28 Jan 2019 04:52) (17 - 17)  SpO2: 95% (28 Jan 2019 04:52) (94% - 95%)    I&O's Summary    27 Jan 2019 07:01  -  28 Jan 2019 07:00  --------------------------------------------------------  IN: 0 mL / OUT: 1050 mL / NET: -1050 mL          PHYSICAL EXAM:     Constitutional: NAD, awake and alert, well-developed  HEENT: Moist Mucous Membranes, Anicteric  Pulmonary: Decreased breath sounds b/l. No rales, crackles or wheeze appreciated.   Cardiovascular: Regular, S1 and S2, No murmurs, rubs, gallops or clicks  Gastrointestinal: Bowel Sounds present, soft, nontender.   Lymph: No peripheral edema. No lymphadenopathy.  Skin: No visible rashes or ulcers.  Psych:  Mood & affect appropriate for situation    LABS: All Labs Reviewed:                        8.5    6.77  )-----------( 342      ( 27 Jan 2019 08:25 )             27.0                         9.7    11.88 )-----------( 371      ( 25 Jan 2019 10:39 )             30.0     27 Jan 2019 08:25    136    |  102    |  20     ----------------------------<  120    4.5     |  29     |  0.59   25 Jan 2019 10:39    127    |  92     |  22     ----------------------------<  117    4.3     |  25     |  0.85     Ca    9.0        27 Jan 2019 08:25  Ca    9.3        25 Jan 2019 10:39    TPro  7.2    /  Alb  2.2    /  TBili  1.2    /  DBili  x      /  AST  67     /  ALT  59     /  AlkPhos  176    25 Jan 2019 10:39

## 2019-01-28 NOTE — PROGRESS NOTE ADULT - PROBLEM SELECTOR PLAN 1
with pcn reported allergy will de-escalate to cipro but will need to work with medicine and surgical specialties regarding any need for local site control.

## 2019-01-28 NOTE — PROGRESS NOTE ADULT - SUBJECTIVE AND OBJECTIVE BOX
[facility]IKA [facility]   [id number]    Patient stable, tolerating diet, pain controlled on regimen.      T(C): 36.8 (01-27-19 @ 20:53), Max: 36.8 (01-27-19 @ 20:53)  HR: 78 (01-27-19 @ 20:53) (62 - 78)  BP: 130/66 (01-27-19 @ 20:53) (109/63 - 136/75)  RR: 17 (01-27-19 @ 20:53) (16 - 17)  SpO2: 94% (01-27-19 @ 20:53) (93% - 95%)  Wt(kg): --  NAD  Left hip:  Minimal expressible drainage.  Resolving erythema with swelling present.    BLE: No calf tenderness.      01-26 @ 07:01  -  01-27 @ 07:00  --------------------------------------------------------  IN: 0 mL / OUT: 150 mL / NET: -150 mL    01-27 @ 07:01  -  01-28 @ 00:24  --------------------------------------------------------  IN: 0 mL / OUT: 550 mL / NET: -550 mL      Hemovac:  EUSEBIO:   acetaminophen   Tablet .. 650 milliGRAM(s) Oral every 6 hours PRN  ALBUTerol    90 MICROgram(s) HFA Inhaler 2 Puff(s) Inhalation every 6 hours  ALPRAZolam 2 milliGRAM(s) Oral at bedtime  ascorbic acid 500 milliGRAM(s) Oral daily  aspirin enteric coated 81 milliGRAM(s) Oral daily  atorvastatin 10 milliGRAM(s) Oral at bedtime  cyclobenzaprine 10 milliGRAM(s) Oral two times a day PRN  docusate sodium 100 milliGRAM(s) Oral daily  enoxaparin Injectable 40 milliGRAM(s) SubCutaneous every 24 hours  finasteride 5 milliGRAM(s) Oral daily  FLUoxetine 60 milliGRAM(s) Oral daily  lisinopril 10 milliGRAM(s) Oral daily  meropenem  IVPB 1000 milliGRAM(s) IV Intermittent every 8 hours  meropenem  IVPB      methadone    Tablet 5 milliGRAM(s) Oral every 24 hours  oxyCODONE    IR 10 milliGRAM(s) Oral every 4 hours PRN  oxyCODONE    IR 5 milliGRAM(s) Oral every 4 hours PRN  potassium chloride    Tablet ER 20 milliEquivalent(s) Oral daily  predniSONE   Tablet 20 milliGRAM(s) Oral daily  senna 2 Tablet(s) Oral at bedtime  tamsulosin 0.4 milliGRAM(s) Oral at bedtime                            8.5    6.77  )-----------( 342      ( 27 Jan 2019 08:25 )             27.0     01-27    136  |  102  |  20  ----------------------------<  120<H>  4.5   |  29  |  0.59    Ca    9.0      27 Jan 2019 08:25    Wcx: E.coli.

## 2019-01-29 PROBLEM — Z00.00 ENCOUNTER FOR PREVENTIVE HEALTH EXAMINATION: Status: ACTIVE | Noted: 2019-01-29

## 2019-01-29 LAB
HCT VFR BLD CALC: 31.7 % — LOW (ref 39–50)
HGB BLD-MCNC: 9.8 G/DL — LOW (ref 13–17)
MCHC RBC-ENTMCNC: 27.5 PG — SIGNIFICANT CHANGE UP (ref 27–34)
MCHC RBC-ENTMCNC: 30.9 GM/DL — LOW (ref 32–36)
MCV RBC AUTO: 89 FL — SIGNIFICANT CHANGE UP (ref 80–100)
NRBC # BLD: 0 /100 WBCS — SIGNIFICANT CHANGE UP (ref 0–0)
PLATELET # BLD AUTO: 409 K/UL — HIGH (ref 150–400)
RBC # BLD: 3.56 M/UL — LOW (ref 4.2–5.8)
RBC # FLD: 14.9 % — HIGH (ref 10.3–14.5)
WBC # BLD: 12.73 K/UL — HIGH (ref 3.8–10.5)
WBC # FLD AUTO: 12.73 K/UL — HIGH (ref 3.8–10.5)

## 2019-01-29 PROCEDURE — 99233 SBSQ HOSP IP/OBS HIGH 50: CPT

## 2019-01-29 PROCEDURE — 99232 SBSQ HOSP IP/OBS MODERATE 35: CPT

## 2019-01-29 RX ORDER — ACETAMINOPHEN 500 MG
1000 TABLET ORAL ONCE
Qty: 0 | Refills: 0 | Status: COMPLETED | OUTPATIENT
Start: 2019-01-29 | End: 2019-01-29

## 2019-01-29 RX ADMIN — OXYCODONE HYDROCHLORIDE 10 MILLIGRAM(S): 5 TABLET ORAL at 05:37

## 2019-01-29 RX ADMIN — METHADONE HYDROCHLORIDE 5 MILLIGRAM(S): 40 TABLET ORAL at 17:39

## 2019-01-29 RX ADMIN — Medication 500 MILLIGRAM(S): at 17:36

## 2019-01-29 RX ADMIN — Medication 60 MILLIGRAM(S): at 11:16

## 2019-01-29 RX ADMIN — SENNA PLUS 2 TABLET(S): 8.6 TABLET ORAL at 21:57

## 2019-01-29 RX ADMIN — CYCLOBENZAPRINE HYDROCHLORIDE 10 MILLIGRAM(S): 10 TABLET, FILM COATED ORAL at 21:57

## 2019-01-29 RX ADMIN — Medication 400 MILLIGRAM(S): at 08:33

## 2019-01-29 RX ADMIN — Medication 20 MILLIEQUIVALENT(S): at 11:15

## 2019-01-29 RX ADMIN — ENOXAPARIN SODIUM 40 MILLIGRAM(S): 100 INJECTION SUBCUTANEOUS at 05:37

## 2019-01-29 RX ADMIN — TAMSULOSIN HYDROCHLORIDE 0.4 MILLIGRAM(S): 0.4 CAPSULE ORAL at 21:57

## 2019-01-29 RX ADMIN — Medication 500 MILLIGRAM(S): at 11:17

## 2019-01-29 RX ADMIN — Medication 500 MILLIGRAM(S): at 05:37

## 2019-01-29 RX ADMIN — Medication 2 MILLIGRAM(S): at 21:57

## 2019-01-29 RX ADMIN — LISINOPRIL 10 MILLIGRAM(S): 2.5 TABLET ORAL at 05:37

## 2019-01-29 RX ADMIN — Medication 1000 MILLIGRAM(S): at 09:00

## 2019-01-29 RX ADMIN — Medication 81 MILLIGRAM(S): at 11:17

## 2019-01-29 RX ADMIN — Medication 20 MILLIGRAM(S): at 05:37

## 2019-01-29 RX ADMIN — FINASTERIDE 5 MILLIGRAM(S): 5 TABLET, FILM COATED ORAL at 11:16

## 2019-01-29 RX ADMIN — ALBUTEROL 2 PUFF(S): 90 AEROSOL, METERED ORAL at 06:27

## 2019-01-29 RX ADMIN — ATORVASTATIN CALCIUM 10 MILLIGRAM(S): 80 TABLET, FILM COATED ORAL at 21:59

## 2019-01-29 RX ADMIN — ALBUTEROL 2 PUFF(S): 90 AEROSOL, METERED ORAL at 17:35

## 2019-01-29 RX ADMIN — Medication 100 MILLIGRAM(S): at 11:17

## 2019-01-29 NOTE — PROGRESS NOTE ADULT - ASSESSMENT
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 With different bacteria each time, it would appear highly unlikely that there is one continuous process originating at the joint space (I would expect the same isolates if that indeed were the case). Suspect we selected different jer based on ongoing antibiotic use. Even if hardware is infected, I have grave concerns regarding its removal-- tolerating the surgery, and functional status even if at some point he were able to be successfully reconstructed, etc.   I will review the situation with plastic/ortho, but would favor exploration/debridement patient being left open with VAC rather than risk developing another closed space infection.

## 2019-01-29 NOTE — PROGRESS NOTE ADULT - SUBJECTIVE AND OBJECTIVE BOX
CHIEF COMPLAINT: Patient is a 73y old  Male who presents with a chief complaint of HCAP and Hip infection (29 Jan 2019 07:40)      HPI:  72 yo M PMHx of CHF (stage I diastolic dysfunction, EF 65%) COPD (diagnosed "a few years ago, quit smoking then), HTN, HLD, hx of MI and CABG (2008) (stents also placed per chart review), RA (on Medrol daily), hx of multiple L hip surgeries presenting to the ED with SOB and L hip pain. Patient's wife and daughter present to assist with history. Of note, patient had hip replacement 30 years ago in [place] and an ORIF of periprosthetic fracture on 11/2018 w/ Dr. Ramirez which was complicated by superficial infection requiring ID evaluation and PICC line antibiotics. 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 Pt. states his surgical wound drain fell out 4 days ago, and under the advisement of his plastic surgeon Dr. Whipple he kept it out. Patient developed worsening SOB over the past few days in addition to cough when he followed up with Dr. Richardson 2 days ago who removed fluid around the hip and recommended he see Dr. Wills for his URI. Outpatient CXR he received yesterday demonstrated PNA which brought him to the ED today as he wasn't able to come yesterday. He denies recent fever, chills, weight changes but admits to fatigue and a decrease in appetite he ascribes to the daptomycin. Denies F/U/D.     In the ED:   VS wnl, , WBC 11.88, H/H 9.7/30 lactate 2.1  Na: 127, K: 4.3 Cl: 93  BUN/cr : 22/0.85 Alphos: 176 AST: 67 ALT: 59 trops <0.015 BNP: 560 (appears baseline)  ABG: No acute acidosis/alkalosis UA: negative (25 Jan 2019 13:53)      Follow Up: CAD    Interval History: Patient seen and examined. Events noted. in bed c/o left leg  and arthritic pain, he received pain meds. No complaints of chest pain, dyspnea, or palpitations reported. No signs of orthopnea or PND.     EKG:  < from: 12 Lead ECG (01.25.19 @ 10:37) >  Ventricular Rate 88 BPM    Atrial Rate 88 BPM    P-R Interval 202 ms    QRS Duration 100 ms    Q-T Interval 376 ms    QTC Calculation(Bezet) 454 ms    P Axis 35 degrees    R Axis -40 degrees    T Axis 31 degrees    Diagnosis Line Normal sinus rhythm Left atrial enlargement    Left axis deviation  Abnormal ECG    < end of copied text >      REVIEW OF SYSTEMS:    CONSTITUTIONAL: No weakness, fevers or chills  EYES/ENT: No visual changes;  No vertigo or throat pain   NECK: No pain or stiffness  RESPIRATORY: No cough, wheezing, hemoptysis; No shortness of breath  CARDIOVASCULAR: No chest pain or palpitations  GASTROINTESTINAL: No abdominal or epigastric pain. No nausea, vomiting, or hematemesis; No diarrhea or constipation. No melena or hematochezia.  GENITOURINARY: No dysuria, frequency or hematuria  NEUROLOGICAL: No numbness or weakness  SKIN: No itching, rashes      PAST MEDICAL & SURGICAL HISTORY:  S/P CABG x 3  S/P hip replacement, left  Rheumatoid arthritis  Osteoarthritis  COPD (chronic obstructive pulmonary disease)  HTN (hypertension)  S/P lumbar fusion: Approx 2012  History of femur fracture: Repaired 11/19- L hip  History of appendectomy  History of right shoulder replacement  History of total left hip arthroplasty      SOCIAL HISTORY:  No tobacco, ethanol, or drug abuse.    FAMILY HISTORY:  No pertinent family history in first degree relatives    No family history of acute MI or sudden cardiac death.    MEDICATIONS  (STANDING):  acetaminophen  IVPB .. 1000 milliGRAM(s) IV Intermittent once  ALBUTerol    90 MICROgram(s) HFA Inhaler 2 Puff(s) Inhalation every 6 hours  ALPRAZolam 2 milliGRAM(s) Oral at bedtime  ascorbic acid 500 milliGRAM(s) Oral daily  aspirin enteric coated 81 milliGRAM(s) Oral daily  atorvastatin 10 milliGRAM(s) Oral at bedtime  ciprofloxacin     Tablet 500 milliGRAM(s) Oral every 12 hours  docusate sodium 100 milliGRAM(s) Oral daily  enoxaparin Injectable 40 milliGRAM(s) SubCutaneous every 24 hours  finasteride 5 milliGRAM(s) Oral daily  FLUoxetine 60 milliGRAM(s) Oral daily  lisinopril 10 milliGRAM(s) Oral daily  methadone    Tablet 5 milliGRAM(s) Oral every 24 hours  methylPREDNISolone 2 milliGRAM(s) Oral daily  potassium chloride    Tablet ER 20 milliEquivalent(s) Oral daily  senna 2 Tablet(s) Oral at bedtime  tamsulosin 0.4 milliGRAM(s) Oral at bedtime    MEDICATIONS  (PRN):  acetaminophen   Tablet .. 650 milliGRAM(s) Oral every 6 hours PRN Mild Pain (1 - 3)  cyclobenzaprine 10 milliGRAM(s) Oral two times a day PRN Muscle Spasm  oxyCODONE    IR 10 milliGRAM(s) Oral every 4 hours PRN Severe Pain (7 - 10)  oxyCODONE    IR 5 milliGRAM(s) Oral every 4 hours PRN Moderate Pain (4 - 6)      Allergies    Ceftin (Pruritus)    Intolerances        Home meds:  Home Medications:  alendronate 70 mg oral tablet: 1 tab(s) orally once a week (25 Jan 2019 15:15)  ALPRAZolam 0.5 mg oral tablet: 1 tab(s) orally once a day (at bedtime), As Needed (25 Jan 2019 15:15)  ascorbic acid 500 mg oral tablet: 1 tab(s) orally 2 times a day (25 Jan 2019 15:15)  cyclobenzaprine 10 mg oral tablet: 1 tab(s) orally 2 times a day, As Needed (25 Jan 2019 15:15)  finasteride 5 mg oral tablet: 1 tab(s) orally once a day (25 Jan 2019 15:15)  FLUoxetine 20 mg oral capsule: 3 cap(s) orally once a day (25 Jan 2019 15:15)  lovastatin 20 mg oral tablet: 1 tab(s) orally once a day (25 Jan 2019 15:15)  methylPREDNISolone 2 mg oral tablet: 1 tab(s) orally once a day (25 Jan 2019 15:15)  potassium chloride 10 mEq oral capsule, extended release: 1 cap(s) orally 2 times a day (25 Jan 2019 15:15)  tamsulosin 0.4 mg oral capsule: 1 cap(s) orally once a day (25 Jan 2019 15:15)        VITAL SIGNS:   Vital Signs Last 24 Hrs  T(C): 36.8 (29 Jan 2019 05:50), Max: 36.9 (28 Jan 2019 21:10)  T(F): 98.3 (29 Jan 2019 05:50), Max: 98.5 (29 Jan 2019 05:37)  HR: 30 (29 Jan 2019 05:50) (30 - 85)  BP: 133/81 (29 Jan 2019 05:50) (110/75 - 137/74)  BP(mean): 95 (28 Jan 2019 21:10) (95 - 95)  RR: 16 (29 Jan 2019 05:37) (16 - 18)  SpO2: 93% (29 Jan 2019 05:37) (93% - 98%)    I&O's Summary    28 Jan 2019 07:01  -  29 Jan 2019 07:00  --------------------------------------------------------  IN: 0 mL / OUT: 800 mL / NET: -800 mL        On Exam:  TELE: not on tele  Constitutional: NAD, awake and alert, well-developed  HEENT: Moist Mucous Membranes, Anicteric  Pulmonary: Decreased breath sounds b/l. No rales, crackles or wheeze appreciated.   Cardiovascular:  regular S1 and S2. 1-2/6 sys murmur.  No rubs, gallops or clicks  Gastrointestinal: Bowel Sounds present, soft, nontender.   Lymph: No peripheral edema. left hip bandaged, cdi  Skin: No visible rashes or ulcers; left hip with dsg c/d/i  Psych:  Mood & affect appropriate    LABS: All Labs Reviewed:                        8.5    7.83  )-----------( 377      ( 28 Jan 2019 08:32 )             27.4                         8.5    6.77  )-----------( 342      ( 27 Jan 2019 08:25 )             27.0     28 Jan 2019 08:32    138    |  101    |  18     ----------------------------<  91     4.4     |  28     |  0.56   27 Jan 2019 08:25    136    |  102    |  20     ----------------------------<  120    4.5     |  29     |  0.59     Ca    9.1        28 Jan 2019 08:32  Ca    9.0        27 Jan 2019 08:25      PT/INR - ( 28 Jan 2019 08:32 )   PT: 14.5 sec;   INR: 1.26 ratio           Blood Culture: Organism Escherichia coli  Gram Stain Blood -- Gram Stain --  Specimen Source .Abscess Hip - Left  Culture-Blood --    Organism --  Gram Stain Blood -- Gram Stain --  Specimen Source .Urine Clean Catch (Midstream)  Culture-Blood --    Organism Blood Culture PCR  Gram Stain Blood -- Gram Stain   Growth in anaerobic bottle: Gram Negative Rods  Growth in aerobic bottle: Gram Negative Rods  Specimen Source .Blood Blood-Peripheral  Culture-Blood --      RADIOLOGY:  < from: TTE Echo Doppler w/o Cont (11.19.18 @ 11:09) >  Conclusion: Overall preserved left ventricular systolic function. EF 65.   Insufficient tricuspid regurgitation Doppler in order to estimate the   right ventricular systolic pressure    < end of copied text >    < from: Xray Chest 2 Views PA/Lat (01.25.19 @ 11:07) >  IMPRESSION: Nonspecific patchy opacities throughout the left lung which   probably prior x-ray examination dated 12/31/2018. Clinical correlation   is required.      < end of copied text >

## 2019-01-29 NOTE — PROGRESS NOTE ADULT - SUBJECTIVE AND OBJECTIVE BOX
INTERVAL HPI/OVERNIGHT EVENTS: Patient seen and examined at bedside. No acute events overnight.   Had wash out with plastic surgery done. Patient restarted on home methadone and xanax.  Orthopedic considering removing hardware or wash out, awaiting further recommendations.   ID following patient as well, awaiting for further orthopedic recs.     MEDICATIONS  (STANDING):  ALBUTerol    90 MICROgram(s) HFA Inhaler 2 Puff(s) Inhalation every 6 hours  ALPRAZolam 2 milliGRAM(s) Oral at bedtime  ascorbic acid 500 milliGRAM(s) Oral daily  aspirin enteric coated 81 milliGRAM(s) Oral daily  atorvastatin 10 milliGRAM(s) Oral at bedtime  ciprofloxacin     Tablet 500 milliGRAM(s) Oral every 12 hours  docusate sodium 100 milliGRAM(s) Oral daily  enoxaparin Injectable 40 milliGRAM(s) SubCutaneous every 24 hours  finasteride 5 milliGRAM(s) Oral daily  FLUoxetine 60 milliGRAM(s) Oral daily  lisinopril 10 milliGRAM(s) Oral daily  methadone    Tablet 5 milliGRAM(s) Oral every 24 hours  methylPREDNISolone 2 milliGRAM(s) Oral daily  potassium chloride    Tablet ER 20 milliEquivalent(s) Oral daily  senna 2 Tablet(s) Oral at bedtime  tamsulosin 0.4 milliGRAM(s) Oral at bedtime    MEDICATIONS  (PRN):  acetaminophen   Tablet .. 650 milliGRAM(s) Oral every 6 hours PRN Mild Pain (1 - 3)  cyclobenzaprine 10 milliGRAM(s) Oral two times a day PRN Muscle Spasm  oxyCODONE    IR 10 milliGRAM(s) Oral every 4 hours PRN Severe Pain (7 - 10)  oxyCODONE    IR 5 milliGRAM(s) Oral every 4 hours PRN Moderate Pain (4 - 6)        Allergies    Ceftin (Pruritus)    Intolerances        CONSTITUTIONAL: No weakness, fevers or chills  RESPIRATORY: No cough, wheezing, hemoptysis; No shortness of breath  Cvs: No chest pain or palpitations  Gi: No abdominal pain. No nausea, vomiting, diarrhea or constipation. No melena or hematochezia.  Neuro: +weakness    All other review of systems is negative unless indicated above.    Vital Signs Last 24 Hrs  T(C): 36.9 (29 Jan 2019 12:48), Max: 36.9 (28 Jan 2019 21:10)  T(F): 98.4 (29 Jan 2019 12:48), Max: 98.5 (29 Jan 2019 05:37)  HR: 95 (29 Jan 2019 12:48) (30 - 95)  BP: 98/56 (29 Jan 2019 12:48) (98/56 - 137/74)  BP(mean): 95 (28 Jan 2019 21:10) (95 - 95)  RR: 16 (29 Jan 2019 12:48) (16 - 18)  SpO2: 93% (29 Jan 2019 12:48) (93% - 98%)    General: NAD  Neurology: A&Ox3 , nonfocal  Respiratory: CTA B/L  CV: RRR, S1S2  Abdominal: Soft, NT, ND +BS  Extremities: No edema, + peripheral pulses, left ace bandage/draining     LABS:                        9.8    12.73 )-----------( 409      ( 29 Jan 2019 08:15 )             31.7     01-28    138  |  101  |  18  ----------------------------<  91  4.4   |  28  |  0.56    Ca    9.1      28 Jan 2019 08:32      PT/INR - ( 28 Jan 2019 08:32 )   PT: 14.5 sec;   INR: 1.26 ratio               RADIOLOGY & ADDITIONAL TESTS:

## 2019-01-29 NOTE — PROGRESS NOTE ADULT - SUBJECTIVE AND OBJECTIVE BOX
[facility], Division of Infectious Diseases  GAETANO Briones A. Lee  [phone]    Name: ERIKA TORREZ  Age: 73y  Gender: Male  MRN: [id number]    Interval History--  Notes reviewed. Feeling ok.  Breathing much more comfortable.  No fevers. To resume baseline steroid dose.  "What's the plan?"    Past Medical History--  S/P CABG x 3  S/P hip replacement, left  Rheumatoid arthritis  Osteoarthritis  COPD (chronic obstructive pulmonary disease)  HTN (hypertension)  S/P lumbar fusion  History of femur fracture  History of appendectomy  History of right shoulder replacement  History of total left hip arthroplasty      For details regarding the patient's social history, family history, and other miscellaneous elements, please refer the initial infectious diseases consultation and/or the admitting history and physical examination for this admission.    Allergies    Ceftin (Pruritus)    Intolerances        Medications--  Antibiotics:  ciprofloxacin     Tablet 500 milliGRAM(s) Oral every 12 hours    Immunologic:    Other:  acetaminophen   Tablet .. PRN  ALBUTerol    90 MICROgram(s) HFA Inhaler  ALPRAZolam  ascorbic acid  aspirin enteric coated  atorvastatin  cyclobenzaprine PRN  docusate sodium  enoxaparin Injectable  finasteride  FLUoxetine  lisinopril  methadone    Tablet  methylPREDNISolone  oxyCODONE    IR PRN  oxyCODONE    IR PRN  potassium chloride    Tablet ER  senna  tamsulosin      Review of Systems--  A 10-point review of systems was obtained.   Review of systems otherwise negative except as previously noted.    Physical Examination--  Vital Signs: T(F): 98.4 (01-29-19 @ 12:48), Max: 98.5 (01-29-19 @ 05:37)  HR: 95 (01-29-19 @ 12:48)  BP: 98/56 (01-29-19 @ 12:48)  RR: 16 (01-29-19 @ 12:48)  SpO2: 93% (01-29-19 @ 12:48)  Wt(kg): --  General: Nontoxic-appearing Male in no acute distress.  HEENT: AT/NC. Anicteric. Conjunctiva pink and moist. Oropharynx clear.   Neck: Not rigid. No sense of mass.  Nodes: None palpable.  Lungs: Diminished breath sounds bilaterally with scattered bilateral rhonchi  Heart: Regular rate and rhythm. No Murmur. No rub. No gallop. No palpable thrill.  Abdomen: Bowel sounds present and normoactive. Soft. Nondistended. Nontender. No sense of mass. No organomegaly. Obese.   Extremities: No cyanosis or clubbing. L thigh dressed.  Skin: Warm. Dry. Good turgor. No rash. No vasculitic stigmata.  Psychiatric: Appropriate affect and mood for situation.       Laboratory Studies--  CBC                        9.8    12.73 )-----------( 409      ( 29 Jan 2019 08:15 )             31.7       Chemistries  01-28    138  |  101  |  18  ----------------------------<  91  4.4   |  28  |  0.56    Ca    9.1      28 Jan 2019 08:32        Culture Data    Culture - Blood (collected 28 Jan 2019 15:21)  Source: .Blood Blood-Venous  Preliminary Report (29 Jan 2019 16:01):    No growth to date.    Culture - Blood (collected 28 Jan 2019 15:21)  Source: .Blood Blood-Peripheral  Preliminary Report (29 Jan 2019 16:01):    No growth to date.    Culture - Abscess with Gram Stain (collected 25 Jan 2019 21:33)  Source: .Abscess Hip - Left  Preliminary Report (27 Jan 2019 17:17):    Numerous Escherichia coli  Organism: Escherichia coli (27 Jan 2019 17:14)  Organism: Escherichia coli (27 Jan 2019 17:14)    Culture - Urine (collected 25 Jan 2019 20:05)  Source: .Urine Clean Catch (Midstream)  Final Report (26 Jan 2019 21:52):    <10,000 CFU/ml Normal Urogenital jer present    Culture - Blood (collected 25 Jan 2019 15:00)  Source: .Blood Blood-Peripheral  Gram Stain (26 Jan 2019 03:06):    Growth in aerobic bottle: Gram Negative Rods    Growth in anaerobic bottle: Gram Negative Rods  Final Report (27 Jan 2019 20:24):    Growth in aerobic and anaerobic bottles: Escherichia coli    "Due to technical problems, Proteus sp. will Not be reported as part of    the BCID panel until further notice"    ***Blood Panel PCR results on this specimen are available    approximately 3 hours after the Gram stain result.***    Gram stain, PCR, and/or culture results may not always    correspond due to difference in methodologies.    ************************************************************    This PCR assay was performed using [employer].    The following targets are tested for: Enterococcus,    vancomycin resistant enterococci, Listeria monocytogenes,    coagulase negative staphylococci, S. aureus,    methicillin resistant S. aureus, Streptococcus agalactiae    (Group B), S. pneumoniae, S.pyogenes (Group A),    Acinetobacter baumannii, Enterobacter cloacae, E. coli,    Klebsiella oxytoca, K. pneumoniae, Proteus sp.,    Serratia marcescens, Haemophilus influenzae,    Neisseria meningitidis, Pseudomonas aeruginosa, Candida    albicans, C. glabrata, C krusei, C parapsilosis,    C. tropicalis and the KPC resistance gene.  Organism: Blood Culture PCR  Escherichia coli (27 Jan 2019 20:24)  Organism: Escherichia coli (27 Jan 2019 20:24)  Organism: Blood Culture PCR (27 Jan 2019 20:24)    Culture - Blood (collected 25 Jan 2019 15:00)  Source: .Blood Blood-Peripheral  Gram Stain (26 Jan 2019 04:17):    Growth in anaerobic bottle: Gram Negative Rods    Growth in aerobic bottle: Gram Negative Rods  Final Report (27 Jan 2019 20:25):    Growth in aerobic and anaerobic bottles: Escherichia coli    See previous culture [id number]

## 2019-01-29 NOTE — PROGRESS NOTE ADULT - SUBJECTIVE AND OBJECTIVE BOX
Patient is seen and examined at bedside. Denies CP/SOB/Dizziness/N/V/D/HA. Pain is controlled. No acute events overnight.    Vital Signs Last 24 Hrs  T(C): 36.8 (29 Jan 2019 05:50), Max: 36.9 (28 Jan 2019 21:10)  T(F): 98.3 (29 Jan 2019 05:50), Max: 98.5 (29 Jan 2019 05:37)  HR: 30 (29 Jan 2019 05:50) (30 - 85)  BP: 133/81 (29 Jan 2019 05:50) (110/75 - 137/74)  BP(mean): 95 (28 Jan 2019 21:10) (95 - 95)  RR: 16 (29 Jan 2019 05:37) (16 - 18)  SpO2: 93% (29 Jan 2019 05:37) (93% - 98%)    GEN: NAD  LLE: Dressing intact; Redness slightly improved.  + EHL/FHL/TA/GS in the BL LE. SILT L3-S1  Extremity warm. Compartments are soft and compressible   DP 1+

## 2019-01-29 NOTE — PROGRESS NOTE ADULT - PROBLEM SELECTOR PLAN 2
-CTA unclear if worsening fibrosis from hx of RA vs. HCAP  -GNR bacteremia suspected from hip but unable to r/o PNA  -Treated for HCAP now on ciprofloxacin  -ID and Pulmonary recs appreciated complains of pain/discomfort

## 2019-01-29 NOTE — PROGRESS NOTE ADULT - PROBLEM SELECTOR PLAN 1
copd, bacteremia, poss pna, chronic lung disease, sherie, DM, hip wound infection  chronic pain - and acute pain, will give IV tylenol this am  cont opioids prn, may need additional regimen - would consider Neurontin  I aundrea  ID follow up  on emp ABX for bacteremia  on proventil for COPD, will taper steroids today to pt's baseline - 2 mg Methylprednisolone,   RA and OP and OA - supportive measures  SHERIE - does not want to use CPAP  prognosis guarded  out of bed as tolerated  keep sat > 88 pct  will follow

## 2019-01-29 NOTE — PROGRESS NOTE ADULT - PROBLEM SELECTOR PLAN 8
hx of CAD s/p CABG  -continue aspirin, lisinopril , continue statin  -cardiology consulted for preop risk assessment

## 2019-01-29 NOTE — PROGRESS NOTE ADULT - SUBJECTIVE AND OBJECTIVE BOX
Date/Time Patient Seen:  		  Referring MD:   Data Reviewed	       Patient is a 73y old  Male who presents with a chief complaint of HCAP and Hip infection (29 Jan 2019 06:18)      Subjective/HPI     PAST MEDICAL & SURGICAL HISTORY:  S/P CABG x 3  S/P hip replacement, left  Rheumatoid arthritis  Osteoarthritis  COPD (chronic obstructive pulmonary disease)  HTN (hypertension)  S/P lumbar fusion: Approx 2012  History of femur fracture: Repaired 11/19- L hip  History of appendectomy  History of right shoulder replacement  History of total left hip arthroplasty        Medication list         MEDICATIONS  (STANDING):  acetaminophen  IVPB .. 1000 milliGRAM(s) IV Intermittent once  ALBUTerol    90 MICROgram(s) HFA Inhaler 2 Puff(s) Inhalation every 6 hours  ALPRAZolam 2 milliGRAM(s) Oral at bedtime  ascorbic acid 500 milliGRAM(s) Oral daily  aspirin enteric coated 81 milliGRAM(s) Oral daily  atorvastatin 10 milliGRAM(s) Oral at bedtime  ciprofloxacin     Tablet 500 milliGRAM(s) Oral every 12 hours  docusate sodium 100 milliGRAM(s) Oral daily  enoxaparin Injectable 40 milliGRAM(s) SubCutaneous every 24 hours  finasteride 5 milliGRAM(s) Oral daily  FLUoxetine 60 milliGRAM(s) Oral daily  lisinopril 10 milliGRAM(s) Oral daily  methadone    Tablet 5 milliGRAM(s) Oral every 24 hours  methylPREDNISolone 2 milliGRAM(s) Oral daily  potassium chloride    Tablet ER 20 milliEquivalent(s) Oral daily  senna 2 Tablet(s) Oral at bedtime  tamsulosin 0.4 milliGRAM(s) Oral at bedtime    MEDICATIONS  (PRN):  acetaminophen   Tablet .. 650 milliGRAM(s) Oral every 6 hours PRN Mild Pain (1 - 3)  cyclobenzaprine 10 milliGRAM(s) Oral two times a day PRN Muscle Spasm  oxyCODONE    IR 10 milliGRAM(s) Oral every 4 hours PRN Severe Pain (7 - 10)  oxyCODONE    IR 5 milliGRAM(s) Oral every 4 hours PRN Moderate Pain (4 - 6)         Vitals log        ICU Vital Signs Last 24 Hrs  T(C): 36.8 (29 Jan 2019 05:50), Max: 36.9 (28 Jan 2019 21:10)  T(F): 98.3 (29 Jan 2019 05:50), Max: 98.5 (29 Jan 2019 05:37)  HR: 30 (29 Jan 2019 05:50) (30 - 85)  BP: 133/81 (29 Jan 2019 05:50) (110/75 - 137/74)  BP(mean): 95 (28 Jan 2019 21:10) (95 - 95)  ABP: --  ABP(mean): --  RR: 16 (29 Jan 2019 05:37) (16 - 18)  SpO2: 93% (29 Jan 2019 05:37) (93% - 98%)           Input and Output:  I&O's Detail    28 Jan 2019 07:01  -  29 Jan 2019 07:00  --------------------------------------------------------  IN:  Total IN: 0 mL    OUT:    Voided: 800 mL  Total OUT: 800 mL    Total NET: -800 mL          Lab Data                        8.5    7.83  )-----------( 377      ( 28 Jan 2019 08:32 )             27.4     01-28    138  |  101  |  18  ----------------------------<  91  4.4   |  28  |  0.56    Ca    9.1      28 Jan 2019 08:32              Review of Systems	      Objective     Physical Examination    heart s1s2  lung dec BS  in pain this am  on o2 support  verbal      Pertinent Lab findings & Imaging      Corinna:  NO   Adequate UO     I&O's Detail    28 Jan 2019 07:01  -  29 Jan 2019 07:00  --------------------------------------------------------  IN:  Total IN: 0 mL    OUT:    Voided: 800 mL  Total OUT: 800 mL    Total NET: -800 mL               Discussed with:     Cultures:	        Radiology

## 2019-01-29 NOTE — PROGRESS NOTE ADULT - ASSESSMENT
Pt is a 73y Male with a L Hip Recurrent Surgical Site Infection s/p bedside I+D/packing     -Dsg in place, will change PRN  -WB as tolerated affected extremity  -XR L Hip: s/p ORIF of periprosthetic fracture of gabriela-arthroplasty. No acute fracture.  -DVT PPx per medical team.   - Encourage incentive spirometry  -Will DW Dr. Ramirez & Dr. Whipple and advise of any changes.

## 2019-01-29 NOTE — PROGRESS NOTE ADULT - ASSESSMENT
72 yo M PMHx of CHF (stage I diastolic dysfunction, EF 65%) COPD, HTN, HLD, hx of MI, CABG 2008, pci, RA on steroids, hx of multiple L hip surgeries.  We have seen him several times over the past few months in anticipation and postop from several surgeries.   Of note, patient had hip replacement 30 years ago in [place] and an ORIF of periprosthetic fracture on 11/2018 w/ Dr. Ramirez which was complicated by superficial infection requiring ID evaluation and PICC line antibiotics. 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    Pt. states his surgical wound drain fell out 4 days pta, and under the advisement of his plastic surgeon Dr. Whipple he kept it out. Patient developed worsening SOB over the past few days in addition to cough when he followed up with Dr. Richardson 2 days ago who removed fluid around the hip and recommended he see Dr. Wills for his URI. Outpatient CXR demonstrated PNA which brought him to the ED.  He has been getting abx for presumed hcap, with CT evidence of fibrotic changes, but no definitive pna.  There has been concern about infected seroma, and reportedly orthopedics is considering removal of hardware.  No s/p I&D     The patient reports no new symptoms.  Denies chest discomfort.  He did have some shortness of breath in the past 48, now improved, treated as  a pulmonary issue  No abdominal pain.  No new neurologic symptoms.    -there is no evidence of acute ischemia.  -he has a history of stable cad, s/p cabg and pci  -cont statin  -cont asa  -reportedly has had a nuclear stress test within the past few months in [place], results not available at this time    -there is no evidence of significant arrhythmia.  -no need for tele monitoring now    -there is no evidence for meaningful  volume overload.  -normal ef by echo 11/2018, no need to repeat    -DVT prophylaxis  -monitor electrolytes, keep k>4, Mg>2  -bp  better controlled. cont current meds  - Pain control.       -if needs orthopedic surgery, would be considered optimized from a cv perspective.  would cont asa periop.  Routine hemodynamic monitoring is recommended.  - Ortho f/u; 1/25 s/p L Hip Recurrent Surgical Site Infection s/p bedside I+D/ packing     - Further cardiac workup will depend on clinical course.   - All other workup per primary team. Will followup. 72 yo M PMHx of CHF (stage I diastolic dysfunction, EF 65%) COPD, HTN, HLD, hx of MI, CABG 2008, pci, RA on steroids, hx of multiple L hip surgeries.  We have seen him several times over the past few months in anticipation and postop from several surgeries.   Of note, patient had hip replacement 30 years ago in [place] and an ORIF of periprosthetic fracture on 11/2018 w/ Dr. Ramirez which was complicated by superficial infection requiring ID evaluation and PICC line antibiotics. 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    Pt. states his surgical wound drain fell out 4 days pta, and under the advisement of his plastic surgeon Dr. Whipple he kept it out. Patient developed worsening SOB over the past few days in addition to cough when he followed up with Dr. Richardson 2 days ago who removed fluid around the hip and recommended he see Dr. Wills for his URI. Outpatient CXR demonstrated PNA which brought him to the ED.  He has been getting abx for presumed hcap, with CT evidence of fibrotic changes, but no definitive pna.  There has been concern about infected seroma, and reportedly orthopedics is considering removal of hardware.  No s/p I&D     The patient reports no new symptoms.  Denies chest discomfort.  He did have some shortness of breath in the past 48, now improved, treated as  a pulmonary issue  No abdominal pain.  No new neurologic symptoms.    - there is no evidence of acute ischemia.  - he has a history of stable cad, s/p cabg and pci  - cont statin  - cont asa  - reportedly has had a nuclear stress test within the past few months in [place], results not available at this time    - there is no evidence of significant arrhythmia.  - no need for tele monitoring now    - there is no evidence for meaningful  volume overload.  - normal ef by echo 11/2018, no need to repeat    - DVT prophylaxis  - monitor electrolytes, keep k>4, Mg>2  - bp  better controlled. cont current meds  - Pain control.     - if needs orthopedic surgery, would be considered optimized from a cv perspective.  would cont asa periop.  Routine hemodynamic monitoring is recommended.  - Ortho f/u; 1/25 s/p L Hip Recurrent Surgical Site Infection s/p bedside I+D/ packing     - Further cardiac workup will depend on clinical course.   - All other workup per primary team. Will followup.

## 2019-01-29 NOTE — PROGRESS NOTE ADULT - ASSESSMENT
74 yo M PMHx of CHF (stage I diastolic dysfunction, EF 65%) COPD (diagnosed "a few years ago, quit smoking then), HTN, HLD, hx of MI and CABG (2008) (stents also placed per chart review), RA (on Medrol daily), hx of multiple L hip surgeries presenting to the ED with SOB and L hip pain admitted for GNR bacteremia likely hip infection/abcess etiology uknown and worsening cough likely from ILD/fibrosis and superimposed suspected gram negative. PNA/HCAP. Patient may undergo wash out or hardware removal, awaiting surgery recommendations.    Patient has RCRI score of 2, intermediate risk for intermediate risk procedure. Patient medically optimized for planned orthopedic surgical procedure- see cardiology not for cardiology preoperative risk assessment.

## 2019-01-29 NOTE — PROGRESS NOTE ADULT - PROBLEM SELECTOR PLAN 1
-S/p washout with Plastic Sx Dr Whipple  -ID recs noted, c/w ciprofloxacin   -pain management with methadone   -awaiting orthopedic recommendations

## 2019-01-30 LAB
ANION GAP SERPL CALC-SCNC: 8 MMOL/L — SIGNIFICANT CHANGE UP (ref 5–17)
BUN SERPL-MCNC: 15 MG/DL — SIGNIFICANT CHANGE UP (ref 7–23)
CALCIUM SERPL-MCNC: 9.1 MG/DL — SIGNIFICANT CHANGE UP (ref 8.5–10.1)
CHLORIDE SERPL-SCNC: 98 MMOL/L — SIGNIFICANT CHANGE UP (ref 96–108)
CO2 SERPL-SCNC: 31 MMOL/L — SIGNIFICANT CHANGE UP (ref 22–31)
CREAT SERPL-MCNC: 0.61 MG/DL — SIGNIFICANT CHANGE UP (ref 0.5–1.3)
CULTURE RESULTS: SIGNIFICANT CHANGE UP
GLUCOSE SERPL-MCNC: 73 MG/DL — SIGNIFICANT CHANGE UP (ref 70–99)
HCT VFR BLD CALC: 31.5 % — LOW (ref 39–50)
HGB BLD-MCNC: 9.9 G/DL — LOW (ref 13–17)
MCHC RBC-ENTMCNC: 28 PG — SIGNIFICANT CHANGE UP (ref 27–34)
MCHC RBC-ENTMCNC: 31.4 GM/DL — LOW (ref 32–36)
MCV RBC AUTO: 89 FL — SIGNIFICANT CHANGE UP (ref 80–100)
NRBC # BLD: 0 /100 WBCS — SIGNIFICANT CHANGE UP (ref 0–0)
ORGANISM # SPEC MICROSCOPIC CNT: SIGNIFICANT CHANGE UP
ORGANISM # SPEC MICROSCOPIC CNT: SIGNIFICANT CHANGE UP
PLATELET # BLD AUTO: 379 K/UL — SIGNIFICANT CHANGE UP (ref 150–400)
POTASSIUM SERPL-MCNC: 4.4 MMOL/L — SIGNIFICANT CHANGE UP (ref 3.5–5.3)
POTASSIUM SERPL-SCNC: 4.4 MMOL/L — SIGNIFICANT CHANGE UP (ref 3.5–5.3)
RBC # BLD: 3.54 M/UL — LOW (ref 4.2–5.8)
RBC # FLD: 15.5 % — HIGH (ref 10.3–14.5)
SODIUM SERPL-SCNC: 137 MMOL/L — SIGNIFICANT CHANGE UP (ref 135–145)
SPECIMEN SOURCE: SIGNIFICANT CHANGE UP
WBC # BLD: 8.98 K/UL — SIGNIFICANT CHANGE UP (ref 3.8–10.5)
WBC # FLD AUTO: 8.98 K/UL — SIGNIFICANT CHANGE UP (ref 3.8–10.5)

## 2019-01-30 PROCEDURE — 99233 SBSQ HOSP IP/OBS HIGH 50: CPT

## 2019-01-30 PROCEDURE — 99232 SBSQ HOSP IP/OBS MODERATE 35: CPT

## 2019-01-30 RX ORDER — ACETAMINOPHEN 500 MG
1000 TABLET ORAL ONCE
Qty: 0 | Refills: 0 | Status: COMPLETED | OUTPATIENT
Start: 2019-01-30 | End: 2019-01-30

## 2019-01-30 RX ADMIN — LISINOPRIL 10 MILLIGRAM(S): 2.5 TABLET ORAL at 06:07

## 2019-01-30 RX ADMIN — Medication 500 MILLIGRAM(S): at 06:07

## 2019-01-30 RX ADMIN — ALBUTEROL 2 PUFF(S): 90 AEROSOL, METERED ORAL at 12:43

## 2019-01-30 RX ADMIN — SENNA PLUS 2 TABLET(S): 8.6 TABLET ORAL at 21:22

## 2019-01-30 RX ADMIN — OXYCODONE HYDROCHLORIDE 10 MILLIGRAM(S): 5 TABLET ORAL at 18:48

## 2019-01-30 RX ADMIN — Medication 60 MILLIGRAM(S): at 12:43

## 2019-01-30 RX ADMIN — OXYCODONE HYDROCHLORIDE 10 MILLIGRAM(S): 5 TABLET ORAL at 12:42

## 2019-01-30 RX ADMIN — Medication 500 MILLIGRAM(S): at 12:42

## 2019-01-30 RX ADMIN — Medication 20 MILLIEQUIVALENT(S): at 12:43

## 2019-01-30 RX ADMIN — Medication 100 MILLIGRAM(S): at 12:42

## 2019-01-30 RX ADMIN — ATORVASTATIN CALCIUM 10 MILLIGRAM(S): 80 TABLET, FILM COATED ORAL at 21:22

## 2019-01-30 RX ADMIN — OXYCODONE HYDROCHLORIDE 10 MILLIGRAM(S): 5 TABLET ORAL at 17:57

## 2019-01-30 RX ADMIN — FINASTERIDE 5 MILLIGRAM(S): 5 TABLET, FILM COATED ORAL at 12:43

## 2019-01-30 RX ADMIN — Medication 650 MILLIGRAM(S): at 14:17

## 2019-01-30 RX ADMIN — OXYCODONE HYDROCHLORIDE 5 MILLIGRAM(S): 5 TABLET ORAL at 08:13

## 2019-01-30 RX ADMIN — OXYCODONE HYDROCHLORIDE 10 MILLIGRAM(S): 5 TABLET ORAL at 07:32

## 2019-01-30 RX ADMIN — OXYCODONE HYDROCHLORIDE 10 MILLIGRAM(S): 5 TABLET ORAL at 06:06

## 2019-01-30 RX ADMIN — ALBUTEROL 2 PUFF(S): 90 AEROSOL, METERED ORAL at 08:14

## 2019-01-30 RX ADMIN — Medication 2 MILLIGRAM(S): at 21:22

## 2019-01-30 RX ADMIN — OXYCODONE HYDROCHLORIDE 5 MILLIGRAM(S): 5 TABLET ORAL at 09:42

## 2019-01-30 RX ADMIN — TAMSULOSIN HYDROCHLORIDE 0.4 MILLIGRAM(S): 0.4 CAPSULE ORAL at 21:22

## 2019-01-30 RX ADMIN — Medication 650 MILLIGRAM(S): at 15:15

## 2019-01-30 RX ADMIN — CYCLOBENZAPRINE HYDROCHLORIDE 10 MILLIGRAM(S): 10 TABLET, FILM COATED ORAL at 17:57

## 2019-01-30 RX ADMIN — OXYCODONE HYDROCHLORIDE 10 MILLIGRAM(S): 5 TABLET ORAL at 13:45

## 2019-01-30 RX ADMIN — Medication 1000 MILLIGRAM(S): at 08:15

## 2019-01-30 RX ADMIN — ENOXAPARIN SODIUM 40 MILLIGRAM(S): 100 INJECTION SUBCUTANEOUS at 06:07

## 2019-01-30 RX ADMIN — Medication 500 MILLIGRAM(S): at 17:20

## 2019-01-30 RX ADMIN — Medication 81 MILLIGRAM(S): at 12:43

## 2019-01-30 RX ADMIN — Medication 2 MILLIGRAM(S): at 06:07

## 2019-01-30 RX ADMIN — Medication 400 MILLIGRAM(S): at 07:23

## 2019-01-30 RX ADMIN — ALBUTEROL 2 PUFF(S): 90 AEROSOL, METERED ORAL at 20:23

## 2019-01-30 RX ADMIN — METHADONE HYDROCHLORIDE 5 MILLIGRAM(S): 40 TABLET ORAL at 12:42

## 2019-01-30 NOTE — PROGRESS NOTE ADULT - ASSESSMENT
74 yo M PMHx of CHF (stage I diastolic dysfunction, EF 65%) COPD, HTN, HLD, hx of MI, CABG 2008, pci, RA on steroids, hx of multiple L hip surgeries.  We have seen him several times over the past few months in anticipation and postop from several surgeries.   Of note, patient had hip replacement [place] years ago in [place] and an ORIF of periprosthetic fracture on 11/2018 w/ Dr. Ramirez which was complicated by superficial infection requiring ID evaluation and PICC line antibiotics. 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    Pt. states his surgical wound drain fell out 4 days pta, and under the advisement of his plastic surgeon Dr. Whipple he kept it out. Patient developed worsening SOB over the past few days in addition to cough when he followed up with Dr. Richardson 2 days ago who removed fluid around the hip and recommended he see Dr. Wills for his URI. Outpatient CXR demonstrated PNA which brought him to the ED.  He has been getting abx for presumed hcap, with CT evidence of fibrotic changes, but no definitive pna.  There has been concern about infected seroma, and reportedly orthopedics is considering removal of hardware.  No s/p I&D     The patient reports no new symptoms.  Denies chest discomfort.  He did have some shortness of breath in the past 48, now improved, treated as  a pulmonary issue  No abdominal pain.  No new neurologic symptoms.    - there is no evidence of acute ischemia.  - he has a history of stable cad, s/p cabg and pci  - cont statin  - cont asa  - reportedly has had a nuclear stress test within the past few months in [place], results not available at this time  - there is no evidence of significant arrhythmia.  - no need for tele monitoring now  - there is no evidence for meaningful  volume overload.  - normal ef by echo 11/2018, no need to repeat  - DVT prophylaxis  - monitor electrolytes, keep k>4, Mg>2  - bp  better controlled. cont current meds  - Pain control.   - if needs orthopedic surgery, would be considered optimized from a cv perspective.  would cont asa periop.  Routine hemodynamic monitoring is recommended.  - Ortho f/u; 1/25 s/p L Hip Recurrent Surgical Site Infection s/p bedside I+D/ packing   - Further cardiac workup will depend on clinical course.   - All other workup per primary team. Will followup.   Walter CARTY  Cardiology 72 yo M PMHx of CHF (stage I diastolic dysfunction, EF 65%) COPD, HTN, HLD, hx of MI, CABG 2008, pci, RA on steroids, hx of multiple L hip surgeries.  We have seen him several times over the past few months in anticipation and postop from several surgeries.   Of note, patient had hip replacement 30 years ago in [place] and an ORIF of periprosthetic fracture on 11/2018 w/ Dr. Ramirez which was complicated by superficial infection requiring ID evaluation and PICC line antibiotics. 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    Pt. states his surgical wound drain fell out 4 days pta, and under the advisement of his plastic surgeon Dr. Whipple he kept it out. Patient developed worsening SOB over the past few days in addition to cough when he followed up with Dr. Richardson 2 days ago who removed fluid around the hip and recommended he see Dr. Wills for his URI. Outpatient CXR demonstrated PNA which brought him to the ED.  He has been getting abx for presumed hcap, with CT evidence of fibrotic changes, but no definitive pna.  There has been concern about infected seroma, and reportedly orthopedics is considering removal of hardware.  No s/p I&D     The patient reports no new symptoms.  Denies chest discomfort.  He did have some shortness of breath in the past 48, now improved, treated as  a pulmonary issue  No abdominal pain.  No new neurologic symptoms.    - there is no evidence of acute ischemia.  - he has a history of stable cad, s/p cabg and pci  - cont statin  - cont asa  - reportedly has had a nuclear stress test within the past few months in [place], results not available at this time    - there is no evidence of significant arrhythmia.  - no need for tele monitoring now    - there is no evidence for meaningful  volume overload.  - normal ef by echo 11/2018, no need to repeat    - bp  better controlled. cont current meds     - if needs orthopedic surgery, would be considered optimized from a cv perspective.  would cont asa periop.  Routine hemodynamic monitoring is recommended.     - Further cardiac workup will depend on clinical course.   - All other workup per primary team. Will followup.     Walter Anglin [facility]  Cardiology

## 2019-01-30 NOTE — PROGRESS NOTE ADULT - ASSESSMENT
74 yo male admitted with outpt concern for pneumonia but with E coli bacteremia and likely source left thigh wound  Suspect recurrent infected seroma the issues here, with reaccumulation in dead space in this man with prior surgeries/trauma, chronic steroid use, some immune compromise on the basis of his RA.   Again with different bacterial isolates each time, concern for any hardware infection at this point is remote.  Drainage the key issue. Unless we are contemplating hardware involvement no role for protracted antibiotics here.  Risk:benefit of quinolone use here acceptable, discussed with patient.

## 2019-01-30 NOTE — PROGRESS NOTE ADULT - PROBLEM SELECTOR PLAN 1
bacteremia, on ABX, ID following, Plastics and Ortho follow up noted  wound care, skin care, PT if tolerated, pain regimen - opioids, will give IV tylenol this am,   copd - proventil inhaler  cvs regimen  on room air  I aundrea  out of bed as tolerated  supportive care and regimen  prognosis guarded  recurrent issue with hip surgery wound  ID note reviewed

## 2019-01-30 NOTE — PROGRESS NOTE ADULT - SUBJECTIVE AND OBJECTIVE BOX
INTERVAL HPI/OVERNIGHT EVENTS: Patient seen and examined at bedside. No acute events overnight.   Had wash out with plastic surgery done. Patient restarted on home methadone and xanax.  Patient reevaluated by orthopedic Dr Ramirez and plastic surgery Dr Whipple will start wound vacuum.   No surgical intervention planned at this time.     MEDICATIONS  (STANDING):  ALBUTerol    90 MICROgram(s) HFA Inhaler 2 Puff(s) Inhalation every 6 hours  ALPRAZolam 2 milliGRAM(s) Oral at bedtime  ascorbic acid 500 milliGRAM(s) Oral daily  aspirin enteric coated 81 milliGRAM(s) Oral daily  atorvastatin 10 milliGRAM(s) Oral at bedtime  ciprofloxacin     Tablet 500 milliGRAM(s) Oral every 12 hours  docusate sodium 100 milliGRAM(s) Oral daily  enoxaparin Injectable 40 milliGRAM(s) SubCutaneous every 24 hours  finasteride 5 milliGRAM(s) Oral daily  FLUoxetine 60 milliGRAM(s) Oral daily  lisinopril 10 milliGRAM(s) Oral daily  methadone    Tablet 5 milliGRAM(s) Oral every 24 hours  methylPREDNISolone 2 milliGRAM(s) Oral daily  potassium chloride    Tablet ER 20 milliEquivalent(s) Oral daily  senna 2 Tablet(s) Oral at bedtime  tamsulosin 0.4 milliGRAM(s) Oral at bedtime    MEDICATIONS  (PRN):  acetaminophen   Tablet .. 650 milliGRAM(s) Oral every 6 hours PRN Mild Pain (1 - 3)  cyclobenzaprine 10 milliGRAM(s) Oral two times a day PRN Muscle Spasm  oxyCODONE    IR 10 milliGRAM(s) Oral every 4 hours PRN Severe Pain (7 - 10)  oxyCODONE    IR 5 milliGRAM(s) Oral every 4 hours PRN Moderate Pain (4 - 6)        Allergies    Ceftin (Pruritus)    Intolerances        CONSTITUTIONAL: No weakness, fevers or chills  RESPIRATORY: No cough, wheezing, hemoptysis; No shortness of breath  Cvs: No chest pain or palpitations  Gi: No abdominal pain. No nausea, vomiting, diarrhea or constipation. No melena or hematochezia.  Neuro: +weakness    All other review of systems is negative unless indicated above.    Vital Signs Last 24 Hrs  T(C): 36.7 (30 Jan 2019 12:55), Max: 36.7 (29 Jan 2019 21:40)  T(F): 98 (30 Jan 2019 12:55), Max: 98.1 (29 Jan 2019 21:40)  HR: 70 (30 Jan 2019 12:55) (70 - 81)  BP: 97/67 (30 Jan 2019 12:55) (97/67 - 122/63)  BP(mean): 82 (29 Jan 2019 21:40) (82 - 82)  RR: 16 (30 Jan 2019 12:55) (16 - 16)  SpO2: 91% (30 Jan 2019 12:55) (91% - 94%)    General: NAD  Neurology: A&Ox3 , nonfocal, sensation grossly intact  Respiratory: CTA B/L  CV: RRR, S1S2  Abdominal: Soft, NT, ND +BS  Extremities: No edema, + peripheral pulses, left ace bandage/draining     LABS:                        9.9    8.98  )-----------( 379      ( 30 Jan 2019 07:10 )             31.5     01-30    137  |  98  |  15  ----------------------------<  73  4.4   |  31  |  0.61    Ca    9.1      30 Jan 2019 07:10                     RADIOLOGY & ADDITIONAL TESTS:

## 2019-01-30 NOTE — PROGRESS NOTE ADULT - SUBJECTIVE AND OBJECTIVE BOX
[facility], Division of Infectious Diseases  GAETANO Briones A. Lee  [phone]    Name: ERIKA TORREZ  Age: 73y  Gender: Male  MRN: [id number]    Interval History--  Notes reviewed.  Discussed with orthopedic residents, no concern for hardware involvement at this time, no plans for exploration/TONY  Discussed with Dr. Whipple re: seroma/dead space, concern that repeat OR will create more dead space and drainage at this time appears good. Plan for VAC placement.    Past Medical History--  S/P CABG x 3  S/P hip replacement, left  Rheumatoid arthritis  Osteoarthritis  COPD (chronic obstructive pulmonary disease)  HTN (hypertension)  S/P lumbar fusion  History of femur fracture  History of appendectomy  History of right shoulder replacement  History of total left hip arthroplasty      For details regarding the patient's social history, family history, and other miscellaneous elements, please refer the initial infectious diseases consultation and/or the admitting history and physical examination for this admission.    Allergies    Ceftin (Pruritus)    Intolerances        Medications--  Antibiotics:  ciprofloxacin     Tablet 500 milliGRAM(s) Oral every 12 hours    Immunologic:    Other:  acetaminophen   Tablet .. PRN  ALBUTerol    90 MICROgram(s) HFA Inhaler  ALPRAZolam  ascorbic acid  aspirin enteric coated  atorvastatin  cyclobenzaprine PRN  docusate sodium  enoxaparin Injectable  finasteride  FLUoxetine  lisinopril  methadone    Tablet  methylPREDNISolone  oxyCODONE    IR PRN  oxyCODONE    IR PRN  potassium chloride    Tablet ER  senna  tamsulosin      Review of Systems--  A 10-point review of systems was obtained.   Review of systems otherwise negative except as previously noted.    Physical Examination--  Vital Signs: T(F): 98 (01-30-19 @ 12:55), Max: 98.1 (01-29-19 @ 21:40)  HR: 70 (01-30-19 @ 12:55)  BP: 97/67 (01-30-19 @ 12:55)  RR: 16 (01-30-19 @ 12:55)  SpO2: 91% (01-30-19 @ 12:55)  Wt(kg): --  General: Nontoxic-appearing Male in no acute distress.  HEENT: AT/NC. Anicteric. Conjunctiva pink and moist. Oropharynx clear.   Neck: Not rigid. No sense of mass.  Nodes: None palpable.  Lungs: Diminished breath sounds bilaterally with scattered bilateral rhonchi  Heart: Regular rate and rhythm. No Murmur. No rub. No gallop. No palpable thrill.  Abdomen: Bowel sounds present and normoactive. Soft. Nondistended. Nontender. No sense of mass. No organomegaly. Obese.   Extremities: No cyanosis or clubbing. L thigh dressed.  Skin: Warm. Dry. Good turgor. No rash. No vasculitic stigmata.  Psychiatric: Appropriate affect and mood for situation.       Laboratory Studies--  CBC                        9.9    8.98  )-----------( 379      ( 30 Jan 2019 07:10 )             31.5       Chemistries  01-30    137  |  98  |  15  ----------------------------<  73  4.4   |  31  |  0.61    Ca    9.1      30 Jan 2019 07:10        Culture Data    Culture - Blood (collected 28 Jan 2019 15:21)  Source: .Blood Blood-Venous  Preliminary Report (29 Jan 2019 16:01):    No growth to date.    Culture - Blood (collected 28 Jan 2019 15:21)  Source: .Blood Blood-Peripheral  Preliminary Report (29 Jan 2019 16:01):    No growth to date.    Culture - Abscess with Gram Stain (collected 25 Jan 2019 21:33)  Source: .Abscess Hip - Left  Preliminary Report (27 Jan 2019 17:17):    Numerous Escherichia coli  Organism: Escherichia coli (27 Jan 2019 17:14)  Organism: Escherichia coli (27 Jan 2019 17:14)    Culture - Urine (collected 25 Jan 2019 20:05)  Source: .Urine Clean Catch (Midstream)  Final Report (26 Jan 2019 21:52):    <10,000 CFU/ml Normal Urogenital jer present    Culture - Blood (collected 25 Jan 2019 15:00)  Source: .Blood Blood-Peripheral  Gram Stain (26 Jan 2019 03:06):    Growth in aerobic bottle: Gram Negative Rods    Growth in anaerobic bottle: Gram Negative Rods  Final Report (27 Jan 2019 20:24):    Growth in aerobic and anaerobic bottles: Escherichia coli    "Due to technical problems, Proteus sp. will Not be reported as part of    the BCID panel until further notice"    ***Blood Panel PCR results on this specimen are available    approximately 3 hours after the Gram stain result.***    Gram stain, PCR, and/or culture results may not always    correspond due to difference in methodologies.    ************************************************************    This PCR assay was performed using Robert Applebaum MD.    The following targets are tested for: Enterococcus,    vancomycin resistant enterococci, Listeria monocytogenes,    coagulase negative staphylococci, S. aureus,    methicillin resistant S. aureus, Streptococcus agalactiae    (Group B), S. pneumoniae, S.pyogenes (Group A),    Acinetobacter baumannii, Enterobacter cloacae, E. coli,    Klebsiella oxytoca, K. pneumoniae, Proteus sp.,    Serratia marcescens, Haemophilus influenzae,    Neisseria meningitidis, Pseudomonas aeruginosa, Candida    albicans, C. glabrata, C krusei, C parapsilosis,    C. tropicalis and the KPC resistance gene.  Organism: Blood Culture PCR  Escherichia coli (27 Jan 2019 20:24)  Organism: Escherichia coli (27 Jan 2019 20:24)  Organism: Blood Culture PCR (27 Jan 2019 20:24)    Culture - Blood (collected 25 Jan 2019 15:00)  Source: .Blood Blood-Peripheral  Gram Stain (26 Jan 2019 04:17):    Growth in anaerobic bottle: Gram Negative Rods    Growth in aerobic bottle: Gram Negative Rods  Final Report (27 Jan 2019 20:25):    Growth in aerobic and anaerobic bottles: Escherichia coli    See previous culture [id number]

## 2019-01-30 NOTE — PROGRESS NOTE ADULT - SUBJECTIVE AND OBJECTIVE BOX
Patient is seen and examined at bedside. Denies CP/SOB/Dizziness/N/V/D/HA. Pain is controlled. No acute events overnight.    Vital Signs Last 24 Hrs  T(C): 36.5 (30 Jan 2019 05:15), Max: 36.9 (29 Jan 2019 12:48)  T(F): 97.7 (30 Jan 2019 05:15), Max: 98.4 (29 Jan 2019 12:48)  HR: 77 (30 Jan 2019 05:15) (77 - 95)  BP: 121/80 (30 Jan 2019 05:15) (98/56 - 122/63)  BP(mean): 82 (29 Jan 2019 21:40) (82 - 82)  RR: 16 (30 Jan 2019 05:15) (16 - 16)  SpO2: 92% (30 Jan 2019 05:15) (92% - 94%)    GEN: NAD  LLE: Dressing intact; Redness slightly improved.  + EHL/FHL/TA/GS in the BL LE. SILT L3-S1  Extremity warm. Compartments are soft and compressible   DP 1+

## 2019-01-30 NOTE — PROVIDER CONTACT NOTE (CHANGE IN STATUS NOTIFICATION) - ASSESSMENT
patient is a 72yo male presenting with a left thigh wound measurements are in the flow sheet NPWT applied to thigh wound black GranuFoam 125mmHg continuous patient tolerated procedure without s/s of pain or discomfort

## 2019-01-30 NOTE — PROGRESS NOTE ADULT - PROBLEM SELECTOR PLAN 1
-S/p washout with Plastic Sx Dr Whipple  -ID recs noted, c/w ciprofloxacin   -pain management with methadone   -Wound care/ wound vac

## 2019-01-30 NOTE — PROGRESS NOTE ADULT - SUBJECTIVE AND OBJECTIVE BOX
Date/Time Patient Seen:  		  Referring MD:   Data Reviewed	       Patient is a 73y old  Male who presents with a chief complaint of HCAP and Hip infection (29 Jan 2019 14:18)      Subjective/HPI     PAST MEDICAL & SURGICAL HISTORY:  S/P CABG x 3  S/P hip replacement, left  Rheumatoid arthritis  Osteoarthritis  COPD (chronic obstructive pulmonary disease)  HTN (hypertension)  S/P lumbar fusion: Approx 2012  History of femur fracture: Repaired 11/19- L hip  History of appendectomy  History of right shoulder replacement  History of total left hip arthroplasty        Medication list         MEDICATIONS  (STANDING):  acetaminophen  IVPB .. 1000 milliGRAM(s) IV Intermittent once  ALBUTerol    90 MICROgram(s) HFA Inhaler 2 Puff(s) Inhalation every 6 hours  ALPRAZolam 2 milliGRAM(s) Oral at bedtime  ascorbic acid 500 milliGRAM(s) Oral daily  aspirin enteric coated 81 milliGRAM(s) Oral daily  atorvastatin 10 milliGRAM(s) Oral at bedtime  ciprofloxacin     Tablet 500 milliGRAM(s) Oral every 12 hours  docusate sodium 100 milliGRAM(s) Oral daily  enoxaparin Injectable 40 milliGRAM(s) SubCutaneous every 24 hours  finasteride 5 milliGRAM(s) Oral daily  FLUoxetine 60 milliGRAM(s) Oral daily  lisinopril 10 milliGRAM(s) Oral daily  methadone    Tablet 5 milliGRAM(s) Oral every 24 hours  methylPREDNISolone 2 milliGRAM(s) Oral daily  potassium chloride    Tablet ER 20 milliEquivalent(s) Oral daily  senna 2 Tablet(s) Oral at bedtime  tamsulosin 0.4 milliGRAM(s) Oral at bedtime    MEDICATIONS  (PRN):  acetaminophen   Tablet .. 650 milliGRAM(s) Oral every 6 hours PRN Mild Pain (1 - 3)  cyclobenzaprine 10 milliGRAM(s) Oral two times a day PRN Muscle Spasm  oxyCODONE    IR 10 milliGRAM(s) Oral every 4 hours PRN Severe Pain (7 - 10)  oxyCODONE    IR 5 milliGRAM(s) Oral every 4 hours PRN Moderate Pain (4 - 6)         Vitals log        ICU Vital Signs Last 24 Hrs  T(C): 36.5 (30 Jan 2019 05:15), Max: 36.9 (29 Jan 2019 12:48)  T(F): 97.7 (30 Jan 2019 05:15), Max: 98.4 (29 Jan 2019 12:48)  HR: 77 (30 Jan 2019 05:15) (77 - 95)  BP: 121/80 (30 Jan 2019 05:15) (98/56 - 122/63)  BP(mean): 82 (29 Jan 2019 21:40) (82 - 82)  ABP: --  ABP(mean): --  RR: 16 (30 Jan 2019 05:15) (16 - 16)  SpO2: 92% (30 Jan 2019 05:15) (92% - 94%)           Input and Output:  I&O's Detail    28 Jan 2019 07:01  -  29 Jan 2019 07:00  --------------------------------------------------------  IN:  Total IN: 0 mL    OUT:    Voided: 800 mL  Total OUT: 800 mL    Total NET: -800 mL      29 Jan 2019 07:01  -  30 Jan 2019 06:34  --------------------------------------------------------  IN:  Total IN: 0 mL    OUT:    Voided: 1150 mL  Total OUT: 1150 mL    Total NET: -1150 mL          Lab Data                        9.8    12.73 )-----------( 409      ( 29 Jan 2019 08:15 )             31.7     01-28    138  |  101  |  18  ----------------------------<  91  4.4   |  28  |  0.56    Ca    9.1      28 Jan 2019 08:32              Review of Systems	      Objective     Physical Examination    heart s1s2  lung dec BS      Pertinent Lab findings & Imaging      Corinna:  NO   Adequate UO     I&O's Detail    28 Jan 2019 07:01  -  29 Jan 2019 07:00  --------------------------------------------------------  IN:  Total IN: 0 mL    OUT:    Voided: 800 mL  Total OUT: 800 mL    Total NET: -800 mL      29 Jan 2019 07:01  -  30 Jan 2019 06:34  --------------------------------------------------------  IN:  Total IN: 0 mL    OUT:    Voided: 1150 mL  Total OUT: 1150 mL    Total NET: -1150 mL               Discussed with:     Cultures:	        Radiology

## 2019-01-30 NOTE — PROGRESS NOTE ADULT - ASSESSMENT
72 yo M PMHx of CHF (stage I diastolic dysfunction, EF 65%) COPD (diagnosed "a few years ago, quit smoking then), HTN, HLD, hx of MI and CABG (2008) (stents also placed per chart review), RA (on Medrol daily), hx of multiple L hip surgeries presenting to the ED with SOB and L hip pain admitted for GNR bacteremia likely hip infection/abcess etiology uknown and worsening cough likely from ILD/fibrosis and superimposed suspected gram negative. PNA/HCAP. Patient reevaluated by orthopedic Dr Ramirez and plastic surgery Dr Whipple will start wound vacuum.   No surgical intervention planned at this time.

## 2019-01-30 NOTE — PROGRESS NOTE ADULT - SUBJECTIVE AND OBJECTIVE BOX
[facility] -- Shannon Valdez, [place], [place], Sidney Orlando Savella  Office # [phone]      Follow Up:    pro op  Subjective/Observations:   No events overnight resting comfortably in bed.  No complaints of chest pain, dyspnea, or palpitations reported. No signs of orthopnea or PND.     REVIEW OF SYSTEMS: All other review of systems is negative unless indicated above    PAST MEDICAL & SURGICAL HISTORY:  S/P CABG x 3  S/P hip replacement, left  Rheumatoid arthritis  Osteoarthritis  COPD (chronic obstructive pulmonary disease)  HTN (hypertension)  S/P lumbar fusion: Approx 2012  History of femur fracture: Repaired 11/19- L hip  History of appendectomy  History of right shoulder replacement  History of total left hip arthroplasty      MEDICATIONS  (STANDING):  ALBUTerol    90 MICROgram(s) HFA Inhaler 2 Puff(s) Inhalation every 6 hours  ALPRAZolam 2 milliGRAM(s) Oral at bedtime  ascorbic acid 500 milliGRAM(s) Oral daily  aspirin enteric coated 81 milliGRAM(s) Oral daily  atorvastatin 10 milliGRAM(s) Oral at bedtime  ciprofloxacin     Tablet 500 milliGRAM(s) Oral every 12 hours  docusate sodium 100 milliGRAM(s) Oral daily  enoxaparin Injectable 40 milliGRAM(s) SubCutaneous every 24 hours  finasteride 5 milliGRAM(s) Oral daily  FLUoxetine 60 milliGRAM(s) Oral daily  lisinopril 10 milliGRAM(s) Oral daily  methadone    Tablet 5 milliGRAM(s) Oral every 24 hours  methylPREDNISolone 2 milliGRAM(s) Oral daily  potassium chloride    Tablet ER 20 milliEquivalent(s) Oral daily  senna 2 Tablet(s) Oral at bedtime  tamsulosin 0.4 milliGRAM(s) Oral at bedtime    MEDICATIONS  (PRN):  acetaminophen   Tablet .. 650 milliGRAM(s) Oral every 6 hours PRN Mild Pain (1 - 3)  cyclobenzaprine 10 milliGRAM(s) Oral two times a day PRN Muscle Spasm  oxyCODONE    IR 10 milliGRAM(s) Oral every 4 hours PRN Severe Pain (7 - 10)  oxyCODONE    IR 5 milliGRAM(s) Oral every 4 hours PRN Moderate Pain (4 - 6)      Allergies    Ceftin (Pruritus)    Intolerances        Vital Signs Last 24 Hrs  T(C): 36.7 (30 Jan 2019 12:55), Max: 36.7 (29 Jan 2019 21:40)  T(F): 98 (30 Jan 2019 12:55), Max: 98.1 (29 Jan 2019 21:40)  HR: 70 (30 Jan 2019 12:55) (70 - 81)  BP: 97/67 (30 Jan 2019 12:55) (97/67 - 122/63)  BP(mean): 82 (29 Jan 2019 21:40) (82 - 82)  RR: 16 (30 Jan 2019 12:55) (16 - 16)  SpO2: 91% (30 Jan 2019 12:55) (91% - 94%)    I&O's Summary    29 Jan 2019 07:01  -  30 Jan 2019 07:00  --------------------------------------------------------  IN: 0 mL / OUT: 1150 mL / NET: -1150 mL          PHYSICAL EXAM:  TELE: Off tele   Constitutional: NAD, awake and alert, well-developed  HEENT: Moist Mucous Membranes, Anicteric  Pulmonary: Non-labored, breath sounds are clear bilaterally, No wheezing, crackles or rhonchi  Cardiovascular: Regular, S1 and S2 nl, No murmurs, rubs, gallops or clicks  Gastrointestinal: Bowel Sounds present, soft, nontender.   Lymph: No lymphadenopathy. No peripheral edema.  Skin: No visible rashes or ulcers.  Psych:  Mood & affect appropriate    LABS: All Labs Reviewed:                        9.9    8.98  )-----------( 379      ( 30 Jan 2019 07:10 )             31.5                         9.8    12.73 )-----------( 409      ( 29 Jan 2019 08:15 )             31.7                         8.5    7.83  )-----------( 377      ( 28 Jan 2019 08:32 )             27.4     30 Jan 2019 07:10    137    |  98     |  15     ----------------------------<  73     4.4     |  31     |  0.61   28 Jan 2019 08:32    138    |  101    |  18     ----------------------------<  91     4.4     |  28     |  0.56     Ca    9.1        30 Jan 2019 07:10  Ca    9.1        28 Jan 2019 08:32               ECG:  < from: 12 Lead ECG (01.25.19 @ 10:37) >  Ventricular Rate 88 BPM    Atrial Rate 88 BPM    P-R Interval 202 ms    QRS Duration 100 ms    Q-T Interval 376 ms    QTC Calculation(Bezet) 454 ms    P Axis 35 degrees    R Axis -40 degrees    T Axis 31 degrees    Diagnosis Line Normal sinus rhythm Left atrial enlargement    Left axis deviation  Abnormal ECG  Confirmed by Jose Mccarty MD ([id number]) on 1/25/2019 5:38:06 PM    < end of copied text >    Echo:    Radiology:  < from: CT Angio Chest w/ IV Cont (01.25.19 @ 12:26) >  EXAM:  CT ANGIO CHEST (W)AW IC                            PROCEDURE DATE:  01/25/2019          INTERPRETATION:  .    CLINICAL INFORMATION: Recent surgery, shortness of breath, evaluate for   pulmonary embolism.    TECHNIQUE: Helical axial images were obtained of the chest and upper   abdomen using CT angiographic protocol. 78 mls of Omnipaque-350  was   administered intravenously without complication and 22 mls were   discarded. Sagittal and coronal reformatted images were obtained from the   source data. Axial MIP reconstructed images were obtained from the source   data and were also reviewed.    COMPARISON: No prior chest CT angiogram examinations are available for   comparison. Comparison is made with the prior noncontrast chest CT   examination from 12/3/2018. Prior x-ray examination of the chest from   earlier today.    FINDINGS: Evaluation of the pulmonary arterial vessels demonstrates a   suboptimal contrast bolus. No central filling defects are notable within   the pulmonary arterial vessels to suggest pulmonary embolism. Evaluation   for segmental and subsegmental pulmonary arterial embolism is limited.    The heart size is normal. The pericardium appears unremarkable. There are   atherosclerotic calcifications of the imaged coronary arteries, aorta,   and branch vessels. There is unchanged mild aneurysmal dilatation of the   ascending aorta measuring up to 4.6 cm at the level of the main pulmonary   artery. The patient is status post median sternotomy.    There is no axillary, mediastinal, or hilar lymphadenopathy.    The central airways are patent. There is been interval worsening of   peripheral reticular opacification throughout both lungs. Patchy   opacities are also notable within both lungs with a left upperlobe and   lingular predominance. Patchy opacities are also noted within the right   middle lobe. No pleural effusions are noted. A few right-sided scattered   calcified granulomas are noted.    The patient is status post cholecystectomy. There is nonspecific   bilateral perinephric stranding which is only partially imaged.    The liver is enlarged.    Multilevel degenerative changes are noted within the imaged potions of   the spine.    Posterior fusion hardware is notable within the upper lumbar spine and is   only partially imaged. There is an unchanged anterior wedge compression   deformity of the T6 vertebral body. There is unchanged grade 1   anterolisthesis of T12 on L1.    A right-sided reverse shoulder arthroplasty is notable.    Redemonstrated is a lower anterior cervical discectomy and fusion.    IMPRESSION:    1. Suboptimal opacification of the pulmonary arterial vessels. No central   pulmonary embolism. Evaluation for segmental and subsegmental pulmonary   arterial embolism is limited.    2. Worsening fibrotic changes throughout both lungs. Superimposed   pneumonia is difficult to exclude. Correlate clinically.    3. Unchanged aneurysmal dilatation of the ascending aorta measuring up to   4.6 cm.                  KENZIE CHARLES, ATTENDING RADIOLOGIST    < end of copied text >           Walter Anglin [facility]   Cardiology

## 2019-01-31 PROCEDURE — 99233 SBSQ HOSP IP/OBS HIGH 50: CPT

## 2019-01-31 PROCEDURE — 99232 SBSQ HOSP IP/OBS MODERATE 35: CPT

## 2019-01-31 RX ORDER — SODIUM CHLORIDE 9 MG/ML
500 INJECTION INTRAMUSCULAR; INTRAVENOUS; SUBCUTANEOUS ONCE
Qty: 0 | Refills: 0 | Status: COMPLETED | OUTPATIENT
Start: 2019-01-31 | End: 2019-01-31

## 2019-01-31 RX ADMIN — Medication 2 MILLIGRAM(S): at 05:49

## 2019-01-31 RX ADMIN — Medication 60 MILLIGRAM(S): at 11:20

## 2019-01-31 RX ADMIN — OXYCODONE HYDROCHLORIDE 10 MILLIGRAM(S): 5 TABLET ORAL at 12:20

## 2019-01-31 RX ADMIN — Medication 500 MILLIGRAM(S): at 11:20

## 2019-01-31 RX ADMIN — ALBUTEROL 2 PUFF(S): 90 AEROSOL, METERED ORAL at 19:04

## 2019-01-31 RX ADMIN — ALBUTEROL 2 PUFF(S): 90 AEROSOL, METERED ORAL at 14:20

## 2019-01-31 RX ADMIN — TAMSULOSIN HYDROCHLORIDE 0.4 MILLIGRAM(S): 0.4 CAPSULE ORAL at 21:47

## 2019-01-31 RX ADMIN — Medication 650 MILLIGRAM(S): at 22:47

## 2019-01-31 RX ADMIN — ENOXAPARIN SODIUM 40 MILLIGRAM(S): 100 INJECTION SUBCUTANEOUS at 05:49

## 2019-01-31 RX ADMIN — METHADONE HYDROCHLORIDE 5 MILLIGRAM(S): 40 TABLET ORAL at 08:32

## 2019-01-31 RX ADMIN — Medication 650 MILLIGRAM(S): at 21:47

## 2019-01-31 RX ADMIN — LISINOPRIL 10 MILLIGRAM(S): 2.5 TABLET ORAL at 05:48

## 2019-01-31 RX ADMIN — SENNA PLUS 2 TABLET(S): 8.6 TABLET ORAL at 21:47

## 2019-01-31 RX ADMIN — OXYCODONE HYDROCHLORIDE 10 MILLIGRAM(S): 5 TABLET ORAL at 11:20

## 2019-01-31 RX ADMIN — Medication 2 MILLIGRAM(S): at 21:47

## 2019-01-31 RX ADMIN — Medication 100 MILLIGRAM(S): at 11:20

## 2019-01-31 RX ADMIN — FINASTERIDE 5 MILLIGRAM(S): 5 TABLET, FILM COATED ORAL at 11:20

## 2019-01-31 RX ADMIN — CYCLOBENZAPRINE HYDROCHLORIDE 10 MILLIGRAM(S): 10 TABLET, FILM COATED ORAL at 05:49

## 2019-01-31 RX ADMIN — ATORVASTATIN CALCIUM 10 MILLIGRAM(S): 80 TABLET, FILM COATED ORAL at 21:47

## 2019-01-31 RX ADMIN — Medication 500 MILLIGRAM(S): at 17:13

## 2019-01-31 RX ADMIN — ALBUTEROL 2 PUFF(S): 90 AEROSOL, METERED ORAL at 08:01

## 2019-01-31 RX ADMIN — SODIUM CHLORIDE 500 MILLILITER(S): 9 INJECTION INTRAMUSCULAR; INTRAVENOUS; SUBCUTANEOUS at 14:30

## 2019-01-31 RX ADMIN — Medication 500 MILLIGRAM(S): at 05:48

## 2019-01-31 RX ADMIN — Medication 81 MILLIGRAM(S): at 11:20

## 2019-01-31 RX ADMIN — Medication 20 MILLIEQUIVALENT(S): at 11:20

## 2019-01-31 NOTE — PROGRESS NOTE ADULT - PROBLEM SELECTOR PLAN 1
-S/p washout I&D with Plastic Sx Dr Whipple  -ID recs noted, c/w ciprofloxacin for total of 14 days  -pain management with methadone   -Wound care/ wound vac  -F/u Dr. Whipple and orthopedic

## 2019-01-31 NOTE — PROGRESS NOTE ADULT - ASSESSMENT
Pt is a 73y Male with a L Hip Recurrent Surgical Site Infection s/p bedside I+D/packing   -Continue IV ABx  -Dsg in place, will change PRN  -WB as tolerated affected extremity  -XR L Hip: s/p ORIF of periprosthetic fracture of gabriela-arthroplasty. No acute fracture.  -DVT PPx per medical team.   - Encourage incentive spirometry  -Will DW Dr. Ramirez & Dr. Whipple and advise of any changes. Pt is a 73y Male with a L Hip Recurrent Surgical Site Infection s/p bedside I+D/packing   -Wound vac in place (mgmt by Plastics)  -Continue IV ABx  -Dsg in place, will change PRN  -WB as tolerated affected extremity  -XR L Hip: s/p ORIF of periprosthetic fracture of gabriela-arthroplasty. No acute fracture.  -DVT PPx per medical team.   - Encourage incentive spirometry  -Will DW Dr. Ramirez & Dr. Whipple and advise of any changes.

## 2019-01-31 NOTE — PROGRESS NOTE ADULT - SUBJECTIVE AND OBJECTIVE BOX
[facility] -- Shannon Valdez, [place], [place], Sidney Orlando Savella  Office # [phone]      Follow Up:    pre op  Subjective/Observations:   No events overnight resting comfortably in bed.  No complaints of chest pain, dyspnea, or palpitations reported. No signs of orthopnea or PND. C/O severe arthritis  pain    REVIEW OF SYSTEMS: All other review of systems is negative unless indicated above    PAST MEDICAL & SURGICAL HISTORY:  S/P CABG x 3  S/P hip replacement, left  Rheumatoid arthritis  Osteoarthritis  COPD (chronic obstructive pulmonary disease)  HTN (hypertension)  S/P lumbar fusion: Approx 2012  History of femur fracture: Repaired 11/19- L hip  History of appendectomy  History of right shoulder replacement  History of total left hip arthroplasty      MEDICATIONS  (STANDING):  ALBUTerol    90 MICROgram(s) HFA Inhaler 2 Puff(s) Inhalation every 6 hours  ALPRAZolam 2 milliGRAM(s) Oral at bedtime  ascorbic acid 500 milliGRAM(s) Oral daily  aspirin enteric coated 81 milliGRAM(s) Oral daily  atorvastatin 10 milliGRAM(s) Oral at bedtime  ciprofloxacin     Tablet 500 milliGRAM(s) Oral every 12 hours  docusate sodium 100 milliGRAM(s) Oral daily  enoxaparin Injectable 40 milliGRAM(s) SubCutaneous every 24 hours  finasteride 5 milliGRAM(s) Oral daily  FLUoxetine 60 milliGRAM(s) Oral daily  lisinopril 10 milliGRAM(s) Oral daily  methadone    Tablet 5 milliGRAM(s) Oral every 24 hours  methylPREDNISolone 2 milliGRAM(s) Oral daily  potassium chloride    Tablet ER 20 milliEquivalent(s) Oral daily  senna 2 Tablet(s) Oral at bedtime  tamsulosin 0.4 milliGRAM(s) Oral at bedtime    MEDICATIONS  (PRN):  acetaminophen   Tablet .. 650 milliGRAM(s) Oral every 6 hours PRN Mild Pain (1 - 3)  cyclobenzaprine 10 milliGRAM(s) Oral two times a day PRN Muscle Spasm  oxyCODONE    IR 10 milliGRAM(s) Oral every 4 hours PRN Severe Pain (7 - 10)  oxyCODONE    IR 5 milliGRAM(s) Oral every 4 hours PRN Moderate Pain (4 - 6)      Allergies    Ceftin (Pruritus)    Intolerances        Vital Signs Last 24 Hrs  T(C): 36.7 (31 Jan 2019 15:43), Max: 37.4 (31 Jan 2019 05:04)  T(F): 98 (31 Jan 2019 15:43), Max: 99.3 (31 Jan 2019 05:04)  HR: 68 (31 Jan 2019 15:43) (68 - 107)  BP: 86/51 (31 Jan 2019 15:43) (74/51 - 122/80)  BP(mean): 85 (31 Jan 2019 05:04) (85 - 94)  RR: 18 (31 Jan 2019 15:43) (16 - 18)  SpO2: 97% (31 Jan 2019 15:43) (92% - 97%)    I&O's Summary        PHYSICAL EXAM:  TELE: Off tele   Constitutional: NAD, awake and alert, well-developed  HEENT: Moist Mucous Membranes, Anicteric  Pulmonary: Non-labored, breath sounds are clear bilaterally, No wheezing, crackles or rhonchi  Cardiovascular: Regular, S1 and S2 nl, No murmurs, rubs, gallops or clicks  Gastrointestinal: Bowel Sounds present, soft, nontender.   Lymph: No lymphadenopathy. No peripheral edema.  Skin: No visible rashes or ulcers.  Psych:  Mood & affect appropriate    LABS: All Labs Reviewed:                        9.9    8.98  )-----------( 379      ( 30 Jan 2019 07:10 )             31.5                         9.8    12.73 )-----------( 409      ( 29 Jan 2019 08:15 )             31.7     30 Jan 2019 07:10    137    |  98     |  15     ----------------------------<  73     4.4     |  31     |  0.61     Ca    9.1        30 Jan 2019 07:10    ECG:  < from: 12 Lead ECG (01.25.19 @ 10:37) >  Ventricular Rate 88 BPM    Atrial Rate 88 BPM    P-R Interval 202 ms    QRS Duration 100 ms    Q-T Interval 376 ms    QTC Calculation(Bezet) 454 ms    P Axis 35 degrees    R Axis -40 degrees    T Axis 31 degrees    Diagnosis Line Normal sinus rhythm Left atrial enlargement    Left axis deviation  Abnormal ECG  Confirmed by Jose Mccarty MD ([id number]) on 1/25/2019 5:38:06 PM    < end of copied text >    Echo:    Radiology:  < from: CT Angio Chest w/ IV Cont (01.25.19 @ 12:26) >  EXAM:  CT ANGIO CHEST (W)AW IC                            PROCEDURE DATE:  01/25/2019          INTERPRETATION:  .    CLINICAL INFORMATION: Recent surgery, shortness of breath, evaluate for   pulmonary embolism.    TECHNIQUE: Helical axial images were obtained of the chest and upper   abdomen using CT angiographic protocol. 78 mls of Omnipaque-350  was   administered intravenously without complication and 22 mls were   discarded. Sagittal and coronal reformatted images were obtained from the   source data. Axial MIP reconstructed images were obtained from the source   data and were also reviewed.    COMPARISON: No prior chest CT angiogram examinations are available for   comparison. Comparison is made with the prior noncontrast chest CT   examination from 12/3/2018. Prior x-ray examination of the chest from   earlier today.    FINDINGS: Evaluation of the pulmonary arterial vessels demonstrates a   suboptimal contrast bolus. No central filling defects are notable within   the pulmonary arterial vessels to suggest pulmonary embolism. Evaluation   for segmental and subsegmental pulmonary arterial embolism is limited.    The heart size is normal. The pericardium appears unremarkable. There are   atherosclerotic calcifications of the imaged coronary arteries, aorta,   and branch vessels. There is unchanged mild aneurysmal dilatation of the   ascending aorta measuring up to 4.6 cm at the level of the main pulmonary   artery. The patient is status post median sternotomy.    There is no axillary, mediastinal, or hilar lymphadenopathy.    The central airways are patent. There is been interval worsening of   peripheral reticular opacification throughout both lungs. Patchy   opacities are also notable within both lungs with a left upperlobe and   lingular predominance. Patchy opacities are also noted within the right   middle lobe. No pleural effusions are noted. A few right-sided scattered   calcified granulomas are noted.    The patient is status post cholecystectomy. There is nonspecific   bilateral perinephric stranding which is only partially imaged.    The liver is enlarged.    Multilevel degenerative changes are noted within the imaged potions of   the spine.    Posterior fusion hardware is notable within the upper lumbar spine and is   only partially imaged. There is an unchanged anterior wedge compression   deformity of the T6 vertebral body. There is unchanged grade 1   anterolisthesis of T12 on L1.    A right-sided reverse shoulder arthroplasty is notable.    Redemonstrated is a lower anterior cervical discectomy and fusion.    IMPRESSION:    1. Suboptimal opacification of the pulmonary arterial vessels. No central   pulmonary embolism. Evaluation for segmental and subsegmental pulmonary   arterial embolism is limited.    2. Worsening fibrotic changes throughout both lungs. Superimposed   pneumonia is difficult to exclude. Correlate clinically.    3. Unchanged aneurysmal dilatation of the ascending aorta measuring up to   4.6 cm.                  KENZIE CHARLES, ATTENDING RADIOLOGIST    < end of copied text >                 Walter Anglin ANP   Cardiology

## 2019-01-31 NOTE — PROVIDER CONTACT NOTE (OTHER) - SITUATION
NA was checking vital signs and found patient to be hypotensive.  RN rechecked BP manually and obtained 74/52.  Patient alert, oriented, asymptomatic at this time.  MD made aware

## 2019-01-31 NOTE — PROGRESS NOTE ADULT - SUBJECTIVE AND OBJECTIVE BOX
Date/Time Patient Seen:  		  Referring MD:   Data Reviewed	       Patient is a 73y old  Male who presents with a chief complaint of HCAP and Hip infection (30 Jan 2019 15:13)      Subjective/HPI     PAST MEDICAL & SURGICAL HISTORY:  S/P CABG x 3  S/P hip replacement, left  Rheumatoid arthritis  Osteoarthritis  COPD (chronic obstructive pulmonary disease)  HTN (hypertension)  S/P lumbar fusion: Approx 2012  History of femur fracture: Repaired 11/19- L hip  History of appendectomy  History of right shoulder replacement  History of total left hip arthroplasty        Medication list         MEDICATIONS  (STANDING):  ALBUTerol    90 MICROgram(s) HFA Inhaler 2 Puff(s) Inhalation every 6 hours  ALPRAZolam 2 milliGRAM(s) Oral at bedtime  ascorbic acid 500 milliGRAM(s) Oral daily  aspirin enteric coated 81 milliGRAM(s) Oral daily  atorvastatin 10 milliGRAM(s) Oral at bedtime  ciprofloxacin     Tablet 500 milliGRAM(s) Oral every 12 hours  docusate sodium 100 milliGRAM(s) Oral daily  enoxaparin Injectable 40 milliGRAM(s) SubCutaneous every 24 hours  finasteride 5 milliGRAM(s) Oral daily  FLUoxetine 60 milliGRAM(s) Oral daily  lisinopril 10 milliGRAM(s) Oral daily  methadone    Tablet 5 milliGRAM(s) Oral every 24 hours  methylPREDNISolone 2 milliGRAM(s) Oral daily  potassium chloride    Tablet ER 20 milliEquivalent(s) Oral daily  senna 2 Tablet(s) Oral at bedtime  tamsulosin 0.4 milliGRAM(s) Oral at bedtime    MEDICATIONS  (PRN):  acetaminophen   Tablet .. 650 milliGRAM(s) Oral every 6 hours PRN Mild Pain (1 - 3)  cyclobenzaprine 10 milliGRAM(s) Oral two times a day PRN Muscle Spasm  oxyCODONE    IR 10 milliGRAM(s) Oral every 4 hours PRN Severe Pain (7 - 10)  oxyCODONE    IR 5 milliGRAM(s) Oral every 4 hours PRN Moderate Pain (4 - 6)         Vitals log        ICU Vital Signs Last 24 Hrs  T(C): 37.4 (31 Jan 2019 05:04), Max: 37.4 (31 Jan 2019 05:04)  T(F): 99.3 (31 Jan 2019 05:04), Max: 99.3 (31 Jan 2019 05:04)  HR: 107 (31 Jan 2019 05:04) (70 - 107)  BP: 112/72 (31 Jan 2019 05:04) (97/67 - 122/80)  BP(mean): 85 (31 Jan 2019 05:04) (85 - 94)  ABP: --  ABP(mean): --  RR: 16 (31 Jan 2019 05:04) (16 - 17)  SpO2: 92% (31 Jan 2019 05:04) (91% - 95%)           Input and Output:  I&O's Detail    29 Jan 2019 07:01  -  30 Jan 2019 07:00  --------------------------------------------------------  IN:  Total IN: 0 mL    OUT:    Voided: 1150 mL  Total OUT: 1150 mL    Total NET: -1150 mL          Lab Data                        9.9    8.98  )-----------( 379      ( 30 Jan 2019 07:10 )             31.5     01-30    137  |  98  |  15  ----------------------------<  73  4.4   |  31  |  0.61    Ca    9.1      30 Jan 2019 07:10              Review of Systems	      Objective     Physical Examination    heart s1s2  lung dec BS      Pertinent Lab findings & Imaging      Corinna:  NO   Adequate UO     I&O's Detail    29 Jan 2019 07:01  -  30 Jan 2019 07:00  --------------------------------------------------------  IN:  Total IN: 0 mL    OUT:    Voided: 1150 mL  Total OUT: 1150 mL    Total NET: -1150 mL               Discussed with:     Cultures:	        Radiology

## 2019-01-31 NOTE — PROGRESS NOTE ADULT - ASSESSMENT
74 yo M PMHx of CHF (stage I diastolic dysfunction, EF 65%) COPD (diagnosed "a few years ago, quit smoking then), HTN, HLD, hx of MI and CABG (2008) (stents also placed per chart review), RA (on Medrol daily), hx of multiple L hip surgeries presenting to the ED with SOB and L hip pain admitted for GNR bacteremia likely hip infection/abcess etiology uknown and worsening cough likely from ILD/fibrosis and superimposed suspected gram negative. PNA/HCAP. Patient reevaluated by orthopedic Dr Ramirez and plastic surgery Dr Whipple, wound vacuum placed. No surgical intervention planned at this time.

## 2019-01-31 NOTE — PROGRESS NOTE ADULT - SUBJECTIVE AND OBJECTIVE BOX
Patient is seen and examined at bedside. Denies CP/Dyspnea/Fever/Chills/Dyspnea. Pain controlled.     Vital Signs Last 24 Hrs  T(C): 37.4 (31 Jan 2019 05:04), Max: 37.4 (31 Jan 2019 05:04)  T(F): 99.3 (31 Jan 2019 05:04), Max: 99.3 (31 Jan 2019 05:04)  HR: 107 (31 Jan 2019 05:04) (70 - 107)  BP: 112/72 (31 Jan 2019 05:04) (97/67 - 122/80)  BP(mean): 85 (31 Jan 2019 05:04) (85 - 94)  RR: 16 (31 Jan 2019 05:04) (16 - 17)  SpO2: 92% (31 Jan 2019 05:04) (91% - 95%)    GEN: NAD  LLE: Dressing intact; Redness stable  + EHL/FHL/TA/GS in the BLLE  SILT L3-S1  Extremity warm  Compartments are soft and compressible   DP 1+

## 2019-01-31 NOTE — PROGRESS NOTE ADULT - ASSESSMENT
74 yo male admitted with outpt concern for pneumonia but with E coli bacteremia and likely source left thigh wound  Suspect recurrent infected seroma the issues here, with reaccumulation in dead space in this man with prior surgeries/trauma, chronic steroid use, some immune compromise on the basis of his RA.   Again with different bacterial isolates each time, concern for any hardware infection at this point is remote.  Drainage the key issue. Unless we are contemplating hardware involvement no role for protracted antibiotics here.  Risk:benefit of quinolone use here acceptable

## 2019-01-31 NOTE — PROGRESS NOTE ADULT - SUBJECTIVE AND OBJECTIVE BOX
INTERVAL HPI/OVERNIGHT EVENTS: Patient seen and examined at bedside. No acute events overnight.   Had wash out I&D with plastic surgery done on admission. Patient restarted on home methadone and xanax.  Patient reevaluated by orthopedic Dr Ramirez and plastic surgery Dr Whipple, wound vac placed.   No surgical intervention planned at this time.     MEDICATIONS  (STANDING):  ALBUTerol    90 MICROgram(s) HFA Inhaler 2 Puff(s) Inhalation every 6 hours  ALPRAZolam 2 milliGRAM(s) Oral at bedtime  ascorbic acid 500 milliGRAM(s) Oral daily  aspirin enteric coated 81 milliGRAM(s) Oral daily  atorvastatin 10 milliGRAM(s) Oral at bedtime  ciprofloxacin     Tablet 500 milliGRAM(s) Oral every 12 hours  docusate sodium 100 milliGRAM(s) Oral daily  enoxaparin Injectable 40 milliGRAM(s) SubCutaneous every 24 hours  finasteride 5 milliGRAM(s) Oral daily  FLUoxetine 60 milliGRAM(s) Oral daily  lisinopril 10 milliGRAM(s) Oral daily  methadone    Tablet 5 milliGRAM(s) Oral every 24 hours  methylPREDNISolone 2 milliGRAM(s) Oral daily  potassium chloride    Tablet ER 20 milliEquivalent(s) Oral daily  senna 2 Tablet(s) Oral at bedtime  tamsulosin 0.4 milliGRAM(s) Oral at bedtime    MEDICATIONS  (PRN):  acetaminophen   Tablet .. 650 milliGRAM(s) Oral every 6 hours PRN Mild Pain (1 - 3)  cyclobenzaprine 10 milliGRAM(s) Oral two times a day PRN Muscle Spasm  oxyCODONE    IR 10 milliGRAM(s) Oral every 4 hours PRN Severe Pain (7 - 10)  oxyCODONE    IR 5 milliGRAM(s) Oral every 4 hours PRN Moderate Pain (4 - 6)        Allergies    Ceftin (Pruritus)    Intolerances        CONSTITUTIONAL: No weakness, fevers or chills  RESPIRATORY: No cough, wheezing, hemoptysis; No shortness of breath  Cvs: No chest pain or palpitations  Gi: No abdominal pain. No nausea, vomiting, diarrhea or constipation. No melena or hematochezia.  Neuro: +weakness    All other review of systems is negative unless indicated above.    Vital Signs Last 24 Hrs  T(C): 36.7 (31 Jan 2019 13:04), Max: 37.4 (31 Jan 2019 05:04)  T(F): 98.1 (31 Jan 2019 13:04), Max: 99.3 (31 Jan 2019 05:04)  HR: 78 (31 Jan 2019 13:44) (78 - 107)  BP: 81/50 (31 Jan 2019 13:44) (74/51 - 122/80)  BP(mean): 85 (31 Jan 2019 05:04) (85 - 94)  RR: 18 (31 Jan 2019 13:04) (16 - 18)  SpO2: 94% (31 Jan 2019 13:04) (92% - 95%)    General: NAD  Neurology: A&Ox3 , nonfocal, sensation grossly intact  Respiratory: CTA B/L  CV: RRR, S1S2  Abdominal: Soft, NT, ND +BS  Extremities: No edema, + peripheral pulses, left wpund wac, mild tenderness     LABS:                        9.9    8.98  )-----------( 379      ( 30 Jan 2019 07:10 )             31.5     01-30    137  |  98  |  15  ----------------------------<  73  4.4   |  31  |  0.61    Ca    9.1      30 Jan 2019 07:10                           RADIOLOGY & ADDITIONAL TESTS:

## 2019-01-31 NOTE — PROGRESS NOTE ADULT - SUBJECTIVE AND OBJECTIVE BOX
infectious diseases progress note:    ERIKA TORREZ is a 73y y. o. Male patient    Patient with no concerning overnight events    Allergies    Ceftin (Pruritus)    Intolerances        ANTIBIOTICS/RELEVANT:  antimicrobials  ciprofloxacin     Tablet 500 milliGRAM(s) Oral every 12 hours    immunologic:    OTHER:  acetaminophen   Tablet .. 650 milliGRAM(s) Oral every 6 hours PRN  ALBUTerol    90 MICROgram(s) HFA Inhaler 2 Puff(s) Inhalation every 6 hours  ALPRAZolam 2 milliGRAM(s) Oral at bedtime  ascorbic acid 500 milliGRAM(s) Oral daily  aspirin enteric coated 81 milliGRAM(s) Oral daily  atorvastatin 10 milliGRAM(s) Oral at bedtime  cyclobenzaprine 10 milliGRAM(s) Oral two times a day PRN  docusate sodium 100 milliGRAM(s) Oral daily  enoxaparin Injectable 40 milliGRAM(s) SubCutaneous every 24 hours  finasteride 5 milliGRAM(s) Oral daily  FLUoxetine 60 milliGRAM(s) Oral daily  lisinopril 10 milliGRAM(s) Oral daily  methadone    Tablet 5 milliGRAM(s) Oral every 24 hours  methylPREDNISolone 2 milliGRAM(s) Oral daily  oxyCODONE    IR 10 milliGRAM(s) Oral every 4 hours PRN  oxyCODONE    IR 5 milliGRAM(s) Oral every 4 hours PRN  potassium chloride    Tablet ER 20 milliEquivalent(s) Oral daily  senna 2 Tablet(s) Oral at bedtime  tamsulosin 0.4 milliGRAM(s) Oral at bedtime      Objective:  Vital Signs Last 24 Hrs  T(C): 37.4 (31 Jan 2019 05:04), Max: 37.4 (31 Jan 2019 05:04)  T(F): 99.3 (31 Jan 2019 05:04), Max: 99.3 (31 Jan 2019 05:04)  HR: 107 (31 Jan 2019 05:04) (70 - 107)  BP: 112/72 (31 Jan 2019 05:04) (97/67 - 122/80)  BP(mean): 85 (31 Jan 2019 05:04) (85 - 94)  RR: 16 (31 Jan 2019 05:04) (16 - 17)  SpO2: 92% (31 Jan 2019 05:04) (91% - 95%)    T(C): 37.4 (01-31-19 @ 05:04), Max: 37.4 (01-31-19 @ 05:04)  T(C): 37.4 (01-31-19 @ 05:04), Max: 37.4 (01-31-19 @ 05:04)  T(C): 37.4 (01-31-19 @ 05:04), Max: 37.4 (01-31-19 @ 05:04)    PHYSICAL EXAM:  Constitutional: Well-developed, well nourished  Eyes: PERRLA, EOMI  Ear/Nose/Throat: oropharynx normal	  Neck: no JVD, no lymphadenopathy, supple  Respiratory: no accessory muscle use  Cardiovascular: RRR,   Gastrointestinal: soft, NT  Extremities: no clubbing, no cyanosis, edema absent      LABS:                        9.9    8.98  )-----------( 379      ( 30 Jan 2019 07:10 )             31.5       8.98 01-30 @ 07:10  12.73 01-29 @ 08:15  7.83 01-28 @ 08:32  6.77 01-27 @ 08:25  11.88 01-25 @ 10:39      01-30    137  |  98  |  15  ----------------------------<  73  4.4   |  31  |  0.61    Ca    9.1      30 Jan 2019 07:10        Creatinine, Serum: 0.61 mg/dL (01-30-19 @ 07:10)  Creatinine, Serum: 0.56 mg/dL (01-28-19 @ 08:32)  Creatinine, Serum: 0.59 mg/dL (01-27-19 @ 08:25)  Creatinine, Serum: 0.85 mg/dL (01-25-19 @ 10:39)                MICROBIOLOGY:              RADIOLOGY & ADDITIONAL STUDIES:

## 2019-01-31 NOTE — PROGRESS NOTE ADULT - ASSESSMENT
74 yo M PMHx of CHF (stage I diastolic dysfunction, EF 65%) COPD, HTN, HLD, hx of MI, CABG 2008, pci, RA on steroids, hx of multiple L hip surgeries.  We have seen him several times over the past few months in anticipation and postop from several surgeries.   Of note, patient had hip replacement 30 years ago in [place] and an ORIF of periprosthetic fracture on 11/2018 w/ Dr. Ramirez which was complicated by superficial infection requiring ID evaluation and PICC line antibiotics. 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    Pt. states his surgical wound drain fell out 4 days pta, and under the advisement of his plastic surgeon Dr. Whipple he kept it out. Patient developed worsening SOB over the past few days in addition to cough when he followed up with Dr. Richardson 2 days ago who removed fluid around the hip and recommended he see Dr. Wills for his URI. Outpatient CXR demonstrated PNA which brought him to the ED.  He has been getting abx for presumed hcap, with CT evidence of fibrotic changes, but no definitive pna.  There has been concern about infected seroma, and reportedly orthopedics is considering removal of hardware.  No s/p I&D     The patient reports no new symptoms.  Denies chest discomfort.  He did have some shortness of breath in the past 48, now improved, treated as  a pulmonary issue  No abdominal pain.  No new neurologic symptoms.  - he has a history of stable cad, s/p cabg and pci  - reportedly has had a nuclear stress test within the past few months in [place], results not available at this time    - there is no evidence of acute ischemia.  - cont statin  - cont asa  - there is no evidence of significant arrhythmia.  - no need for tele monitoring now  - there is no evidence for meaningful  volume overload.  - normal ef by echo 11/2018, no need to repeat  - bp  better controlled. cont current meds  _ hypotensive episode noted after administration of pain medications   - if needs orthopedic surgery, would be considered optimized from a cv perspective.  would cont asa periop.  Routine hemodynamic monitoring is recommended.   - Further cardiac workup will depend on clinical course.   - All other workup per primary team. Will followup.     Walter Anglin [facility]  Cardiology

## 2019-01-31 NOTE — PROGRESS NOTE ADULT - PROBLEM SELECTOR PLAN 1
bacteremia, on ABX, ID following, serial labs,   Hip wound, ID follow up, Ortho follow up, wound care, pain assessment  COPD - proventil, tolerating room air, I aundrea  heart disease - cvs regimen and BP control  PRO - refuses CPAP  ct and tte reviewed  PT if tolerated  RA - supportive measures, pain regimen, reassurance  prognosis guarded

## 2019-02-01 ENCOUNTER — TRANSCRIPTION ENCOUNTER (OUTPATIENT)
Age: 74
End: 2019-02-01

## 2019-02-01 LAB
ANION GAP SERPL CALC-SCNC: 9 MMOL/L — SIGNIFICANT CHANGE UP (ref 5–17)
BUN SERPL-MCNC: 19 MG/DL — SIGNIFICANT CHANGE UP (ref 7–23)
CALCIUM SERPL-MCNC: 9.1 MG/DL — SIGNIFICANT CHANGE UP (ref 8.5–10.1)
CHLORIDE SERPL-SCNC: 97 MMOL/L — SIGNIFICANT CHANGE UP (ref 96–108)
CO2 SERPL-SCNC: 29 MMOL/L — SIGNIFICANT CHANGE UP (ref 22–31)
CREAT SERPL-MCNC: 1.2 MG/DL — SIGNIFICANT CHANGE UP (ref 0.5–1.3)
GLUCOSE SERPL-MCNC: 115 MG/DL — HIGH (ref 70–99)
HCT VFR BLD CALC: 30.6 % — LOW (ref 39–50)
HGB BLD-MCNC: 9.6 G/DL — LOW (ref 13–17)
MCHC RBC-ENTMCNC: 27.6 PG — SIGNIFICANT CHANGE UP (ref 27–34)
MCHC RBC-ENTMCNC: 31.4 GM/DL — LOW (ref 32–36)
MCV RBC AUTO: 87.9 FL — SIGNIFICANT CHANGE UP (ref 80–100)
NRBC # BLD: 0 /100 WBCS — SIGNIFICANT CHANGE UP (ref 0–0)
PLATELET # BLD AUTO: 342 K/UL — SIGNIFICANT CHANGE UP (ref 150–400)
POTASSIUM SERPL-MCNC: 4.4 MMOL/L — SIGNIFICANT CHANGE UP (ref 3.5–5.3)
POTASSIUM SERPL-SCNC: 4.4 MMOL/L — SIGNIFICANT CHANGE UP (ref 3.5–5.3)
RBC # BLD: 3.48 M/UL — LOW (ref 4.2–5.8)
RBC # FLD: 16.3 % — HIGH (ref 10.3–14.5)
SODIUM SERPL-SCNC: 135 MMOL/L — SIGNIFICANT CHANGE UP (ref 135–145)
WBC # BLD: 10.16 K/UL — SIGNIFICANT CHANGE UP (ref 3.8–10.5)
WBC # FLD AUTO: 10.16 K/UL — SIGNIFICANT CHANGE UP (ref 3.8–10.5)

## 2019-02-01 PROCEDURE — 93010 ELECTROCARDIOGRAM REPORT: CPT

## 2019-02-01 PROCEDURE — 99232 SBSQ HOSP IP/OBS MODERATE 35: CPT

## 2019-02-01 PROCEDURE — 99233 SBSQ HOSP IP/OBS HIGH 50: CPT

## 2019-02-01 RX ORDER — HYDROCORTISONE 20 MG
50 TABLET ORAL EVERY 8 HOURS
Qty: 0 | Refills: 0 | Status: DISCONTINUED | OUTPATIENT
Start: 2019-02-01 | End: 2019-02-04

## 2019-02-01 RX ORDER — FLUDROCORTISONE ACETATE 0.1 MG/1
0.1 TABLET ORAL DAILY
Qty: 0 | Refills: 0 | Status: DISCONTINUED | OUTPATIENT
Start: 2019-02-01 | End: 2019-02-04

## 2019-02-01 RX ORDER — PANTOPRAZOLE SODIUM 20 MG/1
40 TABLET, DELAYED RELEASE ORAL
Qty: 0 | Refills: 0 | Status: DISCONTINUED | OUTPATIENT
Start: 2019-02-01 | End: 2019-02-04

## 2019-02-01 RX ORDER — HYDROCORTISONE 20 MG
100 TABLET ORAL ONCE
Qty: 0 | Refills: 0 | Status: DISCONTINUED | OUTPATIENT
Start: 2019-02-01 | End: 2019-02-01

## 2019-02-01 RX ORDER — HYDROCORTISONE 20 MG
100 TABLET ORAL ONCE
Qty: 0 | Refills: 0 | Status: COMPLETED | OUTPATIENT
Start: 2019-02-01 | End: 2019-02-01

## 2019-02-01 RX ORDER — SODIUM CHLORIDE 9 MG/ML
1000 INJECTION INTRAMUSCULAR; INTRAVENOUS; SUBCUTANEOUS
Qty: 0 | Refills: 0 | Status: DISCONTINUED | OUTPATIENT
Start: 2019-02-01 | End: 2019-02-04

## 2019-02-01 RX ADMIN — Medication 2 MILLIGRAM(S): at 05:11

## 2019-02-01 RX ADMIN — PANTOPRAZOLE SODIUM 40 MILLIGRAM(S): 20 TABLET, DELAYED RELEASE ORAL at 17:04

## 2019-02-01 RX ADMIN — FINASTERIDE 5 MILLIGRAM(S): 5 TABLET, FILM COATED ORAL at 11:39

## 2019-02-01 RX ADMIN — OXYCODONE HYDROCHLORIDE 5 MILLIGRAM(S): 5 TABLET ORAL at 11:45

## 2019-02-01 RX ADMIN — Medication 500 MILLIGRAM(S): at 11:39

## 2019-02-01 RX ADMIN — ALBUTEROL 2 PUFF(S): 90 AEROSOL, METERED ORAL at 23:40

## 2019-02-01 RX ADMIN — SENNA PLUS 2 TABLET(S): 8.6 TABLET ORAL at 23:39

## 2019-02-01 RX ADMIN — ATORVASTATIN CALCIUM 10 MILLIGRAM(S): 80 TABLET, FILM COATED ORAL at 23:39

## 2019-02-01 RX ADMIN — TAMSULOSIN HYDROCHLORIDE 0.4 MILLIGRAM(S): 0.4 CAPSULE ORAL at 23:39

## 2019-02-01 RX ADMIN — SODIUM CHLORIDE 70 MILLILITER(S): 9 INJECTION INTRAMUSCULAR; INTRAVENOUS; SUBCUTANEOUS at 16:53

## 2019-02-01 RX ADMIN — ALBUTEROL 2 PUFF(S): 90 AEROSOL, METERED ORAL at 11:40

## 2019-02-01 RX ADMIN — Medication 50 MILLIGRAM(S): at 23:39

## 2019-02-01 RX ADMIN — Medication 20 MILLIEQUIVALENT(S): at 11:39

## 2019-02-01 RX ADMIN — Medication 100 MILLIGRAM(S): at 11:39

## 2019-02-01 RX ADMIN — Medication 60 MILLIGRAM(S): at 11:39

## 2019-02-01 RX ADMIN — Medication 650 MILLIGRAM(S): at 18:30

## 2019-02-01 RX ADMIN — Medication 2 MILLIGRAM(S): at 23:38

## 2019-02-01 RX ADMIN — METHADONE HYDROCHLORIDE 5 MILLIGRAM(S): 40 TABLET ORAL at 08:55

## 2019-02-01 RX ADMIN — Medication 500 MILLIGRAM(S): at 05:11

## 2019-02-01 RX ADMIN — FLUDROCORTISONE ACETATE 0.1 MILLIGRAM(S): 0.1 TABLET ORAL at 17:05

## 2019-02-01 RX ADMIN — Medication 81 MILLIGRAM(S): at 11:39

## 2019-02-01 RX ADMIN — Medication 650 MILLIGRAM(S): at 17:54

## 2019-02-01 RX ADMIN — OXYCODONE HYDROCHLORIDE 5 MILLIGRAM(S): 5 TABLET ORAL at 11:34

## 2019-02-01 RX ADMIN — Medication 100 MILLIGRAM(S): at 17:00

## 2019-02-01 RX ADMIN — Medication 500 MILLIGRAM(S): at 17:04

## 2019-02-01 RX ADMIN — ENOXAPARIN SODIUM 40 MILLIGRAM(S): 100 INJECTION SUBCUTANEOUS at 05:12

## 2019-02-01 NOTE — PROGRESS NOTE ADULT - PROBLEM SELECTOR PLAN 4
-Started on stress steroids for hypotension and concern for adrenal insuffiency  -will taper down slowly to home dose

## 2019-02-01 NOTE — PHYSICAL THERAPY INITIAL EVALUATION ADULT - PHYSICAL ASSIST/NONPHYSICAL ASSIST: SUPINE/SIT, REHAB EVAL
1 person assist/Tactile, manual and verbal cues/verbal cues/nonverbal cues (demo/gestures)/set-up required

## 2019-02-01 NOTE — DISCHARGE NOTE ADULT - CARE PLAN
Principal Discharge DX:	Cellulitis of hip, left  Goal:	resolution  Assessment and plan of treatment:	-complete antibiotics as recommended by CHE Gaitan, on day of discharge pt's was clean and no drainage therefore no further wound vac was needed and pt was advised to follow up with plastic surgery as an outpatient within 3-5 days of discharge for followup and further care and management  Secondary Diagnosis:	Adrenal insufficiency  Goal:	resolution  Assessment and plan of treatment:	-cont home dose of steroids, your bp was improved   -follow up with a primary care provider within 1 week of discharge for follow up care and management

## 2019-02-01 NOTE — DISCHARGE NOTE ADULT - MEDICATION SUMMARY - MEDICATIONS TO STOP TAKING
I will STOP taking the medications listed below when I get home from the hospital:  None I will STOP taking the medications listed below when I get home from the hospital:    ancillary supplies  -- ancillary supplies, normal saline flush for pre and post intravenous infusion

## 2019-02-01 NOTE — PROGRESS NOTE ADULT - PROBLEM SELECTOR PLAN 8
hx of CAD s/p CABG  -continue aspirin, lisinopril , continue statin  -cardiology consulted, ECG from today reviewed, trop neg, does not appear ischemic

## 2019-02-01 NOTE — DISCHARGE NOTE ADULT - HOSPITAL COURSE
74 yo M PMHx of CHF (stage I diastolic dysfunction, EF 65%) COPD (diagnosed "a few years ago, quit smoking then), HTN, HLD, hx of MI and CABG (2008) (stents also placed per chart review), RA (on Medrol daily), hx of multiple L hip surgeries presenting to the ED with SOB and L hip pain. Patient's wife and daughter present to assist with history. Of note, patient had hip replacement 30 years ago in [place] and an ORIF of periprosthetic fracture on 11/2018 w/ Dr. Ramirez which was complicated by superficial infection requiring ID evaluation and PICC line antibiotics. 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 Pt. states his surgical wound drain fell out 4 days ago, and under the advisement of his plastic surgeon Dr. Whipple he kept it out. Patient developed worsening SOB over the past few days in addition to cough when he followed up with Dr. Richardson 2 days ago who removed fluid around the hip and recommended he see Dr. Wills for his URI. Outpatient CXR he received yesterday demonstrated PNA which brought him to the ED today as he wasn't able to come yesterday. He denies recent fever, chills, weight changes but admits to fatigue and a decrease in appetite he ascribes to the daptomycin. Denies F/U/D.     In the ED:   VS wnl, , WBC 11.88, H/H 9.7/30 lactate 2.1  Na: 127, K: 4.3 Cl: 93  BUN/cr : 22/0.85 Alphos: 176 AST: 67 ALT: 59 trops <0.015 BNP: 560 (appears baseline)  ABG: No acute acidosis/alkalosis UA: negative    Patient admitted for GNR bacteremia likely hip infection abscess etiology unknown and worsening cough likely from ILD/fibrosis and superimposed suspected gram negative PNA/HCAP. Patient reevaluated by orthopedic Dr Ramirez and plastic surgery Dr Whipple. After discussing plan with Dr Whipple, no surgical intervention was decided and agreed to start patient with a wound vacuum.   Patient became hypotensive suspected adrenal insuffiencey patient chronically on steroids for RA 74 yo M PMHx of CHF (stage I diastolic dysfunction, EF 65%) COPD (diagnosed "a few years ago, quit smoking then), HTN, HLD, hx of MI and CABG (2008) (stents also placed per chart review), RA (on Medrol daily), hx of multiple L hip surgeries presenting to the ED with SOB and L hip pain. Patient's wife and daughter present to assist with history. Of note, patient had hip replacement 30 years ago in [place] and an ORIF of periprosthetic fracture on 11/2018 w/ Dr. Ramirez which was complicated by superficial infection requiring ID evaluation and PICC line antibiotics. 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 Pt. states his surgical wound drain fell out 4 days ago, and under the advisement of his plastic surgeon Dr. Whipple he kept it out. Patient developed worsening SOB over the past few days in addition to cough when he followed up with Dr. Richardson 2 days ago who removed fluid around the hip and recommended he see Dr. Wills for his URI. Outpatient CXR he received yesterday demonstrated PNA which brought him to the ED today as he wasn't able to come yesterday. He denies recent fever, chills, weight changes but admits to fatigue and a decrease in appetite he ascribes to the daptomycin. Denies F/U/D.     In the ED:   VS wnl, , WBC 11.88, H/H 9.7/30 lactate 2.1  Na: 127, K: 4.3 Cl: 93  BUN/cr : 22/0.85 Alphos: 176 AST: 67 ALT: 59 trops <0.015 BNP: 560 (appears baseline)  ABG: No acute acidosis/alkalosis UA: negative    Patient admitted for GNR bacteremia likely hip infection abscess etiology unknown and worsening cough likely from ILD/fibrosis and superimposed suspected gram negative PNA/HCAP. Patient reevaluated by orthopedic Dr Ramirez and plastic surgery Dr Whipple. After discussing plan with Dr Whipple, no surgical intervention was decided and agreed to start patient with a wound vacuum. Patient also seen by ID and continued on ciprofloxacin. Patient became hypotensive and noted to have LIOR, suspected adrenal insuffiencey  as patient is chronically on steroids for RA and tapered down. He was started on IVF and stress steroids. 74 yo M PMHx of CHF (stage I diastolic dysfunction, EF 65%) COPD (diagnosed "a few years ago, quit smoking then), HTN, HLD, hx of MI and CABG (2008) (stents also placed per chart review), RA (on Medrol daily), hx of multiple L hip surgeries presenting to the ED with SOB and L hip pain. Patient's wife and daughter present to assist with history. Of note, patient had hip replacement [place] years ago in [place] and an ORIF of periprosthetic fracture on 11/2018 w/ Dr. Ramirez which was complicated by superficial infection requiring ID evaluation and PICC line antibiotics. 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 Pt. states his surgical wound drain fell out 4 days ago, and under the advisement of his plastic surgeon Dr. Whipple he kept it out. Patient developed worsening SOB over the past few days in addition to cough when he followed up with Dr. Richardson 2 days ago who removed fluid around the hip and recommended he see Dr. Wills for his URI. Outpatient CXR he received yesterday demonstrated PNA which brought him to the ED today as he wasn't able to come yesterday.  Patient admitted for GNR bacteremia likely hip infection abscess etiology unknown and worsening cough likely from ILD/fibrosis and superimposed suspected gram negative PNA/HCAP. Patient reevaluated by orthopedic Dr Ramirez and plastic surgery Dr Whipple. After discussing plan with Dr Whipple, no surgical intervention was decided and agreed to start patient with a wound vacuum. Patient also seen by ID and continued on ciprofloxacin. Patient became hypotensive and noted to have LIOR, suspected adrenal insuffiencey  as patient is chronically on steroids for RA and tapered down. He was started on IVF and stress steroids.  Pt improved back to baseline. He was instructed to complete antibiotics until 2/8 and follow up with plastic surgery and primary medical provider. Pt on day of discharge had nondraining and clean wound to L hip. Pt is now stable and improved for discharge.     Time spent: 65 minutes

## 2019-02-01 NOTE — PHYSICAL THERAPY INITIAL EVALUATION ADULT - DIAGNOSIS, PT EVAL
Functional decline, deconditioned, s/p left periprosthetic fx and ORIF to left femur 11/19 ,now I&D from infection on 1/2/19, PNA

## 2019-02-01 NOTE — DIETITIAN INITIAL EVALUATION ADULT. - PROBLEM SELECTOR PLAN 1
-S/p washout and 21 day treatment with daptomycin. Last cultures revealed VRE. Will start linezolid under the guidance of ID and follow up their recommendations.  -S/P 1 dose doxy in ED  -Start linezolid  -Cyclobenzaprine for cramps  -Oxy 5 mg Q4H for mod pain  -Oxy 10 mg Q4H for severe

## 2019-02-01 NOTE — PHYSICAL THERAPY INITIAL EVALUATION ADULT - WEIGHT-BEARING RESTRICTIONS: SIT/STAND, REHAB EVAL
weight-bearing as tolerated/BLE, pt tends to extend right LE when initiating transfers and toe-touch WB

## 2019-02-01 NOTE — DISCHARGE NOTE ADULT - INSTRUCTIONS
wet to dry dressings, pt to follow up with plastic surgery Dr. Whipple to reassess wound, on discharge wound clean, nondraining

## 2019-02-01 NOTE — PROGRESS NOTE ADULT - SUBJECTIVE AND OBJECTIVE BOX
Date/Time Patient Seen:  		  Referring MD:   Data Reviewed	       Patient is a 73y old  Male who presents with a chief complaint of HCAP and Hip infection (31 Jan 2019 15:49)      Subjective/HPI     PAST MEDICAL & SURGICAL HISTORY:  S/P CABG x 3  S/P hip replacement, left  Rheumatoid arthritis  Osteoarthritis  COPD (chronic obstructive pulmonary disease)  HTN (hypertension)  S/P lumbar fusion: Approx 2012  History of femur fracture: Repaired 11/19- L hip  History of appendectomy  History of right shoulder replacement  History of total left hip arthroplasty        Medication list         MEDICATIONS  (STANDING):  ALBUTerol    90 MICROgram(s) HFA Inhaler 2 Puff(s) Inhalation every 6 hours  ALPRAZolam 2 milliGRAM(s) Oral at bedtime  ascorbic acid 500 milliGRAM(s) Oral daily  aspirin enteric coated 81 milliGRAM(s) Oral daily  atorvastatin 10 milliGRAM(s) Oral at bedtime  ciprofloxacin     Tablet 500 milliGRAM(s) Oral every 12 hours  docusate sodium 100 milliGRAM(s) Oral daily  enoxaparin Injectable 40 milliGRAM(s) SubCutaneous every 24 hours  finasteride 5 milliGRAM(s) Oral daily  FLUoxetine 60 milliGRAM(s) Oral daily  lisinopril 10 milliGRAM(s) Oral daily  methadone    Tablet 5 milliGRAM(s) Oral every 24 hours  methylPREDNISolone 2 milliGRAM(s) Oral daily  potassium chloride    Tablet ER 20 milliEquivalent(s) Oral daily  senna 2 Tablet(s) Oral at bedtime  tamsulosin 0.4 milliGRAM(s) Oral at bedtime    MEDICATIONS  (PRN):  acetaminophen   Tablet .. 650 milliGRAM(s) Oral every 6 hours PRN Mild Pain (1 - 3)  cyclobenzaprine 10 milliGRAM(s) Oral two times a day PRN Muscle Spasm  oxyCODONE    IR 10 milliGRAM(s) Oral every 4 hours PRN Severe Pain (7 - 10)  oxyCODONE    IR 5 milliGRAM(s) Oral every 4 hours PRN Moderate Pain (4 - 6)         Vitals log        ICU Vital Signs Last 24 Hrs  T(C): 36.4 (01 Feb 2019 04:48), Max: 36.7 (31 Jan 2019 13:04)  T(F): 97.5 (01 Feb 2019 04:48), Max: 98.1 (31 Jan 2019 13:04)  HR: 79 (01 Feb 2019 04:48) (68 - 84)  BP: 103/65 (01 Feb 2019 04:48) (74/51 - 103/65)  BP(mean): 75 (31 Jan 2019 22:23) (75 - 75)  ABP: --  ABP(mean): --  RR: 16 (01 Feb 2019 04:48) (16 - 18)  SpO2: 93% (01 Feb 2019 04:48) (93% - 97%)           Input and Output:  I&O's Detail    31 Jan 2019 07:01  -  01 Feb 2019 06:45  --------------------------------------------------------  IN:    Sodium Chloride 0.9% IV Bolus: 500 mL  Total IN: 500 mL    OUT:    Voided: 300 mL  Total OUT: 300 mL    Total NET: 200 mL          Lab Data                        9.9    8.98  )-----------( 379      ( 30 Jan 2019 07:10 )             31.5     01-30    137  |  98  |  15  ----------------------------<  73  4.4   |  31  |  0.61    Ca    9.1      30 Jan 2019 07:10              Review of Systems	      Objective     Physical Examination    heart s1s2  lung dec BS  abd soft  cn grossly int      Pertinent Lab findings & Imaging      Corin[place]:  NO   Adequate UO     I&O's Detail    31 Jan 2019 07:01  -  01 Feb 2019 06:45  --------------------------------------------------------  IN:    Sodium Chloride 0.9% IV Bolus: 500 mL  Total IN: 500 mL    OUT:    Voided: 300 mL  Total OUT: 300 mL    Total NET: 200 mL               Discussed with:     Cultures:	        Radiology

## 2019-02-01 NOTE — PROGRESS NOTE ADULT - SUBJECTIVE AND OBJECTIVE BOX
[facility] - Shannon Valdez, [place], Anusha, Johanny, Abdias Little  Office Number:  [phone]    Patient resting comfortably in bed in NAD.  Laying flat with no respiratory distress.  He is complaining of chest pain and b/l shoulder pain this AM>  He states that this pain is consistent with his arthritis.    F/U for:  CAD, CP    MEDICATIONS  (STANDING):  ALBUTerol    90 MICROgram(s) HFA Inhaler 2 Puff(s) Inhalation every 6 hours  ALPRAZolam 2 milliGRAM(s) Oral at bedtime  ascorbic acid 500 milliGRAM(s) Oral daily  aspirin enteric coated 81 milliGRAM(s) Oral daily  atorvastatin 10 milliGRAM(s) Oral at bedtime  ciprofloxacin     Tablet 500 milliGRAM(s) Oral every 12 hours  docusate sodium 100 milliGRAM(s) Oral daily  enoxaparin Injectable 40 milliGRAM(s) SubCutaneous every 24 hours  finasteride 5 milliGRAM(s) Oral daily  FLUoxetine 60 milliGRAM(s) Oral daily  methadone    Tablet 5 milliGRAM(s) Oral every 24 hours  methylPREDNISolone 2 milliGRAM(s) Oral daily  potassium chloride    Tablet ER 20 milliEquivalent(s) Oral daily  senna 2 Tablet(s) Oral at bedtime  tamsulosin 0.4 milliGRAM(s) Oral at bedtime    MEDICATIONS  (PRN):  acetaminophen   Tablet .. 650 milliGRAM(s) Oral every 6 hours PRN Mild Pain (1 - 3)  cyclobenzaprine 10 milliGRAM(s) Oral two times a day PRN Muscle Spasm  oxyCODONE    IR 10 milliGRAM(s) Oral every 4 hours PRN Severe Pain (7 - 10)  oxyCODONE    IR 5 milliGRAM(s) Oral every 4 hours PRN Moderate Pain (4 - 6)      Allergies    Ceftin (Pruritus)    Intolerances        Vital Signs Last 24 Hrs  T(C): 36.4 (01 Feb 2019 04:48), Max: 36.7 (31 Jan 2019 13:04)  T(F): 97.5 (01 Feb 2019 04:48), Max: 98.1 (31 Jan 2019 13:04)  HR: 79 (01 Feb 2019 04:48) (68 - 84)  BP: 103/65 (01 Feb 2019 04:48) (74/51 - 103/65)  BP(mean): 75 (31 Jan 2019 22:23) (75 - 75)  RR: 16 (01 Feb 2019 04:48) (16 - 18)  SpO2: 93% (01 Feb 2019 04:48) (93% - 97%)    I&O's Summary    31 Jan 2019 07:01  -  01 Feb 2019 07:00  --------------------------------------------------------  IN: 500 mL / OUT: 300 mL / NET: 200 mL        ON EXAM:    General: NAD, awake and alert, oriented x 3  HEENT: Mucous membranes are moist, anicteric  Lungs: Non-labored, breath sounds are clear bilaterally, No wheezing, rales or rhonchi  Cardiovascular: Regular, S1 and S2, no murmurs, rubs, or gallops  Gastrointestinal: Bowel Sounds present, soft, nontender.   Lymph: No peripheral edema. No lymphadenopathy.  Skin: No rashes or ulcers  Psych:  Mood & affect appropriate    LABS: All Labs Reviewed:                        9.6    10.16 )-----------( 342      ( 01 Feb 2019 09:01 )             30.6                         9.9    8.98  )-----------( 379      ( 30 Jan 2019 07:10 )             31.5     01 Feb 2019 09:01    135    |  97     |  19     ----------------------------<  115    4.4     |  29     |  1.20   30 Jan 2019 07:10    137    |  98     |  15     ----------------------------<  73     4.4     |  31     |  0.61     Ca    9.1        01 Feb 2019 09:01  Ca    9.1        30 Jan 2019 07:10        CARDIAC MARKERS ( 01 Feb 2019 09:01 )  <.015 ng/mL / x     / 11 U/L / x     / x          Blood Culture: Organism --  Gram Stain Blood -- Gram Stain --  Specimen Source .Blood Blood-Peripheral  Culture-Blood --

## 2019-02-01 NOTE — PHYSICAL THERAPY INITIAL EVALUATION ADULT - BALANCE DISTURBANCE, IDENTIFIED IMPAIRMENT CONTRIBUTE, REHAB EVAL
pain/decreased sensation/decreased ROM/decreased strength/impaired coordination/impaired motor control/abnormal muscle tone/impaired postural control

## 2019-02-01 NOTE — PHYSICAL THERAPY INITIAL EVALUATION ADULT - IMPAIRMENTS FOUND, PT EVAL
posture/gait, locomotion, and balance/joint integrity and mobility/arousal, attention, and cognition/muscle strength/ROM/gross motor

## 2019-02-01 NOTE — PROGRESS NOTE ADULT - SUBJECTIVE AND OBJECTIVE BOX
[facility], Division of Infectious Diseases  GAETANO Briones A. Lee  [phone]    Name: ERIKA TORREZ  Age: 73y  Gender: Male  MRN: [id number]    Interval History--  Notes reviewed. Resting comfortably, roused briefly.  Hypotensive yesterday and into today, coincident with steroid dose dropping.  LIOR as well.    Past Medical History--  S/P CABG x 3  S/P hip replacement, left  Rheumatoid arthritis  Osteoarthritis  COPD (chronic obstructive pulmonary disease)  HTN (hypertension)  S/P lumbar fusion  History of femur fracture  History of appendectomy  History of right shoulder replacement  History of total left hip arthroplasty      For details regarding the patient's social history, family history, and other miscellaneous elements, please refer the initial infectious diseases consultation and/or the admitting history and physical examination for this admission.    Allergies    Ceftin (Pruritus)    Intolerances        Medications--  Antibiotics:  ciprofloxacin     Tablet 500 milliGRAM(s) Oral every 12 hours    Immunologic:    Other:  acetaminophen   Tablet .. PRN  ALBUTerol    90 MICROgram(s) HFA Inhaler  ALPRAZolam  ascorbic acid  aspirin enteric coated  atorvastatin  cyclobenzaprine PRN  docusate sodium  enoxaparin Injectable  finasteride  fludroCORTISONE  FLUoxetine  hydrocortisone sodium succinate Injectable  hydrocortisone sodium succinate Injectable  methadone    Tablet  oxyCODONE    IR PRN  oxyCODONE    IR PRN  pantoprazole    Tablet  potassium chloride    Tablet ER  senna  sodium chloride 0.9%.  tamsulosin      Review of Systems--  A 10-point review of systems was obtained.   Review of systems otherwise unchanged compared to prior visit except as previously noted.    Physical Examination--  Vital Signs: T(F): 98.2 (02-01-19 @ 12:57), Max: 98.2 (02-01-19 @ 12:57)  HR: 98 (02-01-19 @ 12:57)  BP: 98/69 (02-01-19 @ 14:37)  RR: 18 (02-01-19 @ 12:57)  SpO2: 92% (02-01-19 @ 12:57)  Wt(kg): --  General: Nontoxic-appearing Male in no acute distress.  HEENT: AT/NC. Anicteric. Conjunctiva pink and moist. Oropharynx clear.   Neck: Not rigid. No sense of mass.  Nodes: None palpable.  Lungs: Diminished breath sounds bilaterally with scattered bilateral rhonchi  Heart: Regular rate and rhythm. No Murmur. No rub. No gallop. No palpable thrill.  Abdomen: Bowel sounds present and normoactive. Soft. Nondistended. Nontender. No sense of mass. No organomegaly. Obese.   Extremities: No cyanosis or clubbing. L thigh dressed.  Skin: Warm. Dry. Good turgor. No rash. No vasculitic stigmata.  Psychiatric: Appropriate affect and mood for situation.         Laboratory Studies--  CBC                        9.6    10.16 )-----------( 342      ( 01 Feb 2019 09:01 )             30.6       Chemistries  02-01    135  |  97  |  19  ----------------------------<  115<H>  4.4   |  29  |  1.20    Ca    9.1      01 Feb 2019 09:01        Culture Data    Culture - Blood (collected 28 Jan 2019 15:21)  Source: .Blood Blood-Venous  Preliminary Report (29 Jan 2019 16:01):    No growth to date.    Culture - Blood (collected 28 Jan 2019 15:21)  Source: .Blood Blood-Peripheral  Preliminary Report (29 Jan 2019 16:01):    No growth to date.    Culture - Abscess with Gram Stain (collected 25 Jan 2019 21:33)  Source: .Abscess Hip - Left  Final Report (30 Jan 2019 17:56):    Numerous Escherichia coli  Organism: Escherichia coli (30 Jan 2019 17:56)  Organism: Escherichia coli (30 Jan 2019 17:56)    Culture - Urine (collected 25 Jan 2019 20:05)  Source: .Urine Clean Catch (Midstream)  Final Report (26 Jan 2019 21:52):    <10,000 CFU/ml Normal Urogenital jer present

## 2019-02-01 NOTE — PROGRESS NOTE ADULT - PROBLEM SELECTOR PLAN 1
Continue Cipro 500mg PO BID  Plan 14 days total Abx so until 2/8, with need for dose adjustment depending on renal dysfunction.

## 2019-02-01 NOTE — PROGRESS NOTE ADULT - PROBLEM SELECTOR PLAN 1
copd  RA  wound infection  OP  OA  bacteremia  Heart disease  cvs regimen and BP control  sherie - does not use CPAP  copd regimen - proventil  tolerating room air  ID follow up noted - on emp ABX  assist with ADL when needed  serial labs  serial PE  prognosis guarded  will follow  supportive medical regimen and care in effect

## 2019-02-01 NOTE — DISCHARGE NOTE ADULT - ADDITIONAL INSTRUCTIONS
-please follow up with a primary care provider and your plastic surgery within 1 week of discharge  -pt responsible to make and attend all followup appts for further care and management

## 2019-02-01 NOTE — PROGRESS NOTE ADULT - PROBLEM SELECTOR PLAN 1
-S/p washout I&D with Plastic Sx Dr Whipple  -ID recs noted, c/w ciprofloxacin for total of 14 days  -pain management with methadone   -Wound care/ wound vac as per plastic surgery  -F/u Dr. Whipple and orthopedic

## 2019-02-01 NOTE — PROGRESS NOTE ADULT - PROBLEM SELECTOR PLAN 3
-Albuterol q6h PRN (called patients insurance for pre authorization)  -Dr. Valente from pulmonary following

## 2019-02-01 NOTE — PHYSICAL THERAPY INITIAL EVALUATION ADULT - PERTINENT HX OF CURRENT PROBLEM, REHAB EVAL
As per H&P:"74 y/o male hx of multiple L hip surgeries (hip replacement 30 years ago in [place], ORIF recently 11/2018 w/ Dr. Ramirez) complicated with superficial infection which required I&D on 12/5 (discharged with PICC line and continued to receive cefazolin), present to the ED with PNA

## 2019-02-01 NOTE — PROGRESS NOTE ADULT - ASSESSMENT
72 yo M PMHx of CHF (stage I diastolic dysfunction, EF 65%) COPD (diagnosed "a few years ago, quit smoking then), HTN, HLD, hx of MI and CABG (2008) (stents also placed per chart review), RA (on Medrol daily), hx of multiple L hip surgeries presenting to the ED with SOB and L hip pain admitted for GNR bacteremia likely hip infection/abcess etiology uknown and worsening cough likely from ILD/fibrosis and superimposed suspected gram negative. PNA/HCAP. Patient reevaluated by orthopedic Dr Ramirez and plastic surgery Dr Whipple, wound vacuum placed. No surgical intervention planned at this time.   Patient hypotensive, LIOR, suspected adrenal insuffiencey

## 2019-02-01 NOTE — PHYSICAL THERAPY INITIAL EVALUATION ADULT - ADDITIONAL COMMENTS
Pt lives at home in a private house /c his spouse in [place] /c 1 KAYCEE and no rails. No stairs to negotiate inside. At pt daughter house he has multiple steps to enter but states through the backwards there is only ~ 3 KAYCEE. Pt initially reports being independent /c all functional mobility and ADLs prior to admission however during interviewing pt admits that he needs assistance for transfers and ambulation ~25% and needs assistance for donning and doffing shoes and socks. Supervision needed during showering. Pt does have a rolling walker, single axis cane, motorized scooter, built in shower seat and raised toilet seat. Pt states his wife assists him as needed. Pt /c history of chronic pain due to Fibromyalgia, LLE neuropathy, back and shoulder pain. Pt has been on Methadone for 30 years. Also on muscle relaxers prior to admission.

## 2019-02-01 NOTE — PROGRESS NOTE ADULT - NSHPATTENDINGPLANDISCUSS_GEN_ALL_CORE
patient, consults, 
patient, consults (ID, Plastic, Orthopedic), 
patient, consults (ID, Plastic, Orthopedic), 
patient, family, consults

## 2019-02-01 NOTE — PHYSICAL THERAPY INITIAL EVALUATION ADULT - IMPAIRMENTS CONTRIBUTING TO GAIT DEVIATIONS, PT EVAL
slow, slightly unsteady and antalgic/decreased ROM/impaired motor control/pain/decreased flexibility/impaired postural control/impaired balance

## 2019-02-01 NOTE — DISCHARGE NOTE ADULT - PATIENT PORTAL LINK FT
You can access the [facility], offered by [facility], by registering with the following website: [email or web address]

## 2019-02-01 NOTE — DIETITIAN INITIAL EVALUATION ADULT. - PROBLEM SELECTOR PLAN 2
-CTA unclear if worsening fibrosis vs. HCAP. Based on clinical picture of worsening cough and associated SOB, cannot rule out MRSA lung infection and will cover empirically with Meropenem and linezolid Bronchoscopy not warranted at this time but will follow up with ID and Pulmonlogy for further recommendations. No objection to steroids now as per ID.   -Chest PT  -F/U Urine legionella, strep  -F/U MRSA nares swab  -F/U ID  -F/U Pulm

## 2019-02-01 NOTE — DISCHARGE NOTE ADULT - CARE PROVIDER_API CALL
Jayson Whipple)  Plastic Surgery Surgery  [address] [place] [address]  Phone: [phone]  Fax: [phone]    Lopez Gaitan (MD; PhD)  Infectious Disease; Internal Medicine  [address] [place] [address]  Phone: [phone]  Fax: [phone]

## 2019-02-01 NOTE — DISCHARGE NOTE ADULT - MEDICATION SUMMARY - MEDICATIONS TO TAKE
I will START or STAY ON the medications listed below when I get home from the hospital:    3-in-1 commode  -- disp: 1  ICD-10: R26.89 - gait instability  -- Indication: For Prophylactic measure    ancillary supplies  -- ancillary supplies, normal saline flush for pre and post intravenous infusion  -- Indication: For Prophylactic measure    finasteride 5 mg oral tablet  -- 1 tab(s) by mouth once a day  -- Indication: For BPH (benign prostatic hyperplasia)    methylPREDNISolone 2 mg oral tablet  -- 1 tab(s) by mouth once a day  -- Indication: For Adrenal insufficiency    aspirin 81 mg oral tablet  -- 1 tab(s) by mouth once a day   -- Take with food or milk.    -- Indication: For Diastolic congestive heart failure, unspecified HF chronicity    oxyCODONE 10 mg oral tablet  -- 1 tab(s) by mouth every 4 hours, As needed, MDD:6 for breakthrough pain  -- Indication: For Home med    tamsulosin 0.4 mg oral capsule  -- 1 cap(s) by mouth once a day  -- Indication: For BPH (benign prostatic hyperplasia)    FLUoxetine 20 mg oral capsule  -- 3 cap(s) by mouth once a day  -- Indication: For Depression    lovastatin 20 mg oral tablet  -- 1 tab(s) by mouth once a day  -- Indication: For HLD (hyperlipidemia)    ALPRAZolam 0.5 mg oral tablet  -- 1 tab(s) by mouth once a day (at bedtime), As Needed  -- Indication: For Anxiety    alendronate 70 mg oral tablet  -- 1 tab(s) by mouth once a week  -- Indication: For Prophylactic measure    potassium chloride 10 mEq oral capsule, extended release  -- 1 cap(s) by mouth 2 times a day  -- Indication: For Prophylactic measure    ciprofloxacin 500 mg oral tablet  -- 1 tab(s) by mouth every 12 hours  -- Indication: For L hip wound    ascorbic acid 500 mg oral tablet  -- 1 tab(s) by mouth 2 times a day  -- Indication: For supplement I will START or STAY ON the medications listed below when I get home from the hospital:    3-in-1 commode  -- disp: 1  ICD-10: R26.89 - gait instability  -- Indication: For Home device    finasteride 5 mg oral tablet  -- 1 tab(s) by mouth once a day  -- Indication: For BPH (benign prostatic hyperplasia)    methylPREDNISolone 2 mg oral tablet  -- 1 tab(s) by mouth once a day  -- Indication: For Adrenal insufficiency    aspirin 81 mg oral tablet  -- 1 tab(s) by mouth once a day   -- Take with food or milk.    -- Indication: For HTN (hypertension)    oxyCODONE 10 mg oral tablet  -- 1 tab(s) by mouth every 4 hours, As needed, MDD:6 for breakthrough pain  -- Indication: For Rheumatoid arthritis    tamsulosin 0.4 mg oral capsule  -- 1 cap(s) by mouth once a day  -- Indication: For BPH (benign prostatic hyperplasia)    FLUoxetine 20 mg oral capsule  -- 3 cap(s) by mouth once a day  -- Indication: For Depression    lovastatin 20 mg oral tablet  -- 1 tab(s) by mouth once a day  -- Indication: For HLD (hyperlipidemia)    ALPRAZolam 0.5 mg oral tablet  -- 1 tab(s) by mouth once a day (at bedtime), As Needed  -- Indication: For Anxiety    alendronate 70 mg oral tablet  -- 1 tab(s) by mouth once a week  -- Indication: For Rheumatoid arthritis    potassium chloride 10 mEq oral capsule, extended release  -- 1 cap(s) by mouth 2 times a day  -- Indication: For Diastolic congestive heart failure, unspecified HF chronicity    ciprofloxacin 500 mg oral tablet  -- 1 tab(s) by mouth every 12 hours  -- Indication: For Cellulitis of hip, left    ascorbic acid 500 mg oral tablet  -- 1 tab(s) by mouth 2 times a day  -- Indication: For supplement

## 2019-02-01 NOTE — CONSULT NOTE ADULT - ASSESSMENT
73 white male with a history of HTN, CAD, CHF, CABG, RA now admitted with PNA and possible sepsis. S/P CT Chest with IV contrast on the 25th of last month. Now has worsening renal indices. Urine out put is acceptable. Will continue IVF at 75 cc an hour. Medications are appropriate at this time. will follow.
A/P: 73  y.o with recurrent left hip collection s/p evacuation and debridement.  - Admit to medicine  - Pain control  - IV abx, f/u wound cultures  - Wound care  - Ambulation as per Ortho   - DVT PPx: SCD, chemoprophylaxis as per Ortho service  - Optimize pulmonary status  - Will Follow    Thank You  Jayson Whipple MD  Plastic Surgery  [phone]
Pt is a 73y Male with a L Hip Recurrent Surgical Site Infection.    -Afebrile, no leukocytosis, non-toxic  -ESR, CRP pending  -Pain control  -Abx per ID team  -Medical management for respiratory infection  -WB as tolerated affected extremity  -XR L Hip: s/p ORIF of periprosthetic fracture of gabriela-arthroplasty. No acute fracture.  -DVT PPx per medical team.   -Please document medical optimization for OR if required.   -FU Plastic Surgery Consult  -FU ID Consult  -Will DW Dr. Ramirez and advise of any changes.
74 yo M PMHx of CHF (stage I diastolic dysfunction, EF 65%) COPD, HTN, HLD, hx of MI, CABG 2008, pci, RA on steroids, hx of multiple L hip surgeries.  We have seen him several times over the past few months in anticipation and postop from several surgeries.   Of note, patient had hip replacement 30 years ago in [place] and an ORIF of periprosthetic fracture on 11/2018 w/ Dr. Ramirez which was complicated by superficial infection requiring ID evaluation and PICC line antibiotics. 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    Pt. states his surgical wound drain fell out 4 days pta, and under the advisement of his plastic surgeon Dr. Whipple he kept it out. Patient developed worsening SOB over the past few days in addition to cough when he followed up with Dr. Richardson 2 days ago who removed fluid around the hip and recommended he see Dr. Wills for his URI. Outpatient CXR demonstrated PNA which brought him to the ED.  He has been getting abx for presumed hcap, with CT evidence of fibrotic changes, but no definitive pna.  There has been concern about infected seroma, and reportedly orthopedics is considering removal of hardware.  Consultation is now being obtained.     The patient reports no new symptoms.  Denies chest discomfort.  He did have some shortness of breath in the past 24, now improved, treated as  a pulmonary issue  No abdominal pain.  No new neurologic symptoms.    -there is no evidence of acute ischemia.  -he has a history of stable cad, s/p cabg and pci  -cont statin  -cont asa  -reportedly has had a nuclear stress test within the past few months in [place], results not available at this time    -there is no evidence of significant arrhythmia.  -no need for tele monitoring now    -there is no evidence for meaningful  volume overload.  -normal ef by echo 11/2018, no need to repeat    -DVT prophylaxis  -monitor electrolytes, keep k>4, Mg>2  -bp labile, follow carefully  -if needs orthopedic surgery, would be considered optimized from a cv perspective.  would cont asa periop.  Routine hemodynamic monitoring is recommended.

## 2019-02-01 NOTE — PROGRESS NOTE ADULT - ASSESSMENT
74 yo M PMHx of CHF (stage I diastolic dysfunction, EF 65%) COPD, HTN, HLD, hx of MI, CABG 2008, pci, RA on steroids, hx of multiple L hip surgeries.  We have seen him several times over the past few months in anticipation and postop from several surgeries.   Of note, patient had hip replacement 30 years ago in [place] and an ORIF of periprosthetic fracture on 11/2018 w/ Dr. Ramirez which was complicated by superficial infection requiring ID evaluation and PICC line antibiotics. 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    Pt. states his surgical wound drain fell out 4 days pta, and under the advisement of his plastic surgeon Dr. Whipple he kept it out. Patient developed worsening SOB over the past few days in addition to cough when he followed up with Dr. Richardson 2 days ago who removed fluid around the hip and recommended he see Dr. Wills for his URI. Outpatient CXR demonstrated PNA which brought him to the ED.  He has been getting abx for presumed hcap, with CT evidence of fibrotic changes, but no definitive pna.  There has been concern about infected seroma, and reportedly orthopedics is considering removal of hardware.  No s/p I&D     The patient reports no new symptoms.   He did have some shortness of breath in the past 48, now improved, treated as  a pulmonary issue  No abdominal pain.  No new neurologic symptoms.  - he has a history of stable cad, s/p cabg and pci  - reportedly has had a nuclear stress test within the past few months in [place], results not available at this time  - Chest pain unlikely to be cardiac.  EKG negative.  First set of enzymes negative.  To trend one more set.  - there is no evidence of acute ischemia.  - cont statin  - cont asa  - there is no evidence of significant arrhythmia.  - no need for tele monitoring now  - there is no evidence for meaningful  volume overload.  - normal ef by echo 11/2018, no need to repeat  - bp  better controlled. cont current meds   - if needs orthopedic surgery, would be considered optimized from a cv perspective.  would cont asa periop.  Routine hemodynamic monitoring is recommended.   - Further cardiac workup will depend on clinical course.   - All other workup per primary team. Will followup.

## 2019-02-01 NOTE — PROGRESS NOTE ADULT - SUBJECTIVE AND OBJECTIVE BOX
INTERVAL HPI/OVERNIGHT EVENTS: Patient seen and examined at bedside. No acute events overnight.   Had wash out I&D with plastic surgery done on admission. Patient restarted on home methadone and xanax.  Patient reevaluated by orthopedic Dr Ramirez and plastic surgery Dr Whipple.  As per Plastic surgery no further surgery needed, started patient on wound vac.  Patient became hypotensive, noted to have LIOR started on stress steroids.   Patient complains of chest pain ECG done no ST changes, trops negative.     MEDICATIONS  (STANDING):  ALBUTerol    90 MICROgram(s) HFA Inhaler 2 Puff(s) Inhalation every 6 hours  ALPRAZolam 2 milliGRAM(s) Oral at bedtime  ascorbic acid 500 milliGRAM(s) Oral daily  aspirin enteric coated 81 milliGRAM(s) Oral daily  atorvastatin 10 milliGRAM(s) Oral at bedtime  ciprofloxacin     Tablet 500 milliGRAM(s) Oral every 12 hours  docusate sodium 100 milliGRAM(s) Oral daily  enoxaparin Injectable 40 milliGRAM(s) SubCutaneous every 24 hours  finasteride 5 milliGRAM(s) Oral daily  fludroCORTISONE 0.1 milliGRAM(s) Oral daily  FLUoxetine 60 milliGRAM(s) Oral daily  hydrocortisone sodium succinate Injectable 50 milliGRAM(s) IV Push every 8 hours  hydrocortisone sodium succinate Injectable 100 milliGRAM(s) IV Push once  methadone    Tablet 5 milliGRAM(s) Oral every 24 hours  pantoprazole    Tablet 40 milliGRAM(s) Oral before breakfast  potassium chloride    Tablet ER 20 milliEquivalent(s) Oral daily  senna 2 Tablet(s) Oral at bedtime  sodium chloride 0.9%. 1000 milliLiter(s) (70 mL/Hr) IV Continuous <Continuous>  tamsulosin 0.4 milliGRAM(s) Oral at bedtime    MEDICATIONS  (PRN):  acetaminophen   Tablet .. 650 milliGRAM(s) Oral every 6 hours PRN Mild Pain (1 - 3)  cyclobenzaprine 10 milliGRAM(s) Oral two times a day PRN Muscle Spasm  oxyCODONE    IR 10 milliGRAM(s) Oral every 4 hours PRN Severe Pain (7 - 10)  oxyCODONE    IR 5 milliGRAM(s) Oral every 4 hours PRN Moderate Pain (4 - 6)        Allergies    Ceftin (Pruritus)    Intolerances        CONSTITUTIONAL: No weakness, fevers or chills  RESPIRATORY: No cough, wheezing, hemoptysis; No shortness of breath  Cvs: + chest pain , No palpitations  Gi: No abdominal pain. No nausea, vomiting, diarrhea or constipation. No melena or hematochezia.  Neuro: +Weakness    All other review of systems is negative unless indicated above.    Vital Signs Last 24 Hrs  T(C): 36.7 (31 Jan 2019 13:04), Max: 37.4 (31 Jan 2019 05:04)  T(F): 98.1 (31 Jan 2019 13:04), Max: 99.3 (31 Jan 2019 05:04)  HR: 78 (31 Jan 2019 13:44) (78 - 107)  BP: 81/50 (31 Jan 2019 13:44) (74/51 - 122/80)  BP(mean): 85 (31 Jan 2019 05:04) (85 - 94)  RR: 18 (31 Jan 2019 13:04) (16 - 18)  SpO2: 94% (31 Jan 2019 13:04) (92% - 95%)    General: NAD  Neurology: A&Ox3 , nonfocal, sensation grossly intact  Respiratory: CTA B/L  CV: RRR, S1S2  Abdominal: Soft, NT, ND +BS  Extremities: No edema, + peripheral pulses, left wound vac, mild tenderness     LABS:                        9.9    8.98  )-----------( 379      ( 30 Jan 2019 07:10 )             31.5     01-30    137  |  98  |  15  ----------------------------<  73  4.4   |  31  |  0.61    Ca    9.1      30 Jan 2019 07:10        RADIOLOGY & ADDITIONAL TESTS:

## 2019-02-01 NOTE — DISCHARGE NOTE ADULT - PLAN OF CARE
resolution -complete antibiotics as recommended by CHE Gaitan, on day of discharge pt's was clean and no drainage therefore no further wound vac was needed and pt was advised to follow up with plastic surgery as an outpatient within 3-5 days of discharge for followup and further care and management -cont home dose of steroids, your bp was improved   -follow up with a primary care provider within 1 week of discharge for follow up care and management

## 2019-02-01 NOTE — PROGRESS NOTE ADULT - ASSESSMENT
74 yo male admitted with outpt concern for pneumonia but with E coli bacteremia and likely source left thigh wound  Suspect recurrent infected seroma the issues here, with reaccumulation in dead space in this man with prior surgeries/trauma, chronic steroid use, some immune compromise on the basis of his RA.   Again with different bacterial isolates each time, concern for any hardware infection at this point is remote.  Drainage the key issue. Unless we are contemplating hardware involvement no role for protracted antibiotics here.  Risk:benefit of quinolone use here acceptable  No concern of uncontrolled infection on exam.  Would wonder whether there is a component of adrenal insufficiency here, steroids augmented by primary team  Suspect LIOR due to relative hypotension

## 2019-02-01 NOTE — DISCHARGE NOTE ADULT - CARE PROVIDERS DIRECT ADDRESSES
,DirectAddress_Unknown,[email or web address]

## 2019-02-01 NOTE — PHYSICAL THERAPY INITIAL EVALUATION ADULT - GAIT DEVIATIONS NOTED, PT EVAL
decreased velocity of limb motion/decreased taran/decreased step length/decreased stride length/decreased weight-shifting ability

## 2019-02-02 DIAGNOSIS — I95.9 HYPOTENSION, UNSPECIFIED: ICD-10-CM

## 2019-02-02 LAB
ANION GAP SERPL CALC-SCNC: 8 MMOL/L — SIGNIFICANT CHANGE UP (ref 5–17)
BUN SERPL-MCNC: 18 MG/DL — SIGNIFICANT CHANGE UP (ref 7–23)
CALCIUM SERPL-MCNC: 8.6 MG/DL — SIGNIFICANT CHANGE UP (ref 8.5–10.1)
CHLORIDE SERPL-SCNC: 98 MMOL/L — SIGNIFICANT CHANGE UP (ref 96–108)
CO2 SERPL-SCNC: 28 MMOL/L — SIGNIFICANT CHANGE UP (ref 22–31)
CREAT SERPL-MCNC: 0.88 MG/DL — SIGNIFICANT CHANGE UP (ref 0.5–1.3)
CULTURE RESULTS: SIGNIFICANT CHANGE UP
CULTURE RESULTS: SIGNIFICANT CHANGE UP
GLUCOSE SERPL-MCNC: 142 MG/DL — HIGH (ref 70–99)
HCT VFR BLD CALC: 27.8 % — LOW (ref 39–50)
HGB BLD-MCNC: 8.8 G/DL — LOW (ref 13–17)
MCHC RBC-ENTMCNC: 27.5 PG — SIGNIFICANT CHANGE UP (ref 27–34)
MCHC RBC-ENTMCNC: 31.7 GM/DL — LOW (ref 32–36)
MCV RBC AUTO: 86.9 FL — SIGNIFICANT CHANGE UP (ref 80–100)
NRBC # BLD: 0 /100 WBCS — SIGNIFICANT CHANGE UP (ref 0–0)
PLATELET # BLD AUTO: 306 K/UL — SIGNIFICANT CHANGE UP (ref 150–400)
POTASSIUM SERPL-MCNC: 4.4 MMOL/L — SIGNIFICANT CHANGE UP (ref 3.5–5.3)
POTASSIUM SERPL-SCNC: 4.4 MMOL/L — SIGNIFICANT CHANGE UP (ref 3.5–5.3)
RBC # BLD: 3.2 M/UL — LOW (ref 4.2–5.8)
RBC # FLD: 15.5 % — HIGH (ref 10.3–14.5)
SODIUM SERPL-SCNC: 134 MMOL/L — LOW (ref 135–145)
SPECIMEN SOURCE: SIGNIFICANT CHANGE UP
SPECIMEN SOURCE: SIGNIFICANT CHANGE UP
WBC # BLD: 5.79 K/UL — SIGNIFICANT CHANGE UP (ref 3.8–10.5)
WBC # FLD AUTO: 5.79 K/UL — SIGNIFICANT CHANGE UP (ref 3.8–10.5)

## 2019-02-02 PROCEDURE — 99233 SBSQ HOSP IP/OBS HIGH 50: CPT

## 2019-02-02 PROCEDURE — 99232 SBSQ HOSP IP/OBS MODERATE 35: CPT

## 2019-02-02 RX ORDER — METHADONE HYDROCHLORIDE 40 MG/1
5 TABLET ORAL EVERY 24 HOURS
Qty: 0 | Refills: 0 | Status: DISCONTINUED | OUTPATIENT
Start: 2019-02-03 | End: 2019-02-04

## 2019-02-02 RX ORDER — SODIUM CHLORIDE 9 MG/ML
1 INJECTION INTRAMUSCULAR; INTRAVENOUS; SUBCUTANEOUS DAILY
Qty: 0 | Refills: 0 | Status: DISCONTINUED | OUTPATIENT
Start: 2019-02-02 | End: 2019-02-04

## 2019-02-02 RX ADMIN — Medication 500 MILLIGRAM(S): at 05:14

## 2019-02-02 RX ADMIN — ALBUTEROL 2 PUFF(S): 90 AEROSOL, METERED ORAL at 05:15

## 2019-02-02 RX ADMIN — SODIUM CHLORIDE 70 MILLILITER(S): 9 INJECTION INTRAMUSCULAR; INTRAVENOUS; SUBCUTANEOUS at 21:06

## 2019-02-02 RX ADMIN — Medication 50 MILLIGRAM(S): at 13:18

## 2019-02-02 RX ADMIN — Medication 500 MILLIGRAM(S): at 12:17

## 2019-02-02 RX ADMIN — Medication 2 MILLIGRAM(S): at 21:05

## 2019-02-02 RX ADMIN — ALBUTEROL 2 PUFF(S): 90 AEROSOL, METERED ORAL at 21:06

## 2019-02-02 RX ADMIN — Medication 100 MILLIGRAM(S): at 12:17

## 2019-02-02 RX ADMIN — ALBUTEROL 2 PUFF(S): 90 AEROSOL, METERED ORAL at 13:19

## 2019-02-02 RX ADMIN — ENOXAPARIN SODIUM 40 MILLIGRAM(S): 100 INJECTION SUBCUTANEOUS at 05:12

## 2019-02-02 RX ADMIN — FINASTERIDE 5 MILLIGRAM(S): 5 TABLET, FILM COATED ORAL at 12:17

## 2019-02-02 RX ADMIN — Medication 650 MILLIGRAM(S): at 16:40

## 2019-02-02 RX ADMIN — Medication 60 MILLIGRAM(S): at 12:18

## 2019-02-02 RX ADMIN — TAMSULOSIN HYDROCHLORIDE 0.4 MILLIGRAM(S): 0.4 CAPSULE ORAL at 21:05

## 2019-02-02 RX ADMIN — FLUDROCORTISONE ACETATE 0.1 MILLIGRAM(S): 0.1 TABLET ORAL at 05:13

## 2019-02-02 RX ADMIN — Medication 650 MILLIGRAM(S): at 15:39

## 2019-02-02 RX ADMIN — PANTOPRAZOLE SODIUM 40 MILLIGRAM(S): 20 TABLET, DELAYED RELEASE ORAL at 05:14

## 2019-02-02 RX ADMIN — ATORVASTATIN CALCIUM 10 MILLIGRAM(S): 80 TABLET, FILM COATED ORAL at 21:05

## 2019-02-02 RX ADMIN — METHADONE HYDROCHLORIDE 5 MILLIGRAM(S): 40 TABLET ORAL at 12:20

## 2019-02-02 RX ADMIN — Medication 500 MILLIGRAM(S): at 17:01

## 2019-02-02 RX ADMIN — SENNA PLUS 2 TABLET(S): 8.6 TABLET ORAL at 21:04

## 2019-02-02 RX ADMIN — Medication 50 MILLIGRAM(S): at 05:14

## 2019-02-02 RX ADMIN — Medication 20 MILLIEQUIVALENT(S): at 12:19

## 2019-02-02 RX ADMIN — Medication 81 MILLIGRAM(S): at 12:17

## 2019-02-02 RX ADMIN — Medication 50 MILLIGRAM(S): at 21:04

## 2019-02-02 NOTE — PROGRESS NOTE ADULT - PROBLEM SELECTOR PLAN 1
-acute   -S/p washout I&D with Plastic Sx Dr Whipple  -ID recs noted, c/w ciprofloxacin for total of 14 days until 2/8  -pain management with methadone   -Wound care/ wound vac as per plastic surgery  -F/u Dr. Whipple and orthopedic

## 2019-02-02 NOTE — PROGRESS NOTE ADULT - SUBJECTIVE AND OBJECTIVE BOX
ERIKA SARGENT  73y  Male    Patient is a 73y old  Male who presents with a chief complaint of HCAP and Hip infection (02 Feb 2019 12:32)    denies any chest pain or shortness of breath.        PAST MEDICAL & SURGICAL HISTORY:  S/P CABG x 3  S/P hip replacement, left  Rheumatoid arthritis  Osteoarthritis  COPD (chronic obstructive pulmonary disease)  HTN (hypertension)  S/P lumbar fusion: Approx 2012  History of femur fracture: Repaired 11/19- L hip  History of appendectomy  History of right shoulder replacement  History of total left hip arthroplasty          PHYSICAL EXAM:    T(C): 36.5 (02-02-19 @ 13:38), Max: 36.7 (02-01-19 @ 20:10)  HR: 77 (02-02-19 @ 13:38) (60 - 79)  BP: 120/75 (02-02-19 @ 13:38) (100/68 - 120/75)  RR: 18 (02-02-19 @ 13:38) (18 - 18)  SpO2: 94% (02-02-19 @ 13:38) (91% - 95%)  Wt(kg): --    I&O's Detail    01 Feb 2019 07:01  -  02 Feb 2019 07:00  --------------------------------------------------------  IN:    Oral Fluid: 960 mL  Total IN: 960 mL    OUT:    Voided: 2300 mL  Total OUT: 2300 mL    Total NET: -1340 mL      02 Feb 2019 07:01  -  02 Feb 2019 16:40  --------------------------------------------------------  IN:    Oral Fluid: 480 mL    sodium chloride 0.9%.: 490 mL  Total IN: 970 mL    OUT:  Total OUT: 0 mL    Total NET: 970 mL    Respiratory: clear anteriorly, decreased BS at bases  Cardiovascular: S1 S2  Gastrointestinal: soft NT ND +BS  Extremities: trace edema   Neuro: Awake and alert    MEDICATIONS  (STANDING):  ALBUTerol    90 MICROgram(s) HFA Inhaler 2 Puff(s) Inhalation every 6 hours  ALPRAZolam 2 milliGRAM(s) Oral at bedtime  ascorbic acid 500 milliGRAM(s) Oral daily  aspirin enteric coated 81 milliGRAM(s) Oral daily  atorvastatin 10 milliGRAM(s) Oral at bedtime  ciprofloxacin     Tablet 500 milliGRAM(s) Oral every 12 hours  docusate sodium 100 milliGRAM(s) Oral daily  enoxaparin Injectable 40 milliGRAM(s) SubCutaneous every 24 hours  finasteride 5 milliGRAM(s) Oral daily  fludroCORTISONE 0.1 milliGRAM(s) Oral daily  FLUoxetine 60 milliGRAM(s) Oral daily  hydrocortisone sodium succinate Injectable 50 milliGRAM(s) IV Push every 8 hours  pantoprazole    Tablet 40 milliGRAM(s) Oral before breakfast  potassium chloride    Tablet ER 20 milliEquivalent(s) Oral daily  senna 2 Tablet(s) Oral at bedtime  sodium chloride 0.9%. 1000 milliLiter(s) (70 mL/Hr) IV Continuous <Continuous>  tamsulosin 0.4 milliGRAM(s) Oral at bedtime    MEDICATIONS  (PRN):  acetaminophen   Tablet .. 650 milliGRAM(s) Oral every 6 hours PRN Mild Pain (1 - 3)  cyclobenzaprine 10 milliGRAM(s) Oral two times a day PRN Muscle Spasm                            8.8    5.79  )-----------( 306      ( 02 Feb 2019 08:30 )             27.8       02-02    134<L>  |  98  |  18  ----------------------------<  142<H>  4.4   |  28  |  0.88    Ca    8.6      02 Feb 2019 08:30

## 2019-02-02 NOTE — PROGRESS NOTE ADULT - PROBLEM SELECTOR PLAN 4
-cont stress steroids for hypotension and concern for adrenal insuffiency  -will taper down slowly to home dose -Albuterol q6h PRN   -Dr. Valente from pulmonary following

## 2019-02-02 NOTE — PROGRESS NOTE ADULT - PROBLEM SELECTOR PLAN 2
-CTA unclear if worsening fibrosis from hx of RA vs. HCAP  -GNR bacteremia suspected from hip but unable to r/o PNA  -Treated for HCAP now on ciprofloxacin  -ID and Pulmonary recs appreciated - acute sec to poss adrenal insuff  - florinef and solu-cortef  - apprec renal and cardio recs

## 2019-02-02 NOTE — PROGRESS NOTE ADULT - SUBJECTIVE AND OBJECTIVE BOX
Patient is a 73y old  Male who presents with a chief complaint of HCAP and Hip infection (02 Feb 2019 16:40)      INTERVAL HPI: Pt seen and examined. Wife and daughter at bedside, would like to go home soon. Denies any acute complaints, states knows hes weak but wants to go home.     OVERNIGHT EVENTS: none noted  T(F): 97.7 (02-02-19 @ 13:38), Max: 98.1 (02-01-19 @ 20:10)  HR: 77 (02-02-19 @ 13:38) (60 - 79)  BP: 120/75 (02-02-19 @ 13:38) (100/68 - 120/75)  RR: 18 (02-02-19 @ 13:38) (18 - 18)  SpO2: 94% (02-02-19 @ 13:38) (91% - 95%)  I&O's Summary    01 Feb 2019 07:01  -  02 Feb 2019 07:00  --------------------------------------------------------  IN: 960 mL / OUT: 2300 mL / NET: -1340 mL    02 Feb 2019 07:01  -  02 Feb 2019 18:31  --------------------------------------------------------  IN: 1180 mL / OUT: 0 mL / NET: 1180 mL        REVIEW OF SYSTEMS:  CONSTITUTIONAL: No fever, weight loss, or fatigue  RESPIRATORY: No cough, wheezing, chills or hemoptysis; No shortness of breath  CARDIOVASCULAR: No chest pain, palpitations, dizziness, or leg swelling  GASTROINTESTINAL: No abdominal or epigastric pain. No nausea, vomiting, or hematemesis; No diarrhea or constipation. No melena or hematochezia.  GENITOURINARY: No dysuria, frequency, hematuria, or incontinence  NEUROLOGICAL: No headaches, memory loss, loss of strength, numbness, or tremors  SKIN: No itching, burning, rashes, or lesions   MUSCULOSKELETAL: No joint pain or swelling; No muscle, back, or extremity pain  PSYCHIATRIC: No depression, anxiety, mood swings, or difficulty sleeping      PHYSICAL EXAM:  GENERAL: NAD, well-groomed, well-developed  NERVOUS SYSTEM:  Alert & Oriented X3, Good concentration; Motor Strength 5/5 B/L upper and lower extremities  CHEST/LUNG: Clear to percussion bilaterally; No rales, rhonchi, wheezing, or rubs  HEART: Regular rate and rhythm; No murmurs, rubs, or gallops  ABDOMEN: Soft, Nontender, Nondistended; Bowel sounds present  EXTREMITIES:  2+ Peripheral Pulses, No clubbing, cyanosis, or edema  SKIN: No rashes or lesions    LABS:                        8.8    5.79  )-----------( 306      ( 02 Feb 2019 08:30 )             27.8     02-02    134<L>  |  98  |  18  ----------------------------<  142<H>  4.4   |  28  |  0.88    Ca    8.6      02 Feb 2019 08:30          CAPILLARY BLOOD GLUCOSE                  MEDICATIONS  (STANDING):  ALBUTerol    90 MICROgram(s) HFA Inhaler 2 Puff(s) Inhalation every 6 hours  ALPRAZolam 2 milliGRAM(s) Oral at bedtime  ascorbic acid 500 milliGRAM(s) Oral daily  aspirin enteric coated 81 milliGRAM(s) Oral daily  atorvastatin 10 milliGRAM(s) Oral at bedtime  ciprofloxacin     Tablet 500 milliGRAM(s) Oral every 12 hours  docusate sodium 100 milliGRAM(s) Oral daily  enoxaparin Injectable 40 milliGRAM(s) SubCutaneous every 24 hours  finasteride 5 milliGRAM(s) Oral daily  fludroCORTISONE 0.1 milliGRAM(s) Oral daily  FLUoxetine 60 milliGRAM(s) Oral daily  hydrocortisone sodium succinate Injectable 50 milliGRAM(s) IV Push every 8 hours  pantoprazole    Tablet 40 milliGRAM(s) Oral before breakfast  potassium chloride    Tablet ER 20 milliEquivalent(s) Oral daily  senna 2 Tablet(s) Oral at bedtime  sodium chloride 0.9%. 1000 milliLiter(s) (70 mL/Hr) IV Continuous <Continuous>  tamsulosin 0.4 milliGRAM(s) Oral at bedtime    MEDICATIONS  (PRN):  acetaminophen   Tablet .. 650 milliGRAM(s) Oral every 6 hours PRN Mild Pain (1 - 3)  cyclobenzaprine 10 milliGRAM(s) Oral two times a day PRN Muscle Spasm

## 2019-02-02 NOTE — PROGRESS NOTE ADULT - SUBJECTIVE AND OBJECTIVE BOX
[facility] -- Shannon Valdez, [place], [place], Sidney Orlando Savella  Office # [phone]    Follow Up:  CAD, preop    Subjective/Observations: Awake and alert, eating lunch.  No discomfort offered.  However, expressed that he does not want his left hip hardware taken out.    REVIEW OF SYSTEMS: All other review of systems is negative unless indicated above    PAST MEDICAL & SURGICAL HISTORY:  S/P CABG x 3  S/P hip replacement, left  Rheumatoid arthritis  Osteoarthritis  COPD (chronic obstructive pulmonary disease)  HTN (hypertension)  S/P lumbar fusion: Approx 2012  History of femur fracture: Repaired 11/19- L hip  History of appendectomy  History of right shoulder replacement  History of total left hip arthroplasty    MEDICATIONS  (STANDING):  ALBUTerol    90 MICROgram(s) HFA Inhaler 2 Puff(s) Inhalation every 6 hours  ALPRAZolam 2 milliGRAM(s) Oral at bedtime  ascorbic acid 500 milliGRAM(s) Oral daily  aspirin enteric coated 81 milliGRAM(s) Oral daily  atorvastatin 10 milliGRAM(s) Oral at bedtime  ciprofloxacin     Tablet 500 milliGRAM(s) Oral every 12 hours  docusate sodium 100 milliGRAM(s) Oral daily  enoxaparin Injectable 40 milliGRAM(s) SubCutaneous every 24 hours  finasteride 5 milliGRAM(s) Oral daily  fludroCORTISONE 0.1 milliGRAM(s) Oral daily  FLUoxetine 60 milliGRAM(s) Oral daily  hydrocortisone sodium succinate Injectable 50 milliGRAM(s) IV Push every 8 hours  pantoprazole    Tablet 40 milliGRAM(s) Oral before breakfast  potassium chloride    Tablet ER 20 milliEquivalent(s) Oral daily  senna 2 Tablet(s) Oral at bedtime  sodium chloride 0.9%. 1000 milliLiter(s) (70 mL/Hr) IV Continuous <Continuous>  tamsulosin 0.4 milliGRAM(s) Oral at bedtime    MEDICATIONS  (PRN):  acetaminophen   Tablet .. 650 milliGRAM(s) Oral every 6 hours PRN Mild Pain (1 - 3)  cyclobenzaprine 10 milliGRAM(s) Oral two times a day PRN Muscle Spasm    Allergies    Ceftin (Pruritus)    Intolerances    Vital Signs Last 24 Hrs  T(C): 36.3 (02 Feb 2019 04:54), Max: 36.8 (01 Feb 2019 12:57)  T(F): 97.3 (02 Feb 2019 04:54), Max: 98.2 (01 Feb 2019 12:57)  HR: 60 (02 Feb 2019 04:54) (60 - 98)  BP: 100/68 (02 Feb 2019 04:54) (93/62 - 113/74)  BP(mean): --  RR: 18 (02 Feb 2019 04:54) (18 - 18)  SpO2: 95% (02 Feb 2019 04:54) (91% - 95%)    I&O's Summary    01 Feb 2019 07:01  -  02 Feb 2019 07:00  --------------------------------------------------------  IN: 960 mL / OUT: 2300 mL / NET: -1340 mL    02 Feb 2019 07:01  -  02 Feb 2019 12:32  --------------------------------------------------------  IN: 690 mL / OUT: 0 mL / NET: 690 mL    PHYSICAL EXAM:  TELE: Not on tele  Constitutional: NAD, awake and alert, well-developed  HEENT: Moist Mucous Membranes, Anicteric  Pulmonary: Non-labored, breath sounds are clear bilaterally, No wheezing, rales or rhonchi  Cardiovascular: Regular, S1 and S2, No murmurs, rubs, gallops or clicks  Gastrointestinal: Bowel Sounds present, soft, nontender.   Lymph: No peripheral edema. No lymphadenopathy.  Skin: No visible rashes.  Left hip wound with vaccuum dressing, no drainage  Psych:  Mood & affect appropriate    LABS: All Labs Reviewed:                        8.8    5.79  )-----------( 306      ( 02 Feb 2019 08:30 )             27.8                         9.6    10.16 )-----------( 342      ( 01 Feb 2019 09:01 )             30.6     02 Feb 2019 08:30    134    |  98     |  18     ----------------------------<  142    4.4     |  28     |  0.88   01 Feb 2019 09:01    135    |  97     |  19     ----------------------------<  115    4.4     |  29     |  1.20     Ca    8.6        02 Feb 2019 08:30  Ca    9.1        01 Feb 2019 09:01    CARDIAC MARKERS ( 01 Feb 2019 09:01 )  <.015 ng/mL / x     / 11 U/L / x     / x      < from: TTE Echo Doppler w/o Cont (11.19.18 @ 11:09) >     EXAM:  ECHO TTE WO CON COMP W DOPPLR       PROCEDURE DATE:  11/19/2018      INTERPRETATION:  INDICATION: CAD  Referring M.D.:Paolo  Blood Pressure 114/70        Weight (kg) :81     Height (cm):170       BSA (sq m): 1.98  Technician: AS    Dimensions:    LA 3.5       Normal Values: 2.0 - 4.0 cm    Ao 3.94        Normal Values: 2.0 - 3.8 cm  SEPTUM 1.1       Normal Values: 0.6 - 1.2 cm  PWT 1.1       Normal Values: 0.6 - 1.1 cm  LVIDd 5.1         Normal Values: 3.0 - 5.6 cm  LVIDs 4.05 Normal Values: 1.8 - 4.0 cm    OBSERVATIONS:  Technically difficult study  Mitral Valve: MAC with calcified MV leaflets. Mild mitral regurgitation.  Aortic Valve/Aorta: Mildly calcified trileaflet AV with normal opening.   Mildly dilated aortic root at 3.9  cm  Tricuspid Valve: Normal tricuspid valve. Trace tricuspid regurgitation.  Pulmonic Valve: The pulmonic valve is not well visualized. Probably   normal.  Left Atrium: Mildly enlarged   Right Atrium: Mildly enlarged  Left Ventricle: Endocardium is not well-visualized. Overall there appears   to be normal left ventricular systolic function. The EF is approximately   65%.  Right Ventricle: The right ventricle is not well-visualized. It appears   to be mildly enlarged in some views with normal systolic function.    Pericardium/Pleura: Trace pericardial effusion noted.  Pulmonary/RV Pressure: Insufficient tricuspid regurgitation Doppler in   order to estimate the right ventricular systolic pressure  LV Diastolic Function: Stage I diastolic dysfunction     Conclusion: Overall preserved left ventricular systolic function. EF 65.   Insufficient tricuspid regurgitation Doppler in order to estimate the   right ventricular systolic pressure      CARMEN BAE M.D., ATTENDING CARDIOLOGIST  This document has been electronically signed. Nov 20 2018  3:56PM     < end of copied text >    < from: CT Angio Chest w/ IV Cont (01.25.19 @ 12:26) >    EXAM:  CT ANGIO CHEST (W)AW IC                          PROCEDURE DATE:  01/25/2019      INTERPRETATION:  .    CLINICAL INFORMATION: Recent surgery, shortness of breath, evaluate for   pulmonary embolism.    TECHNIQUE: Helical axial images were obtained of the chest and upper   abdomen using CT angiographic protocol. 78 mls of Omnipaque-350  was   administered intravenously without complication and 22 mls were   discarded. Sagittal and coronal reformatted images were obtained from the   source data. Axial MIP reconstructed images were obtained from the source   data and were also reviewed.    COMPARISON: No prior chest CT angiogram examinations are available for   comparison. Comparison is made with the prior noncontrast chest CT   examination from 12/3/2018. Prior x-ray examination of the chest from   earlier today.    FINDINGS: Evaluation of the pulmonary arterial vessels demonstrates a   suboptimal contrast bolus. No central filling defects are notable within   the pulmonary arterial vessels to suggest pulmonary embolism. Evaluation   for segmental and subsegmental pulmonary arterial embolism is limited.    The heart size is normal. The pericardium appears unremarkable. There are   atherosclerotic calcifications of the imaged coronary arteries, aorta,   and branch vessels. There is unchanged mild aneurysmal dilatation of the   ascending aorta measuring up to 4.6 cm at the level of the main pulmonary   artery. The patient is status post median sternotomy.    There is no axillary, mediastinal, or hilar lymphadenopathy.    The central airways are patent. There is been interval worsening of   peripheral reticular opacification throughout both lungs. Patchy   opacities are also notable within both lungs with a left upperlobe and   lingular predominance. Patchy opacities are also noted within the right   middle lobe. No pleural effusions are noted. A few right-sided scattered   calcified granulomas are noted.    The patient is status post cholecystectomy. There is nonspecific   bilateral perinephric stranding which is only partially imaged.    The liver is enlarged.    Multilevel degenerative changes are noted within the imaged potions of   the spine.    Posterior fusion hardware is notable within the upper lumbar spine and is   only partially imaged. There is an unchanged anterior wedge compression   deformity of the T6 vertebral body. There is unchanged grade 1   anterolisthesis of T12 on L1.    A right-sided reverse shoulder arthroplasty is notable.    Redemonstrated is a lower anterior cervical discectomy and fusion.    IMPRESSION:    1. Suboptimal opacification of the pulmonary arterial vessels. No central   pulmonary embolism. Evaluation for segmental and subsegmental pulmonary   arterial embolism is limited.    2. Worsening fibrotic changes throughout both lungs. Superimposed   pneumonia is difficult to exclude. Correlate clinically.    3. Unchanged aneurysmal dilatation of the ascending aorta measuring up to   4.6 cm.    KENZIE CHARLES, ATTENDING RADIOLOGIST  This document has been electronically signed. Jan 25 2019 12:37PM      < end of copied text >

## 2019-02-02 NOTE — PROGRESS NOTE ADULT - PROBLEM SELECTOR PLAN 3
-Albuterol q6h PRN   -Dr. Valente from pulmonary following -CTA unclear if worsening fibrosis from hx of RA vs. HCAP  -GNR bacteremia suspected from hip but unable to r/o PNA  -Treated for HCAP now on ciprofloxacin  -ID and Pulmonary recs appreciated

## 2019-02-02 NOTE — PROGRESS NOTE ADULT - PROBLEM SELECTOR PLAN 9
Lovenox 40 mg subq  IMPROVE VTE Individual Risk Assessment    RISK                                                                Points  Lovenox 40 mg subq  [  ] Previous VTE                                                  3  [  ] Thrombophilia                                               2  [ X ] Lower limb paralysis                                      2        (unable to hold up >15 seconds)    [  ] Current Cancer                                              2         (within 6 months)  [  ] Immobilization > 24 hrs                                1  [  ] ICU/CCU stay > 24 hours                              1  [ X ] Age > 60                                                      1    IMPROVE VTE Score 3 - hx of CAD s/p CABG   -continue aspirin, lisinopril , continue statin  -cardiology consult recs apprec

## 2019-02-02 NOTE — PROGRESS NOTE ADULT - PROBLEM SELECTOR PLAN 1
bacteremia  copd  RA  hip wound infection  HFpEF  on IVF - caution with volume overload, renal following  cont proventil for COPD  PRO - does not want to use CPAP  keep sat > 88 pct  I aundrea  pain regimen, on opioids, bowel regimen, education and counseling  plastics, ID and Ortho follow up  out of bed as tolerated  will follow and monitor  prognosis guarded  on Steroids as per PMD

## 2019-02-02 NOTE — PHARMACOTHERAPY INTERVENTION NOTE - COMMENTS
s/w dr. Aldana. patient's methadone automatically discontinued d/t being a controlled rx.  suggested rx to be entered again starting tomorrow. md accepted

## 2019-02-02 NOTE — PROGRESS NOTE ADULT - PROBLEM SELECTOR PLAN 5
- chronic, stable  - Continue atorvastatin 10mg -cont stress steroids for hypotension and concern for adrenal insuffiency  -will taper down slowly to home dose

## 2019-02-02 NOTE — PROGRESS NOTE ADULT - ASSESSMENT
72 yo M PMHx of CHF (stage I diastolic dysfunction, EF 65%) COPD, HTN, HLD, hx of MI, CABG 2008, pci, RA on steroids, hx of multiple L hip surgeries.  We have seen him several times over the past few months in anticipation and postop from several surgeries.   Of note, patient had hip replacement 30 years ago in [place] and an ORIF of periprosthetic fracture on 11/2018 w/ Dr. Ramirez which was complicated by superficial infection requiring ID evaluation and PICC line antibiotics. 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    Pt. states his surgical wound drain fell out 4 days pta, and under the advise of his plastic surgeon, Dr. Whipple, he kept it out. Patient developed worsening SOB over the past few days in addition to cough when he followed up with Dr. Richardson 2 days ago who removed fluid around the hip and recommended he sees Dr. Wills for his URI. Outpatient CXR demonstrated PNA which brought him to the ED.  He has been getting abx for presumed hcap, with CT evidence of fibrotic changes, but no definitive pna.  There has been concern about infected seroma, and reportedly orthopedics is considering removal of hardware.  No s/p I&D     The patient reports no new symptoms.   He did have some shortness of breath in the past 48, now improved, treated as  a pulmonary issue  No abdominal pain.  No new neurologic symptoms.    - Patient has a known CAD s/p CABG and PCI.  He follows a cardiologist, Dr. John Mar [phone]  - Will obtain cardiac records if at all possible  - To date, his CAD is stable  - Chest pain unlikely to be cardiac.  EKG negative.  CE's negative  - Continue ASA and statin  - Not on BB.  Unsure why.  Will clarify with his private Cardio on Monday.  Will not start now as patient is borderline hypotensive.  Continuer Florinef  - there is no evidence for meaningful  volume overload.  - normal ef by echo 11/2018, no need to repeat  - if needs orthopedic surgery, would be considered optimized from cardiac perspective.  Would continue asa preop  Routine hemodynamic monitoring is recommended.  - Monitor electrolytes, replete to keep K>4 and Mag>2  - DVT ppx  - Abx per ID  - Further cardiac workup will depend on clinical course.   - All other workup per primary team. Will followup.     Mikayla Gold NP  Cardiology 72 yo M PMHx of CHF (stage I diastolic dysfunction, EF 65%) COPD, HTN, HLD, hx of MI, CABG 2008, pci, RA on steroids, hx of multiple L hip surgeries.  We have seen him several times over the past few months in anticipation and postop from several surgeries.   Of note, patient had hip replacement 30 years ago in [place] and an ORIF of periprosthetic fracture on 11/2018 w/ Dr. Ramirez which was complicated by superficial infection requiring ID evaluation and PICC line antibiotics. 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    Pt. states his surgical wound drain fell out 4 days pta, and under the advise of his plastic surgeon, Dr. Whipple, he kept it out. Patient developed worsening SOB over the past few days in addition to cough when he followed up with Dr. Richardson 2 days ago who removed fluid around the hip and recommended he sees Dr. Wills for his URI. Outpatient CXR demonstrated PNA which brought him to the ED.  He has been getting abx for presumed hcap, with CT evidence of fibrotic changes, but no definitive pna.  There has been concern about infected seroma, and reportedly orthopedics is considering removal of hardware.  No s/p I&D     The patient reports no new symptoms.   He did have some shortness of breath in the past 48, now improved, treated as  a pulmonary issue  No abdominal pain.  No new neurologic symptoms.    - Patient has a known CAD s/p CABG and PCI.  He follows a cardiologist, Dr. John Mar [phone]  - Will obtain cardiac records if at all possible  - To date, his CAD is stable  - Chest pain unlikely to be cardiac.  EKG negative.  CE's negative.  NO further chest pain today  - Continue ASA and statin  - Not on BB.  Unsure why.  Will clarify with his private Cardio on Monday.  Will not start now as patient is borderline hypotensive.  Continuer Florinef  - there is no evidence for meaningful  volume overload.  - normal ef by echo 11/2018, no need to repeat  - if needs orthopedic surgery, would be considered optimized from cardiac perspective.  Would continue asa preop  Routine hemodynamic monitoring is recommended.  - Monitor electrolytes, replete to keep K>4 and Mag>2  - DVT ppx  - Abx per ID  - Further cardiac workup will depend on clinical course.   - All other workup per primary team. Will followup.     Mikayla Gold NP  Cardiology

## 2019-02-02 NOTE — PROGRESS NOTE ADULT - SUBJECTIVE AND OBJECTIVE BOX
Date/Time Patient Seen:  		  Referring MD:   Data Reviewed	       Patient is a 73y old  Male who presents with a chief complaint of HCAP and Hip infection (01 Feb 2019 16:19)      Subjective/HPI     PAST MEDICAL & SURGICAL HISTORY:  S/P CABG x 3  S/P hip replacement, left  Rheumatoid arthritis  Osteoarthritis  COPD (chronic obstructive pulmonary disease)  HTN (hypertension)  S/P lumbar fusion: Approx 2012  History of femur fracture: Repaired 11/19- L hip  History of appendectomy  History of right shoulder replacement  History of total left hip arthroplasty        Medication list         MEDICATIONS  (STANDING):  ALBUTerol    90 MICROgram(s) HFA Inhaler 2 Puff(s) Inhalation every 6 hours  ALPRAZolam 2 milliGRAM(s) Oral at bedtime  ascorbic acid 500 milliGRAM(s) Oral daily  aspirin enteric coated 81 milliGRAM(s) Oral daily  atorvastatin 10 milliGRAM(s) Oral at bedtime  ciprofloxacin     Tablet 500 milliGRAM(s) Oral every 12 hours  docusate sodium 100 milliGRAM(s) Oral daily  enoxaparin Injectable 40 milliGRAM(s) SubCutaneous every 24 hours  finasteride 5 milliGRAM(s) Oral daily  fludroCORTISONE 0.1 milliGRAM(s) Oral daily  FLUoxetine 60 milliGRAM(s) Oral daily  hydrocortisone sodium succinate Injectable 50 milliGRAM(s) IV Push every 8 hours  methadone    Tablet 5 milliGRAM(s) Oral every 24 hours  pantoprazole    Tablet 40 milliGRAM(s) Oral before breakfast  potassium chloride    Tablet ER 20 milliEquivalent(s) Oral daily  senna 2 Tablet(s) Oral at bedtime  sodium chloride 0.9%. 1000 milliLiter(s) (70 mL/Hr) IV Continuous <Continuous>  tamsulosin 0.4 milliGRAM(s) Oral at bedtime    MEDICATIONS  (PRN):  acetaminophen   Tablet .. 650 milliGRAM(s) Oral every 6 hours PRN Mild Pain (1 - 3)  cyclobenzaprine 10 milliGRAM(s) Oral two times a day PRN Muscle Spasm         Vitals log        ICU Vital Signs Last 24 Hrs  T(C): 36.3 (02 Feb 2019 04:54), Max: 36.8 (01 Feb 2019 12:57)  T(F): 97.3 (02 Feb 2019 04:54), Max: 98.2 (01 Feb 2019 12:57)  HR: 60 (02 Feb 2019 04:54) (60 - 98)  BP: 100/68 (02 Feb 2019 04:54) (93/62 - 113/74)  BP(mean): --  ABP: --  ABP(mean): --  RR: 18 (02 Feb 2019 04:54) (18 - 18)  SpO2: 95% (02 Feb 2019 04:54) (91% - 95%)           Input and Output:  I&O's Detail    31 Jan 2019 07:01  -  01 Feb 2019 07:00  --------------------------------------------------------  IN:    Sodium Chloride 0.9% IV Bolus: 500 mL  Total IN: 500 mL    OUT:    Voided: 300 mL  Total OUT: 300 mL    Total NET: 200 mL      01 Feb 2019 07:01  -  02 Feb 2019 06:42  --------------------------------------------------------  IN:    Oral Fluid: 960 mL  Total IN: 960 mL    OUT:    Voided: 2300 mL  Total OUT: 2300 mL    Total NET: -1340 mL          Lab Data                        9.6    10.16 )-----------( 342      ( 01 Feb 2019 09:01 )             30.6     02-01    135  |  97  |  19  ----------------------------<  115<H>  4.4   |  29  |  1.20    Ca    9.1      01 Feb 2019 09:01        CARDIAC MARKERS ( 01 Feb 2019 09:01 )  <.015 ng/mL / x     / 11 U/L / x     / x            Review of Systems	      Objective     Physical Examination    heart s1s2  lung dec BS  abd soft  cn grossly int      Pertinent Lab findings & Imaging      [place]in[place]:  NO   Adequate UO     I&O's Detail    31 Jan 2019 07:01  -  01 Feb 2019 07:00  --------------------------------------------------------  IN:    Sodium Chloride 0.9% IV Bolus: 500 mL  Total IN: 500 mL    OUT:    Voided: 300 mL  Total OUT: 300 mL    Total NET: 200 mL      01 Feb 2019 07:01  -  02 Feb 2019 06:42  --------------------------------------------------------  IN:    Oral Fluid: 960 mL  Total IN: 960 mL    OUT:    Voided: 2300 mL  Total OUT: 2300 mL    Total NET: -1340 mL               Discussed with:     Cultures:	        Radiology

## 2019-02-02 NOTE — PROGRESS NOTE ADULT - PROBLEM SELECTOR PLAN 8
- hx of CAD s/p CABG   -continue aspirin, lisinopril , continue statin  -cardiology consult recs apprec - chronic,  stable  - Tamsulosin 0.4 daily

## 2019-02-02 NOTE — PROGRESS NOTE ADULT - ASSESSMENT
73 white male with a history of HTN, CAD, CHF, CABG, RA now admitted with PNA and possible sepsis. Renal indices improving. Urine out put is acceptable. Will continue IVF at 75 cc an hour. Medications are appropriate at this time. will follow.

## 2019-02-02 NOTE — PROGRESS NOTE ADULT - ASSESSMENT
74 yo M PMHx of CHF (stage I diastolic dysfunction, EF 65%) COPD (diagnosed "a few years ago, quit smoking then), HTN, HLD, hx of MI and CABG (2008) (stents also placed per chart review), RA (on Medrol daily), hx of multiple L hip surgeries presenting to the ED with SOB and L hip pain admitted for GNR bacteremia likely hip infection/abcess etiology uknown and worsening cough likely from ILD/fibrosis and superimposed suspected gram negative. PNA/HCAP. Patient reevaluated by orthopedic Dr Ramirez and plastic surgery Dr Whipple, wound vacuum placed. No surgical intervention planned at this time.  Patient hypotensive, LIOR, suspected adrenal insuffiencey

## 2019-02-03 DIAGNOSIS — E27.40 UNSPECIFIED ADRENOCORTICAL INSUFFICIENCY: ICD-10-CM

## 2019-02-03 LAB
ANION GAP SERPL CALC-SCNC: 9 MMOL/L — SIGNIFICANT CHANGE UP (ref 5–17)
BUN SERPL-MCNC: 22 MG/DL — SIGNIFICANT CHANGE UP (ref 7–23)
CALCIUM SERPL-MCNC: 8.5 MG/DL — SIGNIFICANT CHANGE UP (ref 8.5–10.1)
CHLORIDE SERPL-SCNC: 100 MMOL/L — SIGNIFICANT CHANGE UP (ref 96–108)
CO2 SERPL-SCNC: 25 MMOL/L — SIGNIFICANT CHANGE UP (ref 22–31)
CREAT SERPL-MCNC: 0.93 MG/DL — SIGNIFICANT CHANGE UP (ref 0.5–1.3)
GLUCOSE SERPL-MCNC: 200 MG/DL — HIGH (ref 70–99)
HCT VFR BLD CALC: 26.9 % — LOW (ref 39–50)
HGB BLD-MCNC: 8.2 G/DL — LOW (ref 13–17)
MCHC RBC-ENTMCNC: 26.8 PG — LOW (ref 27–34)
MCHC RBC-ENTMCNC: 30.5 GM/DL — LOW (ref 32–36)
MCV RBC AUTO: 87.9 FL — SIGNIFICANT CHANGE UP (ref 80–100)
NRBC # BLD: 0 /100 WBCS — SIGNIFICANT CHANGE UP (ref 0–0)
PLATELET # BLD AUTO: 288 K/UL — SIGNIFICANT CHANGE UP (ref 150–400)
POTASSIUM SERPL-MCNC: 4 MMOL/L — SIGNIFICANT CHANGE UP (ref 3.5–5.3)
POTASSIUM SERPL-SCNC: 4 MMOL/L — SIGNIFICANT CHANGE UP (ref 3.5–5.3)
RBC # BLD: 3.06 M/UL — LOW (ref 4.2–5.8)
RBC # FLD: 15.4 % — HIGH (ref 10.3–14.5)
SODIUM SERPL-SCNC: 134 MMOL/L — LOW (ref 135–145)
WBC # BLD: 6.9 K/UL — SIGNIFICANT CHANGE UP (ref 3.8–10.5)
WBC # FLD AUTO: 6.9 K/UL — SIGNIFICANT CHANGE UP (ref 3.8–10.5)

## 2019-02-03 PROCEDURE — 99233 SBSQ HOSP IP/OBS HIGH 50: CPT

## 2019-02-03 PROCEDURE — 99232 SBSQ HOSP IP/OBS MODERATE 35: CPT

## 2019-02-03 RX ORDER — ALPRAZOLAM 0.25 MG
2 TABLET ORAL AT BEDTIME
Qty: 0 | Refills: 0 | Status: DISCONTINUED | OUTPATIENT
Start: 2019-02-03 | End: 2019-02-04

## 2019-02-03 RX ADMIN — SENNA PLUS 2 TABLET(S): 8.6 TABLET ORAL at 21:17

## 2019-02-03 RX ADMIN — Medication 20 MILLIEQUIVALENT(S): at 11:24

## 2019-02-03 RX ADMIN — SODIUM CHLORIDE 1 GRAM(S): 9 INJECTION INTRAMUSCULAR; INTRAVENOUS; SUBCUTANEOUS at 11:26

## 2019-02-03 RX ADMIN — FINASTERIDE 5 MILLIGRAM(S): 5 TABLET, FILM COATED ORAL at 11:25

## 2019-02-03 RX ADMIN — ENOXAPARIN SODIUM 40 MILLIGRAM(S): 100 INJECTION SUBCUTANEOUS at 05:30

## 2019-02-03 RX ADMIN — Medication 81 MILLIGRAM(S): at 11:23

## 2019-02-03 RX ADMIN — FLUDROCORTISONE ACETATE 0.1 MILLIGRAM(S): 0.1 TABLET ORAL at 05:30

## 2019-02-03 RX ADMIN — ALBUTEROL 2 PUFF(S): 90 AEROSOL, METERED ORAL at 11:22

## 2019-02-03 RX ADMIN — TAMSULOSIN HYDROCHLORIDE 0.4 MILLIGRAM(S): 0.4 CAPSULE ORAL at 21:18

## 2019-02-03 RX ADMIN — SODIUM CHLORIDE 70 MILLILITER(S): 9 INJECTION INTRAMUSCULAR; INTRAVENOUS; SUBCUTANEOUS at 21:17

## 2019-02-03 RX ADMIN — Medication 50 MILLIGRAM(S): at 21:18

## 2019-02-03 RX ADMIN — Medication 100 MILLIGRAM(S): at 11:25

## 2019-02-03 RX ADMIN — CYCLOBENZAPRINE HYDROCHLORIDE 10 MILLIGRAM(S): 10 TABLET, FILM COATED ORAL at 23:53

## 2019-02-03 RX ADMIN — Medication 60 MILLIGRAM(S): at 11:26

## 2019-02-03 RX ADMIN — ALBUTEROL 2 PUFF(S): 90 AEROSOL, METERED ORAL at 21:17

## 2019-02-03 RX ADMIN — Medication 500 MILLIGRAM(S): at 17:23

## 2019-02-03 RX ADMIN — ATORVASTATIN CALCIUM 10 MILLIGRAM(S): 80 TABLET, FILM COATED ORAL at 21:18

## 2019-02-03 RX ADMIN — Medication 50 MILLIGRAM(S): at 13:57

## 2019-02-03 RX ADMIN — Medication 50 MILLIGRAM(S): at 05:30

## 2019-02-03 RX ADMIN — METHADONE HYDROCHLORIDE 5 MILLIGRAM(S): 40 TABLET ORAL at 11:27

## 2019-02-03 RX ADMIN — ALBUTEROL 2 PUFF(S): 90 AEROSOL, METERED ORAL at 17:21

## 2019-02-03 RX ADMIN — Medication 500 MILLIGRAM(S): at 11:24

## 2019-02-03 RX ADMIN — Medication 500 MILLIGRAM(S): at 05:30

## 2019-02-03 RX ADMIN — Medication 2 MILLIGRAM(S): at 21:17

## 2019-02-03 RX ADMIN — PANTOPRAZOLE SODIUM 40 MILLIGRAM(S): 20 TABLET, DELAYED RELEASE ORAL at 05:31

## 2019-02-03 NOTE — PROGRESS NOTE ADULT - ASSESSMENT
74 yo M PMHx of CHF (stage I diastolic dysfunction, EF 65%) COPD, HTN, HLD, hx of MI, CABG 2008, pci, RA on steroids, hx of multiple L hip surgeries.  We have seen him several times over the past few months in anticipation and postop from several surgeries.   Of note, patient had hip replacement 30 years ago in [place] and an ORIF of periprosthetic fracture on 11/2018 w/ Dr. Ramirez which was complicated by superficial infection requiring ID evaluation and PICC line antibiotics. 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    Pt. states his surgical wound drain fell out 4 days pta, and under the advise of his plastic surgeon, Dr. Whipple, he kept it out. Patient developed worsening SOB over the past few days in addition to cough when he followed up with Dr. Richardson 2 days ago who removed fluid around the hip and recommended he sees Dr. Wills for his URI. Outpatient CXR demonstrated PNA which brought him to the ED.  He has been getting abx for presumed hcap, with CT evidence of fibrotic changes, but no definitive pna.  There has been concern about infected seroma, and reportedly orthopedics is considering removal of hardware.  No s/p I&D     The patient reports no new symptoms.   He did have some shortness of breath in the past 48, now improved, treated as  a pulmonary issue  No abdominal pain.  No new neurologic symptoms.    - Patient has a known CAD s/p CABG and PCI.  He follows a cardiologist, Dr. John Mar [phone]  - Will obtain cardiac records if at all possible  - To date, his CAD is stable  - Chest pain unlikely to be cardiac.  EKG negative.  CE's negative.  NO further chest pain today  - Continue ASA and statin  - Not on BB.  Unsure why.  Will clarify with his private Cardio on Monday.  Continue Florinef  - there is no evidence for meaningful  volume overload.  - normal ef by echo 11/2018, no need to repeat  - if needs orthopedic surgery, would be considered optimized from cardiac perspective.  Would continue asa preop  Routine hemodynamic monitoring is recommended.  - Monitor electrolytes, replete to keep K>4 and Mag>2  - DVT ppx  - Abx per ID  - Further cardiac workup will depend on clinical course.   - All other workup per primary team. Will followup.   Walter Anglin [facility]  Cardiology 74 yo M PMHx of CHF (stage I diastolic dysfunction, EF 65%) COPD, HTN, HLD, hx of MI, CABG 2008, pci, RA on steroids, hx of multiple L hip surgeries.  We have seen him several times over the past few months in anticipation and postop from several surgeries.   Of note, patient had hip replacement 30 years ago in [place] and an ORIF of periprosthetic fracture on 11/2018 w/ Dr. Ramirez which was complicated by superficial infection requiring ID evaluation and PICC line antibiotics. 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    Pt. states his surgical wound drain fell out 4 days pta, and under the advise of his plastic surgeon, Dr. Whipple, he kept it out. Patient developed worsening SOB over the past few days in addition to cough when he followed up with Dr. Richardson 2 days ago who removed fluid around the hip and recommended he sees Dr. Wills for his URI. Outpatient CXR demonstrated PNA which brought him to the ED.  He has been getting abx for presumed hcap, with CT evidence of fibrotic changes, but no definitive pna.  There has been concern about infected seroma, and reportedly orthopedics is considering removal of hardware.  No s/p I&D     The patient reports no new symptoms.   He did have some shortness of breath in the past 72, now improved, treated as  a pulmonary issue  No abdominal pain.  No new neurologic symptoms.    - Patient has a known CAD s/p CABG and PCI.  He follows a cardiologist, Dr. John Mar [phone]  - Will obtain cardiac records if at all possible  - To date, his CAD is stable  - Chest pain unlikely to be cardiac.  EKG negative.  CE's negative.  NO further chest pain today  - Continue ASA and statin  - BP has not allowed addition of beta blockers  - His BP had trended up on Florinef.  Can continue this dose.  I would stop IVF today  - there is no evidence for meaningful  volume overload.  - normal ef by echo 11/2018, no need to repeat  - if needs orthopedic surgery, would be considered optimized from cardiac perspective.  Would continue asa preop  Routine hemodynamic monitoring is recommended.  - Monitor electrolytes, replete to keep K>4 and Mag>2  - DVT ppx  - Abx per ID  - Further cardiac workup will depend on clinical course.   - All other workup per primary team. Will followup.   Walter Anglin [facility]  Cardiology

## 2019-02-03 NOTE — PROGRESS NOTE ADULT - SUBJECTIVE AND OBJECTIVE BOX
Date/Time Patient Seen:  		  Referring MD:   Data Reviewed	       Patient is a 73y old  Male who presents with a chief complaint of HCAP and Hip infection (02 Feb 2019 16:40)      Subjective/HPI     PAST MEDICAL & SURGICAL HISTORY:  S/P CABG x 3  S/P hip replacement, left  Rheumatoid arthritis  Osteoarthritis  COPD (chronic obstructive pulmonary disease)  HTN (hypertension)  S/P lumbar fusion: Approx 2012  History of femur fracture: Repaired 11/19- L hip  History of appendectomy  History of right shoulder replacement  History of total left hip arthroplasty        Medication list         MEDICATIONS  (STANDING):  ALBUTerol    90 MICROgram(s) HFA Inhaler 2 Puff(s) Inhalation every 6 hours  ascorbic acid 500 milliGRAM(s) Oral daily  aspirin enteric coated 81 milliGRAM(s) Oral daily  atorvastatin 10 milliGRAM(s) Oral at bedtime  ciprofloxacin     Tablet 500 milliGRAM(s) Oral every 12 hours  docusate sodium 100 milliGRAM(s) Oral daily  enoxaparin Injectable 40 milliGRAM(s) SubCutaneous every 24 hours  finasteride 5 milliGRAM(s) Oral daily  fludroCORTISONE 0.1 milliGRAM(s) Oral daily  FLUoxetine 60 milliGRAM(s) Oral daily  hydrocortisone sodium succinate Injectable 50 milliGRAM(s) IV Push every 8 hours  methadone    Tablet 5 milliGRAM(s) Oral every 24 hours  pantoprazole    Tablet 40 milliGRAM(s) Oral before breakfast  potassium chloride    Tablet ER 20 milliEquivalent(s) Oral daily  senna 2 Tablet(s) Oral at bedtime  sodium chloride 1 Gram(s) Oral daily  sodium chloride 0.9%. 1000 milliLiter(s) (70 mL/Hr) IV Continuous <Continuous>  tamsulosin 0.4 milliGRAM(s) Oral at bedtime    MEDICATIONS  (PRN):  acetaminophen   Tablet .. 650 milliGRAM(s) Oral every 6 hours PRN Mild Pain (1 - 3)  cyclobenzaprine 10 milliGRAM(s) Oral two times a day PRN Muscle Spasm         Vitals log        ICU Vital Signs Last 24 Hrs  T(C): 36.2 (03 Feb 2019 05:23), Max: 36.5 (02 Feb 2019 13:38)  T(F): 97.1 (03 Feb 2019 05:23), Max: 97.7 (02 Feb 2019 13:38)  HR: 76 (03 Feb 2019 05:23) (76 - 93)  BP: 141/65 (03 Feb 2019 05:23) (120/75 - 141/65)  BP(mean): --  ABP: --  ABP(mean): --  RR: 16 (03 Feb 2019 05:23) (16 - 18)  SpO2: 93% (03 Feb 2019 05:23) (93% - 98%)           Input and Output:  I&O's Detail    01 Feb 2019 07:01  -  02 Feb 2019 07:00  --------------------------------------------------------  IN:    Oral Fluid: 960 mL  Total IN: 960 mL    OUT:    Voided: 2300 mL  Total OUT: 2300 mL    Total NET: -1340 mL      02 Feb 2019 07:01  -  03 Feb 2019 06:35  --------------------------------------------------------  IN:    Oral Fluid: 480 mL    sodium chloride 0.9%.: 1540 mL  Total IN: 2020 mL    OUT:  Total OUT: 0 mL    Total NET: 2020 mL          Lab Data                        8.8    5.79  )-----------( 306      ( 02 Feb 2019 08:30 )             27.8     02-02    134<L>  |  98  |  18  ----------------------------<  142<H>  4.4   |  28  |  0.88    Ca    8.6      02 Feb 2019 08:30        CARDIAC MARKERS ( 01 Feb 2019 09:01 )  <.015 ng/mL / x     / 11 U/L / x     / x            Review of Systems	      Objective     Physical Examination    heart s1s2  lung dec BS  abd soft  on room air      Pertinent Lab findings & Imaging      [place]:  NO   Adequate UO     I&O's Detail    01 Feb 2019 07:01  -  02 Feb 2019 07:00  --------------------------------------------------------  IN:    Oral Fluid: 960 mL  Total IN: 960 mL    OUT:    Voided: 2300 mL  Total OUT: 2300 mL    Total NET: -1340 mL      02 Feb 2019 07:01  -  03 Feb 2019 06:35  --------------------------------------------------------  IN:    Oral Fluid: 480 mL    sodium chloride 0.9%.: 1540 mL  Total IN: 2020 mL    OUT:  Total OUT: 0 mL    Total NET: 2020 mL               Discussed with:     Cultures:	        Radiology

## 2019-02-03 NOTE — CONSULT NOTE ADULT - CONSULT REASON
L Hip Surgical Site Drainage
LIOR
Left Hip Collection
adrenal insuff
sob  patrick  copd  chf - HFpEF  poss PNA  chronic lung disease
preop eval

## 2019-02-03 NOTE — CONSULT NOTE ADULT - REASON FOR ADMISSION
HCAP and Hip infection

## 2019-02-03 NOTE — CONSULT NOTE ADULT - SUBJECTIVE AND OBJECTIVE BOX
Date/Time Patient Seen:  		  Referring MD:   Data Reviewed	       Patient is a 73y old  Male who presents with a chief complaint of HCAP and Hip infection (25 Jan 2019 13:53)      Subjective/HPI    in bed  seen and examined  vs and meds reviewed  H and P reviewed  ER provider note reviewed  labs and imaging reviewed    awake  verbal    known to me from prior admissions    sent in for eval of poss Lower resp tract infection    cough  sob  patrick  occ sputum production    H&P Adult [Charted Location: [place] [id number]] [Authored: 25-Jan-2019 13:53]- for Visit: [id number], In Progress, Revised, Signed w/additional Signatures Pending, General    History and Physical:   Source of Information	Patient, Spouse/Significant Other, Child  Outpatient Providers	Ortho Dr Ramirez  Plastic: Dr. Whipple  Rheum : Dr Walter Cruz ([place])  ID Dr Wills       History of Present Illness:  Reason for Admission: HCAP and Hip infection  History of Present Illness:   74 yo M PMHx of CHF (stage I diastolic dysfunction, EF 65%) COPD (diagnosed "a few years ago, quit smoking then), HTN, HLD, hx of MI and CABG (2008) (stents also placed per chart review), RA (on Medrol daily), hx of multiple L hip surgeries presenting to the ED with SOB and L hip pain. Patient's wife and daughter present to assist with history. Of note, patient had hip replacement 30 years ago in [place] and an ORIF of periprosthetic fracture on 11/2018 w/ Dr. Ramirez which was complicated with superficial infection requiring ID and PICC line antibiotics. 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 Pt. states his surgical wound drain fell out 4 days ago, and under the advisement of his plastic surgeon Dr. Whipple he kept it out. Patient developed worsening SOB over the past few days in addition to cough when he followed up with Dr. Richardson 2 days ago who removed fluid around the hip and recommended he see Dr. Wills for his URI. Outpatient CXR he received yesterday demonstrated PNA which brought him to the ED today as he wasn't able to come yesterday. He denies recent fever, chills, weight changes but admits to fatigue and a decrease in appetite he ascribes to the daptomycin. Denies F/U/D.     In the ED:   VS wnl, , WBC 11.88, H/H 9.7/30 lactate 2.1  Na: 127, K: 4.3 Cl: 93  BUN/cr : 22/0.85 Alphos: 176 AST: 67 ALT: 59 trops <0.015 BNP: 560 (appears baseline)  ABG: No acute acidosis/alkalosis UA: negative     PAST MEDICAL & SURGICAL HISTORY:  S/P CABG x 3  S/P hip replacement, left  Rheumatoid arthritis  Osteoarthritis  COPD (chronic obstructive pulmonary disease)  HTN (hypertension)  S/P lumbar fusion: Approx 2012  History of femur fracture: Repaired 11/19- L hip  History of appendectomy  History of right shoulder replacement  History of total left hip arthroplasty        Medication list         MEDICATIONS  (STANDING):  ascorbic acid 500 milliGRAM(s) Oral daily  aspirin enteric coated 81 milliGRAM(s) Oral daily  atorvastatin 10 milliGRAM(s) Oral at bedtime  docusate sodium 100 milliGRAM(s) Oral daily  enoxaparin Injectable 40 milliGRAM(s) SubCutaneous every 24 hours  finasteride 5 milliGRAM(s) Oral daily  FLUoxetine 60 milliGRAM(s) Oral daily  linezolid  IVPB      linezolid  IVPB 600 milliGRAM(s) IV Intermittent once  meropenem  IVPB 1000 milliGRAM(s) IV Intermittent every 8 hours  meropenem  IVPB      methylPREDNISolone 2 milliGRAM(s) Oral daily  potassium chloride    Tablet ER 20 milliEquivalent(s) Oral daily  senna 2 Tablet(s) Oral at bedtime  tamsulosin 0.4 milliGRAM(s) Oral at bedtime    MEDICATIONS  (PRN):  acetaminophen   Tablet .. 650 milliGRAM(s) Oral every 6 hours PRN Mild Pain (1 - 3)  ALBUTerol    90 MICROgram(s) HFA Inhaler 2 Puff(s) Inhalation every 6 hours PRN Shortness of Breath  ALPRAZolam 0.5 milliGRAM(s) Oral at bedtime PRN insomnia  cyclobenzaprine 10 milliGRAM(s) Oral two times a day PRN Muscle Spasm  oxyCODONE    IR 10 milliGRAM(s) Oral every 4 hours PRN Severe Pain (7 - 10)  oxyCODONE    IR 5 milliGRAM(s) Oral every 4 hours PRN Moderate Pain (4 - 6)         Vitals log        ICU Vital Signs Last 24 Hrs  T(C): 36.7 (25 Jan 2019 16:07), Max: 36.7 (25 Jan 2019 16:07)  T(F): 98.1 (25 Jan 2019 16:07), Max: 98.1 (25 Jan 2019 16:07)  HR: 82 (25 Jan 2019 16:07) (82 - 112)  BP: 105/64 (25 Jan 2019 16:07) (100/67 - 118/72)  BP(mean): --  ABP: --  ABP(mean): --  RR: 18 (25 Jan 2019 16:07) (18 - 18)  SpO2: 98% (25 Jan 2019 16:07) (92% - 98%)           Input and Output:  I&O's Detail      Lab Data                        9.7    11.88 )-----------( 371      ( 25 Jan 2019 10:39 )             30.0     01-25    127<L>  |  92<L>  |  22  ----------------------------<  117<H>  4.3   |  25  |  0.85    Ca    9.3      25 Jan 2019 10:39    TPro  7.2  /  Alb  2.2<L>  /  TBili  1.2  /  DBili  x   /  AST  67<H>  /  ALT  59  /  AlkPhos  176<H>  01-25      CARDIAC MARKERS ( 25 Jan 2019 10:39 )  <.015 ng/mL / x     / x     / x     / x            Review of Systems	      Objective     Physical Examination      heart s1s2  lung dec BS  frail  weak  abd soft  cn grossly int  on o2 support    Pertinent Lab findings & Imaging      Weinstein:  NO   Adequate UO     I&O's Detail           Discussed with:     Cultures:	        Radiology    EXAM:  CT ANGIO CHEST (W)AW IC                            PROCEDURE DATE:  01/25/2019          INTERPRETATION:  .    CLINICAL INFORMATION: Recent surgery, shortness of breath, evaluate for   pulmonary embolism.    TECHNIQUE: Helical axial images were obtained of the chest and upper   abdomen using CT angiographic protocol. 78 mls of Omnipaque-350  was   administered intravenously without complication and 22 mls were   discarded. Sagittal and coronal reformatted images were obtained from the   source data. Axial MIP reconstructed images were obtained from the source   data and were also reviewed.    COMPARISON: No prior chest CT angiogram examinations are available for   comparison. Comparison is made with the prior noncontrast chest CT   examination from 12/3/2018. Prior x-ray examination of the chest from   earlier today.    FINDINGS: Evaluation of the pulmonary arterial vessels demonstrates a   suboptimal contrast bolus. No central filling defects are notable within   the pulmonary arterial vessels to suggest pulmonary embolism. Evaluation   for segmental and subsegmental pulmonary arterial embolism is limited.    The heart size is normal. The pericardium appears unremarkable. There are   atherosclerotic calcifications of the imaged coronary arteries, aorta,   and branch vessels. There is unchanged mild aneurysmal dilatation of the   ascending aorta measuring up to 4.6 cm at the level of the main pulmonary   artery. The patient is status post median sternotomy.    There is no axillary, mediastinal, or hilar lymphadenopathy.    The central airways are patent. There is been interval worsening of   peripheral reticular opacification throughout both lungs. Patchy   opacities are also notable within both lungs with a left upper lobe and   lingular predominance. Patchy opacities are also noted within the right   middle lobe. No pleural effusions are noted. A few right-sided scattered   calcified granulomas are noted.    The patient is status post cholecystectomy. There is nonspecific   bilateral perinephric stranding which is only partially imaged.    The liver is enlarged.    Multilevel degenerative changes are noted within the imaged potions of   the spine.    Posterior fusion hardware is notable within the upper lumbar spine and is   only partially imaged. There is an unchanged anterior wedge compression   deformity of the T6 vertebral body. There is unchanged grade 1   anterolisthesis of T12 on L1.    A right-sided reverse shoulder arthroplasty is notable.    Redemonstrated is a lower anterior cervical discectomy and fusion.    IMPRESSION:    1. Suboptimal opacification of the pulmonary arterial vessels. No central   pulmonary embolism. Evaluation for segmental and subsegmental pulmonary   arterial embolism is limited.    2. Worsening fibrotic changes throughout both lungs. Superimposed   pneumonia is difficult to exclude. Correlate clinically.    3. Unchanged aneurysmal dilatation of the ascending aorta measuring up to   4.6 cm.                  KENZIE VELA M.D., ATTENDING RADIOLOGIST  This document has been electronically signed. Jan 25 2019 12:37PM
[facility]  [id number]    74 y/o M s/p ORIF of left hip complicated by multiple collections and infections s/p washouts wtih most recent washout and reconstruction of left hip wound performed approximately 3 weeks ago who now presented to the ER with worsening left hip collection and pain.  Patient denied weakness or numbness in the extremity.  Patietn with active SOB.     Pneumonia  No pertinent family history in first degree relatives  Handoff  MEWS Score  S/P CABG x 3  S/P hip replacement, left  Rheumatoid arthritis  Osteoarthritis  COPD (chronic obstructive pulmonary disease)  HTN (hypertension)  Pneumonia of left lung due to infectious organism, unspecified part of lung  Diastolic congestive heart failure, unspecified HF chronicity  Bacteremia due to Escherichia coli  Pneumonia of left lung due to infectious organism, unspecified part of lung  BPH (benign prostatic hyperplasia)  Depression  Prophylactic measure  HLD (hyperlipidemia)  Rheumatoid arthritis  HTN (hypertension)  COPD (chronic obstructive pulmonary disease)  HCAP (healthcare-associated pneumonia)  Cellulitis of hip, left  S/P lumbar fusion  History of femur fracture  History of appendectomy  History of right shoulder replacement  History of total left hip arthroplasty  POSS PNEUMONIA  17  Cellulitis of hip, left    Ceftin (Pruritus)      T(C): 36.8 (01-27-19 @ 20:53), Max: 36.8 (01-27-19 @ 20:53)  HR: 78 (01-27-19 @ 20:53) (62 - 78)  BP: 130/66 (01-27-19 @ 20:53) (109/63 - 136/75)  RR: 17 (01-27-19 @ 20:53) (16 - 17)  SpO2: 94% (01-27-19 @ 20:53) (93% - 95%)  Labored speech  Left hip:  Well healed incision.  Soft with erythema and fluctuance in inferior aspect of incision.          Lab/Imaging: Reviewed.     Procedure:  Left lower lip field block.  3cm incision.  Evacuation of purulent fluid.  Copious washout with betadine. Excisional debridement skin to subcutaneous layer with packing placed.
Nephrology Consultation: Pk Stern MD     S, ERIKA  73y    HPI:  72 yo M PMHx of CHF (stage I diastolic dysfunction, EF 65%) COPD (diagnosed "a few years ago, quit smoking then), HTN, HLD, hx of MI and CABG (2008) (stents also placed per chart review), RA (on Medrol daily), hx of multiple L hip surgeries presenting to the ED with SOB and L hip pain. Patient's wife and daughter present to assist with history. Of note, patient had hip replacement 30 years ago in [place] and an ORIF of periprosthetic fracture on 11/2018 w/ Dr. Ramirez which was complicated by superficial infection requiring ID evaluation and PICC line antibiotics. 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 Pt. states his surgical wound drain fell out,  and under the advise of his plastic surgeon Dr. Whipple he kept it out. Patient developed worsening SOB in addition to cough when he followed up with Dr. Richardson who removed fluid around the hip and recommended he see Dr. Wills for his URI. Outpatient CXR he received demonstrated PNA which brought him to the ED. He denies recent fever, chills, weight changes but admits to fatigue and a decrease in appetite he ascribes to the daptomycin. Denies F/U/D. Denies any use of NSAIDS. Denies any history of IV contrast. Denies any prior history of renal disease.       PAST MEDICAL & SURGICAL HISTORY:  S/P CABG x 3  S/P hip replacement, left  Rheumatoid arthritis  Osteoarthritis  COPD (chronic obstructive pulmonary disease)  HTN (hypertension)  S/P lumbar fusion: Approx 2012  History of femur fracture: Repaired 11/19- L hip  History of appendectomy  History of right shoulder replacement  History of total left hip arthroplasty      Allergies    Ceftin (Pruritus)    Intolerances    Home Medications:  alendronate 70 mg oral tablet: 1 tab(s) orally once a week (25 Jan 2019 15:15)  ALPRAZolam 0.5 mg oral tablet: 1 tab(s) orally once a day (at bedtime), As Needed (25 Jan 2019 15:15)  ascorbic acid 500 mg oral tablet: 1 tab(s) orally 2 times a day (25 Jan 2019 15:15)  cyclobenzaprine 10 mg oral tablet: 1 tab(s) orally 2 times a day, As Needed (25 Jan 2019 15:15)  finasteride 5 mg oral tablet: 1 tab(s) orally once a day (25 Jan 2019 15:15)  FLUoxetine 20 mg oral capsule: 3 cap(s) orally once a day (25 Jan 2019 15:15)  lovastatin 20 mg oral tablet: 1 tab(s) orally once a day (25 Jan 2019 15:15)  methylPREDNISolone 2 mg oral tablet: 1 tab(s) orally once a day (25 Jan 2019 15:15)  potassium chloride 10 mEq oral capsule, extended release: 1 cap(s) orally 2 times a day (25 Jan 2019 15:15)  tamsulosin 0.4 mg oral capsule: 1 cap(s) orally once a day (25 Jan 2019 15:15)        FAMILY HISTORY:  No pertinent family history in first degree relatives      SOCIAL HISTORY:    REVIEW OF SYSTEMS:    Constitutional: No fever, weight loss or fatigue  Eyes: No eye pain, visual disturbances, or discharge  ENT:  No difficulty hearing, tinnitus, vertigo; No sinus or throat pain  Neck: No pain or stiffness  Breasts: No pain, masses or nipple discharge  Respiratory: No cough, wheezing, chills or hemoptysis  Cardiovascular: No chest pain, palpitations, shortness of breath, dizziness or leg swelling  Gastrointestinal: No abdominal or epigastric pain. No nausea, vomiting or hematemesis; No diarrhea or constipation. No melena or hematochezia.  Genitourinary: No dysuria, frequency, hematuria or incontinence  Rectal: No pain, hemorrhoids or incontinence  Neurological: No headaches, memory loss, loss of strength, numbness or tremors  Skin: No itching, burning, rashes or lesions   Lymph Nodes: No enlarged glands  Endocrine: No heat or cold intolerance; No hair loss  Musculoskeletal: No joint pain or swelling; No muscle, back or extremity pain  Psychiatric: No depression, anxiety, mood swings or difficulty sleeping  Heme/Lymph: No easy bruising or bleeding gums  Allergy and Immunologic: No hives or eczema    acetaminophen   Tablet .. 650 milliGRAM(s) Oral every 6 hours PRN  ALBUTerol    90 MICROgram(s) HFA Inhaler 2 Puff(s) Inhalation every 6 hours  ALPRAZolam 2 milliGRAM(s) Oral at bedtime  ascorbic acid 500 milliGRAM(s) Oral daily  aspirin enteric coated 81 milliGRAM(s) Oral daily  atorvastatin 10 milliGRAM(s) Oral at bedtime  ciprofloxacin     Tablet 500 milliGRAM(s) Oral every 12 hours  cyclobenzaprine 10 milliGRAM(s) Oral two times a day PRN  docusate sodium 100 milliGRAM(s) Oral daily  enoxaparin Injectable 40 milliGRAM(s) SubCutaneous every 24 hours  finasteride 5 milliGRAM(s) Oral daily  fludroCORTISONE 0.1 milliGRAM(s) Oral daily  FLUoxetine 60 milliGRAM(s) Oral daily  hydrocortisone sodium succinate Injectable 50 milliGRAM(s) IV Push every 8 hours  hydrocortisone sodium succinate Injectable 100 milliGRAM(s) IV Push once  methadone    Tablet 5 milliGRAM(s) Oral every 24 hours  oxyCODONE    IR 10 milliGRAM(s) Oral every 4 hours PRN  oxyCODONE    IR 5 milliGRAM(s) Oral every 4 hours PRN  pantoprazole    Tablet 40 milliGRAM(s) Oral before breakfast  potassium chloride    Tablet ER 20 milliEquivalent(s) Oral daily  senna 2 Tablet(s) Oral at bedtime  sodium chloride 0.9%. 1000 milliLiter(s) IV Continuous <Continuous>  tamsulosin 0.4 milliGRAM(s) Oral at bedtime      Vital Signs Last 24 Hrs  T(C): 36.8 (01 Feb 2019 12:57), Max: 36.8 (01 Feb 2019 12:57)  T(F): 98.2 (01 Feb 2019 12:57), Max: 98.2 (01 Feb 2019 12:57)  HR: 98 (01 Feb 2019 12:57) (68 - 98)  BP: 98/69 (01 Feb 2019 14:37) (93/62 - 103/65)  BP(mean): 75 (31 Jan 2019 22:23) (75 - 75)  RR: 18 (01 Feb 2019 12:57) (16 - 18)  SpO2: 92% (01 Feb 2019 12:57) (92% - 93%)    PHYSICAL EXAM:    Constitutional: NAD, well-groomed, well-developed  HEENT: PERRLA, EOMI, Normal Hearing, MMM  Neck: No LAD, No JVD  Back: Normal spine flexure, No CVA tenderness  Respiratory: CTAB/L   Cardiovascular: S1 and S2, RRR, no M/G/R  Gastrointestinal: BS+, soft, NT/ND  Extremities: No peripheral edema  Vascular: 2+ peripheral pulses  Neurological: A/O x 3, no focal deficits  Skin: No rashes      LABS:                        9.6    10.16 )-----------( 342      ( 01 Feb 2019 09:01 )             30.6     02-01    135  |  97  |  19  ----------------------------<  115<H>  4.4   |  29  |  1.20    Ca    9.1      01 Feb 2019 09:01    MICROBIOLOGY:  RECENT CULTURES:  01-28 .Blood Blood-Peripheral XXXX XXXX   No growth to date.    01-25 .Abscess Hip - Left Escherichia coli XXXX   Numerous Escherichia coli    01-25 .Urine Clean Catch (Midstream) XXXX XXXX   <10,000 CFU/ml Normal Urogenital jer present          RADIOLOGY & ADDITIONAL STUDIES:    < from: CT Angio Chest w/ IV Cont (01.25.19 @ 12:26) >  EXAM:  CT ANGIO CHEST (W)AW IC                            PROCEDURE DATE:  01/25/2019          INTERPRETATION:  .    CLINICAL INFORMATION: Recent surgery, shortness of breath, evaluate for   pulmonary embolism.    TECHNIQUE: Helical axial images were obtained of the chest and upper   abdomen using CT angiographic protocol. 78 mls of Omnipaque-350  was   administered intravenously without complication and 22 mls were   discarded. Sagittal and coronal reformatted images were obtained from the   source data. Axial MIP reconstructed images were obtained from the source   data and were also reviewed.    COMPARISON: No prior chest CT angiogram examinations are available for   comparison. Comparison is made with the prior noncontrast chest CT   examination from 12/3/2018. Prior x-ray examination of the chest from   earlier today.    FINDINGS: Evaluation of the pulmonary arterial vessels demonstrates a   suboptimal contrast bolus. No central filling defects are notable within   the pulmonary arterial vessels to suggest pulmonary embolism. Evaluation   for segmental and subsegmental pulmonary arterial embolism is limited.    The heart size is normal. The pericardium appears unremarkable. There are   atherosclerotic calcifications of the imaged coronary arteries, aorta,   and branch vessels. There is unchanged mild aneurysmal dilatation of the   ascending aorta measuring up to 4.6 cm at the level of the main pulmonary   artery. The patient is status post median sternotomy.    There is no axillary, mediastinal, or hilar lymphadenopathy.    The central airways are patent. There is been interval worsening of   peripheral reticular opacification throughout both lungs. Patchy   opacities are also notable within both lungs with a left upperlobe and   lingular predominance. Patchy opacities are also noted within the right   middle lobe. No pleural effusions are noted. A few right-sided scattered   calcified granulomas are noted.    The patient is status post cholecystectomy. There is nonspecific   bilateral perinephric stranding which is only partially imaged.    The liver is enlarged.    Multilevel degenerative changes are noted within the imaged potions of   the spine.    Posterior fusion hardware is notable within the upper lumbar spine and is   only partially imaged. There is an unchanged anterior wedge compression   deformity of the T6 vertebral body. There is unchanged grade 1   anterolisthesis of T12 on L1.    A right-sided reverse shoulder arthroplasty is notable.    Redemonstrated is a lower anterior cervical discectomy and fusion.    IMPRESSION:    1. Suboptimal opacification of the pulmonary arterial vessels. No central   pulmonary embolism. Evaluation for segmental and subsegmental pulmonary   arterial embolism is limited.    2. Worsening fibrotic changes throughout both lungs. Superimposed   pneumonia is difficult to exclude. Correlate clinically.    3. Unchanged aneurysmal dilatation of the ascending aorta measuring up to   4.6 cm.
Orthopaedic Surgery Consult Note    Pt is a 73y Male with hx of Left hip periprosthetic hip fx, ORIF with Dr Ramirez, complicated by superficial infection requiring I&D, 12/5 then subsequently on 1/2 for additional I+D.  Presenting today with L hip pain surgical site drainage. Pt was last seen by Dr. Ramirez this week for wound check and dressing change. Pt states he had a drain removed 4 days ago. He began having redness and increased swelling over the ensuring days. He denies any drainage or open wounds. Mild increase in pain. No numbness, tingling, or paresthesias in affected limb. Pt had an outpatient CXR yesterday and there were concerns for a PNA, advised to come to ED. Will be admitted for respiratory infection.  Pt denies recent trauma. Pt denies taking blood thinners. Denies fevers, dizziness, CP, SOB, N/V, calf pain.    PMHx:  CAD  Rheumatoid arthritis (not on DMARD Therapy)  Osteoarthritis  COPD (chronic obstructive pulmonary disease)  HTN (hypertension)    PSHx:  S/P CABG x 3  S/P lumbar fusion ([place])  History of appendectomy  History of right shoulder replacement ([place])  History of total left hip arthroplasty  S/P I&D of of L Hip 12/5  Cholecystectomy     Allergies:  Ceftin (Pruritus)    Medications:  lactated ringers. 1000 milliLiter(s) IV Continuous <Continuous>    Social: Denies smoking, EtOH, illicit drug use.    Labs:                                             9.7    11.88 )-----------( 371      ( 25 Jan 2019 10:39 )             30.0     01-25    127<L>  |  92<L>  |  22  ----------------------------<  117<H>  4.3   |  25  |  0.85    Ca    9.3      25 Jan 2019 10:39    TPro  7.2  /  Alb  2.2<L>  /  TBili  1.2  /  DBili  x   /  AST  67<H>  /  ALT  59  /  AlkPhos  176<H>  01-25      Imaging:  XR L Hip: s/p ORIF of periprosthetic fracture of gabriela-arthroplasty. No acute fracture.    Vitals:  Vital Signs Last 24 Hrs  T(C): 36.7 (25 Jan 2019 16:07), Max: 36.7 (25 Jan 2019 16:07)  T(F): 98.1 (25 Jan 2019 16:07), Max: 98.1 (25 Jan 2019 16:07)  HR: 82 (25 Jan 2019 16:07) (82 - 112)  BP: 105/64 (25 Jan 2019 16:07) (100/67 - 118/72)  BP(mean): --  RR: 18 (25 Jan 2019 16:07) (18 - 18)  SpO2: 98% (25 Jan 2019 16:07) (92% - 98%)    Physical Exam:  Gen: NAD, AAOx3    LLE:   Erythema surrounding the incision Site. No open areas or any drainage.  Fluctuance noted about the distal 1/2 of the incision site.   No gross deformity  No bony TTP of Hip  Full ROM of Hip  NT with ROM of Hip  Able to SLR  +EHL/FHL/TA/GS  SILT L2-S1  +DP/PT Pulses  Compartments soft and compressible  No calf TTP B/L
Patient is a 73y old  Male who presents with a chief complaint of HCAP and Hip infection (03 Feb 2019 06:35)      Reason For Consult: adrenal insuff    HPI:  74 yo M PMHx of CHF (stage I diastolic dysfunction, EF 65%) COPD (diagnosed "a few years ago, quit smoking then), HTN, HLD, hx of MI and CABG (2008) (stents also placed per chart review), RA (on Medrol daily), hx of multiple L hip surgeries presenting to the ED with SOB and L hip pain. Patient's wife and daughter present to assist with history. Of note, patient had hip replacement 30 years ago in [place] and an ORIF of periprosthetic fracture on 11/2018 w/ Dr. Ramirez which was complicated by superficial infection requiring ID evaluation and PICC line antibiotics. 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 Pt. states his surgical wound drain fell out 4 days ago, and under the advisement of his plastic surgeon Dr. Whipple he kept it out. Patient developed worsening SOB over the past few days in addition to cough when he followed up with Dr. Richardson 2 days ago who removed fluid around the hip and recommended he see Dr. Wills for his URI. Outpatient CXR he received yesterday demonstrated PNA which brought him to the ED today as he wasn't able to come yesterday. He denies recent fever, chills, weight changes but admits to fatigue and a decrease in appetite he ascribes to the daptomycin. Denies F/U/D.     In the ED:   VS wnl, , WBC 11.88, H/H 9.7/30 lactate 2.1  Na: 127, K: 4.3 Cl: 93  BUN/cr : 22/0.85 Alphos: 176 AST: 67 ALT: 59 trops <0.015 BNP: 560 (appears baseline)  ABG: No acute acidosis/alkalosis UA: negative (25 Jan 2019 13:53)      PAST MEDICAL & SURGICAL HISTORY:  S/P CABG x 3  S/P hip replacement, left  Rheumatoid arthritis  Osteoarthritis  COPD (chronic obstructive pulmonary disease)  HTN (hypertension)  S/P lumbar fusion: Approx 2012  History of femur fracture: Repaired 11/19- L hip  History of appendectomy  History of right shoulder replacement  History of total left hip arthroplasty      FAMILY HISTORY:  No pertinent family history in first degree relatives        Social History:    MEDICATIONS  (STANDING):  ALBUTerol    90 MICROgram(s) HFA Inhaler 2 Puff(s) Inhalation every 6 hours  ascorbic acid 500 milliGRAM(s) Oral daily  aspirin enteric coated 81 milliGRAM(s) Oral daily  atorvastatin 10 milliGRAM(s) Oral at bedtime  ciprofloxacin     Tablet 500 milliGRAM(s) Oral every 12 hours  docusate sodium 100 milliGRAM(s) Oral daily  enoxaparin Injectable 40 milliGRAM(s) SubCutaneous every 24 hours  finasteride 5 milliGRAM(s) Oral daily  fludroCORTISONE 0.1 milliGRAM(s) Oral daily  FLUoxetine 60 milliGRAM(s) Oral daily  hydrocortisone sodium succinate Injectable 50 milliGRAM(s) IV Push every 8 hours  methadone    Tablet 5 milliGRAM(s) Oral every 24 hours  pantoprazole    Tablet 40 milliGRAM(s) Oral before breakfast  potassium chloride    Tablet ER 20 milliEquivalent(s) Oral daily  senna 2 Tablet(s) Oral at bedtime  sodium chloride 1 Gram(s) Oral daily  sodium chloride 0.9%. 1000 milliLiter(s) (70 mL/Hr) IV Continuous <Continuous>  tamsulosin 0.4 milliGRAM(s) Oral at bedtime    MEDICATIONS  (PRN):  acetaminophen   Tablet .. 650 milliGRAM(s) Oral every 6 hours PRN Mild Pain (1 - 3)  cyclobenzaprine 10 milliGRAM(s) Oral two times a day PRN Muscle Spasm        T(C): 36.2 (02-03-19 @ 05:23), Max: 36.5 (02-02-19 @ 13:38)  HR: 76 (02-03-19 @ 05:23) (76 - 93)  BP: 141/65 (02-03-19 @ 05:23) (120/75 - 141/65)  RR: 16 (02-03-19 @ 05:23) (16 - 18)  SpO2: 93% (02-03-19 @ 05:23) (93% - 98%)  Wt(kg): --    PHYSICAL EXAM:  GENERAL: NAD, well-groomed, well-developed  HEAD:  Atraumatic, Normocephalic  NECK: Supple, No JVD, Normal thyroid  CHEST/LUNG: Clear to percussion bilaterally; No rales, rhonchi, wheezing, or rubs  HEART: Regular rate and rhythm; No murmurs, rubs, or gallops  ABDOMEN: Soft, Nontender, Nondistended; Bowel sounds present  EXTREMITIES:  2+ Peripheral Pulses, No clubbing, cyanosis, or edema  SKIN: No rashes or lesions    CAPILLARY BLOOD GLUCOSE                                8.8    5.79  )-----------( 306      ( 02 Feb 2019 08:30 )             27.8       CMP:  02-02 @ 08:30  SGPT --  Albumin --   Alk Phos --   Anion Gap 8   SGOT --   Total Bili --   BUN 18   Calcium Total 8.6   CO2 28   Chloride 98   Creatinine 0.88   eGFR if    eGFR if non AA 86   Glucose 142   Potassium 4.4   Protein --   Sodium 134      Thyroid Function Tests:      Diabetes Tests:       Radiology:
[facility]         Shannon Valdez, [place], Anusha, Johanny, S[place], Abdias        [phone] (office)    CHIEF COMPLAINT: Patient is a 73y old  Male who presents with a chief complaint of HCAP and Hip infection (27 Jan 2019 11:55)      HPI:  72 yo M PMHx of CHF (stage I diastolic dysfunction, EF 65%) COPD, HTN, HLD, hx of MI, CABG 2008, pci, RA on steroids, hx of multiple L hip surgeries.  We have seen him several times over the past few months in anticipation and postop from several surgeries.   Of note, patient had hip replacement 30 years ago in [place] and an ORIF of periprosthetic fracture on 11/2018 w/ Dr. Ramirez which was complicated by superficial infection requiring ID evaluation and PICC line antibiotics. 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    Pt. states his surgical wound drain fell out 4 days pta, and under the advisement of his plastic surgeon Dr. Whipple he kept it out. Patient developed worsening SOB over the past few days in addition to cough when he followed up with Dr. Richardson 2 days ago who removed fluid around the hip and recommended he see Dr. Wills for his URI. Outpatient CXR demonstrated PNA which brought him to the ED.  He has been getting abx for presumed hcap, with CT evidence of fibrotic changes, but no definitive pna.  There has been concern about infected seroma, and reportedly orthopedics is considering removal of hardware.  Consultation is now being obtained.     The patient reports no new symptoms.  Denies chest discomfort.  He did have some shortness of breath in the past 24, now improved, treated as  a pulmonary issue  No abdominal pain.  No new neurologic symptoms.      PAST MEDICAL & SURGICAL HISTORY:  S/P CABG x 3  S/P hip replacement, left  Rheumatoid arthritis  Osteoarthritis  COPD (chronic obstructive pulmonary disease)  HTN (hypertension)  S/P lumbar fusion: Approx 2012  History of femur fracture: Repaired 11/19- L hip  History of appendectomy  History of right shoulder replacement  History of total left hip arthroplasty      SOCIAL HISTORY: No active tobacco, alcohol or illicit drug use    FAMILY HISTORY:  No pertinent family history in first degree relatives   No pertinent family history of CAD       MEDICATIONS  (STANDING):  ALBUTerol    90 MICROgram(s) HFA Inhaler 2 Puff(s) Inhalation every 6 hours  ALPRAZolam 2 milliGRAM(s) Oral at bedtime  ascorbic acid 500 milliGRAM(s) Oral daily  aspirin enteric coated 81 milliGRAM(s) Oral daily  atorvastatin 10 milliGRAM(s) Oral at bedtime  docusate sodium 100 milliGRAM(s) Oral daily  enoxaparin Injectable 40 milliGRAM(s) SubCutaneous every 24 hours  finasteride 5 milliGRAM(s) Oral daily  FLUoxetine 60 milliGRAM(s) Oral daily  meropenem  IVPB 1000 milliGRAM(s) IV Intermittent every 8 hours  meropenem  IVPB      methadone    Tablet 5 milliGRAM(s) Oral every 24 hours  potassium chloride    Tablet ER 20 milliEquivalent(s) Oral daily  predniSONE   Tablet 20 milliGRAM(s) Oral daily  senna 2 Tablet(s) Oral at bedtime  tamsulosin 0.4 milliGRAM(s) Oral at bedtime    MEDICATIONS  (PRN):  acetaminophen   Tablet .. 650 milliGRAM(s) Oral every 6 hours PRN Mild Pain (1 - 3)  cyclobenzaprine 10 milliGRAM(s) Oral two times a day PRN Muscle Spasm  oxyCODONE    IR 10 milliGRAM(s) Oral every 4 hours PRN Severe Pain (7 - 10)  oxyCODONE    IR 5 milliGRAM(s) Oral every 4 hours PRN Moderate Pain (4 - 6)      Allergies    Ceftin (Pruritus)    Intolerances        REVIEW OF SYSTEMS: Is negative for eye, ENT, GI, , allergic, dermatologic, musculoskeletal and neurologic, except as described above.    VITAL SIGNS:   Vital Signs Last 24 Hrs  T(C): 36.4 (27 Jan 2019 05:02), Max: 36.8 (26 Jan 2019 20:33)  T(F): 97.5 (27 Jan 2019 05:02), Max: 98.2 (26 Jan 2019 20:33)  HR: 62 (27 Jan 2019 05:02) (62 - 88)  BP: 109/63 (27 Jan 2019 05:02) (109/63 - 152/66)  BP(mean): --  RR: 16 (27 Jan 2019 05:02) (16 - 16)  SpO2: 93% (27 Jan 2019 05:02) (92% - 94%)    I&O's Summary    26 Jan 2019 07:01  -  27 Jan 2019 07:00  --------------------------------------------------------  IN: 0 mL / OUT: 150 mL / NET: -150 mL    27 Jan 2019 07:01  -  27 Jan 2019 12:04  --------------------------------------------------------  IN: 0 mL / OUT: 350 mL / NET: -350 mL        PHYSICAL EXAM:    Constitutional: NAD, awake and alert, well-developed  Eyes:  EOMI, no oral cyanosis, conjunctivae clear, anicteric.  Pulmonary: Non-labored, some coarse crackles nimco  Cardiovascular:  regular S1 and S2. 1-2/6 sys murmur.  No rubs, gallops or clicks  Gastrointestinal: Bowel Sounds present, soft, nontender.   Lymph: No peripheral edema. left hip bandaged, cdi  Neurological: Alert, strength and sensitivity are grossly intact  Skin: No obvious lesions/rashes.   Psych:  Mood & affect appropriate .    LABS: All Labs Reviewed:                        8.5    6.77  )-----------( 342      ( 27 Jan 2019 08:25 )             27.0                         9.7    11.88 )-----------( 371      ( 25 Jan 2019 10:39 )             30.0     27 Jan 2019 08:25    136    |  102    |  20     ----------------------------<  120    4.5     |  29     |  0.59   25 Jan 2019 10:39    127    |  92     |  22     ----------------------------<  117    4.3     |  25     |  0.85     Ca    9.0        27 Jan 2019 08:25  Ca    9.3        25 Jan 2019 10:39    TPro  7.2    /  Alb  2.2    /  TBili  1.2    /  DBili  x      /  AST  67     /  ALT  59     /  AlkPhos  176    25 Jan 2019 10:39          Blood Culture: Organism --  Gram Stain Blood -- Gram Stain --  Specimen Source .Abscess Hip - Left  Culture-Blood --    Organism --  Gram Stain Blood -- Gram Stain --  Specimen Source .Urine Clean Catch (Midstream)  Culture-Blood --    Organism Blood Culture PCR  Gram Stain Blood -- Gram Stain   Growth in anaerobic bottle: Gram Negative Rods  Growth in aerobic bottle: Gram Negative Rods  Specimen Source .Blood Blood-Peripheral  Culture-Blood --      01-25 @ 10:39  Pro Bnp 560        RADIOLOGY:    < from: TTE Echo Doppler w/o Cont (11.19.18 @ 11:09) >     EXAM:  ECHO TTE WO CON COMP W DOPPLR         PROCEDURE DATE:  11/19/2018        INTERPRETATION:  INDICATION: CAD  Referring M.D.:Paolo  Blood Pressure 114/70        Weight (kg) :81     Height (cm):170       BSA (sq m): 1.98  Technician: AS    Dimensions:    LA 3.5       Normal Values: 2.0 - 4.0 cm    Ao 3.94        Normal Values: 2.0 - 3.8 cm  SEPTUM 1.1       Normal Values: 0.6 - 1.2 cm  PWT 1.1       Normal Values: 0.6 - 1.1 cm  LVIDd 5.1         Normal Values: 3.0 - 5.6 cm  LVIDs 4.05 Normal Values: 1.8 - 4.0 cm      OBSERVATIONS:  Technically difficult study  Mitral Valve: MAC with calcified MV leaflets. Mild mitral regurgitation.  Aortic Valve/Aorta: Mildly calcified trileaflet AV with normal opening.   Mildly dilated aortic root at 3.9  cm  Tricuspid Valve: Normal tricuspid valve. Trace tricuspid regurgitation.  Pulmonic Valve: The pulmonic valve is not well visualized. Probably   normal.  Left Atrium: Mildly enlarged   Right Atrium: Mildly enlarged  Left Ventricle: Endocardium is not well-visualized. Overall there appears   to be normal left ventricular systolic function. The EF is approximately   65%.  Right Ventricle: The right ventricle is not well-visualized. It appears   to be mildly enlarged in some views with normal systolic function.    Pericardium/Pleura: Trace pericardial effusion noted.  Pulmonary/RV Pressure: Insufficient tricuspid regurgitation Doppler in   order to estimate the right ventricular systolic pressure  LV Diastolic Function: Stage I diastolic dysfunction     Conclusion: Overall preserved left ventricular systolic function. EF 65.   Insufficient tricuspid regurgitation Doppler in order to estimate the   right ventricular systolic pressure                  CARMEN BAE M.D., ATTENDING CARDIOLOGIST  This document has been electronically signed. Nov 20 2018  3:56PM                < end of copied text >  EKG:   lae

## 2019-02-03 NOTE — PROGRESS NOTE ADULT - PROBLEM SELECTOR PLAN 9
- hx of CAD s/p CABG   -continue aspirin, lisinopril , continue statin  -cardiology consult recs apprec

## 2019-02-03 NOTE — PROGRESS NOTE ADULT - PROBLEM SELECTOR PLAN 5
-cont stress steroids for hypotension and concern for adrenal insuffiency  -will taper down slowly to home dose

## 2019-02-03 NOTE — PROGRESS NOTE ADULT - SUBJECTIVE AND OBJECTIVE BOX
Patient is a 73y old  Male who presents with a chief complaint of HCAP and Hip infection (03 Feb 2019 08:45)      INTERVAL HPI: Pt seen and examined. States he feels well, denies any acute symptoms at this time.     OVERNIGHT EVENTS: none noted  T(F): 98 (02-03-19 @ 13:14), Max: 98 (02-03-19 @ 13:14)  HR: 74 (02-03-19 @ 13:14) (74 - 76)  BP: 133/71 (02-03-19 @ 13:14) (133/71 - 141/65)  RR: 18 (02-03-19 @ 13:14) (16 - 18)  SpO2: 94% (02-03-19 @ 13:14) (93% - 94%)  I&O's Summary    02 Feb 2019 07:01  -  03 Feb 2019 07:00  --------------------------------------------------------  IN: 2020 mL / OUT: 0 mL / NET: 2020 mL    03 Feb 2019 07:01  -  03 Feb 2019 21:26  --------------------------------------------------------  IN: 1370 mL / OUT: 560 mL / NET: 810 mL        REVIEW OF SYSTEMS:  CONSTITUTIONAL: No fever, weight loss, or fatigue  RESPIRATORY: No cough, wheezing, chills or hemoptysis; No shortness of breath  CARDIOVASCULAR: No chest pain, palpitations, dizziness, or leg swelling  GASTROINTESTINAL: No abdominal or epigastric pain. No nausea, vomiting, or hematemesis; No diarrhea or constipation. No melena or hematochezia.  GENITOURINARY: No dysuria, frequency, hematuria, or incontinence  NEUROLOGICAL: No headaches, memory loss, loss of strength, numbness, or tremors  SKIN: No itching, burning, rashes, or lesions   ENDOCRINE: No heat or cold intolerance; No hair loss  MUSCULOSKELETAL: No joint pain or swelling; No muscle, back, or extremity pain  PSYCHIATRIC: No depression, anxiety, mood swings, or difficulty sleeping      PHYSICAL EXAM:  GENERAL: NAD, well-groomed, well-developed  NERVOUS SYSTEM:  Alert & Oriented X3, Good concentration; Motor Strength 5/5 B/L upper and lower extremities; DTRs 2+ intact and symmetric  CHEST/LUNG: Clear to percussion bilaterally; No rales, rhonchi, wheezing, or rubs  HEART: Regular rate and rhythm; No murmurs, rubs, or gallops  ABDOMEN: Soft, Nontender, Nondistended; Bowel sounds present  EXTREMITIES:  2+ Peripheral Pulses, No clubbing, cyanosis, or edema  SKIN: No rashes or lesions    LABS:                        8.2    6.90  )-----------( 288      ( 03 Feb 2019 10:13 )             26.9     02-03    134<L>  |  100  |  22  ----------------------------<  200<H>  4.0   |  25  |  0.93    Ca    8.5      03 Feb 2019 10:13          CAPILLARY BLOOD GLUCOSE                  MEDICATIONS  (STANDING):  ALBUTerol    90 MICROgram(s) HFA Inhaler 2 Puff(s) Inhalation every 6 hours  ALPRAZolam 2 milliGRAM(s) Oral at bedtime  ascorbic acid 500 milliGRAM(s) Oral daily  aspirin enteric coated 81 milliGRAM(s) Oral daily  atorvastatin 10 milliGRAM(s) Oral at bedtime  ciprofloxacin     Tablet 500 milliGRAM(s) Oral every 12 hours  docusate sodium 100 milliGRAM(s) Oral daily  enoxaparin Injectable 40 milliGRAM(s) SubCutaneous every 24 hours  finasteride 5 milliGRAM(s) Oral daily  fludroCORTISONE 0.1 milliGRAM(s) Oral daily  FLUoxetine 60 milliGRAM(s) Oral daily  hydrocortisone sodium succinate Injectable 50 milliGRAM(s) IV Push every 8 hours  methadone    Tablet 5 milliGRAM(s) Oral every 24 hours  pantoprazole    Tablet 40 milliGRAM(s) Oral before breakfast  potassium chloride    Tablet ER 20 milliEquivalent(s) Oral daily  senna 2 Tablet(s) Oral at bedtime  sodium chloride 1 Gram(s) Oral daily  sodium chloride 0.9%. 1000 milliLiter(s) (70 mL/Hr) IV Continuous <Continuous>  tamsulosin 0.4 milliGRAM(s) Oral at bedtime    MEDICATIONS  (PRN):  acetaminophen   Tablet .. 650 milliGRAM(s) Oral every 6 hours PRN Mild Pain (1 - 3)  cyclobenzaprine 10 milliGRAM(s) Oral two times a day PRN Muscle Spasm

## 2019-02-03 NOTE — CONSULT NOTE ADULT - CONSULT REQUESTED DATE/TIME
01-Feb-2019 16:19
03-Feb-2019 08:13
25-Jan-2019 16:18
25-Jan-2019 19:16
31-Dec-2018 13:44
27-Jan-2019 12:03

## 2019-02-03 NOTE — PROGRESS NOTE ADULT - SUBJECTIVE AND OBJECTIVE BOX
[facility] -- Shannon Valdez, [place], [place], Sidney Restrepo [place]  Office # [phone]      Follow Up:    cad/preop  Subjective/Observations:   No events overnight resting comfortably in bed.  No complaints of chest pain, dyspnea, or palpitations reported. No signs of orthopnea or PND.     REVIEW OF SYSTEMS: All other review of systems is negative unless indicated above    PAST MEDICAL & SURGICAL HISTORY:  S/P CABG x 3  S/P hip replacement, left  Rheumatoid arthritis  Osteoarthritis  COPD (chronic obstructive pulmonary disease)  HTN (hypertension)  S/P lumbar fusion: Approx 2012  History of femur fracture: Repaired 11/19- L hip  History of appendectomy  History of right shoulder replacement  History of total left hip arthroplasty      MEDICATIONS  (STANDING):  ALBUTerol    90 MICROgram(s) HFA Inhaler 2 Puff(s) Inhalation every 6 hours  ascorbic acid 500 milliGRAM(s) Oral daily  aspirin enteric coated 81 milliGRAM(s) Oral daily  atorvastatin 10 milliGRAM(s) Oral at bedtime  ciprofloxacin     Tablet 500 milliGRAM(s) Oral every 12 hours  docusate sodium 100 milliGRAM(s) Oral daily  enoxaparin Injectable 40 milliGRAM(s) SubCutaneous every 24 hours  finasteride 5 milliGRAM(s) Oral daily  fludroCORTISONE 0.1 milliGRAM(s) Oral daily  FLUoxetine 60 milliGRAM(s) Oral daily  hydrocortisone sodium succinate Injectable 50 milliGRAM(s) IV Push every 8 hours  methadone    Tablet 5 milliGRAM(s) Oral every 24 hours  pantoprazole    Tablet 40 milliGRAM(s) Oral before breakfast  potassium chloride    Tablet ER 20 milliEquivalent(s) Oral daily  senna 2 Tablet(s) Oral at bedtime  sodium chloride 1 Gram(s) Oral daily  sodium chloride 0.9%. 1000 milliLiter(s) (70 mL/Hr) IV Continuous <Continuous>  tamsulosin 0.4 milliGRAM(s) Oral at bedtime    MEDICATIONS  (PRN):  acetaminophen   Tablet .. 650 milliGRAM(s) Oral every 6 hours PRN Mild Pain (1 - 3)  cyclobenzaprine 10 milliGRAM(s) Oral two times a day PRN Muscle Spasm      Allergies    Ceftin (Pruritus)    Intolerances        Vital Signs Last 24 Hrs  T(C): 36.2 (03 Feb 2019 05:23), Max: 36.5 (02 Feb 2019 13:38)  T(F): 97.1 (03 Feb 2019 05:23), Max: 97.7 (02 Feb 2019 13:38)  HR: 76 (03 Feb 2019 05:23) (76 - 93)  BP: 141/65 (03 Feb 2019 05:23) (120/75 - 141/65)  BP(mean): --  RR: 16 (03 Feb 2019 05:23) (16 - 18)  SpO2: 93% (03 Feb 2019 05:23) (93% - 98%)    I&O's Summary    02 Feb 2019 07:01  -  03 Feb 2019 07:00  --------------------------------------------------------  IN: 2020 mL / OUT: 0 mL / NET: 2020 mL          PHYSICAL EXAM:  TELE: Off tele   Constitutional: NAD, awake and alert, well-developed  HEENT: Moist Mucous Membranes, Anicteric  Pulmonary: Non-labored, breath sounds are clear bilaterally, No wheezing, crackles or rhonchi  Cardiovascular: Regular, S1 and S2 nl, No murmurs, rubs, gallops or clicks  Gastrointestinal: Bowel Sounds present, soft, nontender.   Lymph: No lymphadenopathy. No peripheral edema.  Skin: No visible rashes or ulcers.  Psych:  Mood & affect appropriate    LABS: All Labs Reviewed:                        8.8    5.79  )-----------( 306      ( 02 Feb 2019 08:30 )             27.8                         9.6    10.16 )-----------( 342      ( 01 Feb 2019 09:01 )             30.6     02 Feb 2019 08:30    134    |  98     |  18     ----------------------------<  142    4.4     |  28     |  0.88   01 Feb 2019 09:01    135    |  97     |  19     ----------------------------<  115    4.4     |  29     |  1.20     Ca    8.6        02 Feb 2019 08:30  Ca    9.1        01 Feb 2019 09:01    < from: 12 Lead ECG (02.01.19 @ 10:22) >  Ventricular Rate 96 BPM    Atrial Rate 96 BPM    P-R Interval 222 ms    QRS Duration 102 ms    Q-T Interval 356 ms    QTC Calculation(Bezet) 449 ms    P Axis 20 degrees    R Axis -39 degrees    T Axis 51 degrees    Diagnosis Line Sinus rhythm with 1st degree AV block  Left axis deviation  Confirmed by EVITA RESTREPO ([id number]) on 2/1/2019 4:06:11 PM    < end of copied text >      CARDIAC MARKERS ( 01 Feb 2019 09:01 )  <.015 ng/mL / x     / 11 U/L / x     / x         from: TTE Echo Doppler w/o Cont (11.19.18 @ 11:09) >     EXAM:  ECHO TTE WO CON COMP W DOPPLR       PROCEDURE DATE:  11/19/2018      INTERPRETATION:  INDICATION: CAD  Referring M.D.:Paolo  Blood Pressure 114/70        Weight (kg) :81     Height (cm):170       BSA (sq m): 1.98  Technician: AS    Dimensions:    LA 3.5       Normal Values: 2.0 - 4.0 cm    Ao 3.94        Normal Values: 2.0 - 3.8 cm  SEPTUM 1.1       Normal Values: 0.6 - 1.2 cm  PWT 1.1       Normal Values: 0.6 - 1.1 cm  LVIDd 5.1         Normal Values: 3.0 - 5.6 cm  LVIDs 4.05 Normal Values: 1.8 - 4.0 cm    OBSERVATIONS:  Technically difficult study  Mitral Valve: MAC with calcified MV leaflets. Mild mitral regurgitation.  Aortic Valve/Aorta: Mildly calcified trileaflet AV with normal opening.   Mildly dilated aortic root at 3.9  cm  Tricuspid Valve: Normal tricuspid valve. Trace tricuspid regurgitation.  Pulmonic Valve: The pulmonic valve is not well visualized. Probably   normal.  Left Atrium: Mildly enlarged   Right Atrium: Mildly enlarged  Left Ventricle: Endocardium is not well-visualized. Overall there appears   to be normal left ventricular systolic function. The EF is approximately   65%.  Right Ventricle: The right ventricle is not well-visualized. It appears   to be mildly enlarged in some views with normal systolic function.    Pericardium/Pleura: Trace pericardial effusion noted.  Pulmonary/RV Pressure: Insufficient tricuspid regurgitation Doppler in   order to estimate the right ventricular systolic pressure  LV Diastolic Function: Stage I diastolic dysfunction     Conclusion: Overall preserved left ventricular systolic function. EF 65.   Insufficient tricuspid regurgitation Doppler in order to estimate the   right ventricular systolic pressure      CARMEN BAE M.D., ATTENDING CARDIOLOGIST  This document has been electronically signed. Nov 20 2018  3:56PM     < end of copied text >    < from: CT Angio Chest w/ IV Cont (01.25.19 @ 12:26) >    EXAM:  CT ANGIO CHEST (W)AW IC                          PROCEDURE DATE:  01/25/2019      INTERPRETATION:  .    CLINICAL INFORMATION: Recent surgery, shortness of breath, evaluate for   pulmonary embolism.    TECHNIQUE: Helical axial images were obtained of the chest and upper   abdomen using CT angiographic protocol. 78 mls of Omnipaque-350  was   administered intravenously without complication and 22 mls were   discarded. Sagittal and coronal reformatted images were obtained from the   source data. Axial MIP reconstructed images were obtained from the source   data and were also reviewed.    COMPARISON: No prior chest CT angiogram examinations are available for   comparison. Comparison is made with the prior noncontrast chest CT   examination from 12/3/2018. Prior x-ray examination of the chest from   earlier today.    FINDINGS: Evaluation of the pulmonary arterial vessels demonstrates a   suboptimal contrast bolus. No central filling defects are notable within   the pulmonary arterial vessels to suggest pulmonary embolism. Evaluation   for segmental and subsegmental pulmonary arterial embolism is limited.    The heart size is normal. The pericardium appears unremarkable. There are   atherosclerotic calcifications of the imaged coronary arteries, aorta,   and branch vessels. There is unchanged mild aneurysmal dilatation of the   ascending aorta measuring up to 4.6 cm at the level of the main pulmonary   artery. The patient is status post median sternotomy.    There is no axillary, mediastinal, or hilar lymphadenopathy.    The central airways are patent. There is been interval worsening of   peripheral reticular opacification throughout both lungs. Patchy   opacities are also notable within both lungs with a left upperlobe and   lingular predominance. Patchy opacities are also noted within the right   middle lobe. No pleural effusions are noted. A few right-sided scattered   calcified granulomas are noted.    The patient is status post cholecystectomy. There is nonspecific   bilateral perinephric stranding which is only partially imaged.    The liver is enlarged.    Multilevel degenerative changes are noted within the imaged potions of   the spine.    Posterior fusion hardware is notable within the upper lumbar spine and is   only partially imaged. There is an unchanged anterior wedge compression   deformity of the T6 vertebral body. There is unchanged grade 1   anterolisthesis of T12 on L1.    A right-sided reverse shoulder arthroplasty is notable.    Redemonstrated is a lower anterior cervical discectomy and fusion.    IMPRESSION:    1. Suboptimal opacification of the pulmonary arterial vessels. No central   pulmonary embolism. Evaluation for segmental and subsegmental pulmonary   arterial embolism is limited.    2. Worsening fibrotic changes throughout both lungs. Superimposed   pneumonia is difficult to exclude. Correlate clinically.    3. Unchanged aneurysmal dilatation of the ascending aorta measuring up to   4.6 cm.    KENZIE CHARLES, ATTENDING RADIOLOGIST  This document has been electronically signed. Jan 25 2019 12:37PM      < end of copied text >              Walter Anglin ANP   Cardiology

## 2019-02-03 NOTE — PROGRESS NOTE ADULT - PROBLEM SELECTOR PLAN 1
bacteremia, HFpEF, COPD, PRO, Hip Wound Infection, OP and RA and OA  on ABX, ID following, skin care, wound care, ortho and plastics follow up  COPD - proventil Inhaler  on steroids for adrenal insuff, would taper  cvs regimen and BP control - HFpEF and HTN hx  dietary discretion  PT as tolerated  on pain regimen with opioids, bowel regimen, education and counseling  DC planning

## 2019-02-04 VITALS
SYSTOLIC BLOOD PRESSURE: 160 MMHG | TEMPERATURE: 97 F | OXYGEN SATURATION: 96 % | HEART RATE: 67 BPM | RESPIRATION RATE: 17 BRPM | DIASTOLIC BLOOD PRESSURE: 81 MMHG

## 2019-02-04 LAB
ANION GAP SERPL CALC-SCNC: 7 MMOL/L — SIGNIFICANT CHANGE UP (ref 5–17)
BUN SERPL-MCNC: 17 MG/DL — SIGNIFICANT CHANGE UP (ref 7–23)
CALCIUM SERPL-MCNC: 8.3 MG/DL — LOW (ref 8.5–10.1)
CHLORIDE SERPL-SCNC: 104 MMOL/L — SIGNIFICANT CHANGE UP (ref 96–108)
CO2 SERPL-SCNC: 27 MMOL/L — SIGNIFICANT CHANGE UP (ref 22–31)
CREAT SERPL-MCNC: 0.84 MG/DL — SIGNIFICANT CHANGE UP (ref 0.5–1.3)
GLUCOSE SERPL-MCNC: 135 MG/DL — HIGH (ref 70–99)
HCT VFR BLD CALC: 26.5 % — LOW (ref 39–50)
HGB BLD-MCNC: 8.2 G/DL — LOW (ref 13–17)
MCHC RBC-ENTMCNC: 27.2 PG — SIGNIFICANT CHANGE UP (ref 27–34)
MCHC RBC-ENTMCNC: 30.9 GM/DL — LOW (ref 32–36)
MCV RBC AUTO: 88 FL — SIGNIFICANT CHANGE UP (ref 80–100)
NRBC # BLD: 0 /100 WBCS — SIGNIFICANT CHANGE UP (ref 0–0)
PLATELET # BLD AUTO: 286 K/UL — SIGNIFICANT CHANGE UP (ref 150–400)
POTASSIUM SERPL-MCNC: 3.9 MMOL/L — SIGNIFICANT CHANGE UP (ref 3.5–5.3)
POTASSIUM SERPL-SCNC: 3.9 MMOL/L — SIGNIFICANT CHANGE UP (ref 3.5–5.3)
RBC # BLD: 3.01 M/UL — LOW (ref 4.2–5.8)
RBC # FLD: 15.7 % — HIGH (ref 10.3–14.5)
SODIUM SERPL-SCNC: 138 MMOL/L — SIGNIFICANT CHANGE UP (ref 135–145)
WBC # BLD: 6.27 K/UL — SIGNIFICANT CHANGE UP (ref 3.8–10.5)
WBC # FLD AUTO: 6.27 K/UL — SIGNIFICANT CHANGE UP (ref 3.8–10.5)

## 2019-02-04 PROCEDURE — 36415 COLL VENOUS BLD VENIPUNCTURE: CPT

## 2019-02-04 PROCEDURE — 87070 CULTURE OTHR SPECIMN AEROBIC: CPT

## 2019-02-04 PROCEDURE — 87150 DNA/RNA AMPLIFIED PROBE: CPT

## 2019-02-04 PROCEDURE — 94667 MNPJ CHEST WALL 1ST: CPT

## 2019-02-04 PROCEDURE — 96365 THER/PROPH/DIAG IV INF INIT: CPT | Mod: XU

## 2019-02-04 PROCEDURE — 73502 X-RAY EXAM HIP UNI 2-3 VIEWS: CPT

## 2019-02-04 PROCEDURE — 83605 ASSAY OF LACTIC ACID: CPT

## 2019-02-04 PROCEDURE — 99285 EMERGENCY DEPT VISIT HI MDM: CPT | Mod: 25

## 2019-02-04 PROCEDURE — 84484 ASSAY OF TROPONIN QUANT: CPT

## 2019-02-04 PROCEDURE — 97530 THERAPEUTIC ACTIVITIES: CPT

## 2019-02-04 PROCEDURE — 82803 BLOOD GASES ANY COMBINATION: CPT

## 2019-02-04 PROCEDURE — 86850 RBC ANTIBODY SCREEN: CPT

## 2019-02-04 PROCEDURE — 80048 BASIC METABOLIC PNL TOTAL CA: CPT

## 2019-02-04 PROCEDURE — 87040 BLOOD CULTURE FOR BACTERIA: CPT

## 2019-02-04 PROCEDURE — 86900 BLOOD TYPING SEROLOGIC ABO: CPT

## 2019-02-04 PROCEDURE — 87641 MR-STAPH DNA AMP PROBE: CPT

## 2019-02-04 PROCEDURE — 85610 PROTHROMBIN TIME: CPT

## 2019-02-04 PROCEDURE — 97116 GAIT TRAINING THERAPY: CPT

## 2019-02-04 PROCEDURE — 84145 PROCALCITONIN (PCT): CPT

## 2019-02-04 PROCEDURE — 85730 THROMBOPLASTIN TIME PARTIAL: CPT

## 2019-02-04 PROCEDURE — 99239 HOSP IP/OBS DSCHRG MGMT >30: CPT

## 2019-02-04 PROCEDURE — 86803 HEPATITIS C AB TEST: CPT

## 2019-02-04 PROCEDURE — 85027 COMPLETE CBC AUTOMATED: CPT

## 2019-02-04 PROCEDURE — 87640 STAPH A DNA AMP PROBE: CPT

## 2019-02-04 PROCEDURE — 81001 URINALYSIS AUTO W/SCOPE: CPT

## 2019-02-04 PROCEDURE — 83036 HEMOGLOBIN GLYCOSYLATED A1C: CPT

## 2019-02-04 PROCEDURE — 93005 ELECTROCARDIOGRAM TRACING: CPT

## 2019-02-04 PROCEDURE — 85652 RBC SED RATE AUTOMATED: CPT

## 2019-02-04 PROCEDURE — 86901 BLOOD TYPING SEROLOGIC RH(D): CPT

## 2019-02-04 PROCEDURE — 86140 C-REACTIVE PROTEIN: CPT

## 2019-02-04 PROCEDURE — 80053 COMPREHEN METABOLIC PANEL: CPT

## 2019-02-04 PROCEDURE — 97162 PT EVAL MOD COMPLEX 30 MIN: CPT

## 2019-02-04 PROCEDURE — 94640 AIRWAY INHALATION TREATMENT: CPT

## 2019-02-04 PROCEDURE — 83880 ASSAY OF NATRIURETIC PEPTIDE: CPT

## 2019-02-04 PROCEDURE — 71046 X-RAY EXAM CHEST 2 VIEWS: CPT

## 2019-02-04 PROCEDURE — 87186 SC STD MICRODIL/AGAR DIL: CPT

## 2019-02-04 PROCEDURE — 87631 RESP VIRUS 3-5 TARGETS: CPT

## 2019-02-04 PROCEDURE — 82550 ASSAY OF CK (CPK): CPT

## 2019-02-04 PROCEDURE — 87205 SMEAR GRAM STAIN: CPT

## 2019-02-04 PROCEDURE — 71275 CT ANGIOGRAPHY CHEST: CPT

## 2019-02-04 PROCEDURE — 87086 URINE CULTURE/COLONY COUNT: CPT

## 2019-02-04 PROCEDURE — 99232 SBSQ HOSP IP/OBS MODERATE 35: CPT

## 2019-02-04 RX ORDER — HYDROCORTISONE 20 MG
25 TABLET ORAL EVERY 8 HOURS
Qty: 0 | Refills: 0 | Status: DISCONTINUED | OUTPATIENT
Start: 2019-02-04 | End: 2019-02-04

## 2019-02-04 RX ORDER — CIPROFLOXACIN LACTATE 400MG/40ML
1 VIAL (ML) INTRAVENOUS
Qty: 7 | Refills: 0 | OUTPATIENT
Start: 2019-02-04 | End: 2019-02-06

## 2019-02-04 RX ORDER — CYCLOBENZAPRINE HYDROCHLORIDE 10 MG/1
1 TABLET, FILM COATED ORAL
Qty: 0 | Refills: 0 | COMMUNITY

## 2019-02-04 RX ADMIN — FLUDROCORTISONE ACETATE 0.1 MILLIGRAM(S): 0.1 TABLET ORAL at 05:41

## 2019-02-04 RX ADMIN — ENOXAPARIN SODIUM 40 MILLIGRAM(S): 100 INJECTION SUBCUTANEOUS at 05:41

## 2019-02-04 RX ADMIN — ALBUTEROL 2 PUFF(S): 90 AEROSOL, METERED ORAL at 13:31

## 2019-02-04 RX ADMIN — Medication 500 MILLIGRAM(S): at 05:41

## 2019-02-04 RX ADMIN — Medication 500 MILLIGRAM(S): at 11:34

## 2019-02-04 RX ADMIN — PANTOPRAZOLE SODIUM 40 MILLIGRAM(S): 20 TABLET, DELAYED RELEASE ORAL at 05:42

## 2019-02-04 RX ADMIN — METHADONE HYDROCHLORIDE 5 MILLIGRAM(S): 40 TABLET ORAL at 11:34

## 2019-02-04 RX ADMIN — Medication 500 MILLIGRAM(S): at 17:31

## 2019-02-04 RX ADMIN — SODIUM CHLORIDE 1 GRAM(S): 9 INJECTION INTRAMUSCULAR; INTRAVENOUS; SUBCUTANEOUS at 11:34

## 2019-02-04 RX ADMIN — FINASTERIDE 5 MILLIGRAM(S): 5 TABLET, FILM COATED ORAL at 11:34

## 2019-02-04 RX ADMIN — Medication 50 MILLIGRAM(S): at 05:41

## 2019-02-04 RX ADMIN — Medication 25 MILLIGRAM(S): at 17:29

## 2019-02-04 RX ADMIN — Medication 60 MILLIGRAM(S): at 11:34

## 2019-02-04 RX ADMIN — Medication 81 MILLIGRAM(S): at 11:34

## 2019-02-04 RX ADMIN — Medication 20 MILLIEQUIVALENT(S): at 11:34

## 2019-02-04 RX ADMIN — Medication 100 MILLIGRAM(S): at 11:34

## 2019-02-04 NOTE — PROGRESS NOTE ADULT - ASSESSMENT
74 yo M PMHx of CHF (stage I diastolic dysfunction, EF 65%) COPD, HTN, HLD, hx of MI, CABG 2008, pci, RA on steroids, hx of multiple L hip surgeries.  We have seen him several times over the past few months in anticipation and postop from several surgeries.   Of note, patient had hip replacement 30 years ago in [place] and an ORIF of periprosthetic fracture on 11/2018 w/ Dr. Ramirez which was complicated by superficial infection requiring ID evaluation and PICC line antibiotics. 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    Pt. states his surgical wound drain fell out 4 days pta, and under the advise of his plastic surgeon, Dr. Whipple, he kept it out. Patient developed worsening SOB over the past few days in addition to cough when he followed up with Dr. Richardson 2 days ago who removed fluid around the hip and recommended he sees Dr. Wills for his URI. Outpatient CXR demonstrated PNA which brought him to the ED.  He has been getting abx for presumed hcap, with CT evidence of fibrotic changes, but no definitive pna.  There has been concern about infected seroma, and reportedly orthopedics is considering removal of hardware.  No s/p I&D    - Patient has a known CAD s/p CABG and PCI.  He follows a cardiologist, Dr. John Mar [phone], in [place]  - To date, his CAD is stable  - Chest pain unlikely to be cardiac.  EKG negative.  CE's negative.  NO further chest pain today  - Continue ASA and statin  - HR has not allowed addition of beta blockers  - His BP had trended up on Florinef.  Can continue this dose.    - there is no evidence for meaningful  volume overload.  - normal ef by echo 11/2018, no need to repeat  - Monitor electrolytes, replete to keep K>4 and Mag>2  - DVT ppx  - Abx per ID  - Awaiting discharge.  To follow up with his private Cardio    Mikayla Gold NP  Cardiology

## 2019-02-04 NOTE — PROGRESS NOTE ADULT - ATTENDING COMMENTS
Chart reviewed    Patient seen and examined    Agree with plan as outlined above
Chart reviewed    Patient seen and examined    Agree with plan as outlined above
I personally saw and examined the patient in detail.  I have spoken to the above provider regarding the assessment and plan of care.  I reviewed the above assessment and plan of care, and agree.  I have made changes in the body of the note where appropriate.
I personally saw and examined the patient in detail.  I have spoken to the above provider regarding the assessment and plan of care.  I reviewed the above assessment and plan of care, and agree.  I have made changes in the body of the note where appropriate.
Seen/examined. agree with above.
The patient was personally seen and examined, in addition to being examined and evaluated by NP.  All elements of the note were edited where appropriate.
LIOR- suspected from hypotension/ renal Dr. Marie following    Suspected adrenal insuffiencey- stress steroids started, plan to taper slowly, start on IVF
D/W Dr. Grubbs  D/W patient  D/W wife/daughter prior to conversations with orhto/plastics    Fabian Childs MD  [phone]
D/W Dr. Grubbs.    I'm available for issues over the weekend, please call if ID input needed.    Fabian Childs MD  [phone]
D/W patient, wife, daughter. All Q's answered to the best of my ability.  D/W Shanti George.     Fabian Childs MD  [phone]
aspen tejada tomorrow once wound vac orders completed

## 2019-02-04 NOTE — PROGRESS NOTE ADULT - PROVIDER SPECIALTY LIST ADULT
Cardiology
Endocrinology
Hospitalist
Infectious Disease
Nephrology
Nephrology
Orthopedics
Plastic Surgery
Pulmonology
Infectious Disease
Cardiology
Pulmonology
Pulmonology
Hospitalist
Infectious Disease

## 2019-02-04 NOTE — PROGRESS NOTE ADULT - SUBJECTIVE AND OBJECTIVE BOX
infectious diseases progress note:    ERIKA TORREZ is a 73y y. o. Male patient    Patient with no concerning overnight issues    Allergies    Ceftin (Pruritus)    Intolerances        ANTIBIOTICS/RELEVANT:  antimicrobials  ciprofloxacin     Tablet 500 milliGRAM(s) Oral every 12 hours    immunologic:    OTHER:  acetaminophen   Tablet .. 650 milliGRAM(s) Oral every 6 hours PRN  ALBUTerol    90 MICROgram(s) HFA Inhaler 2 Puff(s) Inhalation every 6 hours  ALPRAZolam 2 milliGRAM(s) Oral at bedtime  ascorbic acid 500 milliGRAM(s) Oral daily  aspirin enteric coated 81 milliGRAM(s) Oral daily  atorvastatin 10 milliGRAM(s) Oral at bedtime  cyclobenzaprine 10 milliGRAM(s) Oral two times a day PRN  docusate sodium 100 milliGRAM(s) Oral daily  enoxaparin Injectable 40 milliGRAM(s) SubCutaneous every 24 hours  finasteride 5 milliGRAM(s) Oral daily  fludroCORTISONE 0.1 milliGRAM(s) Oral daily  FLUoxetine 60 milliGRAM(s) Oral daily  hydrocortisone sodium succinate Injectable 25 milliGRAM(s) IV Push every 8 hours  methadone    Tablet 5 milliGRAM(s) Oral every 24 hours  pantoprazole    Tablet 40 milliGRAM(s) Oral before breakfast  potassium chloride    Tablet ER 20 milliEquivalent(s) Oral daily  senna 2 Tablet(s) Oral at bedtime  sodium chloride 1 Gram(s) Oral daily  sodium chloride 0.9%. 1000 milliLiter(s) IV Continuous <Continuous>  tamsulosin 0.4 milliGRAM(s) Oral at bedtime      Objective:  Vital Signs Last 24 Hrs  T(C): 36.7 (04 Feb 2019 05:00), Max: 36.7 (03 Feb 2019 13:14)  T(F): 98.1 (04 Feb 2019 05:00), Max: 98.1 (03 Feb 2019 21:29)  HR: 56 (04 Feb 2019 05:00) (56 - 76)  BP: 143/76 (04 Feb 2019 05:00) (133/71 - 143/76)  BP(mean): --  RR: 17 (04 Feb 2019 05:00) (16 - 18)  SpO2: 96% (04 Feb 2019 05:00) (94% - 96%)    T(C): 36.7 (02-04-19 @ 05:00), Max: 36.7 (02-03-19 @ 13:14)  T(C): 36.7 (02-04-19 @ 05:00), Max: 36.8 (02-01-19 @ 12:57)  T(C): 36.7 (02-04-19 @ 05:00), Max: 36.8 (02-01-19 @ 12:57)    PHYSICAL EXAM:  Constitutional: Well-developed, well nourished  Eyes: PERRLA, EOMI  Ear/Nose/Throat: oropharynx normal	  Neck: no JVD, no lymphadenopathy, supple  Respiratory: no accessory muscle use  Cardiovascular: RRR,   Gastrointestinal: soft, NT  Extremities: no clubbing, no cyanosis, edema absent      LABS:                        8.2    6.27  )-----------( 286      ( 04 Feb 2019 08:06 )             26.5       6.27 02-04 @ 08:06  6.90 02-03 @ 10:13  5.79 02-02 @ 08:30  10.16 02-01 @ 09:01  8.98 01-30 @ 07:10  12.73 01-29 @ 08:15      02-04    138  |  104  |  17  ----------------------------<  135<H>  3.9   |  27  |  0.84    Ca    8.3<L>      04 Feb 2019 08:06        Creatinine, Serum: 0.84 mg/dL (02-04-19 @ 08:06)  Creatinine, Serum: 0.93 mg/dL (02-03-19 @ 10:13)  Creatinine, Serum: 0.88 mg/dL (02-02-19 @ 08:30)  Creatinine, Serum: 1.20 mg/dL (02-01-19 @ 09:01)  Creatinine, Serum: 0.61 mg/dL (01-30-19 @ 07:10)                MICROBIOLOGY:              RADIOLOGY & ADDITIONAL STUDIES:

## 2019-02-04 NOTE — PROGRESS NOTE ADULT - PROBLEM SELECTOR PLAN 1
bacteremia  on ABX  ID following  wound care, skin care  copd - proventil, room air, supp o2 support - as needed - keep sat > 88 pct  I aundrea  on pain regimen - methadone, bowel regimen  PT as tolerated  cvs regimen, BP control, on IVF - monitor for volume overload  DC planning  will follow  PRO - does not tolerate CPAP

## 2019-02-04 NOTE — PROGRESS NOTE ADULT - SUBJECTIVE AND OBJECTIVE BOX
Patient is a 73y old  Male who presents with a chief complaint of HCAP and Hip infection (04 Feb 2019 10:06)      Patient seen in follow up for LIOR. Pt resting in bed. No new complaints.     PAST MEDICAL HISTORY:  S/P CABG x 3  S/P hip replacement, left  Rheumatoid arthritis  Osteoarthritis  COPD (chronic obstructive pulmonary disease)  HTN (hypertension)    MEDICATIONS  (STANDING):  ALBUTerol    90 MICROgram(s) HFA Inhaler 2 Puff(s) Inhalation every 6 hours  ALPRAZolam 2 milliGRAM(s) Oral at bedtime  ascorbic acid 500 milliGRAM(s) Oral daily  aspirin enteric coated 81 milliGRAM(s) Oral daily  atorvastatin 10 milliGRAM(s) Oral at bedtime  ciprofloxacin     Tablet 500 milliGRAM(s) Oral every 12 hours  docusate sodium 100 milliGRAM(s) Oral daily  enoxaparin Injectable 40 milliGRAM(s) SubCutaneous every 24 hours  finasteride 5 milliGRAM(s) Oral daily  fludroCORTISONE 0.1 milliGRAM(s) Oral daily  FLUoxetine 60 milliGRAM(s) Oral daily  hydrocortisone sodium succinate Injectable 25 milliGRAM(s) IV Push every 8 hours  methadone    Tablet 5 milliGRAM(s) Oral every 24 hours  pantoprazole    Tablet 40 milliGRAM(s) Oral before breakfast  potassium chloride    Tablet ER 20 milliEquivalent(s) Oral daily  senna 2 Tablet(s) Oral at bedtime  sodium chloride 1 Gram(s) Oral daily  sodium chloride 0.9%. 1000 milliLiter(s) (70 mL/Hr) IV Continuous <Continuous>  tamsulosin 0.4 milliGRAM(s) Oral at bedtime    MEDICATIONS  (PRN):  acetaminophen   Tablet .. 650 milliGRAM(s) Oral every 6 hours PRN Mild Pain (1 - 3)  cyclobenzaprine 10 milliGRAM(s) Oral two times a day PRN Muscle Spasm    T(C): 36.7 (02-04-19 @ 05:00), Max: 36.7 (02-03-19 @ 13:14)  HR: 56 (02-04-19 @ 05:00) (56 - 76)  BP: 143/76 (02-04-19 @ 05:00) (133/71 - 143/76)  RR: 17 (02-04-19 @ 05:00) (16 - 18)  SpO2: 96% (02-04-19 @ 05:00) (93% - 96%)  Wt(kg): --  I&O's Detail    03 Feb 2019 07:01  -  04 Feb 2019 07:00  --------------------------------------------------------  IN:    Oral Fluid: 600 mL    sodium chloride 0.9%.: 1610 mL  Total IN: 2210 mL    OUT:    Voided: 560 mL  Total OUT: 560 mL    Total NET: 1650 mL          PHYSICAL EXAM:  General: NAD  Respiratory: b/l air entry  Cardiovascular: S1 S2  Gastrointestinal: soft  Extremities:  no edema                          8.2    6.27  )-----------( 286      ( 04 Feb 2019 08:06 )             26.5     02-04    138  |  104  |  17  ----------------------------<  135<H>  3.9   |  27  |  0.84    Ca    8.3<L>      04 Feb 2019 08:06        Sodium, Serum: 138 (02-04 @ 08:06)  Sodium, Serum: 134 (02-03 @ 10:13)  Sodium, Serum: 134 (02-02 @ 08:30)  Sodium, Serum: 135 (02-01 @ 09:01)    Creatinine, Serum: 0.84 (02-04 @ 08:06)  Creatinine, Serum: 0.93 (02-03 @ 10:13)  Creatinine, Serum: 0.88 (02-02 @ 08:30)  Creatinine, Serum: 1.20 (02-01 @ 09:01)    Potassium, Serum: 3.9 (02-04 @ 08:06)  Potassium, Serum: 4.0 (02-03 @ 10:13)  Potassium, Serum: 4.4 (02-02 @ 08:30)  Potassium, Serum: 4.4 (02-01 @ 09:01)    Hemoglobin: 8.2 (02-04 @ 08:06)  Hemoglobin: 8.2 (02-03 @ 10:13)  Hemoglobin: 8.8 (02-02 @ 08:30)  Hemoglobin: 9.6 (02-01 @ 09:01)

## 2019-02-04 NOTE — PROGRESS NOTE ADULT - SUBJECTIVE AND OBJECTIVE BOX
Patient is a 73y old  Male who presents with a chief complaint of HCAP and Hip infection (04 Feb 2019 06:28)        INTERVAL HPI/OVERNIGHT EVENTS:    MEDICATIONS  (STANDING):  ALBUTerol    90 MICROgram(s) HFA Inhaler 2 Puff(s) Inhalation every 6 hours  ALPRAZolam 2 milliGRAM(s) Oral at bedtime  ascorbic acid 500 milliGRAM(s) Oral daily  aspirin enteric coated 81 milliGRAM(s) Oral daily  atorvastatin 10 milliGRAM(s) Oral at bedtime  ciprofloxacin     Tablet 500 milliGRAM(s) Oral every 12 hours  docusate sodium 100 milliGRAM(s) Oral daily  enoxaparin Injectable 40 milliGRAM(s) SubCutaneous every 24 hours  finasteride 5 milliGRAM(s) Oral daily  fludroCORTISONE 0.1 milliGRAM(s) Oral daily  FLUoxetine 60 milliGRAM(s) Oral daily  hydrocortisone sodium succinate Injectable 50 milliGRAM(s) IV Push every 8 hours  methadone    Tablet 5 milliGRAM(s) Oral every 24 hours  pantoprazole    Tablet 40 milliGRAM(s) Oral before breakfast  potassium chloride    Tablet ER 20 milliEquivalent(s) Oral daily  senna 2 Tablet(s) Oral at bedtime  sodium chloride 1 Gram(s) Oral daily  sodium chloride 0.9%. 1000 milliLiter(s) (70 mL/Hr) IV Continuous <Continuous>  tamsulosin 0.4 milliGRAM(s) Oral at bedtime    MEDICATIONS  (PRN):  acetaminophen   Tablet .. 650 milliGRAM(s) Oral every 6 hours PRN Mild Pain (1 - 3)  cyclobenzaprine 10 milliGRAM(s) Oral two times a day PRN Muscle Spasm      Allergies    Ceftin (Pruritus)    Intolerances          Vital Signs Last 24 Hrs  T(C): 36.7 (04 Feb 2019 05:00), Max: 36.7 (03 Feb 2019 13:14)  T(F): 98.1 (04 Feb 2019 05:00), Max: 98.1 (03 Feb 2019 21:29)  HR: 56 (04 Feb 2019 05:00) (56 - 76)  BP: 143/76 (04 Feb 2019 05:00) (133/71 - 143/76)  BP(mean): --  RR: 17 (04 Feb 2019 05:00) (16 - 18)  SpO2: 96% (04 Feb 2019 05:00) (94% - 96%)    General: WN/WD NAD  Respiratory: CTA B/L  CV: RRR, S1S2, no murmurs, rubs or gallops  Abdominal: Soft, NT, ND +BS, Last BM  Extremities: No edema, + peripheral pulses    LABS:                        8.2    6.90  )-----------( 288      ( 03 Feb 2019 10:13 )             26.9     02-03    134<L>  |  100  |  22  ----------------------------<  200<H>  4.0   |  25  |  0.93    Ca    8.5      03 Feb 2019 10:13      CAPILLARY BLOOD GLUCOSE              RADIOLOGY & ADDITIONAL TESTS:

## 2019-02-04 NOTE — PROGRESS NOTE ADULT - SUBJECTIVE AND OBJECTIVE BOX
Date/Time Patient Seen:  		  Referring MD:   Data Reviewed	       Patient is a 73y old  Male who presents with a chief complaint of HCAP and Hip infection (03 Feb 2019 16:24)      Subjective/HPI     PAST MEDICAL & SURGICAL HISTORY:  S/P CABG x 3  S/P hip replacement, left  Rheumatoid arthritis  Osteoarthritis  COPD (chronic obstructive pulmonary disease)  HTN (hypertension)  S/P lumbar fusion: Approx 2012  History of femur fracture: Repaired 11/19- L hip  History of appendectomy  History of right shoulder replacement  History of total left hip arthroplasty        Medication list         MEDICATIONS  (STANDING):  ALBUTerol    90 MICROgram(s) HFA Inhaler 2 Puff(s) Inhalation every 6 hours  ALPRAZolam 2 milliGRAM(s) Oral at bedtime  ascorbic acid 500 milliGRAM(s) Oral daily  aspirin enteric coated 81 milliGRAM(s) Oral daily  atorvastatin 10 milliGRAM(s) Oral at bedtime  ciprofloxacin     Tablet 500 milliGRAM(s) Oral every 12 hours  docusate sodium 100 milliGRAM(s) Oral daily  enoxaparin Injectable 40 milliGRAM(s) SubCutaneous every 24 hours  finasteride 5 milliGRAM(s) Oral daily  fludroCORTISONE 0.1 milliGRAM(s) Oral daily  FLUoxetine 60 milliGRAM(s) Oral daily  hydrocortisone sodium succinate Injectable 50 milliGRAM(s) IV Push every 8 hours  methadone    Tablet 5 milliGRAM(s) Oral every 24 hours  pantoprazole    Tablet 40 milliGRAM(s) Oral before breakfast  potassium chloride    Tablet ER 20 milliEquivalent(s) Oral daily  senna 2 Tablet(s) Oral at bedtime  sodium chloride 1 Gram(s) Oral daily  sodium chloride 0.9%. 1000 milliLiter(s) (70 mL/Hr) IV Continuous <Continuous>  tamsulosin 0.4 milliGRAM(s) Oral at bedtime    MEDICATIONS  (PRN):  acetaminophen   Tablet .. 650 milliGRAM(s) Oral every 6 hours PRN Mild Pain (1 - 3)  cyclobenzaprine 10 milliGRAM(s) Oral two times a day PRN Muscle Spasm         Vitals log        ICU Vital Signs Last 24 Hrs  T(C): 36.7 (04 Feb 2019 05:00), Max: 36.7 (03 Feb 2019 13:14)  T(F): 98.1 (04 Feb 2019 05:00), Max: 98.1 (03 Feb 2019 21:29)  HR: 56 (04 Feb 2019 05:00) (56 - 76)  BP: 143/76 (04 Feb 2019 05:00) (133/71 - 143/76)  BP(mean): --  ABP: --  ABP(mean): --  RR: 17 (04 Feb 2019 05:00) (16 - 18)  SpO2: 96% (04 Feb 2019 05:00) (94% - 96%)           Input and Output:  I&O's Detail    02 Feb 2019 07:01  -  03 Feb 2019 07:00  --------------------------------------------------------  IN:    Oral Fluid: 480 mL    sodium chloride 0.9%.: 1540 mL  Total IN: 2020 mL    OUT:  Total OUT: 0 mL    Total NET: 2020 mL      03 Feb 2019 07:01  -  04 Feb 2019 06:28  --------------------------------------------------------  IN:    Oral Fluid: 600 mL    sodium chloride 0.9%.: 1260 mL  Total IN: 1860 mL    OUT:    Voided: 560 mL  Total OUT: 560 mL    Total NET: 1300 mL          Lab Data                        8.2    6.90  )-----------( 288      ( 03 Feb 2019 10:13 )             26.9     02-03    134<L>  |  100  |  22  ----------------------------<  200<H>  4.0   |  25  |  0.93    Ca    8.5      03 Feb 2019 10:13              Review of Systems	      Objective     Physical Examination    heart s1s2  lung dec BS      Pertinent Lab findings & Imaging      Corinna:  NO   Adequate UO     I&O's Detail    02 Feb 2019 07:01  -  03 Feb 2019 07:00  --------------------------------------------------------  IN:    Oral Fluid: 480 mL    sodium chloride 0.9%.: 1540 mL  Total IN: 2020 mL    OUT:  Total OUT: 0 mL    Total NET: 2020 mL      03 Feb 2019 07:01  -  04 Feb 2019 06:28  --------------------------------------------------------  IN:    Oral Fluid: 600 mL    sodium chloride 0.9%.: 1260 mL  Total IN: 1860 mL    OUT:    Voided: 560 mL  Total OUT: 560 mL    Total NET: 1300 mL               Discussed with:     Cultures:	        Radiology

## 2019-02-04 NOTE — PROVIDER CONTACT NOTE (OTHER) - RECOMMENDATIONS
ok to reschedule methadone to morning
per Dr Aldana"pt is to resume his lisinopril 10mg daily starting 2/5/2019" same to be written on d/c instructions per Dr Aldana

## 2019-02-04 NOTE — PROGRESS NOTE ADULT - REASON FOR ADMISSION
HCAP and Hip infection

## 2019-02-04 NOTE — PROGRESS NOTE ADULT - SUBJECTIVE AND OBJECTIVE BOX
[facility] -- Shannon Valdez, [place], Anusha, Sidney Orlando Savella  Office # [phone]    Follow Up:  CAD, Preop    Subjective/Observations: Sitting on the chair, comfortable on RA.  No respiratory or cardiac discomfort    REVIEW OF SYSTEMS: All other review of systems is negative unless indicated above    PAST MEDICAL & SURGICAL HISTORY:  S/P CABG x 3  S/P hip replacement, left  Rheumatoid arthritis  Osteoarthritis  COPD (chronic obstructive pulmonary disease)  HTN (hypertension)  S/P lumbar fusion: Approx 2012  History of femur fracture: Repaired 11/19- L hip  History of appendectomy  History of right shoulder replacement  History of total left hip arthroplasty    MEDICATIONS  (STANDING):  ALBUTerol    90 MICROgram(s) HFA Inhaler 2 Puff(s) Inhalation every 6 hours  ALPRAZolam 2 milliGRAM(s) Oral at bedtime  ascorbic acid 500 milliGRAM(s) Oral daily  aspirin enteric coated 81 milliGRAM(s) Oral daily  atorvastatin 10 milliGRAM(s) Oral at bedtime  ciprofloxacin     Tablet 500 milliGRAM(s) Oral every 12 hours  docusate sodium 100 milliGRAM(s) Oral daily  enoxaparin Injectable 40 milliGRAM(s) SubCutaneous every 24 hours  finasteride 5 milliGRAM(s) Oral daily  fludroCORTISONE 0.1 milliGRAM(s) Oral daily  FLUoxetine 60 milliGRAM(s) Oral daily  hydrocortisone sodium succinate Injectable 25 milliGRAM(s) IV Push every 8 hours  methadone    Tablet 5 milliGRAM(s) Oral every 24 hours  pantoprazole    Tablet 40 milliGRAM(s) Oral before breakfast  potassium chloride    Tablet ER 20 milliEquivalent(s) Oral daily  senna 2 Tablet(s) Oral at bedtime  sodium chloride 1 Gram(s) Oral daily  sodium chloride 0.9%. 1000 milliLiter(s) (70 mL/Hr) IV Continuous <Continuous>  tamsulosin 0.4 milliGRAM(s) Oral at bedtime    MEDICATIONS  (PRN):  acetaminophen   Tablet .. 650 milliGRAM(s) Oral every 6 hours PRN Mild Pain (1 - 3)  cyclobenzaprine 10 milliGRAM(s) Oral two times a day PRN Muscle Spasm    Allergies    Ceftin (Pruritus)    Intolerances    Vital Signs Last 24 Hrs  T(C): 36.3 (04 Feb 2019 12:31), Max: 36.7 (03 Feb 2019 21:29)  T(F): 97.3 (04 Feb 2019 12:31), Max: 98.1 (03 Feb 2019 21:29)  HR: 67 (04 Feb 2019 12:31) (56 - 76)  BP: 160/81 (04 Feb 2019 12:31) (137/62 - 160/81)  BP(mean): --  RR: 17 (04 Feb 2019 12:31) (16 - 17)  SpO2: 96% (04 Feb 2019 12:31) (95% - 96%)    I&O's Summary    03 Feb 2019 07:01  -  04 Feb 2019 07:00  --------------------------------------------------------  IN: 2210 mL / OUT: 560 mL / NET: 1650 mL    04 Feb 2019 07:01  -  04 Feb 2019 15:46  --------------------------------------------------------  IN: 420 mL / OUT: 400 mL / NET: 20 mL    PHYSICAL EXAM:  TELE: Not on tele  Constitutional: NAD, awake and alert, obese  HEENT: Moist Mucous Membranes, Anicteric  Pulmonary: Non-labored, breath sounds are clear bilaterally, No wheezing, rales or rhonchi  Cardiovascular: Regular, S1 and S2, No murmurs, rubs, gallops or clicks  Gastrointestinal: Bowel Sounds present, soft, nontender.   Lymph: No peripheral edema. No lymphadenopathy.  Skin: No visible rashes.  Left hip ulcer with dressing dry and intact  Psych:  Mood & affect appropriate    LABS: All Labs Reviewed:                        8.2    6.27  )-----------( 286      ( 04 Feb 2019 08:06 )             26.5                         8.2    6.90  )-----------( 288      ( 03 Feb 2019 10:13 )             26.9                         8.8    5.79  )-----------( 306      ( 02 Feb 2019 08:30 )             27.8     04 Feb 2019 08:06    138    |  104    |  17     ----------------------------<  135    3.9     |  27     |  0.84   03 Feb 2019 10:13    134    |  100    |  22     ----------------------------<  200    4.0     |  25     |  0.93   02 Feb 2019 08:30    134    |  98     |  18     ----------------------------<  142    4.4     |  28     |  0.88     Ca    8.3        04 Feb 2019 08:06  Ca    8.5        03 Feb 2019 10:13  Ca    8.6        02 Feb 2019 08:30    < from: TTE Echo Doppler w/o Cont (11.19.18 @ 11:09) >     EXAM:  ECHO TTE WO CON COMP W DOPPLR      PROCEDURE DATE:  11/19/2018      INTERPRETATION:  INDICATION: CAD  Referring M.D.:Paolo  Blood Pressure 114/70        Weight (kg) :81     Height (cm):170       BSA (sq m): 1.98  Technician: AS    Dimensions:    LA 3.5       Normal Values: 2.0 - 4.0 cm    Ao 3.94        Normal Values: 2.0 - 3.8 cm  SEPTUM 1.1       Normal Values: 0.6 - 1.2 cm  PWT 1.1       Normal Values: 0.6 - 1.1 cm  LVIDd 5.1         Normal Values: 3.0 - 5.6 cm  LVIDs 4.05 Normal Values: 1.8 - 4.0 cm    OBSERVATIONS:  Technically difficult study  Mitral Valve: MAC with calcified MV leaflets. Mild mitral regurgitation.  Aortic Valve/Aorta: Mildly calcified trileaflet AV with normal opening.   Mildly dilated aortic root at 3.9  cm  Tricuspid Valve: Normal tricuspid valve. Trace tricuspid regurgitation.  Pulmonic Valve: The pulmonic valve is not well visualized. Probably   normal.  Left Atrium: Mildly enlarged   Right Atrium: Mildly enlarged  Left Ventricle: Endocardium is not well-visualized. Overall there appears   to be normal left ventricular systolic function. The EF is approximately   65%.  Right Ventricle: The right ventricle is not well-visualized. It appears   to be mildly enlarged in some views with normal systolic function.    Pericardium/Pleura: Trace pericardial effusion noted.  Pulmonary/RV Pressure: Insufficient tricuspid regurgitation Doppler in   order to estimate the right ventricular systolic pressure  LV Diastolic Function: Stage I diastolic dysfunction     Conclusion: Overall preserved left ventricular systolic function. EF 65.   Insufficient tricuspid regurgitation Doppler in order to estimate the   right ventricular systolic pressure      CARMEN BAE M.D., ATTENDING CARDIOLOGIST  This document has been electronically signed. Nov 20 2018  3:56PM      < end of copied text >

## 2019-02-04 NOTE — PROGRESS NOTE ADULT - PROBLEM SELECTOR PLAN 2
VAC as per plastics    Thank you for consulting us and involving us in the management of this most interesting and challenging case.     Please Call with any further questions

## 2019-02-04 NOTE — PROGRESS NOTE ADULT - PROBLEM SELECTOR PROBLEM 1
Bacteremia due to Escherichia coli
Pneumonia of left lung due to infectious organism, unspecified part of lung
Adrenal insufficiency
Bacteremia due to Escherichia coli
COPD (chronic obstructive pulmonary disease)
Pneumonia of left lung due to infectious organism, unspecified part of lung
Pneumonia of left lung due to infectious organism, unspecified part of lung
Bacteremia due to Escherichia coli
Cellulitis of hip, left
Bacteremia due to Escherichia coli
Cellulitis of hip, left

## 2019-02-04 NOTE — PROGRESS NOTE ADULT - ASSESSMENT
73 white male with a history of HTN, CAD, CHF, CABG, RA now admitted with PNA and possible sepsis. Renal indices improving.  To continue current meds. Will follow electrolytes and renal function trend. 73 white male with a history of HTN, CAD, CHF, CABG, RA now admitted with PNA and E. Coli bacteremia.     1.	Prerenal azotemia  2.	E. Coli bacteremia  3.	Hypertension  4.	Anemia     Renal indices improving. To continue current meds. IV abx, ID follow up. Avoid nephrotoxins as possible.   Monitor BP trend. Titrate BP meds as needed. Salt restriction. Monitor h/h trend. Check iron studies if not done recently. Transfuse PRBC's PRN.   Will follow electrolytes and renal function trend.

## 2019-02-04 NOTE — PROVIDER CONTACT NOTE (OTHER) - ACTION/TREATMENT ORDERED:
per Dr Aldana"pt is to resume his lisinopril 10mg daily starting 2/5/2019",same noted on d/c instructions, pt,his spouse & dtr all aware & agreeable with plan. Update given to Laura DON.

## 2019-08-10 NOTE — PROGRESS NOTE ADULT - PROBLEM SELECTOR PLAN 5
chronic   stable  continue methylprednisolone  Based on his current dose of methylprednisolone, he would not require periop stress dose steroids, but given his Cushingoid appearance he is likely HPA axis suppressed. Will give his regular dose of Methylprednisolone tomorrow morning, Hydrocortisone 50 mg IV immediately pre-op, and then Hydrocortisone 25 mg IV q8 hours for first 24 hours post-op, and then resume his home dose.
chronic   stable  continue methylprednisolone  Based on his current dose of methylprednisolone, he would not require periop stress dose steroids, but given his Cushingoid appearance he is likely HPA axis suppressed. Will give his regular dose of Methylprednisolone tomorrow morning, Hydrocortisone 50 mg IV immediately pre-op, and then Hydrocortisone 25 mg IV q8 hours for first 24 hours post-op, and then resume his home dose.
Medications/EKG/Labs

## 2019-12-11 NOTE — ED ADULT TRIAGE NOTE - SPO2 (%)
Name: Mert YOB: 1994   MR: [id number]      Group Topic:  BH Group Psychotherapy  Group Date:  12/10/2019  Start Time:   5:30 PM  End Time:   6:30 PM  Facilitators:  Luli Mckeon LPC   Department::  [facility] Dennis LAMBERT: Patient attended 1 hour of the LWA DUAL DIAGNOSIS group today. Topics discussed today were: Recovery Thinking and The Language of Trauma and Addiction. One new group member was introduced, and all members shared their recovery history.    The group supported one member throughout the session as she responded to a series of texts from her ex-boyfriend who was threatening to commit suicide. The member called police for a wellness check, but was not able to convince him to reveal his location. At the end of the session, the member was encouraged to “let it go” by another member. The member decided to go to an NA meeting after this session, to get additional support.     A: Patient participated in group discussion regarding substance abuse, behavioral health issues, and the need to treat both problems.   Patient shared that he accepted an offer to use tobacco stuff, and that it triggered memories of his previous abuse (snorting) of cocaine.    Level of Participation: active  Quality of Participation: attentive, engaged, motivated and offered feedback  Interactions with others: appropriate and gave feedback  Mood/Affect: appropriate and positive  Triggers (if applicable):   Cognition: coherent/clear, insightful and logical  Progress: Moderate  Response:  Patient is cooperative, providing feedback and insight relating to the top today.  He was also supportive of the other group member who was dealing with being contacted by somebody in crisis.    Plan: Continue with Group    Patient Active Problem List   Diagnosis   • Allergic rhinitis, cause unspecified   • Extrinsic asthma, unspecified   • Myopia   • Intrinsic asthma, unspecified   • Chronic rhinitis   •  Concussion, unspecified   • ADHD (attention deficit hyperactivity disorder), combined type   • Other mixed anxiety disorders   • Moderate episode of recurrent major depressive disorder (CMS/HCC)        Visit Diagnoses:  F19.21 Polysubstance dependence in early, early partial, sustained full, or sustained partial remission (CMS/HCC)  (primary encounter diagnosis)  F10.21 Alcohol dependence in remission (CMS/[facility])   93

## 2020-01-01 NOTE — ED PROVIDER NOTE - MUSCULOSKELETAL MINIMAL EXAM
[facility]      DATE OF SERVICE: 2020    PATIENT NAME: Lukas Lawrence  MOM NAME: Cameron Lawrence  YOB: 2018  MRN: [id number]  ATTENDING PHYSICIAN: Joao Aranda MD      CHIEF COMPLAINT:  Follow-up normal .    SUBJECTIVE:  No concerns from nursing at this time, monitored in nursery overnight, and transitioned well.    PHYSICAL EXAM:   General:  No acute distress, In open crib.    Eye:  Pupils are equal, round and reactive to light, Normal conjunctiva.    HENT:  Normocephalic, AFSOF. Normal external ears. Patent nares. Intact palate, no oral lesions.    Neck:  Supple, No lymphadenopathy, Full range of motion, Clavicles intact.    Respiratory:  Lungs are clear to auscultation, Respirations are non-labored.    Cardiovascular:  Normal rate, murmur+, Normal peripheral perfusion, Bilateral femoral pulses present and symmetric.    Gastrointestinal:  Soft, Non-tender, Non-distended, Normal bowel sounds, No organomegaly, three vessel umbilical cord normal, Patent anus.    Genitourinary:  Normal external genitalia for age and sex, testes descended bilaterally  Musculoskeletal: Moves all 4 extremities spontaneously. No swelling or deformities. No hip clicks, negative Olivares/Ortolani. No sacral dimples.     Integumentary:  Intact, No pallor, No rash.    Neurologic:  Alert, No focal deficits, Plantar grasp present, palmar grasp present, Babinski up-going, [place] present.    LABS:  No results found for: ABR, DATANTIIGG, ACPH, BILIRUBIN, WBC, NEUT, BAND, HGB, HCT, PLT, CULT     MEDICATIONS & VACCINES ADMINISTERED  Administrations This Visit     erythromycin ophthalmic ointment     Admin Date  2020 Action  Given Dose   Route  Both Eyes Administered By  Latha Hooker, ANTONIETA          phytonadione (vitamin K)  injection 1 mg     Admin Date  2020 Action  Given Dose  1 mg Route  Intramuscular Administered By  Latha Hooker RN                 ASSESSMENT:  [place]  male.  Full term. Gestational Age: 39w6d.  urgent C/S for NRFHT, requiring crash .  AGA. APGARS 7/8   Birth weight 6 lb 15.5 oz (3160 g).  Negative maternal serology including GBS negative.    TTN - has transitioned well, on room air, now saturating near 100%    Heart Murmur     PLAN:  Continue w/routine  care as per primary MD Dr. George  Recommend monitoring of murmur, defer need for Echo to primary MD, discussed with nurse  Serum Bilirubin at 24 HOL  State  screen and serum bilirubin to be collected at 24 hrs of life.  Hearing screen, pulse ox screen (CCHD screen) prior to discharge.    Joao Aranda MD  [facility] Hospitalist  2020 7:46 AM      feet cold b/l w/ decreased sensation but palpable pulses and no decreased ROM/normal range of motion

## 2020-01-01 NOTE — ED PROVIDER NOTE - CADM POA CENTRAL LINE
Circumcision done by Dr. Mey Dodson with a Mogen clamp. Lidocaine and sweet-ease used per protocol. A&D applied. Baby tolerated procedure well. No

## 2020-02-12 NOTE — ED PROVIDER NOTE - CROS ED HEME ALL NEG
Referred by: Bishnu Boggs DO; Medical Diagnosis (from order):    Diagnosis Information      Diagnosis    729.5 (ICD-9-CM) - M79.641, M79.642 (ICD-10-CM) - Pain in both hands    719.43 (ICD-9-CM) - M25.531, M25.532 (ICD-10-CM) - Pain in both wrists    715.14 (ICD-9-CM) - M18.0 (ICD-10-CM) - Primary osteoarthritis of both first carpometacarpal joints                Occupational Therapy -  Daily Treatment Note    Visit:  8     SUBJECTIVE                                                                                                             Patient reports little change today.         OBJECTIVE                                                                                                                                      TREATMENT                                                                                                                  Therapeutic Exercise:  Bilateral unless otherwise noted:  Passive thumb metacarpalphalangeal(MP) joint/Interphalangeal(IP) joint flexion/extension (isolated and combined)  Passive thumb radial abduction/palmar abduction  Passive wrist flexion/extension/radial deviation/ulnar deviation  Active thumb radial abduction/palmar abduction x 20  Active thumb interphalangeal(IP) joint flexion/extension with blocking x20  Active wrist flexion/extension/radial deviation/ulnar deviation 1/2# (right), 1/4# (left) x 25  Imaginary ball inflating/deflating x 20  Chinese balls x 3 minutes (right, 2 minutes (left)  Rolling pink putty 2x (left only, deferred on right due to fatigue from physical therapy)  Orange wrist maze x 4 (right), x 2.5 (left)  Mallet 0# x20 on left, 1# on right  Cup/washers - 3 at a time x2 on right, 1 at a time on left x2  Manual Therapy:  Bilateral unless otherwise noted:  Soft tissue mobilization to thumb, thenar eminence, thumb carpometacarpal (CMC) joint, and all aspects of wrist  Instrument assisted soft tissue mobilization along thumb and thenar  eminence  Cryotherapy (72707): Cold Pack  Location: to bilateral hands (end of session)  Duration: 8 minutes  Paraffin (19752)    Location: to bilateral hands (beginning of session)  Duration: 10 minutes    Home Exercise Program: (*above indicates provided as part of home exercise program)  See attached 1/3/2020  Orange putty rolling 2x, 2-3 times per day      ASSESSMENT                                                                                                             Deferred pink putty rolling on right today due to fatigue from physical therapy. Patient able to tolerate upgraded exercises without complaints of increased pain. Functionally, patient able to tolerate chinese balls x 3 minutes on right, facilitating increased ease with manipulation of items in right hand.     PLAN                                                                                                                             Suggestions for next session as indicated: Progress per plan of care         Procedures and total treatment time documented Time Entry flowsheet.        Electronically Signed by:    HEBERT Griggs 2/12/2020   negative...

## 2020-08-26 NOTE — PROVIDER CONTACT NOTE (CRITICAL VALUE NOTIFICATION) - TEST AND RESULT REPORTED:
Authorization received via patients [facility] portal.   Still seem to be missing part of the fmla form.     AUTHORIZATION SCANNED TO THIS ENCOUNTER   culture Jan 1st left hip incision site few coag neg staph few intro coccyox faccium vanco resistant

## 2020-10-22 NOTE — PROGRESS NOTE ADULT - PROBLEM SELECTOR PROBLEM 3
Writer advised patient PCP agreeable to sending in treatment at this time, Patient verbalizes understanding and has no further questions.        COPD (chronic obstructive pulmonary disease)

## 2021-03-11 NOTE — PHYSICAL THERAPY INITIAL EVALUATION ADULT - STANDING BALANCE: STATIC
----- Message from Landon Gutierrez MD sent at 3/11/2021  9:58 AM CST -----  LICHA MINOR.    PROSZE PIC DUZO WODY CODZIURVASHI.    JEAN MARIE MADRIDYMELODIE ZASHUYCH LEKOW PRZECIWBOLOWYCH OPROCZ TYLENOL = ACETAMINOPHEN.    TESS DENISG RENARD MINOR. SH[facility]LA [facility] [address] [facility].    DR. GUTIERREZ    Please mail letter with results and above comments to the patient  RESULTS D/W PATIENT OVER THE PHONE  PLEASE ATTACH TO RESULTS LETTER  US ABDOMEN REFERRAL TO PATIENT WITH HIGHLIGHTED PHONE AND ADDRESS TO MAKE APPT    Mailed results to patient with MD's result note,mailed order for ultrasound with info for [place] location.  
/c rolling walker/poor plus

## 2021-04-16 NOTE — PATIENT PROFILE ADULT - NSPROEDAREADYLEARN_GEN_A_NUR
Detail Level: Zone
none
Photo Preface (Leave Blank If You Do Not Want): Photographs were obtained today

## 2021-08-27 NOTE — ED ADULT NURSE NOTE - TOBACCO USE
Form completed and sent to Physician's Office electronically via Nurse Pool for review and signature.     Completed form will be faxed to [phone].     Former smoker

## 2022-01-07 NOTE — ED ADULT TRIAGE NOTE - BSA (M2)
Staff message re-sent to [employer] since appeal was denied for the following reason:    [employer] has denied the appeal request for Repatha [employer]. They do not see where the patient has tried and failed more than one statin, Zetia or a bile acid sequestrant.      OSP is waiting to see if MD wants us to work on helping her get Repatha or if he wants to start a new medication.     1.95

## 2022-01-12 NOTE — H&P ADULT - PROBLEM SELECTOR PLAN 8
[Initial Visit ___] : [unfilled] is here today for an initial visit  for [unfilled]
-chronic, on medrol 2mg daily at home  -risk of immunosuppression with chronic steroid use could be cause for chronic infection at surgical site, and for chronic ecchymosis noted on body

## 2022-04-12 NOTE — ED PROVIDER NOTE - RESPIRATORY WHEEZES
Last Office Visit   2/14/22  Upcoming: Visit date not found    Last Refill  primidone (MYSOLINE) 50 MG tablet 270 tablet 3 4/2/2021     Sig: TAKE 1 TABLET BY MOUTH THREE TIMES DAILY    Sent to pharmacy as: Primidone 50 MG Oral Tablet (MYSOLINE)    Class: Eprescribe    E-Prescribing Status: Receipt confirmed by pharmacy (4/2/2021  3:45 PM CDT)        No Protocol  Medication has been pended for provider review.    DIFFUSE/worse in lower lobes b/l

## 2022-05-05 NOTE — H&P ADULT - PROBLEM SELECTOR PLAN 9
5 - patient with INR 1.38 on admission though no documented afib, not on chronic a/c per med rec   - re-check INR if needed for any ortho procedures/joint tap - patient with INR 1.38 on admission though no documented afib, not on chronic a/c per med rec   - possible increased INR due to malnutrition   - re-check INR if needed for any ortho procedures/joint tap - chronic, no hardware displacement viewed on X ray pelvis/hip/femur   - ortho following
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
## 2022-06-25 NOTE — PROGRESS NOTE ADULT - PROBLEM/PLAN-10
240
DISPLAY PLAN FREE TEXT

## 2022-09-23 NOTE — PHYSICAL THERAPY INITIAL EVALUATION ADULT - GENERAL OBSERVATIONS, REHAB EVAL
Pt rec'd after coming back from x-ray but c/o pain and muscle spams. Pt was pre-medicated in am and is not due to for pain medication until ~11:40 am. Pt was agreeable /c PT & OT evaluation despite c/o pain. ARIAN Shook aware.
Allergy;

## 2022-12-06 NOTE — ED ADULT NURSE NOTE - NSFALLRSKASSISTTYPE_ED_ALL_ED
----- Message from Erendira Cooley MA sent at 12/5/2022  8:26 PM CST -----    ----- Message -----  From: Chanel Ayala  Standing/Walking/Toileting

## 2022-12-12 NOTE — PROGRESS NOTE ADULT - PROBLEM SELECTOR PLAN 7
Appointment has been R/S    chronic  on daily medrol chronic / stable  on daily medrol  continue Tylenol and Methadone for pain control  bowel regimen with Miralax to prevent opoid induced constipation

## 2022-12-15 NOTE — H&P ADULT - FAMILY HISTORY
No pertinent family history in first degree relatives
no abrasions, no jaundice, no lesions, no pruritis, and no rashes.

## 2022-12-15 NOTE — DISCHARGE NOTE ADULT - NSFTFATTENDCERTRD_GEN_ALL_CORE
PA aware My signature below certifies that the above stated patient is homebound and upon completion of the Face-To-Face encounter, has the need for intermittent skilled nursing, physical therapy and/or speech or occupational therapy services in their home for their current diagnosis as outlined in their initial plan of care. These services will continue to be monitored by myself or another physician.

## 2023-07-31 NOTE — PROGRESS NOTE ADULT - PROBLEM SELECTOR PROBLEM 5
Rheumatoid arthritis
HTN (hypertension)
Rheumatoid arthritis
Finasteride Pregnancy And Lactation Text: This medication is absolutely contraindicated during pregnancy. It is unknown if it is excreted in breast milk.

## 2023-08-17 NOTE — PATIENT PROFILE ADULT - NSASFUNCLEVELADLTOILET_GEN_A_NUR
Recent PHQ 2/9 Score    PHQ 2:  PHQ 2 Score Adult PHQ 2 Score Adult PHQ 2 Interpretation Little interest or pleasure in activity?   8/17/2023   8:25 AM 0 No further screening needed 0       PHQ 9:     Health Maintenance Due   Topic Date Due   • Hepatitis B Vaccine (1 of 3 - 3-dose series) Never done   • Shingles Vaccine (1 of 2) Never done   • COVID-19 Vaccine (3 - Pfizer series) 03/22/2021   • Depression Screening  03/16/2023   • Breast Cancer Screening  09/29/2023       Patient is due for topics as listed above but is not proceeding with Immunization(s) COVID-19 and Hep B at this time. Education provided for Immunization(s) COVID-19, Hep B and Shingles.     2 = assistive person

## 2023-09-13 NOTE — PROGRESS NOTE ADULT - PROBLEM SELECTOR PLAN 6
- stable; controlled  - stopped BP meds at [facility] for hypotension per patient's daughter  - holding home lisinopril 20 mg and lasix until LIOR further resolves Mohs Histo Method Verbiage: Each section was then chromacoded and processed in the Mohs lab using the Mohs protocol and submitted for frozen section.

## 2024-12-28 NOTE — OCCUPATIONAL THERAPY INITIAL EVALUATION ADULT - LEVEL OF INDEPENDENCE, OT EVAL
Patient's blood pressure range in the last 24 hours was: BP  Min: 103/53  Max: 141/69.The patient's inpatient anti-hypertensive regimen is listed below:  Current Antihypertensives  bisoprolol-hydrochlorothiazide 2.5-6.25 mg per tablet 1 tablet, Daily, Oral    Not previously taking medication as no recent PCP follow-up.    maximum assist (25% patients effort)

## 2025-03-17 NOTE — PHARMACOTHERAPY INTERVENTION NOTE - COMMENTS
Clarified pain scales for hydromorphone 0.5mg q4h PRN to be used for moderate pain and hydromorphone 1mg q4h PRN to be used for severe pain. Yes